# Patient Record
Sex: MALE | Race: WHITE | NOT HISPANIC OR LATINO
[De-identification: names, ages, dates, MRNs, and addresses within clinical notes are randomized per-mention and may not be internally consistent; named-entity substitution may affect disease eponyms.]

---

## 2015-06-22 RX ORDER — FUROSEMIDE 40 MG
1 TABLET ORAL
Qty: 0 | Refills: 0 | COMMUNITY
Start: 2015-06-22

## 2015-06-22 RX ORDER — CARVEDILOL PHOSPHATE 80 MG/1
0.5 CAPSULE, EXTENDED RELEASE ORAL
Qty: 0 | Refills: 0 | COMMUNITY
Start: 2015-06-22

## 2017-02-23 ENCOUNTER — TRANSCRIPTION ENCOUNTER (OUTPATIENT)
Age: 67
End: 2017-02-23

## 2017-03-28 ENCOUNTER — APPOINTMENT (OUTPATIENT)
Dept: CARDIOLOGY | Facility: CLINIC | Age: 67
End: 2017-03-28

## 2017-04-04 ENCOUNTER — APPOINTMENT (OUTPATIENT)
Dept: CARDIOLOGY | Facility: CLINIC | Age: 67
End: 2017-04-04

## 2017-04-04 VITALS
OXYGEN SATURATION: 100 % | HEIGHT: 71.5 IN | HEART RATE: 82 BPM | BODY MASS INDEX: 34.51 KG/M2 | SYSTOLIC BLOOD PRESSURE: 113 MMHG | WEIGHT: 252 LBS | DIASTOLIC BLOOD PRESSURE: 67 MMHG

## 2017-06-08 ENCOUNTER — APPOINTMENT (OUTPATIENT)
Dept: VASCULAR SURGERY | Facility: CLINIC | Age: 67
End: 2017-06-08

## 2017-06-08 VITALS
SYSTOLIC BLOOD PRESSURE: 116 MMHG | TEMPERATURE: 97.9 F | HEIGHT: 72 IN | DIASTOLIC BLOOD PRESSURE: 79 MMHG | BODY MASS INDEX: 33.86 KG/M2 | WEIGHT: 250 LBS | HEART RATE: 73 BPM

## 2017-07-05 ENCOUNTER — APPOINTMENT (OUTPATIENT)
Dept: ULTRASOUND IMAGING | Facility: CLINIC | Age: 67
End: 2017-07-05

## 2017-07-05 ENCOUNTER — OUTPATIENT (OUTPATIENT)
Dept: OUTPATIENT SERVICES | Facility: HOSPITAL | Age: 67
LOS: 1 days | End: 2017-07-05
Payer: COMMERCIAL

## 2017-07-05 DIAGNOSIS — Z00.8 ENCOUNTER FOR OTHER GENERAL EXAMINATION: ICD-10-CM

## 2017-07-05 DIAGNOSIS — N18.1 CHRONIC KIDNEY DISEASE, STAGE 1: ICD-10-CM

## 2017-07-05 DIAGNOSIS — R74.0 NONSPECIFIC ELEVATION OF LEVELS OF TRANSAMINASE AND LACTIC ACID DEHYDROGENASE [LDH]: ICD-10-CM

## 2017-07-05 PROCEDURE — 76700 US EXAM ABDOM COMPLETE: CPT

## 2017-07-10 ENCOUNTER — LABORATORY RESULT (OUTPATIENT)
Age: 67
End: 2017-07-10

## 2017-07-10 ENCOUNTER — APPOINTMENT (OUTPATIENT)
Age: 67
End: 2017-07-10

## 2017-07-10 VITALS
HEART RATE: 100 BPM | RESPIRATION RATE: 17 BRPM | TEMPERATURE: 97.9 F | HEIGHT: 71.5 IN | SYSTOLIC BLOOD PRESSURE: 101 MMHG | WEIGHT: 257 LBS | BODY MASS INDEX: 35.19 KG/M2 | DIASTOLIC BLOOD PRESSURE: 62 MMHG

## 2017-07-10 DIAGNOSIS — Z86.39 PERSONAL HISTORY OF OTHER ENDOCRINE, NUTRITIONAL AND METABOLIC DISEASE: ICD-10-CM

## 2017-07-10 DIAGNOSIS — R79.89 OTHER SPECIFIED ABNORMAL FINDINGS OF BLOOD CHEMISTRY: ICD-10-CM

## 2017-07-10 LAB
ALBUMIN SERPL ELPH-MCNC: 3.7 G/DL
ALP BLD-CCNC: 109 U/L
ALT SERPL-CCNC: 28 U/L
ANION GAP SERPL CALC-SCNC: 17 MMOL/L
AST SERPL-CCNC: 45 U/L
BILIRUB SERPL-MCNC: 0.6 MG/DL
BUN SERPL-MCNC: 38 MG/DL
CALCIUM SERPL-MCNC: 8.7 MG/DL
CHLORIDE SERPL-SCNC: 103 MMOL/L
CHOLEST SERPL-MCNC: 116 MG/DL
CHOLEST/HDLC SERPL: 3.1 RATIO
CO2 SERPL-SCNC: 22 MMOL/L
CREAT SERPL-MCNC: 1.68 MG/DL
FERRITIN SERPL-MCNC: 51 NG/ML
GLUCOSE SERPL-MCNC: 102 MG/DL
HBA1C MFR BLD HPLC: 8.2 %
HDLC SERPL-MCNC: 38 MG/DL
INR PPP: 1.16 RATIO
IRON SATN MFR SERPL: 11 %
IRON SERPL-MCNC: 32 UG/DL
LDLC SERPL CALC-MCNC: 43 MG/DL
POTASSIUM SERPL-SCNC: 4.2 MMOL/L
PROT SERPL-MCNC: 7.1 G/DL
PT BLD: 13.1 SEC
SODIUM SERPL-SCNC: 142 MMOL/L
T4 FREE SERPL-MCNC: 1.2 NG/DL
TIBC SERPL-MCNC: 290 UG/DL
TRIGL SERPL-MCNC: 174 MG/DL
TSH SERPL-ACNC: 11.84 UIU/ML
UIBC SERPL-MCNC: 258 UG/DL

## 2017-07-11 LAB
BASOPHILS # BLD AUTO: 0.01 K/UL
BASOPHILS NFR BLD AUTO: 0.3 %
EOSINOPHIL # BLD AUTO: 0.39 K/UL
EOSINOPHIL NFR BLD AUTO: 12.5 %
HCT VFR BLD CALC: 31.9 %
HGB BLD-MCNC: 10 G/DL
IMM GRANULOCYTES NFR BLD AUTO: 0 %
LYMPHOCYTES # BLD AUTO: 0.72 K/UL
LYMPHOCYTES NFR BLD AUTO: 23.2 %
MAN DIFF?: NORMAL
MCHC RBC-ENTMCNC: 29.9 PG
MCHC RBC-ENTMCNC: 31.3 GM/DL
MCV RBC AUTO: 95.5 FL
MONOCYTES # BLD AUTO: 0.29 K/UL
MONOCYTES NFR BLD AUTO: 9.3 %
NEUTROPHILS # BLD AUTO: 1.7 K/UL
NEUTROPHILS NFR BLD AUTO: 54.7 %
PLATELET # BLD AUTO: 46 K/UL
RBC # BLD: 3.34 M/UL
RBC # FLD: 16 %
TTG IGA SER IA-ACNC: 5.8 UNITS
TTG IGA SER-ACNC: NEGATIVE
TTG IGG SER IA-ACNC: <5 UNITS
TTG IGG SER IA-ACNC: NEGATIVE
WBC # FLD AUTO: 3.11 K/UL

## 2017-07-12 LAB
A1AT SERPL-MCNC: 158 MG/DL
AFP-TM SERPL-MCNC: 1 NG/ML
ANA SER IF-ACNC: NEGATIVE
CERULOPLASMIN SERPL-MCNC: 40 MG/DL
DEPRECATED KAPPA LC FREE/LAMBDA SER: 2.37 RATIO
IGA SER QL IEP: 281 MG/DL
IGG SER QL IEP: 1240 MG/DL
IGM SER QL IEP: 100 MG/DL
KAPPA LC CSF-MCNC: 3.11 MG/DL
KAPPA LC SERPL-MCNC: 7.37 MG/DL
LKM AB SER QL IF: 1.4 UNITS
MITOCHONDRIA AB SER IF-ACNC: NORMAL
SMOOTH MUSCLE AB SER QL IF: NORMAL

## 2017-07-17 ENCOUNTER — APPOINTMENT (OUTPATIENT)
Age: 67
End: 2017-07-17

## 2017-07-27 ENCOUNTER — APPOINTMENT (OUTPATIENT)
Dept: ENDOCRINOLOGY | Facility: CLINIC | Age: 67
End: 2017-07-27
Payer: COMMERCIAL

## 2017-07-27 VITALS
DIASTOLIC BLOOD PRESSURE: 80 MMHG | HEIGHT: 72 IN | HEART RATE: 51 BPM | WEIGHT: 247 LBS | BODY MASS INDEX: 33.46 KG/M2 | SYSTOLIC BLOOD PRESSURE: 120 MMHG | OXYGEN SATURATION: 97 %

## 2017-07-27 PROCEDURE — 99204 OFFICE O/P NEW MOD 45 MIN: CPT

## 2017-07-27 RX ORDER — DEXTROSE 4 G
4-6 TABLET,CHEWABLE ORAL
Qty: 1 | Refills: 5 | Status: ACTIVE | COMMUNITY
Start: 2017-07-27 | End: 1900-01-01

## 2017-07-28 LAB — PROLACTIN SERPL-MCNC: 1.1 NG/ML

## 2017-07-29 ENCOUNTER — OUTPATIENT (OUTPATIENT)
Dept: OUTPATIENT SERVICES | Facility: HOSPITAL | Age: 67
LOS: 1 days | End: 2017-07-29
Payer: COMMERCIAL

## 2017-07-29 ENCOUNTER — APPOINTMENT (OUTPATIENT)
Dept: RADIOLOGY | Facility: IMAGING CENTER | Age: 67
End: 2017-07-29
Payer: COMMERCIAL

## 2017-07-29 ENCOUNTER — RX RENEWAL (OUTPATIENT)
Age: 67
End: 2017-07-29

## 2017-07-29 DIAGNOSIS — Z00.8 ENCOUNTER FOR OTHER GENERAL EXAMINATION: ICD-10-CM

## 2017-07-29 PROCEDURE — 71020: CPT | Mod: 26

## 2017-07-29 PROCEDURE — 71046 X-RAY EXAM CHEST 2 VIEWS: CPT

## 2017-08-01 LAB
MONOMERIC PROLACTIN (ICMA)*: 0.9 NG/ML
PERCENT MACROPROLACTIN: 0 %
PROLACTIN, SERUM (ICMA)*: 0.9 NG/ML

## 2017-08-15 ENCOUNTER — NON-APPOINTMENT (OUTPATIENT)
Age: 67
End: 2017-08-15

## 2017-08-15 ENCOUNTER — APPOINTMENT (OUTPATIENT)
Dept: CARDIOLOGY | Facility: CLINIC | Age: 67
End: 2017-08-15
Payer: COMMERCIAL

## 2017-08-15 VITALS
HEIGHT: 72 IN | BODY MASS INDEX: 31.56 KG/M2 | WEIGHT: 233 LBS | DIASTOLIC BLOOD PRESSURE: 60 MMHG | SYSTOLIC BLOOD PRESSURE: 96 MMHG | OXYGEN SATURATION: 98 % | HEART RATE: 97 BPM

## 2017-08-15 PROCEDURE — 93000 ELECTROCARDIOGRAM COMPLETE: CPT

## 2017-08-15 PROCEDURE — 99215 OFFICE O/P EST HI 40 MIN: CPT

## 2017-08-22 ENCOUNTER — OUTPATIENT (OUTPATIENT)
Dept: OUTPATIENT SERVICES | Facility: HOSPITAL | Age: 67
LOS: 1 days | End: 2017-08-22
Payer: COMMERCIAL

## 2017-08-22 VITALS
HEART RATE: 90 BPM | HEIGHT: 72 IN | SYSTOLIC BLOOD PRESSURE: 106 MMHG | OXYGEN SATURATION: 99 % | RESPIRATION RATE: 16 BRPM | WEIGHT: 227.96 LBS | TEMPERATURE: 97 F | DIASTOLIC BLOOD PRESSURE: 69 MMHG

## 2017-08-22 DIAGNOSIS — Z89.9 ACQUIRED ABSENCE OF LIMB, UNSPECIFIED: Chronic | ICD-10-CM

## 2017-08-22 DIAGNOSIS — K74.60 UNSPECIFIED CIRRHOSIS OF LIVER: ICD-10-CM

## 2017-08-22 DIAGNOSIS — Z01.818 ENCOUNTER FOR OTHER PREPROCEDURAL EXAMINATION: ICD-10-CM

## 2017-08-22 DIAGNOSIS — E11.9 TYPE 2 DIABETES MELLITUS WITHOUT COMPLICATIONS: ICD-10-CM

## 2017-08-22 DIAGNOSIS — I25.10 ATHEROSCLEROTIC HEART DISEASE OF NATIVE CORONARY ARTERY WITHOUT ANGINA PECTORIS: ICD-10-CM

## 2017-08-22 DIAGNOSIS — D64.9 ANEMIA, UNSPECIFIED: ICD-10-CM

## 2017-08-22 DIAGNOSIS — G47.30 SLEEP APNEA, UNSPECIFIED: ICD-10-CM

## 2017-08-22 LAB
ANION GAP SERPL CALC-SCNC: 17 MMOL/L — SIGNIFICANT CHANGE UP (ref 5–17)
BUN SERPL-MCNC: 40 MG/DL — HIGH (ref 7–23)
CALCIUM SERPL-MCNC: 9.6 MG/DL — SIGNIFICANT CHANGE UP (ref 8.4–10.5)
CHLORIDE SERPL-SCNC: 101 MMOL/L — SIGNIFICANT CHANGE UP (ref 96–108)
CO2 SERPL-SCNC: 22 MMOL/L — SIGNIFICANT CHANGE UP (ref 22–31)
CREAT SERPL-MCNC: 1.69 MG/DL — HIGH (ref 0.5–1.3)
GLUCOSE SERPL-MCNC: 147 MG/DL — HIGH (ref 70–99)
HCT VFR BLD CALC: 35.8 % — LOW (ref 39–50)
HGB BLD-MCNC: 11.3 G/DL — LOW (ref 13–17)
MCHC RBC-ENTMCNC: 29.7 PG — SIGNIFICANT CHANGE UP (ref 27–34)
MCHC RBC-ENTMCNC: 31.6 GM/DL — LOW (ref 32–36)
MCV RBC AUTO: 94 FL — SIGNIFICANT CHANGE UP (ref 80–100)
PLATELET # BLD AUTO: 55 K/UL — LOW (ref 150–400)
POTASSIUM SERPL-MCNC: 4.7 MMOL/L — SIGNIFICANT CHANGE UP (ref 3.5–5.3)
POTASSIUM SERPL-SCNC: 4.7 MMOL/L — SIGNIFICANT CHANGE UP (ref 3.5–5.3)
RBC # BLD: 3.81 M/UL — LOW (ref 4.2–5.8)
RBC # FLD: 16.4 % — HIGH (ref 10.3–14.5)
SODIUM SERPL-SCNC: 140 MMOL/L — SIGNIFICANT CHANGE UP (ref 135–145)
WBC # BLD: 3.87 K/UL — SIGNIFICANT CHANGE UP (ref 3.8–10.5)
WBC # FLD AUTO: 3.87 K/UL — SIGNIFICANT CHANGE UP (ref 3.8–10.5)

## 2017-08-22 PROCEDURE — 85027 COMPLETE CBC AUTOMATED: CPT

## 2017-08-22 PROCEDURE — G0463: CPT

## 2017-08-22 PROCEDURE — 80048 BASIC METABOLIC PNL TOTAL CA: CPT

## 2017-08-22 NOTE — H&P PST ADULT - NEGATIVE GASTROINTESTINAL SYMPTOMS
no melena/no diarrhea/no constipation/no jaundice/no abdominal pain/no change in bowel habits/no nausea/no vomiting

## 2017-08-22 NOTE — H&P PST ADULT - PSH
AICD (automatic cardioverter/defibrillator) present  placement in (2006)  Coronary atherosclerosis of artery bypass graft  CABG X 4 (1986)  Stented coronary artery  RCA (1999) AICD (automatic cardioverter/defibrillator) present  placement in (2006)  Coronary atherosclerosis of artery bypass graft  CABG X 4 (1986)  History of amputation  right foot, 2nd toe, osteo 2015  Stented coronary artery  RCA (1999)

## 2017-08-22 NOTE — H&P PST ADULT - PMH
AICD lead malfunction    Coronary artery disease    DM (diabetes mellitus)    Heart failure with reduced left ventricular function    HLD (hyperlipidemia)    HTN (hypertension)    Ischemic cardiomyopathy    Pituitary adenoma    Presence of stent in coronary artery  2 stents  Thrombocytopenia AICD lead malfunction  history of  Anemia    Coronary artery disease    DM (diabetes mellitus)  type 2, Hg A1C 8.2 % ( 7/10/17)  Heart failure with reduced left ventricular function  EF 37%  Hepatic cirrhosis, unspecified hepatic cirrhosis type    History of hyperprolactinemia    History of osteomyelitis  2015, right foot 2nd toe  HLD (hyperlipidemia)    HTN (hypertension)    ICD (implantable cardioverter-defibrillator) in place  [employer]tr[employer], Model [id number] , last interrogation 4/2017  Ischemic cardiomyopathy    Pituitary adenoma  as per patient chronic, no changes , recently restarted follow up with endocrinologist ( note in allscripts )  Presence of stent in coronary artery  2 stents  PVD (peripheral vascular disease)    Sleep apnea  not using CPAP  Thrombocytopenia  Chronic AICD lead malfunction  history of  Anemia    Coronary artery disease    DM (diabetes mellitus)  type 2, Hg A1C 8.2 % ( 7/10/17)  Heart failure with reduced left ventricular function  EF 37%  Hepatic cirrhosis, unspecified hepatic cirrhosis type    History of hyperprolactinemia    History of osteomyelitis  2015, right foot 2nd toe  HLD (hyperlipidemia)    HTN (hypertension)    Hypothyroidism    ICD (implantable cardioverter-defibrillator) in place  Medtronic, Model [id number] , last interrogation 4/2017  Ischemic cardiomyopathy    Pituitary adenoma  as per patient chronic, no changes , recently restarted follow up with endocrinologist ( note in allscripts )  Presence of stent in coronary artery  2 stents  PVD (peripheral vascular disease)    Sleep apnea  not using CPAP  Thrombocytopenia  Chronic

## 2017-08-22 NOTE — H&P PST ADULT - NEGATIVE CARDIOVASCULAR SYMPTOMS
no paroxysmal nocturnal dyspnea/no claudication/no peripheral edema/no chest pain/no orthopnea/no dyspnea on exertion/no palpitations

## 2017-08-22 NOTE — H&P PST ADULT - HISTORY OF PRESENT ILLNESS
66 y/o Male with a pmhx significant for Type 2 DM with Htn, HLD, thrombocytopenia, CAD s/p stents and 4V cabg, S/p Medtronic AICD, and chronic R diabetic foot ulcer and R 2nd toe Ulcer. Was sent in by Surgeon, Dr Tristan, for worsening foot infection and further evaluation for possible surgical management. Patient reports he has been on oral keflex 500mg bid since January.  He has been keeping all scheduled appointments with ID specialist and his weekly appt with his podiatrist. He denies pain, purulent drainage or increase drainage from wound. No fever, chills, cough, chest pain, sob, headaches, dizziness, or recent injury. 68 y/o Male with a pmhx significant for Type 2 DM with HTN, HLD, thrombocytopenia, CAD, CABG x 4 (1986),  s/p 2 cardiac stents ( 1999) on ASA, CHF, Medtronic AICD, hypothyroidism,  Anemia, liver cirrhosis unknown etiology, presents to [facility] for scheduled EGD and colonoscopy on 8/29/17. Denies fever, chills, no acute complains.

## 2017-08-22 NOTE — H&P PST ADULT - OTHER CARE PROVIDERS
Dr Cuadra-podiatry; Dr Tristan- Surgeon, Vascular- Dr Brooke cardiologist Dr. Douglas Rivers [phone], podiatrist  Dr Tristan- [phone], hepatologist Dr. Inderjit Alaniz [phone], Endocrine Dr. Nicole Hennessy

## 2017-08-22 NOTE — H&P PST ADULT - NS MD HP INPLANTS MED DEV
Pacemaker/Automatic Implantable Cardioverter Defibrillator/cardiac stents 2 cardiac stents/Automatic Implantable Cardioverter Defibrillator

## 2017-08-29 ENCOUNTER — OUTPATIENT (OUTPATIENT)
Dept: OUTPATIENT SERVICES | Facility: HOSPITAL | Age: 67
LOS: 1 days | End: 2017-08-29
Payer: COMMERCIAL

## 2017-08-29 ENCOUNTER — APPOINTMENT (OUTPATIENT)
Age: 67
End: 2017-08-29

## 2017-08-29 DIAGNOSIS — Z89.9 ACQUIRED ABSENCE OF LIMB, UNSPECIFIED: Chronic | ICD-10-CM

## 2017-08-29 DIAGNOSIS — K74.60 UNSPECIFIED CIRRHOSIS OF LIVER: ICD-10-CM

## 2017-08-29 PROCEDURE — 43235 EGD DIAGNOSTIC BRUSH WASH: CPT | Mod: GC

## 2017-08-29 PROCEDURE — 45378 DIAGNOSTIC COLONOSCOPY: CPT | Mod: GC

## 2017-08-29 PROCEDURE — 43235 EGD DIAGNOSTIC BRUSH WASH: CPT

## 2017-08-29 PROCEDURE — G0121: CPT

## 2017-09-06 ENCOUNTER — APPOINTMENT (OUTPATIENT)
Dept: ENDOCRINOLOGY | Facility: CLINIC | Age: 67
End: 2017-09-06
Payer: COMMERCIAL

## 2017-09-06 VITALS
SYSTOLIC BLOOD PRESSURE: 90 MMHG | BODY MASS INDEX: 30.88 KG/M2 | HEIGHT: 72 IN | WEIGHT: 228 LBS | HEART RATE: 106 BPM | DIASTOLIC BLOOD PRESSURE: 60 MMHG | OXYGEN SATURATION: 99 %

## 2017-09-06 PROCEDURE — 99215 OFFICE O/P EST HI 40 MIN: CPT

## 2017-09-07 ENCOUNTER — RESULT CHARGE (OUTPATIENT)
Age: 67
End: 2017-09-07

## 2017-09-07 LAB
ALBUMIN SERPL ELPH-MCNC: 3.9 G/DL
ALP BLD-CCNC: 114 U/L
ALT SERPL-CCNC: 28 U/L
ANION GAP SERPL CALC-SCNC: 19 MMOL/L
AST SERPL-CCNC: 42 U/L
BILIRUB SERPL-MCNC: 1 MG/DL
BUN SERPL-MCNC: 37 MG/DL
CALCIUM SERPL-MCNC: 9.1 MG/DL
CHLORIDE SERPL-SCNC: 104 MMOL/L
CO2 SERPL-SCNC: 23 MMOL/L
CREAT SERPL-MCNC: 1.69 MG/DL
CREAT SPEC-SCNC: 198 MG/DL
GLUCOSE SERPL-MCNC: 163 MG/DL
HBA1C MFR BLD HPLC: 6.7 %
MICROALBUMIN 24H UR DL<=1MG/L-MCNC: 8.9 MG/DL
MICROALBUMIN/CREAT 24H UR-RTO: 45 MG/G
POTASSIUM SERPL-SCNC: 4.4 MMOL/L
PROT SERPL-MCNC: 7 G/DL
SODIUM SERPL-SCNC: 146 MMOL/L
T4 FREE SERPL-MCNC: 1.4 NG/DL
TSH SERPL-ACNC: 7.83 UIU/ML

## 2017-09-15 ENCOUNTER — CLINICAL ADVICE (OUTPATIENT)
Age: 67
End: 2017-09-15

## 2017-09-28 ENCOUNTER — APPOINTMENT (OUTPATIENT)
Age: 67
End: 2017-09-28
Payer: COMMERCIAL

## 2017-09-28 ENCOUNTER — LABORATORY RESULT (OUTPATIENT)
Age: 67
End: 2017-09-28

## 2017-09-28 VITALS
TEMPERATURE: 97.7 F | HEART RATE: 89 BPM | RESPIRATION RATE: 17 BRPM | SYSTOLIC BLOOD PRESSURE: 112 MMHG | WEIGHT: 234 LBS | DIASTOLIC BLOOD PRESSURE: 73 MMHG | HEIGHT: 72 IN | BODY MASS INDEX: 31.69 KG/M2

## 2017-09-28 LAB
ALBUMIN SERPL ELPH-MCNC: 3.7 G/DL
ALP BLD-CCNC: 132 U/L
ALT SERPL-CCNC: 41 U/L
ANION GAP SERPL CALC-SCNC: 13 MMOL/L
AST SERPL-CCNC: 50 U/L
BASOPHILS # BLD AUTO: 0.01 K/UL
BASOPHILS NFR BLD AUTO: 0.3 %
BILIRUB SERPL-MCNC: 0.8 MG/DL
BUN SERPL-MCNC: 31 MG/DL
CALCIUM SERPL-MCNC: 9.3 MG/DL
CHLORIDE SERPL-SCNC: 106 MMOL/L
CO2 SERPL-SCNC: 24 MMOL/L
CREAT SERPL-MCNC: 1.45 MG/DL
EOSINOPHIL # BLD AUTO: 0.52 K/UL
EOSINOPHIL NFR BLD AUTO: 14 %
GLUCOSE SERPL-MCNC: 91 MG/DL
HCT VFR BLD CALC: 30.8 %
HGB BLD-MCNC: 9.7 G/DL
IMM GRANULOCYTES NFR BLD AUTO: 0 %
INR PPP: 1.19 RATIO
LYMPHOCYTES # BLD AUTO: 1.02 K/UL
LYMPHOCYTES NFR BLD AUTO: 27.4 %
MAN DIFF?: NORMAL
MCHC RBC-ENTMCNC: 30 PG
MCHC RBC-ENTMCNC: 31.5 GM/DL
MCV RBC AUTO: 95.4 FL
MONOCYTES # BLD AUTO: 0.35 K/UL
MONOCYTES NFR BLD AUTO: 9.4 %
NEUTROPHILS # BLD AUTO: 1.82 K/UL
NEUTROPHILS NFR BLD AUTO: 48.9 %
PLATELET # BLD AUTO: 47 K/UL
POTASSIUM SERPL-SCNC: 4.4 MMOL/L
PROT SERPL-MCNC: 7.2 G/DL
PT BLD: 13.5 SEC
RBC # BLD: 3.23 M/UL
RBC # FLD: 16 %
SODIUM SERPL-SCNC: 143 MMOL/L
WBC # FLD AUTO: 3.72 K/UL

## 2017-09-28 PROCEDURE — 99214 OFFICE O/P EST MOD 30 MIN: CPT

## 2017-09-29 LAB — AFP-TM SERPL-MCNC: 1.3 NG/ML

## 2017-10-10 ENCOUNTER — APPOINTMENT (OUTPATIENT)
Dept: CARDIOLOGY | Facility: CLINIC | Age: 67
End: 2017-10-10
Payer: COMMERCIAL

## 2017-10-10 ENCOUNTER — RX RENEWAL (OUTPATIENT)
Age: 67
End: 2017-10-10

## 2017-10-10 ENCOUNTER — NON-APPOINTMENT (OUTPATIENT)
Age: 67
End: 2017-10-10

## 2017-10-10 VITALS
HEART RATE: 92 BPM | BODY MASS INDEX: 31.83 KG/M2 | DIASTOLIC BLOOD PRESSURE: 70 MMHG | SYSTOLIC BLOOD PRESSURE: 116 MMHG | HEIGHT: 72 IN | WEIGHT: 235 LBS

## 2017-10-10 DIAGNOSIS — I48.3 TYPICAL ATRIAL FLUTTER: ICD-10-CM

## 2017-10-10 PROCEDURE — 93283 PRGRMG EVAL IMPLANTABLE DFB: CPT

## 2017-10-10 PROCEDURE — 99215 OFFICE O/P EST HI 40 MIN: CPT

## 2017-10-11 ENCOUNTER — MOBILE ON CALL (OUTPATIENT)
Age: 67
End: 2017-10-11

## 2017-10-18 ENCOUNTER — NON-APPOINTMENT (OUTPATIENT)
Age: 67
End: 2017-10-18

## 2017-10-18 ENCOUNTER — APPOINTMENT (OUTPATIENT)
Dept: ELECTROPHYSIOLOGY | Facility: CLINIC | Age: 67
End: 2017-10-18
Payer: COMMERCIAL

## 2017-10-18 VITALS
HEIGHT: 72 IN | HEART RATE: 86 BPM | DIASTOLIC BLOOD PRESSURE: 69 MMHG | WEIGHT: 230 LBS | OXYGEN SATURATION: 99 % | BODY MASS INDEX: 31.15 KG/M2 | SYSTOLIC BLOOD PRESSURE: 102 MMHG

## 2017-10-18 PROCEDURE — 93000 ELECTROCARDIOGRAM COMPLETE: CPT

## 2017-10-18 PROCEDURE — 99215 OFFICE O/P EST HI 40 MIN: CPT

## 2017-11-07 ENCOUNTER — NON-APPOINTMENT (OUTPATIENT)
Age: 67
End: 2017-11-07

## 2017-11-07 ENCOUNTER — APPOINTMENT (OUTPATIENT)
Dept: CARDIOLOGY | Facility: CLINIC | Age: 67
End: 2017-11-07
Payer: COMMERCIAL

## 2017-11-07 VITALS
WEIGHT: 223.2 LBS | DIASTOLIC BLOOD PRESSURE: 67 MMHG | HEART RATE: 86 BPM | BODY MASS INDEX: 30.23 KG/M2 | TEMPERATURE: 97.4 F | SYSTOLIC BLOOD PRESSURE: 107 MMHG | OXYGEN SATURATION: 99 % | HEIGHT: 72 IN

## 2017-11-07 PROCEDURE — 93000 ELECTROCARDIOGRAM COMPLETE: CPT

## 2017-11-07 PROCEDURE — 99215 OFFICE O/P EST HI 40 MIN: CPT

## 2017-11-22 ENCOUNTER — APPOINTMENT (OUTPATIENT)
Dept: ELECTROPHYSIOLOGY | Facility: CLINIC | Age: 67
End: 2017-11-22

## 2018-01-02 ENCOUNTER — APPOINTMENT (OUTPATIENT)
Dept: CARDIOLOGY | Facility: CLINIC | Age: 68
End: 2018-01-02
Payer: COMMERCIAL

## 2018-01-02 VITALS
SYSTOLIC BLOOD PRESSURE: 104 MMHG | HEIGHT: 72 IN | DIASTOLIC BLOOD PRESSURE: 67 MMHG | WEIGHT: 214 LBS | BODY MASS INDEX: 28.99 KG/M2

## 2018-01-02 PROCEDURE — 99212 OFFICE O/P EST SF 10 MIN: CPT

## 2018-01-02 PROCEDURE — 93283 PRGRMG EVAL IMPLANTABLE DFB: CPT

## 2018-01-02 PROCEDURE — ZZZZZ: CPT

## 2018-01-08 ENCOUNTER — APPOINTMENT (OUTPATIENT)
Dept: ENDOCRINOLOGY | Facility: CLINIC | Age: 68
End: 2018-01-08
Payer: COMMERCIAL

## 2018-01-08 VITALS
BODY MASS INDEX: 28.71 KG/M2 | OXYGEN SATURATION: 98 % | DIASTOLIC BLOOD PRESSURE: 80 MMHG | WEIGHT: 212 LBS | HEART RATE: 70 BPM | HEIGHT: 72 IN | SYSTOLIC BLOOD PRESSURE: 110 MMHG

## 2018-01-08 LAB
GLUCOSE BLDC GLUCOMTR-MCNC: 39
GLUCOSE BLDC GLUCOMTR-MCNC: 52
GLUCOSE BLDC GLUCOMTR-MCNC: 53
HBA1C MFR BLD HPLC: 6.8

## 2018-01-08 PROCEDURE — 99215 OFFICE O/P EST HI 40 MIN: CPT

## 2018-01-08 RX ORDER — SODIUM SULFATE, POTASSIUM SULFATE, MAGNESIUM SULFATE 17.5; 3.13; 1.6 G/ML; G/ML; G/ML
17.5-3.13-1.6 SOLUTION, CONCENTRATE ORAL
Qty: 1 | Refills: 0 | Status: DISCONTINUED | COMMUNITY
Start: 2017-07-20 | End: 2018-01-08

## 2018-01-10 LAB — GLUCOSE BLDC GLUCOMTR-MCNC: 62

## 2018-02-18 ENCOUNTER — TRANSCRIPTION ENCOUNTER (OUTPATIENT)
Age: 68
End: 2018-02-18

## 2018-02-20 ENCOUNTER — NON-APPOINTMENT (OUTPATIENT)
Age: 68
End: 2018-02-20

## 2018-02-20 ENCOUNTER — APPOINTMENT (OUTPATIENT)
Dept: CARDIOLOGY | Facility: CLINIC | Age: 68
End: 2018-02-20
Payer: COMMERCIAL

## 2018-02-20 ENCOUNTER — OUTPATIENT (OUTPATIENT)
Dept: OUTPATIENT SERVICES | Facility: HOSPITAL | Age: 68
LOS: 1 days | End: 2018-02-20
Payer: COMMERCIAL

## 2018-02-20 ENCOUNTER — APPOINTMENT (OUTPATIENT)
Dept: UROLOGY | Facility: CLINIC | Age: 68
End: 2018-02-20
Payer: COMMERCIAL

## 2018-02-20 VITALS
BODY MASS INDEX: 28.71 KG/M2 | HEIGHT: 72 IN | HEART RATE: 114 BPM | DIASTOLIC BLOOD PRESSURE: 79 MMHG | WEIGHT: 212 LBS | RESPIRATION RATE: 18 BRPM | SYSTOLIC BLOOD PRESSURE: 126 MMHG

## 2018-02-20 VITALS
WEIGHT: 256 LBS | BODY MASS INDEX: 34.67 KG/M2 | HEIGHT: 72 IN | HEART RATE: 104 BPM | SYSTOLIC BLOOD PRESSURE: 122 MMHG | DIASTOLIC BLOOD PRESSURE: 76 MMHG | OXYGEN SATURATION: 100 %

## 2018-02-20 DIAGNOSIS — N43.3 HYDROCELE, UNSPECIFIED: ICD-10-CM

## 2018-02-20 DIAGNOSIS — N50.89 OTHER SPECIFIED DISORDERS OF THE MALE GENITAL ORGANS: ICD-10-CM

## 2018-02-20 DIAGNOSIS — Z89.9 ACQUIRED ABSENCE OF LIMB, UNSPECIFIED: Chronic | ICD-10-CM

## 2018-02-20 PROCEDURE — 93975 VASCULAR STUDY: CPT | Mod: 26

## 2018-02-20 PROCEDURE — 93975 VASCULAR STUDY: CPT

## 2018-02-20 PROCEDURE — 99215 OFFICE O/P EST HI 40 MIN: CPT

## 2018-02-20 PROCEDURE — 93000 ELECTROCARDIOGRAM COMPLETE: CPT

## 2018-02-20 PROCEDURE — 76870 US EXAM SCROTUM: CPT

## 2018-02-20 PROCEDURE — 76870 US EXAM SCROTUM: CPT | Mod: 26

## 2018-02-20 PROCEDURE — 36415 COLL VENOUS BLD VENIPUNCTURE: CPT

## 2018-02-20 PROCEDURE — 99244 OFF/OP CNSLTJ NEW/EST MOD 40: CPT | Mod: 25

## 2018-02-20 RX ORDER — CARVEDILOL 3.12 MG/1
TABLET, FILM COATED ORAL
Refills: 0 | Status: COMPLETED | COMMUNITY
End: 2018-02-20

## 2018-02-20 RX ORDER — BROMOCRIPTINE MESYLATE 5 MG/1
5 CAPSULE, GELATIN COATED ORAL
Refills: 0 | Status: COMPLETED | COMMUNITY
End: 2018-02-20

## 2018-02-20 RX ORDER — RAMIPRIL 5 MG/1
CAPSULE ORAL
Refills: 0 | Status: COMPLETED | COMMUNITY
End: 2018-02-20

## 2018-02-20 RX ORDER — ATORVASTATIN CALCIUM 80 MG/1
TABLET, FILM COATED ORAL
Refills: 0 | Status: COMPLETED | COMMUNITY
End: 2018-02-20

## 2018-02-20 RX ORDER — ASPIRIN 81 MG
81 TABLET, DELAYED RELEASE (ENTERIC COATED) ORAL
Refills: 0 | Status: COMPLETED | COMMUNITY
End: 2018-02-20

## 2018-02-20 RX ORDER — SITAGLIPTIN 100 MG/1
TABLET, FILM COATED ORAL
Refills: 0 | Status: COMPLETED | COMMUNITY
End: 2018-02-20

## 2018-02-21 LAB
ANION GAP SERPL CALC-SCNC: 12 MMOL/L
BUN SERPL-MCNC: 33 MG/DL
CALCIUM SERPL-MCNC: 8.7 MG/DL
CHLORIDE SERPL-SCNC: 106 MMOL/L
CO2 SERPL-SCNC: 27 MMOL/L
CREAT SERPL-MCNC: 1.33 MG/DL
GLUCOSE SERPL-MCNC: 222 MG/DL
POTASSIUM SERPL-SCNC: 3.9 MMOL/L
SODIUM SERPL-SCNC: 145 MMOL/L

## 2018-02-23 DIAGNOSIS — N43.3 HYDROCELE, UNSPECIFIED: ICD-10-CM

## 2018-02-23 DIAGNOSIS — N50.89 OTHER SPECIFIED DISORDERS OF THE MALE GENITAL ORGANS: ICD-10-CM

## 2018-02-25 ENCOUNTER — TRANSCRIPTION ENCOUNTER (OUTPATIENT)
Age: 68
End: 2018-02-25

## 2018-03-02 ENCOUNTER — APPOINTMENT (OUTPATIENT)
Dept: CARDIOLOGY | Facility: CLINIC | Age: 68
End: 2018-03-02
Payer: COMMERCIAL

## 2018-03-02 ENCOUNTER — NON-APPOINTMENT (OUTPATIENT)
Age: 68
End: 2018-03-02

## 2018-03-02 VITALS
BODY MASS INDEX: 35.41 KG/M2 | OXYGEN SATURATION: 96 % | HEIGHT: 72 IN | SYSTOLIC BLOOD PRESSURE: 124 MMHG | DIASTOLIC BLOOD PRESSURE: 84 MMHG | WEIGHT: 261.4 LBS | HEART RATE: 110 BPM

## 2018-03-02 PROCEDURE — 93306 TTE W/DOPPLER COMPLETE: CPT

## 2018-03-02 PROCEDURE — 99215 OFFICE O/P EST HI 40 MIN: CPT

## 2018-03-02 PROCEDURE — 93000 ELECTROCARDIOGRAM COMPLETE: CPT

## 2018-03-05 ENCOUNTER — OUTPATIENT (OUTPATIENT)
Dept: OUTPATIENT SERVICES | Facility: HOSPITAL | Age: 68
LOS: 1 days | End: 2018-03-05
Payer: COMMERCIAL

## 2018-03-05 ENCOUNTER — APPOINTMENT (OUTPATIENT)
Dept: ULTRASOUND IMAGING | Facility: IMAGING CENTER | Age: 68
End: 2018-03-05
Payer: COMMERCIAL

## 2018-03-05 DIAGNOSIS — Z89.9 ACQUIRED ABSENCE OF LIMB, UNSPECIFIED: Chronic | ICD-10-CM

## 2018-03-05 DIAGNOSIS — K74.60 UNSPECIFIED CIRRHOSIS OF LIVER: ICD-10-CM

## 2018-03-05 PROCEDURE — 93975 VASCULAR STUDY: CPT

## 2018-03-05 PROCEDURE — 76700 US EXAM ABDOM COMPLETE: CPT

## 2018-03-05 PROCEDURE — 76700 US EXAM ABDOM COMPLETE: CPT | Mod: 26

## 2018-03-06 LAB
AFP-TM SERPL-MCNC: 1.6 NG/ML
ALBUMIN SERPL ELPH-MCNC: 3.5 G/DL
ALP BLD-CCNC: 153 U/L
ALT SERPL-CCNC: 32 U/L
ANION GAP SERPL CALC-SCNC: 17 MMOL/L
AST SERPL-CCNC: 44 U/L
BASOPHILS # BLD AUTO: 0.01 K/UL
BASOPHILS NFR BLD AUTO: 0.3 %
BILIRUB SERPL-MCNC: 0.7 MG/DL
BUN SERPL-MCNC: 41 MG/DL
CALCIUM SERPL-MCNC: 9.1 MG/DL
CHLORIDE SERPL-SCNC: 99 MMOL/L
CO2 SERPL-SCNC: 28 MMOL/L
CREAT SERPL-MCNC: 1.45 MG/DL
EOSINOPHIL # BLD AUTO: 0.23 K/UL
EOSINOPHIL NFR BLD AUTO: 6.5 %
GLUCOSE SERPL-MCNC: 91 MG/DL
HCT VFR BLD CALC: 31.3 %
HGB BLD-MCNC: 10.1 G/DL
IMM GRANULOCYTES NFR BLD AUTO: 0.3 %
INR PPP: 1.3 RATIO
LYMPHOCYTES # BLD AUTO: 0.85 K/UL
LYMPHOCYTES NFR BLD AUTO: 23.9 %
MAN DIFF?: NORMAL
MCHC RBC-ENTMCNC: 30.3 PG
MCHC RBC-ENTMCNC: 32.3 GM/DL
MCV RBC AUTO: 94 FL
MONOCYTES # BLD AUTO: 0.36 K/UL
MONOCYTES NFR BLD AUTO: 10.1 %
NEUTROPHILS # BLD AUTO: 2.09 K/UL
NEUTROPHILS NFR BLD AUTO: 58.9 %
PLATELET # BLD AUTO: 49 K/UL
POTASSIUM SERPL-SCNC: 4 MMOL/L
PROT SERPL-MCNC: 7 G/DL
PT BLD: 14.8 SEC
RBC # BLD: 3.33 M/UL
RBC # FLD: 17.2 %
SODIUM SERPL-SCNC: 144 MMOL/L
WBC # FLD AUTO: 3.55 K/UL

## 2018-03-09 ENCOUNTER — APPOINTMENT (OUTPATIENT)
Dept: CARDIOLOGY | Facility: CLINIC | Age: 68
End: 2018-03-09
Payer: COMMERCIAL

## 2018-03-09 ENCOUNTER — NON-APPOINTMENT (OUTPATIENT)
Age: 68
End: 2018-03-09

## 2018-03-09 VITALS
BODY MASS INDEX: 32.41 KG/M2 | WEIGHT: 239 LBS | DIASTOLIC BLOOD PRESSURE: 72 MMHG | HEART RATE: 113 BPM | OXYGEN SATURATION: 97 % | SYSTOLIC BLOOD PRESSURE: 110 MMHG

## 2018-03-09 PROCEDURE — 93000 ELECTROCARDIOGRAM COMPLETE: CPT

## 2018-03-09 PROCEDURE — 99215 OFFICE O/P EST HI 40 MIN: CPT

## 2018-03-12 ENCOUNTER — APPOINTMENT (OUTPATIENT)
Age: 68
End: 2018-03-12
Payer: COMMERCIAL

## 2018-03-12 VITALS
BODY MASS INDEX: 33.59 KG/M2 | DIASTOLIC BLOOD PRESSURE: 75 MMHG | HEART RATE: 111 BPM | SYSTOLIC BLOOD PRESSURE: 116 MMHG | HEIGHT: 72 IN | TEMPERATURE: 97.6 F | WEIGHT: 248 LBS | RESPIRATION RATE: 17 BRPM

## 2018-03-12 PROCEDURE — 99214 OFFICE O/P EST MOD 30 MIN: CPT

## 2018-03-23 ENCOUNTER — MEDICATION RENEWAL (OUTPATIENT)
Age: 68
End: 2018-03-23

## 2018-03-30 ENCOUNTER — APPOINTMENT (OUTPATIENT)
Dept: CARDIOLOGY | Facility: CLINIC | Age: 68
End: 2018-03-30
Payer: COMMERCIAL

## 2018-03-30 ENCOUNTER — NON-APPOINTMENT (OUTPATIENT)
Age: 68
End: 2018-03-30

## 2018-03-30 VITALS
DIASTOLIC BLOOD PRESSURE: 70 MMHG | WEIGHT: 216 LBS | SYSTOLIC BLOOD PRESSURE: 100 MMHG | OXYGEN SATURATION: 97 % | BODY MASS INDEX: 29.3 KG/M2 | HEART RATE: 108 BPM

## 2018-03-30 PROCEDURE — 99215 OFFICE O/P EST HI 40 MIN: CPT

## 2018-03-30 PROCEDURE — 93000 ELECTROCARDIOGRAM COMPLETE: CPT

## 2018-04-03 ENCOUNTER — APPOINTMENT (OUTPATIENT)
Dept: CARDIOLOGY | Facility: CLINIC | Age: 68
End: 2018-04-03
Payer: COMMERCIAL

## 2018-04-03 VITALS
HEART RATE: 106 BPM | BODY MASS INDEX: 29.39 KG/M2 | DIASTOLIC BLOOD PRESSURE: 58 MMHG | SYSTOLIC BLOOD PRESSURE: 100 MMHG | WEIGHT: 217 LBS | HEIGHT: 72 IN

## 2018-04-03 PROCEDURE — 36415 COLL VENOUS BLD VENIPUNCTURE: CPT

## 2018-04-03 PROCEDURE — 99215 OFFICE O/P EST HI 40 MIN: CPT

## 2018-04-04 LAB
ANION GAP SERPL CALC-SCNC: 16 MMOL/L
BUN SERPL-MCNC: 72 MG/DL
CALCIUM SERPL-MCNC: 9.7 MG/DL
CHLORIDE SERPL-SCNC: 97 MMOL/L
CO2 SERPL-SCNC: 27 MMOL/L
CREAT SERPL-MCNC: 2 MG/DL
GLUCOSE SERPL-MCNC: 94 MG/DL
POTASSIUM SERPL-SCNC: 4.3 MMOL/L
SODIUM SERPL-SCNC: 140 MMOL/L

## 2018-04-09 ENCOUNTER — APPOINTMENT (OUTPATIENT)
Dept: ENDOCRINOLOGY | Facility: CLINIC | Age: 68
End: 2018-04-09
Payer: COMMERCIAL

## 2018-04-09 VITALS
SYSTOLIC BLOOD PRESSURE: 110 MMHG | WEIGHT: 225 LBS | HEIGHT: 72 IN | BODY MASS INDEX: 30.48 KG/M2 | HEART RATE: 77 BPM | OXYGEN SATURATION: 97 % | DIASTOLIC BLOOD PRESSURE: 60 MMHG

## 2018-04-09 LAB
GLUCOSE BLDC GLUCOMTR-MCNC: 103
HBA1C MFR BLD HPLC: 7.3

## 2018-04-09 PROCEDURE — 83036 HEMOGLOBIN GLYCOSYLATED A1C: CPT | Mod: QW

## 2018-04-09 PROCEDURE — 82962 GLUCOSE BLOOD TEST: CPT

## 2018-04-09 PROCEDURE — 99215 OFFICE O/P EST HI 40 MIN: CPT | Mod: 25

## 2018-04-10 ENCOUNTER — MEDICATION RENEWAL (OUTPATIENT)
Age: 68
End: 2018-04-10

## 2018-04-18 ENCOUNTER — APPOINTMENT (OUTPATIENT)
Dept: ELECTROPHYSIOLOGY | Facility: CLINIC | Age: 68
End: 2018-04-18
Payer: COMMERCIAL

## 2018-04-18 VITALS
OXYGEN SATURATION: 98 % | DIASTOLIC BLOOD PRESSURE: 84 MMHG | HEART RATE: 105 BPM | HEIGHT: 72 IN | SYSTOLIC BLOOD PRESSURE: 127 MMHG | BODY MASS INDEX: 30.48 KG/M2 | WEIGHT: 225 LBS

## 2018-04-18 PROCEDURE — 93000 ELECTROCARDIOGRAM COMPLETE: CPT

## 2018-04-18 PROCEDURE — 99215 OFFICE O/P EST HI 40 MIN: CPT

## 2018-04-30 ENCOUNTER — MEDICATION RENEWAL (OUTPATIENT)
Age: 68
End: 2018-04-30

## 2018-05-02 ENCOUNTER — NON-APPOINTMENT (OUTPATIENT)
Age: 68
End: 2018-05-02

## 2018-05-02 ENCOUNTER — APPOINTMENT (OUTPATIENT)
Dept: CARDIOLOGY | Facility: CLINIC | Age: 68
End: 2018-05-02
Payer: COMMERCIAL

## 2018-05-02 VITALS
HEIGHT: 72 IN | DIASTOLIC BLOOD PRESSURE: 60 MMHG | SYSTOLIC BLOOD PRESSURE: 118 MMHG | HEART RATE: 100 BPM | WEIGHT: 196 LBS | BODY MASS INDEX: 26.55 KG/M2

## 2018-05-02 PROCEDURE — 99215 OFFICE O/P EST HI 40 MIN: CPT

## 2018-05-02 PROCEDURE — 93000 ELECTROCARDIOGRAM COMPLETE: CPT

## 2018-05-07 ENCOUNTER — NON-APPOINTMENT (OUTPATIENT)
Age: 68
End: 2018-05-07

## 2018-05-18 ENCOUNTER — INPATIENT (INPATIENT)
Facility: HOSPITAL | Age: 68
LOS: 6 days | Discharge: HOME | DRG: 871 | End: 2018-05-25
Attending: STUDENT IN AN ORGANIZED HEALTH CARE EDUCATION/TRAINING PROGRAM | Admitting: ORTHOPAEDIC SURGERY
Payer: COMMERCIAL

## 2018-05-18 VITALS
OXYGEN SATURATION: 99 % | RESPIRATION RATE: 17 BRPM | SYSTOLIC BLOOD PRESSURE: 80 MMHG | HEART RATE: 82 BPM | TEMPERATURE: 98 F | DIASTOLIC BLOOD PRESSURE: 50 MMHG

## 2018-05-18 DIAGNOSIS — Z89.9 ACQUIRED ABSENCE OF LIMB, UNSPECIFIED: Chronic | ICD-10-CM

## 2018-05-18 DIAGNOSIS — A41.9 SEPSIS, UNSPECIFIED ORGANISM: ICD-10-CM

## 2018-05-18 LAB
ALBUMIN SERPL ELPH-MCNC: 3.4 G/DL — SIGNIFICANT CHANGE UP (ref 3.3–5)
ALP SERPL-CCNC: 113 U/L — SIGNIFICANT CHANGE UP (ref 40–120)
ALT FLD-CCNC: 39 U/L — SIGNIFICANT CHANGE UP (ref 10–45)
ANION GAP SERPL CALC-SCNC: 13 MMOL/L — SIGNIFICANT CHANGE UP (ref 5–17)
ANION GAP SERPL CALC-SCNC: 16 MMOL/L — SIGNIFICANT CHANGE UP (ref 5–17)
APPEARANCE UR: CLEAR — SIGNIFICANT CHANGE UP
APTT BLD: 30.7 SEC — SIGNIFICANT CHANGE UP (ref 27.5–37.4)
APTT BLD: 31.8 SEC — SIGNIFICANT CHANGE UP (ref 27.5–37.4)
AST SERPL-CCNC: 52 U/L — HIGH (ref 10–40)
B PERT IGG+IGM PNL SER: ABNORMAL
BASE EXCESS BLDV CALC-SCNC: -2.1 MMOL/L — LOW (ref -2–2)
BASOPHILS # BLD AUTO: 0 K/UL — SIGNIFICANT CHANGE UP (ref 0–0.2)
BASOPHILS NFR BLD AUTO: 0.4 % — SIGNIFICANT CHANGE UP (ref 0–2)
BILIRUB SERPL-MCNC: 1.4 MG/DL — HIGH (ref 0.2–1.2)
BILIRUB UR-MCNC: NEGATIVE — SIGNIFICANT CHANGE UP
BLD GP AB SCN SERPL QL: NEGATIVE — SIGNIFICANT CHANGE UP
BUN SERPL-MCNC: 77 MG/DL — HIGH (ref 7–23)
BUN SERPL-MCNC: 79 MG/DL — HIGH (ref 7–23)
CA-I SERPL-SCNC: 1.07 MMOL/L — LOW (ref 1.12–1.3)
CALCIUM SERPL-MCNC: 8.3 MG/DL — LOW (ref 8.4–10.5)
CALCIUM SERPL-MCNC: 9 MG/DL — SIGNIFICANT CHANGE UP (ref 8.4–10.5)
CHLORIDE BLDV-SCNC: 103 MMOL/L — SIGNIFICANT CHANGE UP (ref 96–108)
CHLORIDE SERPL-SCNC: 94 MMOL/L — LOW (ref 96–108)
CHLORIDE SERPL-SCNC: 98 MMOL/L — SIGNIFICANT CHANGE UP (ref 96–108)
CO2 BLDV-SCNC: 24 MMOL/L — SIGNIFICANT CHANGE UP (ref 22–30)
CO2 SERPL-SCNC: 21 MMOL/L — LOW (ref 22–31)
CO2 SERPL-SCNC: 21 MMOL/L — LOW (ref 22–31)
COLOR FLD: SIGNIFICANT CHANGE UP
COLOR SPEC: YELLOW — SIGNIFICANT CHANGE UP
CREAT SERPL-MCNC: 2.53 MG/DL — HIGH (ref 0.5–1.3)
CREAT SERPL-MCNC: 2.78 MG/DL — HIGH (ref 0.5–1.3)
DIFF PNL FLD: NEGATIVE — SIGNIFICANT CHANGE UP
EOSINOPHIL # BLD AUTO: 0.1 K/UL — SIGNIFICANT CHANGE UP (ref 0–0.5)
EOSINOPHIL NFR BLD AUTO: 0.8 % — SIGNIFICANT CHANGE UP (ref 0–6)
ERYTHROCYTE [SEDIMENTATION RATE] IN BLOOD: 80 MM/HR — HIGH (ref 0–20)
FLUID INTAKE SUBSTANCE CLASS: SIGNIFICANT CHANGE UP
FLUID SEGMENTED GRANULOCYTES: 24 % — SIGNIFICANT CHANGE UP
GAS PNL BLDV: 131 MMOL/L — LOW (ref 136–145)
GAS PNL BLDV: SIGNIFICANT CHANGE UP
GLUCOSE BLDC GLUCOMTR-MCNC: 129 MG/DL — HIGH (ref 70–99)
GLUCOSE BLDV-MCNC: 147 MG/DL — HIGH (ref 70–99)
GLUCOSE FLD-MCNC: 60 MG/DL — SIGNIFICANT CHANGE UP
GLUCOSE SERPL-MCNC: 107 MG/DL — HIGH (ref 70–99)
GLUCOSE SERPL-MCNC: 165 MG/DL — HIGH (ref 70–99)
GLUCOSE UR QL: NEGATIVE — SIGNIFICANT CHANGE UP
GRAM STN FLD: SIGNIFICANT CHANGE UP
HCO3 BLDV-SCNC: 23 MMOL/L — SIGNIFICANT CHANGE UP (ref 21–29)
HCT VFR BLD CALC: 29.9 % — LOW (ref 39–50)
HCT VFR BLD CALC: 32.4 % — LOW (ref 39–50)
HCT VFR BLDA CALC: 29 % — LOW (ref 39–50)
HGB BLD CALC-MCNC: 9.4 G/DL — LOW (ref 13–17)
HGB BLD-MCNC: 10.8 G/DL — LOW (ref 13–17)
HGB BLD-MCNC: 9.9 G/DL — LOW (ref 13–17)
INR BLD: 2 RATIO — HIGH (ref 0.88–1.16)
INR BLD: 2.05 RATIO — HIGH (ref 0.88–1.16)
KETONES UR-MCNC: NEGATIVE — SIGNIFICANT CHANGE UP
LACTATE BLDV-MCNC: 2.4 MMOL/L — HIGH (ref 0.7–2)
LACTATE BLDV-MCNC: 2.8 MMOL/L — HIGH (ref 0.7–2)
LDH SERPL L TO P-CCNC: >2250 U/L — SIGNIFICANT CHANGE UP
LEUKOCYTE ESTERASE UR-ACNC: NEGATIVE — SIGNIFICANT CHANGE UP
LYMPHOCYTES # BLD AUTO: 0.7 K/UL — LOW (ref 1–3.3)
LYMPHOCYTES # BLD AUTO: 9.2 % — LOW (ref 13–44)
LYMPHOCYTES # FLD: 40 % — SIGNIFICANT CHANGE UP
MAGNESIUM SERPL-MCNC: 1.6 MG/DL — SIGNIFICANT CHANGE UP (ref 1.6–2.6)
MCHC RBC-ENTMCNC: 30.6 PG — SIGNIFICANT CHANGE UP (ref 27–34)
MCHC RBC-ENTMCNC: 31.1 PG — SIGNIFICANT CHANGE UP (ref 27–34)
MCHC RBC-ENTMCNC: 33 GM/DL — SIGNIFICANT CHANGE UP (ref 32–36)
MCHC RBC-ENTMCNC: 33.4 GM/DL — SIGNIFICANT CHANGE UP (ref 32–36)
MCV RBC AUTO: 92.6 FL — SIGNIFICANT CHANGE UP (ref 80–100)
MCV RBC AUTO: 93.3 FL — SIGNIFICANT CHANGE UP (ref 80–100)
MONOCYTES # BLD AUTO: 0.8 K/UL — SIGNIFICANT CHANGE UP (ref 0–0.9)
MONOCYTES NFR BLD AUTO: 11.8 % — SIGNIFICANT CHANGE UP (ref 2–14)
MONOS+MACROS # FLD: 36 % — SIGNIFICANT CHANGE UP
NEUTROPHILS # BLD AUTO: 5.6 K/UL — SIGNIFICANT CHANGE UP (ref 1.8–7.4)
NEUTROPHILS NFR BLD AUTO: 77.7 % — HIGH (ref 43–77)
NITRITE UR-MCNC: NEGATIVE — SIGNIFICANT CHANGE UP
OTHER CELLS CSF MANUAL: 4 ML/DL — LOW (ref 18–22)
PCO2 BLDV: 41 MMHG — SIGNIFICANT CHANGE UP (ref 35–50)
PH BLDV: 7.36 — SIGNIFICANT CHANGE UP (ref 7.35–7.45)
PH UR: 5.5 — SIGNIFICANT CHANGE UP (ref 5–8)
PHOSPHATE SERPL-MCNC: 3.1 MG/DL — SIGNIFICANT CHANGE UP (ref 2.5–4.5)
PLATELET # BLD AUTO: 41 K/UL — LOW (ref 150–400)
PLATELET # BLD AUTO: 41 K/UL — LOW (ref 150–400)
PO2 BLDV: 22 MMHG — LOW (ref 25–45)
POTASSIUM BLDV-SCNC: 3.7 MMOL/L — SIGNIFICANT CHANGE UP (ref 3.5–5)
POTASSIUM SERPL-MCNC: 3.9 MMOL/L — SIGNIFICANT CHANGE UP (ref 3.5–5.3)
POTASSIUM SERPL-MCNC: 4 MMOL/L — SIGNIFICANT CHANGE UP (ref 3.5–5.3)
POTASSIUM SERPL-SCNC: 3.9 MMOL/L — SIGNIFICANT CHANGE UP (ref 3.5–5.3)
POTASSIUM SERPL-SCNC: 4 MMOL/L — SIGNIFICANT CHANGE UP (ref 3.5–5.3)
PROT FLD-MCNC: 7.1 G/DL — SIGNIFICANT CHANGE UP
PROT SERPL-MCNC: 7.7 G/DL — SIGNIFICANT CHANGE UP (ref 6–8.3)
PROT UR-MCNC: SIGNIFICANT CHANGE UP
PROTHROM AB SERPL-ACNC: 22.1 SEC — HIGH (ref 9.8–12.7)
PROTHROM AB SERPL-ACNC: 22.7 SEC — HIGH (ref 9.8–12.7)
RBC # BLD: 3.23 M/UL — LOW (ref 4.2–5.8)
RBC # BLD: 3.47 M/UL — LOW (ref 4.2–5.8)
RBC # FLD: 14.9 % — HIGH (ref 10.3–14.5)
RBC # FLD: 15.1 % — HIGH (ref 10.3–14.5)
RCV VOL RI: HIGH /UL (ref 0–5)
RH IG SCN BLD-IMP: POSITIVE — SIGNIFICANT CHANGE UP
SAO2 % BLDV: 29 % — LOW (ref 67–88)
SODIUM SERPL-SCNC: 131 MMOL/L — LOW (ref 135–145)
SODIUM SERPL-SCNC: 132 MMOL/L — LOW (ref 135–145)
SP GR SPEC: 1.02 — SIGNIFICANT CHANGE UP (ref 1.01–1.02)
SPECIMEN SOURCE: SIGNIFICANT CHANGE UP
TOTAL NUCLEATED CELL COUNT, BODY FLUID: HIGH /UL (ref 0–5)
TUBE TYPE: SIGNIFICANT CHANGE UP
UROBILINOGEN FLD QL: NEGATIVE — SIGNIFICANT CHANGE UP
WBC # BLD: 6.2 K/UL — SIGNIFICANT CHANGE UP (ref 3.8–10.5)
WBC # BLD: 7.2 K/UL — SIGNIFICANT CHANGE UP (ref 3.8–10.5)
WBC # FLD AUTO: 6.2 K/UL — SIGNIFICANT CHANGE UP (ref 3.8–10.5)
WBC # FLD AUTO: 7.2 K/UL — SIGNIFICANT CHANGE UP (ref 3.8–10.5)

## 2018-05-18 PROCEDURE — 20610 DRAIN/INJ JOINT/BURSA W/O US: CPT

## 2018-05-18 PROCEDURE — 99223 1ST HOSP IP/OBS HIGH 75: CPT | Mod: GC

## 2018-05-18 PROCEDURE — 99291 CRITICAL CARE FIRST HOUR: CPT

## 2018-05-18 PROCEDURE — 71045 X-RAY EXAM CHEST 1 VIEW: CPT | Mod: 26

## 2018-05-18 PROCEDURE — 93010 ELECTROCARDIOGRAM REPORT: CPT | Mod: 59

## 2018-05-18 PROCEDURE — 99291 CRITICAL CARE FIRST HOUR: CPT | Mod: 25

## 2018-05-18 PROCEDURE — 73562 X-RAY EXAM OF KNEE 3: CPT | Mod: 26,RT

## 2018-05-18 RX ORDER — PIPERACILLIN AND TAZOBACTAM 4; .5 G/20ML; G/20ML
3.38 INJECTION, POWDER, LYOPHILIZED, FOR SOLUTION INTRAVENOUS ONCE
Qty: 0 | Refills: 0 | Status: COMPLETED | OUTPATIENT
Start: 2018-05-18 | End: 2018-05-18

## 2018-05-18 RX ORDER — SODIUM CHLORIDE 9 MG/ML
1000 INJECTION, SOLUTION INTRAVENOUS
Qty: 0 | Refills: 0 | Status: DISCONTINUED | OUTPATIENT
Start: 2018-05-18 | End: 2018-05-18

## 2018-05-18 RX ORDER — SODIUM CHLORIDE 9 MG/ML
500 INJECTION INTRAMUSCULAR; INTRAVENOUS; SUBCUTANEOUS ONCE
Qty: 0 | Refills: 0 | Status: COMPLETED | OUTPATIENT
Start: 2018-05-18 | End: 2018-05-19

## 2018-05-18 RX ORDER — PIPERACILLIN AND TAZOBACTAM 4; .5 G/20ML; G/20ML
3.38 INJECTION, POWDER, LYOPHILIZED, FOR SOLUTION INTRAVENOUS EVERY 8 HOURS
Qty: 0 | Refills: 0 | Status: DISCONTINUED | OUTPATIENT
Start: 2018-05-19 | End: 2018-05-21

## 2018-05-18 RX ORDER — MAGNESIUM SULFATE 500 MG/ML
2 VIAL (ML) INJECTION ONCE
Qty: 0 | Refills: 0 | Status: COMPLETED | OUTPATIENT
Start: 2018-05-18 | End: 2018-05-19

## 2018-05-18 RX ORDER — NOREPINEPHRINE BITARTRATE/D5W 8 MG/250ML
0.05 PLASTIC BAG, INJECTION (ML) INTRAVENOUS
Qty: 8 | Refills: 0 | Status: DISCONTINUED | OUTPATIENT
Start: 2018-05-18 | End: 2018-05-18

## 2018-05-18 RX ORDER — SODIUM CHLORIDE 9 MG/ML
1000 INJECTION INTRAMUSCULAR; INTRAVENOUS; SUBCUTANEOUS ONCE
Qty: 0 | Refills: 0 | Status: COMPLETED | OUTPATIENT
Start: 2018-05-18 | End: 2018-05-18

## 2018-05-18 RX ORDER — CEFTRIAXONE 500 MG/1
1 INJECTION, POWDER, FOR SOLUTION INTRAMUSCULAR; INTRAVENOUS ONCE
Qty: 0 | Refills: 0 | Status: DISCONTINUED | OUTPATIENT
Start: 2018-05-18 | End: 2018-05-18

## 2018-05-18 RX ORDER — ACETAMINOPHEN 500 MG
975 TABLET ORAL EVERY 6 HOURS
Qty: 0 | Refills: 0 | Status: DISCONTINUED | OUTPATIENT
Start: 2018-05-18 | End: 2018-05-18

## 2018-05-18 RX ORDER — INSULIN LISPRO 100/ML
VIAL (ML) SUBCUTANEOUS EVERY 6 HOURS
Qty: 0 | Refills: 0 | Status: DISCONTINUED | OUTPATIENT
Start: 2018-05-18 | End: 2018-05-19

## 2018-05-18 RX ORDER — SODIUM CHLORIDE 9 MG/ML
1000 INJECTION INTRAMUSCULAR; INTRAVENOUS; SUBCUTANEOUS
Qty: 0 | Refills: 0 | Status: DISCONTINUED | OUTPATIENT
Start: 2018-05-18 | End: 2018-05-19

## 2018-05-18 RX ORDER — CEFTRIAXONE 500 MG/1
1 INJECTION, POWDER, FOR SOLUTION INTRAMUSCULAR; INTRAVENOUS ONCE
Qty: 0 | Refills: 0 | Status: COMPLETED | OUTPATIENT
Start: 2018-05-18 | End: 2018-05-18

## 2018-05-18 RX ORDER — SODIUM CHLORIDE 9 MG/ML
3 INJECTION INTRAMUSCULAR; INTRAVENOUS; SUBCUTANEOUS ONCE
Qty: 0 | Refills: 0 | Status: COMPLETED | OUTPATIENT
Start: 2018-05-18 | End: 2018-05-18

## 2018-05-18 RX ORDER — HYDROMORPHONE HYDROCHLORIDE 2 MG/ML
0.5 INJECTION INTRAMUSCULAR; INTRAVENOUS; SUBCUTANEOUS
Qty: 0 | Refills: 0 | Status: DISCONTINUED | OUTPATIENT
Start: 2018-05-18 | End: 2018-05-19

## 2018-05-18 RX ORDER — ACETAMINOPHEN 500 MG
975 TABLET ORAL ONCE
Qty: 0 | Refills: 0 | Status: COMPLETED | OUTPATIENT
Start: 2018-05-18 | End: 2018-05-18

## 2018-05-18 RX ORDER — SODIUM CHLORIDE 9 MG/ML
500 INJECTION INTRAMUSCULAR; INTRAVENOUS; SUBCUTANEOUS
Qty: 0 | Refills: 0 | Status: DISCONTINUED | OUTPATIENT
Start: 2018-05-18 | End: 2018-05-18

## 2018-05-18 RX ORDER — LEVOTHYROXINE SODIUM 125 MCG
62 TABLET ORAL AT BEDTIME
Qty: 0 | Refills: 0 | Status: DISCONTINUED | OUTPATIENT
Start: 2018-05-18 | End: 2018-05-19

## 2018-05-18 RX ORDER — FENTANYL CITRATE 50 UG/ML
50 INJECTION INTRAVENOUS ONCE
Qty: 0 | Refills: 0 | Status: DISCONTINUED | OUTPATIENT
Start: 2018-05-18 | End: 2018-05-18

## 2018-05-18 RX ORDER — ACETAMINOPHEN 500 MG
1000 TABLET ORAL EVERY 8 HOURS
Qty: 0 | Refills: 0 | Status: DISCONTINUED | OUTPATIENT
Start: 2018-05-18 | End: 2018-05-18

## 2018-05-18 RX ORDER — VANCOMYCIN HCL 1 G
2000 VIAL (EA) INTRAVENOUS ONCE
Qty: 0 | Refills: 0 | Status: COMPLETED | OUTPATIENT
Start: 2018-05-18 | End: 2018-05-18

## 2018-05-18 RX ORDER — SODIUM CHLORIDE 9 MG/ML
1000 INJECTION, SOLUTION INTRAVENOUS ONCE
Qty: 0 | Refills: 0 | Status: COMPLETED | OUTPATIENT
Start: 2018-05-18 | End: 2018-05-18

## 2018-05-18 RX ADMIN — Medication 250 MILLIGRAM(S): at 14:57

## 2018-05-18 RX ADMIN — Medication 975 MILLIGRAM(S): at 14:00

## 2018-05-18 RX ADMIN — Medication 975 MILLIGRAM(S): at 20:34

## 2018-05-18 RX ADMIN — HYDROMORPHONE HYDROCHLORIDE 0.5 MILLIGRAM(S): 2 INJECTION INTRAMUSCULAR; INTRAVENOUS; SUBCUTANEOUS at 22:17

## 2018-05-18 RX ADMIN — HYDROMORPHONE HYDROCHLORIDE 0.5 MILLIGRAM(S): 2 INJECTION INTRAMUSCULAR; INTRAVENOUS; SUBCUTANEOUS at 22:32

## 2018-05-18 RX ADMIN — SODIUM CHLORIDE 50 MILLILITER(S): 9 INJECTION INTRAMUSCULAR; INTRAVENOUS; SUBCUTANEOUS at 20:36

## 2018-05-18 RX ADMIN — CEFTRIAXONE 100 GRAM(S): 500 INJECTION, POWDER, FOR SOLUTION INTRAMUSCULAR; INTRAVENOUS at 14:15

## 2018-05-18 RX ADMIN — Medication 975 MILLIGRAM(S): at 21:04

## 2018-05-18 RX ADMIN — SODIUM CHLORIDE 2000 MILLILITER(S): 9 INJECTION INTRAMUSCULAR; INTRAVENOUS; SUBCUTANEOUS at 14:00

## 2018-05-18 RX ADMIN — FENTANYL CITRATE 50 MICROGRAM(S): 50 INJECTION INTRAVENOUS at 14:13

## 2018-05-18 RX ADMIN — SODIUM CHLORIDE 2000 MILLILITER(S): 9 INJECTION, SOLUTION INTRAVENOUS at 15:33

## 2018-05-18 RX ADMIN — Medication 975 MILLIGRAM(S): at 14:35

## 2018-05-18 RX ADMIN — PIPERACILLIN AND TAZOBACTAM 200 GRAM(S): 4; .5 INJECTION, POWDER, LYOPHILIZED, FOR SOLUTION INTRAVENOUS at 21:29

## 2018-05-18 RX ADMIN — SODIUM CHLORIDE 3 MILLILITER(S): 9 INJECTION INTRAMUSCULAR; INTRAVENOUS; SUBCUTANEOUS at 14:16

## 2018-05-18 RX ADMIN — Medication 62 MICROGRAM(S): at 22:32

## 2018-05-18 RX ADMIN — FENTANYL CITRATE 50 MICROGRAM(S): 50 INJECTION INTRAVENOUS at 15:50

## 2018-05-18 NOTE — ED PROVIDER NOTE - PMH
AICD lead malfunction  history of  Anemia    Coronary artery disease    DM (diabetes mellitus)  type 2, Hg A1C 8.2 % ( 7/10/17)  Heart failure with reduced left ventricular function  EF 37%  Hepatic cirrhosis, unspecified hepatic cirrhosis type    History of hyperprolactinemia    History of osteomyelitis  2015, right foot 2nd toe  HLD (hyperlipidemia)    HTN (hypertension)    Hypothyroidism    ICD (implantable cardioverter-defibrillator) in place  Medtronic, Model [id number] , last interrogation 4/2017  Ischemic cardiomyopathy    Pituitary adenoma  as per patient chronic, no changes , recently restarted follow up with endocrinologist ( note in allscripts )  Presence of stent in coronary artery  2 stents  PVD (peripheral vascular disease)    Sleep apnea  not using CPAP  Thrombocytopenia  Chronic

## 2018-05-18 NOTE — ED ADULT NURSE REASSESSMENT NOTE - NS ED NURSE REASSESS COMMENT FT1
pt. remains hypotensive to 82/48, but is mentating well. MD aware. fluid bolus continued to be infused.

## 2018-05-18 NOTE — CONSULT NOTE ADULT - SUBJECTIVE AND OBJECTIVE BOX
CHIEF COMPLAINT: right knee pain    HPI:  68 year old male with PMHx of ICM EF 40%, CABG, HTN, DM II, HLD, pituitary tumor, atrial fibrillation s/p PPM/ICD with complaints of constant right knee pain and swelling x 2 days. Patient states he was in USOH until 2 days ago when his knee began to feel pain and redness. He went to his PCP at this time who diagnosed him with deramtitis and started him on hydrocortisone. The knee continued to increase in pain and swelling thus prompting him to come to ED for evaluation. On arrival to ED, patient noted to be febrile to 102F, hypotensive to 79/51 and with swollen knee. Knee underwent arthrocentesis in ED which revealed turbid fluid with >15,000 WBC. He was also given 2L NS with appropriate response of blood pressure to 90s/60s, which is his baseline. MICU consulted to evaluate patient for potential septic shock in the setting of a history of CHF.    PAST MEDICAL & SURGICAL HISTORY:  Hypothyroidism  PVD (peripheral vascular disease)  History of hyperprolactinemia  Hepatic cirrhosis, unspecified hepatic cirrhosis type  Anemia  ICD (implantable cardioverter-defibrillator) in place: [email or web address], Model [id number] , last interrogation 4/2017  Sleep apnea: not using CPAP  History of osteomyelitis: 2015, right foot 2nd toe  Presence of stent in coronary artery: 2 stents  Heart failure with reduced left ventricular function: EF 37%  Ischemic cardiomyopathy  Pituitary adenoma: as per patient chronic, no changes , recently restarted follow up with endocrinologist ( note in allscripts )  Coronary artery disease  Thrombocytopenia: Chronic  HLD (hyperlipidemia)  HTN (hypertension)  DM (diabetes mellitus): type 2, Hg A1C 8.2 % ( 7/10/17)  AICD lead malfunction: history of  History of amputation: right foot, 2nd toe, osteo 2015  AICD (automatic cardioverter/defibrillator) present: placement in (2006)  Stented coronary artery: RCA (1999)  Coronary atherosclerosis of artery bypass graft: CABG X 4 (1986)      FAMILY HISTORY:  Family history of MI (myocardial infarction)      SOCIAL HISTORY:  Smoking: [ ] Never Smoked [ ] Former Smoker (__ packs x ___ years) [ ] Current Smoker  (__ packs x ___ years)  Substance Use: [ ] Never Used [ ] Used ____  EtOH Use: none  Marital Status: [ ] Single [x ]  [ ]  [ ]   Country of Birth: [place]  Advance Directives: full code    Allergies    No Known Allergies    Intolerances        HOME MEDICATIONS:    REVIEW OF SYSTEMS:  Constitutional: [ ] negative [ ] fevers [ x] chills [ ] weight loss [ ] weight gain  HEENT: [ ] negative [ ] dry eyes [ ] eye irritation [ ] postnasal drip [ ] nasal congestion  CV: [ ] negative  [ ] chest pain [ ] orthopnea [ ] palpitations [ ] murmur  Resp: [ ] negative [ ] cough [ ] shortness of breath [ ] dyspnea [ ] wheezing [ ] sputum [ ] hemoptysis  GI: [ x] negative [ ] nausea [ ] vomiting [ ] diarrhea [ ] constipation [ ] abd pain [ ] dysphagia   : [ ] negative [ ] dysuria [ ] nocturia [ ] hematuria [ ] increased urinary frequency  Musculoskeletal: [ ] negative [ ] back pain [ ] myalgias [x ] arthralgias [ ] fracture  Skin: [ ] negative [ ] rash [ ] itch  Neurological: [ ] negative [ ] headache [ ] dizziness [ ] syncope [ ] weakness [ ] numbness  Psychiatric: [x ] negative [ ] anxiety [ ] depression  Endocrine: [ ] negative [ ] diabetes [ ] thyroid problem  Hematologic/Lymphatic: [ ] negative [ ] anemia [ ] bleeding problem  Allergic/Immunologic: [ ] negative [ ] itchy eyes [ ] nasal discharge [ ] hives [ ] angioedema  [x ] All other systems negative  [ ] Unable to assess ROS because ________    OBJECTIVE:  ICU Vital Signs Last 24 Hrs  T(C): 36.7 (18 May 2018 17:20), Max: 37.4 (18 May 2018 14:17)  T(F): 98 (18 May 2018 17:20), Max: 99.4 (18 May 2018 14:17)  HR: 94 (18 May 2018 18:07) (82 - 104)  BP: 89/72 (18 May 2018 18:07) (79/51 - 96/60)  BP(mean): --  ABP: --  ABP(mean): --  RR: 18 (18 May 2018 18:07) (14 - 21)  SpO2: 100% (18 May 2018 18:07) (95% - 100%)    CAPILLARY BLOOD GLUCOSE      POCT Blood Glucose.: 151 mg/dL (18 May 2018 13:42)      PHYSICAL EXAM:  General: NAD, well-appearing, comfortable in bed  HEENT: PERRL, EOMI  Lymph Nodes: no palpable MC  Neck: supple, no JVD  Respiratory: lungs clear to auscultation bilaterally, no wheezes, rales or ronchi. No signs of respiratory distress  Cardiovascular: irregular rhythm, normal rate, no murmurs, appears grossly euvolemic on examination  Abdomen: soft, NTND, +BS  Extremities: chronic venous stasis changes with hyperpigmentation of bilateral lower extremities to the mid-calf. Erythema and edema of the right knee  Skin: hyperpigmentation of bilateral lower extremities  Neurological: no focal deficits  Psychiatry: AAOX3    LINES:     HOSPITAL MEDICATIONS:        LABS:                        10.8   7.2   )-----------( 41       ( 18 May 2018 14:19 )             32.4     Hgb Trend: 10.8<--  05-18    131<L>  |  94<L>  |  79<H>  ----------------------------<  165<H>  4.0   |  21<L>  |  2.78<H>    Ca    9.0      18 May 2018 14:19    TPro  7.7  /  Alb  3.4  /  TBili  1.4<H>  /  DBili  x   /  AST  52<H>  /  ALT  39  /  AlkPhos  113  05-18    Creatinine Trend: 2.78<--  PT/INR - ( 18 May 2018 14:19 )   PT: 22.7 sec;   INR: 2.05 ratio         PTT - ( 18 May 2018 14:19 )  PTT:31.8 sec      Venous Blood Gas:  05-18 @ 15:54  --/--/--/--/--  VBG Lactate: 2.4  Venous Blood Gas:  05-18 @ 14:52  7.36/41/22/23/29  VBG Lactate: 2.8      MICROBIOLOGY:   Cell Count, Body Fluid (05.18.18 @ 15:04)    Monocyte/Macrophage Count - Body Fluid: 36 %    Fluid Segmented Granulocytes: 24: Differential based on 100 cells counted after cytocentrifugation. %    Fluid Type: Synovial fluid    Body Fluid Appearance: Turbid    BF Lymphocytes: 40 %    Tube Type: Sterile    Color - Body Fluid: Orange    Total Nucleated Cell Count, Body Fluid: 46014: Degenerated cells present.       Includes WBC and other nucleated cells if present. /uL    Total RBC Count: 000797 /uL    RADIOLOGY:  < from: Xray Knee 3 Views, Right (05.18.18 @ 14:19) >  EXAM:  KNEE AP LAT & OBL RIGHT                          PROCEDURE DATE:  05/18/2018        INTERPRETATION:  CLINICAL INFORMATION: Left knee pain and swelling.   Unable to bear weight.    TECHNIQUE: AP lateral and oblique views of the right knee    COMPARISON: None    Impression:    Evaluation for joint effusion is limited by obliquity on the lateral   view. There is no definite knee joint effusion. The prepatellar soft   tissues are not fully included on this study although there is likely   prepatellar soft tissue swelling. No acute fracture or dislocation.   Arterial calcifications are noted.        CHU KRUEGER M.D., RADIOLOGY RESIDENT  This document has been electronically signed.  ALFREDO BRADFORD M.D., ATTENDING RADIOLOGIST  This document has been electronically signed. May 18 2018  4:38PM        < end of copied text >      < from: Xray Chest 1 View AP/PA (05.18.18 @ 14:27) >  EXAM:  XR CHEST AP OR PA 1V                            PROCEDURE DATE:  05/18/2018            INTERPRETATION:  A single chest x-ray was obtained on May 18, 2018.    Indication: Sepsis.    Impression:    The heart is slightly enlarged. The lungs areclear. A pacer is in good   position. Status post sternotomy.    < end of copied text >        [x ] Reviewed and interpreted by me    EKG:

## 2018-05-18 NOTE — ED ADULT NURSE REASSESSMENT NOTE - NS ED NURSE REASSESS COMMENT FT1
Pt. platelet infusion completed at 1650. Will continue to monitor VS. Safety and comfort provided. Family at bedside. Call bell within reach.

## 2018-05-18 NOTE — ED ADULT NURSE REASSESSMENT NOTE - NS ED NURSE REASSESS COMMENT FT1
Pt. MAP is 62. MD made aware - states if pt. MAP remains below 65, will initiate PRN pressors. Will continue to monitor BP. Safety and comfort provided. Call bell within reach.

## 2018-05-18 NOTE — ED PROCEDURE NOTE - CPROC ED INFORMED CONSENT1
This was an emergent procedure and consent was implied./verbal consent obtained, full risks and benefits discussed

## 2018-05-18 NOTE — ED PROVIDER NOTE - PHYSICAL EXAMINATION
Patient is 6'1" 200 lbs.     VITALS: reviewed hypotensive   GEN: NAD, A & O x 4  HEAD/EYES: NCAT, PERRL, EOMI, anicteric sclerae, no conjunctival pallor  ENT: mucus membranes moist, oropharynx WNL, trachea midline, no JVD  RESP: Normal respiratory rate. lungs CTA with equal breath sounds bilaterally, chest wall nontender and atraumatic  CV: HR is tachycardic and irregular. distal pulses intact and symmetric bilaterally  ABDOMEN: soft, nondistended, nontender, no palpable masses  : no CVAT  MSK: No tenderness of either leg except immediately over the right knee. his knee is very warm, red and swollen. Able to range hips.   SKIN: venous stasis changes of b/l lower extremities   NEURO: alert, mentating appropriately, no facial asymmetry. gross sensation, motor, coordination are intact  PSYCH: Affect appropriate Patient is 6'1" 200 lbs.     VITALS: reviewed hypotensive, tachycardic, afebrile, normal O2 sat  GEN: uncomfortable, mildly ill appearing, A & O x 4  HEAD/EYES: NCAT, PERRL, EOMI, anicteric sclerae, no conjunctival pallor  ENT: mucus membranes moist, oropharynx WNL, trachea midline, no JVD  RESP: Normal respiratory rate. lungs CTA with equal breath sounds bilaterally, chest wall nontender and atraumatic  CV: HR is tachycardic and irregular. distal pulses intact and symmetric bilaterally  ABDOMEN: soft, nondistended, nontender, no palpable masses  : no CVAT  MSK: No tenderness of either leg except immediately over the right knee. his knee is very warm, red and swollen +knee effusion. Able to range hips.   SKIN: venous stasis changes of b/l lower extremities   NEURO: alert, mentating appropriately, no facial asymmetry. gross sensation, motor, coordination are intact  PSYCH: Affect appropriate

## 2018-05-18 NOTE — ED PROVIDER NOTE - CARE PLAN
Principal Discharge DX:	Septic shock Principal Discharge DX:	Septic shock  Secondary Diagnosis:	Pyogenic arthritis of right knee joint, due to unspecified organism

## 2018-05-18 NOTE — ED PROVIDER NOTE - NS ED ROS FT
Positive for right knee pain and swelling. Fever.     CONST: no chills, no trauma  EYES: no pain, no visual disturbances  ENT: no sore throat, no epistaxis, no rhinorrhea, no hearing changes  CV: no chest pain, no palpitations, no orthopnea, no extremity pain or swelling  RESP: no cough, no sputum, no pleurisy, no wheezing  ABD: no abdominal pain, no nausea, no vomiting, no diarrhea, no black or bloody stool  : no dysuria, no hematuria, no frequency, no urgency  MSK: no back pain, no neck pain,   NEURO: no headache, no sensory disturbances, no focal weakness, no dizziness  HEME: no easy bleeding or bruising  SKIN: no diaphoresis, no rash

## 2018-05-18 NOTE — ED ADULT NURSE NOTE - PMH
AICD lead malfunction  history of  Anemia    Coronary artery disease    DM (diabetes mellitus)  type 2, Hg A1C 8.2 % ( 7/10/17)  Heart failure with reduced left ventricular function  EF 37%  Hepatic cirrhosis, unspecified hepatic cirrhosis type    History of hyperprolactinemia    History of osteomyelitis  2015, right foot 2nd toe  HLD (hyperlipidemia)    HTN (hypertension)    Hypothyroidism    ICD (implantable cardioverter-defibrillator) in place  [employer]tronic, Model [id number] , last interrogation 4/2017  Ischemic cardiomyopathy    Pituitary adenoma  as per patient chronic, no changes , recently restarted follow up with endocrinologist ( note in allscripts )  Presence of stent in coronary artery  2 stents  PVD (peripheral vascular disease)    Sleep apnea  not using CPAP  Thrombocytopenia  Chronic

## 2018-05-18 NOTE — ED PROVIDER NOTE - OBJECTIVE STATEMENT
68 year old male with PMHx of HTN, DM II, HLD, pituitary tumor, atrial fibrillation with complaints of constant right knee pain and swelling. Vital review show the patient is hypotensive. Is scheduled to undergo cardio inversion on Wednesday. Patient is able to ambulate independently but has difficulty getting in and out of chairs/beds. His pain began 2 days ago and he's never had this pain prior. PSHx of defibrillator placement. Report fever at home per patient 102.5 F. Took an Advil at 6 am took. no other pain. Denies any cough or SOB. No dysuria. No head trauma or LOC.    currently taking - Thryzene 125 mg 1x day, Glimerperide 4mgm 1x a day, Bromocriptine 5mg 1x day, Niaspan 1000mg 1x day, Furesmide 40mg 2x day, Rampiril 2.5mg 1x day, Metformin 1000mg 2x day, Carvedilol 25mg 2x day, Januvia 50mg 1x day, Atorvastin 40mg 1x day, Ecotrim 87mg 1x day , potassium 20 mg 1x day, Spironolactone 25mg 1x day, Metolazone 5mg every other day, Eliquis 2.5 2x a day.     Podiatrist Dr. Tristan, PCP Dr. Thapa [phone], Cardiologist Dr. Douglas Rivers, Liver SpecialistDr. Inderjit Alaniz, Endocrinologist Dr. Nicole Hennessy.

## 2018-05-18 NOTE — ED PROVIDER NOTE - MEDICAL DECISION MAKING DETAILS
Septic appearing, high concern for septic arthritis. Code sepsis called. No other clear source. Will obtain arthrocentesis. IVF, ABx, frequent reeval.

## 2018-05-18 NOTE — ED ADULT NURSE REASSESSMENT NOTE - NS ED NURSE REASSESS COMMENT FT1
Pt. still hypotensive. As per MD Santos, as long as MAP is 65 or above, will not initiate pressors. Will continue to monitor. Family member at bedside. Call bell within reach.

## 2018-05-18 NOTE — CONSULT NOTE ADULT - SUBJECTIVE AND OBJECTIVE BOX
Patient seen and evaluated @ 4:30 PM  Chief Complaint: R knee pain    HPI:  68 year old male with PMHx of ICM EF 40%, CABG, HTN, DM II, HLD, pituitary tumor, atrial fibrillation s/p PPM with complaints of constant right knee pain and swelling. Vital review show the patient is hypotensive. Is scheduled to undergo cardio inversion on Wednesday. Patient is able to ambulate independently but has difficulty getting in and out of chairs/beds. His pain began 2 days ago and he's never had this pain prior. PSHx of defibrillator placement. Report fever at home per patient 102.5 F. Took an Advil at 6 am took. no other pain. Denies any cough or SOB. No dysuria. No head trauma or LOC.    Patient received 2 L IVF in ED. Platelets hanging.    Planned for R knee washout.    PMH:   Hypothyroidism  PVD (peripheral vascular disease)  History of hyperprolactinemia  Hepatic cirrhosis, unspecified hepatic cirrhosis type  Anemia  ICD (implantable cardioverter-defibrillator) in place  Sleep apnea  History of osteomyelitis  Presence of stent in coronary artery  Heart failure with reduced left ventricular function  Ischemic cardiomyopathy  Pituitary adenoma  Coronary artery disease  Thrombocytopenia  HLD (hyperlipidemia)  HTN (hypertension)  DM (diabetes mellitus)  AICD lead malfunction  Shock    PSH:   History of amputation  AICD (automatic cardioverter/defibrillator) present  Stented coronary artery  Coronary atherosclerosis of artery bypass graft    Medications:     Allergies:  No Known Allergies    FAMILY HISTORY:  Family history of MI (myocardial infarction) (Father)    Social History:  [place]    Review of Systems:  Constitutional: [ ] Fever [ ] Chills [ ] Fatigue [ ] Weight change   HEENT: [ ] Blurred vision [ ] Eye Pain [ ] Headache [ ] Runny nose [ ] Sore Throat   Respiratory: [ ] Cough [ ] Wheezing [ ] Shortness of breath  Cardiovascular: [ ] Chest Pain [ ] Palpitations [ ] RODRIGUEZ [ ] PND [ ] Orthopnea  Gastrointestinal: [ ] Abdominal Pain [ ] Diarrhea [ ] Constipation [ ] Hemorrhoids [ ] Nausea [ ] Vomiting  Genitourinary: [ ] Nocturia [ ] Dysuria [ ] Incontinence  Extremities: [ ] Swelling [x] Joint Pain  Neurologic: [ ] Focal deficit [ ] Paresthesias [ ] Syncope  Lymphatic: [ ] Swelling [ ] Lymphadenopathy   Skin: [ ] Rash [ ] Ecchymoses [ ] Wounds [ ] Lesions  Psychiatry: [ ] Depression [ ] Suicidal/Homicidal Ideation [ ] Anxiety [ ] Sleep Disturbances  [x] 10 point review of systems is otherwise negative except as mentioned above            [ ]Unable to obtain    Physical Exam:  T(C): 37.4 (18 @ 14:17), Max: 37.4 (18 @ 14:17)  HR: 97 (18 @ 15:20) (82 - 104)  BP: 85/56 (18 @ 15:40) (79/51 - 96/60)  RR: 21 (18 @ 15:40) (17 - 21)  SpO2: 100% (18 @ 15:40) (95% - 100%)  Wt(kg): --    Daily     Daily     Appearance: NAD  Eyes: PERRL, EOMI  HENT: Normal oral muscosa, NC/AT  Cardiovascular: normal S1 and S2, RRR, soft holsystolic murmur, no edema, normal JVP  Respiratory: Clear to auscultation bilaterally  Gastrointestinal: Soft, non-tender, non-distended, BS+  Musculoskeletal: No clubbing, no joint deformity   Neurologic: Non-focal  Lymphatic: No lymphadenopathy  Psychiatry: AAOx3, mood & affect appropriate  Skin: No rashes, no ecchymoses, no cyanosis    Cardiovascular Diagnostic Testing:  ECG: aflutter with RBBB    Echo:  TTE 2018  EF 40%  mild MR    Stress Testin  IMPRESSIONS:Abnormal Study  * Chest Pain: No chest pain with exercise.  * Symptom: No Symptom.  * HR Response: Appropriate.  * BP Response: Appropriate.  * Heart Rhythm: Sinus Rhythm - Occassional VPD's - 60 BPM.  * Conduction defects: IVCD.  * Baseline ECG: Nonspecific ST-T wave abnormality.  * ECG Abnormalities: None.  * Arrhythmia: Occasional VPD's, Ventricular Couplets.  * Review of raw data shows: The study is of good technical  quality.  * The left ventricle was markedly dilated at stress. There  are large, severe defects in the inferior and  inferolateral walls, inferoseptal  that are partially  reversible, consistent with infarction and moderate  german-infarct ischemia.  There are  moderate defects in the  distal anterior and anteroapical and apical lateral walls  that are predominantly fixed consistent with infarct with  minimal german-infract ischemia.  * Post-stress gated wall motion analysis was performed  (LVEF = 22 %;LVEDV = 218 ml.), revealing severe global  hypokinesis.  * No previous Nuclear/Stress exam.    Cath: none    Interpretation of Telemetry: none    Imaging:  CXR  The heart is slightly enlarged. The lungs areclear. A pacer is in good   position. Status post sternotomy.    Labs:                        10.8   7.2   )-----------( 41       ( 18 May 2018 14:19 )             32.4     05-18    131<L>  |  94<L>  |  79<H>  ----------------------------<  165<H>  4.0   |  21<L>  |  2.78<H>    Ca    9.0      18 May 2018 14:19    TPro  7.7  /  Alb  3.4  /  TBili  1.4<H>  /  DBili  x   /  AST  52<H>  /  ALT  39  /  AlkPhos  113  05-18    PT/INR - ( 18 May 2018 14:19 )   PT: 22.7 sec;   INR: 2.05 ratio         PTT - ( 18 May 2018 14:19 )  PTT:31.8 sec

## 2018-05-18 NOTE — ED ADULT NURSE REASSESSMENT NOTE - NS ED NURSE REASSESS COMMENT FT1
upon calling report to the SICU for the 2nd time (pt. has assigned bed on teletracking), SICU RN reports SICU is no longer accepting pt. SICU resident also reports ortho is currently working w/ MICU to get pt. admitted to MICU. MD Santos aware of this and is speaking w/ SICU resident currently. Charge RN and Flow RN aware.

## 2018-05-18 NOTE — ED PROVIDER NOTE - PROGRESS NOTE DETAILS
BP improving, stable. Will perform emergent arthrocentesis now. Of note, lab lost VBG drawn with initial labs, will have to redraw it to obtain lactate. alvarez aspirated on arthrocentesis orthopedics consulted. Ortho consulted, coming to see now. Asked to transfuse platelets Cardiology consulted for pre-op Pressures soft despite fluids but MAP remains above 65. Ortho uncomfortable taking to the floor, SICU consulted.     Repeat exam shows good skin perfusion, clear lungs, normal mentation, 1+ distal pulses in all extremities, good skin turgor and good urine output. Pressures soft despite fluids but MAP remains above 65. Ortho uncomfortable taking to the floor, SICU consulted.     Repeat exam shows good skin perfusion, clear lungs, normal mentation, 1+ distal pulses in all extremities, good skin turgor, ~2 sec cap refill and good urine output. HR still tachycardic but improved, BPs soft systolic MAP>35, normal RR and O2 sat. ortho accepting to SICU, plan for urgent OR but case of nec fasc going to OR first. pressors to start PRN

## 2018-05-18 NOTE — ED ADULT NURSE NOTE - CHPI ED SYMPTOMS NEG
No SOB, cough, head trauma, or LOC./no numbness/no vomiting/no tingling/no weakness/no nausea/no decreased eating/drinking/no dizziness

## 2018-05-18 NOTE — CONSULT NOTE ADULT - SUBJECTIVE AND OBJECTIVE BOX
SICU Consultation Note  =====================================================  HPI: 68y male PMHx of ICM EF 40%, CABG, HTN, DM II, HLD, pituitary tumor, atrial fibrillation s/p PPM, right 3rd toe amputation presents to ER today with right knee pain and swelling for 2 days, with fevers TMax 102F at home yesterday. He has never had any similar episodes and no surgeries on his knee. Denies any trauma to the knee. Pain worse with movement. In ER, arthrocentesis performed, demonstrating Glucose 60, Protein 7.1, LDH >2250, Nucleated Cell Count 15,600, ,500. Gram stain pos. for Gram positive cocci. In ER patient was tachycardic to 102, Hypotensive to 75/54. Given 2g vancomycin, 1g Ceftriaxone.   Of note, patient states that his blood pressure is at baseline low 90s/60s nad also notes his creatinine is "high" at baseline.    Orthopedics consulted, plan to take patient to OR later tonight.    SICU Consulted for sepsis in setting of septic arthritis and hemodynamic monitoring.          PAST MEDICAL & SURGICAL HISTORY:  Hypothyroidism  PVD (peripheral vascular disease)  History of hyperprolactinemia  Hepatic cirrhosis, unspecified hepatic cirrhosis type  Anemia  ICD (implantable cardioverter-defibrillator) in place: [employer], Model [id number] , last interrogation 4/2017  Sleep apnea: not using CPAP  History of osteomyelitis: 2015, right foot 2nd toe  Presence of stent in coronary artery: 2 stents  Heart failure with reduced left ventricular function: EF 37%  Ischemic cardiomyopathy  Pituitary adenoma: as per patient chronic, no changes , recently restarted follow up with endocrinologist ( note in allscripts )  Coronary artery disease  Thrombocytopenia: Chronic  HLD (hyperlipidemia)  HTN (hypertension)  DM (diabetes mellitus): type 2, Hg A1C 8.2 % ( 7/10/17)  AICD lead malfunction: history of  History of amputation: right foot, 2nd toe, osteo 2015  AICD (automatic cardioverter/defibrillator) present: placement in (2006)  Stented coronary artery: RCA (1999)  Coronary atherosclerosis of artery bypass graft: CABG X 4 (1986)    Home Meds:   Allergies: None  Soc: No smoking  Advanced Directives: Presumed Full Code     ROS:    General: Fever  Skin/Breast: Non-Contributory  Ophthalmologic: Non-Contributory  ENMT: Non-Contributory  Respiratory and Thorax: Non-Contributory  Cardiovascular: No chest pain, SOB  Gastrointestinal: Non-Contributory  Genitourinary: Non-Contributory  Musculoskeletal: Right knee pain, pain with movement  Neurological: Non-Contributory  Psychiatric: Non-Contributory  Hematology/Lymphatics: Non-Contributory  Endocrine: Non-Contributory  Allergic/Immunologic: Non-Contributory    CURRENT MEDICATIONS:   --------------------------------------------------------------------------------------  Neurologic Medications  acetaminophen   Tablet. 975 milliGRAM(s) Oral every 6 hours PRN Mild Pain (1 - 3)    Respiratory Medications    Cardiovascular Medications    Gastrointestinal Medications  sodium chloride 0.9%. 1000 milliLiter(s) IV Continuous <Continuous>    Genitourinary Medications    Hematologic/Oncologic Medications    Antimicrobial/Immunologic Medications    Endocrine/Metabolic Medications    Topical/Other Medications    --------------------------------------------------------------------------------------    VITAL SIGNS, INS/OUTS (last 24 hours):  --------------------------------------------------------------------------------------  ((Insert SICU Vitals / Is+Os here)) ***  --------------------------------------------------------------------------------------    EXAM:  General/Neuro  Exam: Normal, NAD, alert, oriented x 3, no focal deficits. PERRLA.    Respiratory  Exam: Lungs clear to auscultation, Normal expansion/effort.  [] Tracheostomy   [] Intubated  Mechanical Ventilation:     Cardiovascular  Exam: S1, S2.  Regular rate and irregular rhythm. No peripheral pitting edema.  Cardiac Rhythm: Afib    GI  Exam: Abdomen soft, Non-tender, Non-distended.   Current Diet:  NPO for OR	      Tubes/Lines/Drains  [x] Peripheral IV  [] Central Venous Line     	[] R	[] L	[] IJ	[] Fem	[] SC        Type:	    Date Placed:   [] Arterial Line		[] R	[] L	[] Fem	[] Rad	[] Ax	Date Placed:   [] PICC:         	[] Midline		[] Mediport           [] Urinary Catheter		Date Placed:     Extremities  Exam: Extremities warm, pink, well-perfused.    UE: WNL, no swelling/edema.  Right LE: Erythema, swelling over right knee/patella. No palpable effusion. Tenderness to palpation. Previously amputated 3rd digit, well healed.   B/L LE: Venous statis and subsequent skin changes and breakdown.        LABS  --------------------------------------------------------------------------------------                        10.8   7.2   )-----------( 41       ( 18 May 2018 14:19 )             32.4       05-18    131<L>  |  94<L>  |  79<H>  ----------------------------<  165<H>  4.0   |  21<L>  |  2.78<H>    Ca    9.0      18 May 2018 14:19    TPro  7.7  /  Alb  3.4  /  TBili  1.4<H>  /  DBili  x   /  AST  52<H>  /  ALT  39  /  AlkPhos  113  05-18    --------------------------------------------------------------------------------------        ASSESSMENT:  68y Male pmhx ICM EF 40%, CABG, HTN, DM II, HLD, pituitary tumor, atrial fibrillation s/p PPM of presenting with sepsis 2/2 right septic arthritis of the knee.      PLAN:   Neurologic:   - Awake, alert  - Pain control (Tylenol for pain)    Respiratory:   - OOB/PT    Cardiovascular:   - Monitor HR and BP  - Pressors if worsening hypotension not responsive to fluids    Gastrointestinal/Nutrition:   - NPO for Surgery    Renal/Genitourinary:   - NS @ 50  - Cr. 2.78, monitor, contact PMD for baseline in AM    Hematologic:   - Hold DVT ppx for OR    Infectious Disease:   - Septic arthritis of right knee, gram stain positive for G+ cocci  - Zosyn q8h  - Vancomycin by level (Trough in AM)    Lines/Tubes:  - Peripheral IV    Endocrine:   - Monitor FS q6  - ISS    Disposition:   - OR for washout  - SICU for hemodynamic monitoring    --------------------------------------------------------------------------------------    Critical Care Diagnoses: SICU Consultation Note  =====================================================  HPI: 68y male PMHx of ICM EF 40%, CABG, HTN, DM II, HLD, pituitary tumor, atrial fibrillation s/p PPM (on Eliquis), right 3rd toe amputation presents to ER today with right knee pain and swelling for 2 days, with fevers TMax 102F at home yesterday. He has never had any similar episodes and no surgeries on his knee. Denies any trauma to the knee. Pain worse with movement. In ER, arthrocentesis performed, demonstrating Glucose 60, Protein 7.1, LDH >2250, Nucleated Cell Count 15,600, ,500. Gram stain pos. for Gram positive cocci. In ER patient was tachycardic to 102, Hypotensive to 75/54. Given 2g vancomycin, 1g Ceftriaxone.   Of note, patient states that his blood pressure is at baseline low 90s/60s nad also notes his creatinine is "high" at baseline.    Orthopedics consulted, plan to take patient to OR later tonight.    SICU Consulted for sepsis in setting of septic arthritis and hemodynamic monitoring.          PAST MEDICAL & SURGICAL HISTORY:  Hypothyroidism  PVD (peripheral vascular disease)  History of hyperprolactinemia  Hepatic cirrhosis, unspecified hepatic cirrhosis type  Anemia  ICD (implantable cardioverter-defibrillator) in place: [employer], Model [id number] , last interrogation 4/2017  Sleep apnea: not using CPAP  History of osteomyelitis: 2015, right foot 2nd toe  Presence of stent in coronary artery: 2 stents  Heart failure with reduced left ventricular function: EF 37%  Ischemic cardiomyopathy  Pituitary adenoma: as per patient chronic, no changes , recently restarted follow up with endocrinologist ( note in allscripts )  Coronary artery disease  Thrombocytopenia: Chronic  HLD (hyperlipidemia)  HTN (hypertension)  DM (diabetes mellitus): type 2, Hg A1C 8.2 % ( 7/10/17)  AICD lead malfunction: history of  History of amputation: right foot, 2nd toe, osteo 2015  AICD (automatic cardioverter/defibrillator) present: placement in (2006)  Stented coronary artery: RCA (1999)  Coronary atherosclerosis of artery bypass graft: CABG X 4 (1986)    Home Meds:   Allergies: None  Soc: No smoking  Advanced Directives: Presumed Full Code     ROS:    General: Fever  Skin/Breast: Non-Contributory  Ophthalmologic: Non-Contributory  ENMT: Non-Contributory  Respiratory and Thorax: Non-Contributory  Cardiovascular: No chest pain, SOB  Gastrointestinal: Non-Contributory  Genitourinary: Non-Contributory  Musculoskeletal: Right knee pain, pain with movement  Neurological: Non-Contributory  Psychiatric: Non-Contributory  Hematology/Lymphatics: Non-Contributory  Endocrine: Non-Contributory  Allergic/Immunologic: Non-Contributory    CURRENT MEDICATIONS:   --------------------------------------------------------------------------------------  Neurologic Medications  acetaminophen   Tablet. 975 milliGRAM(s) Oral every 6 hours PRN Mild Pain (1 - 3)    Respiratory Medications    Cardiovascular Medications    Gastrointestinal Medications  sodium chloride 0.9%. 1000 milliLiter(s) IV Continuous <Continuous>    Genitourinary Medications    Hematologic/Oncologic Medications    Antimicrobial/Immunologic Medications    Endocrine/Metabolic Medications    Topical/Other Medications    --------------------------------------------------------------------------------------    VITAL SIGNS, INS/OUTS (last 24 hours):  --------------------------------------------------------------------------------------  ((Insert SICU Vitals / Is+Os here)) ***  --------------------------------------------------------------------------------------    EXAM:  General/Neuro  Exam: Normal, NAD, alert, oriented x 3, no focal deficits. PERRLA.    Respiratory  Exam: Lungs clear to auscultation, Normal expansion/effort.  [] Tracheostomy   [] Intubated  Mechanical Ventilation:     Cardiovascular  Exam: S1, S2.  Regular rate and irregular rhythm. No peripheral pitting edema.  Cardiac Rhythm: Afib    GI  Exam: Abdomen soft, Non-tender, Non-distended.   Current Diet:  NPO for OR	      Tubes/Lines/Drains  [x] Peripheral IV  [] Central Venous Line     	[] R	[] L	[] IJ	[] Fem	[] SC        Type:	    Date Placed:   [] Arterial Line		[] R	[] L	[] Fem	[] Rad	[] Ax	Date Placed:   [] PICC:         	[] Midline		[] Mediport           [] Urinary Catheter		Date Placed:     Extremities  Exam: Extremities warm, pink, well-perfused.    UE: WNL, no swelling/edema.  Right LE: Erythema, swelling over right knee/patella. No palpable effusion. Tenderness to palpation. Previously amputated 3rd digit, well healed.   B/L LE: Venous statis and subsequent skin changes and breakdown.        LABS  --------------------------------------------------------------------------------------                        10.8   7.2   )-----------( 41       ( 18 May 2018 14:19 )             32.4       05-18    131<L>  |  94<L>  |  79<H>  ----------------------------<  165<H>  4.0   |  21<L>  |  2.78<H>    Ca    9.0      18 May 2018 14:19    TPro  7.7  /  Alb  3.4  /  TBili  1.4<H>  /  DBili  x   /  AST  52<H>  /  ALT  39  /  AlkPhos  113  05-18    --------------------------------------------------------------------------------------        ASSESSMENT:  68y Male pmhx ICM EF 40%, CABG, HTN, DM II, HLD, pituitary tumor, atrial fibrillation s/p PPM of presenting with sepsis 2/2 right septic arthritis of the knee.      PLAN:   Neurologic:   - Awake, alert  - Pain control (Tylenol for pain)    Respiratory:   - OOB/PT    Cardiovascular:   - Monitor HR and BP  - Pressors if worsening hypotension not responsive to fluids    Gastrointestinal/Nutrition:   - NPO for Surgery    Renal/Genitourinary:   - NS @ 50  - Cr. 2.78, monitor, contact PMD for baseline in AM    Hematologic:   - Hold DVT ppx for OR    Infectious Disease:   - Septic arthritis of right knee, gram stain positive for G+ cocci  - Zosyn q8h  - Vancomycin by level (Trough in AM)    Lines/Tubes:  - Peripheral IV    Endocrine:   - Monitor FS q6  - ISS    Disposition:   - OR for washout  - SICU for hemodynamic monitoring    --------------------------------------------------------------------------------------    Critical Care Diagnoses: SICU Consultation Note  =====================================================  HPI: 68y male PMHx of ICM EF 40%, CABG, HTN, DM II, HLD, pituitary tumor, atrial fibrillation s/p PPM (on Eliquis), right 3rd toe amputation presents to ER today with right knee pain and swelling for 2 days, with fevers TMax 102F at home yesterday. He has never had any similar episodes and no surgeries on his knee. Denies any trauma to the knee. Pain worse with movement. In ER, arthrocentesis performed, demonstrating Glucose 60, Protein 7.1, LDH >2250, Nucleated Cell Count 15,600, ,500. Gram stain pos. for Gram positive cocci. In ER patient was tachycardic to 102, Hypotensive to 75/54. Given 2g vancomycin, 1g Ceftriaxone.   He was due for cardioversion scheduled for Wednesday, took last Eliquis yesterday.  Of note, patient states that his blood pressure is at baseline low 90s/60s nad also notes his creatinine is "high" at baseline.    Orthopedics consulted, plan to take patient to OR later tonight.    SICU Consulted for sepsis in setting of septic arthritis and hemodynamic monitoring.          PAST MEDICAL & SURGICAL HISTORY:  Hypothyroidism  PVD (peripheral vascular disease)  History of hyperprolactinemia  Hepatic cirrhosis, unspecified hepatic cirrhosis type  Anemia  ICD (implantable cardioverter-defibrillator) in place: [employer], Model [id number] , last interrogation 4/2017  Sleep apnea: not using CPAP  History of osteomyelitis: 2015, right foot 2nd toe  Presence of stent in coronary artery: 2 stents  Heart failure with reduced left ventricular function: EF 37%  Ischemic cardiomyopathy  Pituitary adenoma: as per patient chronic, no changes , recently restarted follow up with endocrinologist ( note in allscripts )  Coronary artery disease  Thrombocytopenia: Chronic  HLD (hyperlipidemia)  HTN (hypertension)  DM (diabetes mellitus): type 2, Hg A1C 8.2 % ( 7/10/17)  AICD lead malfunction: history of  History of amputation: right foot, 2nd toe, osteo 2015  AICD (automatic cardioverter/defibrillator) present: placement in (2006)  Stented coronary artery: RCA (1999)  Coronary atherosclerosis of artery bypass graft: CABG X 4 (1986)    Home Meds:   Allergies: None  Soc: No smoking  Advanced Directives: Presumed Full Code     ROS:    General: Fever  Skin/Breast: Non-Contributory  Ophthalmologic: Non-Contributory  ENMT: Non-Contributory  Respiratory and Thorax: Non-Contributory  Cardiovascular: No chest pain, SOB  Gastrointestinal: Non-Contributory  Genitourinary: Non-Contributory  Musculoskeletal: Right knee pain, pain with movement  Neurological: Non-Contributory  Psychiatric: Non-Contributory  Hematology/Lymphatics: Non-Contributory  Endocrine: Non-Contributory  Allergic/Immunologic: Non-Contributory    CURRENT MEDICATIONS:   --------------------------------------------------------------------------------------  Neurologic Medications  acetaminophen   Tablet. 975 milliGRAM(s) Oral every 6 hours PRN Mild Pain (1 - 3)    Respiratory Medications    Cardiovascular Medications    Gastrointestinal Medications  sodium chloride 0.9%. 1000 milliLiter(s) IV Continuous <Continuous>    Genitourinary Medications    Hematologic/Oncologic Medications    Antimicrobial/Immunologic Medications    Endocrine/Metabolic Medications    Topical/Other Medications    --------------------------------------------------------------------------------------    VITAL SIGNS, INS/OUTS (last 24 hours):  --------------------------------------------------------------------------------------  ((Insert SICU Vitals / Is+Os here)) ***  --------------------------------------------------------------------------------------    EXAM:  General/Neuro  Exam: Normal, NAD, alert, oriented x 3, no focal deficits. PERRLA.    Respiratory  Exam: Lungs clear to auscultation, Normal expansion/effort.  [] Tracheostomy   [] Intubated  Mechanical Ventilation:     Cardiovascular  Exam: S1, S2.  Regular rate and irregular rhythm. No peripheral pitting edema.  Cardiac Rhythm: Afib    GI  Exam: Abdomen soft, Non-tender, Non-distended.   Current Diet:  NPO for OR	      Tubes/Lines/Drains  [x] Peripheral IV  [] Central Venous Line     	[] R	[] L	[] IJ	[] Fem	[] SC        Type:	    Date Placed:   [] Arterial Line		[] R	[] L	[] Fem	[] Rad	[] Ax	Date Placed:   [] PICC:         	[] Midline		[] Mediport           [] Urinary Catheter		Date Placed:     Extremities  Exam: Extremities warm, pink, well-perfused.    UE: WNL, no swelling/edema.  Right LE: Erythema, swelling over right knee/patella. No palpable effusion. Tenderness to palpation. Previously amputated 3rd digit, well healed.   B/L LE: Venous statis and subsequent skin changes and breakdown.        LABS  --------------------------------------------------------------------------------------                        10.8   7.2   )-----------( 41       ( 18 May 2018 14:19 )             32.4       05-18    131<L>  |  94<L>  |  79<H>  ----------------------------<  165<H>  4.0   |  21<L>  |  2.78<H>    Ca    9.0      18 May 2018 14:19    TPro  7.7  /  Alb  3.4  /  TBili  1.4<H>  /  DBili  x   /  AST  52<H>  /  ALT  39  /  AlkPhos  113  05-18    --------------------------------------------------------------------------------------        ASSESSMENT:  68y Male pmhx ICM EF 40%, CABG, HTN, DM II, HLD, pituitary tumor, atrial fibrillation s/p PPM of presenting with sepsis 2/2 right septic arthritis of the knee.      PLAN:   Neurologic:   - Awake, alert  - Pain control (Tylenol for pain)    Respiratory:   - OOB/PT    Cardiovascular:   - Monitor HR and BP  - Pressors if worsening hypotension not responsive to fluids    Gastrointestinal/Nutrition:   - NPO for Surgery    Renal/Genitourinary:   - NS @ 50  - Cr. 2.78, monitor, contact PMD for baseline in AM    Hematologic:   - Hold DVT ppx for OR    Infectious Disease:   - Septic arthritis of right knee, gram stain positive for G+ cocci  - Zosyn q8h  - Vancomycin by level (Trough in AM)    Lines/Tubes:  - Peripheral IV    Endocrine:   - Monitor FS q6  - ISS    Disposition:   - OR for washout  - SICU for hemodynamic monitoring    --------------------------------------------------------------------------------------    Critical Care Diagnoses: SICU Consultation Note  =====================================================  HPI: 68y male PMHx of ICM EF 40%, CABG, HTN, DM II, HLD, pituitary tumor, atrial fibrillation s/p PPM (on Eliquis), right 3rd toe amputation presents to ER today with right knee pain and swelling for 2 days, with fevers TMax 102F at home yesterday. He has never had any similar episodes and no surgeries on his knee. Denies any trauma to the knee. Pain worse with movement. In ER, arthrocentesis performed, demonstrating Glucose 60, Protein 7.1, LDH >2250, Nucleated Cell Count 15,600, ,500. Gram stain pos. for Gram positive cocci. In ER patient was tachycardic to 102, Hypotensive to 75/54. Given 2g vancomycin, 1g Ceftriaxone.   He was due for cardioversion scheduled for Wednesday, took last Eliquis yesterday.  Of note, patient states that his blood pressure is at baseline low 90s/60s and also notes his creatinine is "high" at baseline.    Orthopedics consulted, plan to take patient to OR later tonight.    SICU Consulted for sepsis in setting of septic arthritis and hemodynamic monitoring.          PAST MEDICAL & SURGICAL HISTORY:  Hypothyroidism  PVD (peripheral vascular disease)  History of hyperprolactinemia  Hepatic cirrhosis, unspecified hepatic cirrhosis type  Anemia  ICD (implantable cardioverter-defibrillator) in place: [employer], Model [id number] , last interrogation 4/2017  Sleep apnea: not using CPAP  History of osteomyelitis: 2015, right foot 2nd toe  Presence of stent in coronary artery: 2 stents  Heart failure with reduced left ventricular function: EF 37%  Ischemic cardiomyopathy  Pituitary adenoma: as per patient chronic, no changes , recently restarted follow up with endocrinologist ( note in allscripts )  Coronary artery disease  Thrombocytopenia: Chronic  HLD (hyperlipidemia)  HTN (hypertension)  DM (diabetes mellitus): type 2, Hg A1C 8.2 % ( 7/10/17)  AICD lead malfunction: history of  History of amputation: right foot, 2nd toe, osteo 2015  AICD (automatic cardioverter/defibrillator) present: placement in (2006)  Stented coronary artery: RCA (1999)  Coronary atherosclerosis of artery bypass graft: CABG X 4 (1986)    Home Meds:   Allergies: None  Soc: No smoking  Advanced Directives: Presumed Full Code     ROS:    General: Fever  Skin/Breast: Non-Contributory  Ophthalmologic: Non-Contributory  ENMT: Non-Contributory  Respiratory and Thorax: Non-Contributory  Cardiovascular: No chest pain, SOB  Gastrointestinal: Non-Contributory  Genitourinary: Non-Contributory  Musculoskeletal: Right knee pain, pain with movement  Neurological: Non-Contributory  Psychiatric: Non-Contributory  Hematology/Lymphatics: Non-Contributory  Endocrine: Non-Contributory  Allergic/Immunologic: Non-Contributory    CURRENT MEDICATIONS:   --------------------------------------------------------------------------------------  Neurologic Medications  acetaminophen   Tablet. 975 milliGRAM(s) Oral every 6 hours PRN Mild Pain (1 - 3)    Respiratory Medications    Cardiovascular Medications    Gastrointestinal Medications  sodium chloride 0.9%. 1000 milliLiter(s) IV Continuous <Continuous>    Genitourinary Medications    Hematologic/Oncologic Medications    Antimicrobial/Immunologic Medications    Endocrine/Metabolic Medications    Topical/Other Medications    --------------------------------------------------------------------------------------    VITAL SIGNS, INS/OUTS (last 24 hours):  --------------------------------------------------------------------------------------  ((Insert SICU Vitals / Is+Os here)) ***  --------------------------------------------------------------------------------------    EXAM:  General/Neuro  Exam: Normal, NAD, alert, oriented x 3, no focal deficits. PERRLA.    Respiratory  Exam: Lungs clear to auscultation, Normal expansion/effort.  [] Tracheostomy   [] Intubated  Mechanical Ventilation:     Cardiovascular  Exam: S1, S2.  Regular rate and irregular rhythm. No peripheral pitting edema.  Cardiac Rhythm: Afib    GI  Exam: Abdomen soft, Non-tender, Non-distended.   Current Diet:  NPO for OR	      Tubes/Lines/Drains  [x] Peripheral IV  [] Central Venous Line     	[] R	[] L	[] IJ	[] Fem	[] SC        Type:	    Date Placed:   [] Arterial Line		[] R	[] L	[] Fem	[] Rad	[] Ax	Date Placed:   [] PICC:         	[] Midline		[] Mediport           [] Urinary Catheter		Date Placed:     Extremities  Exam: Extremities warm, pink, well-perfused.    UE: WNL, no swelling/edema.  Right LE: Erythema, swelling over right knee/patella. No palpable effusion. Tenderness to palpation. Previously amputated 3rd digit, well healed.   B/L LE: Venous statis and subsequent skin changes and breakdown.        LABS  --------------------------------------------------------------------------------------                        10.8   7.2   )-----------( 41       ( 18 May 2018 14:19 )             32.4       05-18    131<L>  |  94<L>  |  79<H>  ----------------------------<  165<H>  4.0   |  21<L>  |  2.78<H>    Ca    9.0      18 May 2018 14:19    TPro  7.7  /  Alb  3.4  /  TBili  1.4<H>  /  DBili  x   /  AST  52<H>  /  ALT  39  /  AlkPhos  113  05-18    --------------------------------------------------------------------------------------        ASSESSMENT:  68y Male pmhx ICM EF 40%, CABG, HTN, DM II, HLD, pituitary tumor, atrial fibrillation s/p PPM of presenting with sepsis 2/2 right septic arthritis of the knee.      PLAN:   Neurologic:   - Awake, alert  - Pain control (Tylenol for pain)    Respiratory:   - OOB/PT    Cardiovascular:   - Monitor HR and BP  - Pressors if worsening hypotension not responsive to fluids    Gastrointestinal/Nutrition:   - NPO for Surgery    Renal/Genitourinary:   - NS @ 50  - Cr. 2.78, monitor, contact PMD for baseline in AM    Hematologic:   - Hold DVT ppx for OR    Infectious Disease:   - Septic arthritis of right knee, gram stain positive for G+ cocci  - Zosyn q8h  - Vancomycin by level (Trough in AM)    Lines/Tubes:  - Peripheral IV    Endocrine:   - Monitor FS q6  - ISS    Disposition:   - OR for washout  - SICU for hemodynamic monitoring    --------------------------------------------------------------------------------------    Critical Care Diagnoses: SICU Consultation Note  =====================================================  HPI: 68y male PMHx of ICM EF 40%, CABG, HTN, DM II, HLD, pituitary tumor, atrial fibrillation s/p PPM (on Eliquis), right 3rd toe amputation presents to ER today with right knee pain and swelling for 2 days, with fevers TMax 102F at home yesterday. He has never had any similar episodes and no surgeries on his knee. Denies any trauma to the knee. Pain worse with movement. In ER, arthrocentesis performed, demonstrating Glucose 60, Protein 7.1, LDH >2250, Nucleated Cell Count 15,600, ,500. Gram stain pos. for Gram positive cocci. In ER patient was tachycardic to 102, Hypotensive to 75/54. Given 2g vancomycin, 1g Ceftriaxone. 1U platelets.  He was due for cardioversion scheduled for Wednesday, took last Eliquis yesterday.  Of note, patient states that his blood pressure is at baseline low 90s/60s and also notes his creatinine is "high" at baseline.    Orthopedics consulted, plan to take patient to OR later tonight.    SICU Consulted for sepsis in setting of septic arthritis and hemodynamic monitoring.          PAST MEDICAL & SURGICAL HISTORY:  Hypothyroidism  PVD (peripheral vascular disease)  History of hyperprolactinemia  Hepatic cirrhosis, unspecified hepatic cirrhosis type  Anemia  ICD (implantable cardioverter-defibrillator) in place: Medtronic, Model [id number] , last interrogation 4/2017  Sleep apnea: not using CPAP  History of osteomyelitis: 2015, right foot 2nd toe  Presence of stent in coronary artery: 2 stents  Heart failure with reduced left ventricular function: EF 37%  Ischemic cardiomyopathy  Pituitary adenoma: as per patient chronic, no changes , recently restarted follow up with endocrinologist ( note in allscripts )  Coronary artery disease  Thrombocytopenia: Chronic  HLD (hyperlipidemia)  HTN (hypertension)  DM (diabetes mellitus): type 2, Hg A1C 8.2 % ( 7/10/17)  AICD lead malfunction: history of  History of amputation: right foot, 2nd toe, osteo 2015  AICD (automatic cardioverter/defibrillator) present: placement in (2006)  Stented coronary artery: RCA (1999)  Coronary atherosclerosis of artery bypass graft: CABG X 4 (1986)    Home Meds:   Allergies: None  Soc: No smoking  Advanced Directives: Presumed Full Code     ROS:    General: Fever  Skin/Breast: Non-Contributory  Ophthalmologic: Non-Contributory  ENMT: Non-Contributory  Respiratory and Thorax: Non-Contributory  Cardiovascular: No chest pain, SOB  Gastrointestinal: Non-Contributory  Genitourinary: Non-Contributory  Musculoskeletal: Right knee pain, pain with movement  Neurological: Non-Contributory  Psychiatric: Non-Contributory  Hematology/Lymphatics: Non-Contributory  Endocrine: Non-Contributory  Allergic/Immunologic: Non-Contributory    CURRENT MEDICATIONS:   --------------------------------------------------------------------------------------  Neurologic Medications  acetaminophen   Tablet. 975 milliGRAM(s) Oral every 6 hours PRN Mild Pain (1 - 3)    Respiratory Medications    Cardiovascular Medications    Gastrointestinal Medications  sodium chloride 0.9%. 1000 milliLiter(s) IV Continuous <Continuous>    Genitourinary Medications    Hematologic/Oncologic Medications    Antimicrobial/Immunologic Medications    Endocrine/Metabolic Medications    Topical/Other Medications    --------------------------------------------------------------------------------------    VITAL SIGNS, INS/OUTS (last 24 hours):  --------------------------------------------------------------------------------------  ((Insert SICU Vitals / Is+Os here)) ***  --------------------------------------------------------------------------------------    EXAM:  General/Neuro  Exam: Normal, NAD, alert, oriented x 3, no focal deficits. PERRLA.    Respiratory  Exam: Lungs clear to auscultation, Normal expansion/effort.  [] Tracheostomy   [] Intubated  Mechanical Ventilation:     Cardiovascular  Exam: S1, S2.  Regular rate and irregular rhythm. No peripheral pitting edema.  Cardiac Rhythm: Afib    GI  Exam: Abdomen soft, Non-tender, Non-distended.   Current Diet:  NPO for OR	      Tubes/Lines/Drains  [x] Peripheral IV  [] Central Venous Line     	[] R	[] L	[] IJ	[] Fem	[] SC        Type:	    Date Placed:   [] Arterial Line		[] R	[] L	[] Fem	[] Rad	[] Ax	Date Placed:   [] PICC:         	[] Midline		[] Mediport           [] Urinary Catheter		Date Placed:     Extremities  Exam: Extremities warm, pink, well-perfused.    UE: WNL, no swelling/edema.  Right LE: Erythema, swelling over right knee/patella. No palpable effusion. Tenderness to palpation. Previously amputated 3rd digit, well healed.   B/L LE: Venous statis and subsequent skin changes and breakdown.        LABS  --------------------------------------------------------------------------------------                        10.8   7.2   )-----------( 41       ( 18 May 2018 14:19 )             32.4       05-18    131<L>  |  94<L>  |  79<H>  ----------------------------<  165<H>  4.0   |  21<L>  |  2.78<H>    Ca    9.0      18 May 2018 14:19    TPro  7.7  /  Alb  3.4  /  TBili  1.4<H>  /  DBili  x   /  AST  52<H>  /  ALT  39  /  AlkPhos  113  05-18    --------------------------------------------------------------------------------------        ASSESSMENT:  68y Male pmhx ICM EF 40%, CABG, HTN, DM II, HLD, pituitary tumor, atrial fibrillation s/p PPM of presenting with sepsis 2/2 right septic arthritis of the knee.      PLAN:   Neurologic:   - Awake, alert  - Pain control (Tylenol for pain)    Respiratory:   - OOB/PT    Cardiovascular:   - Monitor HR and BP  - Pressors if worsening hypotension not responsive to fluids    Gastrointestinal/Nutrition:   - NPO for Surgery    Renal/Genitourinary:   - NS @ 50  - Cr. 2.78, monitor, contact PMD for baseline in AM    Hematologic:   - Hold DVT ppx for OR    Infectious Disease:   - Septic arthritis of right knee, gram stain positive for G+ cocci  - Zosyn q8h  - Vancomycin by level (Trough in AM)    Lines/Tubes:  - Peripheral IV    Endocrine:   - Monitor FS q6  - ISS    Disposition:   - OR for washout  - SICU for hemodynamic monitoring    --------------------------------------------------------------------------------------    Critical Care Diagnoses:

## 2018-05-18 NOTE — ED ADULT NURSE NOTE - OBJECTIVE STATEMENT
Pt is a 69 yo M who came to the ED amd c/o rt knee pain and swelling x 2 days. States he had a fever to 102 yesterday. RT knee red, swollen, warm, tender, dec ROM due to pain/swelling, + pulses, denies injury. States his Bp has been low , in the 90's due to his A fib, scheduled for cardioversion on 5/23. A/O x3, able to ambulate but has difficulty sitting and standing. Pt is a 67 yo M A&Ox4 who came to the ED amd c/o rt knee pain and swelling x 2 days. States he had a fever to 102 yesterday. RT knee red, swollen, warm, tender, dec ROM due to pain/swelling, + pulses, denies injury. States his Bp has been low , in the 90's due to his A fib, scheduled for cardioversion on 5/23. A/O x3, able to ambulate but has difficulty sitting and standing. Pt is a 67 yo M A&Ox4 with PMH of HTN, PVD, diabetes, a-fib (has a defibrillator) presents to the ED c/o rt knee pain and swelling x 2 days. States he had a fever to 102 yesterday. RT knee red, swollen, warm, tender, decreased ROM due to pain/swelling, + peripheral pulses bilaterally, denies injury. Pt. reports swelling and pain occurred spontaneously x2 days ago. States his Bp has been low, in the 90's due to his A fib, scheduled for cardioversion on Wednesday 5/23. Pt. hypotensive upon arrival to ED - MD aware at bedside. Pt. reports has been able to ambulate but has difficulty sitting and standing. Took Advil for pain at 0600 today. Pt denies SOB, cough, HA, dysuria, hematuria, diarrhea, and ABD pain. Rectal temp 99.4 F at this time. Pt. placed on CM - Afib to the 90s. Safety and comfort provided. Family at bedside. Pt is a 67 yo M A&Ox4 with PMH of HTN, PVD, diabetes, a-fib (has a defibrillator) presents to the ED c/o rt knee pain and swelling x 2 days. States he had a fever to 102 yesterday. RT knee red, swollen, warm, tender, decreased ROM due to pain/swelling, + peripheral pulses bilaterally, denies injury. Pt. reports swelling and pain occurred spontaneously x2 days ago. States his Bp has been low, in the 90's due to his A fib, scheduled for cardioversion on Wednesday 5/23. Pt. hypotensive upon arrival to ED - MD aware at bedside. 2L NS bolus initiated as per MD order. Pt. reports has been able to ambulate but has difficulty sitting and standing. Took Advil for pain at 0600 today. Pt denies SOB, cough, HA, dysuria, hematuria, diarrhea, and ABD pain. Rectal temp 99.4 F at this time. Pt. placed on CM - Afib to the 90s. Safety and comfort provided. Family at bedside. Pt is a 67 yo M A&Ox4 with PMH of HTN, PVD, diabetes, a-fib (has a defibrillator) presents to the ED c/o rt knee pain and swelling x 2 days. States he had a fever to 102 yesterday. RT knee red, swollen, warm, tender, decreased ROM due to pain/swelling, + peripheral pulses bilaterally, denies injury. Pt. reports swelling and pain occurred spontaneously x2 days ago. States his Bp has been low, in the 90's due to his A fib, scheduled for cardioversion on Wednesday 5/23. Pt. hypotensive upon arrival to ED - MD aware at bedside. 2L NS bolus initiated as per MD order. Pt. mentating well and is well appearing. Skin pink in color, warm and dry. for Pt. reports has been able to ambulate but has difficulty sitting and standing. Took Advil for pain at 0600 today. Pt denies SOB, cough, HA, dysuria, hematuria, diarrhea, and ABD pain. Rectal temp 99.4 F at this time. Pt. placed on CM - Afib to the 90s. Safety and comfort provided. Family at bedside. Pt is a 67 yo M A&Ox4 with PMH of HTN, PVD, diabetes, a-fib (has a defibrillator) presents to the ED c/o rt knee pain and swelling x 2 days. States he had a fever to 102 yesterday. RT knee red, swollen, warm, tender, decreased ROM due to pain/swelling, + peripheral pulses bilaterally, denies injury. Pt. reports swelling and pain occurred spontaneously x2 days ago. States his Bp has been low, in the 90's due to his A fib, scheduled for cardioversion on Wednesday 5/23. Pt. hypotensive upon arrival to ED - MD aware at bedside. 2L NS bolus initiated as per MD order. Pt. mentating well and is well appearing. Skin pink in color, warm and dry. for Pt. reports has been able to ambulate but has difficulty sitting and standing. Took Advil for pain at 0600 today. Bilateral lower extremity appear dry and dark red in color due to peripheral arterial disease. Pt denies SOB, cough, HA, dysuria, hematuria, diarrhea, and ABD pain. ABD appears round and distended, soft and nontender upon palpation. Rectal temp 99.4 F at this time. Pt. placed on CM - Afib to the 90s. Safety and comfort provided. Family at bedside.

## 2018-05-18 NOTE — ED ADULT NURSE NOTE - PSH
AICD (automatic cardioverter/defibrillator) present  placement in (2006)  Coronary atherosclerosis of artery bypass graft  CABG X 4 (1986)  History of amputation  right foot, 2nd toe, osteo 2015  Stented coronary artery  RCA (1999)

## 2018-05-18 NOTE — ED ADULT NURSE REASSESSMENT NOTE - NS ED NURSE REASSESS COMMENT FT1
Platelet infusion initiated at 1620 as per MD order with 2 RN's at bedside. Consent in chart. Patient educated on signs and symptoms of transfusion reaction. Informed to notify RN immediately if reaction ensues. 15 min vital signs being monitored. Safety and comfort provided. Family member at bedside. Call bell within reach.

## 2018-05-18 NOTE — ED ADULT NURSE REASSESSMENT NOTE - NS ED NURSE REASSESS COMMENT FT1
Pt. has yet to put out any urine since arrival to ED, even after multiple fluid boluses. Pt. bladder scanned. Max amount shown by bladder scan was 220. MD made aware. Safety and comfort provided. Family at bedside.

## 2018-05-19 LAB
ANION GAP SERPL CALC-SCNC: 15 MMOL/L — SIGNIFICANT CHANGE UP (ref 5–17)
APTT BLD: 31.7 SEC — SIGNIFICANT CHANGE UP (ref 27.5–37.4)
BUN SERPL-MCNC: 75 MG/DL — HIGH (ref 7–23)
CALCIUM SERPL-MCNC: 8.6 MG/DL — SIGNIFICANT CHANGE UP (ref 8.4–10.5)
CHLORIDE SERPL-SCNC: 98 MMOL/L — SIGNIFICANT CHANGE UP (ref 96–108)
CO2 SERPL-SCNC: 19 MMOL/L — LOW (ref 22–31)
CREAT SERPL-MCNC: 2.36 MG/DL — HIGH (ref 0.5–1.3)
CRP SERPL-MCNC: 10.7 MG/DL — HIGH (ref 0–0.4)
CULTURE RESULTS: NO GROWTH — SIGNIFICANT CHANGE UP
GAS PNL BLDV: SIGNIFICANT CHANGE UP
GLUCOSE BLDC GLUCOMTR-MCNC: 135 MG/DL — HIGH (ref 70–99)
GLUCOSE BLDC GLUCOMTR-MCNC: 91 MG/DL — SIGNIFICANT CHANGE UP (ref 70–99)
GLUCOSE BLDC GLUCOMTR-MCNC: 92 MG/DL — SIGNIFICANT CHANGE UP (ref 70–99)
GLUCOSE BLDC GLUCOMTR-MCNC: 95 MG/DL — SIGNIFICANT CHANGE UP (ref 70–99)
GLUCOSE BLDC GLUCOMTR-MCNC: 97 MG/DL — SIGNIFICANT CHANGE UP (ref 70–99)
GLUCOSE SERPL-MCNC: 73 MG/DL — SIGNIFICANT CHANGE UP (ref 70–99)
HCT VFR BLD CALC: 31.8 % — LOW (ref 39–50)
HGB BLD-MCNC: 10.9 G/DL — LOW (ref 13–17)
INR BLD: 1.82 RATIO — HIGH (ref 0.88–1.16)
MAGNESIUM SERPL-MCNC: 2.2 MG/DL — SIGNIFICANT CHANGE UP (ref 1.6–2.6)
MCHC RBC-ENTMCNC: 32.2 PG — SIGNIFICANT CHANGE UP (ref 27–34)
MCHC RBC-ENTMCNC: 34.4 GM/DL — SIGNIFICANT CHANGE UP (ref 32–36)
MCV RBC AUTO: 93.4 FL — SIGNIFICANT CHANGE UP (ref 80–100)
NT-PROBNP SERPL-SCNC: 4700 PG/ML — HIGH (ref 0–300)
PHOSPHATE SERPL-MCNC: 3.3 MG/DL — SIGNIFICANT CHANGE UP (ref 2.5–4.5)
PLATELET # BLD AUTO: 42 K/UL — LOW (ref 150–400)
POTASSIUM SERPL-MCNC: 3.9 MMOL/L — SIGNIFICANT CHANGE UP (ref 3.5–5.3)
POTASSIUM SERPL-SCNC: 3.9 MMOL/L — SIGNIFICANT CHANGE UP (ref 3.5–5.3)
PROCALCITONIN SERPL-MCNC: 1.25 NG/ML — HIGH (ref 0–0.04)
PROTHROM AB SERPL-ACNC: 20.1 SEC — HIGH (ref 9.8–12.7)
RBC # BLD: 3.4 M/UL — LOW (ref 4.2–5.8)
RBC # FLD: 15 % — HIGH (ref 10.3–14.5)
SODIUM SERPL-SCNC: 132 MMOL/L — LOW (ref 135–145)
SPECIMEN SOURCE: SIGNIFICANT CHANGE UP
VANCOMYCIN FLD-MCNC: 8.7 UG/ML — SIGNIFICANT CHANGE UP
WBC # BLD: 7.2 K/UL — SIGNIFICANT CHANGE UP (ref 3.8–10.5)
WBC # FLD AUTO: 7.2 K/UL — SIGNIFICANT CHANGE UP (ref 3.8–10.5)

## 2018-05-19 PROCEDURE — 93010 ELECTROCARDIOGRAM REPORT: CPT | Mod: 76

## 2018-05-19 PROCEDURE — 99233 SBSQ HOSP IP/OBS HIGH 50: CPT

## 2018-05-19 PROCEDURE — 71045 X-RAY EXAM CHEST 1 VIEW: CPT | Mod: 26

## 2018-05-19 PROCEDURE — 99291 CRITICAL CARE FIRST HOUR: CPT

## 2018-05-19 RX ORDER — PHYTONADIONE (VIT K1) 5 MG
5 TABLET ORAL ONCE
Qty: 0 | Refills: 0 | Status: COMPLETED | OUTPATIENT
Start: 2018-05-19 | End: 2018-05-19

## 2018-05-19 RX ORDER — CARVEDILOL PHOSPHATE 80 MG/1
12.5 CAPSULE, EXTENDED RELEASE ORAL EVERY 12 HOURS
Qty: 0 | Refills: 0 | Status: DISCONTINUED | OUTPATIENT
Start: 2018-05-20 | End: 2018-05-20

## 2018-05-19 RX ORDER — MAGNESIUM SULFATE 500 MG/ML
2 VIAL (ML) INJECTION ONCE
Qty: 0 | Refills: 0 | Status: COMPLETED | OUTPATIENT
Start: 2018-05-19 | End: 2018-05-19

## 2018-05-19 RX ORDER — CARVEDILOL PHOSPHATE 80 MG/1
6.25 CAPSULE, EXTENDED RELEASE ORAL ONCE
Qty: 0 | Refills: 0 | Status: COMPLETED | OUTPATIENT
Start: 2018-05-19 | End: 2018-05-19

## 2018-05-19 RX ORDER — ENOXAPARIN SODIUM 100 MG/ML
40 INJECTION SUBCUTANEOUS DAILY
Qty: 0 | Refills: 0 | Status: DISCONTINUED | OUTPATIENT
Start: 2018-05-19 | End: 2018-05-22

## 2018-05-19 RX ORDER — OXYCODONE HYDROCHLORIDE 5 MG/1
10 TABLET ORAL EVERY 4 HOURS
Qty: 0 | Refills: 0 | Status: DISCONTINUED | OUTPATIENT
Start: 2018-05-19 | End: 2018-05-25

## 2018-05-19 RX ORDER — CARVEDILOL PHOSPHATE 80 MG/1
12.5 CAPSULE, EXTENDED RELEASE ORAL EVERY 12 HOURS
Qty: 0 | Refills: 0 | Status: DISCONTINUED | OUTPATIENT
Start: 2018-05-19 | End: 2018-05-19

## 2018-05-19 RX ORDER — VANCOMYCIN HCL 1 G
1000 VIAL (EA) INTRAVENOUS ONCE
Qty: 0 | Refills: 0 | Status: COMPLETED | OUTPATIENT
Start: 2018-05-19 | End: 2018-05-19

## 2018-05-19 RX ORDER — OXYCODONE HYDROCHLORIDE 5 MG/1
5 TABLET ORAL EVERY 4 HOURS
Qty: 0 | Refills: 0 | Status: DISCONTINUED | OUTPATIENT
Start: 2018-05-19 | End: 2018-05-25

## 2018-05-19 RX ORDER — ATORVASTATIN CALCIUM 80 MG/1
40 TABLET, FILM COATED ORAL AT BEDTIME
Qty: 0 | Refills: 0 | Status: DISCONTINUED | OUTPATIENT
Start: 2018-05-19 | End: 2018-05-25

## 2018-05-19 RX ORDER — INSULIN LISPRO 100/ML
VIAL (ML) SUBCUTANEOUS
Qty: 0 | Refills: 0 | Status: DISCONTINUED | OUTPATIENT
Start: 2018-05-19 | End: 2018-05-25

## 2018-05-19 RX ORDER — HYDROMORPHONE HYDROCHLORIDE 2 MG/ML
0.5 INJECTION INTRAMUSCULAR; INTRAVENOUS; SUBCUTANEOUS ONCE
Qty: 0 | Refills: 0 | Status: DISCONTINUED | OUTPATIENT
Start: 2018-05-19 | End: 2018-05-19

## 2018-05-19 RX ORDER — LEVOTHYROXINE SODIUM 125 MCG
125 TABLET ORAL DAILY
Qty: 0 | Refills: 0 | Status: DISCONTINUED | OUTPATIENT
Start: 2018-05-20 | End: 2018-05-25

## 2018-05-19 RX ORDER — METOPROLOL TARTRATE 50 MG
2.5 TABLET ORAL ONCE
Qty: 0 | Refills: 0 | Status: COMPLETED | OUTPATIENT
Start: 2018-05-19 | End: 2018-05-19

## 2018-05-19 RX ORDER — INSULIN LISPRO 100/ML
VIAL (ML) SUBCUTANEOUS AT BEDTIME
Qty: 0 | Refills: 0 | Status: DISCONTINUED | OUTPATIENT
Start: 2018-05-19 | End: 2018-05-25

## 2018-05-19 RX ORDER — ASPIRIN/CALCIUM CARB/MAGNESIUM 324 MG
81 TABLET ORAL DAILY
Qty: 0 | Refills: 0 | Status: DISCONTINUED | OUTPATIENT
Start: 2018-05-19 | End: 2018-05-25

## 2018-05-19 RX ADMIN — PIPERACILLIN AND TAZOBACTAM 25 GRAM(S): 4; .5 INJECTION, POWDER, LYOPHILIZED, FOR SOLUTION INTRAVENOUS at 12:36

## 2018-05-19 RX ADMIN — CARVEDILOL PHOSPHATE 6.25 MILLIGRAM(S): 80 CAPSULE, EXTENDED RELEASE ORAL at 09:58

## 2018-05-19 RX ADMIN — HYDROMORPHONE HYDROCHLORIDE 0.5 MILLIGRAM(S): 2 INJECTION INTRAMUSCULAR; INTRAVENOUS; SUBCUTANEOUS at 07:07

## 2018-05-19 RX ADMIN — Medication 50 GRAM(S): at 00:08

## 2018-05-19 RX ADMIN — PIPERACILLIN AND TAZOBACTAM 25 GRAM(S): 4; .5 INJECTION, POWDER, LYOPHILIZED, FOR SOLUTION INTRAVENOUS at 21:35

## 2018-05-19 RX ADMIN — HYDROMORPHONE HYDROCHLORIDE 0.5 MILLIGRAM(S): 2 INJECTION INTRAMUSCULAR; INTRAVENOUS; SUBCUTANEOUS at 12:15

## 2018-05-19 RX ADMIN — Medication 2.5 MILLIGRAM(S): at 16:30

## 2018-05-19 RX ADMIN — ATORVASTATIN CALCIUM 40 MILLIGRAM(S): 80 TABLET, FILM COATED ORAL at 21:35

## 2018-05-19 RX ADMIN — HYDROMORPHONE HYDROCHLORIDE 0.5 MILLIGRAM(S): 2 INJECTION INTRAMUSCULAR; INTRAVENOUS; SUBCUTANEOUS at 06:59

## 2018-05-19 RX ADMIN — SODIUM CHLORIDE 50 MILLILITER(S): 9 INJECTION INTRAMUSCULAR; INTRAVENOUS; SUBCUTANEOUS at 18:16

## 2018-05-19 RX ADMIN — SODIUM CHLORIDE 1000 MILLILITER(S): 9 INJECTION INTRAMUSCULAR; INTRAVENOUS; SUBCUTANEOUS at 00:07

## 2018-05-19 RX ADMIN — PIPERACILLIN AND TAZOBACTAM 25 GRAM(S): 4; .5 INJECTION, POWDER, LYOPHILIZED, FOR SOLUTION INTRAVENOUS at 04:59

## 2018-05-19 RX ADMIN — SODIUM CHLORIDE 50 MILLILITER(S): 9 INJECTION INTRAMUSCULAR; INTRAVENOUS; SUBCUTANEOUS at 00:07

## 2018-05-19 RX ADMIN — CARVEDILOL PHOSPHATE 12.5 MILLIGRAM(S): 80 CAPSULE, EXTENDED RELEASE ORAL at 20:06

## 2018-05-19 RX ADMIN — HYDROMORPHONE HYDROCHLORIDE 0.5 MILLIGRAM(S): 2 INJECTION INTRAMUSCULAR; INTRAVENOUS; SUBCUTANEOUS at 15:00

## 2018-05-19 RX ADMIN — SODIUM CHLORIDE 50 MILLILITER(S): 9 INJECTION INTRAMUSCULAR; INTRAVENOUS; SUBCUTANEOUS at 09:59

## 2018-05-19 RX ADMIN — CARVEDILOL PHOSPHATE 6.25 MILLIGRAM(S): 80 CAPSULE, EXTENDED RELEASE ORAL at 15:45

## 2018-05-19 RX ADMIN — HYDROMORPHONE HYDROCHLORIDE 0.5 MILLIGRAM(S): 2 INJECTION INTRAMUSCULAR; INTRAVENOUS; SUBCUTANEOUS at 03:27

## 2018-05-19 RX ADMIN — Medication 250 MILLIGRAM(S): at 04:59

## 2018-05-19 RX ADMIN — OXYCODONE HYDROCHLORIDE 5 MILLIGRAM(S): 5 TABLET ORAL at 23:48

## 2018-05-19 RX ADMIN — Medication 5 MILLIGRAM(S): at 03:18

## 2018-05-19 RX ADMIN — Medication 50 GRAM(S): at 16:43

## 2018-05-19 RX ADMIN — HYDROMORPHONE HYDROCHLORIDE 0.5 MILLIGRAM(S): 2 INJECTION INTRAMUSCULAR; INTRAVENOUS; SUBCUTANEOUS at 11:53

## 2018-05-19 RX ADMIN — HYDROMORPHONE HYDROCHLORIDE 0.5 MILLIGRAM(S): 2 INJECTION INTRAMUSCULAR; INTRAVENOUS; SUBCUTANEOUS at 14:45

## 2018-05-19 RX ADMIN — HYDROMORPHONE HYDROCHLORIDE 0.5 MILLIGRAM(S): 2 INJECTION INTRAMUSCULAR; INTRAVENOUS; SUBCUTANEOUS at 03:42

## 2018-05-19 NOTE — H&P ADULT - NSHPPHYSICALEXAM_GEN_ALL_CORE
General: laying in bed, NAD  Resp: Unlabored breathing  EXT: BLE have chronic lymphedematous changes, are erythematous, with old scabs, DP palp BL     RLE: Knee is warm (compared to other side), with a moderate effusion, sp a recent drainage, there soft tissue extension of the swelling, Pt can range knee slowly, and with obvious difficulty    Vital Signs Last 24 Hrs  T(C): 37.1 (18 May 2018 23:00), Max: 37.4 (18 May 2018 14:17)  T(F): 98.7 (18 May 2018 23:00), Max: 99.4 (18 May 2018 14:17)  HR: 117 (19 May 2018 00:41) (82 - 121)  BP: 87/52 (19 May 2018 00:41) (75/54 - 103/57)  BP(mean): 65 (19 May 2018 00:41) (60 - 75)  RR: 19 (19 May 2018 00:41) (10 - 23)  SpO2: 96% (19 May 2018 00:41) (94% - 100%)

## 2018-05-19 NOTE — H&P ADULT - ASSESSMENT
68M with extensive cardiac history sp CABG 20 years ago with AICD placement presents likely septic with hypotension and likely septic knee    ·	Neuro: pain control prn  ·	Resp: IS, monitor VS  ·	CV: monitor on Tele, cards to follow, holding AC for OR  ·	GI: NPO  ·	Renal: IVF, gentle hydration, ISS  ·	Heme: holding DVT PPx ahead of OR, reverse INR   ·	MSK: Pre op labs, SICU clearance  ·	Appreciate SICU management of the patient    Ortho [id number]

## 2018-05-19 NOTE — PROGRESS NOTE ADULT - ASSESSMENT
Comfortable overnight and looks OK this AM.  His usual cardiac regimen at home is Coreg 25 BID, Lasix 40 BID, Aldactone 25 OD, Ramipril 2.5 OD, and PRN Metolazone 5.  Think we should aim to get him back on this.  Agree with starting Coreg first and would try to get up to 25 BID.  He lives at BP around 90/60 so no need to be any higher.  Also has atrial tach/flutter and try to keep rate under 100.  Looks fine to go to OR today.  RAGHU Willett [phone]

## 2018-05-19 NOTE — H&P ADULT - HISTORY OF PRESENT ILLNESS
68M with PMHx of  PMHx of ICM EF 40%, CABG, HTN, DM II, HLD, pituitary tumor, atrial fibrillation s/p PPM, AICD p/w two days of left knee pain and fevers as high as 102.2. The patient went to an outpatient orthopaedic surgeon who sent him into the OR for further evaluation. The patient denies recent trauma to the knee or history of gout. The patient has had episodes of cellulitis in the past, but has not recently been treated. He has been able to bear weight on the knee with some difficulty.     Patient is a diabetic and per the patient his hbg A1c is in the mid-7s    Pt states that his blood pressure has been low recently (90s systolic), which was known by his cardiologist. He has a fib and was scheduled for cardioversion this coming weds.

## 2018-05-19 NOTE — H&P ADULT - PMH
AICD lead malfunction  history of  Anemia    Coronary artery disease    DM (diabetes mellitus)  type 2, Hg A1C 8.2 % ( 7/10/17)  Heart failure with reduced left ventricular function  EF 37%  Hepatic cirrhosis, unspecified hepatic cirrhosis type    History of hyperprolactinemia    History of osteomyelitis  2015, right foot 2nd toe  HLD (hyperlipidemia)    HTN (hypertension)    Hypothyroidism    ICD (implantable cardioverter-defibrillator) in place  [employer]tronic, Model [id number] , last interrogation 4/2017  Ischemic cardiomyopathy    Pituitary adenoma  as per patient chronic, no changes , recently restarted follow up with endocrinologist ( note in allscripts )  Presence of stent in coronary artery  2 stents  PVD (peripheral vascular disease)    Sleep apnea  not using CPAP  Thrombocytopenia  Chronic

## 2018-05-19 NOTE — PROGRESS NOTE ADULT - SUBJECTIVE AND OBJECTIVE BOX
incision and drainage of right prepatellar bursitis at bedside. Right knee was prepped in sterile fashion with betadine. 25cc of 1% lidocaine was used anesthetize the superior and inferior incision sites as well as the prepatellar bursa. 1 cm incision was made superior and inferior to patella, clamp was used to tunnel into the bursa to fully debride and break up pus pockets. 2 wound cultures were obtained. Penrose drain was placed running from superior to inferior incision sites left about 5 cms of penrose from each incision. Knee was wrapped in sterile 4x4 gauze, webril and ace wrap. Elizabeth rubio continue elevating the right knee and icing as needed. Penrose drain will remain in bursa for next 2 days. Will check wounds in am.

## 2018-05-19 NOTE — H&P ADULT - NSHPLABSRESULTS_GEN_ALL_CORE
9.9    6.2   )-----------( 41       ( 18 May 2018 23:31 )             29.9   05-18    132<L>  |  98  |  77<H>  ----------------------------<  107<H>  3.9   |  21<L>  |  2.53<H>    Ca    8.3<L>      18 May 2018 23:31  Phos  3.1     05-18  Mg     1.6     05-18    TPro  7.7  /  Alb  3.4  /  TBili  1.4<H>  /  DBili  x   /  AST  52<H>  /  ALT  39  /  AlkPhos  113  05-18  PT/INR - ( 18 May 2018 23:31 )   PT: 22.1 sec;   INR: 2.00 ratio         PTT - ( 18 May 2018 23:31 )  PTT:30.7 sec    Culture - Body Fluid with Gram Stain (collected 18 May 2018 18:17)  Source: .Body Fluid Knee Fluid  Gram Stain (18 May 2018 22:36):    Few polymorphonuclear leukocytes per low power field    Few Red blood cells per low power field    Numerous Gram positive cocci in pairs, chains and clusters per oil power    field    XR: No fracture

## 2018-05-19 NOTE — PROGRESS NOTE ADULT - ASSESSMENT
ASSESSMENT:  68 year old male with an extensive cardiac history presenting with tachycardia and hypotension in the setting of septic arthritis of the right knee.    PLAN:    Neuro: acute right knee pain  - Monitor mental status.  - Pain control as needed with Dilaudid.    Resp: no acute issues  - Monitor pulse oximeter.    CV: septic shock, ischemic cardiomyopathy, compensated HFrEF, CAD, HTN, HLD, PVD  - Monitor vital signs.  - Holding home carvedilol, ramipril, and Lasix in the setting of likely septic shock from possibly septic arthritis. ASSESSMENT:  68 year old male with an extensive cardiac history presenting with tachycardia and hypotension in the setting of septic arthritis vs. bursitis of the right knee.    PLAN:    Neuro: acute right knee pain  - Monitor mental status.  - Pain control as needed with Dilaudid.    Resp: BEHZAD not on CPAP  - Monitor pulse oximeter.    CV: septic shock, ischemic cardiomyopathy, compensated HFrEF, CAD, HTN, HLD  - Monitor vital signs.  - Holding home carvedilol, ramipril, and Lasix in the setting of likely septic shock from possibly septic arthritis vs. bursitis.    GI: cirrhosis  - NPO as patient may be going to the OR for washout of the right knee vs. pre-patellar bursa.  - Monitor LFTs and coags.    Renal: JULIANNE stage 3 on CKD stage 3  - Monitor I&Os.  - Monitor electrolytes and replete as necessary.  - NS at 50 mL/hr while NPO.    Heme: chronic thrombocytopenia, PVD  - Monitor CBC and coags.  - Will start Lovenox for VTE prophylaxis post-procedure.  - Holding home ASA as patient is possibly pre-op.    ID: septic arthritis vs. bursitis  - Monitor temperature, WBC, and procalcitonin.  - Follow up arthrocentesis and blood cultures.  - Empiric antibiotics with Zosyn and vancomycin by level.  - Possible OR for washout of right knee vs. bedside washout of pre-patellar bursa.    Endo: DM type II, hypothyroidism  - Monitor glucose and ISS q6hrs for glycemic control while NPO.  - Home Synthroid for hypothyroidism.    Disposition:  - Full code.  - Will remain in SICU for hemodynamic monitoring.    Chelsea Mejia PA-C     [id number]

## 2018-05-19 NOTE — PROGRESS NOTE ADULT - SUBJECTIVE AND OBJECTIVE BOX
Comfortable overnight and has no dyspnea or other cardiac symptoms.  Pain in knee without change and not yet been debrided    PAST MEDICAL & SURGICAL HISTORY:  Hypothyroidism  PVD (peripheral vascular disease)  History of hyperprolactinemia  Hepatic cirrhosis, unspecified hepatic cirrhosis type  Anemia  ICD (implantable cardioverter-defibrillator) in place: Medtronic, Model [id number] , last interrogation 2017  Sleep apnea: not using CPAP  History of osteomyelitis: 2015, right foot 2nd toe  Presence of stent in coronary artery: 2 stents  Heart failure with reduced left ventricular function: EF 37%  Ischemic cardiomyopathy  Pituitary adenoma: as per patient chronic, no changes , recently restarted follow up with endocrinologist ( note in allscripts )  Coronary artery disease  Thrombocytopenia: Chronic  HLD (hyperlipidemia)  HTN (hypertension)  DM (diabetes mellitus): type 2, Hg A1C 8.2 % ( 7/10/17)  AICD lead malfunction: history of  History of amputation: right foot, 2nd toe, osteo 2015  AICD (automatic cardioverter/defibrillator) present: placement in ()  Stented coronary artery: RCA ()  Coronary atherosclerosis of artery bypass graft: CABG X 4 ()    Medications:  HYDROmorphone  Injectable 0.5 milliGRAM(s) IV Push every 3 hours PRN  insulin lispro (HumaLOG) corrective regimen sliding scale   SubCutaneous every 6 hours  levothyroxine Injectable 62 MICROGram(s) IV Push at bedtime  piperacillin/tazobactam IVPB. 3.375 Gram(s) IV Intermittent every 8 hours  sodium chloride 0.9%. 1000 milliLiter(s) IV Continuous <Continuous>      Vitals:  T(C): 37.4 (18 @ 08:00), Max: 37.4 (18 @ 14:17)  HR: 118 (18 @ 10:00) (82 - 121)  BP: 101/56 (18 @ 10:00) (75/54 - 110/55)  BP(mean): 73 (18 @ 10:00) (60 - 80)  RR: 26 (18 @ 10:00) (4 - 26)  SpO2: 98% (18 @ 10:00) (93% - 100%)  Wt(kg): --  Daily Height in cm: 185.42 (18 May 2018 19:15)    Daily Weight in k.8 (19 May 2018 00:42)  I&O's Summary    18 May 2018 07:01  -  19 May 2018 07:00  --------------------------------------------------------  IN: 1500 mL / OUT: 830 mL / NET: 670 mL    19 May 2018 07:01  -  19 May 2018 10:01  --------------------------------------------------------  IN: 250 mL / OUT: 250 mL / NET: 0 mL        Physical Exam:  Appearance: [ x] Normal [ ] NAD  Eyes: [ ] PERRL [ ] EOMI  HENT: [x ] Normal oral [place] [ ]NC/AT  Cardiovascular: [x ] S1 x[ ] S2 [ ] irregular, trace edema  Procedural Access Site: [ ] No hematoma [ ] Non-tender to palpation [ ] 2+ pulse [ ] No bruit [ ] No Ecchymosis  Respiratory: [x ] Clear to auscultation bilaterally  Gastrointestinal: [x ] Soft [ ] Non-tender [ ] Non-distended [ ] BS+  Musculoskeletal: [x ] No clubbing [ ] No joint deformity   Neurologic: [ ] Non-focal  Lymphatic: [x ] No lymphadenopathy  Psychiatry: [x ] AAOx3 [ ] Mood & affect appropriate  Skin: [ ] No rashes [ ] No ecchymoses [ ] No cyanosis        132<L>  |  98  |  75<H>  ----------------------------<  73  3.9   |  19<L>  |  2.36<H>    Ca    8.6      19 May 2018 03:53  Phos  3.3       Mg     2.2         TPro  7.7  /  Alb  3.4  /  TBili  1.4<H>  /  DBili  x   /  AST  52<H>  /  ALT  39  /  AlkPhos  113      PT/INR - ( 19 May 2018 03:53 )   PT: 20.1 sec;   INR: 1.82 ratio         PTT - ( 19 May 2018 03:53 )  PTT:31.7 sec      Serum Pro-Brain Natriuretic Peptide: 4700 pg/mL ( @ 03:53)          ECG:    Echo:    Stress Testing:     Cath:    Imaging:    Interpretation of Telemetry:

## 2018-05-19 NOTE — PROGRESS NOTE ADULT - SUBJECTIVE AND OBJECTIVE BOX
68y male PMHx of ICM EF 40%, CABG, HTN, DM II, HLD, pituitary tumor, atrial fibrillation s/p PPM (on Eliquis), right 3rd toe amputation presents to ER today with right knee pain and swelling for 2 days, with fevers TMax 102F at home yesterday. He has never had any similar episodes and no surgeries on his knee. Denies any trauma to the knee. Pain worse with movement. In ER, arthrocentesis performed, demonstrating Glucose 60, Protein 7.1, LDH >2250, Nucleated Cell Count 15,600, ,500. Gram stain pos. for Gram positive cocci. In ER patient was tachycardic to 102, Hypotensive to 75/54. Given 2g vancomycin, 1g Ceftriaxone. 1U platelets.  He was due for cardioversion scheduled for Wednesday, took last Eliquis yesterday.  Of note, patient states that his blood pressure is at baseline low 90s/60s and also notes his creatinine is "high" at baseline.    Orthopedics consulted, plan to take patient to OR later tonight.    SICU Consulted for sepsis in setting of septic arthritis and hemodynamic monitoring. HISTORY:  68 year old male with a past medical history of ischemic cardiomyopathy, CHFrEF of 40%, CAD s/p CABG x4 (1986) & stents x2 (RCA in 1999), atrial fibrillation on Eliquis, s/p PPM/ACID (Medtronic [id number] placed in 2006 w/ lead malfunction & replacement, last interrogation 04/2017), HTN, HLD, DM type II, cirrhosis, PVD s/p right 2nd toe amputation for osteomyelitis, chronic thrombocytopenia, hypothyroidism, pituitary adenoma, hyperprolactinemia, and BEHZAD who presented on 5/18/2018 with two days of  fever and right knee pain & swelling that is worse with movement. Denies trauma to the knee and has had no previous knee surgeries. Arthrocentesis was performed, with fluid analysis highly suspicious for septic arthritis. Additionally, patient was tachycardic to the 100s and hypotensive w/ SBP in the 70s. In the ED, he was given 3 L of crystalloid, vancomycin 2 g, ceftriaxone 1 g, and 1 unit of platelets. SICU consulted for hemodynamic monitoring. Of note, patient was being anticoagulated for a scheduled cardioversion on 5/23/2018 and last took his Eliquis on 5/17/2018. His normal SBP is .    24 HOUR EVENTS:  - Was further fluid resuscitated with 1000 mL of NS for hypotension.  - Given vitamin K 5 mg PO for elevated INR.  - Awaiting OR for right knee washout with Dr. Watkins. HISTORY:  68 year old male with a past medical history of ischemic cardiomyopathy, HFrEF of 40%, CAD s/p CABG x4 (1986) & stents x2 (RCA in 1999), atrial fibrillation on Eliquis, s/p PPM/ACID (Medtronic [id number] placed in 2006 w/ lead malfunction & replacement, last interrogation 04/2017), HTN, HLD, DM type II, cirrhosis, PVD s/p right 2nd toe amputation for osteomyelitis, chronic thrombocytopenia, hypothyroidism, pituitary adenoma, hyperprolactinemia, and BEHZAD who presented on 5/18/2018 with two days of  fever and right knee pain & swelling that is worse with movement. Denies trauma to the knee and has had no previous knee surgeries. Arthrocentesis was performed, with fluid analysis concerning for septic arthritis. Additionally, patient was tachycardic to the 100s and hypotensive w/ SBP in the 70s. In the ED, he was given 3 L of crystalloid, vancomycin 2 g, ceftriaxone 1 g, and 1 unit of platelets. SICU consulted for hemodynamic monitoring. Of note, patient was being anticoagulated for a scheduled cardioversion on 5/23/2018 and last took his Eliquis on 5/17/2018. His normal SBP is .    24 HOUR EVENTS:  - Was further fluid resuscitated with 1000 mL of NS for tachycardia and hypotension. Lactate cleared from 2.4 to .  - Given vitamin K 5 mg PO for elevated INR.  - Awaiting possible right knee washout with Dr. Watkins. HISTORY:  68 year old male with a past medical history of ischemic cardiomyopathy, HFrEF of 40%, CAD s/p CABG x4 (1986) & stents x2 (RCA in 1999), atrial fibrillation on Eliquis, s/p PPM/ACID (Medtronic [id number] placed in 2006 w/ lead malfunction & replacement, last interrogation 04/2017), HTN, HLD, DM type II, cirrhosis, PVD s/p right 2nd toe amputation for osteomyelitis, chronic thrombocytopenia, hypothyroidism, pituitary adenoma, hyperprolactinemia, and BEHZAD who presented on 5/18/2018 with two days of fever and right knee pain & swelling that is worse with movement. Denies trauma to the knee and has had no previous knee surgeries. Arthrocentesis was performed, with fluid analysis concerning for septic arthritis. Additionally, patient was tachycardic to the 100s and hypotensive w/ SBP in the 70s. In the ED, he was given 3 L of crystalloid, vancomycin 2 g, ceftriaxone 1 g, and 1 unit of platelets. SICU consulted for hemodynamic monitoring. Of note, patient was being anticoagulated for a scheduled cardioversion on 5/23/2018 and last took his Eliquis on 5/17/2018. His normal SBP is .    24 HOUR EVENTS:  - Was further fluid resuscitated with 1000 mL of NS for tachycardia and hypotension. Lactate cleared from 2.4 to .  - Given vitamin K 5 mg PO for elevated INR.  - Awaiting possible right knee washout with Dr. Watkins. HISTORY:  68 year old male with a past medical history of ischemic cardiomyopathy, HFrEF of 40%, CAD s/p CABG x4 (1986) & stents x2 (RCA in 1999), atrial fibrillation on Eliquis, s/p PPM/ACID (Medtronic [id number] placed in 2006 w/ lead malfunction & replacement, last interrogation 04/2017), HTN, HLD, DM type II, cirrhosis, PVD s/p right 2nd toe amputation for osteomyelitis, chronic thrombocytopenia, hypothyroidism, pituitary adenoma, hyperprolactinemia, and BEHZAD who presented on 5/18/2018 with two days of fever and right knee pain & swelling that is worse with movement. Denies trauma to the knee and has had no previous knee surgeries. Arthrocentesis was performed, with fluid analysis concerning for septic arthritis. Additionally, patient was tachycardic to the 100s and hypotensive w/ SBP in the 70s. In the ED, he was given 3 L of crystalloid, vancomycin 2 g, ceftriaxone 1 g, and 1 unit of platelets. SICU consulted for hemodynamic monitoring. Of note, patient was being anticoagulated for a scheduled cardioversion on 5/23/2018 and last took his Eliquis on 5/17/2018. His normal SBP is .    24 HOUR EVENTS:  - Admitted within the last 24 hours so please see above.  - Was further fluid resuscitated with 1000 mL of NS for tachycardia and hypotension. Lactate cleared from 2.4 to 1.8.  - Given vitamin K 5 mg PO for elevated INR.    SUBJECTIVE/ROS:  [x] A ten-point review of systems was otherwise negative except as noted.  [ ] Due to altered mental status/intubation, subjective information were not able to be obtained from the patient. History was obtained, to the extent possible, from review of the chart and collateral sources of information.    NEURO  Exam: awake, alert, oriented x4, no acute distress, no focal deficits  Meds: HYDROmorphone  Injectable 0.5 milliGRAM(s) IV Push every 3 hours PRN Severe Pain (7 - 10)  [x] Adequacy of sedation and pain control has been assessed and adjusted    RESPIRATORY  RR: 4 (05-19-18 @ 05:00) (4 - 23)  SpO2: 97% (05-19-18 @ 05:00) (93% - 100%)  Exam: clear to auscultation bilaterally  Mechanical Ventilation: no  [N/A] Extubation Readiness Assessed  Meds: none    CARDIOVASCULAR  HR: 120 (05-19-18 @ 05:00) (82 - 121)  BP: 105/53 (05-19-18 @ 05:00) (75/54 - 110/55)  BP(mean): 72 (05-19-18 @ 05:00) (60 - 76)  VBG - ( 19 May 2018 03:51 )  pH: 7.38  /  pCO2: 37    /  pO2: 34    / HCO3: 22    / Base Excess: -2.5  /  SaO2: 60   /   Lactate: 1.8    Exam: irregularly irregular  Cardiac Rhythm: atrial fibrillation  Perfusion    [x]Adequate    [ ]Inadequate  Mentation   [x]Normal       [ ]Reduced  Extremities  [x]Warm         [ ]Cool  Volume Status [ ]Hypervolemic [x]Euvolemic [ ]Hypovolemic  Meds: none    GI/NUTRITION  Exam: soft, nontender, nondistended  Diet: NPO  Meds: none    GENITOURINARY        132<L>  |  98  |  75<H>  ----------------------------<  73  3.9   |  19<L>  |  2.36<H>    Ca    8.6      19 May 2018 03:53  Phos  3.3  Mg     2.2    [ ] Tello catheter, indication: N/A  Meds: sodium chloride 0.9% infuse at 50 mL/hr    HEMATOLOGIC  Meds: none  [x] VTE Prophylaxis                        10.9   7.2   )-----------( 42       ( 19 May 2018 03:53 )             31.8     PT/INR - ( 19 May 2018 03:53 )   PT: 20.1 sec;   INR: 1.82 ratio    PTT - ( 19 May 2018 03:53 )  PTT:31.7 sec    INFECTIOUS DISEASES  T(C): 36.9 (05-19-18 @ 03:00), Max: 37.4 (05-18-18 @ 14:17)  WBC Count:  ·	7.2 K/uL (05-19 @ 03:53)  ·	6.2 K/uL (05-18 @ 23:31)  ·	7.2 K/uL (05-18 @ 14:19)    Recent Cultures:  ·	Specimen Source: .Body Fluid Knee Fluid, 05-18 @ 18:17; Results --; Gram Stain: Few polymorphonuclear leukocytes per low power field, few Red blood cells per low power field, numerous Gram positive cocci in pairs, chains and clusters per oil power field; Organism: --  ·	Blood cultures x2 pending  Meds: piperacillin/tazobactam IVPB. 3.375 Gram(s) IV Intermittent every 8 hours    ENDOCRINE  Capillary Blood Glucose:  ·	POCT Blood Glucose.: 97 mg/dL (19 May 2018 05:34)  ·	POCT Blood Glucose.: 95 mg/dL (19 May 2018 00:11)  ·	POCT Blood Glucose.: 129 mg/dL (18 May 2018 21:26)  ·	POCT Blood Glucose.: 151 mg/dL (18 May 2018 13:42)  Meds:  ·	insulin lispro (HumaLOG) corrective regimen sliding scale   SubCutaneous every 6 hours  ·	levothyroxine Injectable 62 MICROGram(s) IV Push at bedtime    ACCESS DEVICES:  [x] Peripheral IV  [ ] Central Venous Line	[ ] R	[ ] L	[ ] IJ	[ ] Fem	[ ] SC	Placed:   [ ] Arterial Line		[ ] R	[ ] L	[ ] Fem	[ ] Rad	[ ] Ax	Placed:   [ ] PICC:					[ ] Mediport  [ ] Urinary Catheter, Date Placed:   [x] Necessity of urinary, arterial, and venous catheters discussed    OTHER MEDICATIONS: None.    CODE STATUS: Full code.    IMAGING:

## 2018-05-19 NOTE — PROGRESS NOTE ADULT - SUBJECTIVE AND OBJECTIVE BOX
Patient seen and examined.   There is no palpable effusion in the knee. Prepatellar bursa is grossly inflammed/fluctuant.    ER documentation states that there was a arthrocentesis. Based on the physical examination, it would appear that this was a bursal aspiration given the location of the needle ar and the clinical picture of prepatellar bursitis.    I do not believe that this is septic arthritis. We will perform a bedside I&D of the prepatellar bursa, and follow clinically. AICD so patient cannot have MRI to confirm intraarticular fluid.

## 2018-05-19 NOTE — PROGRESS NOTE ADULT - SUBJECTIVE AND OBJECTIVE BOX
Pt seen and examined.   No acute events overnight.     Vital Signs Last 24 Hrs  T(C): 36.9 (19 May 2018 03:00), Max: 37.4 (18 May 2018 14:17)  T(F): 98.4 (19 May 2018 03:00), Max: 99.4 (18 May 2018 14:17)  HR: 119 (19 May 2018 07:00) (82 - 121)  BP: 106/68 (19 May 2018 07:00) (75/54 - 110/55)  BP(mean): 80 (19 May 2018 07:00) (60 - 80)  RR: 22 (19 May 2018 07:00) (4 - 23)  SpO2: 96% (19 May 2018 07:00) (93% - 100%)    PE:  NAD  RLE knee erythematous, increased warmth to the touch  ROM 0-90 deg limited by pain, particularly at 45-90 deg flexion  EHL/FHL/TA/GS intact  SILT SP/DP/T/S/S  WWP brisk cap refill      68yMale with R septic knee  - NPO/IVF  - Pain control  - Hold chemical DVT ppx  - Plan for OR today for I&D  - s/p Vit K 5 mg for elevated INR, 1.8 this am  - SICU care appreciated

## 2018-05-20 LAB
-  AMPICILLIN/SULBACTAM: SIGNIFICANT CHANGE UP
-  CEFAZOLIN: SIGNIFICANT CHANGE UP
-  CIPROFLOXACIN: SIGNIFICANT CHANGE UP
-  CLINDAMYCIN: SIGNIFICANT CHANGE UP
-  ERYTHROMYCIN: SIGNIFICANT CHANGE UP
-  GENTAMICIN: SIGNIFICANT CHANGE UP
-  LEVOFLOXACIN: SIGNIFICANT CHANGE UP
-  MOXIFLOXACIN(AEROBIC): SIGNIFICANT CHANGE UP
-  OXACILLIN: SIGNIFICANT CHANGE UP
-  PENICILLIN: SIGNIFICANT CHANGE UP
-  RIFAMPIN: SIGNIFICANT CHANGE UP
-  TETRACYCLINE: SIGNIFICANT CHANGE UP
-  TRIMETHOPRIM/SULFAMETHOXAZOLE: SIGNIFICANT CHANGE UP
-  VANCOMYCIN: SIGNIFICANT CHANGE UP
ANION GAP SERPL CALC-SCNC: 16 MMOL/L — SIGNIFICANT CHANGE UP (ref 5–17)
APTT BLD: 24.6 SEC — LOW (ref 27.5–37.4)
BUN SERPL-MCNC: 65 MG/DL — HIGH (ref 7–23)
CA-I BLD-SCNC: 1.16 MMOL/L — SIGNIFICANT CHANGE UP (ref 1.12–1.3)
CALCIUM SERPL-MCNC: 8.5 MG/DL — SIGNIFICANT CHANGE UP (ref 8.4–10.5)
CHLORIDE SERPL-SCNC: 99 MMOL/L — SIGNIFICANT CHANGE UP (ref 96–108)
CO2 SERPL-SCNC: 18 MMOL/L — LOW (ref 22–31)
CREAT SERPL-MCNC: 1.95 MG/DL — HIGH (ref 0.5–1.3)
GLUCOSE BLDC GLUCOMTR-MCNC: 178 MG/DL — HIGH (ref 70–99)
GLUCOSE BLDC GLUCOMTR-MCNC: 180 MG/DL — HIGH (ref 70–99)
GLUCOSE BLDC GLUCOMTR-MCNC: 266 MG/DL — HIGH (ref 70–99)
GLUCOSE BLDC GLUCOMTR-MCNC: 271 MG/DL — HIGH (ref 70–99)
GLUCOSE SERPL-MCNC: 139 MG/DL — HIGH (ref 70–99)
HCT VFR BLD CALC: 29.1 % — LOW (ref 39–50)
HGB BLD-MCNC: 9.8 G/DL — LOW (ref 13–17)
INR BLD: 1.6 RATIO — HIGH (ref 0.88–1.16)
MAGNESIUM SERPL-MCNC: 2.7 MG/DL — HIGH (ref 1.6–2.6)
MCHC RBC-ENTMCNC: 31.5 PG — SIGNIFICANT CHANGE UP (ref 27–34)
MCHC RBC-ENTMCNC: 33.6 GM/DL — SIGNIFICANT CHANGE UP (ref 32–36)
MCV RBC AUTO: 93.8 FL — SIGNIFICANT CHANGE UP (ref 80–100)
METHOD TYPE: SIGNIFICANT CHANGE UP
PHOSPHATE SERPL-MCNC: 3.4 MG/DL — SIGNIFICANT CHANGE UP (ref 2.5–4.5)
PLATELET # BLD AUTO: 42 K/UL — LOW (ref 150–400)
POTASSIUM SERPL-MCNC: 4 MMOL/L — SIGNIFICANT CHANGE UP (ref 3.5–5.3)
POTASSIUM SERPL-SCNC: 4 MMOL/L — SIGNIFICANT CHANGE UP (ref 3.5–5.3)
PROTHROM AB SERPL-ACNC: 17.4 SEC — HIGH (ref 9.8–12.7)
RBC # BLD: 3.11 M/UL — LOW (ref 4.2–5.8)
RBC # FLD: 15 % — HIGH (ref 10.3–14.5)
SODIUM SERPL-SCNC: 133 MMOL/L — LOW (ref 135–145)
VANCOMYCIN FLD-MCNC: 7.7 UG/ML — SIGNIFICANT CHANGE UP
WBC # BLD: 7.5 K/UL — SIGNIFICANT CHANGE UP (ref 3.8–10.5)
WBC # FLD AUTO: 7.5 K/UL — SIGNIFICANT CHANGE UP (ref 3.8–10.5)

## 2018-05-20 PROCEDURE — 71045 X-RAY EXAM CHEST 1 VIEW: CPT | Mod: 26

## 2018-05-20 PROCEDURE — 99233 SBSQ HOSP IP/OBS HIGH 50: CPT

## 2018-05-20 RX ORDER — FUROSEMIDE 40 MG
40 TABLET ORAL ONCE
Qty: 0 | Refills: 0 | Status: DISCONTINUED | OUTPATIENT
Start: 2018-05-20 | End: 2018-05-20

## 2018-05-20 RX ORDER — POLYETHYLENE GLYCOL 3350 17 G/17G
17 POWDER, FOR SOLUTION ORAL DAILY
Qty: 0 | Refills: 0 | Status: DISCONTINUED | OUTPATIENT
Start: 2018-05-20 | End: 2018-05-25

## 2018-05-20 RX ORDER — MAGNESIUM SULFATE 500 MG/ML
2 VIAL (ML) INJECTION ONCE
Qty: 0 | Refills: 0 | Status: COMPLETED | OUTPATIENT
Start: 2018-05-20 | End: 2018-05-20

## 2018-05-20 RX ORDER — FUROSEMIDE 40 MG
40 TABLET ORAL DAILY
Qty: 0 | Refills: 0 | Status: DISCONTINUED | OUTPATIENT
Start: 2018-05-20 | End: 2018-05-25

## 2018-05-20 RX ORDER — VANCOMYCIN HCL 1 G
1250 VIAL (EA) INTRAVENOUS ONCE
Qty: 0 | Refills: 0 | Status: COMPLETED | OUTPATIENT
Start: 2018-05-20 | End: 2018-05-20

## 2018-05-20 RX ORDER — CARVEDILOL PHOSPHATE 80 MG/1
25 CAPSULE, EXTENDED RELEASE ORAL EVERY 12 HOURS
Qty: 0 | Refills: 0 | Status: DISCONTINUED | OUTPATIENT
Start: 2018-05-20 | End: 2018-05-25

## 2018-05-20 RX ADMIN — Medication 50 GRAM(S): at 01:36

## 2018-05-20 RX ADMIN — Medication 125 MICROGRAM(S): at 05:04

## 2018-05-20 RX ADMIN — PIPERACILLIN AND TAZOBACTAM 25 GRAM(S): 4; .5 INJECTION, POWDER, LYOPHILIZED, FOR SOLUTION INTRAVENOUS at 22:31

## 2018-05-20 RX ADMIN — Medication 2: at 08:25

## 2018-05-20 RX ADMIN — OXYCODONE HYDROCHLORIDE 10 MILLIGRAM(S): 5 TABLET ORAL at 23:00

## 2018-05-20 RX ADMIN — OXYCODONE HYDROCHLORIDE 5 MILLIGRAM(S): 5 TABLET ORAL at 00:18

## 2018-05-20 RX ADMIN — Medication 81 MILLIGRAM(S): at 13:39

## 2018-05-20 RX ADMIN — CARVEDILOL PHOSPHATE 12.5 MILLIGRAM(S): 80 CAPSULE, EXTENDED RELEASE ORAL at 05:05

## 2018-05-20 RX ADMIN — ATORVASTATIN CALCIUM 40 MILLIGRAM(S): 80 TABLET, FILM COATED ORAL at 22:31

## 2018-05-20 RX ADMIN — PIPERACILLIN AND TAZOBACTAM 25 GRAM(S): 4; .5 INJECTION, POWDER, LYOPHILIZED, FOR SOLUTION INTRAVENOUS at 05:04

## 2018-05-20 RX ADMIN — Medication 6: at 18:01

## 2018-05-20 RX ADMIN — CARVEDILOL PHOSPHATE 25 MILLIGRAM(S): 80 CAPSULE, EXTENDED RELEASE ORAL at 19:16

## 2018-05-20 RX ADMIN — Medication 166.67 MILLIGRAM(S): at 06:09

## 2018-05-20 RX ADMIN — Medication 40 MILLIGRAM(S): at 13:39

## 2018-05-20 RX ADMIN — Medication 6: at 13:04

## 2018-05-20 RX ADMIN — PIPERACILLIN AND TAZOBACTAM 25 GRAM(S): 4; .5 INJECTION, POWDER, LYOPHILIZED, FOR SOLUTION INTRAVENOUS at 13:40

## 2018-05-20 RX ADMIN — OXYCODONE HYDROCHLORIDE 5 MILLIGRAM(S): 5 TABLET ORAL at 05:57

## 2018-05-20 RX ADMIN — OXYCODONE HYDROCHLORIDE 10 MILLIGRAM(S): 5 TABLET ORAL at 22:30

## 2018-05-20 RX ADMIN — ENOXAPARIN SODIUM 40 MILLIGRAM(S): 100 INJECTION SUBCUTANEOUS at 13:39

## 2018-05-20 RX ADMIN — OXYCODONE HYDROCHLORIDE 5 MILLIGRAM(S): 5 TABLET ORAL at 05:27

## 2018-05-20 NOTE — PROGRESS NOTE ADULT - ASSESSMENT
68 year old male with an extensive cardiac history presenting with tachycardia and hypotension in the setting of septic arthritis vs. bursitis of the right knee.    PLAN:    Neuro: acute right knee pain  - Monitor mental status.  - Pain control as needed with oxycodone    Resp: BEHZAD not on CPAP  - Monitor pulse oximeter.    CV: septic shock, ischemic cardiomyopathy, compensated HFrEF, CAD, HTN, HLD  - Monitor vital signs.  - Continue home meds of coreg    GI: cirrhosis  - Reg low carb diet  - Monitor LFTs and coags.    Renal: JULIANNE stage 3 on CKD stage 3  - Monitor I&Os.  - Monitor electrolytes and replete as necessary.    Heme: chronic thrombocytopenia, PVD  - Monitor CBC and coags.  - Lovenox for DVT ppx  - Continue home Aspirin    ID: septic arthritis vs. bursitis  - Monitor temperature, WBC, and procalcitonin.  - Follow up arthrocentesis and blood cultures.  - Empiric antibiotics with Zosyn and vancomycin by level.  - Possible OR for washout of right knee vs. bedside washout of pre-patellar bursa.    Endo: DM type II, hypothyroidism  - Monitor glucose and ISS q6hrs for glycemic control while NPO.  - Home Synthroid for hypothyroidism.    Disposition:  - Full code.  - Will remain in SICU for hemodynamic monitoring.

## 2018-05-20 NOTE — PROVIDER CONTACT NOTE (OTHER) - ACTION/TREATMENT ORDERED:
ECG performed. SICU team AMELIE Gasca made aware. SICU team called cardiology and no intervention at this time. Cardiac leads assessed and repositioned. Will continue to monitor.

## 2018-05-20 NOTE — PROVIDER CONTACT NOTE (OTHER) - ASSESSMENT
Pt reports "feeling fine" No chest pain or feeling that his heart is racing. AAOx4. Able to follow commands. VSS HR on monitor as high as 190s. With runs of vtach. Hypotensive BP 94/54 MAP 68. No s/s of resp distress on room air. Started on coreg this afternoon for runs of vtach during day shift.

## 2018-05-20 NOTE — PROVIDER CONTACT NOTE (OTHER) - BACKGROUND
Pt admitted 5/18 with R knee pain and fevers as high at 102. Significant cardiac history. AICD in place. Bursa tap today to drain fluid in R knee. Penrose drain placed.

## 2018-05-20 NOTE — PROGRESS NOTE ADULT - SUBJECTIVE AND OBJECTIVE BOX
HISTORY  68 year old male with a past medical history of ischemic cardiomyopathy, HFrEF of 40%, CAD s/p CABG x4 (1986) & stents x2 (RCA in 1999), atrial fibrillation on Eliquis, s/p PPM/ACID (Medtronic [id number] placed in 2006 w/ lead malfunction & replacement, last interrogation 04/2017), HTN, HLD, DM type II, cirrhosis, PVD s/p right 2nd toe amputation for osteomyelitis, chronic thrombocytopenia, hypothyroidism, pituitary adenoma, hyperprolactinemia, and BEHZAD who presented on 5/18/2018 with two days of fever and right knee pain & swelling that is worse with movement. Denies trauma to the knee and has had no previous knee surgeries. Arthrocentesis was performed, with fluid analysis concerning for septic arthritis. Additionally, patient was tachycardic to the 100s and hypotensive w/ SBP in the 70s. In the ED, he was given 3 L of crystalloid, vancomycin 2 g, ceftriaxone 1 g, and 1 unit of platelets. SICU consulted for hemodynamic monitoring. Of note, patient was being anticoagulated for a scheduled cardioversion on 5/23/2018 and last took his Eliquis on 5/17/2018. His normal SBP is .    24 HOUR EVENTS:  - I&D at bedside for prepatellar bursitis  - Consistent bouts of vtach on monitor, self limited (AICD)  - Patient otherwise doing well, has no complaints      SUBJECTIVE/ROS:  [X] A ten-point review of systems was otherwise negative except as noted.  [ ] Due to altered mental status/intubation, subjective information were not able to be obtained from the patient. History was obtained, to the extent possible, from review of the chart and collateral sources of information.      NEURO  Exam: awake, alert, oriented  Meds: oxyCODONE    IR 5 milliGRAM(s) Oral every 4 hours PRN Moderate Pain (4 - 6)  oxyCODONE    IR 10 milliGRAM(s) Oral every 4 hours PRN Severe Pain (7 - 10)    [x] Adequacy of sedation and pain control has been assessed and adjusted      RESPIRATORY  RR: 16 (05-20-18 @ 01:00) (4 - 31)  SpO2: 94% (05-20-18 @ 01:00) (92% - 98%)  Exam: unlabored, clear to auscultation bilaterally  Mechanical Ventilation:     [N/A] Extubation Readiness Assessed  Meds:       CARDIOVASCULAR  HR: 119 (05-20-18 @ 01:00) (115 - 120)  BP: 98/54 (05-20-18 @ 01:00) (87/52 - 110/55)  BP(mean): 70 (05-20-18 @ 01:00) (65 - 83)  VBG - ( 19 May 2018 03:51 )  pH: 7.38  /  pCO2: 37    /  pO2: 34    / HCO3: 22    / Base Excess: -2.5  /  SaO2: 60     Lactate: 1.8        Exam: regular rate and rhythm  Cardiac Rhythm: sinus  Perfusion     [x]Adequate   [ ]Inadequate  Mentation   [x]Normal       [ ]Reduced  Extremities  [x]Warm         [ ]Cool  Volume Status [ ]Hypervolemic [x]Euvolemic [ ]Hypovolemic  Meds: carvedilol 12.5 milliGRAM(s) Oral every 12 hours      GI/NUTRITION  Exam: soft, nontender, nondistended, incision C/D/I  Diet: Carb restricted      GENITOURINARY  I&O's Detail    05-18 @ 07:01  -  05-19 @ 07:00  --------------------------------------------------------  IN:    IV PiggyBack: 450 mL    sodium chloride 0.9%: 550 mL    Sodium Chloride 0.9% IV Bolus: 500 mL  Total IN: 1500 mL    OUT:    Voided: 830 mL  Total OUT: 830 mL    Total NET: 670 mL      05-19 @ 07:01  -  05-20 @ 01:02  --------------------------------------------------------  IN:    IV PiggyBack: 300 mL    Oral Fluid: 240 mL    sodium chloride 0.9%: 650 mL  Total IN: 1190 mL    OUT:    Voided: 1125 mL  Total OUT: 1125 mL    Total NET: 65 mL          05-19    132<L>  |  98  |  75<H>  ----------------------------<  73  3.9   |  19<L>  |  2.36<H>    Ca    8.6      19 May 2018 03:53  Phos  3.3     05-19  Mg     2.2     05-19    TPro  7.7  /  Alb  3.4  /  TBili  1.4<H>  /  DBili  x   /  AST  52<H>  /  ALT  39  /  AlkPhos  113  05-18    [ ] Tello catheter, indication: N/A  Meds:       HEMATOLOGIC  Meds: aspirin  chewable 81 milliGRAM(s) Oral daily  enoxaparin Injectable 40 milliGRAM(s) SubCutaneous daily    [x] VTE Prophylaxis                        10.9   7.2   )-----------( 42       ( 19 May 2018 03:53 )             31.8     PT/INR - ( 19 May 2018 03:53 )   PT: 20.1 sec;   INR: 1.82 ratio         PTT - ( 19 May 2018 03:53 )  PTT:31.7 sec  Transfusion     [ ] PRBC   [ ] Platelets   [ ] FFP   [ ] Cryoprecipitate      INFECTIOUS DISEASES  WBC Count: 7.2 K/uL (05-19 @ 03:53)    RECENT CULTURES:  Specimen Source: .Urine Clean Catch (Midstream)  Date/Time: 05-18 @ 20:30  Culture Results:   No growth  Gram Stain: --  Organism: --  Specimen Source: .Body Fluid Knee Fluid  Date/Time: 05-18 @ 18:17  Culture Results:   Numerous Staphylococcus aureus  Gram Stain:   Few polymorphonuclear leukocytes per low power field  Few Red blood cells per low power field  Numerous Gram positive cocci in pairs, chains and clusters per oil power  field  Organism: --  Specimen Source: .Blood Blood-Peripheral  Date/Time: 05-18 @ 16:07  Culture Results:   No growth to date.  Gram Stain: --  Organism: --    Meds: piperacillin/tazobactam IVPB. 3.375 Gram(s) IV Intermittent every 8 hours        ENDOCRINE  CAPILLARY BLOOD GLUCOSE      POCT Blood Glucose.: 135 mg/dL (19 May 2018 21:39)  POCT Blood Glucose.: 91 mg/dL (19 May 2018 17:50)  POCT Blood Glucose.: 92 mg/dL (19 May 2018 11:50)  POCT Blood Glucose.: 97 mg/dL (19 May 2018 05:34)    Meds: atorvastatin 40 milliGRAM(s) Oral at bedtime  insulin lispro (HumaLOG) corrective regimen sliding scale   SubCutaneous three times a day before meals  insulin lispro (HumaLOG) corrective regimen sliding scale   SubCutaneous at bedtime  levothyroxine 125 MICROGram(s) Oral daily        ACCESS DEVICES:  [ ] Peripheral IV  [ ] Central Venous Line	[ ] R	[ ] L	[ ] IJ	[ ] Fem	[ ] SC	Placed:   [ ] Arterial Line		[ ] R	[ ] L	[ ] Fem	[ ] Rad	[ ] Ax	Placed:   [ ] PICC:					[ ] Mediport  [ ] Urinary Catheter, Date Placed:   [x] Necessity of urinary, arterial, and venous catheters discussed    OTHER MEDICATIONS:      CODE STATUS:      IMAGING:

## 2018-05-20 NOTE — PROGRESS NOTE ADULT - SUBJECTIVE AND OBJECTIVE BOX
Pt seen and examined.   No acute events overnight.     Vital Signs Last 24 Hrs  T(C): 37.2 (20 May 2018 03:00), Max: 38 (19 May 2018 19:00)  T(F): 98.9 (20 May 2018 03:00), Max: 100.4 (19 May 2018 19:00)  HR: 116 (20 May 2018 07:00) (116 - 120)  BP: 95/59 (20 May 2018 07:00) (87/50 - 110/68)  BP(mean): 72 (20 May 2018 07:00) (62 - 83)  RR: 16 (20 May 2018 07:00) (8 - 31)  SpO2: 94% (20 May 2018 07:00) (92% - 100%)    PE:  NAD  RLE knee dressing c/d/i  ROM 0-100 deg limited by mild pain  EHL/FHL/TA/GS intact  SILT SP/DP/T/S/S  WWP brisk cap refill      68yMale with R prepatellar bursitis s/p bedside I&D  - PAIN CONTROL  - dvt ppx  FU cultures  PT/OT, WBAT  DC planning

## 2018-05-20 NOTE — PROGRESS NOTE ADULT - SUBJECTIVE AND OBJECTIVE BOX
Per above infection confined to bursa and not septic arthritis,  Knee feels better this AM.  Having runs wide complex tachycardia which not causing symptoms or discharge from ICD. No cardiac complaints    PAST MEDICAL & SURGICAL HISTORY:  Hypothyroidism  PVD (peripheral vascular disease)  History of hyperprolactinemia  Hepatic cirrhosis, unspecified hepatic cirrhosis type  Anemia  ICD (implantable cardioverter-defibrillator) in place: Medtronic, Model [id number] , last interrogation 2017  Sleep apnea: not using CPAP  History of osteomyelitis: 2015, right foot 2nd toe  Presence of stent in coronary artery: 2 stents  Heart failure with reduced left ventricular function: EF 37%  Ischemic cardiomyopathy  Pituitary adenoma: as per patient chronic, no changes , recently restarted follow up with endocrinologist ( note in allscripts )  Coronary artery disease  Thrombocytopenia: Chronic  HLD (hyperlipidemia)  HTN (hypertension)  DM (diabetes mellitus): type 2, Hg A1C 8.2 % ( 7/10/17)  AICD lead malfunction: history of  History of amputation: right foot, 2nd toe, osteo 2015  AICD (automatic cardioverter/defibrillator) present: placement in ()  Stented coronary artery: RCA ()  Coronary atherosclerosis of artery bypass graft: CABG X 4 ()    Medications:  aspirin  chewable 81 milliGRAM(s) Oral daily  atorvastatin 40 milliGRAM(s) Oral at bedtime  carvedilol 12.5 milliGRAM(s) Oral every 12 hours  enoxaparin Injectable 40 milliGRAM(s) SubCutaneous daily  insulin lispro (HumaLOG) corrective regimen sliding scale   SubCutaneous three times a day before meals  insulin lispro (HumaLOG) corrective regimen sliding scale   SubCutaneous at bedtime  levothyroxine 125 MICROGram(s) Oral daily  oxyCODONE    IR 5 milliGRAM(s) Oral every 4 hours PRN  oxyCODONE    IR 10 milliGRAM(s) Oral every 4 hours PRN  piperacillin/tazobactam IVPB. 3.375 Gram(s) IV Intermittent every 8 hours      Vitals:  T(C): 36.7 (18 @ 07:00), Max: 38 (18 @ 19:00)  HR: 116 (18 @ 08:00) (116 - 120)  BP: 95/59 (18 @ 07:00) (87/50 - 110/68)  BP(mean): 72 (18 @ 07:00) (62 - 83)  RR: 5 (18 @ 08:00) (5 - 31)  SpO2: 93% (18 @ 08:00) (92% - 100%)  Wt(kg): --  Daily     Daily Weight in k.6 (20 May 2018 04:00)  I&O's Summary    19 May 2018 07:01  -  20 May 2018 07:00  --------------------------------------------------------  IN: 1565 mL / OUT: 1375 mL / NET: 190 mL        Physical Exam:  Appearance: [ x] Normal x[ ] NAD  Eyes: [ ] PERRL [ ] EOMI  HENT: [ x] Normal oral [place] [ ]NC/AT  Cardiovascular: [x ] S1 [x ] S2 x[ ] RRR [ ] No m/r/g trace edema  Procedural Access Site: [ ] No hematoma [ ] Non-tender to palpation [ ] 2+ pulse [ ] No bruit [ ] No Ecchymosis  Respiratory: [ x] Clear to auscultation bilaterally  Gastrointestinal: [ x] Soft [ ] Non-tender [ ] Non-distended [ ] BS+  Musculoskeletal: [x ] No clubbing [ ] No joint deformity   Neurologic: [ x] Non-focal  Lymphatic: [ x] No lymphadenopathy  Psychiatry: [x] AAOx3 [ ] Mood & affect appropriate  Skin: [ ] No rashes [ ] No ecchymoses [ ] No cyanosis        133<L>  |  99  |  65<H>  ----------------------------<  139<H>  4.0   |  18<L>  |  1.95<H>    Ca    8.5      20 May 2018 04:30  Phos  3.4     05-20  Mg     2.7     20    TPro  7.7  /  Alb  3.4  /  TBili  1.4<H>  /  DBili  x   /  AST  52<H>  /  ALT  39  /  AlkPhos  113  18    PT/INR - ( 20 May 2018 04:30 )   PT: 17.4 sec;   INR: 1.60 ratio         PTT - ( 20 May 2018 04:30 )  PTT:24.6 sec      Serum Pro-Brain Natriuretic Peptide: 4700 pg/mL ( @ 03:53)          ECG:    Echo:    Stress Testing:     Cath:    Imaging:    Interpretation of Telemetry:

## 2018-05-21 DIAGNOSIS — D69.6 THROMBOCYTOPENIA, UNSPECIFIED: ICD-10-CM

## 2018-05-21 DIAGNOSIS — I50.22 CHRONIC SYSTOLIC (CONGESTIVE) HEART FAILURE: ICD-10-CM

## 2018-05-21 DIAGNOSIS — N17.9 ACUTE KIDNEY FAILURE, UNSPECIFIED: ICD-10-CM

## 2018-05-21 DIAGNOSIS — K74.60 UNSPECIFIED CIRRHOSIS OF LIVER: ICD-10-CM

## 2018-05-21 DIAGNOSIS — M70.41 PREPATELLAR BURSITIS, RIGHT KNEE: ICD-10-CM

## 2018-05-21 DIAGNOSIS — E11.59 TYPE 2 DIABETES MELLITUS WITH OTHER CIRCULATORY COMPLICATIONS: ICD-10-CM

## 2018-05-21 DIAGNOSIS — I48.91 UNSPECIFIED ATRIAL FIBRILLATION: ICD-10-CM

## 2018-05-21 LAB
-  AMPICILLIN/SULBACTAM: SIGNIFICANT CHANGE UP
-  AMPICILLIN/SULBACTAM: SIGNIFICANT CHANGE UP
-  CEFAZOLIN: SIGNIFICANT CHANGE UP
-  CEFAZOLIN: SIGNIFICANT CHANGE UP
-  CIPROFLOXACIN: SIGNIFICANT CHANGE UP
-  CIPROFLOXACIN: SIGNIFICANT CHANGE UP
-  CLINDAMYCIN: SIGNIFICANT CHANGE UP
-  CLINDAMYCIN: SIGNIFICANT CHANGE UP
-  ERYTHROMYCIN: SIGNIFICANT CHANGE UP
-  ERYTHROMYCIN: SIGNIFICANT CHANGE UP
-  GENTAMICIN: SIGNIFICANT CHANGE UP
-  GENTAMICIN: SIGNIFICANT CHANGE UP
-  LEVOFLOXACIN: SIGNIFICANT CHANGE UP
-  LEVOFLOXACIN: SIGNIFICANT CHANGE UP
-  MOXIFLOXACIN(AEROBIC): SIGNIFICANT CHANGE UP
-  MOXIFLOXACIN(AEROBIC): SIGNIFICANT CHANGE UP
-  OXACILLIN: SIGNIFICANT CHANGE UP
-  OXACILLIN: SIGNIFICANT CHANGE UP
-  PENICILLIN: SIGNIFICANT CHANGE UP
-  PENICILLIN: SIGNIFICANT CHANGE UP
-  RIFAMPIN: SIGNIFICANT CHANGE UP
-  RIFAMPIN: SIGNIFICANT CHANGE UP
-  TETRACYCLINE: SIGNIFICANT CHANGE UP
-  TETRACYCLINE: SIGNIFICANT CHANGE UP
-  TRIMETHOPRIM/SULFAMETHOXAZOLE: SIGNIFICANT CHANGE UP
-  TRIMETHOPRIM/SULFAMETHOXAZOLE: SIGNIFICANT CHANGE UP
-  VANCOMYCIN: SIGNIFICANT CHANGE UP
-  VANCOMYCIN: SIGNIFICANT CHANGE UP
ANION GAP SERPL CALC-SCNC: 16 MMOL/L — SIGNIFICANT CHANGE UP (ref 5–17)
APTT BLD: 28.6 SEC — SIGNIFICANT CHANGE UP (ref 27.5–37.4)
BUN SERPL-MCNC: 64 MG/DL — HIGH (ref 7–23)
CA-I BLD-SCNC: 1.14 MMOL/L — SIGNIFICANT CHANGE UP (ref 1.12–1.3)
CALCIUM SERPL-MCNC: 8.5 MG/DL — SIGNIFICANT CHANGE UP (ref 8.4–10.5)
CHLORIDE SERPL-SCNC: 95 MMOL/L — LOW (ref 96–108)
CO2 SERPL-SCNC: 20 MMOL/L — LOW (ref 22–31)
CREAT SERPL-MCNC: 1.95 MG/DL — HIGH (ref 0.5–1.3)
GLUCOSE BLDC GLUCOMTR-MCNC: 193 MG/DL — HIGH (ref 70–99)
GLUCOSE BLDC GLUCOMTR-MCNC: 225 MG/DL — HIGH (ref 70–99)
GLUCOSE BLDC GLUCOMTR-MCNC: 235 MG/DL — HIGH (ref 70–99)
GLUCOSE BLDC GLUCOMTR-MCNC: 271 MG/DL — HIGH (ref 70–99)
GLUCOSE SERPL-MCNC: 137 MG/DL — HIGH (ref 70–99)
HCT VFR BLD CALC: 29.6 % — LOW (ref 39–50)
HGB BLD-MCNC: 9.4 G/DL — LOW (ref 13–17)
INR BLD: 1.39 RATIO — HIGH (ref 0.88–1.16)
MAGNESIUM SERPL-MCNC: 2.4 MG/DL — SIGNIFICANT CHANGE UP (ref 1.6–2.6)
MCHC RBC-ENTMCNC: 29.3 PG — SIGNIFICANT CHANGE UP (ref 27–34)
MCHC RBC-ENTMCNC: 31.7 GM/DL — LOW (ref 32–36)
MCV RBC AUTO: 92.5 FL — SIGNIFICANT CHANGE UP (ref 80–100)
METHOD TYPE: SIGNIFICANT CHANGE UP
METHOD TYPE: SIGNIFICANT CHANGE UP
PHOSPHATE SERPL-MCNC: 3.1 MG/DL — SIGNIFICANT CHANGE UP (ref 2.5–4.5)
PLATELET # BLD AUTO: 51 K/UL — LOW (ref 150–400)
POTASSIUM SERPL-MCNC: 4 MMOL/L — SIGNIFICANT CHANGE UP (ref 3.5–5.3)
POTASSIUM SERPL-SCNC: 4 MMOL/L — SIGNIFICANT CHANGE UP (ref 3.5–5.3)
PROTHROM AB SERPL-ACNC: 15.2 SEC — HIGH (ref 9.8–12.7)
RBC # BLD: 3.2 M/UL — LOW (ref 4.2–5.8)
RBC # FLD: 14.7 % — HIGH (ref 10.3–14.5)
SODIUM SERPL-SCNC: 131 MMOL/L — LOW (ref 135–145)
WBC # BLD: 6.1 K/UL — SIGNIFICANT CHANGE UP (ref 3.8–10.5)
WBC # FLD AUTO: 6.1 K/UL — SIGNIFICANT CHANGE UP (ref 3.8–10.5)

## 2018-05-21 PROCEDURE — 99233 SBSQ HOSP IP/OBS HIGH 50: CPT | Mod: GC

## 2018-05-21 PROCEDURE — 71045 X-RAY EXAM CHEST 1 VIEW: CPT | Mod: 26

## 2018-05-21 PROCEDURE — 99254 IP/OBS CNSLTJ NEW/EST MOD 60: CPT

## 2018-05-21 PROCEDURE — 99223 1ST HOSP IP/OBS HIGH 75: CPT

## 2018-05-21 RX ORDER — CEFAZOLIN SODIUM 1 G
1000 VIAL (EA) INJECTION EVERY 8 HOURS
Qty: 0 | Refills: 0 | Status: DISCONTINUED | OUTPATIENT
Start: 2018-05-21 | End: 2018-05-22

## 2018-05-21 RX ORDER — INSULIN LISPRO 100/ML
3 VIAL (ML) SUBCUTANEOUS
Qty: 0 | Refills: 0 | Status: DISCONTINUED | OUTPATIENT
Start: 2018-05-21 | End: 2018-05-25

## 2018-05-21 RX ORDER — INSULIN GLARGINE 100 [IU]/ML
5 INJECTION, SOLUTION SUBCUTANEOUS AT BEDTIME
Qty: 0 | Refills: 0 | Status: DISCONTINUED | OUTPATIENT
Start: 2018-05-21 | End: 2018-05-25

## 2018-05-21 RX ADMIN — Medication 3 UNIT(S): at 13:16

## 2018-05-21 RX ADMIN — Medication 81 MILLIGRAM(S): at 13:16

## 2018-05-21 RX ADMIN — Medication 125 MICROGRAM(S): at 05:18

## 2018-05-21 RX ADMIN — CARVEDILOL PHOSPHATE 25 MILLIGRAM(S): 80 CAPSULE, EXTENDED RELEASE ORAL at 05:18

## 2018-05-21 RX ADMIN — Medication 6: at 10:05

## 2018-05-21 RX ADMIN — INSULIN GLARGINE 5 UNIT(S): 100 INJECTION, SOLUTION SUBCUTANEOUS at 21:37

## 2018-05-21 RX ADMIN — Medication 2: at 17:35

## 2018-05-21 RX ADMIN — PIPERACILLIN AND TAZOBACTAM 25 GRAM(S): 4; .5 INJECTION, POWDER, LYOPHILIZED, FOR SOLUTION INTRAVENOUS at 05:18

## 2018-05-21 RX ADMIN — Medication 40 MILLIGRAM(S): at 05:18

## 2018-05-21 RX ADMIN — OXYCODONE HYDROCHLORIDE 10 MILLIGRAM(S): 5 TABLET ORAL at 14:13

## 2018-05-21 RX ADMIN — OXYCODONE HYDROCHLORIDE 10 MILLIGRAM(S): 5 TABLET ORAL at 14:43

## 2018-05-21 RX ADMIN — Medication 4: at 13:16

## 2018-05-21 RX ADMIN — ENOXAPARIN SODIUM 40 MILLIGRAM(S): 100 INJECTION SUBCUTANEOUS at 13:15

## 2018-05-21 RX ADMIN — ATORVASTATIN CALCIUM 40 MILLIGRAM(S): 80 TABLET, FILM COATED ORAL at 21:37

## 2018-05-21 RX ADMIN — CARVEDILOL PHOSPHATE 25 MILLIGRAM(S): 80 CAPSULE, EXTENDED RELEASE ORAL at 18:17

## 2018-05-21 RX ADMIN — Medication 100 MILLIGRAM(S): at 21:37

## 2018-05-21 RX ADMIN — PIPERACILLIN AND TAZOBACTAM 25 GRAM(S): 4; .5 INJECTION, POWDER, LYOPHILIZED, FOR SOLUTION INTRAVENOUS at 13:16

## 2018-05-21 RX ADMIN — Medication 3 UNIT(S): at 17:35

## 2018-05-21 NOTE — PROGRESS NOTE ADULT - ASSESSMENT
68 year old male with PMHx of ICM EF 40%, CABG, HTN, DM II, HLD, pituitary tumor, atrial fibrillation s/p PPM with complaints of constant right knee pain and swelling planned for R knee washout for septic joint.    #ICM EF 40% s/p ICD  -- continue coreg 25 mg  -- continue lasix  -- continue asa    #AFib  -- resume eliquis when possible post-op  -- will defer planned cardioversion    #HLD  -- continue atorvastatin    General Cardiology will continue to follow.    Srikanth Loera MD 68 year old male with PMHx of ICM EF 40%, CABG, HTN, DM II, HLD, pituitary tumor, atrial fibrillation s/p PPM  Right knee pain and swelling planned for R knee washout for septic joint.    #ICM EF 40% s/p ICD  -- continue Coreg 25 mg  -- continue Lasix  -- continue asa    #AFib  -- resume eliquis when possible post-op  -- will defer planned cardioversion    #HLD  -- continue atorvastatin    General Cardiology will continue to follow.    MD Charly Nam M.D.    Cardiology Consult Service  [phone] or [phone]

## 2018-05-21 NOTE — CONSULT NOTE ADULT - PROBLEM SELECTOR RECOMMENDATION 6
Suspected etiology due to fatty liver dz/cardiac.   No history of significant ETOH abuse.   s/p EGD/colonoscopy last year with Grade 1 varices and diverticulosis.

## 2018-05-21 NOTE — CONSULT NOTE ADULT - PROBLEM SELECTOR RECOMMENDATION 5
Chronically low platelets suspect due to cirrhosis.  No evidence of bleeding.   Eventually to resume Apixaban once cleared from surg.

## 2018-05-21 NOTE — CONSULT NOTE ADULT - PROBLEM SELECTOR RECOMMENDATION 4
Clinically without overt signs of decompensation   Cont with lasix 40mg daily (PO)  monitor I+Os and renal function.

## 2018-05-21 NOTE — PROGRESS NOTE ADULT - SUBJECTIVE AND OBJECTIVE BOX
Cardiology Progress Note    Interval events: Episodes of NSVT over the weekend. ICD not firing. No complaints this morning.    Medications:  aspirin  chewable 81 milliGRAM(s) Oral daily  atorvastatin 40 milliGRAM(s) Oral at bedtime  carvedilol 25 milliGRAM(s) Oral every 12 hours  enoxaparin Injectable 40 milliGRAM(s) SubCutaneous daily  furosemide    Tablet 40 milliGRAM(s) Oral daily  insulin lispro (HumaLOG) corrective regimen sliding scale   SubCutaneous three times a day before meals  insulin lispro (HumaLOG) corrective regimen sliding scale   SubCutaneous at bedtime  levothyroxine 125 MICROGram(s) Oral daily  oxyCODONE    IR 5 milliGRAM(s) Oral every 4 hours PRN  oxyCODONE    IR 10 milliGRAM(s) Oral every 4 hours PRN  piperacillin/tazobactam IVPB. 3.375 Gram(s) IV Intermittent every 8 hours  polyethylene glycol 3350 17 Gram(s) Oral daily PRN      Review of Systems:  Constitutional: [ ] Fever [ ] Chills [ ] Fatigue [ ] Weight change   HEENT: [ ] Blurred vision [ ] Eye Pain [ ] Headache [ ] Runny nose [ ] Sore Throat   Respiratory: [ ] Cough [ ] Wheezing [ ] Shortness of breath  Cardiovascular: [ ] Chest Pain [ ] Palpitations [ ] RODRIGUEZ [ ] PND [ ] Orthopnea  Gastrointestinal: [ ] Abdominal Pain [ ] Diarrhea [ ] Constipation [ ] Hemorrhoids [ ] Nausea [ ] Vomiting  Genitourinary: [ ] Nocturia [ ] Dysuria [ ] Incontinence  Extremities: [ ] Swelling [ ] Joint Pain  Neurologic: [ ] Focal deficit [ ] Paresthesias [ ] Syncope  Lymphatic: [ ] Swelling [ ] Lymphadenopathy   Skin: [ ] Rash [ ] Ecchymoses [ ] Wounds [ ] Lesions  Psychiatry: [ ] Depression [ ] Suicidal/Homicidal Ideation [ ] Anxiety [ ] Sleep Disturbances  [x] 10 point review of systems is otherwise negative except as mentioned above            [ ]Unable to obtain    Vitals:  T(C): 36.8 (05-21-18 @ 05:00), Max: 37 (05-21-18 @ 01:09)  HR: 116 (05-21-18 @ 05:00) (103 - 117)  BP: 93/58 (05-21-18 @ 05:00) (84/53 - 93/58)  BP(mean): 65 (05-21-18 @ 00:00) (63 - 69)  RR: 18 (05-21-18 @ 05:00) (5 - 24)  SpO2: 98% (05-21-18 @ 05:00) (93% - 99%)  Wt(kg): --  Daily     Daily   I&O's Summary    20 May 2018 07:01  -  21 May 2018 07:00  --------------------------------------------------------  IN: 400 mL / OUT: 1450 mL / NET: -1050 mL        Physical Exam:  Appearance: NAD  Eyes: PERRL, EOMI  HENT: Normal oral mus[place]a, NC/AT  Cardiovascular: normal S1 and S2, RRR, soft holsystolic murmur, no edema, normal JVP  Respiratory: Clear to auscultation bilaterally  Gastrointestinal: Soft, non-tender, non-distended, BS+  Musculoskeletal: No clubbing, no joint deformity   Neurologic: Non-focal  Lymphatic: No lymphadenopathy  Psychiatry: AAOx3, mood & affect appropriate  Skin: No rashes, no ecchymoses, no cyanosis    Labs:                        9.8    7.5   )-----------( 42       ( 20 May 2018 04:30 )             29.1     05-20    133<L>  |  99  |  65<H>  ----------------------------<  139<H>  4.0   |  18<L>  |  1.95<H>    Ca    8.5      20 May 2018 04:30  Phos  3.4     05-20  Mg     2.7     05-20      PT/INR - ( 20 May 2018 04:30 )   PT: 17.4 sec;   INR: 1.60 ratio         PTT - ( 20 May 2018 04:30 )  PTT:24.6 sec      Serum Pro-Brain Natriuretic Peptide: 4700 pg/mL (05-19 @ 03:53)          New results/imaging: Cardiology Progress Note    Interval events: Episodes of NSVT over the weekend. ICD not firing. No complaints this morning.    Medications:  aspirin  chewable 81 milliGRAM(s) Oral daily  atorvastatin 40 milliGRAM(s) Oral at bedtime  carvedilol 25 milliGRAM(s) Oral every 12 hours  enoxaparin Injectable 40 milliGRAM(s) SubCutaneous daily  furosemide    Tablet 40 milliGRAM(s) Oral daily  insulin lispro (HumaLOG) corrective regimen sliding scale   SubCutaneous three times a day before meals  insulin lispro (HumaLOG) corrective regimen sliding scale   SubCutaneous at bedtime  levothyroxine 125 MICROGram(s) Oral daily  oxyCODONE    IR 5 milliGRAM(s) Oral every 4 hours PRN  oxyCODONE    IR 10 milliGRAM(s) Oral every 4 hours PRN  piperacillin/tazobactam IVPB. 3.375 Gram(s) IV Intermittent every 8 hours  polyethylene glycol 3350 17 Gram(s) Oral daily PRN      Review of Systems:  Constitutional: [ ] Fever [ ] Chills [ ] Fatigue [ ] Weight change   HEENT: [ ] Blurred vision [ ] Eye Pain [ ] Headache [ ] Runny nose [ ] Sore Throat   Respiratory: [ ] Cough [ ] Wheezing [ ] Shortness of breath  Cardiovascular: [ ] Chest Pain [ ] Palpitations [ ] RODRIGUEZ [ ] PND [ ] Orthopnea  Gastrointestinal: [ ] Abdominal Pain [ ] Diarrhea [ ] Constipation [ ] Hemorrhoids [ ] Nausea [ ] Vomiting  Genitourinary: [ ] Nocturia [ ] Dysuria [ ] Incontinence  Extremities: [ ] Swelling [ ] Joint Pain  Neurologic: [ ] Focal deficit [ ] Paresthesias [ ] Syncope  Lymphatic: [ ] Swelling [ ] Lymphadenopathy   Skin: [ ] Rash [ ] Ecchymoses [ ] Wounds [ ] Lesions  Psychiatry: [ ] Depression [ ] Suicidal/Homicidal Ideation [ ] Anxiety [ ] Sleep Disturbances  [x] 10 point review of systems is otherwise negative except as mentioned above             Vitals:  T(C): 36.8 (05-21-18 @ 05:00), Max: 37 (05-21-18 @ 01:09)  HR: 116 (05-21-18 @ 05:00) (103 - 117)  BP: 93/58 (05-21-18 @ 05:00) (84/53 - 93/58)  BP(mean): 65 (05-21-18 @ 00:00) (63 - 69)  RR: 18 (05-21-18 @ 05:00) (5 - 24)  SpO2: 98% (05-21-18 @ 05:00) (93% - 99%)    Physical Exam:  Appearance: NAD  Eyes: PERRL, EOMI  HENT: Normal oral mus[place]a, NC/AT  Cardiovascular: normal S1 and S2, RRR, soft holsystolic murmur, no edema, normal JVP  Respiratory: Clear to auscultation bilaterally  Gastrointestinal: Soft, non-tender, non-distended, BS+  Musculoskeletal: No clubbing, no joint deformity   Neurologic: Non-focal  Lymphatic: No lymphadenopathy  Psychiatry: AAOx3, mood & affect appropriate  Skin: No rashes, no ecchymoses, no cyanosis    I&O's Summary    20 May 2018 07:01  -  21 May 2018 07:00  --------------------------------------------------------  IN: 400 mL / OUT: 1450 mL / NET: -1050 mL    Labs:                      9.8    7.5   )-----------( 42       ( 20 May 2018 04:30 )             29.1     05-20  133<L>  |  99  |  65<H>  ----------------------------<  139<H>  4.0   |  18<L>  |  1.95<H>    Ca    8.5      20 May 2018 04:30  Phos  3.4     05-20  Mg     2.7     05-20    PT/INR - ( 20 May 2018 04:30 )   PT: 17.4 sec;   INR: 1.60 ratio    PTT - ( 20 May 2018 04:30 )  PTT:24.6 sec    Serum Pro-Brain Natriuretic Peptide: 4700 pg/mL (05-19 @ 03:53)

## 2018-05-21 NOTE — PROGRESS NOTE ADULT - SUBJECTIVE AND OBJECTIVE BOX
Pt seen and examined.   No acute events overnight. Patient states knee feels much better. Was able to ambulate with cane yesterday    Vital Signs Last 24 Hrs  T(C): 36.8 (21 May 2018 05:00), Max: 37 (21 May 2018 01:09)  T(F): 98.3 (21 May 2018 05:00), Max: 98.6 (21 May 2018 01:09)  HR: 116 (21 May 2018 05:00) (103 - 117)  BP: 93/58 (21 May 2018 05:00) (84/53 - 95/59)  BP(mean): 65 (21 May 2018 00:00) (63 - 72)  RR: 18 (21 May 2018 05:00) (5 - 24)  SpO2: 98% (21 May 2018 05:00) (93% - 99%)      PE:  NAD  RLE knee dressing c/d/i  cellulitis decreasing   flexing and extended knee with out difficulty   EHL/FHL/TA/GS intact  SILT SP/DP/T/S/S  WWP brisk cap refill      68yMale with R prepatellar bursitis s/p bedside I&D  no evidence of septic joint   DC drain on 5/22  - PAIN CONTROL  - dvt ppx  FU cultures  PT/OT, WBAT  DC planning

## 2018-05-21 NOTE — CONSULT NOTE ADULT - PROBLEM SELECTOR RECOMMENDATION 7
will check A1c. monitor BS on SS  holding all oral agents for now.   FS elevated now. 178 to 271.   Will add low dose basal insulin for now.

## 2018-05-21 NOTE — CONSULT NOTE ADULT - SUBJECTIVE AND OBJECTIVE BOX
HPI:  68M with PMHx of  PMHx of ICM EF 40%, CABG, CKD3, HTN, DM II, HLD, pituitary tumor, atrial fibrillation s/p PPM, AICD, cirrhosis, thrombocytopenia p/w two days of left knee pain and fevers as high as 102.2. The patient went to an outpatient orthopaedic surgeon who sent him into the OR for further evaluation. The patient denies recent trauma to the knee or history of gout. The patient has had episodes of cellulitis in the past, but has not recently been treated. He has been able to bear weight on the knee with some difficulty.     Patient was admitted to SICU for close monitoring in setting of sepsis, hypotension. Patient underwent right knee wash out, I+D, and drain placement for suspected prepatellar bursitis on 5/19. Cultures showing MSSA. Hospital course c/b afib RVR, NSVT. Patient had been scheduled for cardioversion this week prior to admission. Patient evaluated by Card and Coreg was resumed. Eliquis currently still on hold until cleared from surg standpoint.       Home Medications:  Amaryl 4 mg oral tablet: 1 tab(s) orally 2 times a day ( hold the morning of procedure )  (22 Aug 2017 18:18)  atorvastatin 40 mg oral tablet: 1 tab(s) orally once a day (at bedtime) (22 Aug 2017 18:18)  carvedilol 25 mg oral tablet: 1 tab(s) orally every 12 hours (22 Aug 2017 18:18)  Ecotrin Adult Low Strength 81 mg oral delayed release tablet: 1 tab(s) orally once a day ( continue )  (22 Aug 2017 18:18)  furosemide 40 mg oral tablet: 1 tab(s) orally once a day (22 Aug 2017 18:18)  Januvia 50 mg oral tablet: 1 tab(s) orally once a day ( hold the morning of procedure )  (22 Aug 2017 18:18)  levothyroxine 125 mcg (0.125 mg) oral tablet: 1 tab(s) orally once a day (22 Aug 2017 18:18)  metFORMIN 1000 mg oral tablet: 1 tab(s) orally 2 times a day ( hold the morning of procedure )  (22 Aug 2017 18:18)  Niaspan ER 1000 mg oral tablet, extended release: 1 tab(s) orally once a day (at bedtime) (22 Aug 2017 18:18)  ramipril 10 mg oral capsule: 1 cap(s) orally once a day (22 Aug 2017 18:18)      PAST MEDICAL & SURGICAL HISTORY:  Hypothyroidism  PVD (peripheral vascular disease)  History of hyperprolactinemia  Hepatic cirrhosis, unspecified hepatic cirrhosis type  Anemia  ICD (implantable cardioverter-defibrillator) in place: [employer], Model [id number] , last interrogation 4/2017  Sleep apnea: not using CPAP  History of osteomyelitis: 2015, right foot 2nd toe  Presence of stent in coronary artery: 2 stents  Heart failure with reduced left ventricular function: EF 37%  Ischemic cardiomyopathy  Pituitary adenoma: as per patient chronic, no changes , recently restarted follow up with endocrinologist ( note in allscripts )  Coronary artery disease  Thrombocytopenia: Chronic  HLD (hyperlipidemia)  HTN (hypertension)  DM (diabetes mellitus): type 2, Hg A1C 8.2 % ( 7/10/17)  AICD lead malfunction: history of  History of amputation: right foot, 2nd toe, osteo 2015  AICD (automatic cardioverter/defibrillator) present: placement in (2006)  Stented coronary artery: RCA (1999)  Coronary atherosclerosis of artery bypass graft: CABG X 4 (1986)      Review of Systems:   CONSTITUTIONAL: No fever, weight loss, or fatigue  EYES: No eye pain, visual disturbances, or discharge  ENMT:  No difficulty hearing, tinnitus, vertigo; No sinus or throat pain  NECK: No pain or stiffness  RESPIRATORY: No cough, wheezing, chills or hemoptysis; No shortness of breath  CARDIOVASCULAR: No chest pain, palpitations, dizziness  GASTROINTESTINAL: No abdominal or epigastric pain. No nausea, vomiting, or hematemesis; No diarrhea or constipation. No melena or hematochezia.  GENITOURINARY: No dysuria, frequency, hematuria, or incontinence  NEUROLOGICAL: No headaches, memory loss, loss of strength, numbness, or tremors  SKIN: No itching, burning, rashes, or lesions   LYMPH NODES: No enlarged glands  ENDOCRINE: No heat or cold intolerance; No hair loss  MUSCULOSKELETAL: right knee swelling and pain  PSYCHIATRIC: No depression, anxiety, mood swings, or difficulty sleeping  HEME/LYMPH: No easy bruising, or bleeding gums  ALLERY AND IMMUNOLOGIC: No hives or eczema    Allergies    No Known Allergies    Intolerances        Social History:   Denies any smoking, drinking.   Lives with spouse    FAMILY HISTORY:  Family history of MI (myocardial infarction) (Father, Mother)      MEDICATIONS  (STANDING):  aspirin  chewable 81 milliGRAM(s) Oral daily  atorvastatin 40 milliGRAM(s) Oral at bedtime  carvedilol 25 milliGRAM(s) Oral every 12 hours  enoxaparin Injectable 40 milliGRAM(s) SubCutaneous daily  furosemide    Tablet 40 milliGRAM(s) Oral daily  insulin lispro (HumaLOG) corrective regimen sliding scale   SubCutaneous three times a day before meals  insulin lispro (HumaLOG) corrective regimen sliding scale   SubCutaneous at bedtime  levothyroxine 125 MICROGram(s) Oral daily  piperacillin/tazobactam IVPB. 3.375 Gram(s) IV Intermittent every 8 hours    MEDICATIONS  (PRN):  oxyCODONE    IR 5 milliGRAM(s) Oral every 4 hours PRN Moderate Pain (4 - 6)  oxyCODONE    IR 10 milliGRAM(s) Oral every 4 hours PRN Severe Pain (7 - 10)  polyethylene glycol 3350 17 Gram(s) Oral daily PRN Constipation      Vital Signs Last 24 Hrs  T(C): 36.8 (21 May 2018 05:00), Max: 37 (21 May 2018 01:09)  T(F): 98.3 (21 May 2018 05:00), Max: 98.6 (21 May 2018 01:09)  HR: 116 (21 May 2018 05:00) (103 - 117)  BP: 93/58 (21 May 2018 05:00) (84/53 - 93/58)  BP(mean): 65 (21 May 2018 00:00) (63 - 69)  RR: 18 (21 May 2018 05:00) (13 - 24)  SpO2: 98% (21 May 2018 05:00) (94% - 99%)  CAPILLARY BLOOD GLUCOSE      POCT Blood Glucose.: 180 mg/dL (20 May 2018 22:48)  POCT Blood Glucose.: 266 mg/dL (20 May 2018 17:47)  POCT Blood Glucose.: 271 mg/dL (20 May 2018 13:01)        PHYSICAL EXAM:  GENERAL: NAD, well-developed  HEAD:  Atraumatic, Normocephalic  EYES: EOMI, conjunctiva and sclera clear  NECK: Supple, No JVD  CHEST/LUNG: Clear to auscultation bilaterally; No wheeze  HEART: tachy, irregular, no murmur  ABDOMEN: Soft, Nontender, Nondistended; Bowel sounds present  EXTREMITIES:  right knee ACE wrap in place. no cyanosis. warm. no edema,.   PSYCH: AAOx3  NEUROLOGY: non-focal  SKIN: No rashes or lesions    LABS:                        9.4    6.1   )-----------( 51       ( 21 May 2018 07:17 )             29.6     05-21    131<L>  |  95<L>  |  64<H>  ----------------------------<  137<H>  4.0   |  20<L>  |  1.95<H>    Ca    8.5      21 May 2018 07:14  Phos  3.1     05-21  Mg     2.4     05-21      PT/INR - ( 21 May 2018 07:14 )   PT: 15.2 sec;   INR: 1.39 ratio         PTT - ( 21 May 2018 07:14 )  PTT:28.6 sec          Culture - Surgical Swab (collected 19 May 2018 16:48)  Source: .Surgical Swab right prepatellar bursa  Preliminary Report (20 May 2018 15:59):    Moderate Staphylococcus aureus    Culture - Surgical Swab (collected 19 May 2018 16:48)  Source: .Surgical Swab right prepatellar bursa  Preliminary Report (20 May 2018 16:08):    Moderate Staphylococcus aureus    Culture - Urine (collected 18 May 2018 20:30)  Source: .Urine Clean Catch (Midstream)  Final Report (19 May 2018 22:55):    No growth    Culture - Body Fluid with Gram Stain (collected 18 May 2018 18:17)  Source: .Body Fluid Knee Fluid  Gram Stain (18 May 2018 22:36):    Few polymorphonuclear leukocytes per low power field    Few Red blood cells per low power field    Numerous Gram positive cocci in pairs, chains and clusters per oil power    field  Preliminary Report (19 May 2018 17:54):    Numerous Staphylococcus aureus  Organism: Staphylococcus aureus (20 May 2018 17:44)  Organism: Staphylococcus aureus (20 May 2018 17:44)    Culture - Blood (collected 18 May 2018 16:07)  Source: .Blood Blood-Peripheral  Preliminary Report (19 May 2018 17:01):    No growth to date.    Culture - Blood (collected 18 May 2018 16:07)  Source: .Blood Blood-Peripheral  Preliminary Report (19 May 2018 17:01):    No growth to date.        RADIOLOGY & ADDITIONAL TESTS:    Imaging Personally Reviewed:    Consultant(s) Notes Reviewed:  ortho surg, Card    Care Discussed with Consultants/Other Providers: HPI:  68M with PMHx of  PMHx of ICM EF 40%, CABG, CKD3, HTN, DM II, HLD, pituitary tumor, atrial fibrillation s/p PPM, AICD, cirrhosis, thrombocytopenia p/w two days of left knee pain and fevers as high as 102.2. The patient went to an outpatient orthopaedic surgeon who sent him into the OR for further evaluation. The patient denies recent trauma to the knee or history of gout. The patient has had episodes of cellulitis in the past, but has not recently been treated. He has been able to bear weight on the knee with some difficulty.     Patient was admitted to SICU for close monitoring in setting of sepsis, hypotension. Patient underwent right knee wash out, I+D, and drain placement for suspected prepatellar bursitis on 5/19. Cultures showing MSSA. Hospital course c/b afib RVR, NSVT. Patient had been scheduled for cardioversion this week prior to admission. Patient evaluated by Card and Coreg was resumed. Eliquis currently still on hold until cleared from surg standpoint.     Currently resting comfortably in bed without complaints. right knee pain mainly with  movement. denies any CP/SOB, palpitations. no fever or chills. Denies any urinary or GI symptoms.     Home Medications:  Amaryl 4 mg oral tablet: 1 tab(s) orally 2 times a day ( hold the morning of procedure )  (22 Aug 2017 18:18)  atorvastatin 40 mg oral tablet: 1 tab(s) orally once a day (at bedtime) (22 Aug 2017 18:18)  carvedilol 25 mg oral tablet: 1 tab(s) orally every 12 hours (22 Aug 2017 18:18)  Ecotrin Adult Low Strength 81 mg oral delayed release tablet: 1 tab(s) orally once a day ( continue )  (22 Aug 2017 18:18)  furosemide 40 mg oral tablet: 1 tab(s) orally once a day (22 Aug 2017 18:18)  Januvia 50 mg oral tablet: 1 tab(s) orally once a day ( hold the morning of procedure )  (22 Aug 2017 18:18)  levothyroxine 125 mcg (0.125 mg) oral tablet: 1 tab(s) orally once a day (22 Aug 2017 18:18)  metFORMIN 1000 mg oral tablet: 1 tab(s) orally 2 times a day ( hold the morning of procedure )  (22 Aug 2017 18:18)  Niaspan ER 1000 mg oral tablet, extended release: 1 tab(s) orally once a day (at bedtime) (22 Aug 2017 18:18)  ramipril 10 mg oral capsule: 1 cap(s) orally once a day (22 Aug 2017 18:18)      PAST MEDICAL & SURGICAL HISTORY:  Hypothyroidism  PVD (peripheral vascular disease)  History of hyperprolactinemia  Hepatic cirrhosis, unspecified hepatic cirrhosis type  Anemia  ICD (implantable cardioverter-defibrillator) in place: [employer], Model [id number] , last interrogation 4/2017  Sleep apnea: not using CPAP  History of osteomyelitis: 2015, right foot 2nd toe  Presence of stent in coronary artery: 2 stents  Heart failure with reduced left ventricular function: EF 37%  Ischemic cardiomyopathy  Pituitary adenoma: as per patient chronic, no changes , recently restarted follow up with endocrinologist ( note in allscripts )  Coronary artery disease  Thrombocytopenia: Chronic  HLD (hyperlipidemia)  HTN (hypertension)  DM (diabetes mellitus): type 2, Hg A1C 8.2 % ( 7/10/17)  AICD lead malfunction: history of  History of amputation: right foot, 2nd toe, osteo 2015  AICD (automatic cardioverter/defibrillator) present: placement in (2006)  Stented coronary artery: RCA (1999)  Coronary atherosclerosis of artery bypass graft: CABG X 4 (1986)      Review of Systems:   CONSTITUTIONAL: No fever, weight loss, or fatigue  EYES: No eye pain, visual disturbances, or discharge  ENMT:  No difficulty hearing, tinnitus, vertigo; No sinus or throat pain  NECK: No pain or stiffness  RESPIRATORY: No cough, wheezing, chills or hemoptysis; No shortness of breath  CARDIOVASCULAR: No chest pain, palpitations, dizziness  GASTROINTESTINAL: No abdominal or epigastric pain. No nausea, vomiting, or hematemesis; No diarrhea or constipation. No melena or hematochezia.  GENITOURINARY: No dysuria, frequency, hematuria, or incontinence  NEUROLOGICAL: No headaches, memory loss, loss of strength, numbness, or tremors  SKIN: No itching, burning, rashes, or lesions   LYMPH NODES: No enlarged glands  ENDOCRINE: No heat or cold intolerance; No hair loss  MUSCULOSKELETAL: right knee swelling and pain  PSYCHIATRIC: No depression, anxiety, mood swings, or difficulty sleeping  HEME/LYMPH: No easy bruising, or bleeding gums  ALLERY AND IMMUNOLOGIC: No hives or eczema    Allergies    No Known Allergies    Intolerances        Social History:   Denies any smoking, drinking.   Lives with spouse    FAMILY HISTORY:  Family history of MI (myocardial infarction) (Father, Mother)      MEDICATIONS  (STANDING):  aspirin  chewable 81 milliGRAM(s) Oral daily  atorvastatin 40 milliGRAM(s) Oral at bedtime  carvedilol 25 milliGRAM(s) Oral every 12 hours  enoxaparin Injectable 40 milliGRAM(s) SubCutaneous daily  furosemide    Tablet 40 milliGRAM(s) Oral daily  insulin lispro (HumaLOG) corrective regimen sliding scale   SubCutaneous three times a day before meals  insulin lispro (HumaLOG) corrective regimen sliding scale   SubCutaneous at bedtime  levothyroxine 125 MICROGram(s) Oral daily  piperacillin/tazobactam IVPB. 3.375 Gram(s) IV Intermittent every 8 hours    MEDICATIONS  (PRN):  oxyCODONE    IR 5 milliGRAM(s) Oral every 4 hours PRN Moderate Pain (4 - 6)  oxyCODONE    IR 10 milliGRAM(s) Oral every 4 hours PRN Severe Pain (7 - 10)  polyethylene glycol 3350 17 Gram(s) Oral daily PRN Constipation      Vital Signs Last 24 Hrs  T(C): 36.8 (21 May 2018 05:00), Max: 37 (21 May 2018 01:09)  T(F): 98.3 (21 May 2018 05:00), Max: 98.6 (21 May 2018 01:09)  HR: 116 (21 May 2018 05:00) (103 - 117)  BP: 93/58 (21 May 2018 05:00) (84/53 - 93/58)  BP(mean): 65 (21 May 2018 00:00) (63 - 69)  RR: 18 (21 May 2018 05:00) (13 - 24)  SpO2: 98% (21 May 2018 05:00) (94% - 99%)  CAPILLARY BLOOD GLUCOSE      POCT Blood Glucose.: 180 mg/dL (20 May 2018 22:48)  POCT Blood Glucose.: 266 mg/dL (20 May 2018 17:47)  POCT Blood Glucose.: 271 mg/dL (20 May 2018 13:01)        PHYSICAL EXAM:  GENERAL: NAD, well-developed  HEAD:  Atraumatic, Normocephalic  EYES: EOMI, conjunctiva and sclera clear  NECK: Supple, No JVD  CHEST/LUNG: Clear to auscultation bilaterally; No wheeze  HEART: tachy, irregular, no murmur  ABDOMEN: Soft, Nontender, Nondistended; Bowel sounds present  EXTREMITIES:  right knee ACE wrap in place. no cyanosis. warm. no edema,.   PSYCH: AAOx3  NEUROLOGY: non-focal  SKIN: No rashes or lesions    LABS:                        9.4    6.1   )-----------( 51       ( 21 May 2018 07:17 )             29.6     05-21    131<L>  |  95<L>  |  64<H>  ----------------------------<  137<H>  4.0   |  20<L>  |  1.95<H>    Ca    8.5      21 May 2018 07:14  Phos  3.1     05-21  Mg     2.4     05-21      PT/INR - ( 21 May 2018 07:14 )   PT: 15.2 sec;   INR: 1.39 ratio         PTT - ( 21 May 2018 07:14 )  PTT:28.6 sec          Culture - Surgical Swab (collected 19 May 2018 16:48)  Source: .Surgical Swab right prepatellar bursa  Preliminary Report (20 May 2018 15:59):    Moderate Staphylococcus aureus    Culture - Surgical Swab (collected 19 May 2018 16:48)  Source: .Surgical Swab right prepatellar bursa  Preliminary Report (20 May 2018 16:08):    Moderate Staphylococcus aureus    Culture - Urine (collected 18 May 2018 20:30)  Source: .Urine Clean Catch (Midstream)  Final Report (19 May 2018 22:55):    No growth    Culture - Body Fluid with Gram Stain (collected 18 May 2018 18:17)  Source: .Body Fluid Knee Fluid  Gram Stain (18 May 2018 22:36):    Few polymorphonuclear leukocytes per low power field    Few Red blood cells per low power field    Numerous Gram positive cocci in pairs, chains and clusters per oil power    field  Preliminary Report (19 May 2018 17:54):    Numerous Staphylococcus aureus  Organism: Staphylococcus aureus (20 May 2018 17:44)  Organism: Staphylococcus aureus (20 May 2018 17:44)    Culture - Blood (collected 18 May 2018 16:07)  Source: .Blood Blood-Peripheral  Preliminary Report (19 May 2018 17:01):    No growth to date.    Culture - Blood (collected 18 May 2018 16:07)  Source: .Blood Blood-Peripheral  Preliminary Report (19 May 2018 17:01):    No growth to date.        RADIOLOGY & ADDITIONAL TESTS:    Imaging Personally Reviewed:    Consultant(s) Notes Reviewed:  ortho surg, Card    Care Discussed with Consultants/Other Providers:

## 2018-05-21 NOTE — CONSULT NOTE ADULT - SUBJECTIVE AND OBJECTIVE BOX
Patient is a 68y old  Male who presents with a chief complaint of Right knee pain (19 May 2018 00:38)      HPI:  68M with PMHx of  PMHx of ICM EF 40%, CABG, HTN, DM II, HLD, pituitary tumor, atrial fibrillation s/p PPM, AICD p/w two days of left knee pain and fevers as high as 102.2. The patient went to an outpatient orthopaedic surgeon who sent him into the OR for further evaluation. The patient denies recent trauma to the knee or history of gout. The patient has had episodes of cellulitis in the past, but has not recently been treated. He has been able to bear weight on the knee with some difficulty.  He has a fib and was scheduled for cardioversion this coming weds.  Above reviewed.  Patient was admitted to SICU for close monitoring in setting of sepsis, hypotension. Patient underwent right knee wash out, I+D, and drain placement for suspected prepatellar bursitis on 5/19. Cultures showing MSSA. Hospital course c/b afib RVR, NSVT. Now transferred to floor.   Pt says last episode of cellulitis more than a year ago. The knee pain is better today able to move better. No more fever or chills. Otherwise denies any complains.       PAST MEDICAL & SURGICAL HISTORY:  Hypothyroidism  PVD (peripheral vascular disease)  History of hyperprolactinemia  Hepatic cirrhosis, unspecified hepatic cirrhosis type  Anemia  ICD (implantable cardioverter-defibrillator) in place: [employer], Model [id number] , last interrogation 4/2017  Sleep apnea: not using CPAP  History of osteomyelitis: 2015, right foot 2nd toe  Presence of stent in coronary artery: 2 stents  Heart failure with reduced left ventricular function: EF 37%  Ischemic cardiomyopathy  Pituitary adenoma: as per patient chronic, no changes , recently restarted follow up with endocrinologist ( note in allscripts )  Coronary artery disease  Thrombocytopenia: Chronic  HLD (hyperlipidemia)  HTN (hypertension)  DM (diabetes mellitus): type 2, Hg A1C 8.2 % ( 7/10/17)  AICD lead malfunction: history of  History of amputation: right foot, 2nd toe, osteo 2015  AICD (automatic cardioverter/defibrillator) present: placement in (2006)  Stented coronary artery: RCA (1999)  Coronary atherosclerosis of artery bypass graft: CABG X 4 (1986)      REVIEW OF SYSTEMS    General: Resolved chills and Fevers    Skin: skin changes b/l legs, chronic   	  Ophthalmologic: Denies any visual complaints, discharge, redness.  	  ENMT: No nasal congestion or throat pain.     Respiratory and Thorax: No cough, sputum or chest pain. Denies shortness of breath.  	  Cardiovascular: No chest pain, palpitations.    Gastrointestinal: No nausea, abdominal pain or diarrhea.    Genitourinary: No dysuria, frequency. No flank pain.    Musculoskeletal: Per HPI     Neurological: No confusion. No extremity weakness.    Psychiatric: No hallucinations	    Endocrine: No recent weight gain or loss.    Allergic/Immunologic: No hives or rash       Social history:  , no smoking       FAMILY HISTORY:  Family history of MI (myocardial infarction) (Father, Mother)      Allergies  No Known Allergies      Antimicrobials:  ceFAZolin   IVPB 1000 milliGRAM(s) IV Intermittent every 8 hours        Vital Signs Last 24 Hrs  T(C): 36.5 (21 May 2018 17:33), Max: 37 (21 May 2018 01:09)  T(F): 97.7 (21 May 2018 17:33), Max: 98.6 (21 May 2018 01:09)  HR: 69 (21 May 2018 17:33) (69 - 116)  BP: 91/55 (21 May 2018 17:33) (84/53 - 98/63)  BP(mean): 65 (21 May 2018 00:00) (63 - 66)  RR: 17 (21 May 2018 17:33) (13 - 24)  SpO2: 97% (21 May 2018 17:33) (95% - 98%)      PHYSICAL EXAM: Patient in no acute distress.    Constitutional: Comfortable. Awake and alert    Eyes: No discharge or conjunctival injection    ENMT: No thrush. No pharyngeal exudate or erythema.    Neck: Supple, No LN.    Respiratory: Good air entry bilaterally.    Cardiovascular: S1 S2 wnl, No murmur, irregular, lt sided AICD.     Gastrointestinal: Soft BS(+) no tenderness, non distended.    Genitourinary: No CVA tenderness     Extremities: trace edema.     Vascular: peripheral pulses felt    Neurological: AAO X 3. No grossly focal deficits.    Skin: Chronic skin changes b/l lower extremities     Musculoskeletal: rt knee with dressing and drain.     Psychiatric: Affect normal.                              9.4    6.1   )-----------( 51       ( 21 May 2018 07:17 )             29.6       05-21    131<L>  |  95<L>  |  64<H>  ----------------------------<  137<H>  4.0   |  20<L>  |  1.95<H>    Ca    8.5      21 May 2018 07:14  Phos  3.1     05-21  Mg     2.4     05-21          Culture - Surgical Swab (collected 19 May 2018 16:48)  Source: .Surgical Swab right prepatellar bursa  Preliminary Report (20 May 2018 15:59):    Moderate Staphylococcus aureus  Organism: Staphylococcus aureus (21 May 2018 18:07)  Organism: Staphylococcus aureus (21 May 2018 18:07)    Culture - Surgical Swab (collected 19 May 2018 16:48)  Source: .Surgical Swab right prepatellar bursa  Preliminary Report (20 May 2018 16:08):    Moderate Staphylococcus aureus  Organism: Staphylococcus aureus (21 May 2018 18:05)  Organism: Staphylococcus aureus (21 May 2018 18:05)    Culture - Urine (collected 18 May 2018 20:30)  Source: .Urine Clean Catch (Midstream)  Final Report (19 May 2018 22:55):    No growth          Radiology: Imaging reviewed personally [ x]  < from: Xray Chest 1 View- PORTABLE-Routine (05.21.18 @ 08:29) >  Impression:    The heart is normal in size. The lungs are clear. No radiographic   evidence for congestive heart failure. A pacer is in good position.   Status post sternotomy.      < from: Xray Knee 3 Views, Right (05.18.18 @ 14:19) >  Impression:    Evaluation for joint effusion is limited by obliquity on the lateral   view. There is no definite knee joint effusion. The prepatellar soft   tissues are not fully included on this study although there is likely   prepatellar soft tissue swelling. No acute fracture or dislocation.   Arterial calcifications are noted.

## 2018-05-21 NOTE — CONSULT NOTE ADULT - PROBLEM SELECTOR RECOMMENDATION 9
Septic bursitis with MSSA on fluid culture.   Plan for drain removal tomorrow as per surg.   Currently on Zosyn since 5/18 likely will require 2-3 weeks of abx course.   Will consult ID for appropriate abx and duration as outpt.   Pain control on oxycodone. bowel regiment. Septic bursitis with MSSA on fluid culture.   Plan for drain removal tomorrow as per surg.   Currently on Zosyn since 5/18 likely will require 2-3 weeks of abx course.   Will consult ID for appropriate abx and duration as outpt.   Pain control on oxycodone. bowel regiment.  PT eval ordered

## 2018-05-22 DIAGNOSIS — N17.9 ACUTE KIDNEY FAILURE, UNSPECIFIED: ICD-10-CM

## 2018-05-22 DIAGNOSIS — Z29.9 ENCOUNTER FOR PROPHYLACTIC MEASURES, UNSPECIFIED: ICD-10-CM

## 2018-05-22 DIAGNOSIS — E87.1 HYPO-OSMOLALITY AND HYPONATREMIA: ICD-10-CM

## 2018-05-22 DIAGNOSIS — Z45.2 ENCOUNTER FOR ADJUSTMENT AND MANAGEMENT OF VASCULAR ACCESS DEVICE: ICD-10-CM

## 2018-05-22 LAB
ANION GAP SERPL CALC-SCNC: 14 MMOL/L — SIGNIFICANT CHANGE UP (ref 5–17)
APPEARANCE UR: CLEAR — SIGNIFICANT CHANGE UP
BASOPHILS # BLD AUTO: 0 K/UL — SIGNIFICANT CHANGE UP (ref 0–0.2)
BASOPHILS NFR BLD AUTO: 0.3 % — SIGNIFICANT CHANGE UP (ref 0–2)
BILIRUB UR-MCNC: NEGATIVE — SIGNIFICANT CHANGE UP
BUN SERPL-MCNC: 70 MG/DL — HIGH (ref 7–23)
CALCIUM SERPL-MCNC: 8.3 MG/DL — LOW (ref 8.4–10.5)
CHLORIDE SERPL-SCNC: 94 MMOL/L — LOW (ref 96–108)
CHLORIDE UR-SCNC: <35 MMOL/L — SIGNIFICANT CHANGE UP
CO2 SERPL-SCNC: 22 MMOL/L — SIGNIFICANT CHANGE UP (ref 22–31)
COLOR SPEC: YELLOW — SIGNIFICANT CHANGE UP
CREAT ?TM UR-MCNC: 139 MG/DL — SIGNIFICANT CHANGE UP
CREAT SERPL-MCNC: 2.36 MG/DL — HIGH (ref 0.5–1.3)
DIFF PNL FLD: NEGATIVE — SIGNIFICANT CHANGE UP
EOSINOPHIL # BLD AUTO: 0.2 K/UL — SIGNIFICANT CHANGE UP (ref 0–0.5)
EOSINOPHIL NFR BLD AUTO: 3.1 % — SIGNIFICANT CHANGE UP (ref 0–6)
GLUCOSE BLDC GLUCOMTR-MCNC: 164 MG/DL — HIGH (ref 70–99)
GLUCOSE BLDC GLUCOMTR-MCNC: 176 MG/DL — HIGH (ref 70–99)
GLUCOSE BLDC GLUCOMTR-MCNC: 204 MG/DL — HIGH (ref 70–99)
GLUCOSE BLDC GLUCOMTR-MCNC: 236 MG/DL — HIGH (ref 70–99)
GLUCOSE SERPL-MCNC: 141 MG/DL — HIGH (ref 70–99)
GLUCOSE UR QL: NEGATIVE — SIGNIFICANT CHANGE UP
HBA1C BLD-MCNC: 7.5 % — HIGH (ref 4–5.6)
HCT VFR BLD CALC: 28.6 % — LOW (ref 39–50)
HGB BLD-MCNC: 9.5 G/DL — LOW (ref 13–17)
KETONES UR-MCNC: NEGATIVE — SIGNIFICANT CHANGE UP
LEUKOCYTE ESTERASE UR-ACNC: NEGATIVE — SIGNIFICANT CHANGE UP
LYMPHOCYTES # BLD AUTO: 0.8 K/UL — LOW (ref 1–3.3)
LYMPHOCYTES # BLD AUTO: 12.3 % — LOW (ref 13–44)
MAGNESIUM SERPL-MCNC: 2.2 MG/DL — SIGNIFICANT CHANGE UP (ref 1.6–2.6)
MCHC RBC-ENTMCNC: 30.9 PG — SIGNIFICANT CHANGE UP (ref 27–34)
MCHC RBC-ENTMCNC: 33.3 GM/DL — SIGNIFICANT CHANGE UP (ref 32–36)
MCV RBC AUTO: 93 FL — SIGNIFICANT CHANGE UP (ref 80–100)
MONOCYTES # BLD AUTO: 0.5 K/UL — SIGNIFICANT CHANGE UP (ref 0–0.9)
MONOCYTES NFR BLD AUTO: 8.7 % — SIGNIFICANT CHANGE UP (ref 2–14)
NEUTROPHILS # BLD AUTO: 4.6 K/UL — SIGNIFICANT CHANGE UP (ref 1.8–7.4)
NEUTROPHILS NFR BLD AUTO: 75.6 % — SIGNIFICANT CHANGE UP (ref 43–77)
NITRITE UR-MCNC: NEGATIVE — SIGNIFICANT CHANGE UP
OSMOLALITY UR: 382 MOS/KG — SIGNIFICANT CHANGE UP (ref 300–900)
PH UR: 6 — SIGNIFICANT CHANGE UP (ref 5–8)
PLATELET # BLD AUTO: 62 K/UL — LOW (ref 150–400)
POTASSIUM SERPL-MCNC: 3.6 MMOL/L — SIGNIFICANT CHANGE UP (ref 3.5–5.3)
POTASSIUM SERPL-SCNC: 3.6 MMOL/L — SIGNIFICANT CHANGE UP (ref 3.5–5.3)
POTASSIUM UR-SCNC: 26 MMOL/L — SIGNIFICANT CHANGE UP
PROCALCITONIN SERPL-MCNC: 1.43 NG/ML — HIGH (ref 0–0.04)
PROT UR-MCNC: SIGNIFICANT CHANGE UP
RBC # BLD: 3.08 M/UL — LOW (ref 4.2–5.8)
RBC # FLD: 14.8 % — HIGH (ref 10.3–14.5)
SODIUM SERPL-SCNC: 130 MMOL/L — LOW (ref 135–145)
SODIUM UR-SCNC: <20 MMOL/L — SIGNIFICANT CHANGE UP
SP GR SPEC: 1.02 — SIGNIFICANT CHANGE UP (ref 1.01–1.02)
UROBILINOGEN FLD QL: 1 MG/DL
WBC # BLD: 6.1 K/UL — SIGNIFICANT CHANGE UP (ref 3.8–10.5)
WBC # FLD AUTO: 6.1 K/UL — SIGNIFICANT CHANGE UP (ref 3.8–10.5)

## 2018-05-22 PROCEDURE — 99233 SBSQ HOSP IP/OBS HIGH 50: CPT

## 2018-05-22 PROCEDURE — 99232 SBSQ HOSP IP/OBS MODERATE 35: CPT

## 2018-05-22 RX ORDER — CEFAZOLIN SODIUM 1 G
500 VIAL (EA) INJECTION EVERY 12 HOURS
Qty: 0 | Refills: 0 | Status: DISCONTINUED | OUTPATIENT
Start: 2018-05-23 | End: 2018-05-24

## 2018-05-22 RX ORDER — HEPARIN SODIUM 5000 [USP'U]/ML
5000 INJECTION INTRAVENOUS; SUBCUTANEOUS EVERY 8 HOURS
Qty: 0 | Refills: 0 | Status: DISCONTINUED | OUTPATIENT
Start: 2018-05-23 | End: 2018-05-25

## 2018-05-22 RX ADMIN — CARVEDILOL PHOSPHATE 25 MILLIGRAM(S): 80 CAPSULE, EXTENDED RELEASE ORAL at 17:49

## 2018-05-22 RX ADMIN — Medication 4: at 12:32

## 2018-05-22 RX ADMIN — Medication 40 MILLIGRAM(S): at 05:15

## 2018-05-22 RX ADMIN — Medication 100 MILLIGRAM(S): at 05:15

## 2018-05-22 RX ADMIN — Medication 2: at 17:49

## 2018-05-22 RX ADMIN — Medication 3 UNIT(S): at 08:50

## 2018-05-22 RX ADMIN — Medication 125 MICROGRAM(S): at 05:15

## 2018-05-22 RX ADMIN — OXYCODONE HYDROCHLORIDE 10 MILLIGRAM(S): 5 TABLET ORAL at 18:20

## 2018-05-22 RX ADMIN — ENOXAPARIN SODIUM 40 MILLIGRAM(S): 100 INJECTION SUBCUTANEOUS at 12:32

## 2018-05-22 RX ADMIN — OXYCODONE HYDROCHLORIDE 10 MILLIGRAM(S): 5 TABLET ORAL at 17:50

## 2018-05-22 RX ADMIN — CARVEDILOL PHOSPHATE 25 MILLIGRAM(S): 80 CAPSULE, EXTENDED RELEASE ORAL at 05:15

## 2018-05-22 RX ADMIN — Medication 3 UNIT(S): at 17:49

## 2018-05-22 RX ADMIN — ATORVASTATIN CALCIUM 40 MILLIGRAM(S): 80 TABLET, FILM COATED ORAL at 21:56

## 2018-05-22 RX ADMIN — Medication 100 MILLIGRAM(S): at 13:33

## 2018-05-22 RX ADMIN — Medication 3 UNIT(S): at 12:32

## 2018-05-22 RX ADMIN — INSULIN GLARGINE 5 UNIT(S): 100 INJECTION, SOLUTION SUBCUTANEOUS at 21:56

## 2018-05-22 RX ADMIN — Medication 81 MILLIGRAM(S): at 12:32

## 2018-05-22 RX ADMIN — Medication 2: at 08:50

## 2018-05-22 NOTE — PHYSICAL THERAPY INITIAL EVALUATION ADULT - LIVES WITH, PROFILE
Pt lives with spouse in a private house. Pt reports 1 step to enter, 1 flight of stairs to bedroom.  Pt reports he can stay on main floor of house.

## 2018-05-22 NOTE — CONSULT NOTE ADULT - ASSESSMENT
67 yo M with PMH of ICM EF 40%, CAD s/p CABG, RCA stent, CKD 3 (baseline Cr 1.6), HTN, DM II, HLD, pituitary tumor, atrial fibrillation s/p PPM, AICD, cirrhosis, thrombocytopenia presented with severe sepsis with hypotension 2/2 septic joint in R knee s/p washout, with JULIANNE and now needs PICC for long-term antibiotics.
68 year old male with PMHx of ICM EF 40%, CABG, HTN, DM II, HLD, pituitary tumor, atrial fibrillation s/p PPM with complaints of constant right knee pain and swelling planned for R knee washout for septic joint.    #Pre-operative evaluation  -- no contraindication to planned OR procedure  -- would defer further volume rescuscitation given CHF hx  -- continue coreg  -- resume eliquis when possible post-op  -- will defer planned cardioversion    General Cardiology will continue to follow.    Srikanth Loera MD
68 year old male with PMHx of ICM EF 40%, CABG, HTN, DM II, HLD, pituitary tumor, atrial fibrillation s/p PPM/ICD with complaints of constant right knee pain and swelling x 2 days, admitted to orthopedics for planned washout of septic joint, MICU consulted to evaluate for septic shock.    # Sepsis r/o septic shock:   - patient admitted to orthopedics with septic joint. He has no leukocytosis but was admitted with fever and relative hypotension compared to his baseline pressure of 90/60  - patient received 2L NS with appropriate response in his blood pressure from 70s/50s --> 90s/60s, mapping appropriately >65, mentating well and asymptomatic  - patient was cleared by cardiology for surgery, per their consult note  - continue with antibiotic management of septic joint per primary team  - patient's clinical presentation is not consistent with septic shock as he was fluid responsive, does not require pressors or MICU level of care at this time    # Septic joint:   - plan for eventual OR washout  - management per primary team    # CHF: currently in well-compensated CHF, no signs or symptoms of acute exacerbation, appears euvolemic on exam  - continue with current management  - monitor volume status  - follow any further cardiology recommendations    Patient does not require MICU level of care at this time. Please re-consult should clinical condition change.    Jacinta Hall MD PGY3  MICU Resident #[id number]
68M with PMHx of  PMHx of ICM EF 40%, CABG, HTN, DM II, HLD, pituitary tumor, atrial fibrillation s/p PPM, AICD p/w two days of left knee pain and fevers as high as 102.2.   Severe sepsis( fever, tachycardia, hypotension, acute on chronic renal failure) due to MSSA septic prepatellar bursitis.  S/P I& D  Resolved sepsis   Clinically improved.    Plan:  Switched Zosyn to Cefazolin 1 gm iv q8h  Ortho on board.
69 yo m with CAD, CABG, chronic systolic HF, afib, ICD, idiopathic cirrhosis, varices, thrombocytopenia, CKD3, Dm2, HLD, pit adenoma presented with right knee prepatellar bursitis s/p I+D, drainage on 5/19 course c/b NSVT, afib RVR, JULIANNE.

## 2018-05-22 NOTE — PROGRESS NOTE ADULT - ASSESSMENT
69 yo m with CAD, CABG, chronic systolic HF, afib, ICD, idiopathic cirrhosis, varices, thrombocytopenia, CKD3, Dm2, HLD, pit adenoma presented with right knee prepatellar bursitis s/p I+D, drainage on 5/19 course c/b NSVT, afib RVR, JULIANNE.

## 2018-05-22 NOTE — CONSULT NOTE ADULT - PROBLEM SELECTOR RECOMMENDATION 9
Pt with CKD stage 3 at baseline. Urine microalb/Cr ratio 45 mg/g in 9/2017. Cr 2.36, eGFR 28 today.   Kidney failure risk equation estimates risk of renal failure requiring HD as 4.11% at 2 years and 12.29% at 5 years.   - repeat urine microalb/creatinine Pt with CKD stage 3 at baseline. Urine microalb/Cr ratio 45 mg/g in 9/2017. Cr 2.36, eGFR 28 today.   Kidney failure risk equation estimates risk of renal failure requiring HD as 4.11% at 2 years and 12.29% at 5 years conservatively. Risk may be less when JULIANNE improves.    - repeat urine microalb/creatinine Pt with CKD stage 3 at baseline. Urine microalb/Cr ratio 45 mg/g in 9/2017. Cr 2.36, eGFR 28 today.   Kidney failure risk equation estimates risk of renal failure requiring HD as 4.11% at 2 years and 12.29% at 5 years conservatively. Risk may be lower if JULIANNE improves.  - Pt is right-handed. PICC placement preferably on R-side to preserve LUE for possible future vascular access  - repeat urine microalb/creatinine

## 2018-05-22 NOTE — PHYSICAL THERAPY INITIAL EVALUATION ADULT - PERTINENT HX OF CURRENT PROBLEM, REHAB EVAL
Pt is a 67 y/o male admitted to [facility] on 5/18/18 Pt is a 67 y/o male admitted to [facility] on 5/18/18 p/w two days of left knee pain and fevers as high as 102.2. The patient went to an outpatient orthopaedic surgeon who sent him into the OR for further evaluation. The patient denies recent trauma to the knee or history of gout. The patient has had episodes of cellulitis in the past, but has not recently been treated. He has been able to bear weight on the knee with some difficulty. Continued...

## 2018-05-22 NOTE — CONSULT NOTE ADULT - SUBJECTIVE AND OBJECTIVE BOX
NEPHROLOGY CONSULTATION NOTE    Patient is a 68y Male whom presented to the hospital with     PAST MEDICAL & SURGICAL HISTORY:  Hypothyroidism  PVD (peripheral vascular disease)  History of hyperprolactinemia  Hepatic cirrhosis, unspecified hepatic cirrhosis type  Anemia  ICD (implantable cardioverter-defibrillator) in place: Medtronic, Model [id number] , last interrogation 2017  Sleep apnea: not using CPAP  History of osteomyelitis: 2015, right foot 2nd toe  Presence of stent in coronary artery: 2 stents  Heart failure with reduced left ventricular function: EF 37%  Ischemic cardiomyopathy  Pituitary adenoma: as per patient chronic, no changes , recently restarted follow up with endocrinologist ( note in allscripts )  Coronary artery disease  Thrombocytopenia: Chronic  HLD (hyperlipidemia)  HTN (hypertension)  DM (diabetes mellitus): type 2, Hg A1C 8.2 % ( 7/10/17)  AICD lead malfunction: history of  History of amputation: right foot, 2nd toe, osteo 2015  AICD (automatic cardioverter/defibrillator) present: placement in ()  Stented coronary artery: RCA ()  Coronary atherosclerosis of artery bypass graft: CABG X 4 ()    Allergies:  No Known Allergies    Home Medications Reviewed  Hospital Medications:   MEDICATIONS  (STANDING):  aspirin  chewable 81 milliGRAM(s) Oral daily  atorvastatin 40 milliGRAM(s) Oral at bedtime  carvedilol 25 milliGRAM(s) Oral every 12 hours  ceFAZolin   IVPB 1000 milliGRAM(s) IV Intermittent every 8 hours  furosemide    Tablet 40 milliGRAM(s) Oral daily  insulin glargine Injectable (LANTUS) 5 Unit(s) SubCutaneous at bedtime  insulin lispro (HumaLOG) corrective regimen sliding scale   SubCutaneous three times a day before meals  insulin lispro (HumaLOG) corrective regimen sliding scale   SubCutaneous at bedtime  insulin lispro Injectable (HumaLOG) 3 Unit(s) SubCutaneous three times a day before meals  levothyroxine 125 MICROGram(s) Oral daily    SOCIAL HISTORY:  Denies ETOH,Smoking,   FAMILY HISTORY:  Family history of MI (myocardial infarction) (Father, Mother)      REVIEW OF SYSTEMS:  CONSTITUTIONAL: No weakness, fevers or chills  EYES/ENT: No visual changes;  No vertigo or throat pain   NECK: No pain or stiffness  RESPIRATORY: No cough, wheezing, hemoptysis; No shortness of breath  CARDIOVASCULAR: No chest pain or palpitations.  GASTROINTESTINAL: No abdominal or epigastric pain. No nausea, vomiting, or hematemesis; No diarrhea or constipation. No melena or hematochezia.  GENITOURINARY: No dysuria, frequency, foamy urine, urinary urgency, incontinence or hematuria  NEUROLOGICAL: No numbness or weakness  SKIN: No itching, burning, rashes, or lesions   VASCULAR: No bilateral lower extremity edema.   All other review of systems is negative unless indicated above.    VITALS:  T(F): 98.2 (18 @ 13:19), Max: 98.2 (18 @ 09:20)  HR: 78 (18 @ 13:19)  BP: 96/60 (18 @ 13:19)  RR: 18 (18 @ 13:19)  SpO2: 97% (18 @ 13:19)  Wt(kg): --     @ 07:01  -   @ 07:00  --------------------------------------------------------  IN: 860 mL / OUT: 1225 mL / NET: -365 mL     @ 07:01  -   @ 15:24  --------------------------------------------------------  IN: 620 mL / OUT: 600 mL / NET: 20 mL        PHYSICAL EXAM:  Constitutional: NAD  HEENT: anicteric sclera, oropharynx clear, MMM  Neck: No JVD  Respiratory: CTAB, no wheezes, rales or rhonchi  Cardiovascular: S1, S2, RRR  Gastrointestinal: BS+, soft, NT/ND  Extremities: No cyanosis or clubbing. No peripheral edema  Neurological: A/O x 3, no focal deficits  Psychiatric: Normal mood, normal affect  : No CVA tenderness. No mckinnon.   Skin: No rashes  Vascular Access:    LABS:      130<L>  |  94<L>  |  70<H>  ----------------------------<  141<H>  3.6   |  22  |  2.36<H>    Ca    8.3<L>      22 May 2018 06:45  Phos  3.1     05-21  Mg     2.2     05-      Creatinine Trend: 2.36 <--, 1.95 <--, 1.95 <--, 2.36 <--, 2.53 <--, 2.78 <--                        9.5    6.1   )-----------( 62       ( 22 May 2018 06:45 )             28.6     Urine Studies:  Urinalysis Basic - ( 18 May 2018 18:41 )    Color: Yellow / Appearance: Clear / S.017 / pH:   Gluc:  / Ketone: Negative  / Bili: Negative / Urobili: Negative   Blood:  / Protein: Trace / Nitrite: Negative   Leuk Esterase: Negative / RBC:  / WBC    Sq Epi:  / Non Sq Epi:  / Bacteria:                 RADIOLOGY & ADDITIONAL STUDIES: NEPHROLOGY CONSULTATION NOTE    Patient is a 68y Male whom presented to the hospital with R knee pain, sepsis, hypotension    69 yo M with PMHx of ICM EF 40%, CAD s/p CABG, stents, HTN, DM II, HLD, pituitary tumor, atrial fibrillation s/p PPM, AICD p/w two days of left knee pain and fevers as high as 102.2. The patient went to an outpatient orthopaedic surgeon who sent him into the OR for further evaluation. The patient denies recent trauma to the knee or history of gout. The patient has had episodes of cellulitis in the past, but has not recently been treated. He has been able to bear weight on the knee with some difficulty.    Patient is a diabetic and per the patient his hbg A1c is 7.5. Pt states that his blood pressure has been low recently (90s systolic), which was known by his cardiologist. He has A fib on eliquis and was scheduled for cardioversion this coming .     Patient came in with R knee pain, sepsis, hypotension and was admitted to SICU. Pt is s/p R knee washout for septic knee on . Pt now transferred to medicine and needs PICC line for IV antibiotics through 18.    Today, pt is feeling better with no complaints except for R knee pain. Pt denies fevers, chills, n/v, cough, CP, SOB, abd pain, back/flank pain, diarrhea, dysuria, urinary frequency.     PAST MEDICAL & SURGICAL HISTORY:  Hypothyroidism  PVD (peripheral vascular disease)  History of hyperprolactinemia  Hepatic cirrhosis, unspecified hepatic cirrhosis type  Anemia  ICD (implantable cardioverter-defibrillator) in place: Medtronic, Model [id number] , last interrogation 2017  Sleep apnea: not using CPAP  History of osteomyelitis: 2015, right foot 2nd toe  Presence of stent in coronary artery: 2 stents  Heart failure with reduced left ventricular function: EF 37%  Ischemic cardiomyopathy  Pituitary adenoma: as per patient chronic, no changes , recently restarted follow up with endocrinologist ( note in allscripts )  Coronary artery disease  Thrombocytopenia: Chronic  HLD (hyperlipidemia)  HTN (hypertension)  DM (diabetes mellitus): type 2, Hg A1C 8.2 % ( 7/10/17)  AICD lead malfunction: history of  History of amputation: right foot, 2nd toe, osteo 2015  AICD (automatic cardioverter/defibrillator) present: placement in ()  Stented coronary artery: RCA ()  Coronary atherosclerosis of artery bypass graft: CABG X 4 ()    Allergies:  No Known Allergies    Home Medications Reviewed  Hospital Medications:   MEDICATIONS  (STANDING):  aspirin  chewable 81 milliGRAM(s) Oral daily  atorvastatin 40 milliGRAM(s) Oral at bedtime  carvedilol 25 milliGRAM(s) Oral every 12 hours  ceFAZolin   IVPB 1000 milliGRAM(s) IV Intermittent every 8 hours  furosemide    Tablet 40 milliGRAM(s) Oral daily  insulin glargine Injectable (LANTUS) 5 Unit(s) SubCutaneous at bedtime  insulin lispro (HumaLOG) corrective regimen sliding scale   SubCutaneous three times a day before meals  insulin lispro (HumaLOG) corrective regimen sliding scale   SubCutaneous at bedtime  insulin lispro Injectable (HumaLOG) 3 Unit(s) SubCutaneous three times a day before meals  levothyroxine 125 MICROGram(s) Oral daily    SOCIAL HISTORY:  Denies ETOH,Smoking,   FAMILY HISTORY:  Family history of MI (myocardial infarction) (Father, Mother)      REVIEW OF SYSTEMS:  CONSTITUTIONAL: No weakness, fevers or chills  EYES/ENT: No visual changes;  No vertigo or throat pain   NECK: No pain or stiffness  RESPIRATORY: No cough, wheezing, hemoptysis; No shortness of breath  CARDIOVASCULAR: No chest pain or palpitations.  GASTROINTESTINAL: No abdominal or epigastric pain. No nausea, vomiting, or hematemesis; No diarrhea or constipation. No melena or hematochezia.  GENITOURINARY: No dysuria, frequency, foamy urine, urinary urgency, incontinence or hematuria  NEUROLOGICAL: No numbness or weakness  SKIN: +dry skin b/l LE. No itching, burning, rashes, or lesions   VASCULAR: +pedal edema  All other review of systems is negative unless indicated above.    VITALS:  T(F): 98.2 (18 @ 13:19), Max: 98.2 (18 @ 09:20)  HR: 78 (18 @ 13:19)  BP: 96/60 (18 @ 13:19)  RR: 18 (18 @ 13:19)  SpO2: 97% (18 @ 13:19)  Wt(kg): --     @ 07:01  -   @ 07:00  --------------------------------------------------------  IN: 860 mL / OUT: 1225 mL / NET: -365 mL     @ 07:01  -   @ 15:24  --------------------------------------------------------  IN: 620 mL / OUT: 600 mL / NET: 20 mL        PHYSICAL EXAM:  Constitutional: NAD, middle aged man sitting comfortably in bed  HEENT: PERRL, anicteric sclera, no conjunctival pallor oropharynx clear, MMM  Neck: supple, no lymphadenopathy  Respiratory: CTAB, no wheezes, rales or rhonchi  Cardiovascular: RRR, no murmurs, rubs, gallops  Gastrointestinal: BS+, soft, obese distention, NT  Extremities: No cyanosis or clubbing. 2+ pedal edema b/l  Psychiatric: AOx3, Normal mood, normal affect. in good spirits  : No CVA tenderness. No mckinnon.   Skin: No rashes  Vascular Access: PIVs in b/l forearms    LABS:      130<L>  |  94<L>  |  70<H>  ----------------------------<  141<H>  3.6   |  22  |  2.36<H>    Ca    8.3<L>      22 May 2018 06:45  Phos  3.1       Mg     2.2           Creatinine Trend: 2.36 <--, 1.95 <--, 1.95 <--, 2.36 <--, 2.53 <--, 2.78 <--                        9.5    6.1   )-----------( 62       ( 22 May 2018 06:45 )             28.6     Urine Studies:  Urinalysis Basic - ( 18 May 2018 18:41 )    Color: Yellow / Appearance: Clear / S.017 / pH:   Gluc:  / Ketone: Negative  / Bili: Negative / Urobili: Negative   Blood:  / Protein: Trace / Nitrite: Negative   Leuk Esterase: Negative / RBC:  / WBC    Sq Epi:  / Non Sq Epi:  / Bacteria:                 RADIOLOGY & ADDITIONAL STUDIES: NEPHROLOGY CONSULTATION NOTE    Patient is a 68y Male whom presented to the hospital with R knee pain, sepsis, hypotension    69 yo M with PMHx of ICM EF 40%, CAD s/p CABG, stents, HTN, DM II, HLD, pituitary tumor, atrial fibrillation s/p PPM, AICD p/w two days of left knee pain and fevers as high as 102.2. The patient went to an outpatient orthopaedic surgeon who sent him into the OR for further evaluation. The patient denies recent trauma to the knee or history of gout. The patient has had episodes of cellulitis in the past, but has not recently been treated. He has been able to bear weight on the knee with some difficulty.    Patient is a diabetic and per the patient his hbg A1c is 7.5. Pt states that his blood pressure has been low recently (90s systolic), which was known by his cardiologist. He has A fib on eliquis and was scheduled for cardioversion this coming .     Patient came in with R knee pain, sepsis, hypotension and was admitted to SICU. Pt is s/p R knee washout for septic knee on . Pt now transferred to medicine and needs PICC line for IV antibiotics through 18.   Patient does not have a nephrologist.   Today, pt is feeling better with no complaints except for R knee pain. Pt denies fevers, chills, n/v, cough, CP, SOB, abd pain, back/flank pain, diarrhea, dysuria, urinary frequency.     PAST MEDICAL & SURGICAL HISTORY:  Hypothyroidism  PVD (peripheral vascular disease)  History of hyperprolactinemia  Hepatic cirrhosis, unspecified hepatic cirrhosis type  Anemia  ICD (implantable cardioverter-defibrillator) in place: Medtronic, Model [id number] , last interrogation 2017  Sleep apnea: not using CPAP  History of osteomyelitis: 2015, right foot 2nd toe  Presence of stent in coronary artery: 2 stents  Heart failure with reduced left ventricular function: EF 37%  Ischemic cardiomyopathy  Pituitary adenoma: as per patient chronic, no changes , recently restarted follow up with endocrinologist ( note in allscripts )  Coronary artery disease  Thrombocytopenia: Chronic  HLD (hyperlipidemia)  HTN (hypertension)  DM (diabetes mellitus): type 2, Hg A1C 8.2 % ( 7/10/17)  AICD lead malfunction: history of  History of amputation: right foot, 2nd toe, osteo 2015  AICD (automatic cardioverter/defibrillator) present: placement in ()  Stented coronary artery: RCA ()  Coronary atherosclerosis of artery bypass graft: CABG X 4 ()    Allergies:  No Known Allergies    Home Medications Reviewed  Hospital Medications:   MEDICATIONS  (STANDING):  aspirin  chewable 81 milliGRAM(s) Oral daily  atorvastatin 40 milliGRAM(s) Oral at bedtime  carvedilol 25 milliGRAM(s) Oral every 12 hours  ceFAZolin   IVPB 1000 milliGRAM(s) IV Intermittent every 8 hours  furosemide    Tablet 40 milliGRAM(s) Oral daily  insulin glargine Injectable (LANTUS) 5 Unit(s) SubCutaneous at bedtime  insulin lispro (HumaLOG) corrective regimen sliding scale   SubCutaneous three times a day before meals  insulin lispro (HumaLOG) corrective regimen sliding scale   SubCutaneous at bedtime  insulin lispro Injectable (HumaLOG) 3 Unit(s) SubCutaneous three times a day before meals  levothyroxine 125 MICROGram(s) Oral daily    SOCIAL HISTORY:  Denies ETOH,Smoking,   FAMILY HISTORY:  Family history of MI (myocardial infarction) (Father, Mother)      REVIEW OF SYSTEMS:  CONSTITUTIONAL: No weakness, fevers or chills  EYES/ENT: No visual changes;  No vertigo or throat pain   NECK: No pain or stiffness  RESPIRATORY: No cough, wheezing, hemoptysis; No shortness of breath  CARDIOVASCULAR: No chest pain or palpitations.  GASTROINTESTINAL: No abdominal or epigastric pain. No nausea, vomiting, or hematemesis; No diarrhea or constipation. No melena or hematochezia.  GENITOURINARY: No dysuria, frequency, foamy urine, urinary urgency, incontinence or hematuria  NEUROLOGICAL: No numbness or weakness  SKIN: +dry skin b/l LE. No itching, burning, rashes, or lesions   VASCULAR: +pedal edema  All other review of systems is negative unless indicated above.    VITALS:  T(F): 98.2 (18 @ 13:19), Max: 98.2 (18 @ 09:20)  HR: 78 (18 @ 13:19)  BP: 96/60 (18 @ 13:19)  RR: 18 (18 @ 13:19)  SpO2: 97% (18 @ 13:19)  Wt(kg): --     @ 07: @ 07:00  --------------------------------------------------------  IN: 860 mL / OUT: 1225 mL / NET: -365 mL     @ 07: @ 15:24  --------------------------------------------------------  IN: 620 mL / OUT: 600 mL / NET: 20 mL        PHYSICAL EXAM:  Constitutional: NAD, middle aged man sitting comfortably in bed  HEENT: PERRL, anicteric sclera, no conjunctival pallor oropharynx clear, MMM  Neck: supple, no lymphadenopathy  Respiratory: CTAB, no wheezes, rales or rhonchi  Cardiovascular: RRR, no murmurs, rubs, gallops  Gastrointestinal: BS+, soft, obese distention, NT  Extremities: No cyanosis or clubbing. 2+ pedal edema b/l  Psychiatric: AOx3, Normal mood, normal affect. in good spirits  : No CVA tenderness. No mckinnon.   Skin: No rashes  Vascular Access: PIVs in b/l forearms    LABS:      130<L>  |  94<L>  |  70<H>  ----------------------------<  141<H>  3.6   |  22  |  2.36<H>    Ca    8.3<L>      22 May 2018 06:45  Phos  3.1       Mg     2.2           Creatinine Trend: 2.36 <--, 1.95 <--, 1.95 <--, 2.36 <--, 2.53 <--, 2.78 <--                        9.5    6.1   )-----------( 62       ( 22 May 2018 06:45 )             28.6     Urine Studies:  Urinalysis Basic - ( 18 May 2018 18:41 )    Color: Yellow / Appearance: Clear / S.017 / pH:   Gluc:  / Ketone: Negative  / Bili: Negative / Urobili: Negative   Blood:  / Protein: Trace / Nitrite: Negative   Leuk Esterase: Negative / RBC:  / WBC    Sq Epi:  / Non Sq Epi:  / Bacteria:                 RADIOLOGY & ADDITIONAL STUDIES:

## 2018-05-22 NOTE — PROGRESS NOTE ADULT - PROBLEM SELECTOR PLAN 3
improving. likely due to pre-renal/hypotension/sepsis on admission.   Baseline Cr around 1.6 last year. CKD3.   Cr on admission 2.78 now 1.95 today.   Monitor strict I+Os and labs. improving. likely due to pre-renal/hypotension/sepsis on admission.   Baseline Cr around 1.6 last year. CKD3.   Monitor strict I+Os and labs.

## 2018-05-22 NOTE — PROGRESS NOTE ADULT - PROBLEM SELECTOR PLAN 1
Septic bursitis with MSSA on fluid culture.   Plan for drain removal tomorrow as per surg.   Currently on Zosyn since 5/18 likely will require 2-3 weeks of abx course.   Will consult ID for appropriate abx and duration as outpt.   Pain control on oxycodone. bowel regiment.  PT eval ordered. Septic bursitis with MSSA on fluid culture. s/p drain removal by ortho this morning.   Currently on Zosyn since 5/18 likely will require 3 weeks of abx course.   Discussed care with Dr. Albert - pt will need total 3 weeks of abx since surgery. Will order PICC line and will dc home on ancef.  Pain control on oxycodone. bowel regiment.  PT eval ordered.

## 2018-05-22 NOTE — PROGRESS NOTE ADULT - SUBJECTIVE AND OBJECTIVE BOX
Patient is a 68y old  Male who presents with a chief complaint of Right knee pain (19 May 2018 00:38)      SUBJECTIVE / OVERNIGHT EVENTS:  no acute events overnight, vss, afebrile  Pt reports that his right knee pain is better  s/p removal of drain this morning by ortho  He wants to go home    ROS:  14 point ROS negative in detail except stated as above    MEDICATIONS  (STANDING):  aspirin  chewable 81 milliGRAM(s) Oral daily  atorvastatin 40 milliGRAM(s) Oral at bedtime  carvedilol 25 milliGRAM(s) Oral every 12 hours  ceFAZolin   IVPB 1000 milliGRAM(s) IV Intermittent every 8 hours  enoxaparin Injectable 40 milliGRAM(s) SubCutaneous daily  furosemide    Tablet 40 milliGRAM(s) Oral daily  insulin glargine Injectable (LANTUS) 5 Unit(s) SubCutaneous at bedtime  insulin lispro (HumaLOG) corrective regimen sliding scale   SubCutaneous three times a day before meals  insulin lispro (HumaLOG) corrective regimen sliding scale   SubCutaneous at bedtime  insulin lispro Injectable (HumaLOG) 3 Unit(s) SubCutaneous three times a day before meals  levothyroxine 125 MICROGram(s) Oral daily    MEDICATIONS  (PRN):  oxyCODONE    IR 5 milliGRAM(s) Oral every 4 hours PRN Moderate Pain (4 - 6)  oxyCODONE    IR 10 milliGRAM(s) Oral every 4 hours PRN Severe Pain (7 - 10)  polyethylene glycol 3350 17 Gram(s) Oral daily PRN Constipation      CAPILLARY BLOOD GLUCOSE      POCT Blood Glucose.: 176 mg/dL (22 May 2018 08:38)  POCT Blood Glucose.: 225 mg/dL (21 May 2018 21:24)  POCT Blood Glucose.: 193 mg/dL (21 May 2018 17:27)  POCT Blood Glucose.: 235 mg/dL (21 May 2018 12:54)    I&O's Summary    21 May 2018 07:01  -  22 May 2018 07:00  --------------------------------------------------------  IN: 860 mL / OUT: 1225 mL / NET: -365 mL    22 May 2018 07:01  -  22 May 2018 11:43  --------------------------------------------------------  IN: 240 mL / OUT: 600 mL / NET: -360 mL        PHYSICAL EXAM:  Vital Signs Last 24 Hrs  T(C): 36.8 (22 May 2018 09:20), Max: 36.8 (21 May 2018 13:29)  T(F): 98.2 (22 May 2018 09:20), Max: 98.3 (21 May 2018 13:29)  HR: 71 (22 May 2018 09:20) (69 - 112)  BP: 92/60 (22 May 2018 09:20) (90/56 - 98/63)  BP(mean): --  RR: 18 (22 May 2018 09:20) (17 - 18)  SpO2: 97% (22 May 2018 09:20) (96% - 99%)    GENERAL: NAD, well-developed  HEAD:  Atraumatic, Normocephalic  EYES: EOMI, PERRLA, conjunctiva and sclera clear  NECK: Supple, No JVD  CHEST/LUNG: Clear to auscultation bilaterally; No wheeze  HEART: Regular rate and rhythm; No murmurs, rubs, or gallops  ABDOMEN: Soft, Nontender, Nondistended; Bowel sounds present  EXTREMITIES:  right knee under ACE wrap; 2+ Peripheral Pulses, No clubbing, cyanosis, or edema  NEUROLOGY: AAOx3; non-focal  SKIN: No rashes or lesions    LABS:  personally reviewed                        9.5    6.1   )-----------( 62       ( 22 May 2018 06:45 )             28.6     05-22    130<L>  |  94<L>  |  70<H>  ----------------------------<  141<H>  3.6   |  22  |  2.36<H>    Ca    8.3<L>      22 May 2018 06:45  Phos  3.1     05-21  Mg     2.2     05-22      PT/INR - ( 21 May 2018 07:14 )   PT: 15.2 sec;   INR: 1.39 ratio         PTT - ( 21 May 2018 07:14 )  PTT:28.6 sec          RADIOLOGY & ADDITIONAL TESTS:    Imaging Personally Reviewed:    Consultant(s) Notes Reviewed:  ortho, card, ID    Care Discussed with Consultants/Other Providers:

## 2018-05-22 NOTE — CONSULT NOTE ADULT - CONSULT REASON
Clearance for PICC Line placement
Pre-operative evaluation
Hypotension
MSSA septic bursitis
Septic Arthritis/Sepsis
transfer to medicine service

## 2018-05-22 NOTE — PROGRESS NOTE ADULT - SUBJECTIVE AND OBJECTIVE BOX
68y old  Male who presents with a chief complaint of Right knee pain (19 May 2018 00:38)      Interval history:  Afebrile, no cough, no SOB, no N/V/D, no abdominal pain, denies dysuria, pain in the knee improved. s/p removal of drain.       No Known Allergies      Antimicrobials:  ceFAZolin   IVPB 1000 milliGRAM(s) IV Intermittent every 8 hours      REVIEW OF SYSTEMS:  As above.     Vital Signs Last 24 Hrs  T(C): 36.7 (05-22-18 @ 16:15), Max: 36.8 (05-22-18 @ 09:20)  T(F): 98.1 (05-22-18 @ 16:15), Max: 98.2 (05-22-18 @ 09:20)  HR: 66 (05-22-18 @ 16:15) (66 - 78)  BP: 93/58 (05-22-18 @ 16:15) (90/56 - 97/62)  RR: 18 (05-22-18 @ 16:15) (18 - 18)  SpO2: 98% (05-22-18 @ 16:15) (96% - 99%)      PHYSICAL EXAM:  Patient in no acute distress. AAOX3.  No icterus, no oral ulcers.  Cardiovascular: S1S2 normal. +murmur.   Lungs: + air entry B/L lung fields.  Gastrointestinal: soft, nontender, nondistended.  Extremities: rt knee with dressing, able to bend almost 90 degrees.   IV sites not inflamed.                           9.5    6.1   )-----------( 62       ( 22 May 2018 06:45 )             28.6   05-22    130<L>  |  94<L>  |  70<H>  ----------------------------<  141<H>  3.6   |  22  |  2.36<H>    Ca    8.3<L>      22 May 2018 06:45  Phos  3.1     05-21  Mg     2.2     05-22          Radiology:  < from: Xray Chest 1 View- PORTABLE-Routine (05.21.18 @ 08:29) >  Impression:    The heart is normal in size. The lungs are clear. No radiographic   evidence for congestive heart failure. A pacer is in good position.   Status post sternotomy.

## 2018-05-22 NOTE — CONSULT NOTE ADULT - PROBLEM SELECTOR RECOMMENDATION 2
Not rate controlled yet. On Coreg 25mg BID now.   Had Coreg dose lowered due to chronically low BP as outpt. (SBP 80-90s)  Previously to hospitalization planned for cardioversion this week.   Seen and followed by card team, cardioversion deferred to later time.   Holding Eliquis until cleared from surg team.
JULIANNE on CKD stage 3 likely prerenal 2/2 hypotension and sepsis. Cr baseline ranges from 1.33-2.0 in past few months.  - check urine urea and urine creatinine  - monitor BMP, avoid nephrotoxins

## 2018-05-22 NOTE — PHYSICAL THERAPY INITIAL EVALUATION ADULT - PLANNED THERAPY INTERVENTIONS, PT EVAL
strengthening/gait training/transfer training/balance training/bed mobility training strengthening/transfer training/Stair Negotiation Training: GOAL- Patient will ascend/descend 1 flight of stairs independently with single railing independently in 2 weeks./balance training/gait training/bed mobility training

## 2018-05-22 NOTE — PROGRESS NOTE ADULT - SUBJECTIVE AND OBJECTIVE BOX
No acute events overnight. Pain well controlled with pain medications. Patient states knee pain is improving    Vital Signs Last 24 Hrs  T(C): 36.3 (22 May 2018 05:18), Max: 36.8 (21 May 2018 09:55)  T(F): 97.4 (22 May 2018 05:18), Max: 98.3 (21 May 2018 09:55)  HR: 71 (22 May 2018 05:18) (69 - 115)  BP: 97/62 (22 May 2018 05:18) (90/56 - 98/63)  BP(mean): --  RR: 18 (22 May 2018 05:18) (17 - 18)  SpO2: 97% (22 May 2018 05:18) (96% - 99%)    Exam:  Gen: NAD  Motor: EHL/FHL/TA/Gastrocnemius intact  Sensory: SILT DP/SP/S/S/T nerve distributions  R Knee wound improving, Penrose drain removed    A/P: 68 year old male with R prepatellar bursitis  - Pain Control  - ABx per primary team  - WBAT  - PT/OT  - No acute orthopedic intervention at this time

## 2018-05-22 NOTE — PHYSICAL THERAPY INITIAL EVALUATION ADULT - IMPAIRMENTS CONTRIBUTING TO GAIT DEVIATIONS, PT EVAL
narrow base of support/decreased strength/Pt fairly steady with rolling walker, 0 LOB while ambulating./impaired balance

## 2018-05-22 NOTE — CONSULT NOTE ADULT - ATTENDING COMMENTS
68 year old man with long history of ischemic cardiomyopathy, ICD, [place] lead fracture and shocks, lead replaced. Recent months with atrial tachycardia rates controlled on carvedilol, but blood pressure at times low leading to decrease carvedilol dose then heart rate too fast. Because of chronic thrombocytopenia have been reluctant to anticoagulate and cardiovert, but in recent months decompensated with congestive heart failure, volume overload and needed aggressive diuresis. Thus recently started apixaban and scheduled TE cardioversion next week.     However now admitted with septic knee. Heart failure is in fact compensated and tolerated fluid bolus for sepsis protocol and low blood pressure, rhythm is stable atrial tachycardia heart rates around 100. He is medically optimized to go to OR for washout of knee and can be off anticoagulation until orthopedic deems safe to resume. Attempt to maintain on carvedilol balancing heart rate ideally not greater than 100) and blood pressure recognizing his normal is 95 to 110.    Dr. Jakob Willett will be seeing patient this weekend - [phone]. Cardiology Fellow also aware of patient [id number].
Stef Albert  Pager: [phone]. If no response or past 5 pm call [phone].
Patient seen and examined with SICU team on 5/19/18.  Patient with extensive PMHx (Ischemic CM (EF 40%, s/p CAGB), DM, HLD, a.fib., PPM, PVD, AICD, sleep apnea (does not use CPAP), thrombocytopenia) now presents with hypotension (SBP in 70's in ED) and likely septic knee.  Brought to SICU for resuscitation of possible septic shock  On arrival to SICU, SBP in low 80's  Awake, alert  Oxygenating well  + swollen & painful knee    - Started on broad spectrum antibiotics, culture taken in ED, for OR with orthopedics for wash out of knee  - Diuretics / ACE / Beta blockers held, careful fluid resuscitation  - SBP increasing overnight  - Cardiology consult appreciated  - 35 minutes critical care time on 5/18/18    -
Septic knee, CKD evaluation for PICC  1.  CKD--has significant lifetime ESRD risk but modest over next 5 years.  As outlined above benefit >risk particularly given significant infection.  Needs w/u including renal sono/art dopplers, repeat P/Cr and possible serologic w/u.  Needs f/u outpt with a nephrologist  2.  Proteinuria--likely benefit from ACEI or ARB under nephrology supervision  3.  DM2--needs tight control to optimize healing of infected joint

## 2018-05-22 NOTE — PHYSICAL THERAPY INITIAL EVALUATION ADULT - PRECAUTIONS/LIMITATIONS, REHAB EVAL
pertinent hx continued... Arthrocentesis was performed, with fluid analysis concerning for septic arthritis. Additionally, patient was tachycardic to the 100s and hypotensive w/ SBP in the 70s. In the ED, he was given 3 L of crystalloid, vancomycin 2 g, ceftriaxone 1 g, and 1 unit of platelets. SICU consulted for hemodynamic monitoring. Of note, patient was being anticoagulated for a scheduled cardioversion on 5/23/2018 and last took his Eliquis on 5/17/2018. His normal SBP is . pertinent hx continued... Arthrocentesis was performed, with fluid analysis concerning for septic arthritis. Additionally, patient was tachycardic to the 100s and hypotensive w/ SBP in the 70s. In the ED, he was given 3 L of crystalloid, vancomycin 2 g, ceftriaxone 1 g, and 1 unit of platelets. SICU consulted for hemodynamic monitoring. Of note, patient was being anticoagulated for a scheduled cardioversion on 5/23/2018 and last took his Eliquis on 5/17/2018. His normal SBP is .   presented with right knee prepatellar bursitis s/p I+D, drainage on 5/19 course c/b NSVT, afib RVR, JULIANNE. Pt s/p I & D of R prepatellar bursitis 5/19/18. pertinent hx continued... Arthrocentesis was performed, with fluid analysis concerning for septic arthritis. Additionally, patient was tachycardic to the 100s and hypotensive w/ SBP in the 70s. In the ED, he was given 3 L of crystalloid, vancomycin 2 g, ceftriaxone 1 g, and 1 unit of platelets. SICU consulted for hemodynamic monitoring. Of note, patient was being anticoagulated for a scheduled cardioversion on 5/23/2018 and last took his Eliquis on 5/17/2018. His normal SBP is .   presented with right knee prepatellar bursitis s/p I+D, drainage on 5/19 course c/b NSVT, afib RVR, JULIANNE. Pt s/p I & D of R prepatellar bursitis 5/19/18./fall precautions

## 2018-05-22 NOTE — CONSULT NOTE ADULT - PROBLEM SELECTOR RECOMMENDATION 3
improving. likely due to pre-renal/hypotension/sepsis on admission.   Baseline Cr around 1.6 last year. CKD3.   Cr on admission 2.78 now 1.95 today.   Monitor strict I+Os and labs.
Mild hyponatremia, worsening to 130 from 132.   - check urine electrolytes, urine osm, serum osm  - check AM cortisol, TSH

## 2018-05-22 NOTE — PROGRESS NOTE ADULT - SUBJECTIVE AND OBJECTIVE BOX
HPI:  68M with PMHx of  PMHx of ICM EF 40%, CABG, HTN, DM II, HLD, pituitary tumor, atrial fibrillation s/p PPM, AICD p/w two days of left knee pain and fevers as high as 102.2. The patient went to an outpatient orthopaedic surgeon who sent him into the OR for further evaluation. The patient denies recent trauma to the knee or history of gout. The patient has had episodes of cellulitis in the past, but has not recently been treated. He has been able to bear weight on the knee with some difficulty.     Patient is a diabetic and per the patient his hbg A1c is in the mid-7s    Pt states that his blood pressure has been low recently (90s systolic), which was known by his cardiologist. He has a fib and was scheduled for cardioversion this coming weds. (19 May 2018 00:38)    Medications:  aspirin  chewable 81 milliGRAM(s) Oral daily  atorvastatin 40 milliGRAM(s) Oral at bedtime  carvedilol 25 milliGRAM(s) Oral every 12 hours  ceFAZolin   IVPB 1000 milliGRAM(s) IV Intermittent every 8 hours  enoxaparin Injectable 40 milliGRAM(s) SubCutaneous daily  furosemide    Tablet 40 milliGRAM(s) Oral daily  insulin glargine Injectable (LANTUS) 5 Unit(s) SubCutaneous at bedtime  insulin lispro (HumaLOG) corrective regimen sliding scale   SubCutaneous three times a day before meals  insulin lispro (HumaLOG) corrective regimen sliding scale   SubCutaneous at bedtime  insulin lispro Injectable (HumaLOG) 3 Unit(s) SubCutaneous three times a day before meals  levothyroxine 125 MICROGram(s) Oral daily  oxyCODONE    IR 5 milliGRAM(s) Oral every 4 hours PRN  oxyCODONE    IR 10 milliGRAM(s) Oral every 4 hours PRN  polyethylene glycol 3350 17 Gram(s) Oral daily PRN    PMH/PSH/FH/SH:  Unchanged    Vitals:  T(C): 36.8 (05-22-18 @ 09:20), Max: 36.8 (05-21-18 @ 09:55)  HR: 71 (05-22-18 @ 09:20) (69 - 115)  BP: 92/60 (05-22-18 @ 09:20) (90/56 - 98/63)  RR: 18 (05-22-18 @ 09:20) (17 - 18)  SpO2: 97% (05-22-18 @ 09:20) (96% - 99%)      EXAM:  GENERAL:  Alert, no distress  HEENT: Normocephalic, EOM intact, PERRLA  NECK: Normal carotid upstrokes, no bruit; No JVD  CHEST:  Clear to auscultation  HEART: PMI not displaced. Normal S1 and S2.  No murmur, rub or gallop.  ABDOMEN: Normal bowel sounds, no bruit. Soft, non-tender.  Liver and spleen not palpable; aorta not palpable.  EXT:  No edema, no varicosities, 2+ pulses in upper and lower extremities.  PSYCH:  A&Ox3, normal insight, no anxiety  NEURO: Non-focal  SKIN:  No rash         I&O's Summary    21 May 2018 07:01  -  22 May 2018 07:00  --------------------------------------------------------  IN: 860 mL / OUT: 1225 mL / NET: -365 mL      Labs:                        9.5    6.1   )-----------( 62       ( 22 May 2018 06:45 )             28.6       05-22    130<L>  |  94<L>  |  70<H>  ----------------------------<  141<H>  3.6   |  22  |  2.36<H>    Ca    8.3<L>      22 May 2018 06:45  Phos  3.1     05-21  Mg     2.2     05-22    PT/INR - ( 21 May 2018 07:14 )   PT: 15.2 sec;   INR: 1.39 ratio    PTT - ( 21 May 2018 07:14 )  PTT:28.6 sec    Serum Pro-Brain Natriuretic Peptide: 4700 pg/mL (05-19-18 @ 03:53)        Assessment		  68 year old male with PMHx of ICM EF 40%, CABG, HTN, DM II, HLD, pituitary tumor, atrial fibrillation s/p PPM  Right knee pain and swelling planned for R knee washout for septic joint.    #ICM EF 40% s/p ICD  -- continue Coreg 25 mg  -- continue Lasix  -- continue asa    #AFib  -- resume eliquis when possible post-op  -- will defer planned cardioversion    #HLD  -- continue atorvastatin    General Cardiology will continue to follow.    MD Charly Nam M.D.    Cardiology Consult Service  [phone] or [phone] Alert, no distress.  Tells me that knee drain was removed yesterday.  Feeling better overall; reports no cardiac sxs.  Hopes to ambulate with assistance of physical therapist later today.      Medications:  aspirin  chewable 81 milliGRAM(s) Oral daily  atorvastatin 40 milliGRAM(s) Oral at bedtime  carvedilol 25 milliGRAM(s) Oral every 12 hours  ceFAZolin   IVPB 1000 milliGRAM(s) IV Intermittent every 8 hours  enoxaparin Injectable 40 milliGRAM(s) SubCutaneous daily  furosemide    Tablet 40 milliGRAM(s) Oral daily  insulin glargine Injectable (LANTUS) 5 Unit(s) SubCutaneous at bedtime  insulin lispro (HumaLOG) corrective regimen sliding scale   SubCutaneous three times a day before meals  insulin lispro (HumaLOG) corrective regimen sliding scale   SubCutaneous at bedtime  insulin lispro Injectable (HumaLOG) 3 Unit(s) SubCutaneous three times a day before meals  levothyroxine 125 MICROGram(s) Oral daily  oxyCODONE    IR 5 milliGRAM(s) Oral every 4 hours PRN  oxyCODONE    IR 10 milliGRAM(s) Oral every 4 hours PRN  polyethylene glycol 3350 17 Gram(s) Oral daily PRN    PMH/PSH/FH/SH:  Unchanged    ROS:  General: no fatigue/malaise, weight loss/gain.  Skin: no rashes.  Ophthalmologic: no blurred vision, no loss of vision. 	  Respiratory: no SOB, cough or wheeze.  Cardiovascular: no chest pain, dyspnea, palpitations, orthopnea or paroxysmal nocturnal dyspnea. No claudication.  Gastrointestinal:  no N/V/D, no melena/hematemesis/hematochezia.  Genitourinary: no dysuria/hesitancy or hematuria.  Neurological: no changes in vision or hearing, no lightheadedness/dizziness, no syncope/near syncope	  Psychiatric: no unusual stress/anxiety.   Hematology/Lymphatics: no unusual bleeding, bruising and no lymphadenopathy.  Endocrine: no unusual thirst.        Vitals:  T(C): 36.8 (05-22-18 @ 09:20), Max: 36.8 (05-21-18 @ 09:55)  HR: 71 (05-22-18 @ 09:20) (69 - 115)  BP: 92/60 (05-22-18 @ 09:20) (90/56 - 98/63)  RR: 18 (05-22-18 @ 09:20) (17 - 18)  SpO2: 97% (05-22-18 @ 09:20) (96% - 99%)      EXAM:  GENERAL:  Alert, no distress  HEENT: Normocephalic, EOM intact, PERRLA  NECK: Normal carotid upstrokes, no bruit; No JVD  CHEST:  Clear to auscultation  HEART: PMI not displaced. Normal S1 and S2.  No murmur, rub or gallop.  ABDOMEN: Normal bowel sounds, no bruit. Soft, non-tender.  Liver and spleen not palpable; aorta not palpable.  EXT:  No edema, no varicosities, 2+ pulses in upper and lower extremities.  PSYCH:  A&Ox3, normal insight, no anxiety  NEURO: Non-focal  SKIN:  No rash       TELE:  A. fib with moderate VR; occ. VPCs.      I&O's Summary    21 May 2018 07:01  -  22 May 2018 07:00  --------------------------------------------------------  IN: 860 mL / OUT: 1225 mL / NET: -365 mL      Labs:                        9.5    6.1   )-----------( 62       ( 22 May 2018 06:45 )             28.6       05-22    130<L>  |  94<L>  |  70<H>  ----------------------------<  141<H>  3.6   |  22  |  2.36<H>    Ca    8.3<L>      22 May 2018 06:45  Phos  3.1     05-21  Mg     2.2     05-22    PT/INR - ( 21 May 2018 07:14 )   PT: 15.2 sec;   INR: 1.39 ratio    PTT - ( 21 May 2018 07:14 )  PTT:28.6 sec    Serum Pro-Brain Natriuretic Peptide: 4700 pg/mL (05-19-18 @ 03:53)        Assessment		  68 year old male with PMHx of ICM EF 40%, CABG, HTN, DM II, HLD, pituitary tumor, atrial fibrillation s/p ICD  Right knee pain and swelling due to Staph infection, s/p washout of septic joint.  Remains on antibx.    #ICM EF 40% s/p ICD  -- continue Coreg 25 mg bid  -- continue Lasix 40 mg qd  -- continue asa    #AFib  -- resume Eliquis when possible post-op  -- Previously planned cardioversion deferred for now.    #HLD  -- continue atorvastatin      Charly Major M.D.  Office: [phone]  Beeper: [phone] Alert, no distress.  Tells me that knee drain was removed yesterday.  Feeling better overall; reports no cardiac sxs.  Hopes to ambulate with assistance of physical therapist later today.      Medications:  aspirin  chewable 81 milliGRAM(s) Oral daily  atorvastatin 40 milliGRAM(s) Oral at bedtime  carvedilol 25 milliGRAM(s) Oral every 12 hours  ceFAZolin   IVPB 1000 milliGRAM(s) IV Intermittent every 8 hours  enoxaparin Injectable 40 milliGRAM(s) SubCutaneous daily  furosemide    Tablet 40 milliGRAM(s) Oral daily  insulin glargine Injectable (LANTUS) 5 Unit(s) SubCutaneous at bedtime  insulin lispro (HumaLOG) corrective regimen sliding scale   SubCutaneous three times a day before meals  insulin lispro (HumaLOG) corrective regimen sliding scale   SubCutaneous at bedtime  insulin lispro Injectable (HumaLOG) 3 Unit(s) SubCutaneous three times a day before meals  levothyroxine 125 MICROGram(s) Oral daily  oxyCODONE    IR 5 milliGRAM(s) Oral every 4 hours PRN  oxyCODONE    IR 10 milliGRAM(s) Oral every 4 hours PRN  polyethylene glycol 3350 17 Gram(s) Oral daily PRN    PMH/PSH/FH/SH:  Unchanged    ROS:  General: no fatigue/malaise, weight loss/gain.  Skin: no rashes.  Ophthalmologic: no blurred vision, no loss of vision. 	  Respiratory: no SOB, cough or wheeze.  Cardiovascular: no chest pain, dyspnea, palpitations, orthopnea or paroxysmal nocturnal dyspnea. No claudication.  Gastrointestinal:  no N/V/D, no melena/hematemesis/hematochezia.  Genitourinary: no dysuria/hesitancy or hematuria.  Neurological: no changes in vision or hearing, no lightheadedness/dizziness, no syncope/near syncope	  Psychiatric: no unusual stress/anxiety.   Hematology/Lymphatics: no unusual bleeding, bruising and no lymphadenopathy.  Endocrine: no unusual thirst.        Vitals:  T(C): 36.8 (05-22-18 @ 09:20), Max: 36.8 (05-21-18 @ 09:55)  HR: 71 (05-22-18 @ 09:20) (69 - 115)  BP: 92/60 (05-22-18 @ 09:20) (90/56 - 98/63)  RR: 18 (05-22-18 @ 09:20) (17 - 18)  SpO2: 97% (05-22-18 @ 09:20) (96% - 99%)      EXAM:  GENERAL:  Alert, no distress  HEENT: Normocephalic, EOM intact, PERRLA  NECK: Normal carotid upstrokes, no bruit; No JVD  CHEST:  Clear to auscultation  HEART: PMI not displaced. Normal S1 and S2.  No murmur, rub or gallop.  ABDOMEN: Normal bowel sounds, no bruit. Soft, non-tender.  Liver and spleen not palpable; aorta not palpable.  EXT:  No edema, no varicosities, 2+ pulses in upper and lower extremities.  PSYCH:  A&Ox3, normal insight, no anxiety  NEURO: Non-focal  SKIN:  No rash       TELE:  Appears to be SR with occ. VPCs.      I&O's Summary    21 May 2018 07:01  -  22 May 2018 07:00  --------------------------------------------------------  IN: 860 mL / OUT: 1225 mL / NET: -365 mL      Labs:                        9.5    6.1   )-----------( 62       ( 22 May 2018 06:45 )             28.6       05-22    130<L>  |  94<L>  |  70<H>  ----------------------------<  141<H>  3.6   |  22  |  2.36<H>    Ca    8.3<L>      22 May 2018 06:45  Phos  3.1     05-21  Mg     2.2     05-22    PT/INR - ( 21 May 2018 07:14 )   PT: 15.2 sec;   INR: 1.39 ratio    PTT - ( 21 May 2018 07:14 )  PTT:28.6 sec    Serum Pro-Brain Natriuretic Peptide: 4700 pg/mL (05-19-18 @ 03:53)        Assessment		  68 year old male with PMHx of ICM EF 40%, CABG, HTN, DM II, HLD, pituitary tumor, atrial fibrillation s/p ICD  Right knee pain and swelling due to Staph infection, s/p washout of septic joint.  Remains on antibx.    #ICM EF 40% s/p ICD  -- continue Coreg 25 mg bid  -- continue Lasix 40 mg qd  -- continue asa    #AFib  -- resume Eliquis when possible post-op  -- Previously planned cardioversion deferred for now.    #HLD  -- continue atorvastatin      Charly Major M.D.  Office: [phone]  Beeper: [phone]

## 2018-05-22 NOTE — PROGRESS NOTE ADULT - ASSESSMENT
68M with PMHx of  PMHx of ICM EF 40%, CABG, HTN, DM II, HLD, pituitary tumor, atrial fibrillation s/p PPM, AICD p/w two days of left knee pain and fevers as high as 102.2.   Severe sepsis( fever, tachycardia, hypotension, acute on chronic renal failure) due to MSSA septic prepatellar bursitis.  S/P I& D  Resolved sepsis   Clinically improved.    Plan:  Continue with Cefazolin, given rise in Cr decreased dose to 500 mg q12h.   Will need 3 weeks of therapy total.   Recommend PICC placement.   Ortho on board.

## 2018-05-22 NOTE — CONSULT NOTE ADULT - NSHPATTENDINGPLANDISCUSS_GEN_ALL_CORE
Medicine Attending
to reach Cardiology Attending call during weekdays [facility] [phone], or off hours [phone].
hospitalist

## 2018-05-22 NOTE — PHYSICAL THERAPY INITIAL EVALUATION ADULT - DIAGNOSIS, PT EVAL
Impaired mobility/function secondary to generalized weakness, decreased balance, decreased endurance. Impaired mobility/function secondary to generalized weakness, decreased balance, decreased ROM R knee.

## 2018-05-22 NOTE — CONSULT NOTE ADULT - CONSULT REQUESTED DATE/TIME
18-May-2018 16:54
18-May-2018 18:31
18-May-2018 20:25
21-May-2018
21-May-2018 19:05
22-May-2018 15:24

## 2018-05-22 NOTE — PROGRESS NOTE ADULT - PROBLEM SELECTOR PLAN 7
will check A1c. monitor BS on SS  holding all oral agents for now.   FS elevated now. 178 to 271.   Will add low dose basal insulin for now A1C 7.5, holding all oral agents for now.   - continue lantus 5 units  - continue humalog 3 units TIDAC  - FSS +ISS

## 2018-05-22 NOTE — PROGRESS NOTE ADULT - PROBLEM SELECTOR PLAN 2
Not rate controlled yet. On Coreg 25mg BID now.   Had Coreg dose lowered due to chronically low BP as outpt. (SBP 80-90s)  Previously to hospitalization planned for cardioversion this week.   Seen and followed by card team, cardioversion deferred to later time.   Holding Eliquis until cleared from surg team.

## 2018-05-23 ENCOUNTER — APPOINTMENT (OUTPATIENT)
Dept: CV DIAGNOSITCS | Facility: HOSPITAL | Age: 68
End: 2018-05-23

## 2018-05-23 LAB
ANION GAP SERPL CALC-SCNC: 17 MMOL/L — SIGNIFICANT CHANGE UP (ref 5–17)
BUN SERPL-MCNC: 68 MG/DL — HIGH (ref 7–23)
CALCIUM SERPL-MCNC: 8.7 MG/DL — SIGNIFICANT CHANGE UP (ref 8.4–10.5)
CHLORIDE SERPL-SCNC: 92 MMOL/L — LOW (ref 96–108)
CO2 SERPL-SCNC: 21 MMOL/L — LOW (ref 22–31)
CORTIS AM PEAK SERPL-MCNC: 26.8 UG/DL — HIGH (ref 6–18.4)
CREAT ?TM UR-MCNC: 132 MG/DL — SIGNIFICANT CHANGE UP
CREAT ?TM UR-MCNC: 133 MG/DL — SIGNIFICANT CHANGE UP
CREAT SERPL-MCNC: 2.25 MG/DL — HIGH (ref 0.5–1.3)
CULTURE RESULTS: SIGNIFICANT CHANGE UP
GLUCOSE BLDC GLUCOMTR-MCNC: 160 MG/DL — HIGH (ref 70–99)
GLUCOSE BLDC GLUCOMTR-MCNC: 189 MG/DL — HIGH (ref 70–99)
GLUCOSE BLDC GLUCOMTR-MCNC: 213 MG/DL — HIGH (ref 70–99)
GLUCOSE BLDC GLUCOMTR-MCNC: 230 MG/DL — HIGH (ref 70–99)
GLUCOSE SERPL-MCNC: 160 MG/DL — HIGH (ref 70–99)
HCT VFR BLD CALC: 31.1 % — LOW (ref 39–50)
HGB BLD-MCNC: 9.7 G/DL — LOW (ref 13–17)
MAGNESIUM SERPL-MCNC: 2.3 MG/DL — SIGNIFICANT CHANGE UP (ref 1.6–2.6)
MCHC RBC-ENTMCNC: 28.8 PG — SIGNIFICANT CHANGE UP (ref 27–34)
MCHC RBC-ENTMCNC: 31.1 GM/DL — LOW (ref 32–36)
MCV RBC AUTO: 92.7 FL — SIGNIFICANT CHANGE UP (ref 80–100)
MICROALBUMIN UR-MCNC: 0.9 MG/DL — SIGNIFICANT CHANGE UP
MICROALBUMIN/CREAT UR-RTO: 7 MG/G — SIGNIFICANT CHANGE UP (ref 0–30)
ORGANISM # SPEC MICROSCOPIC CNT: SIGNIFICANT CHANGE UP
ORGANISM # SPEC MICROSCOPIC CNT: SIGNIFICANT CHANGE UP
OSMOLALITY SERPL: 297 MOS/KG — SIGNIFICANT CHANGE UP (ref 275–300)
PLATELET # BLD AUTO: 69 K/UL — LOW (ref 150–400)
POTASSIUM SERPL-MCNC: 3.4 MMOL/L — LOW (ref 3.5–5.3)
POTASSIUM SERPL-SCNC: 3.4 MMOL/L — LOW (ref 3.5–5.3)
PROT ?TM UR-MCNC: 21 MG/DL — HIGH (ref 0–12)
PROT/CREAT UR-RTO: 0.2 RATIO — SIGNIFICANT CHANGE UP (ref 0–0.2)
RBC # BLD: 3.36 M/UL — LOW (ref 4.2–5.8)
RBC # FLD: 14.8 % — HIGH (ref 10.3–14.5)
SODIUM SERPL-SCNC: 130 MMOL/L — LOW (ref 135–145)
SPECIMEN SOURCE: SIGNIFICANT CHANGE UP
TSH SERPL-MCNC: 9.71 UIU/ML — HIGH (ref 0.27–4.2)
UUN UR-MCNC: 666 MG/DL — SIGNIFICANT CHANGE UP
WBC # BLD: 5.3 K/UL — SIGNIFICANT CHANGE UP (ref 3.8–10.5)
WBC # FLD AUTO: 5.3 K/UL — SIGNIFICANT CHANGE UP (ref 3.8–10.5)

## 2018-05-23 PROCEDURE — 99232 SBSQ HOSP IP/OBS MODERATE 35: CPT

## 2018-05-23 PROCEDURE — 99233 SBSQ HOSP IP/OBS HIGH 50: CPT

## 2018-05-23 PROCEDURE — 99254 IP/OBS CNSLTJ NEW/EST MOD 60: CPT | Mod: GC

## 2018-05-23 RX ORDER — POTASSIUM CHLORIDE 20 MEQ
20 PACKET (EA) ORAL ONCE
Qty: 0 | Refills: 0 | Status: COMPLETED | OUTPATIENT
Start: 2018-05-23 | End: 2018-05-23

## 2018-05-23 RX ADMIN — Medication 125 MICROGRAM(S): at 05:35

## 2018-05-23 RX ADMIN — Medication 2: at 12:43

## 2018-05-23 RX ADMIN — CARVEDILOL PHOSPHATE 25 MILLIGRAM(S): 80 CAPSULE, EXTENDED RELEASE ORAL at 05:35

## 2018-05-23 RX ADMIN — Medication 3 UNIT(S): at 17:40

## 2018-05-23 RX ADMIN — Medication 100 MILLIGRAM(S): at 03:44

## 2018-05-23 RX ADMIN — ATORVASTATIN CALCIUM 40 MILLIGRAM(S): 80 TABLET, FILM COATED ORAL at 21:19

## 2018-05-23 RX ADMIN — Medication 4: at 17:38

## 2018-05-23 RX ADMIN — Medication 3 UNIT(S): at 12:43

## 2018-05-23 RX ADMIN — Medication 20 MILLIEQUIVALENT(S): at 17:36

## 2018-05-23 RX ADMIN — OXYCODONE HYDROCHLORIDE 10 MILLIGRAM(S): 5 TABLET ORAL at 17:46

## 2018-05-23 RX ADMIN — OXYCODONE HYDROCHLORIDE 5 MILLIGRAM(S): 5 TABLET ORAL at 06:10

## 2018-05-23 RX ADMIN — Medication 100 MILLIGRAM(S): at 17:36

## 2018-05-23 RX ADMIN — Medication 40 MILLIGRAM(S): at 05:35

## 2018-05-23 RX ADMIN — HEPARIN SODIUM 5000 UNIT(S): 5000 INJECTION INTRAVENOUS; SUBCUTANEOUS at 12:43

## 2018-05-23 RX ADMIN — INSULIN GLARGINE 5 UNIT(S): 100 INJECTION, SOLUTION SUBCUTANEOUS at 21:19

## 2018-05-23 RX ADMIN — Medication 81 MILLIGRAM(S): at 12:42

## 2018-05-23 RX ADMIN — OXYCODONE HYDROCHLORIDE 5 MILLIGRAM(S): 5 TABLET ORAL at 05:40

## 2018-05-23 RX ADMIN — HEPARIN SODIUM 5000 UNIT(S): 5000 INJECTION INTRAVENOUS; SUBCUTANEOUS at 05:34

## 2018-05-23 RX ADMIN — HEPARIN SODIUM 5000 UNIT(S): 5000 INJECTION INTRAVENOUS; SUBCUTANEOUS at 21:19

## 2018-05-23 RX ADMIN — CARVEDILOL PHOSPHATE 25 MILLIGRAM(S): 80 CAPSULE, EXTENDED RELEASE ORAL at 17:36

## 2018-05-23 RX ADMIN — OXYCODONE HYDROCHLORIDE 10 MILLIGRAM(S): 5 TABLET ORAL at 18:16

## 2018-05-23 NOTE — PROGRESS NOTE ADULT - ASSESSMENT
67 yo m with CAD, CABG, chronic systolic HF, afib, ICD, idiopathic cirrhosis, varices, thrombocytopenia, CKD3, Dm2, HLD, pit adenoma presented with right knee prepatellar bursitis s/p I+D, drainage on 5/19 course c/b NSVT, afib RVR, JULIANNE.

## 2018-05-23 NOTE — PROGRESS NOTE ADULT - PROBLEM SELECTOR PLAN 1
Septic bursitis with MSSA on fluid culture. s/p drain removal by ortho this morning.   Currently on Zosyn since 5/18 likely will require 3 weeks of abx course.   Discussed care with Dr. Albert - pt will need total 3 weeks of abx since surgery.   - pending renal clearance before PICC line insertion given her CKD  - care discussed with CM for home infusion  - Pain control on oxycodone. bowel regiment.  - continue home PT

## 2018-05-23 NOTE — PROGRESS NOTE ADULT - PROBLEM SELECTOR PLAN 2
Rate controlled. On Coreg 25mg BID now.   - cleared by surgery for resuming systemic AC (eliquis); on hold in prep for PICC line insertion  Had Coreg dose lowered due to chronically low BP as outpt. (SBP 80-90s)  Previously to hospitalization planned for cardioversion this week.   Seen and followed by card team, cardioversion deferred to later time.

## 2018-05-23 NOTE — PROGRESS NOTE ADULT - SUBJECTIVE AND OBJECTIVE BOX
Patient is a 68y old  Male who presents with a chief complaint of Right knee pain (19 May 2018 00:38)      SUBJECTIVE / OVERNIGHT EVENTS:  no acute events overnight, vss, afebrile  Pt reports that PT session went well yesterday - he had mild discomfort/pain in his right knee after exercise but no pain while sitting in the chair  He is hopeful that he can get PICC line today so he can go home soon    ROS:  14 point ROS negative in detail except stated as above    MEDICATIONS  (STANDING):  aspirin  chewable 81 milliGRAM(s) Oral daily  atorvastatin 40 milliGRAM(s) Oral at bedtime  carvedilol 25 milliGRAM(s) Oral every 12 hours  ceFAZolin   IVPB 500 milliGRAM(s) IV Intermittent every 12 hours  furosemide    Tablet 40 milliGRAM(s) Oral daily  heparin  Injectable 5000 Unit(s) SubCutaneous every 8 hours  insulin glargine Injectable (LANTUS) 5 Unit(s) SubCutaneous at bedtime  insulin lispro (HumaLOG) corrective regimen sliding scale   SubCutaneous three times a day before meals  insulin lispro (HumaLOG) corrective regimen sliding scale   SubCutaneous at bedtime  insulin lispro Injectable (HumaLOG) 3 Unit(s) SubCutaneous three times a day before meals  levothyroxine 125 MICROGram(s) Oral daily  potassium chloride    Tablet ER 20 milliEquivalent(s) Oral once    MEDICATIONS  (PRN):  oxyCODONE    IR 5 milliGRAM(s) Oral every 4 hours PRN Moderate Pain (4 - 6)  oxyCODONE    IR 10 milliGRAM(s) Oral every 4 hours PRN Severe Pain (7 - 10)  polyethylene glycol 3350 17 Gram(s) Oral daily PRN Constipation      CAPILLARY BLOOD GLUCOSE      POCT Blood Glucose.: 160 mg/dL (23 May 2018 07:45)  POCT Blood Glucose.: 204 mg/dL (22 May 2018 21:46)  POCT Blood Glucose.: 164 mg/dL (22 May 2018 17:34)  POCT Blood Glucose.: 236 mg/dL (22 May 2018 12:18)    I&O's Summary    22 May 2018 07:01  -  23 May 2018 07:00  --------------------------------------------------------  IN: 1100 mL / OUT: 1100 mL / NET: 0 mL        PHYSICAL EXAM:  Vital Signs Last 24 Hrs  T(C): 37.1 (23 May 2018 09:08), Max: 37.1 (23 May 2018 09:08)  T(F): 98.7 (23 May 2018 09:08), Max: 98.7 (23 May 2018 09:08)  HR: 67 (23 May 2018 09:08) (64 - 78)  BP: 110/72 (23 May 2018 09:08) (93/58 - 110/72)  BP(mean): --  RR: 18 (23 May 2018 09:08) (18 - 18)  SpO2: 97% (23 May 2018 09:08) (97% - 99%)    GENERAL: NAD, well-developed  HEAD:  Atraumatic, Normocephalic  EYES: EOMI, PERRLA, conjunctiva and sclera clear  NECK: Supple, No JVD  CHEST/LUNG: Clear to auscultation bilaterally; No wheeze  HEART: Regular rate and rhythm; No murmurs, rubs, or gallops  ABDOMEN: Soft, Nontender, Nondistended; Bowel sounds present  EXTREMITIES:  2+ Peripheral Pulses, No clubbing, cyanosis, or edema  NEUROLOGY: AAOx3; non-focal  SKIN: No rashes or lesions    LABS:  personally reviewed                        9.7    5.3   )-----------( 69       ( 23 May 2018 07:03 )             31.1     05-23    130<L>  |  92<L>  |  68<H>  ----------------------------<  160<H>  3.4<L>   |  21<L>  |  2.25<H>    Ca    8.7      23 May 2018 07:03  Mg     2.3     05-23            Urinalysis Basic - ( 22 May 2018 18:30 )    Color: Yellow / Appearance: Clear / S.016 / pH: x  Gluc: x / Ketone: Negative  / Bili: Negative / Urobili: 1 mg/dL   Blood: x / Protein: Trace / Nitrite: Negative   Leuk Esterase: Negative / RBC: x / WBC x   Sq Epi: x / Non Sq Epi: x / Bacteria: x        RADIOLOGY & ADDITIONAL TESTS:    Imaging Personally Reviewed:    Consultant(s) Notes Reviewed:  ID, ortho, renal    Care Discussed with Consultants/Other Providers: Dr. Blunt

## 2018-05-23 NOTE — PROGRESS NOTE ADULT - ASSESSMENT
67 yo M with PMH of ICM EF 40%, CAD s/p CABG, RCA stent, CKD 3 (baseline Cr 1.6), HTN, DM II, HLD, pituitary tumor, atrial fibrillation s/p PPM, AICD, cirrhosis, thrombocytopenia presented with severe sepsis with hypotension 2/2 septic joint in R knee s/p washout, with JULIANNE and now needs PICC for long-term antibiotics.

## 2018-05-23 NOTE — PROGRESS NOTE ADULT - SUBJECTIVE AND OBJECTIVE BOX
68y old  Male who presents with a chief complaint of Right knee pain (19 May 2018 00:38)      Interval history:  Afebrile, walked with the help of walker, denies any other complains.     No Known Allergies    Antimicrobials:  ceFAZolin   IVPB 500 milliGRAM(s) IV Intermittent every 12 hours      REVIEW OF SYSTEMS:  No chest pain or palpitations  No cough, no SOB  No N/V/D, no abdominal pain  No dysuria or frequency  No rash.     Vital Signs Last 24 Hrs  T(C): 36.6 (05-23-18 @ 13:20), Max: 37.1 (05-23-18 @ 09:08)  T(F): 97.9 (05-23-18 @ 13:20), Max: 98.7 (05-23-18 @ 09:08)  HR: 65 (05-23-18 @ 13:20) (64 - 68)  BP: 96/62 (05-23-18 @ 13:20) (96/62 - 110/72)  RR: 18 (05-23-18 @ 13:20) (18 - 18)  SpO2: 99% (05-23-18 @ 13:20) (97% - 99%)    PHYSICAL EXAM:  Patient in no acute distress. AAOX3.  Cardiovascular: S1S2 normal, + murmur.  Lungs: + air entry B/L lung fields.  Gastrointestinal: soft, nontender, nondistended.  Extremities: chronic skin changes b/l lower extremities. Dressing of the rt knee.   IV sites not inflamed.                           9.7    5.3   )-----------( 69       ( 23 May 2018 07:03 )             31.1   05-23    130<L>  |  92<L>  |  68<H>  ----------------------------<  160<H>  3.4<L>   |  21<L>  |  2.25<H>    Ca    8.7      23 May 2018 07:03  Mg     2.3     05-23

## 2018-05-23 NOTE — PROGRESS NOTE ADULT - PROBLEM SELECTOR PLAN 2
JULIANNE on CKD stage 3 likely prerenal 2/2 hypotension and sepsis. Cr baseline ranges from 1.33-2.0 in past few months. Slightly improved Cr today  - FeUrea 16% - likely pre-renal  - monitor BMP, avoid nephrotoxins.  - f/u renal sono/art dopplers  - may need ACEI/ARB for proteinuria JULIANNE on CKD stage 3 likely prerenal 2/2 hypotension and sepsis. Cr baseline ranges from 1.33-2.0 in past few months. Slightly improved Cr today  - FeUrea 16% - likely pre-renal  - monitor BMP, avoid nephrotoxins.  - f/u renal sono/art dopplers  - may need ACEI/ARB for proteinuria  - needs good renal f/u as outpatient   - pt should f/u with his outpatient ophth (Dr. Lock), endo, PMD

## 2018-05-23 NOTE — PROGRESS NOTE ADULT - ASSESSMENT
68M with PMHx of  PMHx of ICM EF 40%, CABG, HTN, DM II, HLD, pituitary tumor, atrial fibrillation s/p PPM, AICD p/w two days of left knee pain and fevers as high as 102.2.   Severe sepsis( fever, tachycardia, hypotension, acute on chronic renal failure) due to MSSA septic prepatellar bursitis.  S/P I& D  Resolved sepsis   Clinically improved.    Plan:  Continue with Cefazolin, given rise in Cr decreased dose to 500 mg q12h.   Will need 3 weeks of therapy total until 6/7/18  CBC/CMP once a week while on OPAT.   Awaiting PICC placement.   Ortho on board.

## 2018-05-23 NOTE — PROGRESS NOTE ADULT - PROBLEM SELECTOR PLAN 1
Pt with CKD stage 3 at baseline. Urine microalb/Cr ratio 45 mg/g in 9/2017. Cr 2.25, eGFR 2 today. - per wife, pt is scheduled for IJ instead of PICC  Pt has high lifetime risk of renal failure needing HD. Low risk at 2 and 5 years.   - Pt is right-handed. PICC placement preferably on R-side to preserve LUE for possible future vascular access  - repeat urine microalb/creatinine.

## 2018-05-23 NOTE — PROGRESS NOTE ADULT - PROBLEM SELECTOR PLAN 7
A1C 7.5, holding all oral agents for now.   - continue lantus 5 units  - continue humalog 3 units TIDAC  - FSS +ISS

## 2018-05-23 NOTE — PROGRESS NOTE ADULT - SUBJECTIVE AND OBJECTIVE BOX
Patient is a 68y old  Male who presents with a chief complaint of Right knee pain (19 May 2018 00:38)      SUBJECTIVE / OVERNIGHT EVENTS: No acute events. Per pt's wife, pt is being scheduled for an IJ line instead of PICC. Pt feels well and wants to go home. Pt denies fevers, chills, n/v, cough, CP, SOB, abd pain, diarrhea, dysuria, urinary frequency.     MEDICATIONS  (STANDING):  aspirin  chewable 81 milliGRAM(s) Oral daily  atorvastatin 40 milliGRAM(s) Oral at bedtime  carvedilol 25 milliGRAM(s) Oral every 12 hours  ceFAZolin   IVPB 500 milliGRAM(s) IV Intermittent every 12 hours  furosemide    Tablet 40 milliGRAM(s) Oral daily  heparin  Injectable 5000 Unit(s) SubCutaneous every 8 hours  insulin glargine Injectable (LANTUS) 5 Unit(s) SubCutaneous at bedtime  insulin lispro (HumaLOG) corrective regimen sliding scale   SubCutaneous three times a day before meals  insulin lispro (HumaLOG) corrective regimen sliding scale   SubCutaneous at bedtime  insulin lispro Injectable (HumaLOG) 3 Unit(s) SubCutaneous three times a day before meals  levothyroxine 125 MICROGram(s) Oral daily  potassium chloride    Tablet ER 20 milliEquivalent(s) Oral once    MEDICATIONS  (PRN):  oxyCODONE    IR 5 milliGRAM(s) Oral every 4 hours PRN Moderate Pain (4 - 6)  oxyCODONE    IR 10 milliGRAM(s) Oral every 4 hours PRN Severe Pain (7 - 10)  polyethylene glycol 3350 17 Gram(s) Oral daily PRN Constipation      Vital Signs Last 24 Hrs  T(C): 36.6 (23 May 2018 13:20), Max: 37.1 (23 May 2018 09:08)  T(F): 97.9 (23 May 2018 13:20), Max: 98.7 (23 May 2018 09:08)  HR: 65 (23 May 2018 13:20) (64 - 68)  BP: 96/62 (23 May 2018 13:20) (96/62 - 110/72)  BP(mean): --  RR: 18 (23 May 2018 13:20) (18 - 18)  SpO2: 99% (23 May 2018 13:20) (97% - 99%)    CAPILLARY BLOOD GLUCOSE      POCT Blood Glucose.: 189 mg/dL (23 May 2018 12:39)  POCT Blood Glucose.: 160 mg/dL (23 May 2018 07:45)  POCT Blood Glucose.: 204 mg/dL (22 May 2018 21:46)  POCT Blood Glucose.: 164 mg/dL (22 May 2018 17:34)      I&O's Summary    22 May 2018 07:  -  23 May 2018 07:00  --------------------------------------------------------  IN: 1100 mL / OUT: 1100 mL / NET: 0 mL    23 May 2018 07:  -  23 May 2018 16:53  --------------------------------------------------------  IN: 480 mL / OUT: 500 mL / NET: -20 mL          PHYSICAL EXAM:  GENERAL: NAD, well-developed man sitting comfortably in a chair.   ENMT: Airway patent. MMM.  HEART: RRR; Normal S1, S2. No murmurs, rubs, or gallops  CHEST/LUNG: CTABL; No wheezing, rhonchi, or rales  ABDOMEN: +BS; Soft, obese distention, nontender  EXTREMITIES:  2+ Peripheral Pulses, No clubbing, cyanosis, or edema  PSYCH: AAOx3, normal mood and affect  SKIN: Warm, dry, intact; No rashes or lesions. dry flaking skin on b/l LE      LABS:                                   9.7    5.3   )-----------( 69       ( 23 May 2018 07:03 )             31.1                         9.5    6.1   )-----------( 62       ( 22 May 2018 06:45 )             28.6                         9.4    6.1   )-----------( 51       ( 21 May 2018 07:17 )             29.6       05-23    130<L>  |  92<L>  |  68<H>  ----------------------------<  160<H>  3.4<L>   |  21<L>  |  2.25<H>      130<L>  |  94<L>  |  70<H>  ----------------------------<  141<H>  3.6   |  22  |  2.36<H>      131<L>  |  95<L>  |  64<H>  ----------------------------<  137<H>  4.0   |  20<L>  |  1.95<H>    Ca    8.7      23 May 2018 07:03  Ca    8.3<L>      22 May 2018 06:45  Ca    8.5      21 May 2018 07:14  Mg     2.3               CAPILLARY BLOOD GLUCOSE      POCT Blood Glucose.: 189 mg/dL (23 May 2018 12:39)  POCT Blood Glucose.: 160 mg/dL (23 May 2018 07:45)  POCT Blood Glucose.: 204 mg/dL (22 May 2018 21:46)  POCT Blood Glucose.: 164 mg/dL (22 May 2018 17:34)      Urine Studies:  Urinalysis Basic - ( 22 May 2018 18:30 )    Color: Yellow / Appearance: Clear / S.016 / pH:   Gluc:  / Ketone: Negative  / Bili: Negative / Urobili: 1 mg/dL   Blood:  / Protein: Trace / Nitrite: Negative   Leuk Esterase: Negative / RBC:  / WBC    Sq Epi:  / Non Sq Epi:  / Bacteria:       Creatinine, Random Urine: 133 mg/dL ( 22:35)  Protein/Creatinine Ratio Calculation: 0.2 Ratio ( 22:35)  Creatinine, Random Urine: 132 mg/dL ( 22:35)  Osmolality, Random Urine: 382 mos/kg ( @ 18:51)  Potassium, Random Urine: 26 mmol/L ( @ 18:51)  Chloride, Random Urine: <35 mmol/L ( 18:51)  Sodium, Random Urine: <20 mmol/L ( 18:50)  Creatinine, Random Urine: 139 mg/dL ( @ 18:50)        RADIOLOGY & ADDITIONAL TESTS:    Imaging Personally Reviewed:     Consultant(s) Notes Reviewed:     Care Discussed with Consultants/Other Providers:

## 2018-05-23 NOTE — PROGRESS NOTE ADULT - SUBJECTIVE AND OBJECTIVE BOX
No acute events overnight. Pain well controlled with pain medications.    Vital Signs Last 24 Hrs  T(C): 36.3 (23 May 2018 04:49), Max: 36.8 (22 May 2018 09:20)  T(F): 97.4 (23 May 2018 04:49), Max: 98.3 (22 May 2018 21:49)  HR: 64 (23 May 2018 04:49) (64 - 78)  BP: 104/71 (23 May 2018 04:49) (92/60 - 107/69)  BP(mean): --  RR: 18 (23 May 2018 04:49) (18 - 18)  SpO2: 99% (23 May 2018 04:49) (97% - 99%)    Exam:  Gen: NAD, I&D site improving/well healing  Motor: EHL/FHL/TA/Gastrocnemius intact  Sensory: SILT DP/SP/S/S/T nerve distributions  Dressing: Clean, Dry, Intact    A/P: 68 year old male with R prepatellar bursitis  - Pain Control  - ABx per ID  - No acute orthopedic intervention at this time  - Follow up with Dr. Watkins as outpatient.

## 2018-05-23 NOTE — PROGRESS NOTE ADULT - PROBLEM SELECTOR PLAN 3
improving. likely due to pre-renal/hypotension/sepsis on admission.   Baseline Cr around 1.6 last year. CKD3.   Monitor strict I+Os and labs.

## 2018-05-24 LAB
ANION GAP SERPL CALC-SCNC: 15 MMOL/L — SIGNIFICANT CHANGE UP (ref 5–17)
BUN SERPL-MCNC: 66 MG/DL — HIGH (ref 7–23)
CALCIUM SERPL-MCNC: 8.7 MG/DL — SIGNIFICANT CHANGE UP (ref 8.4–10.5)
CHLORIDE SERPL-SCNC: 94 MMOL/L — LOW (ref 96–108)
CO2 SERPL-SCNC: 25 MMOL/L — SIGNIFICANT CHANGE UP (ref 22–31)
CREAT SERPL-MCNC: 1.89 MG/DL — HIGH (ref 0.5–1.3)
CULTURE RESULTS: SIGNIFICANT CHANGE UP
CULTURE RESULTS: SIGNIFICANT CHANGE UP
GLUCOSE BLDC GLUCOMTR-MCNC: 169 MG/DL — HIGH (ref 70–99)
GLUCOSE BLDC GLUCOMTR-MCNC: 172 MG/DL — HIGH (ref 70–99)
GLUCOSE BLDC GLUCOMTR-MCNC: 200 MG/DL — HIGH (ref 70–99)
GLUCOSE BLDC GLUCOMTR-MCNC: 232 MG/DL — HIGH (ref 70–99)
GLUCOSE SERPL-MCNC: 171 MG/DL — HIGH (ref 70–99)
HCT VFR BLD CALC: 33.4 % — LOW (ref 39–50)
HGB BLD-MCNC: 10.6 G/DL — LOW (ref 13–17)
MAGNESIUM SERPL-MCNC: 2.1 MG/DL — SIGNIFICANT CHANGE UP (ref 1.6–2.6)
MCHC RBC-ENTMCNC: 29.4 PG — SIGNIFICANT CHANGE UP (ref 27–34)
MCHC RBC-ENTMCNC: 31.8 GM/DL — LOW (ref 32–36)
MCV RBC AUTO: 92.5 FL — SIGNIFICANT CHANGE UP (ref 80–100)
ORGANISM # SPEC MICROSCOPIC CNT: SIGNIFICANT CHANGE UP
PLATELET # BLD AUTO: 85 K/UL — LOW (ref 150–400)
POTASSIUM SERPL-MCNC: 3.6 MMOL/L — SIGNIFICANT CHANGE UP (ref 3.5–5.3)
POTASSIUM SERPL-SCNC: 3.6 MMOL/L — SIGNIFICANT CHANGE UP (ref 3.5–5.3)
RBC # BLD: 3.61 M/UL — LOW (ref 4.2–5.8)
RBC # FLD: 14.8 % — HIGH (ref 10.3–14.5)
SODIUM SERPL-SCNC: 134 MMOL/L — LOW (ref 135–145)
SPECIMEN SOURCE: SIGNIFICANT CHANGE UP
SPECIMEN SOURCE: SIGNIFICANT CHANGE UP
WBC # BLD: 5.7 K/UL — SIGNIFICANT CHANGE UP (ref 3.8–10.5)
WBC # FLD AUTO: 5.7 K/UL — SIGNIFICANT CHANGE UP (ref 3.8–10.5)

## 2018-05-24 PROCEDURE — 36558 INSERT TUNNELED CV CATH: CPT

## 2018-05-24 PROCEDURE — 99233 SBSQ HOSP IP/OBS HIGH 50: CPT

## 2018-05-24 PROCEDURE — 99232 SBSQ HOSP IP/OBS MODERATE 35: CPT

## 2018-05-24 PROCEDURE — 76937 US GUIDE VASCULAR ACCESS: CPT | Mod: 26

## 2018-05-24 PROCEDURE — 77001 FLUOROGUIDE FOR VEIN DEVICE: CPT | Mod: 26

## 2018-05-24 RX ORDER — CEFAZOLIN SODIUM 1 G
1000 VIAL (EA) INJECTION EVERY 12 HOURS
Qty: 0 | Refills: 0 | Status: DISCONTINUED | OUTPATIENT
Start: 2018-05-24 | End: 2018-05-25

## 2018-05-24 RX ADMIN — Medication 40 MILLIGRAM(S): at 06:52

## 2018-05-24 RX ADMIN — Medication 100 MILLIGRAM(S): at 06:52

## 2018-05-24 RX ADMIN — INSULIN GLARGINE 5 UNIT(S): 100 INJECTION, SOLUTION SUBCUTANEOUS at 22:10

## 2018-05-24 RX ADMIN — Medication 3 UNIT(S): at 07:54

## 2018-05-24 RX ADMIN — Medication 125 MICROGRAM(S): at 06:52

## 2018-05-24 RX ADMIN — OXYCODONE HYDROCHLORIDE 5 MILLIGRAM(S): 5 TABLET ORAL at 01:52

## 2018-05-24 RX ADMIN — Medication 81 MILLIGRAM(S): at 12:26

## 2018-05-24 RX ADMIN — OXYCODONE HYDROCHLORIDE 5 MILLIGRAM(S): 5 TABLET ORAL at 02:30

## 2018-05-24 RX ADMIN — Medication 100 MILLIGRAM(S): at 17:45

## 2018-05-24 RX ADMIN — Medication 2: at 12:27

## 2018-05-24 RX ADMIN — HEPARIN SODIUM 5000 UNIT(S): 5000 INJECTION INTRAVENOUS; SUBCUTANEOUS at 14:56

## 2018-05-24 RX ADMIN — OXYCODONE HYDROCHLORIDE 5 MILLIGRAM(S): 5 TABLET ORAL at 20:01

## 2018-05-24 RX ADMIN — HEPARIN SODIUM 5000 UNIT(S): 5000 INJECTION INTRAVENOUS; SUBCUTANEOUS at 21:33

## 2018-05-24 RX ADMIN — HEPARIN SODIUM 5000 UNIT(S): 5000 INJECTION INTRAVENOUS; SUBCUTANEOUS at 06:52

## 2018-05-24 RX ADMIN — CARVEDILOL PHOSPHATE 25 MILLIGRAM(S): 80 CAPSULE, EXTENDED RELEASE ORAL at 17:45

## 2018-05-24 RX ADMIN — Medication 2: at 17:45

## 2018-05-24 RX ADMIN — OXYCODONE HYDROCHLORIDE 5 MILLIGRAM(S): 5 TABLET ORAL at 19:31

## 2018-05-24 RX ADMIN — ATORVASTATIN CALCIUM 40 MILLIGRAM(S): 80 TABLET, FILM COATED ORAL at 21:33

## 2018-05-24 RX ADMIN — CARVEDILOL PHOSPHATE 25 MILLIGRAM(S): 80 CAPSULE, EXTENDED RELEASE ORAL at 06:52

## 2018-05-24 RX ADMIN — Medication 3 UNIT(S): at 17:45

## 2018-05-24 RX ADMIN — Medication 2: at 07:54

## 2018-05-24 RX ADMIN — Medication 3 UNIT(S): at 12:26

## 2018-05-24 NOTE — PROGRESS NOTE ADULT - PROBLEM SELECTOR PLAN 1
Septic bursitis with MSSA on fluid culture. s/p drain removal by ortho.   Currently on ancef since 5/18 likely will require 3 weeks of abx course.   Discussed care with Dr. Albert - pt will need total 3 weeks of abx since surgery.   - scheduled for tunneled IJ catheter placement by IR  - care discussed with CM for home infusion  - Pain control on oxycodone. bowel regiment.  - continue home PT Septic bursitis with MSSA on fluid culture. s/p drain removal by ortho.   Currently on ancef since 5/18 likely will require 3 weeks of abx course.   Discussed care with Dr. Albert - pt will need total 3 weeks of abx since surgery.   - scheduled for tunneled IJ catheter placement by IR  - resume eliquis after IJ placement  - care discussed with CM for home infusion  - Pain control on oxycodone. bowel regiment.  - continue home PT

## 2018-05-24 NOTE — PROGRESS NOTE ADULT - SUBJECTIVE AND OBJECTIVE BOX
Patient is a 68y old  Male who presents with a chief complaint of Right knee pain (19 May 2018 00:38)      SUBJECTIVE / OVERNIGHT EVENTS:  no acute events overnight, vss, afebrile  Pt reports that he has been ambulating well with walker  Scheduled for tunneled IJ placement by IR today    ROS:  14 point ROS negative in detail except stated as above    MEDICATIONS  (STANDING):  aspirin  chewable 81 milliGRAM(s) Oral daily  atorvastatin 40 milliGRAM(s) Oral at bedtime  carvedilol 25 milliGRAM(s) Oral every 12 hours  ceFAZolin   IVPB 500 milliGRAM(s) IV Intermittent every 12 hours  furosemide    Tablet 40 milliGRAM(s) Oral daily  heparin  Injectable 5000 Unit(s) SubCutaneous every 8 hours  insulin glargine Injectable (LANTUS) 5 Unit(s) SubCutaneous at bedtime  insulin lispro (HumaLOG) corrective regimen sliding scale   SubCutaneous three times a day before meals  insulin lispro (HumaLOG) corrective regimen sliding scale   SubCutaneous at bedtime  insulin lispro Injectable (HumaLOG) 3 Unit(s) SubCutaneous three times a day before meals  levothyroxine 125 MICROGram(s) Oral daily    MEDICATIONS  (PRN):  oxyCODONE    IR 5 milliGRAM(s) Oral every 4 hours PRN Moderate Pain (4 - 6)  oxyCODONE    IR 10 milliGRAM(s) Oral every 4 hours PRN Severe Pain (7 - 10)  polyethylene glycol 3350 17 Gram(s) Oral daily PRN Constipation      CAPILLARY BLOOD GLUCOSE      POCT Blood Glucose.: 169 mg/dL (24 May 2018 07:50)  POCT Blood Glucose.: 213 mg/dL (23 May 2018 21:18)  POCT Blood Glucose.: 230 mg/dL (23 May 2018 17:27)  POCT Blood Glucose.: 189 mg/dL (23 May 2018 12:39)    I&O's Summary    23 May 2018 07:  -  24 May 2018 07:00  --------------------------------------------------------  IN: 1210 mL / OUT: 1300 mL / NET: -90 mL    24 May 2018 07:01  -  24 May 2018 12:10  --------------------------------------------------------  IN: 0 mL / OUT: 950 mL / NET: -950 mL        PHYSICAL EXAM:  Vital Signs Last 24 Hrs  T(C): 37.1 (24 May 2018 09:03), Max: 37.1 (23 May 2018 18:57)  T(F): 98.8 (24 May 2018 09:03), Max: 98.8 (23 May 2018 18:57)  HR: 63 (24 May 2018 09:03) (63 - 78)  BP: 97/52 (24 May 2018 09:03) (96/62 - 110/72)  BP(mean): --  RR: 18 (24 May 2018 09:03) (18 - 18)  SpO2: 99% (24 May 2018 09:03) (94% - 99%)    GENERAL: NAD, well-developed  HEAD:  Atraumatic, Normocephalic  EYES: EOMI, PERRLA, conjunctiva and sclera clear  NECK: Supple, No JVD  CHEST/LUNG: Clear to auscultation bilaterally; No wheeze  HEART: Regular rate and rhythm; No murmurs, rubs, or gallops  ABDOMEN: Soft, Nontender, Nondistended; Bowel sounds present  EXTREMITIES:  dressing on the right knee.   NEUROLOGY: AAOx3; non-focal  SKIN: chronic venous stasis changes in bilateral LEs    LABS:  personally reviewed                        10.6   5.7   )-----------( 85       ( 24 May 2018 06:52 )             33.4     05-24    134<L>  |  94<L>  |  66<H>  ----------------------------<  171<H>  3.6   |  25  |  1.89<H>    Ca    8.7      24 May 2018 06:48  Mg     2.1     05-24      Urinalysis Basic - ( 22 May 2018 18:30 )    Color: Yellow / Appearance: Clear / S.016 / pH: x  Gluc: x / Ketone: Negative  / Bili: Negative / Urobili: 1 mg/dL   Blood: x / Protein: Trace / Nitrite: Negative   Leuk Esterase: Negative / RBC: x / WBC x   Sq Epi: x / Non Sq Epi: x / Bacteria: x        RADIOLOGY & ADDITIONAL TESTS:    Imaging Personally Reviewed:    Consultant(s) Notes Reviewed:  IR, ID, cardiology    Care Discussed with Consultants/Other Providers: Dr. Albert

## 2018-05-24 NOTE — PROGRESS NOTE ADULT - PROBLEM SELECTOR PLAN 8
DVT ppx: lovenox SQ  Dispo; home with home PT DVT ppx: HSQ, resume eliquis after IJ placement  Dispo; home with home PT

## 2018-05-24 NOTE — PROGRESS NOTE ADULT - PROBLEM SELECTOR PLAN 2
Rate controlled. On Coreg 25mg BID now.   - cleared by surgery for resuming systemic AC (eliquis); on hold in prep for IJ line insertion  Had Coreg dose lowered due to chronically low BP as outpt. (SBP 80-90s)  Previously to hospitalization planned for cardioversion this week.   Seen and followed by card team, cardioversion deferred to later time.

## 2018-05-24 NOTE — PROGRESS NOTE ADULT - ASSESSMENT
67 yo m with CAD, CABG, chronic systolic HF, afib, ICD, idiopathic cirrhosis, varices, thrombocytopenia, CKD3, Dm2, HLD, pit adenoma presented with right knee prepatellar bursitis s/p I+D, drainage on 5/19 course c/b NSVT, afib RVR, JULIANNE, now awaiting tunneled IJ catheter placement for IV abx

## 2018-05-24 NOTE — CHART NOTE - NSCHARTNOTEFT_GEN_A_CORE
Discussed case with IR PA - can resume eliquis 24 hrs post procedure per IR. Attending Dr. Minoo mark. Monitor site overnight.    Tahir Perez PA-C  Department of Medicine  #[id number]

## 2018-05-24 NOTE — PROGRESS NOTE ADULT - SUBJECTIVE AND OBJECTIVE BOX
Alert, no distress.  Tells me that knee drain was removed yesterday.  Feeling better overall; reports no cardiac sxs.  Hopes to ambulate with assistance of physical therapist later today.        Medications:  aspirin  chewable 81 milliGRAM(s) Oral daily  atorvastatin 40 milliGRAM(s) Oral at bedtime  carvedilol 25 milliGRAM(s) Oral every 12 hours  ceFAZolin   IVPB 500 milliGRAM(s) IV Intermittent every 12 hours  furosemide    Tablet 40 milliGRAM(s) Oral daily  heparin  Injectable 5000 Unit(s) SubCutaneous every 8 hours  insulin glargine Injectable (LANTUS) 5 Unit(s) SubCutaneous at bedtime  insulin lispro (HumaLOG) corrective regimen sliding scale   SubCutaneous three times a day before meals  insulin lispro (HumaLOG) corrective regimen sliding scale   SubCutaneous at bedtime  insulin lispro Injectable (HumaLOG) 3 Unit(s) SubCutaneous three times a day before meals  levothyroxine 125 MICROGram(s) Oral daily  oxyCODONE    IR 5 milliGRAM(s) Oral every 4 hours PRN  oxyCODONE    IR 10 milliGRAM(s) Oral every 4 hours PRN  polyethylene glycol 3350 17 Gram(s) Oral daily PRN    PMH/PSH/FH/SH: [ ] Unchanged          ROS:  General: no fatigue/malaise, weight loss/gain.  Skin: no rashes.  Ophthalmologic: no blurred vision, no loss of vision. 	  Respiratory: no SOB, cough or wheeze.  Cardiovascular: no chest pain, dyspnea, palpitations, orthopnea or paroxysmal nocturnal dyspnea. No claudication.  Gastrointestinal:  no N/V/D, no melena/hematemesis/hematochezia.  Genitourinary: no dysuria/hesitancy or hematuria.  Neurological: no changes in vision or hearing, no lightheadedness/dizziness, no syncope/near syncope	  Psychiatric: no unusual stress/anxiety.   Hematology/Lymphatics: no unusual bleeding, bruising and no lymphadenopathy.  Endocrine: no unusual thirst.       Vitals:  T(C): 37.1 (05-24-18 @ 09:03), Max: 37.1 (05-23-18 @ 18:57)  HR: 63 (05-24-18 @ 09:03) (63 - 78)  BP: 97/52 (05-24-18 @ 09:03) (96/62 - 110/72)  RR: 18 (05-24-18 @ 09:03) (18 - 18)  SpO2: 99% (05-24-18 @ 09:03) (94% - 99%)      EXAM:  GENERAL:  Alert, no distress  HEENT: Normocephalic, EOM intact, PERRLA  NECK: Normal carotid upstrokes, no bruit; No JVD  CHEST:  Clear to auscultation  HEART: PMI not displaced. Normal S1 and S2.  No murmur, rub or gallop.  ABDOMEN: Normal bowel sounds, no bruit. Soft, non-tender.  Liver and spleen not palpable; aorta not palpable.  EXT:  No edema, no varicosities, 2+ pulses in upper and lower extremities.  PSYCH:  A&Ox3, normal insight, no anxiety  NEURO: Non-focal  SKIN:  No rash       TELE:  Appears to be SR with occ. VPCs.        LABS:                        10.6   5.7   )-----------( 85       ( 24 May 2018 06:52 )             33.4       05-24    134<L>  |  94<L>  |  66<H>  ----------------------------<  171<H>  3.6   |  25  |  1.89<H>    Ca    8.7      24 May 2018 06:48  Mg     2.1     05-24      Assessment		  68 year old male with PMHx of ICM EF 40%, CABG, HTN, DM II, HLD, pituitary tumor, atrial fibrillation s/p ICD  Right knee pain and swelling due to Staph infection, s/p washout of septic joint.  Remains on antibx.    #ICM EF 40% s/p ICD  -- continue Coreg 25 mg bid  -- continue Lasix 40 mg qd  -- continue asa    #AFib  -- resume Eliquis when possible post-op  -- Previously planned cardioversion deferred for now.    #HLD  -- continue atorvastatin      Charly Major M.D.  Office: [phone]  Beeper: [phone] Alert, OOB to chair.  No cardiac sxs.  Ambulating with physical therapist.  Awaiting IV line to permit home antibx.    Medications:  aspirin  chewable 81 milliGRAM(s) Oral daily  atorvastatin 40 milliGRAM(s) Oral at bedtime  carvedilol 25 milliGRAM(s) Oral every 12 hours  ceFAZolin   IVPB 500 milliGRAM(s) IV Intermittent every 12 hours  furosemide    Tablet 40 milliGRAM(s) Oral daily  heparin  Injectable 5000 Unit(s) SubCutaneous every 8 hours  insulin glargine Injectable (LANTUS) 5 Unit(s) SubCutaneous at bedtime  insulin lispro (HumaLOG) corrective regimen sliding scale   SubCutaneous three times a day before meals  insulin lispro (HumaLOG) corrective regimen sliding scale   SubCutaneous at bedtime  insulin lispro Injectable (HumaLOG) 3 Unit(s) SubCutaneous three times a day before meals  levothyroxine 125 MICROGram(s) Oral daily  oxyCODONE    IR 5 milliGRAM(s) Oral every 4 hours PRN  oxyCODONE    IR 10 milliGRAM(s) Oral every 4 hours PRN  polyethylene glycol 3350 17 Gram(s) Oral daily PRN    PMH/PSH/FH/SH:  Unchanged    ROS:  General: no fatigue/malaise, weight loss/gain.  Skin: no rashes.  Ophthalmologic: no blurred vision, no loss of vision. 	  Respiratory: no SOB, cough or wheeze.  Cardiovascular: no chest pain, dyspnea, palpitations, orthopnea or paroxysmal nocturnal dyspnea. No claudication.  Gastrointestinal:  no N/V/D, no melena/hematemesis/hematochezia.  Genitourinary: no dysuria/hesitancy or hematuria.  Neurological: no changes in vision or hearing, no lightheadedness/dizziness, no syncope/near syncope	  Psychiatric: no unusual stress/anxiety.   Hematology/Lymphatics: no unusual bleeding, bruising and no lymphadenopathy.  Endocrine: no unusual thirst.       Vitals:  T(C): 37.1 (05-24-18 @ 09:03), Max: 37.1 (05-23-18 @ 18:57)  HR: 63 (05-24-18 @ 09:03) (63 - 78)  BP: 97/52 (05-24-18 @ 09:03) (96/62 - 110/72)  RR: 18 (05-24-18 @ 09:03) (18 - 18)  SpO2: 99% (05-24-18 @ 09:03) (94% - 99%)      EXAM:  GENERAL:  Alert, no distress  HEENT: Normocephalic, EOM intact, PERRLA  NECK: Normal carotid upstrokes, no bruit; No JVD  CHEST:  Clear to auscultation  HEART: PMI not displaced. Normal S1 and S2.  No murmur, rub or gallop.  ABDOMEN: Normal bowel sounds, no bruit. Soft, non-tender.  Liver and spleen not palpable; aorta not palpable.  EXT:  No edema, no varicosities, 2+ pulses in upper and lower extremities.  PSYCH:  A&Ox3, normal insight, no anxiety  NEURO: Non-focal  SKIN:  No rash       TELE:  Appears to be SR, 50 - 70 bpm, with occ. VPCs.        LABS:                        10.6   5.7   )-----------( 85       ( 24 May 2018 06:52 )             33.4       05-24    134<L>  |  94<L>  |  66<H>  ----------------------------<  171<H>  3.6   |  25  |  1.89<H>    Ca    8.7      24 May 2018 06:48  Mg     2.1     05-24      Assessment		  68 year old male with PMHx of ICM EF 40%, CABG, HTN, DM II, HLD, pituitary tumor, atrial fibrillation s/p ICD  Right knee pain and swelling due to Staph infection, s/p washout of septic joint.  Remains on antibx.    #ICM EF 40% s/p ICD  -- continue Coreg 25 mg bid  -- continue Lasix 40 mg qd  -- continue asa    #AFib  -- resume Eliquis when possible post IV line placement  -- Previously planned cardioversion deferred for now.    #HLD  -- continue atorvastatin      Charly Major M.D.  Office: [phone]  Beeper: [phone]

## 2018-05-24 NOTE — PROGRESS NOTE ADULT - SUBJECTIVE AND OBJECTIVE BOX
Interventional Radiology     Pre- Procedure   Patient and/or family/surrogate educated about central line-associated blood stream infection prevention practices  Central Line insertion checklist utilized    Catheter Type: (  ) Dialysis  (  ) Introducer  ( x  ) Central venous catheter (tunnelled CVC)   (  ) peripherally inserted central catheter (PICC)      Number of Lumens: ( x ) single   (  ) Double   (   ) Triple  (  ) Antimicrobial catheter    TIME OUT performed prior to this procedure with verbal confirmation of correct patient identity, correct site, side and ar (if applicable), agreement of the procedure, correct patient position, availability of necessary equipment.  The consent form (if applicable) is complete and accurate.  Safety precautions based on patient history or medication use has been addressed   RN at bedside    Procedure  The patient was placed in the ( x )Supine position, (   ) Trendelenburg postiton.  The patient's placement site was prepped with Chlorhexidine solution then drapped using maximum sterile barrier protection.  The area was injected with 10 cc of 2% Lidocaine.  Using the  (  ) Seldinger technique    ( x ) Modified Seldinger technique   ( x ) Ultrasound, the catheter was introduced.  Strict adherence to outlined aseptic technique, including hand washing prior to donning sterile barriers (gloves and gown), utilizing a mask and cap, plus draping the patient with a sterile drape was observed.  Uponcompletion of the line placement, the insertion site was covered with sterile dressing.    All materials used for catheter insertion, including the intact guide wire, were accounted for at the end of the procedure      Emergency placement (x  ) No    (   ) Yes   (  x ) New Site    (   )  Guide Wire change      Attempted site and actual site ( x  ) Right  (   )  Left                 internal jugular, tunnelled CVC (castillo)        Post Procedure (Catheter Placement Verification)  Correct placement confirmed by pressure transducer or ultrasonography or venous saturation  The tip of the catheter is confirmed to be in the SVC by radiography which revealed no pneumothorax and line may be accessed

## 2018-05-24 NOTE — PROGRESS NOTE ADULT - SUBJECTIVE AND OBJECTIVE BOX
Interventional Radiology Pre-Procedure Note    This is a 68y Male with PMH of ICM, CABG, HTB, DMII, HLD, pituitary tumor, afib (not on anticoag), s/p PPM, AICD who was admitted on 5/18 with sepsis 2/2 right prepatellar bursitis. Pt underwent knee washout and I&D on 5/19. He is now requiring access for prolonged abx therapy with cefazolin [id number]. Pt with GFR <40, IR requested for tunnelled CVC placement.    Pt offers no complaints at this time. Plan is for tunnelled CVC placement.     PAST MEDICAL & SURGICAL HISTORY:  Hypothyroidism  PVD (peripheral vascular disease)  History of hyperprolactinemia  Hepatic cirrhosis, unspecified hepatic cirrhosis type  Anemia  ICD (implantable cardioverter-defibrillator) in place: [employer], Model Q495LLM , last interrogation 4/2017  Sleep apnea: not using CPAP  History of osteomyelitis: 2015, right foot 2nd toe  Presence of stent in coronary artery: 2 stents  Heart failure with reduced left ventricular function: EF 37%  Ischemic cardiomyopathy  Pituitary adenoma: as per patient chronic, no changes , recently restarted follow up with endocrinologist ( note in allscripts )  Coronary artery disease  Thrombocytopenia: Chronic  HLD (hyperlipidemia)  HTN (hypertension)  DM (diabetes mellitus): type 2, Hg A1C 8.2 % ( 7/10/17)  AICD lead malfunction: history of  History of amputation: right foot, 2nd toe, osteo 2015  AICD (automatic cardioverter/defibrillator) present: placement in (2006)  Stented coronary artery: RCA (1999)  Coronary atherosclerosis of artery bypass graft: CABG X 4 (1986)       Vital Signs Last 24 Hrs  T(C): 36.7 (24 May 2018 13:10), Max: 37.1 (23 May 2018 18:57)  T(F): 98.1 (24 May 2018 13:10), Max: 98.8 (23 May 2018 18:57)  HR: 66 (24 May 2018 13:10) (63 - 78)  BP: 100/65 (24 May 2018 13:10) (97/52 - 110/72)  BP(mean): --  RR: 18 (24 May 2018 13:10) (18 - 18)  SpO2: 100% (24 May 2018 13:10) (94% - 100%)    Allergies: No Known Allergies    Physical Exam: Gen: NAD, A&Ox3    Labs:                         10.6   5.7   )-----------( 85       ( 24 May 2018 06:52 )             33.4     05-24    134<L>  |  94<L>  |  66<H>  ----------------------------<  171<H>  3.6   |  25  |  1.89<H>    Ca    8.7      24 May 2018 06:48  Mg     2.1     05-24          Plan is for tunnelled CVC placement. The full procedure/ risks/ benefits/ alternatives were discussed with the pt and Informed consent obtained. All questions and concerns have been addressed at this time.

## 2018-05-25 ENCOUNTER — TRANSCRIPTION ENCOUNTER (OUTPATIENT)
Age: 68
End: 2018-05-25

## 2018-05-25 VITALS
HEART RATE: 70 BPM | OXYGEN SATURATION: 100 % | TEMPERATURE: 98 F | SYSTOLIC BLOOD PRESSURE: 118 MMHG | DIASTOLIC BLOOD PRESSURE: 70 MMHG | RESPIRATION RATE: 18 BRPM

## 2018-05-25 LAB
ALBUMIN SERPL ELPH-MCNC: 2.9 G/DL — LOW (ref 3.3–5)
ALP SERPL-CCNC: 195 U/L — HIGH (ref 40–120)
ALT FLD-CCNC: 23 U/L — SIGNIFICANT CHANGE UP (ref 10–45)
ANION GAP SERPL CALC-SCNC: 13 MMOL/L — SIGNIFICANT CHANGE UP (ref 5–17)
AST SERPL-CCNC: 58 U/L — HIGH (ref 10–40)
BILIRUB SERPL-MCNC: 0.8 MG/DL — SIGNIFICANT CHANGE UP (ref 0.2–1.2)
BUN SERPL-MCNC: 55 MG/DL — HIGH (ref 7–23)
CALCIUM SERPL-MCNC: 8.6 MG/DL — SIGNIFICANT CHANGE UP (ref 8.4–10.5)
CHLORIDE SERPL-SCNC: 97 MMOL/L — SIGNIFICANT CHANGE UP (ref 96–108)
CO2 SERPL-SCNC: 23 MMOL/L — SIGNIFICANT CHANGE UP (ref 22–31)
CREAT SERPL-MCNC: 1.63 MG/DL — HIGH (ref 0.5–1.3)
GLUCOSE BLDC GLUCOMTR-MCNC: 174 MG/DL — HIGH (ref 70–99)
GLUCOSE BLDC GLUCOMTR-MCNC: 216 MG/DL — HIGH (ref 70–99)
GLUCOSE SERPL-MCNC: 163 MG/DL — HIGH (ref 70–99)
HCT VFR BLD CALC: 30.6 % — LOW (ref 39–50)
HGB BLD-MCNC: 10.1 G/DL — LOW (ref 13–17)
MCHC RBC-ENTMCNC: 30.5 PG — SIGNIFICANT CHANGE UP (ref 27–34)
MCHC RBC-ENTMCNC: 33.1 GM/DL — SIGNIFICANT CHANGE UP (ref 32–36)
MCV RBC AUTO: 92.3 FL — SIGNIFICANT CHANGE UP (ref 80–100)
PLATELET # BLD AUTO: 77 K/UL — LOW (ref 150–400)
POTASSIUM SERPL-MCNC: 3.4 MMOL/L — LOW (ref 3.5–5.3)
POTASSIUM SERPL-SCNC: 3.4 MMOL/L — LOW (ref 3.5–5.3)
PROT SERPL-MCNC: 6.9 G/DL — SIGNIFICANT CHANGE UP (ref 6–8.3)
RBC # BLD: 3.32 M/UL — LOW (ref 4.2–5.8)
RBC # FLD: 15 % — HIGH (ref 10.3–14.5)
SODIUM SERPL-SCNC: 133 MMOL/L — LOW (ref 135–145)
WBC # BLD: 4.7 K/UL — SIGNIFICANT CHANGE UP (ref 3.8–10.5)
WBC # FLD AUTO: 4.7 K/UL — SIGNIFICANT CHANGE UP (ref 3.8–10.5)

## 2018-05-25 PROCEDURE — 84295 ASSAY OF SERUM SODIUM: CPT

## 2018-05-25 PROCEDURE — 83615 LACTATE (LD) (LDH) ENZYME: CPT

## 2018-05-25 PROCEDURE — 84145 PROCALCITONIN (PCT): CPT

## 2018-05-25 PROCEDURE — C1769: CPT

## 2018-05-25 PROCEDURE — 99232 SBSQ HOSP IP/OBS MODERATE 35: CPT

## 2018-05-25 PROCEDURE — 86900 BLOOD TYPING SEROLOGIC ABO: CPT

## 2018-05-25 PROCEDURE — 77001 FLUOROGUIDE FOR VEIN DEVICE: CPT

## 2018-05-25 PROCEDURE — 86901 BLOOD TYPING SEROLOGIC RH(D): CPT

## 2018-05-25 PROCEDURE — 84300 ASSAY OF URINE SODIUM: CPT

## 2018-05-25 PROCEDURE — 86140 C-REACTIVE PROTEIN: CPT

## 2018-05-25 PROCEDURE — 87205 SMEAR GRAM STAIN: CPT

## 2018-05-25 PROCEDURE — C1751: CPT

## 2018-05-25 PROCEDURE — 93975 VASCULAR STUDY: CPT

## 2018-05-25 PROCEDURE — 73562 X-RAY EXAM OF KNEE 3: CPT

## 2018-05-25 PROCEDURE — 71045 X-RAY EXAM CHEST 1 VIEW: CPT

## 2018-05-25 PROCEDURE — 87040 BLOOD CULTURE FOR BACTERIA: CPT

## 2018-05-25 PROCEDURE — 83930 ASSAY OF BLOOD OSMOLALITY: CPT

## 2018-05-25 PROCEDURE — 96374 THER/PROPH/DIAG INJ IV PUSH: CPT | Mod: XU

## 2018-05-25 PROCEDURE — 86850 RBC ANTIBODY SCREEN: CPT

## 2018-05-25 PROCEDURE — 20610 DRAIN/INJ JOINT/BURSA W/O US: CPT | Mod: RT

## 2018-05-25 PROCEDURE — 89051 BODY FLUID CELL COUNT: CPT

## 2018-05-25 PROCEDURE — 84100 ASSAY OF PHOSPHORUS: CPT

## 2018-05-25 PROCEDURE — 76937 US GUIDE VASCULAR ACCESS: CPT

## 2018-05-25 PROCEDURE — 82330 ASSAY OF CALCIUM: CPT

## 2018-05-25 PROCEDURE — 84443 ASSAY THYROID STIM HORMONE: CPT

## 2018-05-25 PROCEDURE — 82533 TOTAL CORTISOL: CPT

## 2018-05-25 PROCEDURE — 84157 ASSAY OF PROTEIN OTHER: CPT

## 2018-05-25 PROCEDURE — 82043 UR ALBUMIN QUANTITATIVE: CPT

## 2018-05-25 PROCEDURE — 82435 ASSAY OF BLOOD CHLORIDE: CPT

## 2018-05-25 PROCEDURE — 83735 ASSAY OF MAGNESIUM: CPT

## 2018-05-25 PROCEDURE — 85730 THROMBOPLASTIN TIME PARTIAL: CPT

## 2018-05-25 PROCEDURE — 36558 INSERT TUNNELED CV CATH: CPT

## 2018-05-25 PROCEDURE — 99232 SBSQ HOSP IP/OBS MODERATE 35: CPT | Mod: GC

## 2018-05-25 PROCEDURE — 87086 URINE CULTURE/COLONY COUNT: CPT

## 2018-05-25 PROCEDURE — P9011: CPT

## 2018-05-25 PROCEDURE — 93005 ELECTROCARDIOGRAM TRACING: CPT

## 2018-05-25 PROCEDURE — 93975 VASCULAR STUDY: CPT | Mod: 26

## 2018-05-25 PROCEDURE — 84156 ASSAY OF PROTEIN URINE: CPT

## 2018-05-25 PROCEDURE — 97162 PT EVAL MOD COMPLEX 30 MIN: CPT

## 2018-05-25 PROCEDURE — 80053 COMPREHEN METABOLIC PANEL: CPT

## 2018-05-25 PROCEDURE — 83880 ASSAY OF NATRIURETIC PEPTIDE: CPT

## 2018-05-25 PROCEDURE — 99285 EMERGENCY DEPT VISIT HI MDM: CPT | Mod: 25

## 2018-05-25 PROCEDURE — 85014 HEMATOCRIT: CPT

## 2018-05-25 PROCEDURE — 99239 HOSP IP/OBS DSCHRG MGMT >30: CPT

## 2018-05-25 PROCEDURE — 87186 SC STD MICRODIL/AGAR DIL: CPT

## 2018-05-25 PROCEDURE — 84133 ASSAY OF URINE POTASSIUM: CPT

## 2018-05-25 PROCEDURE — 82436 ASSAY OF URINE CHLORIDE: CPT

## 2018-05-25 PROCEDURE — 83935 ASSAY OF URINE OSMOLALITY: CPT

## 2018-05-25 PROCEDURE — 82962 GLUCOSE BLOOD TEST: CPT

## 2018-05-25 PROCEDURE — 85610 PROTHROMBIN TIME: CPT

## 2018-05-25 PROCEDURE — 82947 ASSAY GLUCOSE BLOOD QUANT: CPT

## 2018-05-25 PROCEDURE — 82945 GLUCOSE OTHER FLUID: CPT

## 2018-05-25 PROCEDURE — 85652 RBC SED RATE AUTOMATED: CPT

## 2018-05-25 PROCEDURE — 84540 ASSAY OF URINE/UREA-N: CPT

## 2018-05-25 PROCEDURE — 84132 ASSAY OF SERUM POTASSIUM: CPT

## 2018-05-25 PROCEDURE — 36430 TRANSFUSION BLD/BLD COMPNT: CPT

## 2018-05-25 PROCEDURE — 83605 ASSAY OF LACTIC ACID: CPT

## 2018-05-25 PROCEDURE — 10060 I&D ABSCESS SIMPLE/SINGLE: CPT | Mod: XU

## 2018-05-25 PROCEDURE — 83036 HEMOGLOBIN GLYCOSYLATED A1C: CPT

## 2018-05-25 PROCEDURE — 80048 BASIC METABOLIC PNL TOTAL CA: CPT

## 2018-05-25 PROCEDURE — 82570 ASSAY OF URINE CREATININE: CPT

## 2018-05-25 PROCEDURE — 85027 COMPLETE CBC AUTOMATED: CPT

## 2018-05-25 PROCEDURE — 87070 CULTURE OTHR SPECIMN AEROBIC: CPT

## 2018-05-25 PROCEDURE — 82803 BLOOD GASES ANY COMBINATION: CPT

## 2018-05-25 PROCEDURE — 87075 CULTR BACTERIA EXCEPT BLOOD: CPT

## 2018-05-25 PROCEDURE — 81003 URINALYSIS AUTO W/O SCOPE: CPT

## 2018-05-25 PROCEDURE — 96375 TX/PRO/DX INJ NEW DRUG ADDON: CPT | Mod: XU

## 2018-05-25 RX ORDER — POTASSIUM CHLORIDE 20 MEQ
20 PACKET (EA) ORAL ONCE
Qty: 0 | Refills: 0 | Status: COMPLETED | OUTPATIENT
Start: 2018-05-25 | End: 2018-05-25

## 2018-05-25 RX ORDER — CEFAZOLIN SODIUM 1 G
1 VIAL (EA) INJECTION
Qty: 28 | Refills: 0 | OUTPATIENT
Start: 2018-05-25 | End: 2018-06-07

## 2018-05-25 RX ORDER — INSULIN LISPRO 100/ML
VIAL (ML) SUBCUTANEOUS ONCE
Qty: 0 | Refills: 0 | Status: COMPLETED | OUTPATIENT
Start: 2018-05-25 | End: 2018-05-25

## 2018-05-25 RX ORDER — OXYCODONE HYDROCHLORIDE 5 MG/1
1 TABLET ORAL
Qty: 30 | Refills: 0 | OUTPATIENT
Start: 2018-05-25 | End: 2018-05-29

## 2018-05-25 RX ADMIN — Medication 125 MICROGRAM(S): at 06:28

## 2018-05-25 RX ADMIN — Medication 2: at 12:37

## 2018-05-25 RX ADMIN — Medication 100 MILLIGRAM(S): at 06:26

## 2018-05-25 RX ADMIN — HEPARIN SODIUM 5000 UNIT(S): 5000 INJECTION INTRAVENOUS; SUBCUTANEOUS at 13:58

## 2018-05-25 RX ADMIN — Medication 3 UNIT(S): at 07:29

## 2018-05-25 RX ADMIN — Medication 2: at 07:28

## 2018-05-25 RX ADMIN — HEPARIN SODIUM 5000 UNIT(S): 5000 INJECTION INTRAVENOUS; SUBCUTANEOUS at 06:28

## 2018-05-25 RX ADMIN — Medication 20 MILLIEQUIVALENT(S): at 12:37

## 2018-05-25 RX ADMIN — Medication 100 MILLIGRAM(S): at 16:56

## 2018-05-25 RX ADMIN — CARVEDILOL PHOSPHATE 25 MILLIGRAM(S): 80 CAPSULE, EXTENDED RELEASE ORAL at 06:28

## 2018-05-25 RX ADMIN — Medication 81 MILLIGRAM(S): at 12:37

## 2018-05-25 RX ADMIN — Medication 40 MILLIGRAM(S): at 06:28

## 2018-05-25 NOTE — PROGRESS NOTE ADULT - PROBLEM SELECTOR PROBLEM 2
Acute renal failure superimposed on chronic kidney disease
Atrial fibrillation with RVR

## 2018-05-25 NOTE — PROGRESS NOTE ADULT - PROBLEM SELECTOR PROBLEM 7
Type 2 diabetes mellitus with other circulatory complication, without long-term current use of insulin

## 2018-05-25 NOTE — PROGRESS NOTE ADULT - PROBLEM SELECTOR PLAN 1
JULIANNE on CKD. JULIANNE likely in setting of hypotension. Pt with stable renal function. Recommend to check CKD work up including HEp b, Hep B, C3, C4, ANCA, Serum immunofixation, Serum Free light chains, UA, Urine Tp/Cr, Renal sonogram with dopplers. Monitor BMP, Strict i/o, avoid nephrotoxics, NSAIDS, RCA.

## 2018-05-25 NOTE — PROGRESS NOTE ADULT - SUBJECTIVE AND OBJECTIVE BOX
Patient is a 68y old  Male who presents with a chief complaint of Right knee pain (25 May 2018 11:50)      SUBJECTIVE / OVERNIGHT EVENTS:  no acute events overnight. vss, afebrile  s/p RIJ placement by IR yesterday without complication  Pt is unhappy that he is still in the hospital    ROS:  14 point ROS negative in detail except stated as above    MEDICATIONS  (STANDING):  aspirin  chewable 81 milliGRAM(s) Oral daily  atorvastatin 40 milliGRAM(s) Oral at bedtime  carvedilol 25 milliGRAM(s) Oral every 12 hours  ceFAZolin   IVPB 1000 milliGRAM(s) IV Intermittent every 12 hours  furosemide    Tablet 40 milliGRAM(s) Oral daily  heparin  Injectable 5000 Unit(s) SubCutaneous every 8 hours  insulin glargine Injectable (LANTUS) 5 Unit(s) SubCutaneous at bedtime  insulin lispro (HumaLOG) corrective regimen sliding scale   SubCutaneous three times a day before meals  insulin lispro (HumaLOG) corrective regimen sliding scale   SubCutaneous at bedtime  insulin lispro Injectable (HumaLOG) 3 Unit(s) SubCutaneous three times a day before meals  levothyroxine 125 MICROGram(s) Oral daily  potassium chloride    Tablet ER 20 milliEquivalent(s) Oral once    MEDICATIONS  (PRN):  oxyCODONE    IR 5 milliGRAM(s) Oral every 4 hours PRN Moderate Pain (4 - 6)  oxyCODONE    IR 10 milliGRAM(s) Oral every 4 hours PRN Severe Pain (7 - 10)  polyethylene glycol 3350 17 Gram(s) Oral daily PRN Constipation      CAPILLARY BLOOD GLUCOSE      POCT Blood Glucose.: 174 mg/dL (25 May 2018 07:27)  POCT Blood Glucose.: 232 mg/dL (24 May 2018 21:40)  POCT Blood Glucose.: 172 mg/dL (24 May 2018 17:33)  POCT Blood Glucose.: 200 mg/dL (24 May 2018 12:15)    I&O's Summary    24 May 2018 07:01  -  25 May 2018 07:00  --------------------------------------------------------  IN: 840 mL / OUT: 1650 mL / NET: -810 mL    25 May 2018 07:01  -  25 May 2018 12:03  --------------------------------------------------------  IN: 120 mL / OUT: 700 mL / NET: -580 mL        PHYSICAL EXAM:  Vital Signs Last 24 Hrs  T(C): 36.7 (25 May 2018 09:50), Max: 37.2 (24 May 2018 21:41)  T(F): 98.1 (25 May 2018 09:50), Max: 98.9 (24 May 2018 21:41)  HR: 59 (25 May 2018 09:50) (59 - 73)  BP: 99/63 (25 May 2018 09:50) (98/62 - 112/71)  BP(mean): --  RR: 18 (25 May 2018 09:50) (16 - 18)  SpO2: 100% (25 May 2018 09:50) (95% - 100%)    GENERAL: NAD, well-developed  HEAD:  Atraumatic, Normocephalic  EYES: EOMI, PERRLA, conjunctiva and sclera clear  NECK: Supple, No JVD  CHEST/LUNG: Clear to auscultation bilaterally; No wheeze  HEART: Regular rate and rhythm; No murmurs, rubs, or gallops  ABDOMEN: Soft, Nontender, Nondistended; Bowel sounds present  EXTREMITIES:  right knee under dressing; 2+ Peripheral Pulses, No clubbing, cyanosis, or edema  NEUROLOGY: AAOx3; non-focal  SKIN: No rashes or lesions    LABS:  personally reviewed                        10.1   4.7   )-----------( 77       ( 25 May 2018 06:56 )             30.6     05-25    133<L>  |  97  |  55<H>  ----------------------------<  163<H>  3.4<L>   |  23  |  1.63<H>    Ca    8.6      25 May 2018 06:56  Mg     2.1     05-24    TPro  6.9  /  Alb  2.9<L>  /  TBili  0.8  /  DBili  x   /  AST  58<H>  /  ALT  23  /  AlkPhos  195<H>  05-25              RADIOLOGY & ADDITIONAL TESTS:    Imaging Personally Reviewed:    Consultant(s) Notes Reviewed:      Care Discussed with Consultants/Other Providers:

## 2018-05-25 NOTE — DISCHARGE NOTE ADULT - HOSPITAL COURSE
To be completed by attending Mr. Harvey is a 67 yo man with CAD, CABG, chronic systolic HF, afib, ICD, idiopathic cirrhosis, varices, thrombocytopenia, CKD3, Dm2, HLD, pituitary adenoma presented with right knee prepatellar bursitis s/p I+D, drainage on 5/19. His hospital course was complicated by NSVT, afib w/ RVR, JULIANNE. Cardiology was consulted for the management and his initially planned cardioversion was deferred until active infection resolved. ID was consulted for MSSA septic prepatellar bursitis and he was started on ancef. ID recommended total 3 weeks course of IV abx. s/p tunneled IJ catheter placement by IR for IV abx on 5/24 without complication. Nephrology was also consulted for his JULIANNE on CKD - recommended outpatient workups for etiology. Cardiology recommended resuming eliquis for his atrial fibrillation until cardioversion. He has remained hemodynamically stable for discharge home with home infusion and close follow up with his outpatient providers.

## 2018-05-25 NOTE — CHART NOTE - NSCHARTNOTEFT_GEN_A_CORE
Request from  to facilitate patient discharge. Medication reconciliation reviewed, revised, and resolved with  who had medically cleared patient for discharge with follow-up as advised. Please refer to discharge note for detailed hospital course. Patient is currently stable for discharge to home at this time.    Tahir Perez PA-C  Department of Medicine  #[id number]

## 2018-05-25 NOTE — DISCHARGE NOTE ADULT - CARE PROVIDERS DIRECT ADDRESSES
,DirectAddress_Unknown ,DirectAddress_Unknown,[email or web address],DirectAddress_Unknown,DirectAddress_Unknown ,[email or web address],DirectAddress_Unknown,DirectAddress_Unknown,[email or web address]

## 2018-05-25 NOTE — PROGRESS NOTE ADULT - ASSESSMENT
69 yo m with CAD, CABG, chronic systolic HF, afib, ICD, idiopathic cirrhosis, varices, thrombocytopenia, CKD3, Dm2, HLD, pit adenoma presented with right knee prepatellar bursitis s/p I+D, drainage on 5/19 course c/b NSVT, afib RVR, JULIANNE, s/p tunneled IJ catheter placement for IV abx

## 2018-05-25 NOTE — DISCHARGE NOTE ADULT - HOME CARE AGENCY
[facility]  [phone]. Visiting nurse will call 1-2 days after discharge to arrange visit. Please call agency with any questions or concerns.  [facility] [phone]. Visiting nurse will call to arrange visit and delivery of medication. Please call agency with any questions or concerns

## 2018-05-25 NOTE — PROGRESS NOTE ADULT - PROBLEM SELECTOR PLAN 5
Chronically low platelets suspect due to cirrhosis.  No evidence of bleeding.   Eventually to resume Apixaban once cleared from surg

## 2018-05-25 NOTE — DISCHARGE NOTE ADULT - CARE PROVIDER_API CALL
[facility],   Phone: [phone]  Fax: (   )    - [facility],   Phone: [phone]  Fax: (   )    -    Stef Gentile), Infectious Disease; Internal Medicine  [address] [place] [address]  Phone: [phone]  Fax: [phone] Stef Gentile), Infectious Disease; Internal Medicine  [address] [place] [address]  Phone: [phone]  Fax: [phone]    [facility],   Phone: [phone]  Fax: (   )    -    Farhad Watkins), Orthopaedic Surgery  [address] [place] [address]  Phone: [phone]  Fax: [phone] Stef Gentile), Infectious Disease; Internal Medicine  [address] [place] [address]  Phone: [phone]  Fax: [phone]    Farhad Watkins (MD), Orthopaedic Surgery  [address]  [place] [address]  Phone: [phone]  Fax: [phone]    [facility],   Phone: [phone]  Fax: (   )    -    Douglas Rivers), Cardiovascular Disease; Internal Medicine; Nuclear Cardiology  [address]  [place] [address]  Phone: [phone]  Fax: [phone]

## 2018-05-25 NOTE — PROGRESS NOTE ADULT - SUBJECTIVE AND OBJECTIVE BOX
Alert, OOB to chair.  No cardiac sxs.  Ambulating with physical therapist.  Awaiting IV line to permit home antibx.      Medications:  aspirin  chewable 81 milliGRAM(s) Oral daily  atorvastatin 40 milliGRAM(s) Oral at bedtime  carvedilol 25 milliGRAM(s) Oral every 12 hours  ceFAZolin   IVPB 1000 milliGRAM(s) IV Intermittent every 12 hours  furosemide    Tablet 40 milliGRAM(s) Oral daily  heparin  Injectable 5000 Unit(s) SubCutaneous every 8 hours  insulin glargine Injectable (LANTUS) 5 Unit(s) SubCutaneous at bedtime  insulin lispro (HumaLOG) corrective regimen sliding scale   SubCutaneous three times a day before meals  insulin lispro (HumaLOG) corrective regimen sliding scale   SubCutaneous at bedtime  insulin lispro Injectable (HumaLOG) 3 Unit(s) SubCutaneous three times a day before meals  levothyroxine 125 MICROGram(s) Oral daily  oxyCODONE    IR 5 milliGRAM(s) Oral every 4 hours PRN  oxyCODONE    IR 10 milliGRAM(s) Oral every 4 hours PRN  polyethylene glycol 3350 17 Gram(s) Oral daily PRN    PMH/PSH/FH/SH:  Unchanged      ROS:  General: no fatigue/malaise, weight loss/gain.  Skin: no rashes.  Ophthalmologic: no blurred vision, no loss of vision. 	  Respiratory: no SOB, cough or wheeze.  Cardiovascular: no chest pain, dyspnea, palpitations, orthopnea or paroxysmal nocturnal dyspnea. No claudication.  Gastrointestinal:  no N/V/D, no melena/hematemesis/hematochezia.  Genitourinary: no dysuria/hesitancy or hematuria.  Neurological: no changes in vision or hearing, no lightheadedness/dizziness, no syncope/near syncope	  Psychiatric: no unusual stress/anxiety.   Hematology/Lymphatics: no unusual bleeding, bruising and no lymphadenopathy.  Endocrine: no unusual thirst.       Vitals:  T(C): 36.7 (18 @ 09:50), Max: 37.2 (18 @ 21:41)  HR: 59 (18 @ 09:50) (59 - 73)  BP: 99/63 (18 @ 09:50) (98/62 - 112/71)  RR: 18 (05-25-18 @ 09:50) (16 - 18)  SpO2: 100% (- @ 09:50) (95% - 100%)  Daily Weight in k.6 (25 May 2018 11:50)      EXAM:  GENERAL:  Alert, no distress  HEENT: Normocephalic, EOM intact, PERRLA  NECK: Normal carotid upstrokes, no bruit; No JVD  CHEST:  Clear to auscultation  HEART: PMI not displaced. Normal S1 and S2.  No murmur, rub or gallop.  ABDOMEN: Normal bowel sounds, no bruit. Soft, non-tender.  Liver and spleen not palpable; aorta not palpable.  EXT:  No edema, no varicosities, 2+ pulses in upper and lower extremities.  PSYCH:  A&Ox3, normal insight, no anxiety  NEURO: Non-focal  SKIN:  No rash       I&O's Summary    24 May 2018 07:  -  25 May 2018 07:00  --------------------------------------------------------  IN: 840 mL / OUT: 1650 mL / NET: -810 mL    25 May 2018 07:01  -  25 May 2018 13:21  --------------------------------------------------------  IN: 120 mL / OUT: 700 mL / NET: -580 mL        Labs:                      10.1   4.7   )-----------( 77       ( 25 May 2018 06:56 )             30.6       133<L>  |  97  |  55<H>  ----------------------------<  163<H>  3.4<L>   |  23  |  1.63<H>    Ca    8.6      25 May 2018 06:56  Mg     2.1         TPro  6.9  /  Alb  2.9<L>  /  TBili  0.8  /  DBili  x   /  AST  58<H>  /  ALT  23  /  AlkPhos  195<H>      Serum Pro-Brain Natriuretic Peptide: 4700 pg/mL (18 @ 03:53)        Assessment		  68 year old male with PMHx of ICM EF 40%, CABG, HTN, DM II, HLD, pituitary tumor, atrial fibrillation s/p ICD  Right knee pain and swelling due to Staph infection, s/p washout of septic joint.  Remains on antibx.    #ICM EF 40% s/p ICD  -- continue Coreg 25 mg bid  -- continue Lasix 40 mg qd  -- continue asa    #AFib  -- resume Eliquis when possible post IV line placement  -- Previously planned cardioversion deferred for now.    #HLD  -- continue atorvastatin      Charly Major M.D.  Office: [phone]  Beeper: [phone] Alert, OOB to chair.  No cardiac sxs.  Ambulating with physical therapist.  IV line placed for home antibx.    Medications:  aspirin  chewable 81 milliGRAM(s) Oral daily  atorvastatin 40 milliGRAM(s) Oral at bedtime  carvedilol 25 milliGRAM(s) Oral every 12 hours  ceFAZolin   IVPB 1000 milliGRAM(s) IV Intermittent every 12 hours  furosemide    Tablet 40 milliGRAM(s) Oral daily  heparin  Injectable 5000 Unit(s) SubCutaneous every 8 hours  insulin glargine Injectable (LANTUS) 5 Unit(s) SubCutaneous at bedtime  insulin lispro (HumaLOG) corrective regimen sliding scale   SubCutaneous three times a day before meals  insulin lispro (HumaLOG) corrective regimen sliding scale   SubCutaneous at bedtime  insulin lispro Injectable (HumaLOG) 3 Unit(s) SubCutaneous three times a day before meals  levothyroxine 125 MICROGram(s) Oral daily  oxyCODONE    IR 5 milliGRAM(s) Oral every 4 hours PRN  oxyCODONE    IR 10 milliGRAM(s) Oral every 4 hours PRN  polyethylene glycol 3350 17 Gram(s) Oral daily PRN    PMH/PSH/FH/SH:  Unchanged    ROS:  General: no fatigue/malaise, weight loss/gain.  Skin: no rashes.  Ophthalmologic: no blurred vision, no loss of vision. 	  Respiratory: no SOB, cough or wheeze.  Cardiovascular: no chest pain, dyspnea, palpitations, orthopnea or paroxysmal nocturnal dyspnea. No claudication.  Gastrointestinal:  no N/V/D, no melena/hematemesis/hematochezia.  Genitourinary: no dysuria/hesitancy or hematuria.  Neurological: no changes in vision or hearing, no lightheadedness/dizziness, no syncope/near syncope	  Psychiatric: no unusual stress/anxiety.   Hematology/Lymphatics: no unusual bleeding, bruising and no lymphadenopathy.  Endocrine: no unusual thirst.     Vitals:  T(C): 36.7 (18 @ 09:50), Max: 37.2 (18 @ 21:41)  HR: 59 (18 @ 09:50) (59 - 73)  BP: 99/63 (18 @ 09:50) (98/62 - 112/71)  RR: 18 (18 @ 09:50) (16 - 18)  SpO2: 100% (18 @ 09:50) (95% - 100%)  Daily Weight in k.6 (25 May 2018 11:50)    EXAM:  GENERAL:  Alert, no distress  HEENT: Normocephalic, EOM intact, PERRLA  NECK: Normal carotid upstrokes, no bruit; No JVD  CHEST:  Clear to auscultation  HEART: PMI not displaced. Normal S1 and S2.  No murmur, rub or gallop.  ABDOMEN: Normal bowel sounds, no bruit. Soft, non-tender.  Liver and spleen not palpable; aorta not palpable.  EXT:  No edema, no varicosities, 2+ pulses in upper and lower extremities.  PSYCH:  A&Ox3, normal insight, no anxiety  NEURO: Non-focal  SKIN:  No rash     I&O's Summary    24 May 2018 07:  -  25 May 2018 07:00  --------------------------------------------------------  IN: 840 mL / OUT: 1650 mL / NET: -810 mL    25 May 2018 07:01  -  25 May 2018 13:21  --------------------------------------------------------  IN: 120 mL / OUT: 700 mL / NET: -580 mL    Labs:                      10.1   4.7   )-----------( 77       ( 25 May 2018 06:56 )             30.6       133<L>  |  97  |  55<H>  ----------------------------<  163<H>  3.4<L>   |  23  |  1.63<H>    Ca    8.6      25 May 2018 06:56  Mg     2.1     -    TPro  6.9  /  Alb  2.9<L>  /  TBili  0.8  /  DBili  x   /  AST  58<H>  /  ALT  23  /  AlkPhos  195<H>      Serum Pro-Brain Natriuretic Peptide: 4700 pg/mL (18 @ 03:53)      Assessment		  68 year old male with PMHx of ICM EF 40%, CABG, HTN, DM II, HLD, pituitary tumor, atrial fibrillation s/p ICD  Right knee pain and swelling due to Staph infection, s/p washout of septic joint.  Remains on antibx.    #ICM EF 40% s/p ICD  -- continue Coreg 25 mg bid  -- continue Lasix 40 mg qd  -- continue asa  -- will hold off on ramipril at his time given borderline BP and concern re transient JULIANNE.    #AFib  -- resume Eliquis next Tues.    -- Previously planned cardioversion deferred for now; patient will f/u with Dr. Rivers after discharge.    #HLD  -- continue atorvastatin    Discussed with Dr. Zack Perez of orthopedics.    Charly Major M.D.  Office: [phone]  Beeper: [phone]

## 2018-05-25 NOTE — PROGRESS NOTE ADULT - ATTENDING COMMENTS
Stef Albert  Pager: [phone]. If no response or past 5 pm call [phone].
Flexing and extending knee without difficulty.    No evidence that there is/was septic arthritis. Suspect that the ED tapped the bursa, which has now been drained.    Continue packing/drain, discontinue tuesday. Abx
Dr. Leonard (Attending Physician)  Pain well controlled. Afib with RVR and NSVT improved with increased carvedilol, will put on home dose today.  JULIANNE improving ho CKD Stage 3, Restart lasix 40mg orally today.  Will start stool softeners.  On aspirin for CAD, cabg, stents, lovenox for dvt ppx.  Holding anticoagulation.  May need Hep gtt. Staph Aureus from drainage of pre-patellar bursitis.  Given vancomycin 1250 mg after low yesterday. Will list for telemetry floor.
Stef Albert  Pager: [phone]. If no response or past 5 pm call [phone].
Infected knee, ARF, CKD  1.  ARF--rx infection.  Hemodynamic optimization  2.  CKD--likely DM2.  W/U in progress.
Pt has hemodynamically stable to be discharged home with home infusion. He is to complete total 3 week course of IV abx and to follow up with Dr. Albert as outpatient.     61 minutes spent on discharge process    Janna Hennessy MD  Division of Hospital Medicine  Pager: [phone]  Office: [phone]
Dr. Leonard (Attending Physician)  N - Pain control  P - No acute resp issues  C - Afib with RVR Tachycardic to 120s this morning likely secondary to severe sepsis from septic arthritis.  NSVT overnight no shocks. ischemic cardiomyopathy, HFrEF of 40%, CAD s/p CABG x4 (1986) & stents x2 (RCA in 1999), atrial fibrillation on Eliquis, s/p PPM/ACID ([employer] [id number] placed in 2006 w/ lead malfunction & replacement, last interrogation 04/2017), HTN, HLD  GI - NPO for OR, Ho Cirrhosis   - JULIANNE on CKD stage 3.  H - Coagulopathy secondary to Eliquis given vitamin K 5 mg yesterday for cirrhosis with elevated INR.    ID - Septic Arthritis on Vanc/Zosyn  Endo - hypothyroid on synthroid, DM on ssi
Agree with above.  See my consult note signed 5/23
Janna Hennessy MD  Division of Hospital Medicine  Pager: [phone]  Office: [phone]
Janna Hennessy MD  Division of Hospital Medicine  Pager: [phone]  Office: [phone]
Janna Hennessy MD  Division of Hospital Medicine  Pager: [phone]  Office: [phone]

## 2018-05-25 NOTE — DISCHARGE NOTE ADULT - NSFTFSERV1RD_GEN_ALL_CORE
rehabilitation services/observation and assessment/central venous access care/medication teaching and assessment

## 2018-05-25 NOTE — PROGRESS NOTE ADULT - PROBLEM SELECTOR PROBLEM 6
Cirrhosis of liver without ascites, unspecified hepatic cirrhosis type

## 2018-05-25 NOTE — DISCHARGE NOTE ADULT - ADDITIONAL INSTRUCTIONS
Follow up with [facility] as outpatient for further kidney workup - call [phone] for appointment within 1 week  Continue home infusions of abx for total 3 weeks - completes on 6/7/18 Follow up with [facility] as outpatient for further kidney workup - call [phone] for appointment within 1 week  Continue home infusions of abx for total 3 weeks - completes on 6/7/18  You need weekly CBC/CMP blood work to monitor while on antibiotics Follow up with [facility] as outpatient for further kidney workup - call [phone] for appointment within 1 week  Continue home infusions of abx for total 3 weeks - completes on 6/7/18  You need weekly CBC/CMP blood work to monitor while on antibiotics  Follow up with orthopedics Dr. Watkins as outpatient - call for appointment within 1 week Follow up with [facility] as outpatient for further kidney workup - call [phone] for appointment within 1 week  Continue home infusions of abx for total 3 weeks - completes on 6/7/18 - F/u with ID Dr. Ybarra in 1-2 weeks  You need weekly CBC/CMP blood work to monitor while on antibiotics  Follow up with orthopedics Dr. Watkins as outpatient - call for appointment within 1 week  Follow up with cardiologist Dr. Rivers in 1 week  Follow up with PCP Dr. Thapa in 1-2 weeks Follow up with [facility] as outpatient for further kidney workup - call [phone] for appointment within 1 week  Continue home infusions of abx for total 3 weeks - completes on 6/7/18 - F/u with ID Dr. Ybarra in 1-2 weeks  You need weekly CBC/CMP blood work to monitor while on antibiotics  Follow up with orthopedics Dr. Watkins as outpatient - call for appointment within 1 week  Follow up with cardiologist Dr. Rivers within 1 week follow up when to resume eliquis  Follow up with PCP Dr. Thapa in 1-2 weeks

## 2018-05-25 NOTE — DISCHARGE NOTE ADULT - CARE PLAN
Principal Discharge DX:	Prepatellar bursitis of right knee  Secondary Diagnosis:	Atrial fibrillation with RVR  Secondary Diagnosis:	Acute renal failure superimposed on stage 3 chronic kidney disease, unspecified acute renal failure type  Secondary Diagnosis:	Chronic systolic heart failure  Secondary Diagnosis:	Thrombocytopenia  Secondary Diagnosis:	Cirrhosis of liver without ascites, unspecified hepatic cirrhosis type  Secondary Diagnosis:	Type 2 diabetes mellitus with other circulatory complication, without long-term current use of insulin Principal Discharge DX:	Prepatellar bursitis of right knee  Goal:	Resolution - complete antibiotics  Assessment and plan of treatment:	Continue home infusions of abx for total 3 weeks - completes on 6/7/18  Take all antibiotics as ordered.  Call you Health care provider upon arrival home to make a one week follow up appointment.  If you develop fever, chills, malaise, or change in mental status call your Health Care Provider or go to the Emergency Department.  Nutrition is important, eat small frequent meals to help ensure you get adequate calories.  Do not stay in bed all day!  Increase your activity daily as tolerated.  Secondary Diagnosis:	Atrial fibrillation with RVR  Goal:	Controlled Heart Rate  Assessment and plan of treatment:	Atrial fibrillation is the most common heart rhythm problem.  The condition puts you at risk for has stroke and heart attack  It helps if you control your blood pressure, not drink more than 1-2 alcohol drinks per day, cut down on caffeine, getting treatment for over active thyroid gland, and get regular exercise  Call your doctor if you feel your heart racing or beating unusually, chest tightness or pain, lightheaded, faint, shortness of breath especially with exercise  It is important to take your heart medication as prescribed  You may be on anticoagulation which is very important to take as directed - you may need blood work to monitor drug levels  Secondary Diagnosis:	Acute renal failure superimposed on stage 3 chronic kidney disease, unspecified acute renal failure type  Goal:	Improvement  Assessment and plan of treatment:	Follow up with [facility] as outpatient for further kidney workup - call [phone] for appointment within 1 week  Secondary Diagnosis:	Chronic systolic heart failure  Goal:	Stable  Assessment and plan of treatment:	Weigh yourself daily.  If you gain 3lbs in 3 days, or 5lbs in a week call your Health Care Provider.  Do not eat or drink foods containing more than 2000mg of salt (sodium) in your diet every day.  Call your Health Care Provider if you have any swelling or increased swelling in your feet, ankles, and/or stomach.  Take all of your medication as directed.  If you become dizzy call your Health Care Provider.  Secondary Diagnosis:	Thrombocytopenia  Goal:	Stable  Assessment and plan of treatment:	Continue to monitor platelet count with PCP  Monitor for any signs of bleeding  Secondary Diagnosis:	Cirrhosis of liver without ascites, unspecified hepatic cirrhosis type  Goal:	Stable  Assessment and plan of treatment:	Cirrhosis is scarring of the liver which can cause heavy bleeding, swelling, & breathing problems  Call your doctor with belly & leg swelling, shortness of breath, bruising or bleeding easily, feeling full, tired, trouble getting enough or too much sleep, yellowing of skin or whites of eye, sudden confusion, or coma  Causes may include heavy alcohol use, hepatitis B or C, or fatty liver.    You can help yourself by avoiding alcohol, speak with your doctor before starting on any new medication, herbs, vitamins, or supplements which may cause more damage to the liver  Treatment includes treating the cause, reducing blood pressure, low salt diet, diuretics, belly fluid drainage, treating infections, getting vaccinations to prevent common infections, &/or lactulose to improve confusion  It is important to get help if you have an alcohol problem, use condoms when having sex, and nor sharing drug needles if this applies to you  Secondary Diagnosis:	Type 2 diabetes mellitus with other circulatory complication, without long-term current use of insulin  Goal:	Stable  Assessment and plan of treatment:	HgA1C this admission - 7.5  Make sure you get your HgA1c checked every three months.  If you take oral diabetes medications, check your blood glucose two times a day.  If you take insulin, check your blood glucose before meals and at bedtime.  It's important not to skip any meals.  Keep a log of your blood glucose results and always take it with you to your doctor appointments.  Keep a list of your current medications including injectables and over the counter medications and bring this medication list with you to all your doctor appointments.  If you have not seen your ophthalmologist this year call for appointment.  Check your feet daily for redness, sores, or openings. Do not self treat. If no improvement in two days call your primary care physician for an appointment.  Low blood sugar (hypoglycemia) is a blood sugar below 70mg/dl. Check your blood sugar if you feel signs/symptoms of hypoglycemia. If your blood sugar is below 70 take 15 grams of carbohydrates (ex 4 oz of apple juice, 3-4 glucose tablets, or 4-6 oz of regular soda) wait 15 minutes and repeat blood sugar to make sure it comes up above 70.  If your blood sugar is above 70 and you are due for a meal, have a meal.  If you are not due for a meal have a snack.  This snack helps keeps your blood sugar at a safe range. Principal Discharge DX:	Prepatellar bursitis of right knee  Goal:	Resolution - complete antibiotics  Assessment and plan of treatment:	You had a tunneled IJ catheter placement by IR (5/24) to Continue home infusions of abx for total 3 weeks - completes on 6/7/18 - you need weekly CBC/CMP blood work to monitor while on antibiotics  Take all antibiotics as ordered.  Pain control as needed  Call you Health care provider upon arrival home to make a one week follow up appointment.  If you develop fever, chills, malaise, or change in mental status call your Health Care Provider or go to the Emergency Department.  Nutrition is important, eat small frequent meals to help ensure you get adequate calories.  Do not stay in bed all day!  Increase your activity daily as tolerated.  Secondary Diagnosis:	Atrial fibrillation with RVR  Goal:	Controlled Heart Rate  Assessment and plan of treatment:	Atrial fibrillation is the most common heart rhythm problem.  The condition puts you at risk for has stroke and heart attack  It helps if you control your blood pressure, not drink more than 1-2 alcohol drinks per day, cut down on caffeine, getting treatment for over active thyroid gland, and get regular exercise  Call your doctor if you feel your heart racing or beating unusually, chest tightness or pain, lightheaded, faint, shortness of breath especially with exercise  It is important to take your heart medication as prescribed  You may be on anticoagulation which is very important to take as directed - you may need blood work to monitor drug levels  Secondary Diagnosis:	Acute renal failure superimposed on stage 3 chronic kidney disease, unspecified acute renal failure type  Goal:	Improvement  Assessment and plan of treatment:	Follow up with [facility] as outpatient for further kidney workup - call [phone] for appointment within 1 week  Avoid taking (NSAIDs) - (ex: Ibuprofen, Advil, Celebrex, Naprosyn)  Avoid taking any nephrotoxic agents (can harm kidneys) - Intravenous contrast for diagnostic testing, combination cold medications.  Have all medications adjusted for your renal function by your Health Care Provider.  Blood pressure control is important.  Take all medication as prescribed.  Secondary Diagnosis:	Chronic systolic heart failure  Goal:	Stable  Assessment and plan of treatment:	Weigh yourself daily.  If you gain 3lbs in 3 days, or 5lbs in a week call your Health Care Provider.  Do not eat or drink foods containing more than 2000mg of salt (sodium) in your diet every day.  Call your Health Care Provider if you have any swelling or increased swelling in your feet, ankles, and/or stomach.  Take all of your medication as directed.  If you become dizzy call your Health Care Provider.  Secondary Diagnosis:	Thrombocytopenia  Goal:	Stable  Assessment and plan of treatment:	Continue to monitor platelet count with PCP  Monitor for any signs of bleeding  Secondary Diagnosis:	Cirrhosis of liver without ascites, unspecified hepatic cirrhosis type  Goal:	Stable  Assessment and plan of treatment:	Cirrhosis is scarring of the liver which can cause heavy bleeding, swelling, & breathing problems  Call your doctor with belly & leg swelling, shortness of breath, bruising or bleeding easily, feeling full, tired, trouble getting enough or too much sleep, yellowing of skin or whites of eye, sudden confusion, or coma  Causes may include heavy alcohol use, hepatitis B or C, or fatty liver.    You can help yourself by avoiding alcohol, speak with your doctor before starting on any new medication, herbs, vitamins, or supplements which may cause more damage to the liver  Treatment includes treating the cause, reducing blood pressure, low salt diet, diuretics, belly fluid drainage, treating infections, getting vaccinations to prevent common infections, &/or lactulose to improve confusion  It is important to get help if you have an alcohol problem, use condoms when having sex, and nor sharing drug needles if this applies to you  Secondary Diagnosis:	Type 2 diabetes mellitus with other circulatory complication, without long-term current use of insulin  Goal:	Stable  Assessment and plan of treatment:	HgA1C this admission - 7.5  Make sure you get your HgA1c checked every three months.  If you take oral diabetes medications, check your blood glucose two times a day.  If you take insulin, check your blood glucose before meals and at bedtime.  It's important not to skip any meals.  Keep a log of your blood glucose results and always take it with you to your doctor appointments.  Keep a list of your current medications including injectables and over the counter medications and bring this medication list with you to all your doctor appointments.  If you have not seen your ophthalmologist this year call for appointment.  Check your feet daily for redness, sores, or openings. Do not self treat. If no improvement in two days call your primary care physician for an appointment.  Low blood sugar (hypoglycemia) is a blood sugar below 70mg/dl. Check your blood sugar if you feel signs/symptoms of hypoglycemia. If your blood sugar is below 70 take 15 grams of carbohydrates (ex 4 oz of apple juice, 3-4 glucose tablets, or 4-6 oz of regular soda) wait 15 minutes and repeat blood sugar to make sure it comes up above 70.  If your blood sugar is above 70 and you are due for a meal, have a meal.  If you are not due for a meal have a snack.  This snack helps keeps your blood sugar at a safe range. Principal Discharge DX:	Prepatellar bursitis of right knee  Goal:	Resolution - complete antibiotics  Assessment and plan of treatment:	You had a tunneled IJ catheter placement by IR (5/24) to Continue home infusions of abx for total 3 weeks - completes on 6/7/18 - you need weekly CBC/CMP blood work to monitor while on antibiotics  Take all antibiotics as ordered.  Pain control as needed  Follow up with orthopedics Dr. Watkins as outpatient - call for appointment within 1 week  Call you Health care provider upon arrival home to make a one week follow up appointment.  If you develop fever, chills, malaise, or change in mental status call your Health Care Provider or go to the Emergency Department.  Nutrition is important, eat small frequent meals to help ensure you get adequate calories.  Do not stay in bed all day!  Increase your activity daily as tolerated.  Secondary Diagnosis:	Atrial fibrillation with RVR  Goal:	Controlled Heart Rate  Assessment and plan of treatment:	Atrial fibrillation is the most common heart rhythm problem.  The condition puts you at risk for has stroke and heart attack  It helps if you control your blood pressure, not drink more than 1-2 alcohol drinks per day, cut down on caffeine, getting treatment for over active thyroid gland, and get regular exercise  Call your doctor if you feel your heart racing or beating unusually, chest tightness or pain, lightheaded, faint, shortness of breath especially with exercise  It is important to take your heart medication as prescribed  You may be on anticoagulation which is very important to take as directed - you may need blood work to monitor drug levels  Secondary Diagnosis:	Acute renal failure superimposed on stage 3 chronic kidney disease, unspecified acute renal failure type  Goal:	Improvement  Assessment and plan of treatment:	Follow up with [facility] as outpatient for further kidney workup - call [phone] for appointment within 1 week  Avoid taking (NSAIDs) - (ex: Ibuprofen, Advil, Celebrex, Naprosyn)  Avoid taking any nephrotoxic agents (can harm kidneys) - Intravenous contrast for diagnostic testing, combination cold medications.  Have all medications adjusted for your renal function by your Health Care Provider.  Blood pressure control is important.  Take all medication as prescribed.  Secondary Diagnosis:	Chronic systolic heart failure  Goal:	Stable  Assessment and plan of treatment:	Weigh yourself daily.  If you gain 3lbs in 3 days, or 5lbs in a week call your Health Care Provider.  Do not eat or drink foods containing more than 2000mg of salt (sodium) in your diet every day.  Call your Health Care Provider if you have any swelling or increased swelling in your feet, ankles, and/or stomach.  Take all of your medication as directed.  If you become dizzy call your Health Care Provider.  Secondary Diagnosis:	Thrombocytopenia  Goal:	Stable  Assessment and plan of treatment:	Continue to monitor platelet count with PCP  Monitor for any signs of bleeding  Secondary Diagnosis:	Cirrhosis of liver without ascites, unspecified hepatic cirrhosis type  Goal:	Stable  Assessment and plan of treatment:	Cirrhosis is scarring of the liver which can cause heavy bleeding, swelling, & breathing problems  Call your doctor with belly & leg swelling, shortness of breath, bruising or bleeding easily, feeling full, tired, trouble getting enough or too much sleep, yellowing of skin or whites of eye, sudden confusion, or coma  Causes may include heavy alcohol use, hepatitis B or C, or fatty liver.    You can help yourself by avoiding alcohol, speak with your doctor before starting on any new medication, herbs, vitamins, or supplements which may cause more damage to the liver  Treatment includes treating the cause, reducing blood pressure, low salt diet, diuretics, belly fluid drainage, treating infections, getting vaccinations to prevent common infections, &/or lactulose to improve confusion  It is important to get help if you have an alcohol problem, use condoms when having sex, and nor sharing drug needles if this applies to you  Secondary Diagnosis:	Type 2 diabetes mellitus with other circulatory complication, without long-term current use of insulin  Goal:	Stable  Assessment and plan of treatment:	HgA1C this admission - 7.5  Make sure you get your HgA1c checked every three months.  If you take oral diabetes medications, check your blood glucose two times a day.  If you take insulin, check your blood glucose before meals and at bedtime.  It's important not to skip any meals.  Keep a log of your blood glucose results and always take it with you to your doctor appointments.  Keep a list of your current medications including injectables and over the counter medications and bring this medication list with you to all your doctor appointments.  If you have not seen your ophthalmologist this year call for appointment.  Check your feet daily for redness, sores, or openings. Do not self treat. If no improvement in two days call your primary care physician for an appointment.  Low blood sugar (hypoglycemia) is a blood sugar below 70mg/dl. Check your blood sugar if you feel signs/symptoms of hypoglycemia. If your blood sugar is below 70 take 15 grams of carbohydrates (ex 4 oz of apple juice, 3-4 glucose tablets, or 4-6 oz of regular soda) wait 15 minutes and repeat blood sugar to make sure it comes up above 70.  If your blood sugar is above 70 and you are due for a meal, have a meal.  If you are not due for a meal have a snack.  This snack helps keeps your blood sugar at a safe range. Principal Discharge DX:	Prepatellar bursitis of right knee  Goal:	Resolution - complete antibiotics  Assessment and plan of treatment:	You had a tunneled IJ catheter placement by IR (5/24) to Continue home infusions of abx for total 3 weeks - completes on 6/7/18 - you need weekly CBC/CMP blood work to monitor while on antibiotics  Take all antibiotics as ordered.  Pain control as needed  Follow up with orthopedics Dr. Watkins as outpatient - call for appointment within 1 week  Call you Health care provider upon arrival home to make a one week follow up appointment.  If you develop fever, chills, malaise, or change in mental status call your Health Care Provider or go to the Emergency Department.  Nutrition is important, eat small frequent meals to help ensure you get adequate calories.  Do not stay in bed all day!  Increase your activity daily as tolerated.  Secondary Diagnosis:	Atrial fibrillation with RVR  Goal:	Controlled Heart Rate  Assessment and plan of treatment:	Atrial fibrillation is the most common heart rhythm problem.  The condition puts you at risk for has stroke and heart attack  It helps if you control your blood pressure, not drink more than 1-2 alcohol drinks per day, cut down on caffeine, getting treatment for over active thyroid gland, and get regular exercise  Call your doctor if you feel your heart racing or beating unusually, chest tightness or pain, lightheaded, faint, shortness of breath especially with exercise  It is important to take your heart medication as prescribed  You may be on anticoagulation which is very important to take as directed - you may need blood work to monitor drug levels  Secondary Diagnosis:	Acute renal failure superimposed on stage 3 chronic kidney disease, unspecified acute renal failure type  Goal:	Improvement  Assessment and plan of treatment:	Follow up with [facility] as outpatient for further kidney workup - call [phone] for appointment within 1 week  Avoid taking (NSAIDs) - (ex: Ibuprofen, Advil, Celebrex, Naprosyn)  Avoid taking any nephrotoxic agents (can harm kidneys) - Intravenous contrast for diagnostic testing, combination cold medications.  Have all medications adjusted for your renal function by your Health Care Provider.  Blood pressure control is important.  Take all medication as prescribed.  Secondary Diagnosis:	Chronic systolic heart failure  Goal:	Stable  Assessment and plan of treatment:	Weigh yourself daily.  If you gain 3lbs in 3 days, or 5lbs in a week call your Health Care Provider.  Do not eat or drink foods containing more than 2000mg of salt (sodium) in your diet every day.  Call your Health Care Provider if you have any swelling or increased swelling in your feet, ankles, and/or stomach.  Take all of your medication as directed.  If you become dizzy call your Health Care Provider.  Secondary Diagnosis:	Thrombocytopenia  Goal:	Stable  Assessment and plan of treatment:	Continue to monitor platelet count with PCP  Monitor for any signs of bleeding  Secondary Diagnosis:	Cirrhosis of liver without ascites, unspecified hepatic cirrhosis type  Goal:	Stable  Assessment and plan of treatment:	Cirrhosis is scarring of the liver which can cause heavy bleeding, swelling, & breathing problems  Call your doctor with belly & leg swelling, shortness of breath, bruising or bleeding easily, feeling full, tired, trouble getting enough or too much sleep, yellowing of skin or whites of eye, sudden confusion, or coma  Causes may include heavy alcohol use, hepatitis B or C, or fatty liver.    You can help yourself by avoiding alcohol, speak with your doctor before starting on any new medication, herbs, vitamins, or supplements which may cause more damage to the liver  Treatment includes treating the cause, reducing blood pressure, low salt diet, diuretics, belly fluid drainage, treating infections, getting vaccinations to prevent common infections, &/or lactulose to improve confusion  It is important to get help if you have an alcohol problem, use condoms when having sex, and nor sharing drug needles if this applies to you  Secondary Diagnosis:	Type 2 diabetes mellitus with other circulatory complication, without long-term current use of insulin  Goal:	Stable  Assessment and plan of treatment:	Follow up with endocrinologist Dr. Hennessy as scheduled within next month  HgA1C this admission - 7.5  Make sure you get your HgA1c checked every three months.  If you take oral diabetes medications, check your blood glucose two times a day.  If you take insulin, check your blood glucose before meals and at bedtime.  It's important not to skip any meals.  Keep a log of your blood glucose results and always take it with you to your doctor appointments.  Keep a list of your current medications including injectables and over the counter medications and bring this medication list with you to all your doctor appointments.  If you have not seen your ophthalmologist this year call for appointment.  Check your feet daily for redness, sores, or openings. Do not self treat. If no improvement in two days call your primary care physician for an appointment.  Low blood sugar (hypoglycemia) is a blood sugar below 70mg/dl. Check your blood sugar if you feel signs/symptoms of hypoglycemia. If your blood sugar is below 70 take 15 grams of carbohydrates (ex 4 oz of apple juice, 3-4 glucose tablets, or 4-6 oz of regular soda) wait 15 minutes and repeat blood sugar to make sure it comes up above 70.  If your blood sugar is above 70 and you are due for a meal, have a meal.  If you are not due for a meal have a snack.  This snack helps keeps your blood sugar at a safe range. Principal Discharge DX:	Prepatellar bursitis of right knee  Goal:	Resolution - complete antibiotics  Assessment and plan of treatment:	You had a tunneled IJ catheter placement by IR (5/24) to Continue home infusions of abx for total 3 weeks - completes on 6/7/18 - you need weekly CBC/CMP blood work to monitor while on antibiotics  Take all antibiotics as ordered.  Pain control as needed  Follow up with orthopedics Dr. Watkins as outpatient - call for appointment within 1 week  Call you Health care provider upon arrival home to make a one week follow up appointment.  If you develop fever, chills, malaise, or change in mental status call your Health Care Provider or go to the Emergency Department.  Nutrition is important, eat small frequent meals to help ensure you get adequate calories.  Do not stay in bed all day!  Increase your activity daily as tolerated.  Secondary Diagnosis:	Atrial fibrillation with RVR  Goal:	Controlled Heart Rate  Assessment and plan of treatment:	Follow up with Dr. Rivers within 1 week to follow up when to resume eliquis  Atrial fibrillation is the most common heart rhythm problem.  The condition puts you at risk for has stroke and heart attack  It helps if you control your blood pressure, not drink more than 1-2 alcohol drinks per day, cut down on caffeine, getting treatment for over active thyroid gland, and get regular exercise  Call your doctor if you feel your heart racing or beating unusually, chest tightness or pain, lightheaded, faint, shortness of breath especially with exercise  It is important to take your heart medication as prescribed  You may be on anticoagulation which is very important to take as directed - you may need blood work to monitor drug levels  Secondary Diagnosis:	Acute renal failure superimposed on stage 3 chronic kidney disease, unspecified acute renal failure type  Goal:	Improvement  Assessment and plan of treatment:	Follow up with [facility] as outpatient for further kidney workup - call [phone] for appointment within 1 week  Avoid taking (NSAIDs) - (ex: Ibuprofen, Advil, Celebrex, Naprosyn)  Avoid taking any nephrotoxic agents (can harm kidneys) - Intravenous contrast for diagnostic testing, combination cold medications.  Have all medications adjusted for your renal function by your Health Care Provider.  Blood pressure control is important.  Take all medication as prescribed.  Secondary Diagnosis:	Chronic systolic heart failure  Goal:	Stable  Assessment and plan of treatment:	Weigh yourself daily.  If you gain 3lbs in 3 days, or 5lbs in a week call your Health Care Provider.  Do not eat or drink foods containing more than 2000mg of salt (sodium) in your diet every day.  Call your Health Care Provider if you have any swelling or increased swelling in your feet, ankles, and/or stomach.  Take all of your medication as directed.  If you become dizzy call your Health Care Provider.  Secondary Diagnosis:	Thrombocytopenia  Goal:	Stable  Assessment and plan of treatment:	Continue to monitor platelet count with PCP  Monitor for any signs of bleeding  Secondary Diagnosis:	Cirrhosis of liver without ascites, unspecified hepatic cirrhosis type  Goal:	Stable  Assessment and plan of treatment:	Cirrhosis is scarring of the liver which can cause heavy bleeding, swelling, & breathing problems  Call your doctor with belly & leg swelling, shortness of breath, bruising or bleeding easily, feeling full, tired, trouble getting enough or too much sleep, yellowing of skin or whites of eye, sudden confusion, or coma  Causes may include heavy alcohol use, hepatitis B or C, or fatty liver.    You can help yourself by avoiding alcohol, speak with your doctor before starting on any new medication, herbs, vitamins, or supplements which may cause more damage to the liver  Treatment includes treating the cause, reducing blood pressure, low salt diet, diuretics, belly fluid drainage, treating infections, getting vaccinations to prevent common infections, &/or lactulose to improve confusion  It is important to get help if you have an alcohol problem, use condoms when having sex, and nor sharing drug needles if this applies to you  Secondary Diagnosis:	Type 2 diabetes mellitus with other circulatory complication, without long-term current use of insulin  Goal:	Stable  Assessment and plan of treatment:	Follow up with endocrinologist Dr. Hennessy as scheduled within next month  HgA1C this admission - 7.5  Make sure you get your HgA1c checked every three months.  If you take oral diabetes medications, check your blood glucose two times a day.  If you take insulin, check your blood glucose before meals and at bedtime.  It's important not to skip any meals.  Keep a log of your blood glucose results and always take it with you to your doctor appointments.  Keep a list of your current medications including injectables and over the counter medications and bring this medication list with you to all your doctor appointments.  If you have not seen your ophthalmologist this year call for appointment.  Check your feet daily for redness, sores, or openings. Do not self treat. If no improvement in two days call your primary care physician for an appointment.  Low blood sugar (hypoglycemia) is a blood sugar below 70mg/dl. Check your blood sugar if you feel signs/symptoms of hypoglycemia. If your blood sugar is below 70 take 15 grams of carbohydrates (ex 4 oz of apple juice, 3-4 glucose tablets, or 4-6 oz of regular soda) wait 15 minutes and repeat blood sugar to make sure it comes up above 70.  If your blood sugar is above 70 and you are due for a meal, have a meal.  If you are not due for a meal have a snack.  This snack helps keeps your blood sugar at a safe range.

## 2018-05-25 NOTE — PROGRESS NOTE ADULT - PROBLEM SELECTOR PROBLEM 1
Needs peripherally inserted central catheter (PICC)
JULIANNE (acute kidney injury)
Prepatellar bursitis of right knee

## 2018-05-25 NOTE — DISCHARGE NOTE ADULT - PROVIDER TOKENS
FREE:[LAST:[[facility]],PHONE:[phone]],FAX:[(   )    -]] FREE:[LAST:[[facility]],PHONE:[phone]],FAX:[(   )    -]],TOKEN:'[id number]' TOKEN:'[id number]',FREE:[LAST:[[facility]],PHONE:[phone]],FAX:[(   )    -]],TOKEN:'[id number]' TOKEN:'[id number]:'[id number]',FREE:[LAST:[[facility]],PHONE:[phone]],FAX:[(   )    -]],TOKEN:'[id number]'

## 2018-05-25 NOTE — PROGRESS NOTE ADULT - PROBLEM SELECTOR PROBLEM 4
Chronic systolic heart failure

## 2018-05-25 NOTE — PROGRESS NOTE ADULT - NSHPATTENDINGPLANDISCUSS_GEN_ALL_CORE
Medicine Attending
Medicine Attending
cardiology fellow, Dr. DHARA Loera; patient seen and examined.  Hx., exam and labs as above.  I agree with the assessment and recommendations.
Dr. Simon
NP Niesha
hospitalist
hospitalist
HUSSAIN Zack

## 2018-05-25 NOTE — PROGRESS NOTE ADULT - PROBLEM SELECTOR PLAN 6
Suspected etiology due to fatty liver dz/cardiac.   No history of significant ETOH abuse.   s/p EGD/colonoscopy last year with Grade 1 varices and diverticulosis

## 2018-05-25 NOTE — PROGRESS NOTE ADULT - SUBJECTIVE AND OBJECTIVE BOX
[facility] DIVISION OF KIDNEY DISEASES AND HYPERTENSION -- [phone]   FOLLOW UP NOTE  --------------------------------------------------------------------------------  HPI:  69 yo M with PMH of ICM EF 40%, CAD s/p CABG, RCA stent, CKD 3 (baseline Cr 1.6), HTN, DM II, HLD, pituitary tumor, atrial fibrillation s/p PPM, AICD, cirrhosis, thrombocytopenia presented with severe sepsis with hypotension 2/2 septic joint in R knee s/p washout, now needs IV access for  long-term antibiotics. Pt s/p tunnelled CVC placement on (5/24).     PAST HISTORY  --------------------------------------------------------------------------------  No significant changes to PMH, PSH, FHx, SHx, unless otherwise noted    ALLERGIES & MEDICATIONS  --------------------------------------------------------------------------------  Allergies    No Known Allergies    Intolerances      Standing Inpatient Medications  aspirin  chewable 81 milliGRAM(s) Oral daily  atorvastatin 40 milliGRAM(s) Oral at bedtime  carvedilol 25 milliGRAM(s) Oral every 12 hours  ceFAZolin   IVPB 1000 milliGRAM(s) IV Intermittent every 12 hours  furosemide    Tablet 40 milliGRAM(s) Oral daily  heparin  Injectable 5000 Unit(s) SubCutaneous every 8 hours  insulin glargine Injectable (LANTUS) 5 Unit(s) SubCutaneous at bedtime  insulin lispro (HumaLOG) corrective regimen sliding scale   SubCutaneous three times a day before meals  insulin lispro (HumaLOG) corrective regimen sliding scale   SubCutaneous at bedtime  insulin lispro Injectable (HumaLOG) 3 Unit(s) SubCutaneous three times a day before meals  levothyroxine 125 MICROGram(s) Oral daily    PRN Inpatient Medications  oxyCODONE    IR 5 milliGRAM(s) Oral every 4 hours PRN  oxyCODONE    IR 10 milliGRAM(s) Oral every 4 hours PRN  polyethylene glycol 3350 17 Gram(s) Oral daily PRN      REVIEW OF SYSTEMS  --------------------------------------------------------------------------------  General: no fever  CVS: no chest pain  RESP: no sob, no cough  ABD: no abdominal pain  : no dysuria,  MSK: no edema     VITALS/PHYSICAL EXAM  --------------------------------------------------------------------------------  T(C): 36.7 (05-25-18 @ 01:42), Max: 37.2 (05-24-18 @ 21:41)  HR: 63 (05-25-18 @ 01:42) (63 - 73)  BP: 112/71 (05-25-18 @ 01:42) (97/52 - 112/71)  RR: 18 (05-25-18 @ 01:42) (16 - 18)  SpO2: 99% (05-25-18 @ 01:42) (95% - 100%)  Wt(kg): --        05-24-18 @ 07:01  -  05-25-18 @ 07:00  --------------------------------------------------------  IN: 840 mL / OUT: 1650 mL / NET: -810 mL      Physical Exam:  	GENERAL: NAD, well-developed man sitting comfortably in a chair.   ENMT: Airway patent. MMM.  HEART: RRR; Normal S1, S2. No murmurs, rubs, or gallops  CHEST/LUNG: CTABL; No wheezing, rhonchi, or rales  ABDOMEN: +BS; Soft, obese distention, nontender  EXTREMITIES:  2+ Peripheral Pulses, No clubbing, cyanosis, or edema  PSYCH: AAOx3, normal mood and affect  SKIN: Warm, dry, intact; No rashes or lesions. dry flaking skin on b/l LE      LABS/STUDIES  --------------------------------------------------------------------------------              10.1   4.7   >-----------<  77       [05-25-18 @ 06:56]              30.6     133  |  97  |  55  ----------------------------<  163      [05-25-18 @ 06:56]  3.4   |  23  |  1.63        Ca     8.6     [05-25-18 @ 06:56]      Mg     2.1     [05-24-18 @ 06:48]    TPro  6.9  /  Alb  2.9  /  TBili  0.8  /  DBili  x   /  AST  58  /  ALT  23  /  AlkPhos  195  [05-25-18 @ 06:56]          Creatinine Trend:  SCr 1.63 [05-25 @ 06:56]  SCr 1.89 [05-24 @ 06:48]  SCr 2.25 [05-23 @ 07:03]  SCr 2.36 [05-22 @ 06:45]  SCr 1.95 [05-21 @ 07:14]    Urinalysis - [05-22-18 @ 18:30]      Color Yellow / Appearance Clear / SG 1.016 / pH 6.0      Gluc Negative / Ketone Negative  / Bili Negative / Urobili 1       Blood Negative / Protein Trace / Leuk Est Negative / Nitrite Negative      RBC  / WBC  / Hyaline  / Gran  / Sq Epi  / Non Sq Epi  / Bacteria     Urine Creatinine 133      [05-22-18 @ 22:35]  Urine Protein 21      [05-22-18 @ 22:35]  Urine Albumin 0.9      [05-22-18 @ 22:35]  Urine Sodium <20      [05-22-18 @ 18:50]  Urine Urea Nitrogen 666      [05-22-18 @ 22:35]  Urine Potassium 26      [05-22-18 @ 18:51]  Urine Chloride <35      [05-22-18 @ 18:51]  Urine Osmolality 382      [05-22-18 @ 18:51]    HbA1c 7.5      [05-22-18 @ 07:44]  TSH 9.71      [05-23-18 @ 08:21]

## 2018-05-25 NOTE — PROGRESS NOTE ADULT - PROBLEM SELECTOR PLAN 1
Septic bursitis with MSSA on fluid culture. s/p drain removal by ortho.   Currently on ancef since 5/18 likely will require 3 weeks of abx course.   Discussed care with Dr. Albert - pt will need total 3 weeks of abx since surgery.   - s/p tunneled IJ catheter placement by IR (5/24)  - resume eliquis 24hrs after line placement. To be resumed tonight  - care discussed with CM for home infusion  - Pain control on oxycodone. bowel regiment.  - continue home PT

## 2018-05-25 NOTE — DISCHARGE NOTE ADULT - MEDICATION SUMMARY - MEDICATIONS TO TAKE
I will START or STAY ON the medications listed below when I get home from the hospital:    Weekly CBC and CMP while on IV antibiotics  -- Indication: For Prepatellar bursitis of right knee    oxyCODONE 5 mg oral tablet  -- 1 tab(s) by mouth every 4 hours, As needed, Moderate Pain (4 - 6) MDD:6  -- Indication: For Prepatellar bursitis of right knee    Ecotrin Adult Low Strength 81 mg oral delayed release tablet  -- 1 tab(s) by mouth once a day ( continue )   -- Indication: For Chronic systolic heart failure    metFORMIN 1000 mg oral tablet  -- 1 tab(s) by mouth 2 times a day ( hold the morning of procedure )   -- Indication: For Type 2 diabetes mellitus with other circulatory complication, without long-term current use of insulin    Januvia 50 mg oral tablet  -- 1 tab(s) by mouth once a day ( hold the morning of procedure )   -- Indication: For Type 2 diabetes mellitus with other circulatory complication, without long-term current use of insulin    Amaryl 4 mg oral tablet  -- 1 tab(s) by mouth 2 times a day ( hold the morning of procedure )   -- Indication: For Type 2 diabetes mellitus with other circulatory complication, without long-term current use of insulin    Niaspan ER 1000 mg oral tablet, extended release  -- 1 tab(s) by mouth once a day (at bedtime)  -- Indication: For Hyperlipidemia    atorvastatin 40 mg oral tablet  -- 1 tab(s) by mouth once a day (at bedtime)  -- Indication: For Hyperlipidemia    bromocriptine 5 mg oral capsule  -- 1 cap(s) by mouth once a day  -- Indication: For Pituiatary Adenoma    carvedilol 25 mg oral tablet  -- 1 tab(s) by mouth every 12 hours  -- Indication: For Chronic systolic heart failure    ceFAZolin 1 g intravenous injection  -- 1 gram(s) intravenous every 12 hours  -- Indication: For Prepatellar bursitis of right knee    furosemide 40 mg oral tablet  -- 1 tab(s) by mouth once a day  -- Indication: For Chronic systolic heart failure    levothyroxine 125 mcg (0.125 mg) oral tablet  -- 1 tab(s) by mouth once a day  -- Indication: For Hypothyroid

## 2018-05-25 NOTE — DISCHARGE NOTE ADULT - PATIENT PORTAL LINK FT
You can access the [facility] Patient Portal, offered by [facility], by registering with the following website: [email or web address]

## 2018-05-25 NOTE — PROGRESS NOTE ADULT - PROBLEM SELECTOR PROBLEM 3
Hyponatremia
JULIANNE (acute kidney injury)

## 2018-05-25 NOTE — DISCHARGE NOTE ADULT - PLAN OF CARE
Resolution - complete antibiotics Continue home infusions of abx for total 3 weeks - completes on 6/7/18  Take all antibiotics as ordered.  Call you Health care provider upon arrival home to make a one week follow up appointment.  If you develop fever, chills, malaise, or change in mental status call your Health Care Provider or go to the Emergency Department.  Nutrition is important, eat small frequent meals to help ensure you get adequate calories.  Do not stay in bed all day!  Increase your activity daily as tolerated. Controlled Heart Rate Atrial fibrillation is the most common heart rhythm problem.  The condition puts you at risk for has stroke and heart attack  It helps if you control your blood pressure, not drink more than 1-2 alcohol drinks per day, cut down on caffeine, getting treatment for over active thyroid gland, and get regular exercise  Call your doctor if you feel your heart racing or beating unusually, chest tightness or pain, lightheaded, faint, shortness of breath especially with exercise  It is important to take your heart medication as prescribed  You may be on anticoagulation which is very important to take as directed - you may need blood work to monitor drug levels Improvement Follow up with [facility] as outpatient for further kidney workup - call [phone] for appointment within 1 week Stable Weigh yourself daily.  If you gain 3lbs in 3 days, or 5lbs in a week call your Health Care Provider.  Do not eat or drink foods containing more than 2000mg of salt (sodium) in your diet every day.  Call your Health Care Provider if you have any swelling or increased swelling in your feet, ankles, and/or stomach.  Take all of your medication as directed.  If you become dizzy call your Health Care Provider. Continue to monitor platelet count with PCP  Monitor for any signs of bleeding Cirrhosis is scarring of the liver which can cause heavy bleeding, swelling, & breathing problems  Call your doctor with belly & leg swelling, shortness of breath, bruising or bleeding easily, feeling full, tired, trouble getting enough or too much sleep, yellowing of skin or whites of eye, sudden confusion, or coma  Causes may include heavy alcohol use, hepatitis B or C, or fatty liver.    You can help yourself by avoiding alcohol, speak with your doctor before starting on any new medication, herbs, vitamins, or supplements which may cause more damage to the liver  Treatment includes treating the cause, reducing blood pressure, low salt diet, diuretics, belly fluid drainage, treating infections, getting vaccinations to prevent common infections, &/or lactulose to improve confusion  It is important to get help if you have an alcohol problem, use condoms when having sex, and nor sharing drug needles if this applies to you HgA1C this admission - 7.5  Make sure you get your HgA1c checked every three months.  If you take oral diabetes medications, check your blood glucose two times a day.  If you take insulin, check your blood glucose before meals and at bedtime.  It's important not to skip any meals.  Keep a log of your blood glucose results and always take it with you to your doctor appointments.  Keep a list of your current medications including injectables and over the counter medications and bring this medication list with you to all your doctor appointments.  If you have not seen your ophthalmologist this year call for appointment.  Check your feet daily for redness, sores, or openings. Do not self treat. If no improvement in two days call your primary care physician for an appointment.  Low blood sugar (hypoglycemia) is a blood sugar below 70mg/dl. Check your blood sugar if you feel signs/symptoms of hypoglycemia. If your blood sugar is below 70 take 15 grams of carbohydrates (ex 4 oz of apple juice, 3-4 glucose tablets, or 4-6 oz of regular soda) wait 15 minutes and repeat blood sugar to make sure it comes up above 70.  If your blood sugar is above 70 and you are due for a meal, have a meal.  If you are not due for a meal have a snack.  This snack helps keeps your blood sugar at a safe range. You had a tunneled IJ catheter placement by IR (5/24) to Continue home infusions of abx for total 3 weeks - completes on 6/7/18 - you need weekly CBC/CMP blood work to monitor while on antibiotics  Take all antibiotics as ordered.  Pain control as needed  Call you Health care provider upon arrival home to make a one week follow up appointment.  If you develop fever, chills, malaise, or change in mental status call your Health Care Provider or go to the Emergency Department.  Nutrition is important, eat small frequent meals to help ensure you get adequate calories.  Do not stay in bed all day!  Increase your activity daily as tolerated. Follow up with [facility] as outpatient for further kidney workup - call [phone] for appointment within 1 week  Avoid taking (NSAIDs) - (ex: Ibuprofen, Advil, Celebrex, Naprosyn)  Avoid taking any nephrotoxic agents (can harm kidneys) - Intravenous contrast for diagnostic testing, combination cold medications.  Have all medications adjusted for your renal function by your Health Care Provider.  Blood pressure control is important.  Take all medication as prescribed. You had a tunneled IJ catheter placement by IR (5/24) to Continue home infusions of abx for total 3 weeks - completes on 6/7/18 - you need weekly CBC/CMP blood work to monitor while on antibiotics  Take all antibiotics as ordered.  Pain control as needed  Follow up with orthopedics Dr. Watkins as outpatient - call for appointment within 1 week  Call you Health care provider upon arrival home to make a one week follow up appointment.  If you develop fever, chills, malaise, or change in mental status call your Health Care Provider or go to the Emergency Department.  Nutrition is important, eat small frequent meals to help ensure you get adequate calories.  Do not stay in bed all day!  Increase your activity daily as tolerated. Follow up with endocrinologist Dr. Hennessy as scheduled within next month  HgA1C this admission - 7.5  Make sure you get your HgA1c checked every three months.  If you take oral diabetes medications, check your blood glucose two times a day.  If you take insulin, check your blood glucose before meals and at bedtime.  It's important not to skip any meals.  Keep a log of your blood glucose results and always take it with you to your doctor appointments.  Keep a list of your current medications including injectables and over the counter medications and bring this medication list with you to all your doctor appointments.  If you have not seen your ophthalmologist this year call for appointment.  Check your feet daily for redness, sores, or openings. Do not self treat. If no improvement in two days call your primary care physician for an appointment.  Low blood sugar (hypoglycemia) is a blood sugar below 70mg/dl. Check your blood sugar if you feel signs/symptoms of hypoglycemia. If your blood sugar is below 70 take 15 grams of carbohydrates (ex 4 oz of apple juice, 3-4 glucose tablets, or 4-6 oz of regular soda) wait 15 minutes and repeat blood sugar to make sure it comes up above 70.  If your blood sugar is above 70 and you are due for a meal, have a meal.  If you are not due for a meal have a snack.  This snack helps keeps your blood sugar at a safe range. Follow up with Dr. Rivers within 1 week to follow up when to resume eliquis  Atrial fibrillation is the most common heart rhythm problem.  The condition puts you at risk for has stroke and heart attack  It helps if you control your blood pressure, not drink more than 1-2 alcohol drinks per day, cut down on caffeine, getting treatment for over active thyroid gland, and get regular exercise  Call your doctor if you feel your heart racing or beating unusually, chest tightness or pain, lightheaded, faint, shortness of breath especially with exercise  It is important to take your heart medication as prescribed  You may be on anticoagulation which is very important to take as directed - you may need blood work to monitor drug levels

## 2018-05-25 NOTE — PROGRESS NOTE ADULT - ASSESSMENT
69 yo M with PMH of ICM EF 40%, CAD s/p CABG, RCA stent, CKD 3 (baseline Cr 1.6), HTN, DM II, HLD, pituitary tumor, atrial fibrillation s/p PPM, AICD, cirrhosis, thrombocytopenia presented with severe sepsis with hypotension 2/2 septic joint in R knee s/p washout, now needs IV access for  long-term antibiotics. Pt s/p tunnelled CVC placement on (5/24).

## 2018-05-25 NOTE — DISCHARGE NOTE ADULT - SECONDARY DIAGNOSIS.
Atrial fibrillation with RVR Acute renal failure superimposed on stage 3 chronic kidney disease, unspecified acute renal failure type Chronic systolic heart failure Thrombocytopenia Cirrhosis of liver without ascites, unspecified hepatic cirrhosis type Type 2 diabetes mellitus with other circulatory complication, without long-term current use of insulin

## 2018-05-30 ENCOUNTER — NON-APPOINTMENT (OUTPATIENT)
Age: 68
End: 2018-05-30

## 2018-05-30 ENCOUNTER — APPOINTMENT (OUTPATIENT)
Dept: CARDIOLOGY | Facility: CLINIC | Age: 68
End: 2018-05-30
Payer: COMMERCIAL

## 2018-05-30 VITALS
HEART RATE: 64 BPM | HEIGHT: 72 IN | WEIGHT: 202 LBS | BODY MASS INDEX: 27.36 KG/M2 | SYSTOLIC BLOOD PRESSURE: 102 MMHG | DIASTOLIC BLOOD PRESSURE: 60 MMHG

## 2018-05-30 PROCEDURE — 93000 ELECTROCARDIOGRAM COMPLETE: CPT

## 2018-05-30 PROCEDURE — 99215 OFFICE O/P EST HI 40 MIN: CPT

## 2018-05-30 RX ORDER — APIXABAN 2.5 MG/1
2.5 TABLET, FILM COATED ORAL
Qty: 60 | Refills: 2 | Status: DISCONTINUED | COMMUNITY
Start: 2018-05-02 | End: 2018-05-30

## 2018-05-30 RX ORDER — OXYCODONE 5 MG/1
5 TABLET ORAL
Qty: 30 | Refills: 0 | Status: COMPLETED | COMMUNITY
Start: 2018-05-25

## 2018-06-08 ENCOUNTER — OUTPATIENT (OUTPATIENT)
Dept: OUTPATIENT SERVICES | Facility: HOSPITAL | Age: 68
LOS: 1 days | End: 2018-06-08
Payer: COMMERCIAL

## 2018-06-08 DIAGNOSIS — I48.91 UNSPECIFIED ATRIAL FIBRILLATION: ICD-10-CM

## 2018-06-08 DIAGNOSIS — Z89.9 ACQUIRED ABSENCE OF LIMB, UNSPECIFIED: Chronic | ICD-10-CM

## 2018-06-08 DIAGNOSIS — Z00.00 ENCOUNTER FOR GENERAL ADULT MEDICAL EXAMINATION WITHOUT ABNORMAL FINDINGS: ICD-10-CM

## 2018-06-10 ENCOUNTER — RX RENEWAL (OUTPATIENT)
Age: 68
End: 2018-06-10

## 2018-06-10 ENCOUNTER — FORM ENCOUNTER (OUTPATIENT)
Age: 68
End: 2018-06-10

## 2018-06-11 PROCEDURE — 36589 REMOVAL TUNNELED CV CATH: CPT

## 2018-06-12 DIAGNOSIS — Z45.2 ENCOUNTER FOR ADJUSTMENT AND MANAGEMENT OF VASCULAR ACCESS DEVICE: ICD-10-CM

## 2018-06-12 DIAGNOSIS — M86.9 OSTEOMYELITIS, UNSPECIFIED: ICD-10-CM

## 2018-06-14 ENCOUNTER — APPOINTMENT (OUTPATIENT)
Dept: HEPATOLOGY | Facility: CLINIC | Age: 68
End: 2018-06-14
Payer: COMMERCIAL

## 2018-06-14 VITALS
DIASTOLIC BLOOD PRESSURE: 67 MMHG | HEIGHT: 72 IN | WEIGHT: 200 LBS | TEMPERATURE: 98.1 F | BODY MASS INDEX: 27.09 KG/M2 | RESPIRATION RATE: 17 BRPM | HEART RATE: 76 BPM | SYSTOLIC BLOOD PRESSURE: 108 MMHG | OXYGEN SATURATION: 96 %

## 2018-06-14 PROCEDURE — 99214 OFFICE O/P EST MOD 30 MIN: CPT

## 2018-06-18 ENCOUNTER — RX RENEWAL (OUTPATIENT)
Age: 68
End: 2018-06-18

## 2018-06-28 ENCOUNTER — APPOINTMENT (OUTPATIENT)
Dept: CARDIOLOGY | Facility: CLINIC | Age: 68
End: 2018-06-28
Payer: COMMERCIAL

## 2018-06-28 ENCOUNTER — NON-APPOINTMENT (OUTPATIENT)
Age: 68
End: 2018-06-28

## 2018-06-28 VITALS
WEIGHT: 199 LBS | HEIGHT: 72 IN | DIASTOLIC BLOOD PRESSURE: 63 MMHG | BODY MASS INDEX: 26.95 KG/M2 | SYSTOLIC BLOOD PRESSURE: 101 MMHG | HEART RATE: 81 BPM

## 2018-06-28 PROCEDURE — 93000 ELECTROCARDIOGRAM COMPLETE: CPT

## 2018-06-28 PROCEDURE — 99215 OFFICE O/P EST HI 40 MIN: CPT

## 2018-07-02 ENCOUNTER — TRANSCRIPTION ENCOUNTER (OUTPATIENT)
Age: 68
End: 2018-07-02

## 2018-07-02 ENCOUNTER — APPOINTMENT (OUTPATIENT)
Dept: RADIOLOGY | Facility: CLINIC | Age: 68
End: 2018-07-02
Payer: COMMERCIAL

## 2018-07-02 ENCOUNTER — OUTPATIENT (OUTPATIENT)
Dept: OUTPATIENT SERVICES | Facility: HOSPITAL | Age: 68
LOS: 1 days | End: 2018-07-02
Payer: COMMERCIAL

## 2018-07-02 DIAGNOSIS — M19.072 PRIMARY OSTEOARTHRITIS, LEFT ANKLE AND FOOT: ICD-10-CM

## 2018-07-02 DIAGNOSIS — M19.041 PRIMARY OSTEOARTHRITIS, RIGHT HAND: ICD-10-CM

## 2018-07-02 DIAGNOSIS — Z89.9 ACQUIRED ABSENCE OF LIMB, UNSPECIFIED: Chronic | ICD-10-CM

## 2018-07-02 PROCEDURE — 73130 X-RAY EXAM OF HAND: CPT

## 2018-07-02 PROCEDURE — 73130 X-RAY EXAM OF HAND: CPT | Mod: 26,50

## 2018-07-05 ENCOUNTER — EMERGENCY (EMERGENCY)
Facility: HOSPITAL | Age: 68
LOS: 1 days | Discharge: HOME | End: 2018-07-05
Attending: EMERGENCY MEDICINE
Payer: COMMERCIAL

## 2018-07-05 ENCOUNTER — RX RENEWAL (OUTPATIENT)
Age: 68
End: 2018-07-05

## 2018-07-05 VITALS
OXYGEN SATURATION: 99 % | SYSTOLIC BLOOD PRESSURE: 113 MMHG | HEIGHT: 73 IN | HEART RATE: 85 BPM | WEIGHT: 197.09 LBS | TEMPERATURE: 98 F | DIASTOLIC BLOOD PRESSURE: 72 MMHG | RESPIRATION RATE: 18 BRPM

## 2018-07-05 DIAGNOSIS — Z89.9 ACQUIRED ABSENCE OF LIMB, UNSPECIFIED: Chronic | ICD-10-CM

## 2018-07-05 LAB
ALBUMIN SERPL ELPH-MCNC: 3.5 G/DL — SIGNIFICANT CHANGE UP (ref 3.3–5)
ALP SERPL-CCNC: 215 U/L — HIGH (ref 40–120)
ALT FLD-CCNC: 29 U/L — SIGNIFICANT CHANGE UP (ref 10–45)
ANION GAP SERPL CALC-SCNC: 15 MMOL/L — SIGNIFICANT CHANGE UP (ref 5–17)
AST SERPL-CCNC: 38 U/L — SIGNIFICANT CHANGE UP (ref 10–40)
BASOPHILS # BLD AUTO: 0 K/UL — SIGNIFICANT CHANGE UP (ref 0–0.2)
BASOPHILS NFR BLD AUTO: 0 % — SIGNIFICANT CHANGE UP (ref 0–2)
BILIRUB SERPL-MCNC: 0.5 MG/DL — SIGNIFICANT CHANGE UP (ref 0.2–1.2)
BUN SERPL-MCNC: 31 MG/DL — HIGH (ref 7–23)
CALCIUM SERPL-MCNC: 8.6 MG/DL — SIGNIFICANT CHANGE UP (ref 8.4–10.5)
CHLORIDE SERPL-SCNC: 103 MMOL/L — SIGNIFICANT CHANGE UP (ref 96–108)
CO2 SERPL-SCNC: 20 MMOL/L — LOW (ref 22–31)
CREAT SERPL-MCNC: 1.15 MG/DL — SIGNIFICANT CHANGE UP (ref 0.5–1.3)
CRP SERPL-MCNC: 0.3 MG/DL — SIGNIFICANT CHANGE UP (ref 0–0.4)
EOSINOPHIL # BLD AUTO: 0.1 K/UL — SIGNIFICANT CHANGE UP (ref 0–0.5)
EOSINOPHIL NFR BLD AUTO: 2.7 % — SIGNIFICANT CHANGE UP (ref 0–6)
ERYTHROCYTE [SEDIMENTATION RATE] IN BLOOD: 57 MM/HR — HIGH (ref 0–20)
GAS PNL BLDV: SIGNIFICANT CHANGE UP
GLUCOSE SERPL-MCNC: 233 MG/DL — HIGH (ref 70–99)
HCT VFR BLD CALC: 28.7 % — LOW (ref 39–50)
HGB BLD-MCNC: 9.7 G/DL — LOW (ref 13–17)
LYMPHOCYTES # BLD AUTO: 0.8 K/UL — LOW (ref 1–3.3)
LYMPHOCYTES # BLD AUTO: 23.1 % — SIGNIFICANT CHANGE UP (ref 13–44)
MCHC RBC-ENTMCNC: 32.4 PG — SIGNIFICANT CHANGE UP (ref 27–34)
MCHC RBC-ENTMCNC: 34 GM/DL — SIGNIFICANT CHANGE UP (ref 32–36)
MCV RBC AUTO: 95.3 FL — SIGNIFICANT CHANGE UP (ref 80–100)
MONOCYTES # BLD AUTO: 0.3 K/UL — SIGNIFICANT CHANGE UP (ref 0–0.9)
MONOCYTES NFR BLD AUTO: 9.3 % — SIGNIFICANT CHANGE UP (ref 2–14)
NEUTROPHILS # BLD AUTO: 2.4 K/UL — SIGNIFICANT CHANGE UP (ref 1.8–7.4)
NEUTROPHILS NFR BLD AUTO: 64.9 % — SIGNIFICANT CHANGE UP (ref 43–77)
PLATELET # BLD AUTO: 57 K/UL — LOW (ref 150–400)
POTASSIUM SERPL-MCNC: 5.1 MMOL/L — SIGNIFICANT CHANGE UP (ref 3.5–5.3)
POTASSIUM SERPL-SCNC: 5.1 MMOL/L — SIGNIFICANT CHANGE UP (ref 3.5–5.3)
PROT SERPL-MCNC: 7.1 G/DL — SIGNIFICANT CHANGE UP (ref 6–8.3)
RBC # BLD: 3.01 M/UL — LOW (ref 4.2–5.8)
RBC # FLD: 15 % — HIGH (ref 10.3–14.5)
SODIUM SERPL-SCNC: 138 MMOL/L — SIGNIFICANT CHANGE UP (ref 135–145)
WBC # BLD: 3.7 K/UL — LOW (ref 3.8–10.5)
WBC # FLD AUTO: 3.7 K/UL — LOW (ref 3.8–10.5)

## 2018-07-05 PROCEDURE — 86140 C-REACTIVE PROTEIN: CPT

## 2018-07-05 PROCEDURE — 82435 ASSAY OF BLOOD CHLORIDE: CPT

## 2018-07-05 PROCEDURE — 85014 HEMATOCRIT: CPT

## 2018-07-05 PROCEDURE — 82330 ASSAY OF CALCIUM: CPT

## 2018-07-05 PROCEDURE — 73120 X-RAY EXAM OF HAND: CPT

## 2018-07-05 PROCEDURE — 84132 ASSAY OF SERUM POTASSIUM: CPT

## 2018-07-05 PROCEDURE — 85652 RBC SED RATE AUTOMATED: CPT

## 2018-07-05 PROCEDURE — 73120 X-RAY EXAM OF HAND: CPT | Mod: 26,LT

## 2018-07-05 PROCEDURE — 99284 EMERGENCY DEPT VISIT MOD MDM: CPT | Mod: 25

## 2018-07-05 PROCEDURE — 84295 ASSAY OF SERUM SODIUM: CPT

## 2018-07-05 PROCEDURE — 82947 ASSAY GLUCOSE BLOOD QUANT: CPT

## 2018-07-05 PROCEDURE — 99284 EMERGENCY DEPT VISIT MOD MDM: CPT

## 2018-07-05 PROCEDURE — 80053 COMPREHEN METABOLIC PANEL: CPT

## 2018-07-05 PROCEDURE — 85027 COMPLETE CBC AUTOMATED: CPT

## 2018-07-05 PROCEDURE — 82803 BLOOD GASES ANY COMBINATION: CPT

## 2018-07-05 PROCEDURE — 83605 ASSAY OF LACTIC ACID: CPT

## 2018-07-05 RX ORDER — COLCHICINE 0.6 MG
0.6 TABLET ORAL ONCE
Qty: 0 | Refills: 0 | Status: COMPLETED | OUTPATIENT
Start: 2018-07-05 | End: 2018-07-05

## 2018-07-05 RX ORDER — COLCHICINE 0.6 MG
1 TABLET ORAL
Qty: 14 | Refills: 0 | OUTPATIENT
Start: 2018-07-05 | End: 2018-07-18

## 2018-07-05 RX ADMIN — Medication 750 MILLIGRAM(S): at 11:38

## 2018-07-05 RX ADMIN — Medication 0.6 MILLIGRAM(S): at 11:26

## 2018-07-05 RX ADMIN — Medication 750 MILLIGRAM(S): at 11:39

## 2018-07-05 NOTE — ED ADULT NURSE NOTE - PMH
AICD lead malfunction  history of  Anemia    Coronary artery disease    DM (diabetes mellitus)  type 2, Hg A1C 8.2 % ( 7/10/17)  Heart failure with reduced left ventricular function  EF 37%  Hepatic cirrhosis, unspecified hepatic cirrhosis type    History of hyperprolactinemia    History of osteomyelitis  2015, right foot 2nd toe  HLD (hyperlipidemia)    HTN (hypertension)    Hypothyroidism    ICD (implantable cardioverter-defibrillator) in place  [employer], Model U924KCI , last interrogation 4/2017  Ischemic cardiomyopathy    Pituitary adenoma  as per patient chronic, no changes , recently restarted follow up with endocrinologist ( note in allscripts )  Presence of stent in coronary artery  2 stents  PVD (peripheral vascular disease)    Sleep apnea  not using CPAP  Thrombocytopenia  Chronic

## 2018-07-05 NOTE — ED ADULT NURSE NOTE - OBJECTIVE STATEMENT
68 year old male presents ambulatory to ED through waiting room complaining of finger pain and swelling. History of hypothyroid, PVD, hyperprolactinemia, cirrhosis, anemia, ICD, osteomyelitis, CAD s/p stent, heart failure, cardiomyopathy, HLD, HTN, DM. Right middle, index and ring finger pain and swelling at joints x 7-10 days. Seen at urgent care 4 days ago, xrays and placed on clindamycin with no improvement. Recently treated for patellar bursitis x 3 weeks with one week inpatient two weeks out patient antibiotics. Denies HA, CP, SOB, fevers, chills, body aches, abd pain, NVD, recent fall/travel/injury. Denies history of gout. Took 600 advil at 4am with little relief of pain. Patient undressed and placed into gown, call bell in hand and side rails up with bed in lowest position for safety. blanket provided. Comfort and safety provided.

## 2018-07-05 NOTE — ED PROVIDER NOTE - MEDICAL DECISION MAKING DETAILS
67 y/o hx of CAD, DM, cryptogenic cirrhosis, pAF on no anticoagulation, HTN, HLD, pituitary tumor presents with various swollen joints. DDx includes Gout, septic joint. No fevers/chills, well appearing, low suspicion for endocarditis. Plan: Plain Films, Lab Work, reassess.

## 2018-07-05 NOTE — ED PROVIDER NOTE - PMH
AICD lead malfunction  history of  Anemia    Coronary artery disease    DM (diabetes mellitus)  type 2, Hg A1C 8.2 % ( 7/10/17)  Heart failure with reduced left ventricular function  EF 37%  Hepatic cirrhosis, unspecified hepatic cirrhosis type    History of hyperprolactinemia    History of osteomyelitis  2015, right foot 2nd toe  HLD (hyperlipidemia)    HTN (hypertension)    Hypothyroidism    ICD (implantable cardioverter-defibrillator) in place  Medtronic, Model S041AVW , last interrogation 4/2017  Ischemic cardiomyopathy    Pituitary adenoma  as per patient chronic, no changes , recently restarted follow up with endocrinologist ( note in allscripts )  Presence of stent in coronary artery  2 stents  PVD (peripheral vascular disease)    Sleep apnea  not using CPAP  Thrombocytopenia  Chronic

## 2018-07-05 NOTE — ED PROVIDER NOTE - PLAN OF CARE
1. Take one tablet of colchicine every 6 hours for two doses on 07/05 then one table daily starting 07/06.   2. Take 250 mg of naproxen every 8 hours as needed for pain and swelling  3. Continue Clindamycin as prescribed.  4. Follow up with Dr. Froilan Solitario tomorrow at 11 AM.   5. Return to the emergency room for fevers/chills, worsening pain or any other concerns.   3.

## 2018-07-05 NOTE — ED PROVIDER NOTE - PROGRESS NOTE DETAILS
68 y.o male with PMHx of CAD s/p AICD, DM, cryptogenic cirrhosis, pAF on no anticoagulation, HTN, HLD, CKD, pituitary tumor presenting to ED due to multiple joint pain in his hands. Patient has history of Discussed with Hand Surgeon Dr. Solitario. States would treat for Gout before any intervention including I&D. 68 y.o male with PMHx of CAD s/p AICD, DM, cryptogenic cirrhosis, pAF on no anticoagulation, HTN, HLD, CKD, pituitary tumor presenting to ED due to multiple joint pain in his hands. Patient has history of osteomyelitis and amputation of a toe previously. Labs show elevated ESR. 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 Will order labs, start the patient on gout medication and call hand surgery for evaluation  ATTG: Dr. Ferris

## 2018-07-05 NOTE — PATIENT PROFILE ADULT. - NS TRANSFER RESPONSE BELONGING
Home Treatment    Home treatment for pinkeye will help reduce your pain and keep your eye free of drainage. If you wear contacts, remove them and wear glasses until your symptoms have gone away completely. Thoroughly clean your contacts and storage case.  Cold compresses or warm compresses (whichever feels best) can be used. If an allergy is the problem, a cool compress may feel better. If the pinkeye is caused by an infection, a warm, moist compress may soothe your eye and help reduce redness and swelling. Warm, moist compresses can spread infection from one eye to the other. Use a different compress for each eye, and use a clean compress for each application.  When cleaning your eye, wipe from the inside  (next to the nose) toward the outside. Use a clean surface for each wipe so that drainage being cleaned away is not rubbed back across the eye.   If tissues or wipes are used, make sure they are put in the trash and not allowed to sit around. If washcloths are used to clean the eye, put them in the laundry right away so that no one else picks them up or uses them. After wiping your eye, wash your hands to prevent the pinkeye from spreading.  After pinkeye has been diagnosed:  Take steps to prevent the spread of pinkeye by following the instructions in the Prevention section of this topic.   Do not go to day care or school or go to work until pinkeye has improved.   You can usually return to day care, school, or work when symptoms begin to improve, typically in 3 to 5 days. Medicines are not usually used to treat viral pinkeye, so preventing its spread is important. Home treatment of the symptoms will help you feel more comfortable while the infection goes away.       
yes

## 2018-07-05 NOTE — ED PROVIDER NOTE - OBJECTIVE STATEMENT
68 YOM p/w 7 days of right middle finger swelling at DIP and right index finger PIP pain, left ring finger PIP swelling and left middle PIP. no fever, no chills. Pt has pain with movement. Pt recently on clindamycin. 67 y/o hx of CAD, DM, cryptogenic cirrhosis, pAF on no anticoagulation, HTN, HLD, pituitary tumor  p/w 7 days of right middle finger swelling at DIP and right index finger PIP pain, left ring finger PIP swelling and left middle PIP. no fever, no chills. Pt has pain with movement. Pt prescribed oral clindamycin at urgent care 4 days PTA with no improvement. Denies any fevers/chills, patient has noted mild swelling in past but not painful or to this extent.      Patient recently treated for 3 weeks with Ancef for PrePatellar Bursitis. 67 y/o hx of CAD, DM, cryptogenic cirrhosis, pAF on no anticoagulation, HTN, HLD, pituitary tumor  p/w 7 days of right middle finger swelling at DIP and right index finger PIP pain, left ring finger PIP swelling and left middle PIP. no fever, no chills. Pt has pain with movement. Pt prescribed oral clindamycin at urgent care 4 days PTA with no improvement. Underwent hand xrays with erosive changes suggestive of gout. Denies any fevers/chills, patient has noted mild swelling in past but not painful or to this extent.      Patient recently treated for 3 weeks with Ancef for PrePatellar Bursitis. 67 y/o hx of CAD, DM, cryptogenic cirrhosis, pAF on no anticoagulation, HTN, HLD, CKD, pituitary tumor  p/w 7 days of right middle finger swelling at DIP and right index finger PIP pain, left ring finger PIP swelling and left middle PIP. no fever, no chills. Pt has pain with movement. Pt prescribed oral clindamycin at urgent care 4 days PTA with no improvement. Underwent hand xrays with erosive changes suggestive of gout. Denies any fevers/chills, patient has noted mild swelling in past but not painful or to this extent.      Patient recently treated for 3 weeks with Ancef for PrePatellar Bursitis. 69 y/o hx of CAD, DM, cryptogenic cirrhosis, pAF on no anticoagulation, HTN, HLD, CKD, pituitary tumor  p/w 7 days of right middle finger swelling at DIP and right index finger PIP pain, left ring finger PIP swelling and left middle PIP. no fever, no chills. Pt has pain with movement. Pt prescribed oral clindamycin at urgent care 4 days PTA with no improvement. Underwent hand xrays with erosive changes suggestive of gout. Denies any fevers/chills, patient has noted mild swelling in past but not painful or to this extent.      Patient recently treated for 3 weeks with Ancef for PrePatellar Bursitis.    PMD: Dr. Patel 67 y/o hx of CAD s/p AICD, DM, cryptogenic cirrhosis, pAF on no anticoagulation, HTN, HLD, CKD, pituitary tumor  p/w 7 days of right middle finger swelling at DIP and right index finger PIP pain, left ring finger PIP swelling and left middle PIP. no fever, no chills. Pt has pain with movement. Pt prescribed oral clindamycin at urgent care 4 days PTA with no improvement. Underwent hand xrays with erosive changes suggestive of gout. Denies any fevers/chills, patient has noted mild swelling in past but not painful or to this extent.      Patient recently treated for 3 weeks with Ancef for PrePatellar Bursitis.    PMD: Dr. Patel

## 2018-07-05 NOTE — ED PROVIDER NOTE - MUSCULOSKELETAL, MLM
Tenderness to palpation at DIP L 3rd +4th digits, DIP of R 2nd digit, PIP of R 3rd digit. Tenderness to palpation at DIP L 3rd +4th digits, DIP of R 2nd digit, PIP of R 3rd digit. Tophus on right first toe.

## 2018-07-05 NOTE — ED PROVIDER NOTE - CARE PLAN
Principal Discharge DX:	Gout  Assessment and plan of treatment:	1. Take one tablet of colchicine every 6 hours for two doses on 07/05 then one table daily starting 07/06.   2. Take 250 mg of naproxen every 8 hours as needed for pain and swelling  3. Continue Clindamycin as prescribed.  4. Follow up with Dr. Froilan Solitario tomorrow at 11 AM.   5. Return to the emergency room for fevers/chills, worsening pain or any other concerns.   3.

## 2018-07-05 NOTE — ED ADULT TRIAGE NOTE - CHIEF COMPLAINT QUOTE
10 days of finger redness and swelling. saw PCP was prescribed cream for inflammation. went to urgent care and was given antibiotics for possible infection.  pt c.o pain in fingers, right 2nd, 3rd, 4th, left 3rd digit. denies fevers/chill, no history of gout.

## 2018-07-09 ENCOUNTER — APPOINTMENT (OUTPATIENT)
Dept: ENDOCRINOLOGY | Facility: CLINIC | Age: 68
End: 2018-07-09
Payer: COMMERCIAL

## 2018-07-09 VITALS
HEIGHT: 72 IN | OXYGEN SATURATION: 98 % | WEIGHT: 203 LBS | SYSTOLIC BLOOD PRESSURE: 110 MMHG | HEART RATE: 80 BPM | BODY MASS INDEX: 27.5 KG/M2 | DIASTOLIC BLOOD PRESSURE: 80 MMHG

## 2018-07-09 LAB
GLUCOSE BLDC GLUCOMTR-MCNC: 206
HBA1C MFR BLD HPLC: 7

## 2018-07-09 PROCEDURE — 83036 HEMOGLOBIN GLYCOSYLATED A1C: CPT | Mod: QW

## 2018-07-09 PROCEDURE — 99214 OFFICE O/P EST MOD 30 MIN: CPT | Mod: 25

## 2018-07-12 ENCOUNTER — MEDICATION RENEWAL (OUTPATIENT)
Age: 68
End: 2018-07-12

## 2018-07-13 ENCOUNTER — RX RENEWAL (OUTPATIENT)
Age: 68
End: 2018-07-13

## 2018-07-17 PROBLEM — G47.30 SLEEP APNEA, UNSPECIFIED: Chronic | Status: ACTIVE | Noted: 2017-08-22

## 2018-07-17 PROBLEM — D64.9 ANEMIA, UNSPECIFIED: Chronic | Status: ACTIVE | Noted: 2017-08-22

## 2018-07-17 PROBLEM — E03.9 HYPOTHYROIDISM, UNSPECIFIED: Chronic | Status: ACTIVE | Noted: 2017-08-22

## 2018-07-17 PROBLEM — I73.9 PERIPHERAL VASCULAR DISEASE, UNSPECIFIED: Chronic | Status: ACTIVE | Noted: 2017-08-22

## 2018-07-17 PROBLEM — K74.60 UNSPECIFIED CIRRHOSIS OF LIVER: Chronic | Status: ACTIVE | Noted: 2017-08-22

## 2018-07-17 PROBLEM — Z86.39 PERSONAL HISTORY OF OTHER ENDOCRINE, NUTRITIONAL AND METABOLIC DISEASE: Chronic | Status: ACTIVE | Noted: 2017-08-22

## 2018-07-23 ENCOUNTER — INBOUND DOCUMENT (OUTPATIENT)
Age: 68
End: 2018-07-23

## 2018-08-01 ENCOUNTER — INPATIENT (INPATIENT)
Facility: HOSPITAL | Age: 68
LOS: 11 days | Discharge: HOME | DRG: 356 | End: 2018-08-13
Attending: HOSPITALIST | Admitting: HOSPITALIST
Payer: COMMERCIAL

## 2018-08-01 VITALS
HEART RATE: 92 BPM | SYSTOLIC BLOOD PRESSURE: 101 MMHG | OXYGEN SATURATION: 99 % | TEMPERATURE: 99 F | HEIGHT: 73 IN | WEIGHT: 188.94 LBS | RESPIRATION RATE: 19 BRPM | DIASTOLIC BLOOD PRESSURE: 63 MMHG

## 2018-08-01 DIAGNOSIS — E11.9 TYPE 2 DIABETES MELLITUS WITHOUT COMPLICATIONS: ICD-10-CM

## 2018-08-01 DIAGNOSIS — E87.5 HYPERKALEMIA: ICD-10-CM

## 2018-08-01 DIAGNOSIS — N17.9 ACUTE KIDNEY FAILURE, UNSPECIFIED: ICD-10-CM

## 2018-08-01 DIAGNOSIS — A04.72 ENTEROCOLITIS DUE TO CLOSTRIDIUM DIFFICILE, NOT SPECIFIED AS RECURRENT: ICD-10-CM

## 2018-08-01 DIAGNOSIS — E78.5 HYPERLIPIDEMIA, UNSPECIFIED: ICD-10-CM

## 2018-08-01 DIAGNOSIS — Z89.9 ACQUIRED ABSENCE OF LIMB, UNSPECIFIED: Chronic | ICD-10-CM

## 2018-08-01 DIAGNOSIS — I25.810 ATHEROSCLEROSIS OF CORONARY ARTERY BYPASS GRAFT(S) WITHOUT ANGINA PECTORIS: ICD-10-CM

## 2018-08-01 DIAGNOSIS — L97.519 NON-PRESSURE CHRONIC ULCER OF OTHER PART OF RIGHT FOOT WITH UNSPECIFIED SEVERITY: ICD-10-CM

## 2018-08-01 DIAGNOSIS — I50.22 CHRONIC SYSTOLIC (CONGESTIVE) HEART FAILURE: ICD-10-CM

## 2018-08-01 DIAGNOSIS — Z29.9 ENCOUNTER FOR PROPHYLACTIC MEASURES, UNSPECIFIED: ICD-10-CM

## 2018-08-01 LAB
ALBUMIN SERPL ELPH-MCNC: 3.2 G/DL — LOW (ref 3.3–5)
ALP SERPL-CCNC: 112 U/L — SIGNIFICANT CHANGE UP (ref 40–120)
ALT FLD-CCNC: 13 U/L — SIGNIFICANT CHANGE UP (ref 10–45)
ANION GAP SERPL CALC-SCNC: 13 MMOL/L — SIGNIFICANT CHANGE UP (ref 5–17)
ANION GAP SERPL CALC-SCNC: 14 MMOL/L — SIGNIFICANT CHANGE UP (ref 5–17)
APTT BLD: 27.8 SEC — SIGNIFICANT CHANGE UP (ref 27.5–37.4)
AST SERPL-CCNC: 21 U/L — SIGNIFICANT CHANGE UP (ref 10–40)
BASOPHILS # BLD AUTO: 0 K/UL — SIGNIFICANT CHANGE UP (ref 0–0.2)
BASOPHILS NFR BLD AUTO: 0.2 % — SIGNIFICANT CHANGE UP (ref 0–2)
BILIRUB SERPL-MCNC: 1 MG/DL — SIGNIFICANT CHANGE UP (ref 0.2–1.2)
BUN SERPL-MCNC: 42 MG/DL — HIGH (ref 7–23)
BUN SERPL-MCNC: 49 MG/DL — HIGH (ref 7–23)
C DIFF GDH STL QL: POSITIVE — SIGNIFICANT CHANGE UP
C DIFF GDH STL QL: SIGNIFICANT CHANGE UP
CALCIUM SERPL-MCNC: 8.2 MG/DL — LOW (ref 8.4–10.5)
CALCIUM SERPL-MCNC: 8.7 MG/DL — SIGNIFICANT CHANGE UP (ref 8.4–10.5)
CHLORIDE SERPL-SCNC: 102 MMOL/L — SIGNIFICANT CHANGE UP (ref 96–108)
CHLORIDE SERPL-SCNC: 102 MMOL/L — SIGNIFICANT CHANGE UP (ref 96–108)
CO2 SERPL-SCNC: 17 MMOL/L — LOW (ref 22–31)
CO2 SERPL-SCNC: 18 MMOL/L — LOW (ref 22–31)
CREAT SERPL-MCNC: 1.66 MG/DL — HIGH (ref 0.5–1.3)
CREAT SERPL-MCNC: 1.93 MG/DL — HIGH (ref 0.5–1.3)
EOSINOPHIL # BLD AUTO: 0.1 K/UL — SIGNIFICANT CHANGE UP (ref 0–0.5)
EOSINOPHIL NFR BLD AUTO: 0.7 % — SIGNIFICANT CHANGE UP (ref 0–6)
GLUCOSE SERPL-MCNC: 231 MG/DL — HIGH (ref 70–99)
GLUCOSE SERPL-MCNC: 237 MG/DL — HIGH (ref 70–99)
HCT VFR BLD CALC: 33.9 % — LOW (ref 39–50)
HGB BLD-MCNC: 10.9 G/DL — LOW (ref 13–17)
INR BLD: 1.41 RATIO — HIGH (ref 0.88–1.16)
LIDOCAIN IGE QN: 26 U/L — SIGNIFICANT CHANGE UP (ref 7–60)
LYMPHOCYTES # BLD AUTO: 1.2 K/UL — SIGNIFICANT CHANGE UP (ref 1–3.3)
LYMPHOCYTES # BLD AUTO: 8.1 % — LOW (ref 13–44)
MCHC RBC-ENTMCNC: 30.4 PG — SIGNIFICANT CHANGE UP (ref 27–34)
MCHC RBC-ENTMCNC: 32.2 GM/DL — SIGNIFICANT CHANGE UP (ref 32–36)
MCV RBC AUTO: 94.2 FL — SIGNIFICANT CHANGE UP (ref 80–100)
MONOCYTES # BLD AUTO: 1 K/UL — HIGH (ref 0–0.9)
MONOCYTES NFR BLD AUTO: 7.2 % — SIGNIFICANT CHANGE UP (ref 2–14)
NEUTROPHILS # BLD AUTO: 11.8 K/UL — HIGH (ref 1.8–7.4)
NEUTROPHILS NFR BLD AUTO: 83.7 % — HIGH (ref 43–77)
PLATELET # BLD AUTO: 62 K/UL — LOW (ref 150–400)
POTASSIUM SERPL-MCNC: 4.8 MMOL/L — SIGNIFICANT CHANGE UP (ref 3.5–5.3)
POTASSIUM SERPL-MCNC: 5.4 MMOL/L — HIGH (ref 3.5–5.3)
POTASSIUM SERPL-SCNC: 4.8 MMOL/L — SIGNIFICANT CHANGE UP (ref 3.5–5.3)
POTASSIUM SERPL-SCNC: 5.4 MMOL/L — HIGH (ref 3.5–5.3)
PROT SERPL-MCNC: 7.1 G/DL — SIGNIFICANT CHANGE UP (ref 6–8.3)
PROTHROM AB SERPL-ACNC: 15.5 SEC — HIGH (ref 9.8–12.7)
RBC # BLD: 3.59 M/UL — LOW (ref 4.2–5.8)
RBC # FLD: 14.4 % — SIGNIFICANT CHANGE UP (ref 10.3–14.5)
SODIUM SERPL-SCNC: 133 MMOL/L — LOW (ref 135–145)
SODIUM SERPL-SCNC: 133 MMOL/L — LOW (ref 135–145)
TROPONIN T, HIGH SENSITIVITY RESULT: 41 NG/L — SIGNIFICANT CHANGE UP (ref 0–51)
WBC # BLD: 14.1 K/UL — HIGH (ref 3.8–10.5)
WBC # FLD AUTO: 14.1 K/UL — HIGH (ref 3.8–10.5)

## 2018-08-01 PROCEDURE — 73610 X-RAY EXAM OF ANKLE: CPT | Mod: 26,RT

## 2018-08-01 PROCEDURE — 99285 EMERGENCY DEPT VISIT HI MDM: CPT | Mod: GC

## 2018-08-01 PROCEDURE — 93010 ELECTROCARDIOGRAM REPORT: CPT

## 2018-08-01 PROCEDURE — 71045 X-RAY EXAM CHEST 1 VIEW: CPT | Mod: 26

## 2018-08-01 PROCEDURE — 99223 1ST HOSP IP/OBS HIGH 75: CPT

## 2018-08-01 PROCEDURE — 73630 X-RAY EXAM OF FOOT: CPT | Mod: 26,RT

## 2018-08-01 RX ORDER — LEVOTHYROXINE SODIUM 125 MCG
250 TABLET ORAL
Qty: 0 | Refills: 0 | Status: DISCONTINUED | OUTPATIENT
Start: 2018-08-01 | End: 2018-08-10

## 2018-08-01 RX ORDER — GLUCAGON INJECTION, SOLUTION 0.5 MG/.1ML
1 INJECTION, SOLUTION SUBCUTANEOUS ONCE
Qty: 0 | Refills: 0 | Status: DISCONTINUED | OUTPATIENT
Start: 2018-08-01 | End: 2018-08-10

## 2018-08-01 RX ORDER — LEVOTHYROXINE SODIUM 125 MCG
1 TABLET ORAL
Qty: 0 | Refills: 0 | COMMUNITY

## 2018-08-01 RX ORDER — INSULIN LISPRO 100/ML
VIAL (ML) SUBCUTANEOUS AT BEDTIME
Qty: 0 | Refills: 0 | Status: DISCONTINUED | OUTPATIENT
Start: 2018-08-01 | End: 2018-08-10

## 2018-08-01 RX ORDER — DEXTROSE 50 % IN WATER 50 %
12.5 SYRINGE (ML) INTRAVENOUS ONCE
Qty: 0 | Refills: 0 | Status: DISCONTINUED | OUTPATIENT
Start: 2018-08-01 | End: 2018-08-10

## 2018-08-01 RX ORDER — BROMOCRIPTINE MESYLATE 5 MG/1
1 CAPSULE ORAL
Qty: 0 | Refills: 0 | COMMUNITY

## 2018-08-01 RX ORDER — SPIRONOLACTONE 25 MG/1
25 TABLET, FILM COATED ORAL DAILY
Qty: 0 | Refills: 0 | Status: DISCONTINUED | OUTPATIENT
Start: 2018-08-01 | End: 2018-08-02

## 2018-08-01 RX ORDER — BROMOCRIPTINE MESYLATE 5 MG/1
5 CAPSULE ORAL DAILY
Qty: 0 | Refills: 0 | Status: DISCONTINUED | OUTPATIENT
Start: 2018-08-01 | End: 2018-08-10

## 2018-08-01 RX ORDER — VANCOMYCIN HCL 1 G
1000 VIAL (EA) INTRAVENOUS ONCE
Qty: 0 | Refills: 0 | Status: DISCONTINUED | OUTPATIENT
Start: 2018-08-01 | End: 2018-08-01

## 2018-08-01 RX ORDER — VANCOMYCIN HCL 1 G
VIAL (EA) INTRAVENOUS
Qty: 0 | Refills: 0 | Status: DISCONTINUED | OUTPATIENT
Start: 2018-08-01 | End: 2018-08-01

## 2018-08-01 RX ORDER — VANCOMYCIN HCL 1 G
1000 VIAL (EA) INTRAVENOUS ONCE
Qty: 0 | Refills: 0 | Status: COMPLETED | OUTPATIENT
Start: 2018-08-01 | End: 2018-08-01

## 2018-08-01 RX ORDER — FUROSEMIDE 40 MG
40 TABLET ORAL DAILY
Qty: 0 | Refills: 0 | Status: DISCONTINUED | OUTPATIENT
Start: 2018-08-01 | End: 2018-08-02

## 2018-08-01 RX ORDER — PIPERACILLIN AND TAZOBACTAM 4; .5 G/20ML; G/20ML
3.38 INJECTION, POWDER, LYOPHILIZED, FOR SOLUTION INTRAVENOUS ONCE
Qty: 0 | Refills: 0 | Status: COMPLETED | OUTPATIENT
Start: 2018-08-01 | End: 2018-08-01

## 2018-08-01 RX ORDER — LISINOPRIL 2.5 MG/1
2.5 TABLET ORAL DAILY
Qty: 0 | Refills: 0 | Status: DISCONTINUED | OUTPATIENT
Start: 2018-08-01 | End: 2018-08-03

## 2018-08-01 RX ORDER — SODIUM CHLORIDE 9 MG/ML
250 INJECTION INTRAMUSCULAR; INTRAVENOUS; SUBCUTANEOUS ONCE
Qty: 0 | Refills: 0 | Status: DISCONTINUED | OUTPATIENT
Start: 2018-08-01 | End: 2018-08-01

## 2018-08-01 RX ORDER — DEXTROSE 50 % IN WATER 50 %
15 SYRINGE (ML) INTRAVENOUS ONCE
Qty: 0 | Refills: 0 | Status: DISCONTINUED | OUTPATIENT
Start: 2018-08-01 | End: 2018-08-10

## 2018-08-01 RX ORDER — ATORVASTATIN CALCIUM 80 MG/1
40 TABLET, FILM COATED ORAL AT BEDTIME
Qty: 0 | Refills: 0 | Status: DISCONTINUED | OUTPATIENT
Start: 2018-08-01 | End: 2018-08-10

## 2018-08-01 RX ORDER — SODIUM CHLORIDE 9 MG/ML
1000 INJECTION, SOLUTION INTRAVENOUS
Qty: 0 | Refills: 0 | Status: DISCONTINUED | OUTPATIENT
Start: 2018-08-01 | End: 2018-08-10

## 2018-08-01 RX ORDER — DEXTROSE 50 % IN WATER 50 %
25 SYRINGE (ML) INTRAVENOUS ONCE
Qty: 0 | Refills: 0 | Status: DISCONTINUED | OUTPATIENT
Start: 2018-08-01 | End: 2018-08-10

## 2018-08-01 RX ORDER — SODIUM CHLORIDE 9 MG/ML
500 INJECTION INTRAMUSCULAR; INTRAVENOUS; SUBCUTANEOUS ONCE
Qty: 0 | Refills: 0 | Status: COMPLETED | OUTPATIENT
Start: 2018-08-01 | End: 2018-08-01

## 2018-08-01 RX ORDER — INSULIN LISPRO 100/ML
VIAL (ML) SUBCUTANEOUS
Qty: 0 | Refills: 0 | Status: DISCONTINUED | OUTPATIENT
Start: 2018-08-01 | End: 2018-08-03

## 2018-08-01 RX ORDER — CARVEDILOL PHOSPHATE 80 MG/1
12.5 CAPSULE, EXTENDED RELEASE ORAL EVERY 12 HOURS
Qty: 0 | Refills: 0 | Status: DISCONTINUED | OUTPATIENT
Start: 2018-08-01 | End: 2018-08-02

## 2018-08-01 RX ORDER — LEVOTHYROXINE SODIUM 125 MCG
125 TABLET ORAL
Qty: 0 | Refills: 0 | Status: DISCONTINUED | OUTPATIENT
Start: 2018-08-01 | End: 2018-08-10

## 2018-08-01 RX ORDER — ASPIRIN/CALCIUM CARB/MAGNESIUM 324 MG
81 TABLET ORAL DAILY
Qty: 0 | Refills: 0 | Status: DISCONTINUED | OUTPATIENT
Start: 2018-08-01 | End: 2018-08-10

## 2018-08-01 RX ORDER — VANCOMYCIN HCL 1 G
125 VIAL (EA) INTRAVENOUS EVERY 6 HOURS
Qty: 0 | Refills: 0 | Status: DISCONTINUED | OUTPATIENT
Start: 2018-08-01 | End: 2018-08-10

## 2018-08-01 RX ORDER — METFORMIN HYDROCHLORIDE 850 MG/1
1 TABLET ORAL
Qty: 0 | Refills: 0 | COMMUNITY

## 2018-08-01 RX ADMIN — Medication 1: at 22:33

## 2018-08-01 RX ADMIN — Medication 250 MILLIGRAM(S): at 18:08

## 2018-08-01 RX ADMIN — SODIUM CHLORIDE 500 MILLILITER(S): 9 INJECTION INTRAMUSCULAR; INTRAVENOUS; SUBCUTANEOUS at 16:00

## 2018-08-01 RX ADMIN — ATORVASTATIN CALCIUM 40 MILLIGRAM(S): 80 TABLET, FILM COATED ORAL at 22:34

## 2018-08-01 RX ADMIN — PIPERACILLIN AND TAZOBACTAM 200 GRAM(S): 4; .5 INJECTION, POWDER, LYOPHILIZED, FOR SOLUTION INTRAVENOUS at 16:15

## 2018-08-01 RX ADMIN — Medication 125 MILLIGRAM(S): at 20:35

## 2018-08-01 NOTE — CONSULT NOTE ADULT - ASSESSMENT
67 y/o male with right foot 3rd toe distal tip ulcer  -Pt seen and evaluated in ED  -3rd toe wound fibrotic, no probe to bone, no drainage  -Right foot edematous and erythematous  -Cont IV abx  -Pending right foot x-rays  -Possible CT tomorrow if no improvement (pt cannot have an MRI)  -Seen and discussed w/ attending

## 2018-08-01 NOTE — ED ADULT NURSE NOTE - OBJECTIVE STATEMENT
68M comes to ED c/o weakness, decreased PO intake, 15 lb weight loss and diarrhea over the past 2 weeks. He had recent right knee infection for which he was on IV ceftriaxone. Patient is a&ox3 in no acute distress at this time. Patient states he had gout flare to fingers and diarrhea started after taking allopurinol. He states he was seen earlier today by podiatry who wrapped his right foot for suspected gout in right ankle, and blister to third toe. He denies SOB/chest pain/N/V/dizziness/fever/chills/abdominal pain/urinary symptoms. On exam, soft nondistended abdomen, moves all extremities, swelling and redness to right second and third finger, left fourth finger, no swelling to knees, no edema, dark skin changes to bilateral lower extremities. PMH quadruple bypass, DM, gout, HTN, fatty liver and hypothyroid. Patient states he was been walking with a cane. Will continue to monitor.

## 2018-08-01 NOTE — ED PROVIDER NOTE - ATTENDING CONTRIBUTION TO CARE
Patient is a 69 yo with history of right knee bursitis, s/p treatment in May 2018 x 3 weeks, hx of gout now c/o generalized weakness and nonbloody diarrhea x 1.5 weeks. Reports he is going 6-8 times a day. He is naproxen/ colchicine for his gout but stopped mid-July secondary to these symptoms. No fevers, cough, dysuria. Saw his outpatient podiatrist who debrided patient's right foot ulcers.     pmd: Elier HUFFMAN noted  Gen. no acute distress, Non toxic   HEENT: EOMI, mmm,   Lungs: CTAB/L no C/ W /R   CVS: RRR   Abd; Soft non tender, non distended   Ext: right foot 2nd toe, ulcer, heel ulcer, no surrounding erythema with drainage  Skin: no rash  Neuro AAOx3 non focal clear speech  - [place] DUBON Patient is a 69 yo with history of right knee bursitis, s/p treatment in May 2018 x 3 weeks, hx of gout now c/o generalized weakness and nonbloody diarrhea x 1.5 weeks. Reports he is going 6-8 times a day. He is naproxen/ colchicine for his gout but stopped mid-July secondary to these symptoms. No fevers, cough, dysuria. Saw his outpatient podiatrist who debrided patient's right foot ulcers and sent him in for IV antibiotics and evaluation for diarrhea and weakness.     pmd: Elier    VS noted  Gen. chronically ill, weak, elderly  HEENT: EOMI, dry MM  Lungs: CTAB/L no C/ W /R   CVS: RRR   Abd; Soft non tender, non distended   Ext: right foot 2nd toe, ulcer, heel ulcer, no surrounding erythema with drainage  Skin: no rash  Neuro AAOx3 non focal clear speech  a/p: weakness, persistent diarrhea, foot ulcers - requiring Abx - concern for C.diff, sepsis, foot infection - podiatry consulted, broad spectrum Abx, labs, cdiff, u/a, sent. Pt w/ hx of CHF, IVF to be given slowly so as to not fluid overload him.   - [place] DUBON

## 2018-08-01 NOTE — ED ADULT NURSE NOTE - NSIMPLEMENTINTERV_GEN_ALL_ED
Implemented All Fall Risk Interventions:  [place] to call system. Call bell, personal items and telephone within reach. Instruct patient to call for assistance. Room bathroom lighting operational. Non-slip footwear when patient is off stretcher. Physically safe environment: no spills, clutter or unnecessary equipment. Stretcher in lowest position, wheels locked, appropriate side rails in place. Provide visual cue, wrist band, yellow gown, etc. Monitor gait and stability. Monitor for mental status changes and reorient to person, place, and time. Review medications for side effects contributing to fall risk. Reinforce activity limits and safety measures with patient and family.

## 2018-08-01 NOTE — H&P ADULT - HISTORY OF PRESENT ILLNESS
68 y.o male with PMHx of CAD s/p CABG & stenting, CHF, Gout, recent right knee septic bursitis presenting with diarrhea, weakness, right foot ulcers x 1 week. Reports 6-8 episodes of watery non-bloody stools per day. Associated with generalized fatigue and decreased appetite. He was seen today by Dr. Tristan Podiatry and had his right 2nd toe, heel ulcer debrided. Right foot ulcers were associated with mild yellow-drainage. Denies any fevers, nausea/vomiting, chest pain, difficulty breathing, headaches, travel history. 68 y.o male with PMHx of CAD s/p CABG & stenting, systolic CHF ( Ef 37%) s/p ICD, Afib, Gout, idiopathic cirrhosis, thrombocytopenia, CKD stage 3, DM2, HLD, pituitary adenoma and recent right knee septic bursitis presenting with diarrhea, weakness, and right foot ulcer. He started having diarrhea 2 weeks ago, with 6-8 episodes of watery non-bloody stools per day. Associated with generalized fatigue and decreased appetite. He attributed these symptoms to naproxen/ colchicine for his gout, subsequently stopped taking them in mid-July.  He was recently discharged from the hospital (5/23) with IV antibiotics for right knee septic bursitis, completing a full 3 weeks antibiotic course on 6/8/18. Denies any fevers, nausea/vomiting, chest pain, SOB, headaches, travel history and pus in stool.    He was also seen today by his podiatrist Dr. Tristan and had his right 2nd toe, heel ulcer debrided due to with mild yellow-drainage s/p debridement in office.     In the ED he received vanco, zosyn, Ns 500 ml and was found + Cdiff, remained hemodynamically stable

## 2018-08-01 NOTE — CONSULT NOTE ADULT - SUBJECTIVE AND OBJECTIVE BOX
Podiatry pager #: [phone]/ [phone]    Patient is a 68y old  Male who presents with a chief complaint of right foot 3rd toe ulcer at distal tip.    ED HPI:  Patient is a 67 yo with history of right knee bursitis, s/p treatment in May 2018 x 3 weeks, hx of gout now c/o generalized weakness and nonbloody diarrhea x 1.5 weeks. Reports he is going 6-8 times a day. He is naproxen/ colchicine for his gout but stopped mid-July secondary to these symptoms. No fevers, cough, dysuria. Saw his outpatient podiatrist who debrided patient's right foot ulcers.     PAST MEDICAL & SURGICAL HISTORY:  Hypothyroidism  PVD (peripheral vascular disease)  History of hyperprolactinemia  Hepatic cirrhosis, unspecified hepatic cirrhosis type  Anemia  ICD (implantable cardioverter-defibrillator) in place: [employer], Model [id number] , last interrogation 4/2017  Sleep apnea: not using CPAP  History of osteomyelitis: 2015, right foot 2nd toe  Presence of stent in coronary artery: 2 stents  Heart failure with reduced left ventricular function: EF 37%  Ischemic cardiomyopathy  Pituitary adenoma: as per patient chronic, no changes , recently restarted follow up with endocrinologist ( note in allscripts )  Coronary artery disease  Thrombocytopenia: Chronic  HLD (hyperlipidemia)  HTN (hypertension)  DM (diabetes mellitus): type 2, Hg A1C 8.2 % ( 7/10/17)  AICD lead malfunction: history of  History of amputation: right foot, 2nd toe, osteo 2015  AICD (automatic cardioverter/defibrillator) present: placement in (2006)  Stented coronary artery: RCA (1999)  Coronary atherosclerosis of artery bypass graft: CABG X 4 (1986)      MEDICATIONS  (STANDING):  piperacillin/tazobactam IVPB. 3.375 Gram(s) IV Intermittent once  sodium chloride 0.9% Bolus 250 milliLiter(s) IV Bolus once  vancomycin  IVPB 1000 milliGRAM(s) IV Intermittent once    MEDICATIONS  (PRN):      Allergies    No Known Allergies    Intolerances        VITALS:    Vital Signs Last 24 Hrs  T(C): 36.7 (01 Aug 2018 15:14), Max: 37.3 (01 Aug 2018 10:00)  T(F): 98 (01 Aug 2018 15:14), Max: 99.1 (01 Aug 2018 10:00)  HR: 85 (01 Aug 2018 15:14) (82 - 92)  BP: 117/78 (01 Aug 2018 15:14) (101/63 - 117/78)  BP(mean): --  RR: 17 (01 Aug 2018 15:14) (16 - 19)  SpO2: 99% (01 Aug 2018 15:14) (99% - 100%)    LABS:                          10.9   14.1  )-----------( 62       ( 01 Aug 2018 12:00 )             33.9       08-01    133<L>  |  102  |  49<H>  ----------------------------<  231<H>  5.4<H>   |  18<L>  |  1.93<H>    Ca    8.7      01 Aug 2018 12:00    TPro  7.1  /  Alb  3.2<L>  /  TBili  1.0  /  DBili  x   /  AST  21  /  ALT  13  /  AlkPhos  112  08-01      CAPILLARY BLOOD GLUCOSE          PT/INR - ( 01 Aug 2018 12:00 )   PT: 15.5 sec;   INR: 1.41 ratio         PTT - ( 01 Aug 2018 12:00 )  PTT:27.8 sec    LOWER EXTREMITY PHYSICAL EXAM:    Vasular: DP bounding, PT nonpalpable secondary to edema, CFT <5 seconds B/L, Temperature gradient warm to cool B/L.   Neuro: Epicritic sensation diminished to the level of toes B/L.  Musculoskeletal/Ortho: right foot 2nd toe amp  Skin: perimalleolar nonpitting nonpitting, ascending cellulitis, posterior heel boggy but no open lesions   Wound #1:   Location: right foot 3rd toe distal tip  Size: 0.5cm in diameter  Depth: to subQ  Wound bed: fibrotic  Drainage: none  Odor: none  Periwound: erythema  Etiology: pressure, diabetic  Wound #2: sub 5th MPJ, hyperkeratotic (debrided earlier today), nothing open, no clinical signs of infection    RADIOLOGY & ADDITIONAL STUDIES:

## 2018-08-01 NOTE — ED PROVIDER NOTE - CARE PLAN
Principal Discharge DX:	C. difficile diarrhea  Assessment and plan of treatment:	68M presenting with diarrhea, weakness, right foot ulcer x 1 week. was seen by outpatient podiatry and was sent to the ED for further evaluation. Right foot ulcer draining yellow-fluid. (+) C. Diff. on stool culture. Treated with vancomycin and zosyn for foot ulcer as per podiatry. Treated with oral vancomycin for C. Diff as per admitted hospitalist.  Secondary Diagnosis:	Foot ulcer, right, limited to breakdown of skin

## 2018-08-01 NOTE — H&P ADULT - NSHPOUTPATIENTPROVIDERS_GEN_ALL_CORE
PCP: Dr Thapa   Podiatrist: Dr. Tristan   Cardiologist: Dr. Rivers   Liver specialist: Dr. Inderjit Alaniz  Endocrinologist: Dr. clive Hennessy

## 2018-08-01 NOTE — H&P ADULT - PROBLEM SELECTOR PLAN 2
Podiatry consult appreciated  right foot xray: no signs of OM   Pt is not a candidate for MRI due to ICD, possible CT scan tomorrow  continue IV abx    ID consult per primary team

## 2018-08-01 NOTE — H&P ADULT - PROBLEM SELECTOR PLAN 1
s/p 3 week IV abx for septic bursitis, found to have leukocytosis, acute on chronic RF and + cdiff  hemodynamically stable   s/p  ml   Continue Vancomycin 125 mg QID

## 2018-08-01 NOTE — H&P ADULT - NSHPLABSRESULTS_GEN_ALL_CORE
Labs personally reviewed:                        10.9   14.1  )-----------( 62       ( 01 Aug 2018 12:00 )             33.9       08-01    133<L>  |  102  |  49<H>  ----------------------------<  231<H>  5.4<H>   |  18<L>  |  1.93<H>    Ca    8.7      01 Aug 2018 12:00    TPro  7.1  /  Alb  3.2<L>  /  TBili  1.0  /  DBili  x   /  AST  21  /  ALT  13  /  AlkPhos  112  08-01      PT/INR - ( 01 Aug 2018 12:00 )   PT: 15.5 sec;   INR: 1.41 ratio         PTT - ( 01 Aug 2018 12:00 )  PTT:27.8 sec      Imaging personally reviewed:  CXR: clear lungs, Xray right foot: no signs of OM

## 2018-08-01 NOTE — H&P ADULT - PROBLEM SELECTOR PLAN 8
Pt is asymptomatic, K: 5.4, however has acute on chronic renal failure,   check EKG  f/u BMP now, insuline and D50 if K elevated

## 2018-08-01 NOTE — H&P ADULT - ASSESSMENT
68 y.o male with PMHx of CAD s/p CABG & stenting, systolic CHF ( Ef 37%) s/p ICD, Afib, Gout, idiopathic cirrhosis, thrombocytopenia, CKD stage 3, DM2, HLD, pituitary adenoma and recent right knee septic bursitis s/p IV abx presenting with diarrhea (6-8 episodes per day), weakness, and right foot ulcer s/p debridement in podiatry office, found to have leucocytosis, Cdiff+. He is being admitted for Cdiff colitis and possible right foot OM.

## 2018-08-01 NOTE — ED PROVIDER NOTE - OBJECTIVE STATEMENT
68 year old male with PMHx of CHF CABG/stenting, Gout, recent right knee septic bursitis presenting with diarrhea, weakness, right foot ulcers x 1 week. Reports 6-8 episodes of watery non-bloody stools per day. Associated with generalized fatigue and decreased appetite. He was seen today by Dr. Tristan Podiatry and had his right 2nd toe, heel ulcer debrided. Right foot ulcers were associated with mild yellow-drainage. Denies any fevers, nausea/vomiting, chest pain, difficulty breathing, headaches, travel history.

## 2018-08-01 NOTE — ED PROVIDER NOTE - PLAN OF CARE
68M presenting with diarrhea, weakness, right foot ulcer x 1 week. was seen by outpatient podiatry and was sent to the ED for further evaluation. Right foot ulcer draining yellow-fluid. (+) C. Diff. on stool culture. Treated with vancomycin and zosyn for foot ulcer as per podiatry. Treated with oral vancomycin for C. Diff as per admitted hospitalist.

## 2018-08-01 NOTE — ED PROVIDER NOTE - PMH
AICD lead malfunction  history of  Anemia    Coronary artery disease    DM (diabetes mellitus)  type 2, Hg A1C 8.2 % ( 7/10/17)  Heart failure with reduced left ventricular function  EF 37%  Hepatic cirrhosis, unspecified hepatic cirrhosis type    History of hyperprolactinemia    History of osteomyelitis  2015, right foot 2nd toe  HLD (hyperlipidemia)    HTN (hypertension)    Hypothyroidism    ICD (implantable cardioverter-defibrillator) in place  [employer]tr[employer], Model F630USX , last interrogation 4/2017  Ischemic cardiomyopathy    Pituitary adenoma  as per patient chronic, no changes , recently restarted follow up with endocrinologist ( note in allscripts )  Presence of stent in coronary artery  2 stents  PVD (peripheral vascular disease)    Sleep apnea  not using CPAP  Thrombocytopenia  Chronic

## 2018-08-01 NOTE — H&P ADULT - PMH
AICD lead malfunction  history of  Anemia    Coronary artery disease    DM (diabetes mellitus)  type 2, Hg A1C 8.2 % ( 7/10/17)  Heart failure with reduced left ventricular function  EF 37%  Hepatic cirrhosis, unspecified hepatic cirrhosis type    History of hyperprolactinemia    History of osteomyelitis  2015, right foot 2nd toe  HLD (hyperlipidemia)    HTN (hypertension)    Hypothyroidism    ICD (implantable cardioverter-defibrillator) in place  Medtronic, Model [id number] , last interrogation 4/2017  Ischemic cardiomyopathy    Pituitary adenoma  as per patient chronic, no changes , recently restarted follow up with endocrinologist ( note in allscripts )  Presence of stent in coronary artery  2 stents  PVD (peripheral vascular disease)    Sleep apnea  not using CPAP  Thrombocytopenia  Chronic

## 2018-08-01 NOTE — ED ADULT TRIAGE NOTE - CHIEF COMPLAINT QUOTE
diarrhea and decreased appetite for 1 week  pt c/o blister on right 3rd toe and right ankle swelling

## 2018-08-02 ENCOUNTER — TRANSCRIPTION ENCOUNTER (OUTPATIENT)
Age: 68
End: 2018-08-02

## 2018-08-02 LAB
ANION GAP SERPL CALC-SCNC: 14 MMOL/L — SIGNIFICANT CHANGE UP (ref 5–17)
APPEARANCE UR: CLEAR — SIGNIFICANT CHANGE UP
BILIRUB UR-MCNC: NEGATIVE — SIGNIFICANT CHANGE UP
BUN SERPL-MCNC: 38 MG/DL — HIGH (ref 7–23)
CALCIUM SERPL-MCNC: 8.3 MG/DL — LOW (ref 8.4–10.5)
CHLORIDE SERPL-SCNC: 103 MMOL/L — SIGNIFICANT CHANGE UP (ref 96–108)
CO2 SERPL-SCNC: 15 MMOL/L — LOW (ref 22–31)
COLOR SPEC: YELLOW — SIGNIFICANT CHANGE UP
CREAT SERPL-MCNC: 1.46 MG/DL — HIGH (ref 0.5–1.3)
DIFF PNL FLD: NEGATIVE — SIGNIFICANT CHANGE UP
GLUCOSE SERPL-MCNC: 200 MG/DL — HIGH (ref 70–99)
GLUCOSE UR QL: 500 MG/DL
HBA1C BLD-MCNC: 6.7 % — HIGH (ref 4–5.6)
HCT VFR BLD CALC: 30.1 % — LOW (ref 39–50)
HGB BLD-MCNC: 9.9 G/DL — LOW (ref 13–17)
HYALINE CASTS # UR AUTO: ABNORMAL
KETONES UR-MCNC: ABNORMAL
LEUKOCYTE ESTERASE UR-ACNC: NEGATIVE — SIGNIFICANT CHANGE UP
MCHC RBC-ENTMCNC: 30.4 PG — SIGNIFICANT CHANGE UP (ref 27–34)
MCHC RBC-ENTMCNC: 32.9 GM/DL — SIGNIFICANT CHANGE UP (ref 32–36)
MCV RBC AUTO: 92.3 FL — SIGNIFICANT CHANGE UP (ref 80–100)
NIGHT BLUE STAIN TISS: SIGNIFICANT CHANGE UP
NITRITE UR-MCNC: NEGATIVE — SIGNIFICANT CHANGE UP
PH UR: 6 — SIGNIFICANT CHANGE UP (ref 5–8)
PLATELET # BLD AUTO: 59 K/UL — LOW (ref 150–400)
POTASSIUM SERPL-MCNC: 4.6 MMOL/L — SIGNIFICANT CHANGE UP (ref 3.5–5.3)
POTASSIUM SERPL-SCNC: 4.6 MMOL/L — SIGNIFICANT CHANGE UP (ref 3.5–5.3)
PROT UR-MCNC: SIGNIFICANT CHANGE UP
RBC # BLD: 3.26 M/UL — LOW (ref 4.2–5.8)
RBC # FLD: 15.4 % — HIGH (ref 10.3–14.5)
SODIUM SERPL-SCNC: 132 MMOL/L — LOW (ref 135–145)
SP GR SPEC: 1.02 — SIGNIFICANT CHANGE UP (ref 1.01–1.02)
SPECIMEN SOURCE: SIGNIFICANT CHANGE UP
UROBILINOGEN FLD QL: NEGATIVE — SIGNIFICANT CHANGE UP
WBC # BLD: 11.78 K/UL — HIGH (ref 3.8–10.5)
WBC # FLD AUTO: 11.78 K/UL — HIGH (ref 3.8–10.5)
WBC UR QL: SIGNIFICANT CHANGE UP /HPF (ref 0–5)

## 2018-08-02 PROCEDURE — 99222 1ST HOSP IP/OBS MODERATE 55: CPT

## 2018-08-02 PROCEDURE — 99223 1ST HOSP IP/OBS HIGH 75: CPT

## 2018-08-02 PROCEDURE — 73701 CT LOWER EXTREMITY W/DYE: CPT | Mod: 26,RT

## 2018-08-02 RX ORDER — FUROSEMIDE 40 MG
40 TABLET ORAL
Qty: 0 | Refills: 0 | Status: DISCONTINUED | OUTPATIENT
Start: 2018-08-02 | End: 2018-08-03

## 2018-08-02 RX ORDER — SPIRONOLACTONE 25 MG/1
25 TABLET, FILM COATED ORAL
Qty: 0 | Refills: 0 | Status: DISCONTINUED | OUTPATIENT
Start: 2018-08-02 | End: 2018-08-03

## 2018-08-02 RX ORDER — SODIUM CHLORIDE 9 MG/ML
1000 INJECTION INTRAMUSCULAR; INTRAVENOUS; SUBCUTANEOUS
Qty: 0 | Refills: 0 | Status: DISCONTINUED | OUTPATIENT
Start: 2018-08-02 | End: 2018-08-02

## 2018-08-02 RX ORDER — CARVEDILOL PHOSPHATE 80 MG/1
25 CAPSULE, EXTENDED RELEASE ORAL EVERY 12 HOURS
Qty: 0 | Refills: 0 | Status: DISCONTINUED | OUTPATIENT
Start: 2018-08-02 | End: 2018-08-06

## 2018-08-02 RX ADMIN — LISINOPRIL 2.5 MILLIGRAM(S): 2.5 TABLET ORAL at 09:04

## 2018-08-02 RX ADMIN — Medication 125 MILLIGRAM(S): at 08:58

## 2018-08-02 RX ADMIN — Medication 2: at 08:56

## 2018-08-02 RX ADMIN — SPIRONOLACTONE 25 MILLIGRAM(S): 25 TABLET, FILM COATED ORAL at 06:15

## 2018-08-02 RX ADMIN — Medication 40 MILLIGRAM(S): at 06:15

## 2018-08-02 RX ADMIN — Medication 125 MILLIGRAM(S): at 02:47

## 2018-08-02 RX ADMIN — ATORVASTATIN CALCIUM 40 MILLIGRAM(S): 80 TABLET, FILM COATED ORAL at 21:03

## 2018-08-02 RX ADMIN — BROMOCRIPTINE MESYLATE 5 MILLIGRAM(S): 5 CAPSULE ORAL at 12:37

## 2018-08-02 RX ADMIN — Medication 2: at 12:37

## 2018-08-02 RX ADMIN — Medication 125 MILLIGRAM(S): at 12:37

## 2018-08-02 RX ADMIN — Medication 2: at 17:44

## 2018-08-02 RX ADMIN — CARVEDILOL PHOSPHATE 12.5 MILLIGRAM(S): 80 CAPSULE, EXTENDED RELEASE ORAL at 06:15

## 2018-08-02 RX ADMIN — CARVEDILOL PHOSPHATE 25 MILLIGRAM(S): 80 CAPSULE, EXTENDED RELEASE ORAL at 17:53

## 2018-08-02 RX ADMIN — Medication 81 MILLIGRAM(S): at 12:37

## 2018-08-02 RX ADMIN — SPIRONOLACTONE 25 MILLIGRAM(S): 25 TABLET, FILM COATED ORAL at 17:53

## 2018-08-02 RX ADMIN — Medication 125 MILLIGRAM(S): at 17:54

## 2018-08-02 RX ADMIN — Medication 125 MICROGRAM(S): at 09:04

## 2018-08-02 NOTE — PROGRESS NOTE ADULT - ASSESSMENT
68 y.o male with PMHx of CAD s/p CABG & stenting, systolic CHF ( Ef 37%) s/p ICD, Afib, Gout, idiopathic cirrhosis, thrombocytopenia, CKD stage 3, DM2, HLD, pituitary adenoma and recent right knee MSSA prepatellar bursitis presenting with diarrhea, weakness, and right foot ulcer.     Clostridium difficile colitis  - patient has moderately severe first episode of Cdiff. He has exagerated gastrocolic reflex.  avoid systemic antibiotics  increased po intake encouraged over patients reluctance  Probiotic recommendations made to wife (who is RN) at bedside. Nature of Cdiff discussed with patient and wife - Her questions about infection control after discharge in the home were ansered    Leukocytosis  improving    Foot ulcer  achilles tendon swelling  CT scan pending    CKD   Creatinine trending down    Suggest  Continue po Vanco 125 4 times daily 8/1 -->  minmize systemic antibiotics as feasible

## 2018-08-02 NOTE — DISCHARGE NOTE ADULT - MEDICATION SUMMARY - MEDICATIONS TO TAKE
I will START or STAY ON the medications listed below when I get home from the hospital:    spironolactone 25 mg oral tablet  -- 1 tab(s) by mouth 2 times a day  -- Indication: For Blood pressure    oxyCODONE-acetaminophen 5 mg-325 mg oral tablet  -- 1 tab(s) by mouth every 6 hours, As Needed -Moderate Pain (4 - 6) MDD:4  -- Indication: For Pain    aspirin 81 mg oral delayed release tablet  -- 1 tab(s) by mouth once a day  -- Indication: For Blood thinner    ramipril 2.5 mg oral capsule  -- 1 cap(s) by mouth once a day (in the morning)  -- Indication: For Blood pressure    Januvia 50 mg oral tablet  -- 1 tab(s) by mouth once a day  -- Indication: For diabetes    allopurinol 100 mg oral tablet  -- 1 tab(s) by mouth once a day  -- Indication: For gout    Niaspan ER 1000 mg oral tablet, extended release  -- 1 tab(s) by mouth once a day (at bedtime)  -- Indication: For Cholesterol    atorvastatin 40 mg oral tablet  -- 1 tab(s) by mouth once a day (at bedtime)  -- Indication: For Cholesterol    bromocriptine 5 mg oral capsule  -- 1 cap(s) by mouth once a day  -- Indication: For Antiparkinson    sodium hypochlorite 0.125% topical solution  -- 1 application on skin once a day  -- Indication: For wound care    carvedilol 12.5 mg oral tablet  -- 1 tab(s) by mouth every 12 hours  -- Indication: For Blood pressure    Keflex 500 mg oral capsule  -- 1 cap(s) by mouth every 6 hours  -- Indication: For Antibiotic    furosemide 40 mg oral tablet  -- 1 tab(s) by mouth once a day  -- Indication: For water pill    vancomycin 125 mg oral capsule  -- 1 cap(s) by mouth every 6 hours  -- Indication: For C. difficile colitis    potassium chloride 20 mEq oral tablet, extended release  -- 1 tab(s) by mouth once a day (in the morning)  -- Indication: For supplement while on furosemide    levothyroxine 125 mcg (0.125 mg) oral tablet  -- 1 tab(s) by mouth Monday through Friday  -- Indication: For Thyroid    levothyroxine 125 mcg (0.125 mg) oral tablet  -- 2 tab(s) by mouth Saturday and Sunday  -- Indication: For Thyroid

## 2018-08-02 NOTE — DISCHARGE NOTE ADULT - INSTRUCTIONS
Low fat, Low salt Diabetic  Low fat, Low salt call for  follow up  appointment  with primary care  md   diet  meds  activity  as per md   any severe pain nausea  vomiting  fevers  any  signs of infection reddness swelling purulent drainage  any problems     call md   call 911 or [facility] EMERGENCY ROOM

## 2018-08-02 NOTE — DISCHARGE NOTE ADULT - ADDITIONAL INSTRUCTIONS
Follow up with your Primary care doctor  Follow up with Podiatry  Follow up with Cardiology Follow up with your Primary care doctor  Follow up with Podiatry in 5 days  Follow up with Cardiology

## 2018-08-02 NOTE — DISCHARGE NOTE ADULT - HOSPITAL COURSE
68 m with DM, HLD, CKD, CAD s/p CABG & stenting, systolic CHF ( Ef 37%) s/p ICD, Afib, Gout, idiopathic cirrhosis, thrombocytopenia,  pituitary adenoma and recent right knee MSSA prepatellar bursitis p/w diarrhea, weakness, and right foot/heel ulcer and purulent drainage, was found to have C-diff and started on PO vanco, the achilles tendon also aspirated and had purulence and tophaceous  material that grew MSSA, the diarrhea is improving but the renal function worsened but now back to baseline    #Clostridium difficile colitis  * on PO vanco and clinically resolved, leukocytosis also resolved, will c/w PO vanco until a week after finishing the course of antibiotics    #achilles gouty tendinopathy and MSSA abscess and skin infection with bursitis s/p OR debridement and washout, OR cxs also MSSA  * increase cefazolin to 1 g q 8 as the renal function improved started 8/6 now day 8  * low suspicions for residual infection as per podiatry  * can switch to Keflex 500 PO q6 for another week 68 m with DM, HLD, CKD, CAD s/p CABG & stenting, systolic CHF ( Ef 37%) s/p ICD, Afib, Gout, idiopathic cirrhosis, thrombocytopenia,  pituitary adenoma and recent right knee MSSA prepatellar bursitis p/w diarrhea, weakness, and right foot/heel ulcer and purulent drainage, was found to have C-diff and started on PO vanco, the achilles tendon also aspirated and had purulence and tophaceous  material that grew MSSA, the diarrhea is improving but the renal function worsened but now back to baseline    #Clostridium difficile colitis  * on PO vanco and clinically resolved, leukocytosis also resolved, will c/w PO vanco until a week after finishing the course of antibiotics    #achilles gouty tendinopathy and MSSA abscess and skin infection with bursitis s/p OR debridement and washout, OR cxs also MSSA  * increase cefazolin to 1 g q 8 as the renal function improved started 8/6 now day 8  * low suspicions for residual infection as per podiatry  * Switched to Keflex 500 PO q6 for another week 68 m with DM, HLD, CKD, CAD s/p CABG & stenting, systolic CHF ( Ef 37%) s/p ICD, Afib, Gout, idiopathic cirrhosis, thrombocytopenia,  pituitary adenoma and recent right knee MSSA prepatellar bursitis p/w diarrhea, weakness, and right foot/heel ulcer and purulent drainage, was found to have C-diff and started on PO vanco, the achilles tendon also aspirated and had purulence and tophaceous  material that grew MSSA, the diarrhea is improving but the renal function worsened but now back to baseline    #Clostridium difficile colitis  * on PO vanco and clinically resolved, leukocytosis also resolved, will c/w PO vanco until a week after finishing the course of antibiotics    #achilles gouty tendinopathy and MSSA abscess and skin infection with bursitis s/p OR debridement and washout, OR cxs also MSSA  * increase cefazolin to 1 g q 8 as the renal function improved started 8/6 now day 8  * low suspicions for residual infection as per podiatry  * Switched to Keflex 500 PO q6 for another week  c/w wound care to Rt foot and f/u w/ podiatry

## 2018-08-02 NOTE — CONSULT NOTE ADULT - SUBJECTIVE AND OBJECTIVE BOX
HPI:  68 y.o male with PMHx of CAD s/p CABG & stenting, systolic CHF ( Ef 37%) s/p ICD, Afib, Gout, idiopathic cirrhosis, thrombocytopenia, CKD stage 3, DM2, HLD, pituitary adenoma and recent right knee septic bursitis presenting with diarrhea, weakness, and right foot ulcer. He started having diarrhea 2 weeks ago, with 6-8 episodes of watery non-bloody stools per day. Associated with generalized fatigue and decreased appetite. He attributed these symptoms to naproxen/ colchicine for his gout, subsequently stopped taking them in mid-July.  He was recently discharged from the hospital (5/23) with IV antibiotics for right knee septic bursitis, completing a full 3 weeks antibiotic course on 6/8/18. Denies any fevers, nausea/vomiting, chest pain, SOB, headaches, travel history and pus in stool.    He was also seen today by his podiatrist Dr. Tristan and had his right 2nd toe, heel ulcer debrided due to with mild yellow-drainage s/p debridement in office.     In the ED he received vanco, zosyn, Ns 500 ml and was found + Cdiff, remained hemodynamically stable (01 Aug 2018 17:54)    Managed by cardiology with long history of ischemic cardiomyopathy, ICD, [place] lead fracture and shocks, lead replaced. Recent months with atrial tachycardia rates controlled on carvedilol, Because of chronic thrombocytopenia had been reluctant to anticoagulate and cardiovert, but then decompensated with congestive heart failure, volume overload and needed aggressive diuresis. Started apixaban and during last hospital stay spontaneously reverted to sinus and maintained. Anticoagulation discontinued.    PMH:   Hypothyroidism  PVD (peripheral vascular disease)  History of hyperprolactinemia  Hepatic cirrhosis, unspecified hepatic cirrhosis type  Anemia  ICD (implantable cardioverter-defibrillator) in place  Sleep apnea  History of osteomyelitis  Presence of stent in coronary artery  Heart failure with reduced left ventricular function  Ischemic cardiomyopathy  Pituitary adenoma  Coronary artery disease  Thrombocytopenia  HLD (hyperlipidemia)  HTN (hypertension)  DM (diabetes mellitus)  AICD lead malfunction  Shock    PSH:   History of amputation  AICD (automatic cardioverter/defibrillator) present  Stented coronary artery  Coronary atherosclerosis of artery bypass graft    Review of Systems:  Constitutional: [ ] Fever [ ] Chills [ ] Fatigue [ ] Weight change   HEENT: [ ] Blurred vision [ ] Eye Pain [ ] Headache [ ] Runny nose [ ] Sore Throat   Respiratory: [ ] Cough [ ] Wheezing [ ] Shortness of breath  Cardiovascular: [ ] Chest Pain [ ] Palpitations [ ] RODRIGUEZ [ ] PND [ ] Orthopnea  Gastrointestinal: [ ] Abdominal Pain [x ] Diarrhea [ ] Constipation [ ] Hemorrhoids [ ] Nausea [ ] Vomiting  Genitourinary: [ ] Nocturia [ ] Dysuria [ ] Incontinence  Extremities: [ ] Swelling [x] Joint Pain  Neurologic: [ ] Focal deficit [ ] Paresthesias [ ] Syncope  Lymphatic: [ ] Swelling [ ] Lymphadenopathy   Skin: [ ] Rash [ ] Ecchymoses [ ] Wounds [ ] Lesions  Psychiatry: [ ] Depression [ ] Suicidal/Homicidal Ideation [ ] Anxiety [ ] Sleep Disturbances  [x] 10 point review of systems is otherwise negative except as mentioned above            [ ]Unable to obtain    Medications:   aspirin enteric coated 81 milliGRAM(s) Oral daily  atorvastatin 40 milliGRAM(s) Oral at bedtime  bromocriptine Capsule 5 milliGRAM(s) Oral daily  carvedilol 12.5 milliGRAM(s) Oral every 12 hours  dextrose 40% Gel 15 Gram(s) Oral once PRN  dextrose 5%. 1000 milliLiter(s) IV Continuous <Continuous>  dextrose 50% Injectable 12.5 Gram(s) IV Push once  dextrose 50% Injectable 25 Gram(s) IV Push once  dextrose 50% Injectable 25 Gram(s) IV Push once  furosemide    Tablet 40 milliGRAM(s) Oral daily  glucagon  Injectable 1 milliGRAM(s) IntraMuscular once PRN  insulin lispro (HumaLOG) corrective regimen sliding scale   SubCutaneous three times a day before meals  insulin lispro (HumaLOG) corrective regimen sliding scale   SubCutaneous at bedtime  levothyroxine 125 MICROGram(s) Oral <User Schedule>  levothyroxine 250 MICROGram(s) Oral <User Schedule>  lisinopril 2.5 milliGRAM(s) Oral daily  spironolactone 25 milliGRAM(s) Oral daily  vancomycin    Solution 125 milliGRAM(s) Oral every 6 hours    Allergies:  No Known Allergies    FAMILY HISTORY:  Family history of MI (myocardial infarction) (Father)    Social History:  Smoking:  Alcohol:  Drugs:    Vital Signs Last 24 Hrs  T(C): 36.8 (02 Aug 2018 06:03), Max: 37 (01 Aug 2018 19:07)  T(F): 98.3 (02 Aug 2018 06:03), Max: 98.6 (01 Aug 2018 19:07)  HR: 89 (02 Aug 2018 06:03) (80 - 89)  BP: 107/71 (02 Aug 2018 06:03) (99/60 - 118/74)  BP(mean): 91 (01 Aug 2018 17:54) (91 - 91)  RR: 18 (02 Aug 2018 06:03) (17 - 18)  SpO2: 97% (02 Aug 2018 06:03) (96% - 99%)    Physical Exam:  Appearance: NAD  Eyes: PERRL, EOMI  HENT: Normal oral mus[place]a, NC/AT  Cardiovascular: normal S1 and S2, RRR, soft holsystolic murmur, no edema, normal JVP  Respiratory: Clear to auscultation bilaterally  Gastrointestinal: Soft, non-tender, non-distended, BS+  Musculoskeletal: No clubbing, no joint deformity   Neurologic: Non-focal  Lymphatic: No lymphadenopathy  Psychiatry: AAOx3, mood & affect appropriate  Skin: No rashes, no ecchymoses, no cyanosis    Labs:                        9.9    11.78 )-----------( 59       ( 02 Aug 2018 07:40 )             30.1     08-02    132<L>  |  103  |  38<H>  ----------------------------<  200<H>  4.6   |  15<L>  |  1.46<H>    Ca    8.3<L>      02 Aug 2018 06:42    TPro  7.1  /  Alb  3.2<L>  /  TBili  1.0  /  DBili  x   /  AST  21  /  ALT  13  /  AlkPhos  112  08-01    PT/INR - ( 01 Aug 2018 12:00 )   PT: 15.5 sec;   INR: 1.41 ratio         PTT - ( 01 Aug 2018 12:00 )  PTT:27.8 sec          Hemoglobin A1C, Whole Blood: 6.7 % (08-02 @ 07:40)          Cardiovascular Diagnostic Testing:  ECG:    Echo:    Stress Testing:    Cath:    Interpretation of Telemetry:    Imaging:

## 2018-08-02 NOTE — DISCHARGE NOTE ADULT - CARE PLAN
Principal Discharge DX:	C. difficile colitis  Assessment and plan of treatment:	Continue with oral vancomycin taper dose Principal Discharge DX:	C. difficile colitis  Assessment and plan of treatment:	Continue with oral vancomycin taper dose  Secondary Diagnosis:	Acute renal failure superimposed on stage 3 chronic kidney disease  Secondary Diagnosis:	CAD (coronary artery disease) of bypass graft  Assessment and plan of treatment:	Continue with Aspirin  Secondary Diagnosis:	Type 2 diabetes mellitus with hyperglycemia, without long-term current use of insulin  Assessment and plan of treatment:	8/2: HgbA1c- 6.7  Continue with you home diabetic medications  Secondary Diagnosis:	Paroxysmal atrial tachycardia  Assessment and plan of treatment:	Continue with --------------- Principal Discharge DX:	C. difficile colitis  Assessment and plan of treatment:	Continue with oral vancomycin taper dose  Secondary Diagnosis:	Acute renal failure superimposed on stage 3 chronic kidney disease  Secondary Diagnosis:	CAD (coronary artery disease) of bypass graft  Assessment and plan of treatment:	Continue with Aspirin  Secondary Diagnosis:	Type 2 diabetes mellitus with hyperglycemia, without long-term current use of insulin  Assessment and plan of treatment:	8/2: HgbA1c- 6.7  Continue with you home diabetic medications  Secondary Diagnosis:	Paroxysmal atrial tachycardia  Assessment and plan of treatment:	Continue with carvedilol as prescribed Principal Discharge DX:	C. difficile colitis  Assessment and plan of treatment:	Continue with oral vancomycin taper dose  Secondary Diagnosis:	Acute renal failure superimposed on stage 3 chronic kidney disease  Secondary Diagnosis:	CAD (coronary artery disease) of bypass graft  Assessment and plan of treatment:	Continue with Aspirin  Secondary Diagnosis:	Type 2 diabetes mellitus with hyperglycemia, without long-term current use of insulin  Assessment and plan of treatment:	8/2: HgbA1c- 6.7  Continue with you home diabetic medications  Secondary Diagnosis:	Paroxysmal atrial tachycardia  Assessment and plan of treatment:	Continue with carvedilol as prescribed  Secondary Diagnosis:	Chronic tophaceous gout  Assessment and plan of treatment:	Continue with Allopurinol Principal Discharge DX:	C. difficile colitis  Assessment and plan of treatment:	Continue with oral vancomycin taper dose  Secondary Diagnosis:	Acute renal failure superimposed on stage 3 chronic kidney disease  Secondary Diagnosis:	CAD (coronary artery disease) of bypass graft  Assessment and plan of treatment:	Continue with Aspirin  Secondary Diagnosis:	Type 2 diabetes mellitus with hyperglycemia, without long-term current use of insulin  Assessment and plan of treatment:	8/2: HgbA1c- 6.7  Continue with you home diabetic medications  Secondary Diagnosis:	Paroxysmal atrial tachycardia  Assessment and plan of treatment:	Continue with carvedilol as prescribed  Secondary Diagnosis:	Chronic tophaceous gout  Assessment and plan of treatment:	Continue with Allopurinol  Secondary Diagnosis:	Hyponatremia Principal Discharge DX:	C. difficile colitis  Assessment and plan of treatment:	Continue with oral vancomycin taper dose  Secondary Diagnosis:	Acute renal failure superimposed on stage 3 chronic kidney disease  Secondary Diagnosis:	MSSA (methicillin susceptible Staphylococcus aureus) infection  Assessment and plan of treatment:	Received intravenous antibiotics  Secondary Diagnosis:	Type 2 diabetes mellitus with hyperglycemia, without long-term current use of insulin  Assessment and plan of treatment:	8/2: HgbA1c- 6.7  Continue with you home diabetic medications  Secondary Diagnosis:	Paroxysmal atrial tachycardia  Assessment and plan of treatment:	Continue with carvedilol as prescribed  Secondary Diagnosis:	Chronic tophaceous gout  Assessment and plan of treatment:	Continue with Allopurinol  Secondary Diagnosis:	Hyponatremia Principal Discharge DX:	C. difficile colitis  Assessment and plan of treatment:	Continue with oral vancomycin taper dose  Secondary Diagnosis:	Acute renal failure superimposed on stage 3 chronic kidney disease  Secondary Diagnosis:	MSSA (methicillin susceptible Staphylococcus aureus) infection  Assessment and plan of treatment:	Received intravenous antibiotics  Secondary Diagnosis:	Type 2 diabetes mellitus with hyperglycemia, without long-term current use of insulin  Assessment and plan of treatment:	8/2: HgbA1c- 6.7  Continue with you home diabetic medications  Secondary Diagnosis:	Paroxysmal atrial tachycardia  Assessment and plan of treatment:	Continue with carvedilol as prescribed  Secondary Diagnosis:	Chronic tophaceous gout  Assessment and plan of treatment:	Continue with Allopurinol  Had right posterior heel wound debridement on 9/10  Secondary Diagnosis:	Hyponatremia Principal Discharge DX:	C. difficile colitis  Goal:	Resolution of infection  Assessment and plan of treatment:	Continue with oral vancomycin taper dose  Secondary Diagnosis:	Acute renal failure superimposed on stage 3 chronic kidney disease  Secondary Diagnosis:	MSSA (methicillin susceptible Staphylococcus aureus) infection  Assessment and plan of treatment:	Received intravenous antibiotics  Secondary Diagnosis:	Type 2 diabetes mellitus with hyperglycemia, without long-term current use of insulin  Assessment and plan of treatment:	8/2: HgbA1c- 6.7  Continue with you home diabetic medications  Secondary Diagnosis:	Paroxysmal atrial tachycardia  Assessment and plan of treatment:	Continue with carvedilol as prescribed  Secondary Diagnosis:	Chronic tophaceous gout  Assessment and plan of treatment:	Continue with Allopurinol  Had right posterior heel wound debridement on 9/10  Secondary Diagnosis:	Hyponatremia Principal Discharge DX:	C. difficile colitis  Goal:	Resolution of infection  Assessment and plan of treatment:	Continue with oral vancomycin taper dose  Secondary Diagnosis:	Acute renal failure superimposed on stage 3 chronic kidney disease  Secondary Diagnosis:	MSSA (methicillin susceptible Staphylococcus aureus) infection  Assessment and plan of treatment:	Received intravenous antibiotics  Continue with Keflex 500 PO q6 for 7more days  Secondary Diagnosis:	Type 2 diabetes mellitus with hyperglycemia, without long-term current use of insulin  Assessment and plan of treatment:	8/2: HgbA1c- 6.7  Continue with you home diabetic medications  Secondary Diagnosis:	Paroxysmal atrial tachycardia  Assessment and plan of treatment:	Continue with carvedilol as prescribed  Secondary Diagnosis:	Chronic tophaceous gout  Assessment and plan of treatment:	Continue with Allopurinol  Had right posterior heel wound debridement on 9/10  Secondary Diagnosis:	Hyponatremia Principal Discharge DX:	C. difficile colitis  Goal:	Resolution of infection  Assessment and plan of treatment:	Continue with oral vancomycin taper dose  Secondary Diagnosis:	Acute renal failure superimposed on stage 3 chronic kidney disease  Assessment and plan of treatment:	Creatinine stable  Secondary Diagnosis:	MSSA (methicillin susceptible Staphylococcus aureus) infection  Assessment and plan of treatment:	Received intravenous antibiotics  Continue with Keflex 500 PO q6 for 7more days  Right foot wound: Apply 1/4 Dakins. 1/4" iodoform. Apply 4.4 Abd Rubio daily  Secondary Diagnosis:	Type 2 diabetes mellitus with hyperglycemia, without long-term current use of insulin  Assessment and plan of treatment:	8/2: HgbA1c- 6.7  Continue with you home diabetic medications  Secondary Diagnosis:	Paroxysmal atrial tachycardia  Assessment and plan of treatment:	Continue with carvedilol as prescribed  Secondary Diagnosis:	Chronic tophaceous gout  Assessment and plan of treatment:	Continue with Allopurinol  Had right posterior heel wound debridement on 9/10  Secondary Diagnosis:	Hyponatremia

## 2018-08-02 NOTE — DISCHARGE NOTE ADULT - CARE PROVIDER_API CALL
Andrea Tristan (SHANE), Podiatric Medicine and Surgery  [address] [facility] [place] [address]  Phone: [phone]  Fax: [phone]    Douglsa Rivers), Cardiovascular Disease; Internal Medicine; Nuclear Cardiology  [address] [address]  Phone: [phone]  Fax: [phone]    Suha Thapa), Internal Medicine  [address] [place] [address]  Phone: [phone]  Fax: [phone]

## 2018-08-02 NOTE — DISCHARGE NOTE ADULT - NS AS DC HF EDUCATION INSTRUCTIONS
Monitor Weight Daily/Low salt diet/Report weight gain of 2 or more pounds in one day or 3 or more pounds in one week, worsening shortness of breath, fatigue, weakness, increased swelling of hands and feet to primary care provider/Activities as tolerated/Call Primary Care Provider for follow-up after discharge

## 2018-08-02 NOTE — DISCHARGE NOTE ADULT - SECONDARY DIAGNOSIS.
Acute renal failure superimposed on stage 3 chronic kidney disease CAD (coronary artery disease) of bypass graft Type 2 diabetes mellitus with hyperglycemia, without long-term current use of insulin Paroxysmal atrial tachycardia Chronic tophaceous gout Hyponatremia MSSA (methicillin susceptible Staphylococcus aureus) infection

## 2018-08-02 NOTE — DISCHARGE NOTE ADULT - PLAN OF CARE
Continue with oral vancomycin taper dose Continue with Aspirin 8/2: HgbA1c- 6.7  Continue with you home diabetic medications Continue with --------------- Continue with carvedilol as prescribed Continue with Allopurinol Received intravenous antibiotics Continue with Allopurinol  Had right posterior heel wound debridement on 9/10 Resolution of infection Received intravenous antibiotics  Continue with Keflex 500 PO q6 for 7more days Creatinine stable Received intravenous antibiotics  Continue with Keflex 500 PO q6 for 7more days  Right foot wound: Apply 1/4 Dakins. 1/4" iodoform. Apply 4.4 Abd Rubio daily

## 2018-08-02 NOTE — DISCHARGE NOTE ADULT - PATIENT PORTAL LINK FT
You can access the [facility] Patient Portal, offered by [facility], by registering with the following website: [email or web address]

## 2018-08-02 NOTE — CONSULT NOTE ADULT - ASSESSMENT
68 year old male with ICM EF 40%, CABG, HTN, DM II, HLD, pituitary tumor, atrial fibrillation s/p ICD admitted with C. Diff. Has recent sustained atrial tachycardia and reverted to sinus and need to maintain on full doses of his cardiac regimen in effort to maintain, but no longer on anticoagulation.    Ischemic cardiomyopathy    maintain carvedilol dose should be 25 mg BID    Ramipril 2.5 mg BID changed to lisinopril 2.5 mg BID in hospital, may want to increase dose to 5 mg    Spironolactone should be 25 mg BID    Furosemide should be 40 mg BID    Continue aspirin 81 mg    Paroxysmal atrial tachycardia    manage by best treating ischemic cardiomyopathy and avoiding volume overload    no anticoagulation    Paroxysmal ventricular tachycardia    Has ICD.    Chronic thrombocytopenia    Preferable to not be on anticoagulation

## 2018-08-02 NOTE — DISCHARGE NOTE ADULT - CARE PROVIDERS DIRECT ADDRESSES
[email or web address]

## 2018-08-03 DIAGNOSIS — N18.3 CHRONIC KIDNEY DISEASE, STAGE 3 (MODERATE): ICD-10-CM

## 2018-08-03 DIAGNOSIS — N17.9 ACUTE KIDNEY FAILURE, UNSPECIFIED: ICD-10-CM

## 2018-08-03 DIAGNOSIS — E87.1 HYPO-OSMOLALITY AND HYPONATREMIA: ICD-10-CM

## 2018-08-03 DIAGNOSIS — E87.2 ACIDOSIS: ICD-10-CM

## 2018-08-03 LAB
ANION GAP SERPL CALC-SCNC: 12 MMOL/L — SIGNIFICANT CHANGE UP (ref 5–17)
BUN SERPL-MCNC: 40 MG/DL — HIGH (ref 7–23)
CALCIUM SERPL-MCNC: 8 MG/DL — LOW (ref 8.4–10.5)
CHLORIDE SERPL-SCNC: 103 MMOL/L — SIGNIFICANT CHANGE UP (ref 96–108)
CO2 SERPL-SCNC: 17 MMOL/L — LOW (ref 22–31)
CREAT ?TM UR-MCNC: 158 MG/DL — SIGNIFICANT CHANGE UP
CREAT SERPL-MCNC: 1.87 MG/DL — HIGH (ref 0.5–1.3)
CRYSTALS SNV QL MICRO: SIGNIFICANT CHANGE UP
CULTURE RESULTS: SIGNIFICANT CHANGE UP
GLUCOSE SERPL-MCNC: 291 MG/DL — HIGH (ref 70–99)
PH UR: 5.5 — SIGNIFICANT CHANGE UP (ref 5–8)
POTASSIUM SERPL-MCNC: 4.6 MMOL/L — SIGNIFICANT CHANGE UP (ref 3.5–5.3)
POTASSIUM SERPL-SCNC: 4.6 MMOL/L — SIGNIFICANT CHANGE UP (ref 3.5–5.3)
SODIUM SERPL-SCNC: 132 MMOL/L — LOW (ref 135–145)
SODIUM UR-SCNC: 23 MMOL/L — SIGNIFICANT CHANGE UP
SPECIMEN SOURCE: SIGNIFICANT CHANGE UP

## 2018-08-03 PROCEDURE — 99232 SBSQ HOSP IP/OBS MODERATE 35: CPT

## 2018-08-03 PROCEDURE — 99233 SBSQ HOSP IP/OBS HIGH 50: CPT | Mod: GC

## 2018-08-03 RX ORDER — INSULIN LISPRO 100/ML
VIAL (ML) SUBCUTANEOUS
Qty: 0 | Refills: 0 | Status: DISCONTINUED | OUTPATIENT
Start: 2018-08-03 | End: 2018-08-10

## 2018-08-03 RX ORDER — ACETYLCYSTEINE 200 MG/ML
1200 VIAL (ML) MISCELLANEOUS EVERY 12 HOURS
Qty: 0 | Refills: 0 | Status: COMPLETED | OUTPATIENT
Start: 2018-08-03 | End: 2018-08-05

## 2018-08-03 RX ORDER — SODIUM CHLORIDE 9 MG/ML
1000 INJECTION INTRAMUSCULAR; INTRAVENOUS; SUBCUTANEOUS
Qty: 0 | Refills: 0 | Status: DISCONTINUED | OUTPATIENT
Start: 2018-08-03 | End: 2018-08-04

## 2018-08-03 RX ORDER — FUROSEMIDE 40 MG
40 TABLET ORAL DAILY
Qty: 0 | Refills: 0 | Status: DISCONTINUED | OUTPATIENT
Start: 2018-08-03 | End: 2018-08-03

## 2018-08-03 RX ORDER — ACETAMINOPHEN 500 MG
650 TABLET ORAL ONCE
Qty: 0 | Refills: 0 | Status: COMPLETED | OUTPATIENT
Start: 2018-08-03 | End: 2018-08-03

## 2018-08-03 RX ADMIN — ATORVASTATIN CALCIUM 40 MILLIGRAM(S): 80 TABLET, FILM COATED ORAL at 21:59

## 2018-08-03 RX ADMIN — CARVEDILOL PHOSPHATE 25 MILLIGRAM(S): 80 CAPSULE, EXTENDED RELEASE ORAL at 11:19

## 2018-08-03 RX ADMIN — Medication 40 MILLIGRAM(S): at 10:19

## 2018-08-03 RX ADMIN — Medication 81 MILLIGRAM(S): at 13:02

## 2018-08-03 RX ADMIN — SODIUM CHLORIDE 60 MILLILITER(S): 9 INJECTION INTRAMUSCULAR; INTRAVENOUS; SUBCUTANEOUS at 17:42

## 2018-08-03 RX ADMIN — Medication 6: at 13:01

## 2018-08-03 RX ADMIN — Medication 125 MILLIGRAM(S): at 17:30

## 2018-08-03 RX ADMIN — Medication 2: at 08:54

## 2018-08-03 RX ADMIN — BROMOCRIPTINE MESYLATE 5 MILLIGRAM(S): 5 CAPSULE ORAL at 13:03

## 2018-08-03 RX ADMIN — Medication 4: at 17:29

## 2018-08-03 RX ADMIN — Medication 650 MILLIGRAM(S): at 21:59

## 2018-08-03 RX ADMIN — Medication 125 MILLIGRAM(S): at 21:59

## 2018-08-03 RX ADMIN — Medication 125 MILLIGRAM(S): at 13:02

## 2018-08-03 RX ADMIN — Medication 650 MILLIGRAM(S): at 22:44

## 2018-08-03 RX ADMIN — Medication 125 MILLIGRAM(S): at 06:47

## 2018-08-03 RX ADMIN — Medication 125 MICROGRAM(S): at 08:55

## 2018-08-03 RX ADMIN — CARVEDILOL PHOSPHATE 25 MILLIGRAM(S): 80 CAPSULE, EXTENDED RELEASE ORAL at 21:59

## 2018-08-03 RX ADMIN — Medication 125 MILLIGRAM(S): at 00:26

## 2018-08-03 NOTE — PROGRESS NOTE ADULT - NSHPATTENDINGPLANDISCUSS_GEN_ALL_CORE
to reach Cardiology Attending call during weekdays [employer] [phone], or off hours [phone]. Nephrology, Medicine NP. To reach Cardiology Attending call during weekdays [employer] [phone], or off hours [phone].

## 2018-08-03 NOTE — CONSULT NOTE ADULT - PROBLEM SELECTOR RECOMMENDATION 9
pre renal vs atn  check ua  check urine na/cr/osm/k/cl  check urine pro/cr  check renal us  d/c ACE and d/c lasix  given active diarrhea and contrast expose- recommend IV nacl @ 60cc/hr x 24 h then re eval- I discussed with Dr Dillon and he agrees  trend bmp  avoid nephrotoxins

## 2018-08-03 NOTE — PROGRESS NOTE ADULT - ASSESSMENT
68 y.o male with PMHx of CAD s/p CABG & stenting, systolic CHF ( Ef 37%) s/p ICD, Afib, Gout, idiopathic cirrhosis, thrombocytopenia, CKD stage 3, DM2, HLD, pituitary adenoma and recent right knee MSSA prepatellar bursitis presenting with diarrhea, weakness, and right foot ulcer.     Clostridium difficile colitis  - patient has moderately severe first episode of Cdiff. He has exagerated gastrocolic reflex.        modestly better  Leukocytosis        improving  Foot ulcer  achilles tendon swelling                impression is gouty achilles tendonopathy  CKD   Creatinine increased    Suggest  Continue po Vanco 125  4 times daily 8/1 -->  minmize systemic antibiotics as feasible  If required for his gout in context of renal impairment, no ID objection to systemic steroids - but would prefer to minimize dose and duration as feasible    I will be out until 8/13 --- ID service to follow

## 2018-08-03 NOTE — PROVIDER CONTACT NOTE (MEDICATION) - ASSESSMENT
pt is A+Ox4. pt educated on risk/benefit of medication, but continue to refuse stating, "no provider mentioned it today and I don't feel comfortable taking it at this time".

## 2018-08-03 NOTE — CONSULT NOTE ADULT - ASSESSMENT
68 y.o male with PMHx of CAD s/p CABG & stenting, systolic CHF ( Ef 37%) s/p ICD, Afib, Gout, idiopathic cirrhosis, thrombocytopenia, CKD stage 3, DM2, HLD, pituitary adenoma and recent right knee septic bursitis presenting with diarrhea, weakness, and right foot ulcer found to have c diff and alban on ckd3 in setting of contrast exposure/diuretic/ace and hypotension

## 2018-08-03 NOTE — PROGRESS NOTE ADULT - ASSESSMENT
68 year old male with ICM EF 40%, CABG, HTN, DM II, HLD, pituitary tumor, atrial fibrillation s/p ICD admitted with C. Diff. Has recent sustained atrial tachycardia and reverted to sinus and need to maintain on full doses of his cardiac regimen in effort to maintain, but no longer on anticoagulation.    Ischemic cardiomyopathy    maintain carvedilol dose should be 25 mg BID    Ramipril 2.5 mg changed to lisinopril 2.5 mg in hospital    Spironolactone should be 25 mg BID    Furosemide should be 40 mg BID, but with diarrhea and relatively low blood pressure will decrease to once daily for now.    Continue aspirin 81 mg    Paroxysmal atrial tachycardia    manage by best treating ischemic cardiomyopathy and avoiding volume overload    If possible would not hold carvedilol doses    no anticoagulation    Paroxysmal ventricular tachycardia    Has ICD.    Chronic thrombocytopenia    Preferable to not be on anticoagulation 68 year old male with ICM EF 40%, CABG, HTN, DM II, HLD, pituitary tumor, atrial fibrillation s/p ICD admitted with C. Diff. Has recent sustained atrial tachycardia and reverted to sinus and need to maintain on full doses of his cardiac regimen in effort to maintain, but no longer on anticoagulation.    Ischemic cardiomyopathy    maintain carvedilol dose should be 25 mg BID    Ramipril 2.5 mg changed to lisinopril 2.5 mg in hospital    Spironolactone should be 25 mg BID    Furosemide should be 40 mg BID, but with diarrhea and relatively low blood pressure will decrease to once daily for now.    Continue aspirin 81 mg    Paroxysmal atrial tachycardia    manage by best treating ischemic cardiomyopathy and avoiding volume overload    If possible would not hold carvedilol doses    no anticoagulation    Paroxysmal ventricular tachycardia    Has ICD.    Chronic thrombocytopenia    Preferable to not be on anticoagulation    JULIANNE - discussed with renal due to increase in creatinine, contrast administered yesterday for procedure in ER and diarrhea leading to relative dehydration will stop furosemide and lisinopril for now.

## 2018-08-03 NOTE — CONSULT NOTE ADULT - SUBJECTIVE AND OBJECTIVE BOX
[facility] Consult Note Nephrology - CONSULTATION NOTE    68 y.o male with PMHx of CAD s/p CABG & stenting, systolic CHF ( Ef 37%) s/p ICD, Afib, Gout, idiopathic cirrhosis, thrombocytopenia, CKD stage 3, DM2, HLD, pituitary adenoma and recent right knee septic bursitis presenting with diarrhea, weakness, and right foot ulcer. He started having diarrhea 2 weeks ago, with 6-8 episodes of watery non-bloody stools per day. Associated with generalized fatigue and decreased appetite. He attributed these symptoms to naproxen/ colchicine for his gout, subsequently stopped taking them in mid-July.  He was recently discharged from the hospital () with IV antibiotics for right knee septic bursitis, completing a full 3 weeks antibiotic course on 18. Denies any fevers, nausea/vomiting, chest pain, SOB, headaches, travel history and pus in stool.    He was also seen today by his podiatrist Dr. Tristan and had his right 2nd toe, heel ulcer debrided due to with mild yellow-drainage s/p debridement in office.     In the ED he received vanco, zosyn, Ns 500 ml and was found + Cdiff, remained hemodynamically stable     Renal now called for alban on ckd 3- cr appears to range around 1.5mg/dl. Pt s/p ct with iv con of leg with 90cc of contrast given, on lasix/ace and having diarrhea close q2h  Feels weak and has poor appetite  denies any nausea or vomiting or chest pain    PAST MEDICAL & SURGICAL HISTORY:  Hypothyroidism  PVD (peripheral vascular disease)  History of hyperprolactinemia  Hepatic cirrhosis, unspecified hepatic cirrhosis type  Anemia  ICD (implantable cardioverter-defibrillator) in place: Medtronic, Model [id number] , last interrogation 2017  Sleep apnea: not using CPAP  History of osteomyelitis: , right foot 2nd toe  Presence of stent in coronary artery: 2 stents  Heart failure with reduced left ventricular function: EF 37%  Ischemic cardiomyopathy  Pituitary adenoma: as per patient chronic, no changes , recently restarted follow up with endocrinologist ( note in allscripts )  Coronary artery disease  Thrombocytopenia: Chronic  HLD (hyperlipidemia)  HTN (hypertension)  DM (diabetes mellitus): type 2, Hg A1C 8.2 % ( 7/10/17)  AICD lead malfunction: history of  History of amputation: right foot, 2nd toe, osteo 2015  AICD (automatic cardioverter/defibrillator) present: placement in ()  Stented coronary artery: RCA ()  Coronary atherosclerosis of artery bypass graft: CABG X 4 ()    No Known Allergies    Home Medications Reviewed  Hospital Medications:   MEDICATIONS  (STANDING):  acetylcysteine  Oral Solution 1200 milliGRAM(s) Oral every 12 hours  aspirin enteric coated 81 milliGRAM(s) Oral daily  atorvastatin 40 milliGRAM(s) Oral at bedtime  bromocriptine Capsule 5 milliGRAM(s) Oral daily  carvedilol 25 milliGRAM(s) Oral every 12 hours  dextrose 5%. 1000 milliLiter(s) (50 mL/Hr) IV Continuous <Continuous>  dextrose 50% Injectable 12.5 Gram(s) IV Push once  dextrose 50% Injectable 25 Gram(s) IV Push once  dextrose 50% Injectable 25 Gram(s) IV Push once  insulin lispro (HumaLOG) corrective regimen sliding scale   SubCutaneous three times a day before meals  insulin lispro (HumaLOG) corrective regimen sliding scale   SubCutaneous at bedtime  levothyroxine 125 MICROGram(s) Oral <User Schedule>  levothyroxine 250 MICROGram(s) Oral <User Schedule>  spironolactone 25 milliGRAM(s) Oral two times a day  vancomycin    Solution 125 milliGRAM(s) Oral every 6 hours    SOCIAL HISTORY:  Denies ETOh,Smoking,   FAMILY HISTORY:  Family history of MI (myocardial infarction) (Father)    REVIEW OF SYSTEMS:  CONSTITUTIONAL: No weakness, fevers or chills  EYES/ENT: No visual changes;  No vertigo or throat pain   NECK: No pain or stiffness  RESPIRATORY: No cough, wheezing, hemoptysis; No shortness of breath  CARDIOVASCULAR: No chest pain or palpitations.  GASTROINTESTINAL: + diarrhea  GENITOURINARY: No dysuria, frequency, foamy urine, urinary urgency, incontinence or hematuria  NEUROLOGICAL: No numbness or weakness  SKIN: No itching, burning, rashes, or lesions   VASCULAR: No bilateral lower extremity edema.   All other review of systems is negative unless indicated above.    VITALS:  T(F): 97.8 (18 @ 10:04), Max: 98.2 (18 @ 06:30)  HR: 70 (18 @ 10:04)  BP: 90/48 (18 @ 10:04)  RR: 18 (18 @ 10:04)  SpO2: 98% (18 @ 10:04)  Wt(kg): --     @ 07:01  -   @ 07:00  --------------------------------------------------------  IN: 600 mL / OUT: 300 mL / NET: 300 mL        PHYSICAL EXAM:  Constitutional: NAD  HEENT: anicteric sclera, oropharynx clear, MMM  Neck: No JVD  Respiratory: CTAB, no wheezes, rales or rhonchi  Cardiovascular: S1, S2, RRR  Gastrointestinal: BS+, soft, NT/ND  Extremities: right foot ulcer  Neurological: A/O x 3, no focal deficits  Psychiatric: Normal mood, normal affect  : No CVA tenderness. No mckinnon.   Skin: No rashes  Vascular Access:    LABS:      132<L>  |  103  |  40<H>  ----------------------------<  291<H>  4.6   |  17<L>  |  1.87<H>    Ca    8.0<L>      03 Aug 2018 10:15      Creatinine Trend: 1.87 <--, 1.46 <--, 1.66 <--, 1.93 <--                        9.9    11.78 )-----------( 59       ( 02 Aug 2018 07:40 )             30.1     Urine Studies:  Urinalysis Basic - ( 02 Aug 2018 02:46 )    Color: Yellow / Appearance: Clear / S.019 / pH:   Gluc:  / Ketone: Trace  / Bili: Negative / Urobili: Negative   Blood:  / Protein: Trace / Nitrite: Negative   Leuk Esterase: Negative / RBC:  / WBC 3-5 /HPF   Sq Epi:  / Non Sq Epi:  / Bacteria:         RADIOLOGY & ADDITIONAL STUDIES:

## 2018-08-04 LAB
ANION GAP SERPL CALC-SCNC: 14 MMOL/L — SIGNIFICANT CHANGE UP (ref 5–17)
BUN SERPL-MCNC: 42 MG/DL — HIGH (ref 7–23)
CALCIUM SERPL-MCNC: 8.1 MG/DL — LOW (ref 8.4–10.5)
CALCIUM UR-MCNC: 1 MG/DL — SIGNIFICANT CHANGE UP
CHLORIDE SERPL-SCNC: 102 MMOL/L — SIGNIFICANT CHANGE UP (ref 96–108)
CO2 SERPL-SCNC: 16 MMOL/L — LOW (ref 22–31)
CREAT SERPL-MCNC: 2.68 MG/DL — HIGH (ref 0.5–1.3)
CULTURE RESULTS: NO GROWTH — SIGNIFICANT CHANGE UP
GLUCOSE SERPL-MCNC: 165 MG/DL — HIGH (ref 70–99)
GRAM STN FLD: SIGNIFICANT CHANGE UP
HCT VFR BLD CALC: 28.4 % — LOW (ref 39–50)
HGB BLD-MCNC: 9.1 G/DL — LOW (ref 13–17)
MCHC RBC-ENTMCNC: 29.1 PG — SIGNIFICANT CHANGE UP (ref 27–34)
MCHC RBC-ENTMCNC: 32 GM/DL — SIGNIFICANT CHANGE UP (ref 32–36)
MCV RBC AUTO: 90.7 FL — SIGNIFICANT CHANGE UP (ref 80–100)
OSMOLALITY UR: 435 MOS/KG — SIGNIFICANT CHANGE UP (ref 50–1200)
PLATELET # BLD AUTO: 52 K/UL — LOW (ref 150–400)
POTASSIUM SERPL-MCNC: 4.3 MMOL/L — SIGNIFICANT CHANGE UP (ref 3.5–5.3)
POTASSIUM SERPL-SCNC: 4.3 MMOL/L — SIGNIFICANT CHANGE UP (ref 3.5–5.3)
PROT ?TM UR-MCNC: 15 MG/DL — HIGH (ref 0–12)
RBC # BLD: 3.13 M/UL — LOW (ref 4.2–5.8)
RBC # FLD: 15 % — HIGH (ref 10.3–14.5)
SODIUM SERPL-SCNC: 132 MMOL/L — LOW (ref 135–145)
SPECIMEN SOURCE: SIGNIFICANT CHANGE UP
SPECIMEN SOURCE: SIGNIFICANT CHANGE UP
URATE UR-MCNC: 28.3 MG/DL — SIGNIFICANT CHANGE UP
UUN UR-MCNC: 596 MG/DL — SIGNIFICANT CHANGE UP
WBC # BLD: 7.21 K/UL — SIGNIFICANT CHANGE UP (ref 3.8–10.5)
WBC # FLD AUTO: 7.21 K/UL — SIGNIFICANT CHANGE UP (ref 3.8–10.5)

## 2018-08-04 PROCEDURE — 99232 SBSQ HOSP IP/OBS MODERATE 35: CPT | Mod: GC

## 2018-08-04 RX ORDER — SODIUM CHLORIDE 9 MG/ML
1000 INJECTION INTRAMUSCULAR; INTRAVENOUS; SUBCUTANEOUS
Qty: 0 | Refills: 0 | Status: DISCONTINUED | OUTPATIENT
Start: 2018-08-04 | End: 2018-08-05

## 2018-08-04 RX ADMIN — Medication 125 MILLIGRAM(S): at 12:51

## 2018-08-04 RX ADMIN — ATORVASTATIN CALCIUM 40 MILLIGRAM(S): 80 TABLET, FILM COATED ORAL at 20:53

## 2018-08-04 RX ADMIN — Medication 125 MILLIGRAM(S): at 23:12

## 2018-08-04 RX ADMIN — Medication 250 MICROGRAM(S): at 17:01

## 2018-08-04 RX ADMIN — CARVEDILOL PHOSPHATE 25 MILLIGRAM(S): 80 CAPSULE, EXTENDED RELEASE ORAL at 20:54

## 2018-08-04 RX ADMIN — CARVEDILOL PHOSPHATE 25 MILLIGRAM(S): 80 CAPSULE, EXTENDED RELEASE ORAL at 09:37

## 2018-08-04 RX ADMIN — Medication 1200 MILLIGRAM(S): at 20:54

## 2018-08-04 RX ADMIN — Medication 125 MILLIGRAM(S): at 18:05

## 2018-08-04 RX ADMIN — Medication 81 MILLIGRAM(S): at 12:49

## 2018-08-04 RX ADMIN — Medication 6: at 18:05

## 2018-08-04 RX ADMIN — SODIUM CHLORIDE 75 MILLILITER(S): 9 INJECTION INTRAMUSCULAR; INTRAVENOUS; SUBCUTANEOUS at 18:05

## 2018-08-04 RX ADMIN — Medication 1: at 22:08

## 2018-08-04 RX ADMIN — Medication 1200 MILLIGRAM(S): at 12:49

## 2018-08-04 RX ADMIN — Medication 125 MILLIGRAM(S): at 05:37

## 2018-08-04 RX ADMIN — BROMOCRIPTINE MESYLATE 5 MILLIGRAM(S): 5 CAPSULE ORAL at 12:51

## 2018-08-04 RX ADMIN — Medication 6: at 12:48

## 2018-08-04 RX ADMIN — Medication 4: at 09:35

## 2018-08-04 NOTE — CHART NOTE - NSCHARTNOTEFT_GEN_A_CORE
Notified by RN for with abnormal body fluid culture results-"Numerous polymorphonuclear leukocytes seen per low power field;   Moderate Gram Positive Cocci in Clusters seen per oil power field"     REVIEW OF SYSTEMS:    CONSTITUTIONAL: No weakness, fevers or chills  EYES/ENT: No visual changes;  No vertigo or throat pain   NECK: No pain or stiffness  RESPIRATORY: No cough, wheezing, hemoptysis; No shortness of breath  CARDIOVASCULAR: No chest pain or palpitations  GASTROINTESTINAL: No abdominal or epigastric pain. No nausea, vomiting, or hematemesis; No diarrhea or constipation. No melena or hematochezia.  GENITOURINARY: No dysuria, frequency or hematuria  NEUROLOGICAL: No numbness or weakness  SKIN: No itching, rashes      MEDICATIONS  (STANDING):  acetylcysteine  Oral Solution 1200 milliGRAM(s) Oral every 12 hours  aspirin enteric coated 81 milliGRAM(s) Oral daily  atorvastatin 40 milliGRAM(s) Oral at bedtime  bromocriptine Capsule 5 milliGRAM(s) Oral daily  carvedilol 25 milliGRAM(s) Oral every 12 hours  dextrose 5%. 1000 milliLiter(s) (50 mL/Hr) IV Continuous <Continuous>  dextrose 50% Injectable 12.5 Gram(s) IV Push once  dextrose 50% Injectable 25 Gram(s) IV Push once  dextrose 50% Injectable 25 Gram(s) IV Push once  insulin lispro (HumaLOG) corrective regimen sliding scale   SubCutaneous three times a day before meals  insulin lispro (HumaLOG) corrective regimen sliding scale   SubCutaneous at bedtime  levothyroxine 125 MICROGram(s) Oral <User Schedule>  levothyroxine 250 MICROGram(s) Oral <User Schedule>  sodium chloride 0.9%. 1000 milliLiter(s) (60 mL/Hr) IV Continuous <Continuous>  vancomycin    Solution 125 milliGRAM(s) Oral every 6 hours    MEDICATIONS  (PRN):  dextrose 40% Gel 15 Gram(s) Oral once PRN Blood Glucose LESS THAN 70 milliGRAM(s)/deciliter  glucagon  Injectable 1 milliGRAM(s) IntraMuscular once PRN Glucose LESS THAN 70 milligrams/deciliter      Allergies    No Known Allergies    Intolerances        Vital Signs Last 24 Hrs  T(C): 36.8 (04 Aug 2018 14:24), Max: 36.9 (03 Aug 2018 19:24)  T(F): 98.2 (04 Aug 2018 14:24), Max: 98.4 (03 Aug 2018 19:24)  HR: 74 (04 Aug 2018 14:24) (68 - 81)  BP: 96/59 (04 Aug 2018 14:24) (92/54 - 99/62)  BP(mean): --  RR: 18 (04 Aug 2018 14:24) (18 - 18)  SpO2: 95% (04 Aug 2018 14:24) (95% - 100%)                        9.1    7.21  )-----------( 52       ( 04 Aug 2018 09:05 )             28.4       08-04    132<L>  |  102  |  42<H>  ----------------------------<  165<H>  4.3   |  16<L>  |  2.68<H>    Ca    8.1<L>      04 Aug 2018 07:33          Culture - Body Fluid with Gram Stain (collected 03 Aug 2018 21:04)  Source: .Body Fluid Synovial Fluid (&lt;5mL)  Gram Stain (04 Aug 2018 00:57):    Numerous polymorphonuclear leukocytes seen per low power field    Moderate Gram Positive Cocci in Clusters seen per oil power field    Culture - Urine (collected 03 Aug 2018 03:02)  Source: .Urine Clean Catch (Midstream)  Final Report (04 Aug 2018 10:28):    No growth    Culture - Acid Fast - Stool w/Smear (collected 01 Aug 2018 18:49)  Source: .Stool Stool    Culture - Blood (collected 01 Aug 2018 18:14)  Source: .Blood Blood  Preliminary Report (02 Aug 2018 19:01):    No growth to date.    A&P    Pt clinically stable and actively being treated for C-diff. Will defer antibiotics at this time, case presented to DR Way. Id continues to follow.

## 2018-08-04 NOTE — PROVIDER CONTACT NOTE (CRITICAL VALUE NOTIFICATION) - TEST AND RESULT REPORTED:
Body fluid Moderate gram positive Cocci in clusters. Moderate poly-morphonuclear Leukocytes (synovial fluid) Body fluid Sspecimen collected on 8/03/18, moderate gram positive Cocci in clusters. Moderate poly-morphonuclear Leukocytes (synovial fluid)

## 2018-08-04 NOTE — PROGRESS NOTE ADULT - ASSESSMENT
Assessment and Plan:   · Assessment		  68 year old male with ICM EF 40%, CABG, HTN, DM II, HLD, pituitary tumor, atrial fibrillation s/p ICD admitted with C. Diff. Has recent sustained atrial tachycardia and reverted to sinus and need to maintain on full doses of his cardiac regimen in effort to maintain, but no longer on anticoagulation.    Ischemic cardiomyopathy    maintain carvedilol dose should be 25 mg BID    Ramipril 2.5 mg changed to lisinopril 2.5 mg in hospital    Spironolactone should be 25 mg BID    Furosemide should be 40 mg BID, but with diarrhea and relatively low blood pressure will decrease to once daily for now.    Continue aspirin 81 mg    Paroxysmal atrial tachycardia    manage by best treating ischemic cardiomyopathy and avoiding volume overload    If possible would not hold carvedilol doses    no anticoagulation    Paroxysmal ventricular tachycardia    Has ICD.    Chronic thrombocytopenia    Preferable to not be on anticoagulation    JULIANNE Khan MD, FACC  Office: [phone]  Cell: [phone]

## 2018-08-05 LAB
-  AMPICILLIN/SULBACTAM: SIGNIFICANT CHANGE UP
-  AMPICILLIN/SULBACTAM: SIGNIFICANT CHANGE UP
-  CEFAZOLIN: SIGNIFICANT CHANGE UP
-  CEFAZOLIN: SIGNIFICANT CHANGE UP
-  CLINDAMYCIN: SIGNIFICANT CHANGE UP
-  CLINDAMYCIN: SIGNIFICANT CHANGE UP
-  ERYTHROMYCIN: SIGNIFICANT CHANGE UP
-  ERYTHROMYCIN: SIGNIFICANT CHANGE UP
-  GENTAMICIN: SIGNIFICANT CHANGE UP
-  GENTAMICIN: SIGNIFICANT CHANGE UP
-  OXACILLIN: SIGNIFICANT CHANGE UP
-  OXACILLIN: SIGNIFICANT CHANGE UP
-  PENICILLIN: SIGNIFICANT CHANGE UP
-  PENICILLIN: SIGNIFICANT CHANGE UP
-  RIFAMPIN: SIGNIFICANT CHANGE UP
-  RIFAMPIN: SIGNIFICANT CHANGE UP
-  TETRACYCLINE: SIGNIFICANT CHANGE UP
-  TETRACYCLINE: SIGNIFICANT CHANGE UP
-  TRIMETHOPRIM/SULFAMETHOXAZOLE: SIGNIFICANT CHANGE UP
-  TRIMETHOPRIM/SULFAMETHOXAZOLE: SIGNIFICANT CHANGE UP
-  VANCOMYCIN: SIGNIFICANT CHANGE UP
-  VANCOMYCIN: SIGNIFICANT CHANGE UP
ANION GAP SERPL CALC-SCNC: 16 MMOL/L — SIGNIFICANT CHANGE UP (ref 5–17)
BUN SERPL-MCNC: 48 MG/DL — HIGH (ref 7–23)
CALCIUM SERPL-MCNC: 7.9 MG/DL — LOW (ref 8.4–10.5)
CHLORIDE SERPL-SCNC: 101 MMOL/L — SIGNIFICANT CHANGE UP (ref 96–108)
CO2 SERPL-SCNC: 13 MMOL/L — LOW (ref 22–31)
CREAT SERPL-MCNC: 3.94 MG/DL — HIGH (ref 0.5–1.3)
GLUCOSE SERPL-MCNC: 193 MG/DL — HIGH (ref 70–99)
HCT VFR BLD CALC: 26 % — LOW (ref 39–50)
HGB BLD-MCNC: 8.9 G/DL — LOW (ref 13–17)
MCHC RBC-ENTMCNC: 30.8 PG — SIGNIFICANT CHANGE UP (ref 27–34)
MCHC RBC-ENTMCNC: 34.2 GM/DL — SIGNIFICANT CHANGE UP (ref 32–36)
MCV RBC AUTO: 90 FL — SIGNIFICANT CHANGE UP (ref 80–100)
METHOD TYPE: SIGNIFICANT CHANGE UP
METHOD TYPE: SIGNIFICANT CHANGE UP
PLATELET # BLD AUTO: 54 K/UL — LOW (ref 150–400)
POTASSIUM SERPL-MCNC: 4.4 MMOL/L — SIGNIFICANT CHANGE UP (ref 3.5–5.3)
POTASSIUM SERPL-SCNC: 4.4 MMOL/L — SIGNIFICANT CHANGE UP (ref 3.5–5.3)
RBC # BLD: 2.89 M/UL — LOW (ref 4.2–5.8)
RBC # FLD: 14.9 % — HIGH (ref 10.3–14.5)
SODIUM SERPL-SCNC: 130 MMOL/L — LOW (ref 135–145)
URATE SERPL-MCNC: 11 MG/DL — HIGH (ref 3.4–8.8)
WBC # BLD: 6.75 K/UL — SIGNIFICANT CHANGE UP (ref 3.8–10.5)
WBC # FLD AUTO: 6.75 K/UL — SIGNIFICANT CHANGE UP (ref 3.8–10.5)

## 2018-08-05 PROCEDURE — 99232 SBSQ HOSP IP/OBS MODERATE 35: CPT

## 2018-08-05 RX ORDER — SODIUM CHLORIDE 9 MG/ML
1000 INJECTION, SOLUTION INTRAVENOUS
Qty: 0 | Refills: 0 | Status: DISCONTINUED | OUTPATIENT
Start: 2018-08-05 | End: 2018-08-06

## 2018-08-05 RX ORDER — SODIUM CHLORIDE 9 MG/ML
1000 INJECTION, SOLUTION INTRAVENOUS
Qty: 0 | Refills: 0 | Status: DISCONTINUED | OUTPATIENT
Start: 2018-08-05 | End: 2018-08-05

## 2018-08-05 RX ORDER — ACETAMINOPHEN 500 MG
650 TABLET ORAL ONCE
Qty: 0 | Refills: 0 | Status: COMPLETED | OUTPATIENT
Start: 2018-08-05 | End: 2018-08-05

## 2018-08-05 RX ORDER — ALLOPURINOL 300 MG
100 TABLET ORAL DAILY
Qty: 0 | Refills: 0 | Status: DISCONTINUED | OUTPATIENT
Start: 2018-08-05 | End: 2018-08-10

## 2018-08-05 RX ORDER — INSULIN LISPRO 100/ML
3 VIAL (ML) SUBCUTANEOUS
Qty: 0 | Refills: 0 | Status: DISCONTINUED | OUTPATIENT
Start: 2018-08-05 | End: 2018-08-07

## 2018-08-05 RX ADMIN — BROMOCRIPTINE MESYLATE 5 MILLIGRAM(S): 5 CAPSULE ORAL at 11:49

## 2018-08-05 RX ADMIN — Medication 100 MILLIGRAM(S): at 11:49

## 2018-08-05 RX ADMIN — SODIUM CHLORIDE 60 MILLILITER(S): 9 INJECTION, SOLUTION INTRAVENOUS at 11:46

## 2018-08-05 RX ADMIN — Medication 650 MILLIGRAM(S): at 04:05

## 2018-08-05 RX ADMIN — Medication 250 MICROGRAM(S): at 08:41

## 2018-08-05 RX ADMIN — Medication 125 MILLIGRAM(S): at 05:21

## 2018-08-05 RX ADMIN — Medication 81 MILLIGRAM(S): at 11:49

## 2018-08-05 RX ADMIN — SODIUM CHLORIDE 75 MILLILITER(S): 9 INJECTION INTRAMUSCULAR; INTRAVENOUS; SUBCUTANEOUS at 04:22

## 2018-08-05 RX ADMIN — Medication 2: at 21:46

## 2018-08-05 RX ADMIN — Medication 125 MILLIGRAM(S): at 11:48

## 2018-08-05 RX ADMIN — Medication 1200 MILLIGRAM(S): at 08:41

## 2018-08-05 RX ADMIN — Medication 125 MILLIGRAM(S): at 23:10

## 2018-08-05 RX ADMIN — Medication 4: at 08:40

## 2018-08-05 RX ADMIN — Medication 3 UNIT(S): at 17:24

## 2018-08-05 RX ADMIN — Medication 125 MILLIGRAM(S): at 17:25

## 2018-08-05 RX ADMIN — CARVEDILOL PHOSPHATE 25 MILLIGRAM(S): 80 CAPSULE, EXTENDED RELEASE ORAL at 21:22

## 2018-08-05 RX ADMIN — ATORVASTATIN CALCIUM 40 MILLIGRAM(S): 80 TABLET, FILM COATED ORAL at 21:22

## 2018-08-05 RX ADMIN — Medication 650 MILLIGRAM(S): at 05:00

## 2018-08-05 RX ADMIN — CARVEDILOL PHOSPHATE 25 MILLIGRAM(S): 80 CAPSULE, EXTENDED RELEASE ORAL at 08:45

## 2018-08-05 RX ADMIN — Medication 8: at 12:38

## 2018-08-05 RX ADMIN — Medication 8: at 17:25

## 2018-08-05 NOTE — PROGRESS NOTE ADULT - PROBLEM SELECTOR PLAN 6
sliding scale   monitor FS sliding scale   monitor FS  on D5 bicarb drip. sugars running high.  start premeal humalog.   will consider Lantus but caution in the setting of JULIANNE.

## 2018-08-05 NOTE — PROGRESS NOTE ADULT - ASSESSMENT
Assessment and Plan:   		  68 year old male with ICM EF 40%, CABG, HTN, DM II, HLD, pituitary tumor, atrial fibrillation s/p ICD admitted with C. Diff. Has recent sustained atrial tachycardia and reverted to sinus and need to maintain on full doses of his cardiac regimen in effort to maintain, but no longer on anticoagulation.    Ischemic cardiomyopathy    maintain carvedilol dose should be 25 mg BID    Ramipril 2.5 mg changed to lisinopril 2.5 mg in hospital    Spironolactone should be 25 mg BID    Hold furosemide    Continue aspirin 81 mg    Paroxysmal atrial tachycardia    manage by best treating ischemic cardiomyopathy and avoiding volume overload    If possible would not hold carvedilol doses    no anticoagulation    Paroxysmal ventricular tachycardia    Has ICD.    Chronic thrombocytopenia    Preferable to not be on anticoagulation    JULIANNE / CKD    Hold diuretics    Renal f/u    Sergio Khan MD, FACC  Office: [phone]  Cell: [phone]

## 2018-08-05 NOTE — CONSULT NOTE ADULT - ASSESSMENT
Imp:  69 yo male with multiple medical problems a/w C. Diff noted to have tophaceous gout in the elbows, hands, and with fluid behind the achilles tendon and active synovitis of the fingers.  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    Rec:  WIll need uric acid lowering therapy: would start Allopurinol 100mg/day  check uric acid level  gout diet reviewed  Hold NSAIDs/steroids due to CKD and current infxn

## 2018-08-05 NOTE — PROVIDER CONTACT NOTE (OTHER) - ASSESSMENT
patient a&ox4 resting in bed and denies pain
Pt asymptomatic - no dizziness, no N/V, no HA, no feelings of being light headed.  Pt states he is having watery BMs.
patient a&ox4 resting in bed
pt went to bathroom and upon return reported that abscess has opened. pink milky drainage and oozing at this site. pt complains of mild discomfort. site cleansed with NS and gauze/kory applied.

## 2018-08-05 NOTE — PROVIDER CONTACT NOTE (OTHER) - RECOMMENDATIONS
per provider
As per PA, hold all BP meds this AM and re-evaluate after breakfast to assess need for BP meds.
per provider.
provider come to assess pt at bedside

## 2018-08-05 NOTE — PROGRESS NOTE ADULT - ASSESSMENT
68 y.o male with PMHx of CAD s/p CABG & stenting, systolic CHF ( Ef 37%) s/p ICD, Afib, Gout, idiopathic cirrhosis, thrombocytopenia, CKD stage 3, DM2, HLD, pituitary adenoma and recent right knee septic bursitis s/p IV abx presenting with diarrhea (6-8 episodes per day), weakness, and right foot ulcer s/p debridement in podiatry office, found to have leucocytosis, Cdiff+. He is being admitted for Cdiff colitis and possible right foot OM/ gout. Hospital course complicated by acute on chronic renal failure.

## 2018-08-05 NOTE — CONSULT NOTE ADULT - SUBJECTIVE AND OBJECTIVE BOX
HISTORY OF PRESENT ILLNESS:  67 yo male with multiple med issues; CKD, DM, CAD, CHF, cirrhosis who had a recent infection in the area of the R knee, now p/w diarrhea and found to have C. DIff.  He is noted to have tophi on the elbows and fingers and after a recent fluid collection in the achilles area he was found to have +monosodium urate crystals.  He had an episode of toe pain last month and this was the first time he recalls being diagnosed with gout.       PAST MEDICAL & SURGICAL HISTORY:  Hypothyroidism  PVD (peripheral vascular disease)  History of hyperprolactinemia  Hepatic cirrhosis, unspecified hepatic cirrhosis type  Anemia  ICD (implantable cardioverter-defibrillator) in place: Medtr[employer], Model [id number] , last interrogation 4/2017  Sleep apnea: not using CPAP  History of osteomyelitis: 2015, right foot 2nd toe  Presence of stent in coronary artery: 2 stents  Heart failure with reduced left ventricular function: EF 37%  Ischemic cardiomyopathy  Pituitary adenoma: as per patient chronic, no changes , recently restarted follow up with endocrinologist ( note in allscripts )  Coronary artery disease  Thrombocytopenia: Chronic  HLD (hyperlipidemia)  HTN (hypertension)  DM (diabetes mellitus): type 2, Hg A1C 8.2 % ( 7/10/17)  AICD lead malfunction: history of  History of amputation: right foot, 2nd toe, osteo 2015  AICD (automatic cardioverter/defibrillator) present: placement in (2006)  Stented coronary artery: RCA (1999)  Coronary atherosclerosis of artery bypass graft: CABG X 4 (1986)        MEDICATIONS  (STANDING):  acetylcysteine  Oral Solution 1200 milliGRAM(s) Oral every 12 hours  aspirin enteric coated 81 milliGRAM(s) Oral daily  atorvastatin 40 milliGRAM(s) Oral at bedtime  bromocriptine Capsule 5 milliGRAM(s) Oral daily  carvedilol 25 milliGRAM(s) Oral every 12 hours  dextrose 5%. 1000 milliLiter(s) (50 mL/Hr) IV Continuous <Continuous>  dextrose 50% Injectable 12.5 Gram(s) IV Push once  dextrose 50% Injectable 25 Gram(s) IV Push once  dextrose 50% Injectable 25 Gram(s) IV Push once  insulin lispro (HumaLOG) corrective regimen sliding scale   SubCutaneous three times a day before meals  insulin lispro (HumaLOG) corrective regimen sliding scale   SubCutaneous at bedtime  levothyroxine 125 MICROGram(s) Oral <User Schedule>  levothyroxine 250 MICROGram(s) Oral <User Schedule>  sodium chloride 0.9%. 1000 milliLiter(s) (75 mL/Hr) IV Continuous <Continuous>  vancomycin    Solution 125 milliGRAM(s) Oral every 6 hours    MEDICATIONS  (PRN):  dextrose 40% Gel 15 Gram(s) Oral once PRN Blood Glucose LESS THAN 70 milliGRAM(s)/deciliter  glucagon  Injectable 1 milliGRAM(s) IntraMuscular once PRN Glucose LESS THAN 70 milligrams/deciliter      Allergies    No Known Allergies    Intolerances        PERTINENT MEDICATION HISTORY:    SOCIAL HISTORY:    FAMILY HISTORY:  Family history of MI (myocardial infarction) (Father)      Vital Signs Last 24 Hrs  T(C): 36.8 (04 Aug 2018 20:33), Max: 36.8 (04 Aug 2018 11:02)  T(F): 98.2 (04 Aug 2018 20:33), Max: 98.2 (04 Aug 2018 11:02)  HR: 74 (04 Aug 2018 20:33) (68 - 78)  BP: 92/52 (04 Aug 2018 20:33) (92/52 - 97/60)  BP(mean): --  RR: 18 (04 Aug 2018 20:33) (18 - 18)  SpO2: 95% (04 Aug 2018 20:33) (95% - 99%)    Daily     Daily     PHYSICAL EXAM:      Constitutional:  well appearing    Eyes:    ENMT:    Neck:  supple       Back:        Gastrointestinal:  abdomen distented, non tender, no masses felt    Genitourinary:    Rectal:    Extremities:  B chronic discoloration, L heel area wrapped,       Neurological:  grossly intact    Skin:  discoloration of LE    Lymph Nodes:    Musculoskeletal:  +tophi noted on finger and elbows R>L  +swelling, tenderness of the 2-3rd PIPs on R and 3rd PIP on left    Psychiatric:  appropriate, alert      LABS:                        9.1    7.21  )-----------( 52       ( 04 Aug 2018 09:05 )             28.4     08-04    132<L>  |  102  |  42<H>  ----------------------------<  165<H>  4.3   |  16<L>  |  2.68<H>    Ca    8.1<L>      04 Aug 2018 07:33            RADIOLOGY & ADDITIONAL STUDIES:  < from: CT Ankle w/ IV Cont, Right (08.02.18 @ 22:30) >  SOFT TISSUE: A reniform fluid collection is identified surrounding the   mid and distal Achilles tendon consistent with retro-Achilles bursitis.   The bursal fluid collection measures approximately 27 x 43 x 80 mm and   has a partially mineralized wall, suggesting chronicity. Within the bursa   there is a large amount of fluid. The Achilles tendon is intact.   Remaining tendinous structures of the ankle are normal in CT appearance.   There is diffuse muscle atrophy. Skin thickening and subcutaneous edema   is seen diffusely about the ankle. Calcification of the neurovascular   structures.    < end of copied text >

## 2018-08-05 NOTE — PROVIDER CONTACT NOTE (OTHER) - BACKGROUND
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 Called [employer], [employer], and [facility] who all report they can't accept any new clients. VM left for [employer], and request sent to [facility] with [facility] for his review.     8/7 VM from [facility] with [facility] that they are working to staff client's homemaking hours. Mac will call client and look into preferred day and time, and then continue to work with the staffing department. [facility] will call back with an update.     8/10 Email received from [facility]- they are working with client to interview/schedule a homemaker. They will continue to update writer.     Vanesa Gtz RN  [facility]  [phone]  Fax: [phone]   pt is admitted with positive C-diff and a right foot ulcer.

## 2018-08-06 LAB
ANION GAP SERPL CALC-SCNC: 14 MMOL/L — SIGNIFICANT CHANGE UP (ref 5–17)
BUN SERPL-MCNC: 52 MG/DL — HIGH (ref 7–23)
CALCIUM SERPL-MCNC: 7.7 MG/DL — LOW (ref 8.4–10.5)
CHLORIDE SERPL-SCNC: 97 MMOL/L — SIGNIFICANT CHANGE UP (ref 96–108)
CO2 SERPL-SCNC: 16 MMOL/L — LOW (ref 22–31)
CREAT SERPL-MCNC: 4.25 MG/DL — HIGH (ref 0.5–1.3)
CULTURE RESULTS: SIGNIFICANT CHANGE UP
CULTURE RESULTS: SIGNIFICANT CHANGE UP
GLUCOSE SERPL-MCNC: 212 MG/DL — HIGH (ref 70–99)
HCT VFR BLD CALC: 25.3 % — LOW (ref 39–50)
HGB BLD-MCNC: 8.7 G/DL — LOW (ref 13–17)
MCHC RBC-ENTMCNC: 29.9 PG — SIGNIFICANT CHANGE UP (ref 27–34)
MCHC RBC-ENTMCNC: 34.4 GM/DL — SIGNIFICANT CHANGE UP (ref 32–36)
MCV RBC AUTO: 86.9 FL — SIGNIFICANT CHANGE UP (ref 80–100)
PLATELET # BLD AUTO: 56 K/UL — LOW (ref 150–400)
POTASSIUM SERPL-MCNC: 4.4 MMOL/L — SIGNIFICANT CHANGE UP (ref 3.5–5.3)
POTASSIUM SERPL-SCNC: 4.4 MMOL/L — SIGNIFICANT CHANGE UP (ref 3.5–5.3)
RBC # BLD: 2.91 M/UL — LOW (ref 4.2–5.8)
RBC # FLD: 14.7 % — HIGH (ref 10.3–14.5)
SODIUM SERPL-SCNC: 127 MMOL/L — LOW (ref 135–145)
SPECIMEN SOURCE: SIGNIFICANT CHANGE UP
SPECIMEN SOURCE: SIGNIFICANT CHANGE UP
WBC # BLD: 5.63 K/UL — SIGNIFICANT CHANGE UP (ref 3.8–10.5)
WBC # FLD AUTO: 5.63 K/UL — SIGNIFICANT CHANGE UP (ref 3.8–10.5)

## 2018-08-06 PROCEDURE — 99233 SBSQ HOSP IP/OBS HIGH 50: CPT

## 2018-08-06 PROCEDURE — 76775 US EXAM ABDO BACK WALL LIM: CPT | Mod: 26

## 2018-08-06 PROCEDURE — 99233 SBSQ HOSP IP/OBS HIGH 50: CPT | Mod: GC

## 2018-08-06 RX ORDER — CEFAZOLIN SODIUM 1 G
1000 VIAL (EA) INJECTION ONCE
Qty: 0 | Refills: 0 | Status: COMPLETED | OUTPATIENT
Start: 2018-08-06 | End: 2018-08-06

## 2018-08-06 RX ORDER — CARVEDILOL PHOSPHATE 80 MG/1
12.5 CAPSULE, EXTENDED RELEASE ORAL EVERY 12 HOURS
Qty: 0 | Refills: 0 | Status: DISCONTINUED | OUTPATIENT
Start: 2018-08-06 | End: 2018-08-10

## 2018-08-06 RX ORDER — CEFAZOLIN SODIUM 1 G
1000 VIAL (EA) INJECTION EVERY 12 HOURS
Qty: 0 | Refills: 0 | Status: DISCONTINUED | OUTPATIENT
Start: 2018-08-06 | End: 2018-08-10

## 2018-08-06 RX ORDER — HEPARIN SODIUM 5000 [USP'U]/ML
5000 INJECTION INTRAVENOUS; SUBCUTANEOUS EVERY 8 HOURS
Qty: 0 | Refills: 0 | Status: DISCONTINUED | OUTPATIENT
Start: 2018-08-06 | End: 2018-08-10

## 2018-08-06 RX ORDER — CEFAZOLIN SODIUM 1 G
VIAL (EA) INJECTION
Qty: 0 | Refills: 0 | Status: DISCONTINUED | OUTPATIENT
Start: 2018-08-06 | End: 2018-08-10

## 2018-08-06 RX ORDER — SODIUM CHLORIDE 9 MG/ML
1000 INJECTION, SOLUTION INTRAVENOUS
Qty: 0 | Refills: 0 | Status: DISCONTINUED | OUTPATIENT
Start: 2018-08-06 | End: 2018-08-07

## 2018-08-06 RX ADMIN — Medication 4: at 08:29

## 2018-08-06 RX ADMIN — Medication 100 MILLIGRAM(S): at 12:08

## 2018-08-06 RX ADMIN — Medication 6: at 12:55

## 2018-08-06 RX ADMIN — Medication 100 MILLIGRAM(S): at 12:55

## 2018-08-06 RX ADMIN — Medication 3 UNIT(S): at 08:29

## 2018-08-06 RX ADMIN — CARVEDILOL PHOSPHATE 12.5 MILLIGRAM(S): 80 CAPSULE, EXTENDED RELEASE ORAL at 08:30

## 2018-08-06 RX ADMIN — SODIUM CHLORIDE 60 MILLILITER(S): 9 INJECTION, SOLUTION INTRAVENOUS at 23:02

## 2018-08-06 RX ADMIN — Medication 4: at 17:19

## 2018-08-06 RX ADMIN — Medication 1: at 22:01

## 2018-08-06 RX ADMIN — ATORVASTATIN CALCIUM 40 MILLIGRAM(S): 80 TABLET, FILM COATED ORAL at 21:54

## 2018-08-06 RX ADMIN — Medication 81 MILLIGRAM(S): at 12:55

## 2018-08-06 RX ADMIN — Medication 125 MILLIGRAM(S): at 23:02

## 2018-08-06 RX ADMIN — Medication 125 MILLIGRAM(S): at 12:55

## 2018-08-06 RX ADMIN — Medication 3 UNIT(S): at 12:55

## 2018-08-06 RX ADMIN — Medication 125 MILLIGRAM(S): at 18:28

## 2018-08-06 RX ADMIN — BROMOCRIPTINE MESYLATE 5 MILLIGRAM(S): 5 CAPSULE ORAL at 12:55

## 2018-08-06 RX ADMIN — Medication 125 MICROGRAM(S): at 08:30

## 2018-08-06 RX ADMIN — CARVEDILOL PHOSPHATE 12.5 MILLIGRAM(S): 80 CAPSULE, EXTENDED RELEASE ORAL at 21:58

## 2018-08-06 RX ADMIN — Medication 3 UNIT(S): at 17:19

## 2018-08-06 RX ADMIN — Medication 125 MILLIGRAM(S): at 05:04

## 2018-08-06 RX ADMIN — Medication 100 MILLIGRAM(S): at 23:02

## 2018-08-06 NOTE — PROGRESS NOTE ADULT - PROBLEM SELECTOR PLAN 6
sliding scale   monitor FS  on D5 bicarb drip. sugars running high.  start premeal humalog, will titrate up.   will consider Lantus but caution in the setting of JULIANNE.

## 2018-08-06 NOTE — PROGRESS NOTE ADULT - ASSESSMENT
68 m with DM, H LD, CKD, CAD s/p CABG & stenting, systolic CHF ( Ef 37%) s/p ICD, Afib, Gout, idiopathic cirrhosis, thrombocytopenia,  pituitary adenoma and recent right knee MSSA prepatellar bursitis p/w diarrhea, weakness, and right foot/heel ulcer and purulent drainage, was found to have C-diff and started on PO vanco, the achilles tendon also aspirated and had purulence and tophaceous  material that grew MSSA, the diarrhea is improving but the renal function has been worsening    #Clostridium difficile colitis  * on PO vanco and clinically improving, leukocytosis resolved but renal function worse, will c/w PO vanco until finishing the course of antibiotics    #achilles gouty tendinopathy and MSSA abscess and skin infection  * start cefazolin 1 g q 12 and adjust as per the renal function    #JULIANNE on CKD worsening  * C-diff is improving,  * f/u the renal u/s and monitor BMP

## 2018-08-06 NOTE — PROGRESS NOTE ADULT - ASSESSMENT
Assessment and Plan:   		  68 year old male with ICM EF 40%, CABG, HTN, DM II, HLD, pituitary tumor, paroxysmal atrial tachycardia, paroxysmal ventricular tackycardia, ICD admitted with C. Diff. Has recent sustained atrial tachycardia and reverted to sinus and need to maintain on his cardiac regimen in effort to maintain, but no longer on anticoagulation.    Ischemic cardiomyopathy    maintain carvedilol, but will decrease today to 12.5 mg BID in effort to increase blood pressure, possibly allow improved renal perfusion    Ramipril 2.5 mg changed to lisinopril 2.5 mg in hospital, but discontinued due to JULIANNE    Spironolactone 25 mg BID, discontinued due to JULIANNE    furosemide stopped due to JULIANNE and appears euvolemic    Continue aspirin 81 mg    Paroxysmal atrial tachycardia    manage by best treating ischemic cardiomyopathy and avoiding volume overload    If possible would not hold carvedilol doses, now at lower dose    no anticoagulation    Paroxysmal ventricular tachycardia    Has ICD.    Chronic thrombocytopenia    Preferable to not be on anticoagulation    JULIANNE / CKD    No diuretics, no potentially nephrotoxic substances    Should probably never receive iodinated contrast for any procedures in future    Renal f/u

## 2018-08-07 LAB
ANION GAP SERPL CALC-SCNC: 15 MMOL/L — SIGNIFICANT CHANGE UP (ref 5–17)
BUN SERPL-MCNC: 54 MG/DL — HIGH (ref 7–23)
CALCIUM SERPL-MCNC: 7.9 MG/DL — LOW (ref 8.4–10.5)
CHLORIDE SERPL-SCNC: 97 MMOL/L — SIGNIFICANT CHANGE UP (ref 96–108)
CO2 SERPL-SCNC: 20 MMOL/L — LOW (ref 22–31)
CREAT SERPL-MCNC: 4.41 MG/DL — HIGH (ref 0.5–1.3)
GLUCOSE SERPL-MCNC: 191 MG/DL — HIGH (ref 70–99)
POTASSIUM SERPL-MCNC: 4.2 MMOL/L — SIGNIFICANT CHANGE UP (ref 3.5–5.3)
POTASSIUM SERPL-SCNC: 4.2 MMOL/L — SIGNIFICANT CHANGE UP (ref 3.5–5.3)
SODIUM SERPL-SCNC: 132 MMOL/L — LOW (ref 135–145)

## 2018-08-07 PROCEDURE — 99233 SBSQ HOSP IP/OBS HIGH 50: CPT

## 2018-08-07 PROCEDURE — 99232 SBSQ HOSP IP/OBS MODERATE 35: CPT | Mod: GC

## 2018-08-07 RX ORDER — SODIUM HYPOCHLORITE 0.125 %
1 SOLUTION, NON-ORAL MISCELLANEOUS DAILY
Qty: 0 | Refills: 0 | Status: DISCONTINUED | OUTPATIENT
Start: 2018-08-07 | End: 2018-08-10

## 2018-08-07 RX ORDER — INSULIN LISPRO 100/ML
2 VIAL (ML) SUBCUTANEOUS ONCE
Qty: 0 | Refills: 0 | Status: COMPLETED | OUTPATIENT
Start: 2018-08-07 | End: 2018-08-07

## 2018-08-07 RX ORDER — INSULIN LISPRO 100/ML
4 VIAL (ML) SUBCUTANEOUS
Qty: 0 | Refills: 0 | Status: DISCONTINUED | OUTPATIENT
Start: 2018-08-07 | End: 2018-08-10

## 2018-08-07 RX ORDER — SODIUM CHLORIDE 9 MG/ML
1000 INJECTION, SOLUTION INTRAVENOUS
Qty: 0 | Refills: 0 | Status: DISCONTINUED | OUTPATIENT
Start: 2018-08-07 | End: 2018-08-08

## 2018-08-07 RX ADMIN — CARVEDILOL PHOSPHATE 12.5 MILLIGRAM(S): 80 CAPSULE, EXTENDED RELEASE ORAL at 09:04

## 2018-08-07 RX ADMIN — HEPARIN SODIUM 5000 UNIT(S): 5000 INJECTION INTRAVENOUS; SUBCUTANEOUS at 21:41

## 2018-08-07 RX ADMIN — Medication 1: at 21:48

## 2018-08-07 RX ADMIN — SODIUM CHLORIDE 40 MILLILITER(S): 9 INJECTION, SOLUTION INTRAVENOUS at 18:31

## 2018-08-07 RX ADMIN — Medication 100 MILLIGRAM(S): at 12:56

## 2018-08-07 RX ADMIN — Medication 2 UNIT(S): at 13:08

## 2018-08-07 RX ADMIN — Medication 4 UNIT(S): at 18:28

## 2018-08-07 RX ADMIN — Medication 100 MILLIGRAM(S): at 09:05

## 2018-08-07 RX ADMIN — HEPARIN SODIUM 5000 UNIT(S): 5000 INJECTION INTRAVENOUS; SUBCUTANEOUS at 13:10

## 2018-08-07 RX ADMIN — Medication 81 MILLIGRAM(S): at 12:56

## 2018-08-07 RX ADMIN — Medication 125 MILLIGRAM(S): at 18:29

## 2018-08-07 RX ADMIN — Medication 100 MILLIGRAM(S): at 21:41

## 2018-08-07 RX ADMIN — CARVEDILOL PHOSPHATE 12.5 MILLIGRAM(S): 80 CAPSULE, EXTENDED RELEASE ORAL at 21:41

## 2018-08-07 RX ADMIN — Medication 4: at 18:29

## 2018-08-07 RX ADMIN — BROMOCRIPTINE MESYLATE 5 MILLIGRAM(S): 5 CAPSULE ORAL at 12:56

## 2018-08-07 RX ADMIN — Medication 3 UNIT(S): at 12:54

## 2018-08-07 RX ADMIN — Medication 3 UNIT(S): at 09:03

## 2018-08-07 RX ADMIN — Medication 8: at 12:54

## 2018-08-07 RX ADMIN — Medication 125 MICROGRAM(S): at 08:17

## 2018-08-07 RX ADMIN — Medication 125 MILLIGRAM(S): at 12:56

## 2018-08-07 RX ADMIN — ATORVASTATIN CALCIUM 40 MILLIGRAM(S): 80 TABLET, FILM COATED ORAL at 21:41

## 2018-08-07 RX ADMIN — Medication 2: at 09:02

## 2018-08-07 RX ADMIN — Medication 125 MILLIGRAM(S): at 06:26

## 2018-08-07 NOTE — PROGRESS NOTE ADULT - ASSESSMENT
Assessment and Plan:   		  68 year old male with ICM EF 40%, CABG, HTN, DM II, HLD, pituitary tumor, paroxysmal atrial tachycardia, paroxysmal ventricular tackycardia, ICD admitted with C. Diff. Has recent sustained atrial tachycardia and reverted to sinus and need to maintain on his cardiac regimen in effort to maintain, but no longer on anticoagulation.    Ischemic cardiomyopathy    maintain carvedilol at decreased dose 12.5 mg BID in effort to increase blood pressure, possibly allow improved renal perfusion    Ramipril 2.5 mg changed to lisinopril 2.5 mg in hospital, but discontinued due to JULIANNE    Spironolactone 25 mg BID, discontinued due to JULIANNE    furosemide stopped due to JULIANNE and is remaining euvolemic    Continue aspirin 81 mg    Paroxysmal atrial tachycardia    manage by best treating ischemic cardiomyopathy and avoiding volume overload    If possible would not hold carvedilol doses, now at lower dose    no anticoagulation    Paroxysmal ventricular tachycardia    Has ICD.    Chronic thrombocytopenia    Preferable to not be on anticoagulation    JULIANNE / CKD    No diuretics, no potentially nephrotoxic substances    Should probably never receive iodinated contrast for any procedures in future    Renal f/u

## 2018-08-07 NOTE — PROGRESS NOTE ADULT - PROBLEM SELECTOR PLAN 6
sliding scale   monitor FS  on D5 bicarb drip. sugars running high.  premeal humalog titrated up.   will consider Lantus but with caution in the setting of JULIANNE.

## 2018-08-07 NOTE — PROVIDER CONTACT NOTE (MEDICATION) - ACTION/TREATMENT ORDERED:
held   meds for now and will followup with cardiologist,
continue to monitor pt.
risk and benefits explained, verbalized understanding

## 2018-08-07 NOTE — PROGRESS NOTE ADULT - ATTENDING COMMENTS
- Dr. MARTÍN Way ([facility])  - [phone]
For any question, call:  Cell # [phone]  Pager # [phone]  Callback # [phone]
For any question, call:  Cell # [phone]  Pager # [phone]  Callback # [phone]
- Dr. MARTÍN Way ([facility])  - [phone]
Alberto Fountain will be covering for me starting 8/8/18. He can be reached at  if needed.     - Dr. MARTÍN Way ([facility])  - [phone]
- Dr. MARTÍN Way ([facility])  - [phone]
- Dr. MARTÍN Way ([facility])  - [phone]
- Dr. MARTÍN Way ([facility])  - [phone]

## 2018-08-08 LAB
ANION GAP SERPL CALC-SCNC: 13 MMOL/L — SIGNIFICANT CHANGE UP (ref 5–17)
BUN SERPL-MCNC: 48 MG/DL — HIGH (ref 7–23)
CALCIUM SERPL-MCNC: 7.9 MG/DL — LOW (ref 8.4–10.5)
CHLORIDE SERPL-SCNC: 97 MMOL/L — SIGNIFICANT CHANGE UP (ref 96–108)
CO2 SERPL-SCNC: 23 MMOL/L — SIGNIFICANT CHANGE UP (ref 22–31)
CREAT SERPL-MCNC: 3.38 MG/DL — HIGH (ref 0.5–1.3)
CULTURE RESULTS: SIGNIFICANT CHANGE UP
CULTURE RESULTS: SIGNIFICANT CHANGE UP
GLUCOSE SERPL-MCNC: 181 MG/DL — HIGH (ref 70–99)
HCT VFR BLD CALC: 26 % — LOW (ref 39–50)
HGB BLD-MCNC: 8.9 G/DL — LOW (ref 13–17)
MCHC RBC-ENTMCNC: 30.3 PG — SIGNIFICANT CHANGE UP (ref 27–34)
MCHC RBC-ENTMCNC: 34.2 GM/DL — SIGNIFICANT CHANGE UP (ref 32–36)
MCV RBC AUTO: 88.4 FL — SIGNIFICANT CHANGE UP (ref 80–100)
ORGANISM # SPEC MICROSCOPIC CNT: SIGNIFICANT CHANGE UP
PLATELET # BLD AUTO: 63 K/UL — LOW (ref 150–400)
POTASSIUM SERPL-MCNC: 4.1 MMOL/L — SIGNIFICANT CHANGE UP (ref 3.5–5.3)
POTASSIUM SERPL-SCNC: 4.1 MMOL/L — SIGNIFICANT CHANGE UP (ref 3.5–5.3)
RBC # BLD: 2.94 M/UL — LOW (ref 4.2–5.8)
RBC # FLD: 14.8 % — HIGH (ref 10.3–14.5)
SODIUM SERPL-SCNC: 133 MMOL/L — LOW (ref 135–145)
SPECIMEN SOURCE: SIGNIFICANT CHANGE UP
SPECIMEN SOURCE: SIGNIFICANT CHANGE UP
WBC # BLD: 4.11 K/UL — SIGNIFICANT CHANGE UP (ref 3.8–10.5)
WBC # FLD AUTO: 4.11 K/UL — SIGNIFICANT CHANGE UP (ref 3.8–10.5)

## 2018-08-08 PROCEDURE — 99233 SBSQ HOSP IP/OBS HIGH 50: CPT

## 2018-08-08 RX ADMIN — Medication 4 UNIT(S): at 13:05

## 2018-08-08 RX ADMIN — Medication 125 MILLIGRAM(S): at 23:53

## 2018-08-08 RX ADMIN — Medication 100 MILLIGRAM(S): at 11:52

## 2018-08-08 RX ADMIN — Medication 100 MILLIGRAM(S): at 10:13

## 2018-08-08 RX ADMIN — Medication 6: at 13:04

## 2018-08-08 RX ADMIN — Medication 125 MILLIGRAM(S): at 00:58

## 2018-08-08 RX ADMIN — HEPARIN SODIUM 5000 UNIT(S): 5000 INJECTION INTRAVENOUS; SUBCUTANEOUS at 22:53

## 2018-08-08 RX ADMIN — Medication 4 UNIT(S): at 09:18

## 2018-08-08 RX ADMIN — Medication 1 APPLICATION(S): at 11:55

## 2018-08-08 RX ADMIN — Medication 125 MILLIGRAM(S): at 17:42

## 2018-08-08 RX ADMIN — ATORVASTATIN CALCIUM 40 MILLIGRAM(S): 80 TABLET, FILM COATED ORAL at 22:54

## 2018-08-08 RX ADMIN — BROMOCRIPTINE MESYLATE 5 MILLIGRAM(S): 5 CAPSULE ORAL at 13:04

## 2018-08-08 RX ADMIN — HEPARIN SODIUM 5000 UNIT(S): 5000 INJECTION INTRAVENOUS; SUBCUTANEOUS at 05:33

## 2018-08-08 RX ADMIN — Medication 4 UNIT(S): at 17:42

## 2018-08-08 RX ADMIN — Medication 100 MILLIGRAM(S): at 22:53

## 2018-08-08 RX ADMIN — Medication 125 MICROGRAM(S): at 09:18

## 2018-08-08 RX ADMIN — CARVEDILOL PHOSPHATE 12.5 MILLIGRAM(S): 80 CAPSULE, EXTENDED RELEASE ORAL at 10:14

## 2018-08-08 RX ADMIN — Medication 125 MILLIGRAM(S): at 05:31

## 2018-08-08 RX ADMIN — SODIUM CHLORIDE 40 MILLILITER(S): 9 INJECTION, SOLUTION INTRAVENOUS at 10:17

## 2018-08-08 RX ADMIN — CARVEDILOL PHOSPHATE 12.5 MILLIGRAM(S): 80 CAPSULE, EXTENDED RELEASE ORAL at 20:45

## 2018-08-08 RX ADMIN — Medication 125 MILLIGRAM(S): at 11:52

## 2018-08-08 RX ADMIN — Medication 81 MILLIGRAM(S): at 11:52

## 2018-08-08 RX ADMIN — HEPARIN SODIUM 5000 UNIT(S): 5000 INJECTION INTRAVENOUS; SUBCUTANEOUS at 13:04

## 2018-08-08 RX ADMIN — Medication 2: at 09:18

## 2018-08-08 RX ADMIN — Medication 4: at 17:42

## 2018-08-08 NOTE — PROGRESS NOTE ADULT - ASSESSMENT
Assessment and Plan:   		  68 year old male with ICM EF 40%, CABG, HTN, DM II, HLD, pituitary tumor, paroxysmal atrial tachycardia, paroxysmal ventricular tackycardia, ICD admitted with C. Diff. Has recent sustained atrial tachycardia and reverted to sinus and need to maintain on his cardiac regimen in effort to maintain, but no longer on anticoagulation.    Ischemic cardiomyopathy    maintain carvedilol at decreased dose 12.5 mg BID in effort to increase blood pressure and allow improved renal perfusion    Ramipril 2.5 mg changed to lisinopril 2.5 mg in hospital, but discontinued due to JULIANNE, remain off    Spironolactone 25 mg BID, discontinued due to JULIANNE, resume soon    furosemide stopped due to JULIANNE and may be manifesting mild volume accumulation, observe closely for need to resume    Continue aspirin 81 mg    Paroxysmal atrial tachycardia    manage by best treating ischemic cardiomyopathy and avoiding volume overload    If possible would not hold carvedilol doses, now at lower dose    no anticoagulation    Paroxysmal ventricular tachycardia    Has ICD.    Chronic thrombocytopenia    Preferable to not be on anticoagulation    JULIANNE / CKD    Renal function now improving    Off diuretics, but will need to resume soon. Continue to avoid potentially nephrotoxic medications    Should probably never receive iodinated contrast for any procedures in future    Renal f/u

## 2018-08-08 NOTE — PROGRESS NOTE ADULT - ASSESSMENT
pt seen at bedside in NAD. dressing to right foot c/d/i  continue abx  irrigated with NS and packed with wet to dry and covered in DSD  follow up rheumatology needs long term control of gout  will order Dakins for daily irrigation  BOOKED FOR FRIDAY WITH DR. HARDEN FOR AN ACHILLES TENDON WASHOUT. PLEASE OPTIMIZE FOR THE OR (LOCAL ANESTHESIA W/ SEDATION) AND DOCUMENT MEDICAL CLEARANCE IN THE CHART. THANK YOU.   will follow

## 2018-08-08 NOTE — PROGRESS NOTE ADULT - PROBLEM SELECTOR PLAN 6
sliding scale   monitor FS  on D5 bicarb drip  premeal humalog titrated up.   will consider Lantus but with caution in the setting of JULIANNE.

## 2018-08-09 ENCOUNTER — TRANSCRIPTION ENCOUNTER (OUTPATIENT)
Age: 68
End: 2018-08-09

## 2018-08-09 LAB
ANION GAP SERPL CALC-SCNC: 12 MMOL/L — SIGNIFICANT CHANGE UP (ref 5–17)
ANISOCYTOSIS BLD QL: SLIGHT — SIGNIFICANT CHANGE UP
BASOPHILS # BLD AUTO: 0.01 K/UL — SIGNIFICANT CHANGE UP (ref 0–0.2)
BASOPHILS NFR BLD AUTO: 0.3 % — SIGNIFICANT CHANGE UP (ref 0–2)
BUN SERPL-MCNC: 36 MG/DL — HIGH (ref 7–23)
CALCIUM SERPL-MCNC: 8 MG/DL — LOW (ref 8.4–10.5)
CHLORIDE SERPL-SCNC: 102 MMOL/L — SIGNIFICANT CHANGE UP (ref 96–108)
CO2 SERPL-SCNC: 24 MMOL/L — SIGNIFICANT CHANGE UP (ref 22–31)
CREAT SERPL-MCNC: 2.25 MG/DL — HIGH (ref 0.5–1.3)
EOSINOPHIL # BLD AUTO: 0.2 K/UL — SIGNIFICANT CHANGE UP (ref 0–0.5)
EOSINOPHIL NFR BLD AUTO: 5.9 % — SIGNIFICANT CHANGE UP (ref 0–6)
GLUCOSE SERPL-MCNC: 142 MG/DL — HIGH (ref 70–99)
HCT VFR BLD CALC: 24.3 % — LOW (ref 39–50)
HGB BLD-MCNC: 8.2 G/DL — LOW (ref 13–17)
IMM GRANULOCYTES NFR BLD AUTO: 0.3 % — SIGNIFICANT CHANGE UP (ref 0–1.5)
LYMPHOCYTES # BLD AUTO: 0.82 K/UL — LOW (ref 1–3.3)
LYMPHOCYTES # BLD AUTO: 24.2 % — SIGNIFICANT CHANGE UP (ref 13–44)
MACROCYTES BLD QL: SIGNIFICANT CHANGE UP
MAGNESIUM SERPL-MCNC: 1.5 MG/DL — LOW (ref 1.6–2.6)
MANUAL SMEAR VERIFICATION: SIGNIFICANT CHANGE UP
MCHC RBC-ENTMCNC: 30.1 PG — SIGNIFICANT CHANGE UP (ref 27–34)
MCHC RBC-ENTMCNC: 33.7 GM/DL — SIGNIFICANT CHANGE UP (ref 32–36)
MCV RBC AUTO: 89.3 FL — SIGNIFICANT CHANGE UP (ref 80–100)
MONOCYTES # BLD AUTO: 0.51 K/UL — SIGNIFICANT CHANGE UP (ref 0–0.9)
MONOCYTES NFR BLD AUTO: 15 % — HIGH (ref 2–14)
NEUTROPHILS # BLD AUTO: 1.84 K/UL — SIGNIFICANT CHANGE UP (ref 1.8–7.4)
NEUTROPHILS NFR BLD AUTO: 54.3 % — SIGNIFICANT CHANGE UP (ref 43–77)
PLAT MORPH BLD: NORMAL — SIGNIFICANT CHANGE UP
PLATELET # BLD AUTO: 65 K/UL — LOW (ref 150–400)
POTASSIUM SERPL-MCNC: 3.8 MMOL/L — SIGNIFICANT CHANGE UP (ref 3.5–5.3)
POTASSIUM SERPL-SCNC: 3.8 MMOL/L — SIGNIFICANT CHANGE UP (ref 3.5–5.3)
RBC # BLD: 2.72 M/UL — LOW (ref 4.2–5.8)
RBC # FLD: 15 % — HIGH (ref 10.3–14.5)
RBC BLD AUTO: ABNORMAL
SODIUM SERPL-SCNC: 138 MMOL/L — SIGNIFICANT CHANGE UP (ref 135–145)
WBC # BLD: 3.39 K/UL — LOW (ref 3.8–10.5)
WBC # FLD AUTO: 3.39 K/UL — LOW (ref 3.8–10.5)

## 2018-08-09 PROCEDURE — 99233 SBSQ HOSP IP/OBS HIGH 50: CPT

## 2018-08-09 PROCEDURE — 99232 SBSQ HOSP IP/OBS MODERATE 35: CPT

## 2018-08-09 RX ORDER — MAGNESIUM SULFATE 500 MG/ML
1 VIAL (ML) INJECTION ONCE
Qty: 0 | Refills: 0 | Status: COMPLETED | OUTPATIENT
Start: 2018-08-09 | End: 2018-08-09

## 2018-08-09 RX ORDER — SODIUM CHLORIDE 9 MG/ML
1000 INJECTION INTRAMUSCULAR; INTRAVENOUS; SUBCUTANEOUS
Qty: 0 | Refills: 0 | Status: DISCONTINUED | OUTPATIENT
Start: 2018-08-09 | End: 2018-08-10

## 2018-08-09 RX ORDER — CHLORHEXIDINE GLUCONATE 213 G/1000ML
1 SOLUTION TOPICAL ONCE
Qty: 0 | Refills: 0 | Status: DISCONTINUED | OUTPATIENT
Start: 2018-08-09 | End: 2018-08-10

## 2018-08-09 RX ADMIN — Medication 6: at 13:00

## 2018-08-09 RX ADMIN — SODIUM CHLORIDE 30 MILLILITER(S): 9 INJECTION INTRAMUSCULAR; INTRAVENOUS; SUBCUTANEOUS at 23:46

## 2018-08-09 RX ADMIN — Medication 100 GRAM(S): at 13:00

## 2018-08-09 RX ADMIN — Medication 125 MICROGRAM(S): at 06:19

## 2018-08-09 RX ADMIN — Medication 1 APPLICATION(S): at 14:52

## 2018-08-09 RX ADMIN — Medication 4: at 19:10

## 2018-08-09 RX ADMIN — ATORVASTATIN CALCIUM 40 MILLIGRAM(S): 80 TABLET, FILM COATED ORAL at 22:20

## 2018-08-09 RX ADMIN — Medication 4 UNIT(S): at 08:55

## 2018-08-09 RX ADMIN — HEPARIN SODIUM 5000 UNIT(S): 5000 INJECTION INTRAVENOUS; SUBCUTANEOUS at 13:00

## 2018-08-09 RX ADMIN — Medication 4 UNIT(S): at 19:05

## 2018-08-09 RX ADMIN — HEPARIN SODIUM 5000 UNIT(S): 5000 INJECTION INTRAVENOUS; SUBCUTANEOUS at 22:20

## 2018-08-09 RX ADMIN — CARVEDILOL PHOSPHATE 12.5 MILLIGRAM(S): 80 CAPSULE, EXTENDED RELEASE ORAL at 10:02

## 2018-08-09 RX ADMIN — Medication 81 MILLIGRAM(S): at 11:36

## 2018-08-09 RX ADMIN — Medication 125 MILLIGRAM(S): at 17:26

## 2018-08-09 RX ADMIN — Medication 100 MILLIGRAM(S): at 22:21

## 2018-08-09 RX ADMIN — Medication 4 UNIT(S): at 13:00

## 2018-08-09 RX ADMIN — CARVEDILOL PHOSPHATE 12.5 MILLIGRAM(S): 80 CAPSULE, EXTENDED RELEASE ORAL at 23:40

## 2018-08-09 RX ADMIN — Medication 2: at 08:55

## 2018-08-09 RX ADMIN — BROMOCRIPTINE MESYLATE 5 MILLIGRAM(S): 5 CAPSULE ORAL at 11:36

## 2018-08-09 RX ADMIN — Medication 125 MILLIGRAM(S): at 11:36

## 2018-08-09 RX ADMIN — Medication 125 MILLIGRAM(S): at 23:33

## 2018-08-09 RX ADMIN — Medication 125 MILLIGRAM(S): at 06:19

## 2018-08-09 RX ADMIN — Medication 100 MILLIGRAM(S): at 11:37

## 2018-08-09 RX ADMIN — Medication 100 MILLIGRAM(S): at 10:02

## 2018-08-09 NOTE — PROVIDER CONTACT NOTE (OTHER) - ACTION/TREATMENT ORDERED:
NP Shallow aware of above, pts BP to be rechecked in one hour-if  or greater admin med.
NP Shallow aware of above, to clarifcy dose of insulin.
Per NP Shallow, pt to be covered for FS of 204
PA notified. No new orders.
Endorsed to next shift.
PA notified. Ok to give all AM meds now and reschedule lisinopril to 8 am per PA Samuel.
provider came to assess pt at bedside. keep site dressing dry. Tylenol ordered for pain.

## 2018-08-09 NOTE — PROVIDER CONTACT NOTE (OTHER) - DATE AND TIME:
09-Aug-2018 18:40
09-Aug-2018 19:00
01-Aug-2018 20:18
02-Aug-2018 06:09
03-Aug-2018 06:40
05-Aug-2018 03:00

## 2018-08-09 NOTE — PROGRESS NOTE ADULT - PROBLEM SELECTOR PLAN 6
sliding scale   monitor FS    premeal humalog titrated up.   will consider Lantus but with caution in the setting of JULIANNE.

## 2018-08-09 NOTE — PROGRESS NOTE ADULT - ASSESSMENT
68 m with DM, H LD, CKD, CAD s/p CABG & stenting, systolic CHF ( Ef 37%) s/p ICD, Afib, Gout, idiopathic cirrhosis, thrombocytopenia,  pituitary adenoma and recent right knee MSSA prepatellar bursitis p/w diarrhea, weakness, and right foot/heel ulcer and purulent drainage, was found to have C-diff and started on PO vanco, the achilles tendon also aspirated and had purulence and tophaceous  material that grew MSSA, the diarrhea is improving but the renal function has been worsening    #Clostridium difficile colitis  * on PO vanco and clinically resolved, leukocytosis also resolved, will c/w PO vanco until finishing the course of antibiotics    #achilles gouty tendinopathy and MSSA abscess and skin infection with bursitis  * c/w cefazolin 1 g q 12 and adjust as per the renal function, started 8/6 now day 4  * pt going for achilles wash out tomorrow, will f/u the OR cultures  * will decide about the duration based on the OR findings    #JULIANNE on CKD now improving

## 2018-08-09 NOTE — PROVIDER CONTACT NOTE (OTHER) - SITUATION
pts BP 97/60,66HR. pt scheduled for coreg. no parameters in place. med not admin. RN asking for clarification of admin med.
RN asking for clarification of SS insulin coverage for dinner--see prior contact note
pts pre dinner .pt ate dinner  RN attempted to admin insulin x3 ( pt was in restroom). one hour passed. New FS was taken results 204. RN asking for clarification on dose of insulin to admin
/71; HR 89. clarifying about which AM meds to give right now.
EKG completed
Pt with low BP this AM of 86/58, HR = 74
abscess on right foot open at this time; pink drainage

## 2018-08-09 NOTE — PROGRESS NOTE ADULT - ASSESSMENT
Assessment and Plan:   		  68 year old male with ICM EF 40%, CABG, HTN, DM II, HLD, pituitary tumor, paroxysmal atrial tachycardia, paroxysmal ventricular tackycardia, ICD admitted with C. Diff. Has recent sustained atrial tachycardia and reverted to sinus and need to maintain on his cardiac regimen in effort to maintain, but no longer on anticoagulation. Creatinine declining steadily and rapidly past 2 days.    Ischemic cardiomyopathy    maintain carvedilol at decreased dose 12.5 mg BID in effort to increase blood pressure and allow improved renal perfusion    Ramipril 2.5 mg changed to lisinopril 2.5 mg in hospital, but discontinued due to JULIANNE, remain off until renal function fully recovers.    Spironolactone 25 mg BID, discontinued due to JULIANNE, resume soon, probably tomorrow.    furosemide stopped due to JULIANNE and may be manifesting mild volume accumulation, observe closely and anticipate resuming in 1 to 2 days.    Continue aspirin 81 mg    Paroxysmal atrial tachycardia    manage by best treating ischemic cardiomyopathy and avoiding volume overload    If possible would not hold carvedilol doses, now at lower dose    no anticoagulation    Paroxysmal ventricular tachycardia    Has ICD.    Chronic thrombocytopenia    Preferable to not be on anticoagulation    JULIANNE / CKD    Renal function now improving    Off diuretics, but will need to resume soon. Continue to avoid potentially nephrotoxic medications    Should probably never receive iodinated contrast for any procedures in future    Renal f/u    Right heel, achilles wound, plan for wash out tomorrow by podiatry. Heart failure remains compensated, rhythm stable and he is optimized for this procedure.

## 2018-08-10 ENCOUNTER — RESULT REVIEW (OUTPATIENT)
Age: 68
End: 2018-08-10

## 2018-08-10 LAB
ANION GAP SERPL CALC-SCNC: 11 MMOL/L — SIGNIFICANT CHANGE UP (ref 5–17)
APTT BLD: 29.1 SEC — SIGNIFICANT CHANGE UP (ref 27.5–37.4)
BLD GP AB SCN SERPL QL: NEGATIVE — SIGNIFICANT CHANGE UP
BUN SERPL-MCNC: 28 MG/DL — HIGH (ref 7–23)
CALCIUM SERPL-MCNC: 8.2 MG/DL — LOW (ref 8.4–10.5)
CHLORIDE SERPL-SCNC: 100 MMOL/L — SIGNIFICANT CHANGE UP (ref 96–108)
CO2 SERPL-SCNC: 23 MMOL/L — SIGNIFICANT CHANGE UP (ref 22–31)
CREAT SERPL-MCNC: 1.67 MG/DL — HIGH (ref 0.5–1.3)
GLUCOSE SERPL-MCNC: 128 MG/DL — HIGH (ref 70–99)
HCT VFR BLD CALC: 26.2 % — LOW (ref 39–50)
HGB BLD-MCNC: 8.5 G/DL — LOW (ref 13–17)
INR BLD: 1.25 RATIO — HIGH (ref 0.88–1.16)
MCHC RBC-ENTMCNC: 29.7 PG — SIGNIFICANT CHANGE UP (ref 27–34)
MCHC RBC-ENTMCNC: 32.4 GM/DL — SIGNIFICANT CHANGE UP (ref 32–36)
MCV RBC AUTO: 91.6 FL — SIGNIFICANT CHANGE UP (ref 80–100)
PLATELET # BLD AUTO: 72 K/UL — LOW (ref 150–400)
POTASSIUM SERPL-MCNC: 4.1 MMOL/L — SIGNIFICANT CHANGE UP (ref 3.5–5.3)
POTASSIUM SERPL-SCNC: 4.1 MMOL/L — SIGNIFICANT CHANGE UP (ref 3.5–5.3)
PROTHROM AB SERPL-ACNC: 14.2 SEC — HIGH (ref 10–13.1)
RBC # BLD: 2.86 M/UL — LOW (ref 4.2–5.8)
RBC # FLD: 14.9 % — HIGH (ref 10.3–14.5)
RH IG SCN BLD-IMP: POSITIVE — SIGNIFICANT CHANGE UP
SODIUM SERPL-SCNC: 134 MMOL/L — LOW (ref 135–145)
WBC # BLD: 4.19 K/UL — SIGNIFICANT CHANGE UP (ref 3.8–10.5)
WBC # FLD AUTO: 4.19 K/UL — SIGNIFICANT CHANGE UP (ref 3.8–10.5)

## 2018-08-10 PROCEDURE — 88304 TISSUE EXAM BY PATHOLOGIST: CPT | Mod: 26

## 2018-08-10 PROCEDURE — 99233 SBSQ HOSP IP/OBS HIGH 50: CPT

## 2018-08-10 RX ORDER — DEXTROSE 50 % IN WATER 50 %
12.5 SYRINGE (ML) INTRAVENOUS ONCE
Qty: 0 | Refills: 0 | Status: DISCONTINUED | OUTPATIENT
Start: 2018-08-10 | End: 2018-08-13

## 2018-08-10 RX ORDER — HYDROMORPHONE HYDROCHLORIDE 2 MG/ML
0.5 INJECTION INTRAMUSCULAR; INTRAVENOUS; SUBCUTANEOUS ONCE
Qty: 0 | Refills: 0 | Status: DISCONTINUED | OUTPATIENT
Start: 2018-08-10 | End: 2018-08-10

## 2018-08-10 RX ORDER — SPIRONOLACTONE 25 MG/1
25 TABLET, FILM COATED ORAL
Qty: 0 | Refills: 0 | Status: DISCONTINUED | OUTPATIENT
Start: 2018-08-10 | End: 2018-08-13

## 2018-08-10 RX ORDER — ONDANSETRON 8 MG/1
4 TABLET, FILM COATED ORAL ONCE
Qty: 0 | Refills: 0 | Status: DISCONTINUED | OUTPATIENT
Start: 2018-08-10 | End: 2018-08-13

## 2018-08-10 RX ORDER — FUROSEMIDE 40 MG
40 TABLET ORAL DAILY
Qty: 0 | Refills: 0 | Status: DISCONTINUED | OUTPATIENT
Start: 2018-08-10 | End: 2018-08-13

## 2018-08-10 RX ORDER — INSULIN LISPRO 100/ML
VIAL (ML) SUBCUTANEOUS AT BEDTIME
Qty: 0 | Refills: 0 | Status: DISCONTINUED | OUTPATIENT
Start: 2018-08-10 | End: 2018-08-13

## 2018-08-10 RX ORDER — INSULIN LISPRO 100/ML
VIAL (ML) SUBCUTANEOUS
Qty: 0 | Refills: 0 | Status: DISCONTINUED | OUTPATIENT
Start: 2018-08-10 | End: 2018-08-13

## 2018-08-10 RX ORDER — CARVEDILOL PHOSPHATE 80 MG/1
12.5 CAPSULE, EXTENDED RELEASE ORAL EVERY 12 HOURS
Qty: 0 | Refills: 0 | Status: DISCONTINUED | OUTPATIENT
Start: 2018-08-10 | End: 2018-08-13

## 2018-08-10 RX ORDER — ACETAMINOPHEN 500 MG
1000 TABLET ORAL ONCE
Qty: 0 | Refills: 0 | Status: DISCONTINUED | OUTPATIENT
Start: 2018-08-10 | End: 2018-08-13

## 2018-08-10 RX ORDER — LEVOTHYROXINE SODIUM 125 MCG
250 TABLET ORAL
Qty: 0 | Refills: 0 | Status: DISCONTINUED | OUTPATIENT
Start: 2018-08-10 | End: 2018-08-13

## 2018-08-10 RX ORDER — HEPARIN SODIUM 5000 [USP'U]/ML
5000 INJECTION INTRAVENOUS; SUBCUTANEOUS EVERY 8 HOURS
Qty: 0 | Refills: 0 | Status: DISCONTINUED | OUTPATIENT
Start: 2018-08-10 | End: 2018-08-13

## 2018-08-10 RX ORDER — SODIUM HYPOCHLORITE 0.125 %
1 SOLUTION, NON-ORAL MISCELLANEOUS DAILY
Qty: 0 | Refills: 0 | Status: DISCONTINUED | OUTPATIENT
Start: 2018-08-10 | End: 2018-08-13

## 2018-08-10 RX ORDER — ASPIRIN/CALCIUM CARB/MAGNESIUM 324 MG
81 TABLET ORAL DAILY
Qty: 0 | Refills: 0 | Status: DISCONTINUED | OUTPATIENT
Start: 2018-08-10 | End: 2018-08-13

## 2018-08-10 RX ORDER — OXYCODONE AND ACETAMINOPHEN 5; 325 MG/1; MG/1
1 TABLET ORAL EVERY 4 HOURS
Qty: 0 | Refills: 0 | Status: DISCONTINUED | OUTPATIENT
Start: 2018-08-10 | End: 2018-08-13

## 2018-08-10 RX ORDER — SODIUM CHLORIDE 9 MG/ML
1000 INJECTION, SOLUTION INTRAVENOUS
Qty: 0 | Refills: 0 | Status: DISCONTINUED | OUTPATIENT
Start: 2018-08-10 | End: 2018-08-13

## 2018-08-10 RX ORDER — SODIUM CHLORIDE 9 MG/ML
1000 INJECTION INTRAMUSCULAR; INTRAVENOUS; SUBCUTANEOUS
Qty: 0 | Refills: 0 | Status: DISCONTINUED | OUTPATIENT
Start: 2018-08-10 | End: 2018-08-10

## 2018-08-10 RX ORDER — ACETAMINOPHEN 500 MG
1000 TABLET ORAL ONCE
Qty: 0 | Refills: 0 | Status: DISCONTINUED | OUTPATIENT
Start: 2018-08-10 | End: 2018-08-10

## 2018-08-10 RX ORDER — DEXTROSE 50 % IN WATER 50 %
25 SYRINGE (ML) INTRAVENOUS ONCE
Qty: 0 | Refills: 0 | Status: DISCONTINUED | OUTPATIENT
Start: 2018-08-10 | End: 2018-08-13

## 2018-08-10 RX ORDER — INSULIN LISPRO 100/ML
4 VIAL (ML) SUBCUTANEOUS
Qty: 0 | Refills: 0 | Status: DISCONTINUED | OUTPATIENT
Start: 2018-08-10 | End: 2018-08-13

## 2018-08-10 RX ORDER — ATORVASTATIN CALCIUM 80 MG/1
40 TABLET, FILM COATED ORAL AT BEDTIME
Qty: 0 | Refills: 0 | Status: DISCONTINUED | OUTPATIENT
Start: 2018-08-10 | End: 2018-08-13

## 2018-08-10 RX ORDER — ACETAMINOPHEN 500 MG
650 TABLET ORAL EVERY 6 HOURS
Qty: 0 | Refills: 0 | Status: DISCONTINUED | OUTPATIENT
Start: 2018-08-10 | End: 2018-08-13

## 2018-08-10 RX ORDER — LEVOTHYROXINE SODIUM 125 MCG
125 TABLET ORAL
Qty: 0 | Refills: 0 | Status: DISCONTINUED | OUTPATIENT
Start: 2018-08-10 | End: 2018-08-13

## 2018-08-10 RX ORDER — CEFAZOLIN SODIUM 1 G
1000 VIAL (EA) INJECTION EVERY 12 HOURS
Qty: 0 | Refills: 0 | Status: DISCONTINUED | OUTPATIENT
Start: 2018-08-10 | End: 2018-08-13

## 2018-08-10 RX ORDER — ONDANSETRON 8 MG/1
4 TABLET, FILM COATED ORAL ONCE
Qty: 0 | Refills: 0 | Status: DISCONTINUED | OUTPATIENT
Start: 2018-08-10 | End: 2018-08-10

## 2018-08-10 RX ORDER — HYDROMORPHONE HYDROCHLORIDE 2 MG/ML
0.5 INJECTION INTRAMUSCULAR; INTRAVENOUS; SUBCUTANEOUS ONCE
Qty: 0 | Refills: 0 | Status: DISCONTINUED | OUTPATIENT
Start: 2018-08-10 | End: 2018-08-13

## 2018-08-10 RX ORDER — CHLORHEXIDINE GLUCONATE 213 G/1000ML
1 SOLUTION TOPICAL ONCE
Qty: 0 | Refills: 0 | Status: DISCONTINUED | OUTPATIENT
Start: 2018-08-10 | End: 2018-08-13

## 2018-08-10 RX ORDER — SPIRONOLACTONE 25 MG/1
25 TABLET, FILM COATED ORAL
Qty: 0 | Refills: 0 | Status: DISCONTINUED | OUTPATIENT
Start: 2018-08-10 | End: 2018-08-10

## 2018-08-10 RX ORDER — FUROSEMIDE 40 MG
40 TABLET ORAL DAILY
Qty: 0 | Refills: 0 | Status: DISCONTINUED | OUTPATIENT
Start: 2018-08-10 | End: 2018-08-10

## 2018-08-10 RX ORDER — VANCOMYCIN HCL 1 G
125 VIAL (EA) INTRAVENOUS EVERY 6 HOURS
Qty: 0 | Refills: 0 | Status: DISCONTINUED | OUTPATIENT
Start: 2018-08-10 | End: 2018-08-13

## 2018-08-10 RX ORDER — GLUCAGON INJECTION, SOLUTION 0.5 MG/.1ML
1 INJECTION, SOLUTION SUBCUTANEOUS ONCE
Qty: 0 | Refills: 0 | Status: DISCONTINUED | OUTPATIENT
Start: 2018-08-10 | End: 2018-08-13

## 2018-08-10 RX ORDER — BROMOCRIPTINE MESYLATE 5 MG/1
5 CAPSULE ORAL DAILY
Qty: 0 | Refills: 0 | Status: DISCONTINUED | OUTPATIENT
Start: 2018-08-10 | End: 2018-08-13

## 2018-08-10 RX ORDER — MORPHINE SULFATE 50 MG/1
2 CAPSULE, EXTENDED RELEASE ORAL EVERY 4 HOURS
Qty: 0 | Refills: 0 | Status: DISCONTINUED | OUTPATIENT
Start: 2018-08-10 | End: 2018-08-13

## 2018-08-10 RX ORDER — ALLOPURINOL 300 MG
100 TABLET ORAL DAILY
Qty: 0 | Refills: 0 | Status: DISCONTINUED | OUTPATIENT
Start: 2018-08-10 | End: 2018-08-13

## 2018-08-10 RX ADMIN — SPIRONOLACTONE 25 MILLIGRAM(S): 25 TABLET, FILM COATED ORAL at 18:16

## 2018-08-10 RX ADMIN — Medication 125 MILLIGRAM(S): at 18:08

## 2018-08-10 RX ADMIN — Medication 40 MILLIGRAM(S): at 18:14

## 2018-08-10 RX ADMIN — OXYCODONE AND ACETAMINOPHEN 1 TABLET(S): 5; 325 TABLET ORAL at 20:09

## 2018-08-10 RX ADMIN — Medication 100 MILLIGRAM(S): at 10:29

## 2018-08-10 RX ADMIN — CARVEDILOL PHOSPHATE 12.5 MILLIGRAM(S): 80 CAPSULE, EXTENDED RELEASE ORAL at 10:29

## 2018-08-10 RX ADMIN — BROMOCRIPTINE MESYLATE 5 MILLIGRAM(S): 5 CAPSULE ORAL at 18:09

## 2018-08-10 RX ADMIN — Medication 125 MILLIGRAM(S): at 23:01

## 2018-08-10 RX ADMIN — Medication 4 UNIT(S): at 18:07

## 2018-08-10 RX ADMIN — CARVEDILOL PHOSPHATE 12.5 MILLIGRAM(S): 80 CAPSULE, EXTENDED RELEASE ORAL at 18:08

## 2018-08-10 RX ADMIN — HEPARIN SODIUM 5000 UNIT(S): 5000 INJECTION INTRAVENOUS; SUBCUTANEOUS at 22:19

## 2018-08-10 RX ADMIN — HEPARIN SODIUM 5000 UNIT(S): 5000 INJECTION INTRAVENOUS; SUBCUTANEOUS at 05:31

## 2018-08-10 RX ADMIN — Medication 81 MILLIGRAM(S): at 18:09

## 2018-08-10 RX ADMIN — OXYCODONE AND ACETAMINOPHEN 1 TABLET(S): 5; 325 TABLET ORAL at 20:39

## 2018-08-10 RX ADMIN — ATORVASTATIN CALCIUM 40 MILLIGRAM(S): 80 TABLET, FILM COATED ORAL at 22:19

## 2018-08-10 RX ADMIN — Medication 100 MILLIGRAM(S): at 18:08

## 2018-08-10 RX ADMIN — Medication 100 MILLIGRAM(S): at 22:16

## 2018-08-10 RX ADMIN — Medication 125 MILLIGRAM(S): at 05:31

## 2018-08-10 RX ADMIN — Medication 125 MICROGRAM(S): at 05:31

## 2018-08-10 NOTE — BRIEF OPERATIVE NOTE - OPERATION/FINDINGS
s/p right posterior heel wound debridement, partially closed  gouty tophi expressed  low concern for residual infection

## 2018-08-10 NOTE — PRE-OP CHECKLIST - 1.
emotional support and pre op teaching provided to patient and family withy verbazlied understanding.

## 2018-08-10 NOTE — BRIEF OPERATIVE NOTE - PROCEDURE
<<-----Click on this checkbox to enter Procedure Incision and debridement, foot  08/10/2018    Active  [id number]

## 2018-08-10 NOTE — PROGRESS NOTE ADULT - ASSESSMENT
Assessment and Plan:   		  68 year old male with ICM EF 40%, CABG, HTN, DM II, HLD, pituitary tumor, paroxysmal atrial tachycardia, paroxysmal ventricular tackycardia, ICD admitted with C. Diff. Has recent sustained atrial tachycardia and reverted to sinus and need to maintain on his cardiac regimen in effort to maintain, but no longer on anticoagulation. Creatinine declining steadily and rapidly past 2 days.    Ischemic cardiomyopathy    maintain carvedilol at decreased dose 12.5 mg BID in effort to increase blood pressure and allow improved renal perfusion    Ramipril 2.5 mg changed to lisinopril 2.5 mg in hospital, but discontinued due to JULIANNE, remain off until renal function fully recovers.    Spironolactone 25 mg BID, discontinued due to JULIANNE, and have ordered to resume today.    furosemide stopped due to JULIANNE which has recovered and manifesting mild volume accumulation, resume furosemide 40 mg daily today and likely need to increase to twice daily soon.    Continue aspirin 81 mg    Paroxysmal atrial tachycardia    manage by best treating ischemic cardiomyopathy and avoiding volume overload    If possible would not hold carvedilol doses, now at lower dose    no anticoagulation    Paroxysmal ventricular tachycardia    Has ICD.    Chronic thrombocytopenia    Preferable to not be on anticoagulation    JULIANNE / CKD    JULIANNE recovered, renal function essentially at baseline    Resume diuretic and aldosterone antagonist today. Continue to avoid potentially nephrotoxic medications, but likely resume ACE-i in next 1 to 2 days.    Should probably never receive iodinated contrast for any procedures in future    Renal f/u    Right heel, achilles wound, plan for wash out tomorrow by podiatry. Heart failure remains compensated, rhythm stable and he is optimized for this procedure.

## 2018-08-11 DIAGNOSIS — M71.9 BURSOPATHY, UNSPECIFIED: ICD-10-CM

## 2018-08-11 LAB
ANION GAP SERPL CALC-SCNC: 10 MMOL/L — SIGNIFICANT CHANGE UP (ref 5–17)
BUN SERPL-MCNC: 23 MG/DL — SIGNIFICANT CHANGE UP (ref 7–23)
CALCIUM SERPL-MCNC: 8.4 MG/DL — SIGNIFICANT CHANGE UP (ref 8.4–10.5)
CHLORIDE SERPL-SCNC: 101 MMOL/L — SIGNIFICANT CHANGE UP (ref 96–108)
CO2 SERPL-SCNC: 26 MMOL/L — SIGNIFICANT CHANGE UP (ref 22–31)
CREAT SERPL-MCNC: 1.46 MG/DL — HIGH (ref 0.5–1.3)
GLUCOSE SERPL-MCNC: 123 MG/DL — HIGH (ref 70–99)
HCT VFR BLD CALC: 25.1 % — LOW (ref 39–50)
HGB BLD-MCNC: 8.3 G/DL — LOW (ref 13–17)
MCHC RBC-ENTMCNC: 30.5 PG — SIGNIFICANT CHANGE UP (ref 27–34)
MCHC RBC-ENTMCNC: 33.1 GM/DL — SIGNIFICANT CHANGE UP (ref 32–36)
MCV RBC AUTO: 92.3 FL — SIGNIFICANT CHANGE UP (ref 80–100)
PLATELET # BLD AUTO: 67 K/UL — LOW (ref 150–400)
POTASSIUM SERPL-MCNC: 3.9 MMOL/L — SIGNIFICANT CHANGE UP (ref 3.5–5.3)
POTASSIUM SERPL-SCNC: 3.9 MMOL/L — SIGNIFICANT CHANGE UP (ref 3.5–5.3)
RBC # BLD: 2.72 M/UL — LOW (ref 4.2–5.8)
RBC # FLD: 15.2 % — HIGH (ref 10.3–14.5)
SODIUM SERPL-SCNC: 137 MMOL/L — SIGNIFICANT CHANGE UP (ref 135–145)
WBC # BLD: 3.48 K/UL — LOW (ref 3.8–10.5)
WBC # FLD AUTO: 3.48 K/UL — LOW (ref 3.8–10.5)

## 2018-08-11 RX ADMIN — Medication 4 UNIT(S): at 09:05

## 2018-08-11 RX ADMIN — SPIRONOLACTONE 25 MILLIGRAM(S): 25 TABLET, FILM COATED ORAL at 18:00

## 2018-08-11 RX ADMIN — Medication 125 MILLIGRAM(S): at 12:22

## 2018-08-11 RX ADMIN — Medication 40 MILLIGRAM(S): at 06:23

## 2018-08-11 RX ADMIN — Medication 125 MILLIGRAM(S): at 18:00

## 2018-08-11 RX ADMIN — Medication 250 MICROGRAM(S): at 06:13

## 2018-08-11 RX ADMIN — Medication 4 UNIT(S): at 13:18

## 2018-08-11 RX ADMIN — Medication 6: at 17:58

## 2018-08-11 RX ADMIN — HEPARIN SODIUM 5000 UNIT(S): 5000 INJECTION INTRAVENOUS; SUBCUTANEOUS at 13:18

## 2018-08-11 RX ADMIN — Medication 4: at 13:19

## 2018-08-11 RX ADMIN — OXYCODONE AND ACETAMINOPHEN 1 TABLET(S): 5; 325 TABLET ORAL at 20:00

## 2018-08-11 RX ADMIN — HEPARIN SODIUM 5000 UNIT(S): 5000 INJECTION INTRAVENOUS; SUBCUTANEOUS at 06:13

## 2018-08-11 RX ADMIN — ATORVASTATIN CALCIUM 40 MILLIGRAM(S): 80 TABLET, FILM COATED ORAL at 21:19

## 2018-08-11 RX ADMIN — Medication 4 UNIT(S): at 17:57

## 2018-08-11 RX ADMIN — Medication 100 MILLIGRAM(S): at 06:15

## 2018-08-11 RX ADMIN — Medication 1 APPLICATION(S): at 07:05

## 2018-08-11 RX ADMIN — OXYCODONE AND ACETAMINOPHEN 1 TABLET(S): 5; 325 TABLET ORAL at 20:30

## 2018-08-11 RX ADMIN — CARVEDILOL PHOSPHATE 12.5 MILLIGRAM(S): 80 CAPSULE, EXTENDED RELEASE ORAL at 18:00

## 2018-08-11 RX ADMIN — SPIRONOLACTONE 25 MILLIGRAM(S): 25 TABLET, FILM COATED ORAL at 06:14

## 2018-08-11 RX ADMIN — Medication 100 MILLIGRAM(S): at 18:00

## 2018-08-11 RX ADMIN — CARVEDILOL PHOSPHATE 12.5 MILLIGRAM(S): 80 CAPSULE, EXTENDED RELEASE ORAL at 06:13

## 2018-08-11 RX ADMIN — Medication 125 MILLIGRAM(S): at 23:28

## 2018-08-11 RX ADMIN — Medication 81 MILLIGRAM(S): at 12:21

## 2018-08-11 RX ADMIN — BROMOCRIPTINE MESYLATE 5 MILLIGRAM(S): 5 CAPSULE ORAL at 12:21

## 2018-08-11 RX ADMIN — HEPARIN SODIUM 5000 UNIT(S): 5000 INJECTION INTRAVENOUS; SUBCUTANEOUS at 21:19

## 2018-08-11 RX ADMIN — Medication 125 MILLIGRAM(S): at 06:14

## 2018-08-11 RX ADMIN — Medication 100 MILLIGRAM(S): at 12:21

## 2018-08-11 NOTE — PROGRESS NOTE ADULT - PROBLEM SELECTOR PLAN 6
No signs and symptoms of acute exacerbation   lasix and spironolactone have been resumed  ACe-i still on hold

## 2018-08-12 LAB
-  AMPICILLIN/SULBACTAM: SIGNIFICANT CHANGE UP
-  CEFAZOLIN: SIGNIFICANT CHANGE UP
-  CLINDAMYCIN: SIGNIFICANT CHANGE UP
-  ERYTHROMYCIN: SIGNIFICANT CHANGE UP
-  GENTAMICIN: SIGNIFICANT CHANGE UP
-  OXACILLIN: SIGNIFICANT CHANGE UP
-  PENICILLIN: SIGNIFICANT CHANGE UP
-  RIFAMPIN: SIGNIFICANT CHANGE UP
-  TETRACYCLINE: SIGNIFICANT CHANGE UP
-  TRIMETHOPRIM/SULFAMETHOXAZOLE: SIGNIFICANT CHANGE UP
-  VANCOMYCIN: SIGNIFICANT CHANGE UP
METHOD TYPE: SIGNIFICANT CHANGE UP

## 2018-08-12 PROCEDURE — 99233 SBSQ HOSP IP/OBS HIGH 50: CPT

## 2018-08-12 RX ORDER — ASPIRIN/CALCIUM CARB/MAGNESIUM 324 MG
1 TABLET ORAL
Qty: 0 | Refills: 0 | COMMUNITY

## 2018-08-12 RX ORDER — ASPIRIN/CALCIUM CARB/MAGNESIUM 324 MG
1 TABLET ORAL
Qty: 0 | Refills: 0 | COMMUNITY
Start: 2018-08-12

## 2018-08-12 RX ORDER — ALLOPURINOL 300 MG
1 TABLET ORAL
Qty: 0 | Refills: 0 | COMMUNITY
Start: 2018-08-12

## 2018-08-12 RX ORDER — BROMOCRIPTINE MESYLATE 5 MG/1
1 CAPSULE ORAL
Qty: 0 | Refills: 0 | COMMUNITY

## 2018-08-12 RX ORDER — FUROSEMIDE 40 MG
1 TABLET ORAL
Qty: 0 | Refills: 0 | COMMUNITY
Start: 2018-08-12

## 2018-08-12 RX ADMIN — BROMOCRIPTINE MESYLATE 5 MILLIGRAM(S): 5 CAPSULE ORAL at 12:36

## 2018-08-12 RX ADMIN — Medication 81 MILLIGRAM(S): at 12:36

## 2018-08-12 RX ADMIN — ATORVASTATIN CALCIUM 40 MILLIGRAM(S): 80 TABLET, FILM COATED ORAL at 21:58

## 2018-08-12 RX ADMIN — Medication 125 MILLIGRAM(S): at 17:34

## 2018-08-12 RX ADMIN — Medication 40 MILLIGRAM(S): at 06:01

## 2018-08-12 RX ADMIN — CARVEDILOL PHOSPHATE 12.5 MILLIGRAM(S): 80 CAPSULE, EXTENDED RELEASE ORAL at 06:06

## 2018-08-12 RX ADMIN — Medication 100 MILLIGRAM(S): at 12:36

## 2018-08-12 RX ADMIN — Medication 100 MILLIGRAM(S): at 17:33

## 2018-08-12 RX ADMIN — SPIRONOLACTONE 25 MILLIGRAM(S): 25 TABLET, FILM COATED ORAL at 06:01

## 2018-08-12 RX ADMIN — Medication 4 UNIT(S): at 12:35

## 2018-08-12 RX ADMIN — OXYCODONE AND ACETAMINOPHEN 1 TABLET(S): 5; 325 TABLET ORAL at 22:34

## 2018-08-12 RX ADMIN — Medication 4 UNIT(S): at 08:38

## 2018-08-12 RX ADMIN — OXYCODONE AND ACETAMINOPHEN 1 TABLET(S): 5; 325 TABLET ORAL at 22:04

## 2018-08-12 RX ADMIN — Medication 125 MILLIGRAM(S): at 12:36

## 2018-08-12 RX ADMIN — Medication 0: at 21:58

## 2018-08-12 RX ADMIN — HEPARIN SODIUM 5000 UNIT(S): 5000 INJECTION INTRAVENOUS; SUBCUTANEOUS at 06:01

## 2018-08-12 RX ADMIN — Medication 100 MILLIGRAM(S): at 06:01

## 2018-08-12 RX ADMIN — Medication 4 UNIT(S): at 17:33

## 2018-08-12 RX ADMIN — Medication 125 MILLIGRAM(S): at 06:01

## 2018-08-12 RX ADMIN — Medication 250 MICROGRAM(S): at 06:01

## 2018-08-12 RX ADMIN — HEPARIN SODIUM 5000 UNIT(S): 5000 INJECTION INTRAVENOUS; SUBCUTANEOUS at 12:40

## 2018-08-12 RX ADMIN — CARVEDILOL PHOSPHATE 12.5 MILLIGRAM(S): 80 CAPSULE, EXTENDED RELEASE ORAL at 17:33

## 2018-08-12 RX ADMIN — SPIRONOLACTONE 25 MILLIGRAM(S): 25 TABLET, FILM COATED ORAL at 17:34

## 2018-08-12 RX ADMIN — Medication 6: at 12:35

## 2018-08-12 RX ADMIN — HEPARIN SODIUM 5000 UNIT(S): 5000 INJECTION INTRAVENOUS; SUBCUTANEOUS at 21:58

## 2018-08-12 NOTE — PROGRESS NOTE ADULT - PROBLEM SELECTOR PROBLEM 8
Hyperkalemia
HLD (hyperlipidemia)
Hyperkalemia
HLD (hyperlipidemia)
Hyperkalemia

## 2018-08-12 NOTE — PROGRESS NOTE ADULT - PROBLEM SELECTOR PROBLEM 7
HLD (hyperlipidemia)
Type 2 diabetes mellitus
HLD (hyperlipidemia)
Type 2 diabetes mellitus
HLD (hyperlipidemia)

## 2018-08-12 NOTE — PROGRESS NOTE ADULT - ASSESSMENT
68 m with DM, HLD, CKD, CAD s/p CABG & stenting, systolic CHF ( Ef 37%) s/p ICD, Afib, Gout, idiopathic cirrhosis, thrombocytopenia,  pituitary adenoma and recent right knee MSSA prepatellar bursitis p/w diarrhea, weakness, and right foot/heel ulcer and purulent drainage, was found to have C-diff and started on PO vanco, the achilles tendon also aspirated and had purulence and tophaceous  material that grew MSSA, the diarrhea is improving but the renal function has been worsening    #Clostridium difficile colitis  * on PO vanco and clinically resolved, leukocytosis also resolved, will c/w PO vanco until a week after finishing the course of antibiotics    #achilles gouty tendinopathy and MSSA abscess and skin infection with bursitis s/p OR debridement and washout, OR cxs also MSSA  * increase cefazolin to 1 g q 8 as the renal function imrpoved, started 8/6 now day 7  * low suspicions for residual infection as per podiartry  * when ready for DC can switch to Keflex 500 PO q6 for another week    #JULIANNE on CKD now improving, cr : 1.46

## 2018-08-12 NOTE — PROGRESS NOTE ADULT - PROBLEM SELECTOR PROBLEM 6
Type 2 diabetes mellitus
Chronic systolic congestive heart failure
Type 2 diabetes mellitus
Chronic systolic congestive heart failure
Type 2 diabetes mellitus

## 2018-08-12 NOTE — PHYSICAL THERAPY INITIAL EVALUATION ADULT - PRECAUTIONS/LIMITATIONS, REHAB EVAL
Denies any fevers, nausea/vomiting, chest pain, SOB, headaches, travel history and pus in stool.Seen by pods R 2nd toe, heel ulcer debrided +mild yellow drainage. In the ED he received vanco, zosyn, Ns 500 ml and was found + Cdiff, remained hemodynamically stable. Pt with PMHx of CAD s/p CABG & stenting, systolic CHF ( Ef 37%) s/p ICD, Afib, Gout, idiopathic cirrhosis, thrombocytopenia, CKD stage 3, DM2, HLD, pituitary adenoma and recent right knee septic bursitis. Hospital course complicated by acute on chronic renal failure.S/p achilles tendon washout 8/9/18. US renal without hydronephrosis. Pt is not a candidate for MRI due to ICD. Foot aspirate shows crystals consistent with gout.

## 2018-08-12 NOTE — PROGRESS NOTE ADULT - PROBLEM SELECTOR PLAN 9
Hep SQ
SCD  early ambulation
Hep SQ
SCD  early ambulation

## 2018-08-12 NOTE — PROGRESS NOTE ADULT - ASSESSMENT
pt seen at bedside in NAD. dressing to right foot c/d/i s/p I&D of left posterior heel  sutures intact no pus minimal tophi decreased edema and erythema   irrigated with dakins and applied packing with DSD  follow up OR cultures  continue abx --> Will adjust for outpt antibiotics once cultures c/s finalized   pt may ambulate as tolerated

## 2018-08-12 NOTE — PHYSICAL THERAPY INITIAL EVALUATION ADULT - PERTINENT HX OF CURRENT PROBLEM, REHAB EVAL
Pt is a 68 y.o male admitted to [facility] on 8/1/18 for diarrhea, weakness, R foot ulcer. +diarrhea 2 wks ago with 6-8 episodes of watery nonbloody stools per day. +fatigue and decr appetite. He attributed these symptoms to naproxen/ colchicine for his gout, subsequently stopped taking them in mid-July.  He was recently discharged from the hospital (5/23) with IV antibiotics for right knee septic bursitis, completing a full 3 weeks antibiotic course on 6/8/18.

## 2018-08-12 NOTE — PHYSICAL THERAPY INITIAL EVALUATION ADULT - DISCHARGE DISPOSITION, PT EVAL
DC home with no skilled PT needs, owns walker/cane, assist from spouse as needed, DC plan to be emailed, CM to be notifeid.

## 2018-08-12 NOTE — PHYSICAL THERAPY INITIAL EVALUATION ADULT - ADDITIONAL COMMENTS
pt lives in home with spouse, +no stairs to enter, +flt of stairs in home, can stay on main floor, PTA ind amb and ADLs, occasional use of cane 2* bursitis R knee, owns walker. spouse can assist as needed, spouse is an RN x40+yrs.

## 2018-08-12 NOTE — PHYSICAL THERAPY INITIAL EVALUATION ADULT - PLANNED THERAPY INTERVENTIONS, PT EVAL
PT will DC pt at this time as pt is fxl independent; remain on amb walking program, pt cleared for DC home as per PT standpoint

## 2018-08-12 NOTE — PROGRESS NOTE ADULT - PROBLEM SELECTOR PLAN 5
No signs and symptoms of acute exacerbation   off Lasix 40 mg PO daily, ACE , spironolactone.   holding still for elevated Cr.
No signs and symptoms of acute exacerbation   off Lasix 40 mg PO daily, ACE , spironolactone.   hold for labile BP and rising Cr.
No signs and symptoms of acute exacerbation   off Lasix 40 mg PO daily, ACE , spironolactone.   hold for labile BP and rising Cr.
Stable  Pt is currently asymptomatic,  continue ASA 81 mg
No signs and symptoms of acute exacerbation   off Lasix 40 mg PO daily, ACE , spironolactone.   hold for labile BP and rising Cr.
No signs and symptoms of acute exacerbation   off Lasix 40 mg PO daily, ACE , spironolactone.   holding still for elevated Cr.
Stable  Pt is currently asymptomatic,  continue ASA 81 mg
No signs and symptoms of acute exacerbation   On Lasix 40 mg PO daily, ACE , spironolactone.   hold for labile BP.

## 2018-08-12 NOTE — PROGRESS NOTE ADULT - PROBLEM SELECTOR PROBLEM 5
Chronic systolic congestive heart failure
CAD (coronary artery disease) of bypass graft
Chronic systolic congestive heart failure
CAD (coronary artery disease) of bypass graft
Chronic systolic congestive heart failure

## 2018-08-12 NOTE — PROGRESS NOTE ADULT - PROBLEM SELECTOR PLAN 7
atorvastatin 40 mg
sliding scale   monitor FS    premeal humalog titrated up.   will consider Lantus but with caution in the setting of JULIANNE.
atorvastatin 40 mg
sliding scale   monitor FS    premeal humalog titrated up.   will consider Lantus but with caution in the setting of JULIANNE.
atorvastatin 40 mg

## 2018-08-13 ENCOUNTER — INBOUND DOCUMENT (OUTPATIENT)
Age: 68
End: 2018-08-13

## 2018-08-13 VITALS
SYSTOLIC BLOOD PRESSURE: 100 MMHG | RESPIRATION RATE: 18 BRPM | OXYGEN SATURATION: 95 % | DIASTOLIC BLOOD PRESSURE: 62 MMHG | TEMPERATURE: 98 F | HEART RATE: 60 BPM

## 2018-08-13 LAB
ANION GAP SERPL CALC-SCNC: 12 MMOL/L — SIGNIFICANT CHANGE UP (ref 5–17)
BUN SERPL-MCNC: 18 MG/DL — SIGNIFICANT CHANGE UP (ref 7–23)
CALCIUM SERPL-MCNC: 8.2 MG/DL — LOW (ref 8.4–10.5)
CHLORIDE SERPL-SCNC: 101 MMOL/L — SIGNIFICANT CHANGE UP (ref 96–108)
CO2 SERPL-SCNC: 26 MMOL/L — SIGNIFICANT CHANGE UP (ref 22–31)
CREAT SERPL-MCNC: 1.22 MG/DL — SIGNIFICANT CHANGE UP (ref 0.5–1.3)
GLUCOSE SERPL-MCNC: 107 MG/DL — HIGH (ref 70–99)
HCT VFR BLD CALC: 26.3 % — LOW (ref 39–50)
HGB BLD-MCNC: 8.2 G/DL — LOW (ref 13–17)
MCHC RBC-ENTMCNC: 29.1 PG — SIGNIFICANT CHANGE UP (ref 27–34)
MCHC RBC-ENTMCNC: 31.2 GM/DL — LOW (ref 32–36)
MCV RBC AUTO: 93.3 FL — SIGNIFICANT CHANGE UP (ref 80–100)
PLATELET # BLD AUTO: 72 K/UL — LOW (ref 150–400)
POTASSIUM SERPL-MCNC: 4 MMOL/L — SIGNIFICANT CHANGE UP (ref 3.5–5.3)
POTASSIUM SERPL-SCNC: 4 MMOL/L — SIGNIFICANT CHANGE UP (ref 3.5–5.3)
RBC # BLD: 2.82 M/UL — LOW (ref 4.2–5.8)
RBC # FLD: 15.2 % — HIGH (ref 10.3–14.5)
SODIUM SERPL-SCNC: 139 MMOL/L — SIGNIFICANT CHANGE UP (ref 135–145)
WBC # BLD: 3.04 K/UL — LOW (ref 3.8–10.5)
WBC # FLD AUTO: 3.04 K/UL — LOW (ref 3.8–10.5)

## 2018-08-13 PROCEDURE — 83690 ASSAY OF LIPASE: CPT

## 2018-08-13 PROCEDURE — 87206 SMEAR FLUORESCENT/ACID STAI: CPT

## 2018-08-13 PROCEDURE — 85027 COMPLETE CBC AUTOMATED: CPT

## 2018-08-13 PROCEDURE — 97161 PT EVAL LOW COMPLEX 20 MIN: CPT

## 2018-08-13 PROCEDURE — 93005 ELECTROCARDIOGRAM TRACING: CPT

## 2018-08-13 PROCEDURE — 82570 ASSAY OF URINE CREATININE: CPT

## 2018-08-13 PROCEDURE — 83735 ASSAY OF MAGNESIUM: CPT

## 2018-08-13 PROCEDURE — 81001 URINALYSIS AUTO W/SCOPE: CPT

## 2018-08-13 PROCEDURE — 73610 X-RAY EXAM OF ANKLE: CPT

## 2018-08-13 PROCEDURE — 86850 RBC ANTIBODY SCREEN: CPT

## 2018-08-13 PROCEDURE — 76775 US EXAM ABDO BACK WALL LIM: CPT

## 2018-08-13 PROCEDURE — 84540 ASSAY OF URINE/UREA-N: CPT

## 2018-08-13 PROCEDURE — 87075 CULTR BACTERIA EXCEPT BLOOD: CPT

## 2018-08-13 PROCEDURE — 84300 ASSAY OF URINE SODIUM: CPT

## 2018-08-13 PROCEDURE — 88304 TISSUE EXAM BY PATHOLOGIST: CPT

## 2018-08-13 PROCEDURE — 73630 X-RAY EXAM OF FOOT: CPT

## 2018-08-13 PROCEDURE — 71045 X-RAY EXAM CHEST 1 VIEW: CPT

## 2018-08-13 PROCEDURE — 89060 EXAM SYNOVIAL FLUID CRYSTALS: CPT

## 2018-08-13 PROCEDURE — 87449 NOS EACH ORGANISM AG IA: CPT

## 2018-08-13 PROCEDURE — 73701 CT LOWER EXTREMITY W/DYE: CPT

## 2018-08-13 PROCEDURE — 82340 ASSAY OF CALCIUM IN URINE: CPT

## 2018-08-13 PROCEDURE — 99285 EMERGENCY DEPT VISIT HI MDM: CPT | Mod: 25

## 2018-08-13 PROCEDURE — 84550 ASSAY OF BLOOD/URIC ACID: CPT

## 2018-08-13 PROCEDURE — 85610 PROTHROMBIN TIME: CPT

## 2018-08-13 PROCEDURE — 87045 FECES CULTURE AEROBIC BACT: CPT

## 2018-08-13 PROCEDURE — 86900 BLOOD TYPING SEROLOGIC ABO: CPT

## 2018-08-13 PROCEDURE — 87205 SMEAR GRAM STAIN: CPT

## 2018-08-13 PROCEDURE — 87324 CLOSTRIDIUM AG IA: CPT

## 2018-08-13 PROCEDURE — 80053 COMPREHEN METABOLIC PANEL: CPT

## 2018-08-13 PROCEDURE — 99231 SBSQ HOSP IP/OBS SF/LOW 25: CPT

## 2018-08-13 PROCEDURE — 84484 ASSAY OF TROPONIN QUANT: CPT

## 2018-08-13 PROCEDURE — 85730 THROMBOPLASTIN TIME PARTIAL: CPT

## 2018-08-13 PROCEDURE — 84156 ASSAY OF PROTEIN URINE: CPT

## 2018-08-13 PROCEDURE — 83986 ASSAY PH BODY FLUID NOS: CPT

## 2018-08-13 PROCEDURE — 87102 FUNGUS ISOLATION CULTURE: CPT

## 2018-08-13 PROCEDURE — 83935 ASSAY OF URINE OSMOLALITY: CPT

## 2018-08-13 PROCEDURE — 99233 SBSQ HOSP IP/OBS HIGH 50: CPT

## 2018-08-13 PROCEDURE — 82962 GLUCOSE BLOOD TEST: CPT

## 2018-08-13 PROCEDURE — 96374 THER/PROPH/DIAG INJ IV PUSH: CPT

## 2018-08-13 PROCEDURE — 83036 HEMOGLOBIN GLYCOSYLATED A1C: CPT

## 2018-08-13 PROCEDURE — 87040 BLOOD CULTURE FOR BACTERIA: CPT

## 2018-08-13 PROCEDURE — 87046 STOOL CULTR AEROBIC BACT EA: CPT

## 2018-08-13 PROCEDURE — 84560 ASSAY OF URINE/URIC ACID: CPT

## 2018-08-13 PROCEDURE — 87186 SC STD MICRODIL/AGAR DIL: CPT

## 2018-08-13 PROCEDURE — 87070 CULTURE OTHR SPECIMN AEROBIC: CPT

## 2018-08-13 PROCEDURE — 80048 BASIC METABOLIC PNL TOTAL CA: CPT

## 2018-08-13 PROCEDURE — 87086 URINE CULTURE/COLONY COUNT: CPT

## 2018-08-13 PROCEDURE — 86901 BLOOD TYPING SEROLOGIC RH(D): CPT

## 2018-08-13 PROCEDURE — 87116 MYCOBACTERIA CULTURE: CPT

## 2018-08-13 PROCEDURE — 87015 SPECIMEN INFECT AGNT CONCNTJ: CPT

## 2018-08-13 RX ORDER — VANCOMYCIN HCL 1 G
1 VIAL (EA) INTRAVENOUS
Qty: 56 | Refills: 0 | OUTPATIENT
Start: 2018-08-13 | End: 2018-08-26

## 2018-08-13 RX ORDER — SPIRONOLACTONE 25 MG/1
1 TABLET, FILM COATED ORAL
Qty: 0 | Refills: 0 | COMMUNITY

## 2018-08-13 RX ORDER — CEPHALEXIN 500 MG
1 CAPSULE ORAL
Qty: 28 | Refills: 0 | OUTPATIENT
Start: 2018-08-13 | End: 2018-08-19

## 2018-08-13 RX ORDER — ALLOPURINOL 300 MG
1 TABLET ORAL
Qty: 30 | Refills: 0 | OUTPATIENT
Start: 2018-08-13 | End: 2018-09-11

## 2018-08-13 RX ORDER — SODIUM HYPOCHLORITE 0.125 %
1 SOLUTION, NON-ORAL MISCELLANEOUS
Qty: 1 | Refills: 0 | OUTPATIENT
Start: 2018-08-13 | End: 2018-08-26

## 2018-08-13 RX ORDER — SPIRONOLACTONE 25 MG/1
1 TABLET, FILM COATED ORAL
Qty: 0 | Refills: 0 | COMMUNITY
Start: 2018-08-13

## 2018-08-13 RX ADMIN — Medication 100 MILLIGRAM(S): at 05:52

## 2018-08-13 RX ADMIN — Medication 40 MILLIGRAM(S): at 05:51

## 2018-08-13 RX ADMIN — Medication 125 MICROGRAM(S): at 08:34

## 2018-08-13 RX ADMIN — BROMOCRIPTINE MESYLATE 5 MILLIGRAM(S): 5 CAPSULE ORAL at 11:55

## 2018-08-13 RX ADMIN — SPIRONOLACTONE 25 MILLIGRAM(S): 25 TABLET, FILM COATED ORAL at 05:51

## 2018-08-13 RX ADMIN — Medication 125 MILLIGRAM(S): at 05:51

## 2018-08-13 RX ADMIN — Medication 81 MILLIGRAM(S): at 11:55

## 2018-08-13 RX ADMIN — Medication 1 APPLICATION(S): at 11:45

## 2018-08-13 RX ADMIN — CARVEDILOL PHOSPHATE 12.5 MILLIGRAM(S): 80 CAPSULE, EXTENDED RELEASE ORAL at 05:51

## 2018-08-13 RX ADMIN — Medication 100 MILLIGRAM(S): at 11:55

## 2018-08-13 RX ADMIN — Medication 125 MILLIGRAM(S): at 00:06

## 2018-08-13 RX ADMIN — HEPARIN SODIUM 5000 UNIT(S): 5000 INJECTION INTRAVENOUS; SUBCUTANEOUS at 05:51

## 2018-08-13 RX ADMIN — Medication 125 MILLIGRAM(S): at 11:55

## 2018-08-13 RX ADMIN — Medication 4 UNIT(S): at 08:34

## 2018-08-13 NOTE — PROGRESS NOTE ADULT - PROBLEM SELECTOR PLAN 3
Podiatry consult appreciated  right foot xray: no signs of OM   Pt is not a candidate for MRI due to ICD  CT scan with IV contrast. renal Jody Tate consulted.   continue IV abx. gentle IVF for renal protection.  rheum Dr. Goldberg consulted for possible right ankle gout. f/u rheum recommendations. f/u foot aspirate for crystals and culture.
Podiatry consult appreciated  right foot xray: no signs of OM   Pt is not a candidate for MRI due to ICD  CT scan with IV contrast. renal Jody Tate   continue IV abx. IVF as above. (on bicarb drip)  rheum Dr. Goldberg consulted for right ankle gout.   Foot aspirate shows crystals consistent with gout.  high uric acid. started on allopurinol. pain control.
renally dosed ancef  duration TBD
likely hypovolemic vs related to JULIANNE  UOsm/Sandra suggests pre-renal  ivf as above.
Podiatry consult appreciated  right foot xray: no signs of OM   Pt is not a candidate for MRI due to ICD  CT scan with IV contrast. renal Jody Tate   continue IV abx. IVF as above. (on bicarb drip)  rheum Dr. Goldberg consulted for possible right ankle gout. f/u rheum recommendations. foot aspirate shows crystals consistent with gout.  high uric acid. started on allopurinol. pain control.
Likely prerenal from diarrhea, fluid loss.   Cr worsened from 1.15 to 1.93 on admission.   s/p  ml. initialy improved. now worsened.    Agree with renal. restart gentle IVF. hold Lasix.   continue to trend Cr.   Avoid renal toxic medications
Podiatry consult appreciated  right foot xray: no signs of OM   Pt is not a candidate for MRI due to ICD  CT scan with IV contrast. renal Jody Tate   continue IV abx. IVF as above. (on bicarb drip)  rheum Dr. Goldberg consulted for possible right ankle gout. f/u rheum recommendations. foot aspirate shows crystals consistent with gout.  high uric acid.   started on allopurinol.
Podiatry consult appreciated  right foot xray: no signs of OM   Pt is not a candidate for MRI due to ICD  CT scan with IV contrast. renal Jody Tate   continue IV abx. IVF as above. (on bicarb drip)  rheum Dr. Goldberg consulted for right ankle gout.   Foot aspirate shows crystals consistent with gout.  high uric acid. started on allopurinol. pain control.
likely hypovolemic  check u osm  ivf as above.
likely hypovolemic vs related to JULIANNE  UOsm/Sandra suggests pre-renal  ivf as above.
likely hypovolemic vs related to JULIANNE  UOsm/Sandra suggests pre-renal  ivf as above.
likely hypovolemic vs related to JULIANNE  na improved  trend na
likely hypovolemic vs related to JULIANNE  na improved  trend na
likely hypovolemic vs related to JULIANNE  na improving  trend na
likely hypovolemic vs related to JULIANNE  na improving  trend na
renally dosed ancef  duration TBD
Likely prerenal from diarrhea, fluid loss.   Cr worsened from 1.15 to 1.93 on admission.   s/p  ml. Cr improving 1.4 today with gentle IVF.  monitor BMP, continue to trend Cr.   Avoid renal toxic medications  renal consulted.
Podiatry consult appreciated  right foot xray: no signs of OM   Pt is not a candidate for MRI due to ICD  rheum Dr. Goldberg consulted for right ankle gout.   Foot aspirate shows crystals consistent with gout.  high uric acid. started on allopurinol. pain control.  ****Pt is medically cleared for achilles tendon washout 8/9/18.****

## 2018-08-13 NOTE — PROGRESS NOTE ADULT - SUBJECTIVE AND OBJECTIVE BOX
CARLIN [id number]  68y  Male  Enterocolitis due to Clostridium difficile  H/o or current diagnosis of HF- ACEI/ARB contraindication unknown  H/o or current diagnosis of HF- no contraindication to ACEI/ARBs  H/o or current diagnosis of HF- ACEI/ARB contraindication unknown  H/o or current diagnosis of HF- Contraindication to ACEI/ARBs  H/o or current diagnosis of HF- ACEI/ARB contraindication unknown  Family history of MI (myocardial infarction) (Father)  Handoff  MEWS Score  Hypothyroidism  PVD (peripheral vascular disease)  History of hyperprolactinemia  Hepatic cirrhosis, unspecified hepatic cirrhosis type  Anemia  ICD (implantable cardioverter-defibrillator) in place  Sleep apnea  History of osteomyelitis  Presence of stent in coronary artery  Heart failure with reduced left ventricular function  Ischemic cardiomyopathy  Pituitary adenoma  Coronary artery disease  Thrombocytopenia  HLD (hyperlipidemia)  HTN (hypertension)  DM (diabetes mellitus)  AICD lead malfunction  Shock  C. difficile colitis  C. difficile diarrhea  Metabolic acidosis  Hyponatremia  CKD (chronic kidney disease), stage III  Acute kidney injury superimposed on CKD  Prophylactic measure  Hyperkalemia  HLD (hyperlipidemia)  Type 2 diabetes mellitus  Chronic systolic congestive heart failure  CAD (coronary artery disease) of bypass graft  Acute on chronic kidney failure  Right foot ulcer, with unspecified severity  C. difficile colitis  History of amputation  AICD (automatic cardioverter/defibrillator) present  Stented coronary artery  Coronary atherosclerosis of artery bypass graft  WEAK/DIARRHEA/NO APPETITE  27  Paroxysmal atrial tachycardia  Type 2 diabetes mellitus with hyperglycemia, without long-term current use of insulin  CAD (coronary artery disease) of bypass graft  Acute renal failure superimposed on stage 3 chronic kidney disease  Foot ulcer, right, limited to breakdown of skin    allopurinol 100 milliGRAM(s) Oral daily  aspirin enteric coated 81 milliGRAM(s) Oral daily  atorvastatin 40 milliGRAM(s) Oral at bedtime  bromocriptine Capsule 5 milliGRAM(s) Oral daily  carvedilol 25 milliGRAM(s) Oral every 12 hours  dextrose 40% Gel 15 Gram(s) Oral once PRN  dextrose 5%. 1000 milliLiter(s) IV Continuous <Continuous>  dextrose 50% Injectable 12.5 Gram(s) IV Push once  dextrose 50% Injectable 25 Gram(s) IV Push once  dextrose 50% Injectable 25 Gram(s) IV Push once  glucagon  Injectable 1 milliGRAM(s) IntraMuscular once PRN  insulin lispro (HumaLOG) corrective regimen sliding scale   SubCutaneous three times a day before meals  insulin lispro (HumaLOG) corrective regimen sliding scale   SubCutaneous at bedtime  levothyroxine 125 MICROGram(s) Oral <User Schedule>  levothyroxine 250 MICROGram(s) Oral <User Schedule>  sodium chloride 0.9%. 1000 milliLiter(s) IV Continuous <Continuous>  vancomycin    Solution 125 milliGRAM(s) Oral every 6 hours  No Known Allergies    CBC Full  -  ( 04 Aug 2018 09:05 )  WBC Count : 7.21 K/uL  Hemoglobin : 9.1 g/dL  Hematocrit : 28.4 %  Platelet Count - Automated : 52 K/uL  Mean Cell Volume : 90.7 fl  Mean Cell Hemoglobin : 29.1 pg  Mean Cell Hemoglobin Concentration : 32.0 gm/dL  Auto Neutrophil # : x  Auto Lymphocyte # : x  Auto Monocyte # : x  Auto Eosinophil # : x  Auto Basophil # : x  Auto Neutrophil % : x  Auto Lymphocyte % : x  Auto Monocyte % : x  Auto Eosinophil % : x  Auto Basophil % : x    Patient visited at bedside with "right foot draining" Patient relates posterior right foot opened up and extensive amount of drainage was noted. Tophaceous depostis noted to exude from posterior achilles tendon tendon. No signs of fluctuance decreased posterior fluid. no signs of abscess. Apply betadine and 4x4 with abd and kory right leg No signs of ascending cellulitis right leg. Continue daily dressing changes. Patient may need debridement of area if any signs of infection. Podiatry will folow.
CARLIN [id number]  68y  Male  Enterocolitis due to Clostridium difficile  H/o or current diagnosis of HF- ACEI/ARB contraindication unknown  H/o or current diagnosis of HF- no contraindication to ACEI/ARBs  H/o or current diagnosis of HF- ACEI/ARB contraindication unknown  H/o or current diagnosis of HF- Contraindication to ACEI/ARBs  H/o or current diagnosis of HF- ACEI/ARB contraindication unknown  Family history of MI (myocardial infarction) (Father)  Handoff  MEWS Score  Hypothyroidism  PVD (peripheral vascular disease)  History of hyperprolactinemia  Hepatic cirrhosis, unspecified hepatic cirrhosis type  Anemia  ICD (implantable cardioverter-defibrillator) in place  Sleep apnea  History of osteomyelitis  Presence of stent in coronary artery  Heart failure with reduced left ventricular function  Ischemic cardiomyopathy  Pituitary adenoma  Coronary artery disease  Thrombocytopenia  HLD (hyperlipidemia)  HTN (hypertension)  DM (diabetes mellitus)  AICD lead malfunction  Shock  C. difficile colitis  C. difficile diarrhea  Prophylactic measure  Hyperkalemia  HLD (hyperlipidemia)  Type 2 diabetes mellitus  Chronic systolic congestive heart failure  CAD (coronary artery disease) of bypass graft  Acute on chronic kidney failure  Right foot ulcer, with unspecified severity  C. difficile colitis  History of amputation  AICD (automatic cardioverter/defibrillator) present  Stented coronary artery  Coronary atherosclerosis of artery bypass graft  WEAK/DIARRHEA/NO APPETITE  27  Foot ulcer, right, limited to breakdown of skin    aspirin enteric coated 81 milliGRAM(s) Oral daily  atorvastatin 40 milliGRAM(s) Oral at bedtime  bromocriptine Capsule 5 milliGRAM(s) Oral daily  carvedilol 25 milliGRAM(s) Oral every 12 hours  dextrose 40% Gel 15 Gram(s) Oral once PRN  dextrose 5%. 1000 milliLiter(s) IV Continuous <Continuous>  dextrose 50% Injectable 12.5 Gram(s) IV Push once  dextrose 50% Injectable 25 Gram(s) IV Push once  dextrose 50% Injectable 25 Gram(s) IV Push once  furosemide    Tablet 40 milliGRAM(s) Oral two times a day  glucagon  Injectable 1 milliGRAM(s) IntraMuscular once PRN  insulin lispro (HumaLOG) corrective regimen sliding scale   SubCutaneous three times a day before meals  insulin lispro (HumaLOG) corrective regimen sliding scale   SubCutaneous at bedtime  levothyroxine 125 MICROGram(s) Oral <User Schedule>  levothyroxine 250 MICROGram(s) Oral <User Schedule>  lisinopril 2.5 milliGRAM(s) Oral daily  spironolactone 25 milliGRAM(s) Oral two times a day  vancomycin    Solution 125 milliGRAM(s) Oral every 6 hours  No Known Allergies    CBC Full  -  ( 02 Aug 2018 07:40 )  WBC Count : 11.78 K/uL  Hemoglobin : 9.9 g/dL  Hematocrit : 30.1 %  Platelet Count - Automated : 59 K/uL  Mean Cell Volume : 92.3 fl  Mean Cell Hemoglobin : 30.4 pg  Mean Cell Hemoglobin Concentration : 32.9 gm/dL  Auto Neutrophil # : x  Auto Lymphocyte # : x  Auto Monocyte # : x  Auto Eosinophil # : x  Auto Basophil # : x  Auto Neutrophil % : x  Auto Lymphocyte % : x  Auto Monocyte % : x  Auto Eosinophil % : x  Auto Basophil % : x  Patient visited at [facility] awaiting evaluation of recent CT scan. Awaiting rheumatology consult for possible achilles tendon gout  ulcer right toe is stable continue with daily dressing changes  podiatry will follow
Follow Up:  C diff    Interval History/ROS: eating better, taking probiotic - had bm that was partially formed, then loose stools    Allergies  No Known Allergies    ANTIMICROBIALS:  vancomycin    Solution 125 every 6 hours    OTHER MEDS:  MEDICATIONS  (STANDING):  aspirin enteric coated 81 daily  atorvastatin 40 at bedtime  bromocriptine Capsule 5 daily  carvedilol 25 every 12 hours  dextrose 40% Gel 15 once PRN  dextrose 50% Injectable 12.5 once  dextrose 50% Injectable 25 once  dextrose 50% Injectable 25 once  glucagon  Injectable 1 once PRN  insulin lispro (HumaLOG) corrective regimen sliding scale  three times a day before meals  insulin lispro (HumaLOG) corrective regimen sliding scale  at bedtime  levothyroxine 125 <User Schedule>  levothyroxine 250 <User Schedule>    Vital Signs Last 24 Hrs  T(C): 36.9 (03 Aug 2018 19:24), Max: 36.9 (03 Aug 2018 19:24)  T(F): 98.4 (03 Aug 2018 19:24), Max: 98.4 (03 Aug 2018 19:24)  HR: 73 (03 Aug 2018 19:24) (70 - 75)  BP: 99/62 (03 Aug 2018 19:24) (86/58 - 99/62)  BP(mean): --  RR: 18 (03 Aug 2018 19:24) (18 - 18)  SpO2: 100% (03 Aug 2018 19:24) (98% - 100%)    PHYSICAL EXAM:  General: WN/WD NAD, Non-toxic  Neurology: A&Ox3, nonfocal  Respiratory: Clear to auscultation bilaterally  CV: RRR, S1S2, no murmurs, rubs or gallops  Abdominal: Soft, Non-tender, non-distended, normal bowel sounds  Extremities: No edema, deep erytema  Line Sites: Clear  Skin: No rash                        9.9    11.78 )-----------( 59       ( 02 Aug 2018 07:40 )             30.1   WBC Count: 11.78 ( @ 07:40)  WBC Count: 14.1 ( @ 12:00)        132<L>  |  103  |  40<H>  ----------------------------<  291<H>  4.6   |  17<L>  |  1.87<H>  Creatinine: 1.87 ( @ 10:15)    Creatinine: 1.46 ( @ 06:42)    Creatinine: 1.66 ( @ 20:34)    Creatinine: 1.93 ( @ 12:00)      Ca    8.0<L>      03 Aug 2018 10:15        Urinalysis Basic - ( 02 Aug 2018 02:46 )    Color: Yellow / Appearance: Clear / S.019 / pH: x  Gluc: x / Ketone: Trace  / Bili: Negative / Urobili: Negative   Blood: x / Protein: Trace / Nitrite: Negative   Leuk Esterase: Negative / RBC: x / WBC 3-5 /HPF   Sq Epi: x / Non Sq Epi: x / Bacteria: x      MICROBIOLOGY:  .Stool Stool  18 --  --  --      .Blood Blood  18   No growth to date.  --  --      .Stool Feces  18   No enteric pathogens isolated.  (Stool culture examined for Salmonella,  Shigella, Campylobacter, Aeromonas, Plesiomonas,  Vibrio, E.coli O157 and Yersinia)  --  --      .Blood Blood  18   No growth to date.  --  --    Clostridium difficile GDH Toxins A&amp;B, EIA:   Positive (18 @ 14:07)  Clostridium difficile GDH Interpretation: Positive for toxigenic C. Difficile.  This specimen is positive for C.  Difficile glutamate dehydrogenase (GDH) antigen and positive for C.  Difficile Toxins A & B, by EIA.  GDH is a highly sensitive marker for C.  Difficile that is produced in largeamounts by all C. Difficile strains,  both toxigenic and nontoxigenic.  This assay has not been validated as a  test of cure.  The results of this assay should always be interpreted in  conjunction with patient's clinical history. (18 @ 14:07)    RADIOLOGY:  < from: CT Ankle w/ IV Cont, Right (18 @ 22:30) >  1.  Chronic appearing retro-Achilles bursitis. It is not possible to   determine on the basis of imaging alone if this is sterile or infected.   Consider percutaneous aspiration of the fluid for further evaluation.    < end of copied text >      Gopi Grace MD; Division of Infectious Disease; Pager: [phone]; nights and weekends: [phone]
Follow Up:  C-diff, MSSA abscess    Interval History: pt stable and afebrile, has been on cefazolin, s/p washout and OR cx MSSA    ROS:      All other systems negative    Constitutional: no fever, no chills  Head: no trauma  Eyes: no vision changes, no eye pain  ENT:  no sore throat, no rhinorrhea  Cardiovascular:  no chest pain, no palpitation  Respiratory:  no SOB, no cough  GI:  no abd pain, no vomiting, improved diarrhea, more formed  urinary: no dysuria, no hematuria, no flank pain  musculoskeletal: packing in right heel  skin:  no rash  neurology:  no headache, no seizure, no change in mental status  psych: no anxiety, no depression       Allergies  No Known Allergies        ANTIMICROBIALS:  ceFAZolin   IVPB 1000 every 12 hours  vancomycin    Solution 125 every 6 hours      OTHER MEDS:  acetaminophen   Tablet. 650 milliGRAM(s) Oral every 6 hours PRN  acetaminophen  IVPB. 1000 milliGRAM(s) IV Intermittent once PRN  allopurinol 100 milliGRAM(s) Oral daily  aspirin enteric coated 81 milliGRAM(s) Oral daily  atorvastatin 40 milliGRAM(s) Oral at bedtime  bromocriptine Capsule 5 milliGRAM(s) Oral daily  carvedilol 12.5 milliGRAM(s) Oral every 12 hours  chlorhexidine 4% Liquid 1 Application(s) Topical once  Dakins Solution - 1/4 Strength 1 Application(s) Topical daily  dextrose 5%. 1000 milliLiter(s) IV Continuous <Continuous>  dextrose 50% Injectable 12.5 Gram(s) IV Push once  dextrose 50% Injectable 25 Gram(s) IV Push once  dextrose 50% Injectable 25 Gram(s) IV Push once  furosemide    Tablet 40 milliGRAM(s) Oral daily  glucagon  Injectable 1 milliGRAM(s) IntraMuscular once PRN  heparin  Injectable 5000 Unit(s) SubCutaneous every 8 hours  HYDROmorphone  Injectable 0.5 milliGRAM(s) IV Push once PRN  insulin lispro (HumaLOG) corrective regimen sliding scale   SubCutaneous at bedtime  insulin lispro (HumaLOG) corrective regimen sliding scale   SubCutaneous three times a day before meals  insulin lispro Injectable (HumaLOG) 4 Unit(s) SubCutaneous three times a day before meals  levothyroxine 125 MICROGram(s) Oral <User Schedule>  levothyroxine 250 MICROGram(s) Oral <User Schedule>  morphine  - Injectable 2 milliGRAM(s) IV Push every 4 hours PRN  ondansetron Injectable 4 milliGRAM(s) IV Push once PRN  oxyCODONE    5 mG/acetaminophen 325 mG 1 Tablet(s) Oral every 4 hours PRN  spironolactone 25 milliGRAM(s) Oral two times a day      Vital Signs Last 24 Hrs  T(C): 36.7 (12 Aug 2018 05:53), Max: 37 (11 Aug 2018 19:50)  T(F): 98 (12 Aug 2018 05:53), Max: 98.6 (11 Aug 2018 19:50)  HR: 62 (12 Aug 2018 07:33) (60 - 70)  BP: 101/60 (12 Aug 2018 07:33) (100/62 - 113/70)  BP(mean): --  RR: 18 (12 Aug 2018 07:33) (18 - 18)  SpO2: 97% (12 Aug 2018 07:33) (97% - 100%)    Physical Exam:  General:    NAD,  non toxic, A&O x 3  Head: atraumatic, normocephalic  Eye: normal sclera and conjunctiva  ENT:    no oropharyngeal lesions,   no LAD,   neck supple  Cardio:     regular S1, S2,  no murmur  Respiratory:    clear b/l,    no wheezing  abd:     soft,   BS +,   no tenderness,    no organomegaly  :   no CVAT,  no suprapubic tenderness,   no  mckinnon  Musculoskeletal:  right heel and achilles s/p debridement and washout now with packing  vascular: no lines, normal pulses  Skin:   b/l hyperpigmentation and chronic skin changes of legs  Neurologic:     no focal deficit  psych: normal affect, no suicidal ideation                          8.3    3.48  )-----------( 67       ( 11 Aug 2018 08:42 )             25.1       08-11    137  |  101  |  23  ----------------------------<  123<H>  3.9   |  26  |  1.46<H>    Ca    8.4      11 Aug 2018 07:22            MICROBIOLOGY:  v  .Surgical Swab right foot deep wound culture  08-11-18   Few Staphylococcus species  --  --      .Body Fluid Synovial Fluid (<5mL)  08-03-18   Moderate Staphylococcus aureus  --  Staphylococcus aureus      .Abscess ankle right  08-03-18   Few Staphylococcus aureus  --  Staphylococcus aureus      .Urine Clean Catch (Midstream)  08-03-18   No growth  --  --      .Stool Stool  08-01-18   No growth at 1 week.  --  --      .Blood Blood  08-01-18   No growth at 5 days.  --  --      .Stool Feces  08-01-18   No enteric pathogens isolated.  (Stool culture examined for Salmonella,  Shigella, Campylobacter, Aeromonas, Plesiomonas,  Vibrio, E.coli O157 and Yersinia)  --  --      .Blood Blood  08-01-18   No growth at 5 days.  --  --                RADIOLOGY:  Images below reviewed personally  < from: US Renal (08.06.18 @ 15:19) >  IMPRESSION:     Normal renal ultrasound.    Splenomegaly.      < from: CT Ankle w/ IV Cont, Right (08.02.18 @ 22:30) >  IMPRESSION:  1.  Chronic appearing retro-Achilles bursitis. It is not possible to   determine on the basis of imaging alone if this is sterile or infected.   Consider percutaneous aspiration of the fluid for further evaluation.
HPI:  Feeling better and stronger each day, ambulating in room, no dizziness or lightheadedness. No dyspnea and no palpitations.    Medications:  allopurinol 100 milliGRAM(s) Oral daily  aspirin enteric coated 81 milliGRAM(s) Oral daily  atorvastatin 40 milliGRAM(s) Oral at bedtime  bromocriptine Capsule 5 milliGRAM(s) Oral daily  carvedilol 12.5 milliGRAM(s) Oral every 12 hours  ceFAZolin   IVPB      ceFAZolin   IVPB 1000 milliGRAM(s) IV Intermittent every 12 hours  Dakins Solution - 1/4 Strength 1 Application(s) Topical daily  dextrose 40% Gel 15 Gram(s) Oral once PRN  dextrose 5%. 1000 milliLiter(s) IV Continuous <Continuous>  dextrose 50% Injectable 12.5 Gram(s) IV Push once  dextrose 50% Injectable 25 Gram(s) IV Push once  dextrose 50% Injectable 25 Gram(s) IV Push once  glucagon  Injectable 1 milliGRAM(s) IntraMuscular once PRN  heparin  Injectable 5000 Unit(s) SubCutaneous every 8 hours  insulin lispro (HumaLOG) corrective regimen sliding scale   SubCutaneous three times a day before meals  insulin lispro (HumaLOG) corrective regimen sliding scale   SubCutaneous at bedtime  insulin lispro Injectable (HumaLOG) 4 Unit(s) SubCutaneous three times a day before meals  levothyroxine 125 MICROGram(s) Oral <User Schedule>  levothyroxine 250 MICROGram(s) Oral <User Schedule>  vancomycin    Solution 125 milliGRAM(s) Oral every 6 hours    PMH/PSH/FH/SH:  Unchanged    ROS:  CONSTITUTIONAL: No fever or chills.  appetite is normal  EYES: no visual disturbances  ENMT:  No epistaxis  RESPIRATORY: No cough, wheezing  or hemoptysis  CARDIOVASCULAR: see HPI  GASTROINTESTINAL: No abdominal pain. No nausea, vomiting, or hematemesis; No diarrhea or constipation. No melena or hematochezia.  GENITOURINARY: No dysuria, frequency, hematuria  NEUROLOGICAL: No headache.  No amaurosis.  No new focal motor or sensory disturbance  SKIN: No pruritis or rash   MUSCULOSKELETAL: No joint pain or swelling; No muscle, back, or extremity pain  PSYCHIATRIC: No depression, anxiety or difficulty sleeping  HEME/LYMPH: No easy bruising, or bleeding gums    Vitals:  T(C): 36.9 (18 @ 06:14), Max: 36.9 (18 @ 20:01)  HR: 64 (18 @ 06:14) (61 - 79)  BP: 104/65 (18 @ 06:14) (97/60 - 111/69)  BP(mean): --  RR: 18 (18 @ 06:14) (18 - 18)  SpO2: 96% (18 @ 06:14) (96% - 100%)  Wt(kg): --  Daily     Daily Weight in k.1 (09 Aug 2018 04:14)    Physical exam:  Appearance: NAD  Eyes: PERRL, EOMI  HENT: Normal oral mucosa, NC/AT  Cardiovascular: RRR, normal S1 and S2, soft holosystolic murmur, trace left and right pre-tibial edema, normal JVP  Respiratory: Clear to auscultation bilaterally  Gastrointestinal: Soft, non-tender, non-distended, BS+  Musculoskeletal: No clubbing, no joint deformity   Skin: No rashes, no ecchymoses, no cyanosis  Lymphatic: No lymphadenopathy  Neurologic: Non-focal  Psychiatry: AAOx3, mood & affect appropriate    I&O's Summary    08 Aug 2018 07:  -  09 Aug 2018 07:00  --------------------------------------------------------  IN: 1810 mL / OUT: 2460 mL / NET: -650 mL    09 Aug 2018 07:  -  09 Aug 2018 08:41  --------------------------------------------------------  IN: 0 mL / OUT: 200 mL / NET: -200 mL                              8.2    3.39  )-----------( 65       ( 09 Aug 2018 07:25 )             24.3         138  |  102  |  36<H>  ----------------------------<  142<H>  3.8   |  24  |  2.25<H>    Ca    8.0<L>      09 Aug 2018 06:49  Mg     1.5     
Patient is a 68y old  Male who presents with a chief complaint of diarrhea and weakness (02 Aug 2018 16:31)      SUBJECTIVE / OVERNIGHT EVENTS:  diarrhea much improved.  no cp, no sob, no n/v/d. no abdominal pain.  no headache, no dizziness.   low urine outpt.       Vital Signs Last 24 Hrs  T(C): 36.7 (06 Aug 2018 16:01), Max: 36.8 (06 Aug 2018 04:54)  T(F): 98 (06 Aug 2018 16:01), Max: 98.3 (06 Aug 2018 04:54)  HR: 68 (06 Aug 2018 16:01) (58 - 68)  BP: 98/60 (06 Aug 2018 16:01) (97/56 - 104/66)  BP(mean): --  RR: 18 (06 Aug 2018 16:01) (18 - 18)  SpO2: 96% (06 Aug 2018 16:01) (95% - 100%)  I&O's Summary    05 Aug 2018 07:01  -  06 Aug 2018 07:00  --------------------------------------------------------  IN: 2475 mL / OUT: 425 mL / NET: 2050 mL    06 Aug 2018 07:01  -  06 Aug 2018 19:59  --------------------------------------------------------  IN: 1940 mL / OUT: 550 mL / NET: 1390 mL        PHYSICAL EXAM:  GENERAL: NAD, Comfortable  HEAD:  Atraumatic, Normocephalic  EYES: EOMI, PERRLA, conjunctiva and sclera clear  NECK: Supple, No JVD  CHEST/LUNG: Clear to auscultation bilaterally; No wheeze  HEART: Regular rate and rhythm; No murmurs, rubs, or gallops  ABDOMEN: Soft, Nontender, Nondistended; Bowel sounds present  Neuro: AAO x 3, no focal deficit, 5/5 b/l extremities  EXTREMITIES:  2+ Peripheral Pulses, No clubbing, cyanosis, or edema, right foot, third toe, small ulcer, healing. small plantar hardened skin patch. no pus, no drainage. dressing d/c/i.   SKIN: No rashes or lesions      LABS:                        8.7    5.63  )-----------( 56       ( 06 Aug 2018 07:42 )             25.3     08-06    127<L>  |  97  |  52<H>  ----------------------------<  212<H>  4.4   |  16<L>  |  4.25<H>    Ca    7.7<L>      06 Aug 2018 06:00        CAPILLARY BLOOD GLUCOSE      POCT Blood Glucose.: 249 mg/dL (06 Aug 2018 17:09)  POCT Blood Glucose.: 294 mg/dL (06 Aug 2018 12:24)  POCT Blood Glucose.: 246 mg/dL (06 Aug 2018 08:27)  POCT Blood Glucose.: 304 mg/dL (05 Aug 2018 21:40)            RADIOLOGY & ADDITIONAL TESTS:    Imaging Personally Reviewed:  [x] YES  [ ] NO    Consultant(s) Notes Reviewed:  [x] YES  [ ] NO      MEDICATIONS  (STANDING):  allopurinol 100 milliGRAM(s) Oral daily  aspirin enteric coated 81 milliGRAM(s) Oral daily  atorvastatin 40 milliGRAM(s) Oral at bedtime  bromocriptine Capsule 5 milliGRAM(s) Oral daily  carvedilol 12.5 milliGRAM(s) Oral every 12 hours  ceFAZolin   IVPB      ceFAZolin   IVPB 1000 milliGRAM(s) IV Intermittent every 12 hours  dextrose 5% 1000 milliLiter(s) (60 mL/Hr) IV Continuous <Continuous>  dextrose 5%. 1000 milliLiter(s) (50 mL/Hr) IV Continuous <Continuous>  dextrose 50% Injectable 12.5 Gram(s) IV Push once  dextrose 50% Injectable 25 Gram(s) IV Push once  dextrose 50% Injectable 25 Gram(s) IV Push once  insulin lispro (HumaLOG) corrective regimen sliding scale   SubCutaneous three times a day before meals  insulin lispro (HumaLOG) corrective regimen sliding scale   SubCutaneous at bedtime  insulin lispro Injectable (HumaLOG) 3 Unit(s) SubCutaneous three times a day before meals  levothyroxine 125 MICROGram(s) Oral <User Schedule>  levothyroxine 250 MICROGram(s) Oral <User Schedule>  vancomycin    Solution 125 milliGRAM(s) Oral every 6 hours    MEDICATIONS  (PRN):  dextrose 40% Gel 15 Gram(s) Oral once PRN Blood Glucose LESS THAN 70 milliGRAM(s)/deciliter  glucagon  Injectable 1 milliGRAM(s) IntraMuscular once PRN Glucose LESS THAN 70 milligrams/deciliter      Care Discussed with Consultants/Other Providers [x] YES  [ ] NO    HEALTH ISSUES - PROBLEM Dx:  Metabolic acidosis: Metabolic acidosis  Hyponatremia: Hyponatremia  CKD (chronic kidney disease), stage III: CKD (chronic kidney disease), stage III  Acute kidney injury superimposed on CKD: Acute kidney injury superimposed on CKD  Prophylactic measure: Prophylactic measure  Hyperkalemia: Hyperkalemia  HLD (hyperlipidemia): HLD (hyperlipidemia)  Type 2 diabetes mellitus: Type 2 diabetes mellitus  Chronic systolic congestive heart failure: Chronic systolic congestive heart failure  CAD (coronary artery disease) of bypass graft: CAD (coronary artery disease) of bypass graft  Acute on chronic kidney failure: Acute on chronic kidney failure  Right foot ulcer, with unspecified severity: Right foot ulcer, with unspecified severity  C. difficile colitis: C. difficile colitis
Patient is a 68y old  Male who presents with a chief complaint of diarrhea and weakness (02 Aug 2018 16:31)     INTERVAL HPI/OVERNIGHT EVENTS:  Patient seen and evaluated at bedside.  Pt is resting comfortable in NAD. Denies N/V/F/C.  Pain rated at 0/10    Allergies    No Known Allergies    Intolerances    Vital Signs Last 24 Hrs  T(C): 36.6 (08 Aug 2018 14:22), Max: 36.8 (07 Aug 2018 16:08)  T(F): 97.9 (08 Aug 2018 14:22), Max: 98.3 (07 Aug 2018 23:43)  HR: 73 (08 Aug 2018 14:22) (64 - 73)  BP: 104/64 (08 Aug 2018 14:22) (97/60 - 111/63)  BP(mean): --  RR: 18 (08 Aug 2018 14:22) (17 - 189)  SpO2: 100% (08 Aug 2018 14:22) (97% - 100%)    LABS:                        8.9    4.11  )-----------( 63       ( 08 Aug 2018 07:52 )             26.0     08-08    133<L>  |  97  |  48<H>  ----------------------------<  181<H>  4.1   |  23  |  3.38<H>    Ca    7.9<L>      08 Aug 2018 06:40    CAPILLARY BLOOD GLUCOSE    POCT Blood Glucose.: 290 mg/dL (08 Aug 2018 12:30)  POCT Blood Glucose.: 199 mg/dL (08 Aug 2018 08:46)  POCT Blood Glucose.: 257 mg/dL (07 Aug 2018 21:46)  POCT Blood Glucose.: 248 mg/dL (07 Aug 2018 18:17)  POCT Blood Glucose.: 255 mg/dL (07 Aug 2018 17:04)    Lower Extremity Physical Exam:  right heel/achilles wound from gout attack that opened up do to tophi noted  fibrotic tissue noted along with tophaceous material     RADIOLOGY & ADDITIONAL TESTS:
Podiatry Pager #: [phone]    Patient is a 68y old  Male who presents with a chief complaint of diarrhea and weakness (02 Aug 2018 16:31)      INTERVAL HPI/OVERNIGHT EVENTS:   Pt is scheduled for right foot posterior heel incision and drainage with Dr. Tristan at 3:30pm. Patient is aware of procedure and is NPO since midnight.    MEDICATIONS  (STANDING):  allopurinol 100 milliGRAM(s) Oral daily  aspirin enteric coated 81 milliGRAM(s) Oral daily  atorvastatin 40 milliGRAM(s) Oral at bedtime  bromocriptine Capsule 5 milliGRAM(s) Oral daily  carvedilol 12.5 milliGRAM(s) Oral every 12 hours  ceFAZolin   IVPB      ceFAZolin   IVPB 1000 milliGRAM(s) IV Intermittent every 12 hours  chlorhexidine 4% Liquid 1 Application(s) Topical once  Dakins Solution - 1/4 Strength 1 Application(s) Topical daily  dextrose 5%. 1000 milliLiter(s) (50 mL/Hr) IV Continuous <Continuous>  dextrose 50% Injectable 12.5 Gram(s) IV Push once  dextrose 50% Injectable 25 Gram(s) IV Push once  dextrose 50% Injectable 25 Gram(s) IV Push once  furosemide    Tablet 40 milliGRAM(s) Oral daily  heparin  Injectable 5000 Unit(s) SubCutaneous every 8 hours  insulin lispro (HumaLOG) corrective regimen sliding scale   SubCutaneous three times a day before meals  insulin lispro (HumaLOG) corrective regimen sliding scale   SubCutaneous at bedtime  insulin lispro Injectable (HumaLOG) 4 Unit(s) SubCutaneous three times a day before meals  levothyroxine 125 MICROGram(s) Oral <User Schedule>  levothyroxine 250 MICROGram(s) Oral <User Schedule>  sodium chloride 0.9%. 1000 milliLiter(s) (30 mL/Hr) IV Continuous <Continuous>  spironolactone 25 milliGRAM(s) Oral two times a day  vancomycin    Solution 125 milliGRAM(s) Oral every 6 hours    MEDICATIONS  (PRN):  dextrose 40% Gel 15 Gram(s) Oral once PRN Blood Glucose LESS THAN 70 milliGRAM(s)/deciliter  glucagon  Injectable 1 milliGRAM(s) IntraMuscular once PRN Glucose LESS THAN 70 milligrams/deciliter      Allergies    No Known Allergies    Intolerances        Vital Signs Last 24 Hrs  T(C): 36.8 (10 Aug 2018 05:36), Max: 36.8 (09 Aug 2018 16:17)  T(F): 98.3 (10 Aug 2018 05:36), Max: 98.3 (09 Aug 2018 16:17)  HR: 60 (10 Aug 2018 05:36) (59 - 66)  BP: 111/69 (10 Aug 2018 05:36) (102/59 - 112/70)  BP(mean): --  RR: 18 (10 Aug 2018 05:36) (18 - 18)  SpO2: 98% (10 Aug 2018 05:36) (97% - 100%)    LABS:                        8.2    3.39  )-----------( 65       ( 09 Aug 2018 07:25 )             24.3     08-10    134<L>  |  100  |  28<H>  ----------------------------<  128<H>  4.1   |  23  |  1.67<H>    Ca    8.2<L>      10 Aug 2018 06:45  Mg     1.5     08-09          CAPILLARY BLOOD GLUCOSE      POCT Blood Glucose.: 222 mg/dL (09 Aug 2018 21:45)  POCT Blood Glucose.: 204 mg/dL (09 Aug 2018 18:39)  POCT Blood Glucose.: 181 mg/dL (09 Aug 2018 17:16)  POCT Blood Glucose.: 255 mg/dL (09 Aug 2018 12:45)  POCT Blood Glucose.: 172 mg/dL (09 Aug 2018 08:37)      RADIOLOGY & ADDITIONAL TESTS:    Plan:   To OR today at 3:30pm with Dr. Tristan for right foot posterior heel incision and drainage.   CXR on sunrise.  EKG on sunrise.  Medical/Cardiac clearance since 8/9/18 and documented in chart.  Consent signed and in chart.  Procedure was explained to patient in detail. All alternatives, risks and complications were discussed. All questions answered.
pt seen at bedside in NAD. dressing to right foot c/d/i s/p I&D of left posterior heel  sutures intact no pus minimal tophi decreased edema and erythema   irrigated with dakins and applied packing with DSD  follow up OR cultures  continue abx  pt may ambulate as tolerated
********Cardiology Progress Note***************    CC:      INTERVAL HISTORY:          HPI:  68 y.o male with PMHx of CAD s/p CABG & stenting, systolic CHF ( Ef 37%) s/p ICD, Afib, Gout, idiopathic cirrhosis, thrombocytopenia, CKD stage 3, DM2, HLD, pituitary adenoma and recent right knee septic bursitis presenting with diarrhea, weakness, and right foot ulcer. Now much improve w/ Rx for C. diff w/ decreasing diarrhea and no fever or abdominal pain.  Ambulating in room w/ LH or any effort provoked sxs(BP marginal).    No chest, throat, jaw. arm or upper back pain.  No dyspnea, orthopnea, PND.  No edema. No or syncope.     Allergies    No Known Allergies    Intolerances    	    MEDICATIONS:  aspirin enteric coated 81 milliGRAM(s) Oral daily  carvedilol 25 milliGRAM(s) Oral every 12 hours    vancomycin    Solution 125 milliGRAM(s) Oral every 6 hours      bromocriptine Capsule 5 milliGRAM(s) Oral daily      atorvastatin 40 milliGRAM(s) Oral at bedtime  dextrose 40% Gel 15 Gram(s) Oral once PRN  dextrose 50% Injectable 12.5 Gram(s) IV Push once  dextrose 50% Injectable 25 Gram(s) IV Push once  dextrose 50% Injectable 25 Gram(s) IV Push once  glucagon  Injectable 1 milliGRAM(s) IntraMuscular once PRN  insulin lispro (HumaLOG) corrective regimen sliding scale   SubCutaneous three times a day before meals  insulin lispro (HumaLOG) corrective regimen sliding scale   SubCutaneous at bedtime  levothyroxine 125 MICROGram(s) Oral <User Schedule>  levothyroxine 250 MICROGram(s) Oral <User Schedule>    dextrose 5%. 1000 milliLiter(s) IV Continuous <Continuous>  sodium chloride 0.9%. 1000 milliLiter(s) IV Continuous <Continuous>      PAST MEDICAL & SURGICAL HISTORY:  Hypothyroidism  PVD (peripheral vascular disease)  History of hyperprolactinemia  Hepatic cirrhosis, unspecified hepatic cirrhosis type  Anemia  ICD (implantable cardioverter-defibrillator) in place: [employer], Model [id number] , last interrogation 4/2017  Sleep apnea: not using CPAP  History of osteomyelitis: 2015, right foot 2nd toe  Presence of stent in coronary artery: 2 stents  Heart failure with reduced left ventricular function: EF 37%  Ischemic cardiomyopathy  Pituitary adenoma: as per patient chronic, no changes , recently restarted follow up with endocrinologist ( note in allscripts )  Coronary artery disease  Thrombocytopenia: Chronic  HLD (hyperlipidemia)  HTN (hypertension)  DM (diabetes mellitus): type 2, Hg A1C 8.2 % ( 7/10/17)  AICD lead malfunction: history of  History of amputation: right foot, 2nd toe, osteo 2015  AICD (automatic cardioverter/defibrillator) present: placement in (2006)  Stented coronary artery: RCA (1999)  Coronary atherosclerosis of artery bypass graft: CABG X 4 (1986)      FAMILY HISTORY:  Family history of MI (myocardial infarction) (Father)      SOCIAL HISTORY:  unchanged    REVIEW OF SYSTEMS:    CONSTITUTIONAL: No fever or chills.  appetite is normal  EYES: no visual disturbances  ENMT:  No epistaxis  RESPIRATORY: No cough, wheezing  or hemoptysis  CARDIOVASCULAR: see HPI  GASTROINTESTINAL: No abdominal pain. No nausea, vomiting, or hematemesis; No diarrhea or constipation. No melena or hematochezia.  GENITOURINARY: No dysuria, frequency, hematuria  NEUROLOGICAL: No headache.  No amaurosis.  No new focal motor or sensory disturbance  SKIN: No pruritis or rash   MUSCULOSKELETAL: No joint pain or swelling; No muscle, back, or extremity pain  PSYCHIATRIC: No depression, anxiety or difficulty sleeping  HEME/LYMPH: No easy bruising, or bleeding gums  	    [ ] All others negative	  [ ] Unable to obtain    PHYSICAL EXAM:  T(C): 36.8 (08-04-18 @ 11:02), Max: 36.9 (08-03-18 @ 19:24)  HR: 78 (08-04-18 @ 11:02) (68 - 81)  BP: 97/60 (08-04-18 @ 11:02) (92/54 - 99/62)  RR: 18 (08-04-18 @ 11:02) (18 - 18)  SpO2: 99% (08-04-18 @ 11:02) (99% - 100%)  Wt(kg): --  I&O's Summary    03 Aug 2018 07:01  -  04 Aug 2018 07:00  --------------------------------------------------------  IN: 1860 mL / OUT: 200 mL / NET: 1660 mL    04 Aug 2018 07:01  -  04 Aug 2018 13:54  --------------------------------------------------------  IN: 480 mL / OUT: 0 mL / NET: 480 mL    Physical exam:  Appearance: NAD  Eyes: PERRL, EOMI  HENT: Normal oral muscosa, NC/AT  Cardiovascular: normal S1 and S2, RRR, soft holosystolic murmur, no edema, normal JVP  Respiratory: Clear to auscultation bilaterally  Gastrointestinal: Soft, non-tender, non-distended, BS+  Musculoskeletal: No clubbing, no joint deformity   Skin: No rashes, no ecchymoses, no cyanosis  Lymphatic: No lymphadenopathy  Neurologic: Non-focal  Psychiatry: AAOx3, mood & affect appropriate  LABS:	 	    CBC Full  -  ( 04 Aug 2018 09:05 )  WBC Count : 7.21 K/uL  Hemoglobin : 9.1 g/dL  Hematocrit : 28.4 %  Platelet Count - Automated : 52 K/uL  Mean Cell Volume : 90.7 fl  Mean Cell Hemoglobin : 29.1 pg  Mean Cell Hemoglobin Concentration : 32.0 gm/dL  Auto Neutrophil # : x  Auto Lymphocyte # : x  Auto Monocyte # : x  Auto Eosinophil # : x  Auto Basophil # : x  Auto Neutrophil % : x  Auto Lymphocyte % : x  Auto Monocyte % : x  Auto Eosinophil % : x  Auto Basophil % : x    08-04    132<L>  |  102  |  42<H>  ----------------------------<  165<H>  4.3   |  16<L>  |  2.68<H>  08-03    132<L>  |  103  |  40<H>  ----------------------------<  291<H>  4.6   |  17<L>  |  1.87<H>    Ca    8.1<L>      04 Aug 2018 07:33  Ca    8.0<L>      03 Aug 2018 10
********Cardiology Progress Note***************    CC: "leg drained strawberry liquid."      INTERVAL HISTORY:    Right posterior foot drainage w/ tophaceous material per Dr. Tristan.  No diarrhea in 12 hrs.  Ambulating in room w/o sxs.  No chest, throat, jaw. arm or upper back pain.  No dyspnea, orthopnea, PND.  No edema. No palpitations, dizziness or syncope.           Allergies    No Known Allergies    Intolerances    	    MEDICATIONS:  aspirin enteric coated 81 milliGRAM(s) Oral daily  carvedilol 25 milliGRAM(s) Oral every 12 hours    vancomycin    Solution 125 milliGRAM(s) Oral every 6 hours      bromocriptine Capsule 5 milliGRAM(s) Oral daily      allopurinol 100 milliGRAM(s) Oral daily  atorvastatin 40 milliGRAM(s) Oral at bedtime  dextrose 40% Gel 15 Gram(s) Oral once PRN  dextrose 50% Injectable 12.5 Gram(s) IV Push once  dextrose 50% Injectable 25 Gram(s) IV Push once  dextrose 50% Injectable 25 Gram(s) IV Push once  glucagon  Injectable 1 milliGRAM(s) IntraMuscular once PRN  insulin lispro (HumaLOG) corrective regimen sliding scale   SubCutaneous three times a day before meals  insulin lispro (HumaLOG) corrective regimen sliding scale   SubCutaneous at bedtime  levothyroxine 125 MICROGram(s) Oral <User Schedule>  levothyroxine 250 MICROGram(s) Oral <User Schedule>    dextrose 5% 1000 milliLiter(s) IV Continuous <Continuous>  dextrose 5%. 1000 milliLiter(s) IV Continuous <Continuous>      PAST MEDICAL & SURGICAL HISTORY:  Hypothyroidism  PVD (peripheral vascular disease)  History of hyperprolactinemia  Hepatic cirrhosis, unspecified hepatic cirrhosis type  Anemia  ICD (implantable cardioverter-defibrillator) in place: [employer], Model [id number] , last interrogation 4/2017  Sleep apnea: not using CPAP  History of osteomyelitis: 2015, right foot 2nd toe  Presence of stent in coronary artery: 2 stents  Heart failure with reduced left ventricular function: EF 37%  Ischemic cardiomyopathy  Pituitary adenoma: as per patient chronic, no changes , recently restarted follow up with endocrinologist ( note in allscripts )  Coronary artery disease  Thrombocytopenia: Chronic  HLD (hyperlipidemia)  HTN (hypertension)  DM (diabetes mellitus): type 2, Hg A1C 8.2 % ( 7/10/17)  AICD lead malfunction: history of  History of amputation: right foot, 2nd toe, osteo 2015  AICD (automatic cardioverter/defibrillator) present: placement in (2006)  Stented coronary artery: RCA (1999)  Coronary atherosclerosis of artery bypass graft: CABG X 4 (1986)      FAMILY HISTORY:  Family history of MI (myocardial infarction) (Father)      SOCIAL HISTORY:  unchanged    REVIEW OF SYSTEMS:  CONSTITUTIONAL: No fever or chills.  appetite is normal  EYES: no visual disturbances  ENMT:  No epistaxis  RESPIRATORY: No cough, wheezing  or hemoptysis  CARDIOVASCULAR: see HPI  GASTROINTESTINAL: No abdominal pain. No nausea, vomiting, or hematemesis; No diarrhea or constipation. No melena or hematochezia.  GENITOURINARY: No dysuria, frequency, hematuria  NEUROLOGICAL: No headache.  No amaurosis.  No new focal motor or sensory disturbance  SKIN: No pruritis or rash   MUSCULOSKELETAL: No joint pain or swelling; No muscle, back, or extremity pain  PSYCHIATRIC: No depression, anxiety or difficulty sleeping  HEME/LYMPH: No easy bruising, or bleeding gums    PHYSICAL EXAM:  T(C): 36.6 (08-05-18 @ 04:20), Max: 36.8 (08-04-18 @ 11:02)  HR: 60 (08-05-18 @ 08:46) (60 - 78)  BP: 93/50 (08-05-18 @ 08:46) (90/54 - 97/60)  RR: 18 (08-05-18 @ 04:20) (18 - 18)  SpO2: 98% (08-05-18 @ 04:20) (95% - 99%)  Wt(kg): --  I&O's Summary    04 Aug 2018 07:01  -  05 Aug 2018 07:00  --------------------------------------------------------  IN: 2895 mL / OUT: 0 mL / NET: 2895 mL    Physical exam:  Appearance: NAD  Eyes: PERRL, EOMI  HENT: Normal oral muscosa, NC/AT  Cardiovascular: normal S1 and S2, RRR, soft holosystolic murmur, no edema, normal JVP  Respiratory: Clear to auscultation bilaterally  Gastrointestinal: Soft, non-tender, non-distended, BS+  Musculoskeletal: No clubbing, no joint deformity   Skin: No rashes, no ecchymoses, no cyanosis  Lymphatic: No lymphadenopathy  Neurologic: Non-focal  Psychiatry: AAOx3, mood & affect appropriate      LABS:	 	    CBC Full  -  ( 05 Aug 2018 08:36 )  WBC Count : 6.75 K/uL  Hemoglobin : 8.9 g/dL  Hematocrit : 26.0 %  Platelet Count - Automated : 54 K/uL  Mean Cell Volume : 90.0 fl  Mean Cell Hemoglobin : 30.8 pg  Mean Cell Hemoglobin Concentration : 34.2 gm/dL  Auto Neutrophil # : x  Auto Lymphocyte # : x  Auto Monocyte # : x  Auto Eosinophil # : x  Auto Basophil # : x  Auto Neutrophil % : x  Auto Lymphocyte % : x  Auto Monocyte % : x  Auto Eosinophil % : x  Auto Basophil % : x    08-05    130<L>  |  101  |  48<H>  ----------------------------<  193<H>  4.4   |  13<L>  |  3.94<H>  08-04    132<L>  |  102  |  42<H>  ----------------------------<  165<H>  4.3   |  16<L>  |  2.68<H>    Ca    7.9<L>      05 Aug 2018 06:59  Ca    8.1<L>      04 Aug 2018 07:33
CARLIN [id number]  68y  Male  Enterocolitis due to Clostridium difficile  H/o or current diagnosis of HF- ACEI/ARB contraindication unknown  H/o or current diagnosis of HF- no contraindication to ACEI/ARBs  H/o or current diagnosis of HF- ACEI/ARB contraindication unknown  H/o or current diagnosis of HF- Contraindication to ACEI/ARBs  H/o or current diagnosis of HF- ACEI/ARB contraindication unknown  Family history of MI (myocardial infarction) (Father)  Handoff  MEWS Score  Hypothyroidism  PVD (peripheral vascular disease)  History of hyperprolactinemia  Hepatic cirrhosis, unspecified hepatic cirrhosis type  Anemia  ICD (implantable cardioverter-defibrillator) in place  Sleep apnea  History of osteomyelitis  Presence of stent in coronary artery  Heart failure with reduced left ventricular function  Ischemic cardiomyopathy  Pituitary adenoma  Coronary artery disease  Thrombocytopenia  HLD (hyperlipidemia)  HTN (hypertension)  DM (diabetes mellitus)  AICD lead malfunction  Shock  C. difficile colitis  C. difficile diarrhea  Metabolic acidosis  Hyponatremia  CKD (chronic kidney disease), stage III  Acute kidney injury superimposed on CKD  Prophylactic measure  Hyperkalemia  HLD (hyperlipidemia)  Type 2 diabetes mellitus  Chronic systolic congestive heart failure  CAD (coronary artery disease) of bypass graft  Acute on chronic kidney failure  Right foot ulcer, with unspecified severity  C. difficile colitis  History of amputation  AICD (automatic cardioverter/defibrillator) present  Stented coronary artery  Coronary atherosclerosis of artery bypass graft  WEAK/DIARRHEA/NO APPETITE  27  MSSA (methicillin susceptible Staphylococcus aureus) infection  Hyponatremia  Chronic tophaceous gout  Paroxysmal atrial tachycardia  Type 2 diabetes mellitus with hyperglycemia, without long-term current use of insulin  CAD (coronary artery disease) of bypass graft  Acute renal failure superimposed on stage 3 chronic kidney disease  Foot ulcer, right, limited to breakdown of skin    allopurinol 100 milliGRAM(s) Oral daily  aspirin enteric coated 81 milliGRAM(s) Oral daily  atorvastatin 40 milliGRAM(s) Oral at bedtime  bromocriptine Capsule 5 milliGRAM(s) Oral daily  carvedilol 12.5 milliGRAM(s) Oral every 12 hours  ceFAZolin   IVPB      ceFAZolin   IVPB 1000 milliGRAM(s) IV Intermittent every 12 hours  dextrose 40% Gel 15 Gram(s) Oral once PRN  dextrose 5% 1000 milliLiter(s) IV Continuous <Continuous>  dextrose 5%. 1000 milliLiter(s) IV Continuous <Continuous>  dextrose 50% Injectable 12.5 Gram(s) IV Push once  dextrose 50% Injectable 25 Gram(s) IV Push once  dextrose 50% Injectable 25 Gram(s) IV Push once  glucagon  Injectable 1 milliGRAM(s) IntraMuscular once PRN  insulin lispro (HumaLOG) corrective regimen sliding scale   SubCutaneous three times a day before meals  insulin lispro (HumaLOG) corrective regimen sliding scale   SubCutaneous at bedtime  insulin lispro Injectable (HumaLOG) 3 Unit(s) SubCutaneous three times a day before meals  levothyroxine 125 MICROGram(s) Oral <User Schedule>  levothyroxine 250 MICROGram(s) Oral <User Schedule>  vancomycin    Solution 125 milliGRAM(s) Oral every 6 hours  No Known Allergies    CBC Full  -  ( 06 Aug 2018 07:42 )  WBC Count : 5.63 K/uL  Hemoglobin : 8.7 g/dL  Hematocrit : 25.3 %  Platelet Count - Automated : 56 K/uL  Mean Cell Volume : 86.9 fl  Mean Cell Hemoglobin : 29.9 pg  Mean Cell Hemoglobin Concentration : 34.4 gm/dL  Auto Neutrophil # : x  Auto Lymphocyte # : x  Auto Monocyte # : x  Auto Eosinophil # : x  Auto Basophil # : x  Auto Neutrophil % : x  Auto Lymphocyte % : x  Auto Monocyte % : x  Auto Eosinophil % : x  Auto Basophil % : x    Patient visited at bedside decreased swelling right leg with gouty tophi still emanating from posterior wound. Concern is for soft tissue infection of the achilles tendon. Appreciate Rhematology input for continued acute gout attakc with extensive amount of tophi exuding from wound. No asending cellulitis noted but patient needs continued IV antibiosis.  Flush wound with palpation to remove more gouty tophi and apply packing with dsd right foot. Patient could end up needed surgical debridement if area becomes infected but main concern is for Gout management per rhemmatology  podiatry will follow
CARLIN [id number]  68y  Male  Enterocolitis due to Clostridium difficile  H/o or current diagnosis of HF- ACEI/ARB contraindication unknown  H/o or current diagnosis of HF- no contraindication to ACEI/ARBs  H/o or current diagnosis of HF- ACEI/ARB contraindication unknown  H/o or current diagnosis of HF- Contraindication to ACEI/ARBs  H/o or current diagnosis of HF- ACEI/ARB contraindication unknown  Family history of MI (myocardial infarction) (Father)  Handoff  MEWS Score  Hypothyroidism  PVD (peripheral vascular disease)  History of hyperprolactinemia  Hepatic cirrhosis, unspecified hepatic cirrhosis type  Anemia  ICD (implantable cardioverter-defibrillator) in place  Sleep apnea  History of osteomyelitis  Presence of stent in coronary artery  Heart failure with reduced left ventricular function  Ischemic cardiomyopathy  Pituitary adenoma  Coronary artery disease  Thrombocytopenia  HLD (hyperlipidemia)  HTN (hypertension)  DM (diabetes mellitus)  AICD lead malfunction  Shock  C. difficile colitis  C. difficile diarrhea  Metabolic acidosis  Hyponatremia  CKD (chronic kidney disease), stage III  Acute kidney injury superimposed on CKD  Prophylactic measure  Hyperkalemia  HLD (hyperlipidemia)  Type 2 diabetes mellitus  Chronic systolic congestive heart failure  CAD (coronary artery disease) of bypass graft  Acute on chronic kidney failure  Right foot ulcer, with unspecified severity  C. difficile colitis  History of amputation  AICD (automatic cardioverter/defibrillator) present  Stented coronary artery  Coronary atherosclerosis of artery bypass graft  WEAK/DIARRHEA/NO APPETITE  27  Paroxysmal atrial tachycardia  Type 2 diabetes mellitus with hyperglycemia, without long-term current use of insulin  CAD (coronary artery disease) of bypass graft  Acute renal failure superimposed on stage 3 chronic kidney disease  Foot ulcer, right, limited to breakdown of skin    acetylcysteine  Oral Solution 1200 milliGRAM(s) Oral every 12 hours  aspirin enteric coated 81 milliGRAM(s) Oral daily  atorvastatin 40 milliGRAM(s) Oral at bedtime  bromocriptine Capsule 5 milliGRAM(s) Oral daily  carvedilol 25 milliGRAM(s) Oral every 12 hours  dextrose 40% Gel 15 Gram(s) Oral once PRN  dextrose 5%. 1000 milliLiter(s) IV Continuous <Continuous>  dextrose 50% Injectable 12.5 Gram(s) IV Push once  dextrose 50% Injectable 25 Gram(s) IV Push once  dextrose 50% Injectable 25 Gram(s) IV Push once  glucagon  Injectable 1 milliGRAM(s) IntraMuscular once PRN  insulin lispro (HumaLOG) corrective regimen sliding scale   SubCutaneous three times a day before meals  insulin lispro (HumaLOG) corrective regimen sliding scale   SubCutaneous at bedtime  levothyroxine 125 MICROGram(s) Oral <User Schedule>  levothyroxine 250 MICROGram(s) Oral <User Schedule>  sodium chloride 0.9%. 1000 milliLiter(s) IV Continuous <Continuous>  vancomycin    Solution 125 milliGRAM(s) Oral every 6 hours  No Known Allergies    Culture - Body Fluid with Gram Stain (08.03.18 @ 21:04)    Gram Stain:   Numerous polymorphonuclear leukocytes seen per low power field  Moderate Gram Positive Cocci in Clusters seen per oil power field    Specimen Source: .Body Fluid Synovial Fluid (<5mL)    Crystals, Synovial Fluid (08.03.18 @ 20:24)    Crystals, Synovial Fluid:   CONTAINER: MINT GREEN TUBE    COLOR:PINK        Ref Range: Colorless    CLARITY:SEMI SOLID      Ref Range: Clear    RESULT: MANY EXTRACELLULAR MONOSODIUM URATE CRYSTALS PRESENT  Ref Range:  None Seen    Patient seen at bedside with no signs of drainage from 22 gauge needle aspiration of fluid no ascending cellulitis decreased erythema at distal toe ulcer.   await final culture results. Patient saw rheumatology who agrees that right foot is consistent with gout. I would recommend continued IV antibiosis with gout treatment per rheumatology. Continue daily dressing changes podiatry will follow.
CARLIN [id number]  68y  Male  Enterocolitis due to Clostridium difficile  H/o or current diagnosis of HF- ACEI/ARB contraindication unknown  H/o or current diagnosis of HF- no contraindication to ACEI/ARBs  H/o or current diagnosis of HF- ACEI/ARB contraindication unknown  H/o or current diagnosis of HF- Contraindication to ACEI/ARBs  H/o or current diagnosis of HF- ACEI/ARB contraindication unknown  Family history of MI (myocardial infarction) (Father)  Handoff  MEWS Score  Hypothyroidism  PVD (peripheral vascular disease)  History of hyperprolactinemia  Hepatic cirrhosis, unspecified hepatic cirrhosis type  Anemia  ICD (implantable cardioverter-defibrillator) in place  Sleep apnea  History of osteomyelitis  Presence of stent in coronary artery  Heart failure with reduced left ventricular function  Ischemic cardiomyopathy  Pituitary adenoma  Coronary artery disease  Thrombocytopenia  HLD (hyperlipidemia)  HTN (hypertension)  DM (diabetes mellitus)  AICD lead malfunction  Shock  C. difficile colitis  C. difficile diarrhea  Metabolic acidosis  Hyponatremia  CKD (chronic kidney disease), stage III  Acute kidney injury superimposed on CKD  Prophylactic measure  Hyperkalemia  HLD (hyperlipidemia)  Type 2 diabetes mellitus  Chronic systolic congestive heart failure  CAD (coronary artery disease) of bypass graft  Acute on chronic kidney failure  Right foot ulcer, with unspecified severity  C. difficile colitis  History of amputation  AICD (automatic cardioverter/defibrillator) present  Stented coronary artery  Coronary atherosclerosis of artery bypass graft  WEAK/DIARRHEA/NO APPETITE  27  Paroxysmal atrial tachycardia  Type 2 diabetes mellitus with hyperglycemia, without long-term current use of insulin  CAD (coronary artery disease) of bypass graft  Acute renal failure superimposed on stage 3 chronic kidney disease  Foot ulcer, right, limited to breakdown of skin    acetylcysteine  Oral Solution 1200 milliGRAM(s) Oral every 12 hours  aspirin enteric coated 81 milliGRAM(s) Oral daily  atorvastatin 40 milliGRAM(s) Oral at bedtime  bromocriptine Capsule 5 milliGRAM(s) Oral daily  carvedilol 25 milliGRAM(s) Oral every 12 hours  dextrose 40% Gel 15 Gram(s) Oral once PRN  dextrose 5%. 1000 milliLiter(s) IV Continuous <Continuous>  dextrose 50% Injectable 12.5 Gram(s) IV Push once  dextrose 50% Injectable 25 Gram(s) IV Push once  dextrose 50% Injectable 25 Gram(s) IV Push once  glucagon  Injectable 1 milliGRAM(s) IntraMuscular once PRN  insulin lispro (HumaLOG) corrective regimen sliding scale   SubCutaneous three times a day before meals  insulin lispro (HumaLOG) corrective regimen sliding scale   SubCutaneous at bedtime  levothyroxine 125 MICROGram(s) Oral <User Schedule>  levothyroxine 250 MICROGram(s) Oral <User Schedule>  sodium chloride 0.9%. 1000 milliLiter(s) IV Continuous <Continuous>  vancomycin    Solution 125 milliGRAM(s) Oral every 6 hours  No Known Allergies    CBC Full  -  ( 02 Aug 2018 07:40 )  WBC Count : 11.78 K/uL  Hemoglobin : 9.9 g/dL  Hematocrit : 30.1 %  Platelet Count - Automated : 59 K/uL  Mean Cell Volume : 92.3 fl  Mean Cell Hemoglobin : 30.4 pg  Mean Cell Hemoglobin Concentration : 32.9 gm/dL  Auto Neutrophil # : x  Auto Lymphocyte # : x  Auto Monocyte # : x  Auto Eosinophil # : x  Auto Basophil # : x  Auto Neutrophil % : x  Auto Lymphocyte % : x  Auto Monocyte % : x  Auto Eosinophil % : x  Auto Basophil % : x    < from: CT Ankle w/ IV Cont, Right (08.02.18 @ 22:30) >    EXAM:  CT ANKLE ONLY IC RT                            PROCEDURE DATE:  08/02/2018            INTERPRETATION:    CT OF THE RIGHT ANKLE    CLINICAL INFORMATION: Redness, swelling, and pain of the right ankle for   5 to 6 days. Question abscess of theright posterior Achilles.  TECHNIQUE: Multidetector CT of the right ankle was performed following   the intravenous administration of 90 cc Omnipaque 350 (10 cc discarded).   Multiplanar reformats were generated for review.    COMPARISON: Right ankleradiographs 1 August 2018.    FINDINGS:    BONE: No acute fracture or dislocation. Healed chronic avulsion fracture   of the anterior distal fibula. No cortical destruction or periosteal new   bone formation. Chondral loss with marginal osteophyte formation   involving the tarsometatarsal joints. Type I os naviculare. Plantar   calcaneal heel spur. Enthesopathy at the insertion of the Achilles tendon   on the calcaneus.    SOFT TISSUE: A reniform fluid collection is identified surrounding the   mid and distal Achilles tendon consistent with retro-Achilles bursitis.   The bursal fluid collection measures approximately 27 x 43 x 80 mm and   has a partially mineralized wall, suggesting chronicity. Within the bursa   there is a large amount of fluid. The Achilles tendon is intact.   Remaining tendinous structures of the ankle are normal in CT appearance.   There is diffuse muscle atrophy. Skin thickening and subcutaneous edema   is seen diffusely about the ankle. Calcification of the neurovascular   structures.      IMPRESSION:  1.  Chronic appearing retro-Achilles bursitis. It is not possible to   determine on the basis of imaging alone if this is sterile or infected.   Consider percutaneous aspiration of the fluid for further evaluation.                    TIANNA MON M.D., ATTENDING RADIOLOGIST  This document has been electronically signed. Aug  3 2018  9:00AM                < end of copied text >    Patient signed informed consent for procedure to aspirate area posterior right heel at achilles tendon. Possible tophi noted with bloody discharge  Cytology, culture and pathology taken of aspirate.  22 gauge needle utilized right ankle. Patient tolerated needle aspirate well after scrubbing area with alcohol and betadine solution right leg  await culture results  strongly recommend rheumatology consult  apply iodine, 4x4 gauze, abd and kory right ankle. Podiatry will follow
CARLIN [id number]  68y  Male  Enterocolitis due to Clostridium difficile  H/o or current diagnosis of HF- ACEI/ARB contraindication unknown  H/o or current diagnosis of HF- no contraindication to ACEI/ARBs  H/o or current diagnosis of HF- ACEI/ARB contraindication unknown  H/o or current diagnosis of HF- Contraindication to ACEI/ARBs  H/o or current diagnosis of HF- ACEI/ARB contraindication unknown  Family history of MI (myocardial infarction) (Father)  Handoff  MEWS Score  Hypothyroidism  PVD (peripheral vascular disease)  History of hyperprolactinemia  Hepatic cirrhosis, unspecified hepatic cirrhosis type  Anemia  ICD (implantable cardioverter-defibrillator) in place  Sleep apnea  History of osteomyelitis  Presence of stent in coronary artery  Heart failure with reduced left ventricular function  Ischemic cardiomyopathy  Pituitary adenoma  Coronary artery disease  Thrombocytopenia  HLD (hyperlipidemia)  HTN (hypertension)  DM (diabetes mellitus)  AICD lead malfunction  Shock  C. difficile diarrhea  Prophylactic measure  Hyperkalemia  HLD (hyperlipidemia)  Type 2 diabetes mellitus  Chronic systolic congestive heart failure  CAD (coronary artery disease) of bypass graft  Acute on chronic kidney failure  Right foot ulcer, with unspecified severity  C. difficile colitis  History of amputation  AICD (automatic cardioverter/defibrillator) present  Stented coronary artery  Coronary atherosclerosis of artery bypass graft  WEAK/DIARRHEA/NO APPETITE  27  Foot ulcer, right, limited to breakdown of skin    aspirin enteric coated 81 milliGRAM(s) Oral daily  atorvastatin 40 milliGRAM(s) Oral at bedtime  bromocriptine Capsule 5 milliGRAM(s) Oral daily  carvedilol 25 milliGRAM(s) Oral every 12 hours  dextrose 40% Gel 15 Gram(s) Oral once PRN  dextrose 5%. 1000 milliLiter(s) IV Continuous <Continuous>  dextrose 50% Injectable 12.5 Gram(s) IV Push once  dextrose 50% Injectable 25 Gram(s) IV Push once  dextrose 50% Injectable 25 Gram(s) IV Push once  furosemide    Tablet 40 milliGRAM(s) Oral two times a day  glucagon  Injectable 1 milliGRAM(s) IntraMuscular once PRN  insulin lispro (HumaLOG) corrective regimen sliding scale   SubCutaneous three times a day before meals  insulin lispro (HumaLOG) corrective regimen sliding scale   SubCutaneous at bedtime  levothyroxine 125 MICROGram(s) Oral <User Schedule>  levothyroxine 250 MICROGram(s) Oral <User Schedule>  lisinopril 2.5 milliGRAM(s) Oral daily  spironolactone 25 milliGRAM(s) Oral two times a day  vancomycin    Solution 125 milliGRAM(s) Oral every 6 hours  No Known Allergies    CBC Full  -  ( 02 Aug 2018 07:40 )  WBC Count : 11.78 K/uL  Hemoglobin : 9.9 g/dL  Hematocrit : 30.1 %  Platelet Count - Automated : 59 K/uL  Mean Cell Volume : 92.3 fl  Mean Cell Hemoglobin : 30.4 pg  Mean Cell Hemoglobin Concentration : 32.9 gm/dL  Auto Neutrophil # : x  Auto Lymphocyte # : x  Auto Monocyte # : x  Auto Eosinophil # : x  Auto Basophil # : x  Auto Neutrophil % : x  Auto Lymphocyte % : x  Auto Monocyte % : x  Auto Eosinophil % : x  Auto Basophil % : x    Patient visited at bedside ulcer submet 5 right foot is much improved distal 3rd toe right foot ulcer decreased erythema. Persistent swelling of posterior right achilles region. recommend Rheumatology consult for Gout work up right achilles. Awaiting CT scan right leg  Continue daily dressing changes  Podiatry will follow
Follow Up:  C-diff, MSSA abscess    Interval History: pt stable and afebrile, has been on cefazolin, going for achilles wash out tomorrow    ROS:      All other systems negative    Constitutional: no fever, no chills  Head: no trauma  Eyes: no vision changes, no eye pain  ENT:  no sore throat, no rhinorrhea  Cardiovascular:  no chest pain, no palpitation  Respiratory:  no SOB, no cough  GI:  no abd pain, no vomiting, improved diarrhea, more formed  urinary: no dysuria, no hematuria, no flank pain  musculoskeletal:  drainage from the right heel  skin:  no rash  neurology:  no headache, no seizure, no change in mental status  psych: no anxiety, no depression         Allergies  No Known Allergies        ANTIMICROBIALS:  ceFAZolin   IVPB    ceFAZolin   IVPB 1000 every 12 hours  vancomycin    Solution 125 every 6 hours      OTHER MEDS:  allopurinol 100 milliGRAM(s) Oral daily  aspirin enteric coated 81 milliGRAM(s) Oral daily  atorvastatin 40 milliGRAM(s) Oral at bedtime  bromocriptine Capsule 5 milliGRAM(s) Oral daily  carvedilol 12.5 milliGRAM(s) Oral every 12 hours  Dakins Solution - 1/4 Strength 1 Application(s) Topical daily  dextrose 40% Gel 15 Gram(s) Oral once PRN  dextrose 5%. 1000 milliLiter(s) IV Continuous <Continuous>  dextrose 50% Injectable 12.5 Gram(s) IV Push once  dextrose 50% Injectable 25 Gram(s) IV Push once  dextrose 50% Injectable 25 Gram(s) IV Push once  glucagon  Injectable 1 milliGRAM(s) IntraMuscular once PRN  heparin  Injectable 5000 Unit(s) SubCutaneous every 8 hours  insulin lispro (HumaLOG) corrective regimen sliding scale   SubCutaneous three times a day before meals  insulin lispro (HumaLOG) corrective regimen sliding scale   SubCutaneous at bedtime  insulin lispro Injectable (HumaLOG) 4 Unit(s) SubCutaneous three times a day before meals  levothyroxine 125 MICROGram(s) Oral <User Schedule>  levothyroxine 250 MICROGram(s) Oral <User Schedule>      Vital Signs Last 24 Hrs  T(C): 36.6 (09 Aug 2018 09:59), Max: 36.9 (08 Aug 2018 20:01)  T(F): 97.9 (09 Aug 2018 09:59), Max: 98.5 (08 Aug 2018 20:01)  HR: 65 (09 Aug 2018 09:59) (61 - 79)  BP: 102/59 (09 Aug 2018 09:59) (102/59 - 111/69)  BP(mean): --  RR: 18 (09 Aug 2018 09:59) (18 - 18)  SpO2: 100% (09 Aug 2018 09:59) (96% - 100%)    Physical Exam:  General:    NAD,  non toxic, A&O x 3  Head: atraumatic, normocephalic  Eye: normal sclera and conjunctiva  ENT:    no oropharyngeal lesions,   no LAD,   neck supple  Cardio:     regular S1, S2,  no murmur  Respiratory:    clear b/l,    no wheezing  abd:     soft,   BS +,   no tenderness,    no organomegaly  :   no CVAT,  no suprapubic tenderness,   no  mckinnon  Musculoskeletal:  right heel and achilles area with purulent drainage  vascular: no lines, normal pulses  Skin:   b/l hyperpigmentation and chronic skin changes of legs  Neurologic:     no focal deficit  psych: normal affect, no suicidal ideation                        8.2    3.39  )-----------( 65       ( 09 Aug 2018 07:25 )             24.3       08-09    138  |  102  |  36<H>  ----------------------------<  142<H>  3.8   |  24  |  2.25<H>    Ca    8.0<L>      09 Aug 2018 06:49  Mg     1.5     08-09            MICROBIOLOGY:  v  .Body Fluid Synovial Fluid (<5mL)  08-03-18   Moderate Staphylococcus aureus  --  Staphylococcus aureus      .Abscess ankle right  08-03-18   Few Staphylococcus aureus  --  Staphylococcus aureus      .Urine Clean Catch (Midstream)  08-03-18   No growth  --  --      .Stool Stool  08-01-18 --  --  --      .Blood Blood  08-01-18   No growth at 5 days.  --  --      .Stool Feces  08-01-18   No enteric pathogens isolated.  (Stool culture examined for Salmonella,  Shigella, Campylobacter, Aeromonas, Plesiomonas,  Vibrio, E.coli O157 and Yersinia)  --  --      .Blood Blood  08-01-18   No growth at 5 days.  --  --                RADIOLOGY:  Images below reviewed personally  < from: CT Ankle w/ IV Cont, Right (08.02.18 @ 22:30) >    IMPRESSION:  1.  Chronic appearing retro-Achilles bursitis. It is not possible to   determine on the basis of imaging alone if this is sterile or infected.   Consider percutaneous aspiration of the fluid for further evaluation.
Follow Up:  C-diff, MSSA abscess    Interval History: pt stable and afebrile, renal function worsening and the culture of achilles grew MSSA    ROS:      All other systems negative    Constitutional: no fever, no chills  Head: no trauma  Eyes: no vision changes, no eye pain  ENT:  no sore throat, no rhinorrhea  Cardiovascular:  no chest pain, no palpitation  Respiratory:  no SOB, no cough  GI:  no abd pain, no vomiting, improved diarrhea, more formed  urinary: no dysuria, no hematuria, no flank pain  musculoskeletal:  drainage from the right heel  skin:  no rash  neurology:  no headache, no seizure, no change in mental status  psych: no anxiety, no depression           Allergies  No Known Allergies        ANTIMICROBIALS:  ceFAZolin   IVPB    ceFAZolin   IVPB 1000 every 12 hours  vancomycin    Solution 125 every 6 hours      OTHER MEDS:  allopurinol 100 milliGRAM(s) Oral daily  aspirin enteric coated 81 milliGRAM(s) Oral daily  atorvastatin 40 milliGRAM(s) Oral at bedtime  bromocriptine Capsule 5 milliGRAM(s) Oral daily  carvedilol 12.5 milliGRAM(s) Oral every 12 hours  Dakins Solution - 1/4 Strength 1 Application(s) Topical daily  dextrose 40% Gel 15 Gram(s) Oral once PRN  dextrose 5% 1000 milliLiter(s) IV Continuous <Continuous>  dextrose 5%. 1000 milliLiter(s) IV Continuous <Continuous>  dextrose 50% Injectable 12.5 Gram(s) IV Push once  dextrose 50% Injectable 25 Gram(s) IV Push once  dextrose 50% Injectable 25 Gram(s) IV Push once  glucagon  Injectable 1 milliGRAM(s) IntraMuscular once PRN  heparin  Injectable 5000 Unit(s) SubCutaneous every 8 hours  insulin lispro (HumaLOG) corrective regimen sliding scale   SubCutaneous three times a day before meals  insulin lispro (HumaLOG) corrective regimen sliding scale   SubCutaneous at bedtime  insulin lispro Injectable (HumaLOG) 4 Unit(s) SubCutaneous three times a day before meals  levothyroxine 125 MICROGram(s) Oral <User Schedule>  levothyroxine 250 MICROGram(s) Oral <User Schedule>      Vital Signs Last 24 Hrs  T(C): 36.8 (07 Aug 2018 16:08), Max: 36.8 (07 Aug 2018 16:08)  T(F): 98.2 (07 Aug 2018 16:08), Max: 98.2 (07 Aug 2018 16:08)  HR: 64 (07 Aug 2018 16:08) (59 - 78)  BP: 102/68 (07 Aug 2018 16:08) (91/53 - 110/66)  BP(mean): --  RR: 189 (07 Aug 2018 16:08) (18 - 189)  SpO2: 99% (07 Aug 2018 16:08) (96% - 100%)    Physical Exam:  General:    NAD,  non toxic, A&O x 3  Head: atraumatic, normocephalic  Eye: normal sclera and conjunctiva  ENT:    no oropharyngeal lesions,   no LAD,   neck supple  Cardio:     regular S1, S2,  no murmur  Respiratory:    clear b/l,    no wheezing  abd:     soft,   BS +,   no tenderness,    no organomegaly  :   no CVAT,  no suprapubic tenderness,   no  mckinnon  Musculoskeletal:  right heel and achilles area with purulent drainage  vascular: no lines, normal pulses  Skin:   b/l hyperpigmentation and chronic skin changes of legs  Neurologic:     no focal deficit  psych: normal affect, no suicidal ideation                        8.7    5.63  )-----------( 56       ( 06 Aug 2018 07:42 )             25.3       08-07    132<L>  |  97  |  54<H>  ----------------------------<  191<H>  4.2   |  20<L>  |  4.41<H>    Ca    7.9<L>      07 Aug 2018 06:46            MICROBIOLOGY:  v  .Body Fluid Synovial Fluid (<5mL)  08-03-18   Moderate Staphylococcus aureus  --  Staphylococcus aureus      .Abscess ankle right  08-03-18   Few Staphylococcus aureus  --  Staphylococcus aureus      .Urine Clean Catch (Midstream)  08-03-18   No growth  --  --      .Stool Stool  08-01-18 --  --  --      .Blood Blood  08-01-18   No growth at 5 days.  --  --      .Stool Feces  08-01-18   No enteric pathogens isolated.  (Stool culture examined for Salmonella,  Shigella, Campylobacter, Aeromonas, Plesiomonas,  Vibrio, E.coli O157 and Yersinia)  --  --      .Blood Blood  08-01-18   No growth at 5 days.  --  --            Clostridium difficile GDH Toxins A&amp;B, EIA:   Positive (08-01-18 @ 14:07)  Clostridium difficile GDH Interpretation: Positive for toxigenic C. Difficile.  This specimen is positive for C.  Difficile glutamate dehydrogenase (GDH) antigen and positive for C.  Difficile Toxins A & B, by EIA.  GDH is a highly sensitive marker for C.  Difficile that is produced in largeamounts by all C. Difficile strains,  both toxigenic and nontoxigenic.  This assay has not been validated as a  test of cure.  The results of this assay should always be interpreted in  conjunction with patient's clinical history. (08-01-18 @ 14:07)      RADIOLOGY:  Images below reviewed personally  < from: CT Ankle w/ IV Cont, Right (08.02.18 @ 22:30) >    IMPRESSION:  1.  Chronic appearing retro-Achilles bursitis. It is not possible to   determine on the basis of imaging alone if this is sterile or infected.   Consider percutaneous aspiration of the fluid for further evaluation.
Follow Up:  C-diff, MSSA abscess    Interval History: pt stable and afebrile, renal function worsening and the culture of achilles grew MSSa    ROS:      All other systems negative    Constitutional: no fever, no chills  Head: no trauma  Eyes: no vision changes, no eye pain  ENT:  no sore throat, no rhinorrhea  Cardiovascular:  no chest pain, no palpitation  Respiratory:  no SOB, no cough  GI:  no abd pain, no vomiting, improved diarrhea, more formed  urinary: no dysuria, no hematuria, no flank pain  musculoskeletal:  drainage from the right heel  skin:  no rash  neurology:  no headache, no seizure, no change in mental status  psych: no anxiety, no depression         Allergies  No Known Allergies        ANTIMICROBIALS:  ceFAZolin   IVPB    vancomycin    Solution 125 every 6 hours      OTHER MEDS:  allopurinol 100 milliGRAM(s) Oral daily  aspirin enteric coated 81 milliGRAM(s) Oral daily  atorvastatin 40 milliGRAM(s) Oral at bedtime  bromocriptine Capsule 5 milliGRAM(s) Oral daily  carvedilol 12.5 milliGRAM(s) Oral every 12 hours  dextrose 40% Gel 15 Gram(s) Oral once PRN  dextrose 5% 1000 milliLiter(s) IV Continuous <Continuous>  dextrose 5%. 1000 milliLiter(s) IV Continuous <Continuous>  dextrose 50% Injectable 12.5 Gram(s) IV Push once  dextrose 50% Injectable 25 Gram(s) IV Push once  dextrose 50% Injectable 25 Gram(s) IV Push once  glucagon  Injectable 1 milliGRAM(s) IntraMuscular once PRN  insulin lispro (HumaLOG) corrective regimen sliding scale   SubCutaneous three times a day before meals  insulin lispro (HumaLOG) corrective regimen sliding scale   SubCutaneous at bedtime  insulin lispro Injectable (HumaLOG) 3 Unit(s) SubCutaneous three times a day before meals  levothyroxine 125 MICROGram(s) Oral <User Schedule>  levothyroxine 250 MICROGram(s) Oral <User Schedule>      Vital Signs Last 24 Hrs  T(C): 36.8 (06 Aug 2018 04:54), Max: 36.8 (06 Aug 2018 04:54)  T(F): 98.3 (06 Aug 2018 04:54), Max: 98.3 (06 Aug 2018 04:54)  HR: 68 (06 Aug 2018 08:25) (58 - 68)  BP: 104/66 (06 Aug 2018 08:25) (95/61 - 104/66)  BP(mean): --  RR: 18 (06 Aug 2018 08:25) (18 - 18)  SpO2: 100% (06 Aug 2018 08:25) (95% - 100%)    Physical Exam:  General:    NAD,  non toxic, A&O x 3  Head: atraumatic, normocephalic  Eye: normal sclera and conjunctiva  ENT:    no oropharyngeal lesions,   no LAD,   neck supple  Cardio:     regular S1, S2,  no murmur  Respiratory:    clear b/l,    no wheezing  abd:     soft,   BS +,   no tenderness,    no organomegaly  :   no CVAT,  no suprapubic tenderness,   no  mckinnon  Musculoskeletal:  right heel and achilles area with purulent drainage  vascular: no lines, normal pulses  Skin:   b/l hyperpigmentation and chronic skin changes of legs  Neurologic:     no focal deficit  psych: normal affect, no suicidal ideation                          8.7    5.63  )-----------( 56       ( 06 Aug 2018 07:42 )             25.3       08-06    127<L>  |  97  |  52<H>  ----------------------------<  212<H>  4.4   |  16<L>  |  4.25<H>    Ca    7.7<L>      06 Aug 2018 06:00            MICROBIOLOGY:  v  .Body Fluid Synovial Fluid (<5mL)  08-03-18   Moderate Staphylococcus aureus  --  Staphylococcus aureus      .Abscess ankle right  08-03-18   Few Staphylococcus aureus  --  Staphylococcus aureus      .Urine Clean Catch (Midstream)  08-03-18   No growth  --  --      .Stool Stool  08-01-18 --  --  --      .Blood Blood  08-01-18   No growth to date.  --  --      .Stool Feces  08-01-18   No enteric pathogens isolated.  (Stool culture examined for Salmonella,  Shigella, Campylobacter, Aeromonas, Plesiomonas,  Vibrio, E.coli O157 and Yersinia)  --  --      .Blood Blood  08-01-18   No growth to date.  --  --            Clostridium difficile GDH Toxins A&amp;B, EIA:   Positive (08-01-18 @ 14:07)  Clostridium difficile GDH Interpretation: Positive for toxigenic C. Difficile.  This specimen is positive for C.  Difficile glutamate dehydrogenase (GDH) antigen and positive for C.  Difficile Toxins A & B, by EIA.  GDH is a highly sensitive marker for C.  Difficile that is produced in largeamounts by all C. Difficile strains,  both toxigenic and nontoxigenic.  This assay has not been validated as a  test of cure.  The results of this assay should always be interpreted in  conjunction with patient's clinical history. (08-01-18 @ 14:07)      RADIOLOGY:  Images below reviewed personally  < from: CT Ankle w/ IV Cont, Right (08.02.18 @ 22:30) >    IMPRESSION:  1.  Chronic appearing retro-Achilles bursitis. It is not possible to   determine on the basis of imaging alone if this is sterile or infected.   Consider percutaneous aspiration of the fluid for further evaluation.
Follow Up:  MSSA tendinitis, C diff    Interval History/ROS: happy to be going home, Diarrhea resolved several days ago, Modest ankle pain, no GI distress, good appetite    Allergies  No Known Allergies        ANTIMICROBIALS:  ceFAZolin   IVPB 1000 every 12 hours  vancomycin    Solution 125 every 6 hours      OTHER MEDS:  MEDICATIONS  (STANDING):  acetaminophen   Tablet. 650 every 6 hours PRN  acetaminophen  IVPB. 1000 once PRN  allopurinol 100 daily  aspirin enteric coated 81 daily  atorvastatin 40 at bedtime  bromocriptine Capsule 5 daily  carvedilol 12.5 every 12 hours  dextrose 50% Injectable 12.5 once  dextrose 50% Injectable 25 once  dextrose 50% Injectable 25 once  furosemide    Tablet 40 daily  glucagon  Injectable 1 once PRN  heparin  Injectable 5000 every 8 hours  HYDROmorphone  Injectable 0.5 once PRN  insulin lispro (HumaLOG) corrective regimen sliding scale  at bedtime  insulin lispro (HumaLOG) corrective regimen sliding scale  three times a day before meals  insulin lispro Injectable (HumaLOG) 4 three times a day before meals  levothyroxine 125 <User Schedule>  levothyroxine 250 <User Schedule>  morphine  - Injectable 2 every 4 hours PRN  ondansetron Injectable 4 once PRN  oxyCODONE    5 mG/acetaminophen 325 mG 1 every 4 hours PRN  spironolactone 25 two times a day      Vital Signs Last 24 Hrs  T(C): 36.4 (13 Aug 2018 11:49), Max: 36.8 (12 Aug 2018 19:48)  T(F): 97.6 (13 Aug 2018 11:49), Max: 98.2 (12 Aug 2018 19:48)  HR: 60 (13 Aug 2018 11:49) (60 - 77)  BP: 100/62 (13 Aug 2018 11:49) (100/62 - 117/69)  BP(mean): --  RR: 18 (13 Aug 2018 11:49) (18 - 18)  SpO2: 95% (13 Aug 2018 11:49) (95% - 96%)    PHYSICAL EXAM:  General: WN/WD NAD, Non-toxic  Neurology: A&Ox3, nonfocal  Respiratory: no resp distress  Extremities: dressing clean and dry  Line Sites: Clear                        8.2    3.04  )-----------( 72       ( 13 Aug 2018 07:37 )             26.3       08-13    139  |  101  |  18  ----------------------------<  107<H>  4.0   |  26  |  1.22    Ca    8.2<L>      13 Aug 2018 07:12      MICROBIOLOGY:  .Other Other, right foot deep wound culture  08-11-18   Testing in progress  --  Staphylococcus aureus      .Body Fluid Synovial Fluid (<5mL)  08-03-18   Moderate Staphylococcus aureus  --  Staphylococcus aureus      .Abscess ankle right  08-03-18   Few Staphylococcus aureus  --  Staphylococcus aureus      .Urine Clean Catch (Midstream)  08-03-18   No growth  --  --      .Stool Stool  08-01-18   No growth at 1 week.  --  --      .Blood Blood  08-01-18   No growth at 5 days.  --  --      .Stool Feces  08-01-18   No enteric pathogens isolated.  (Stool culture examined for Salmonella,  Shigella, Campylobacter, Aeromonas, Plesiomonas,  Vibrio, E.coli O157 and Yersinia)  --  --      .Blood Blood  08-01-18   No growth at 5 days.  --  --      RADIOLOGY:  < from: CT Ankle w/ IV Cont, Right (08.02.18 @ 22:30) >  IMPRESSION:  1.  Chronic appearing retro-Achilles bursitis. It is not possible to   determine on the basis of imaging alone if this is sterile or infected.   Consider percutaneous aspiration of the fluid for further evaluation.    < end of copied text >      Gopi Grace MD; Division of Infectious Disease; Pager: [phone]; nights and weekends: [phone]
HPI:  Feeling better each day. Ambulatory, steady, no dizziness or lightheadedness, no dyspnea. Diarrhea [id number].    Medications:  allopurinol 100 milliGRAM(s) Oral daily  aspirin enteric coated 81 milliGRAM(s) Oral daily  atorvastatin 40 milliGRAM(s) Oral at bedtime  bromocriptine Capsule 5 milliGRAM(s) Oral daily  carvedilol 12.5 milliGRAM(s) Oral every 12 hours  ceFAZolin   IVPB      ceFAZolin   IVPB 1000 milliGRAM(s) IV Intermittent every 12 hours  Dakins Solution - 1/4 Strength 1 Application(s) Topical daily  dextrose 40% Gel 15 Gram(s) Oral once PRN  dextrose 5% 1000 milliLiter(s) IV Continuous <Continuous>  dextrose 5%. 1000 milliLiter(s) IV Continuous <Continuous>  dextrose 50% Injectable 12.5 Gram(s) IV Push once  dextrose 50% Injectable 25 Gram(s) IV Push once  dextrose 50% Injectable 25 Gram(s) IV Push once  glucagon  Injectable 1 milliGRAM(s) IntraMuscular once PRN  heparin  Injectable 5000 Unit(s) SubCutaneous every 8 hours  insulin lispro (HumaLOG) corrective regimen sliding scale   SubCutaneous three times a day before meals  insulin lispro (HumaLOG) corrective regimen sliding scale   SubCutaneous at bedtime  insulin lispro Injectable (HumaLOG) 4 Unit(s) SubCutaneous three times a day before meals  levothyroxine 125 MICROGram(s) Oral <User Schedule>  levothyroxine 250 MICROGram(s) Oral <User Schedule>  vancomycin    Solution 125 milliGRAM(s) Oral every 6 hours    PMH/PSH/FH/SH:  Unchanged    ROS:  CONSTITUTIONAL: No fever or chills.  appetite is normal  EYES: no visual disturbances  ENMT:  No epistaxis  RESPIRATORY: No cough, wheezing  or hemoptysis  CARDIOVASCULAR: see HPI  GASTROINTESTINAL: No abdominal pain. No nausea, vomiting, or hematemesis; No diarrhea or constipation. No melena or hematochezia.  GENITOURINARY: No dysuria, frequency, hematuria  NEUROLOGICAL: No headache.  No amaurosis.  No new focal motor or sensory disturbance  SKIN: No pruritis or rash   MUSCULOSKELETAL: No joint pain or swelling; No muscle, back, or extremity pain  PSYCHIATRIC: No depression, anxiety or difficulty sleeping  HEME/LYMPH: No easy bruising, or bleeding gums    Vitals:  T(C): 36.6 (18 @ 05:26), Max: 36.8 (18 @ 16:08)  HR: 66 (18 @ 05:26) (64 - 67)  BP: 104/63 (18 @ 05:26) (102/65 - 111/63)  BP(mean): --  RR: 17 (18 @ 05:26) (17 - 189)  SpO2: 98% (18 @ 05:26) (97% - 99%)  Wt(kg): --  Daily     Daily Weight in k.5 (08 Aug 2018 05:26)    Physical exam:  Appearance: NAD  Eyes: PERRL, EOMI  HENT: Normal oral mucosa, NC/AT  Cardiovascular: RRR, normal S1 and S2, soft holosystolic murmur, trace left and 1+ right pre-tibial edema, normal JVP  Respiratory: Clear to auscultation bilaterally  Gastrointestinal: Soft, non-tender, non-distended, BS+  Musculoskeletal: No clubbing, no joint deformity   Skin: No rashes, no ecchymoses, no cyanosis  Lymphatic: No lymphadenopathy  Neurologic: Non-focal  Psychiatry: AAOx3, mood & affect appropriate      I&O's Summary    07 Aug 2018 07:01  -  08 Aug 2018 07:00  --------------------------------------------------------  IN: 1420 mL / OUT: 1950 mL / NET: -530 mL              133<L>  |  97  |  48<H>  ----------------------------<  181<H>  4.1   |  23  |  3.38<H>    Ca    7.9<L>      08 Aug 2018 06:40
HPI:  Feeling much better, C. Diff diarrhea resolved, no longer on isolation. No palpitations, no dyspnea. Anticipate discharge to home today.    Medications:  acetaminophen   Tablet. 650 milliGRAM(s) Oral every 6 hours PRN  acetaminophen  IVPB. 1000 milliGRAM(s) IV Intermittent once PRN  allopurinol 100 milliGRAM(s) Oral daily  aspirin enteric coated 81 milliGRAM(s) Oral daily  atorvastatin 40 milliGRAM(s) Oral at bedtime  bromocriptine Capsule 5 milliGRAM(s) Oral daily  carvedilol 12.5 milliGRAM(s) Oral every 12 hours  ceFAZolin   IVPB 1000 milliGRAM(s) IV Intermittent every 12 hours  chlorhexidine 4% Liquid 1 Application(s) Topical once  Dakins Solution - 1/4 Strength 1 Application(s) Topical daily  dextrose 5%. 1000 milliLiter(s) IV Continuous <Continuous>  dextrose 50% Injectable 12.5 Gram(s) IV Push once  dextrose 50% Injectable 25 Gram(s) IV Push once  dextrose 50% Injectable 25 Gram(s) IV Push once  furosemide    Tablet 40 milliGRAM(s) Oral daily  glucagon  Injectable 1 milliGRAM(s) IntraMuscular once PRN  heparin  Injectable 5000 Unit(s) SubCutaneous every 8 hours  HYDROmorphone  Injectable 0.5 milliGRAM(s) IV Push once PRN  insulin lispro (HumaLOG) corrective regimen sliding scale   SubCutaneous at bedtime  insulin lispro (HumaLOG) corrective regimen sliding scale   SubCutaneous three times a day before meals  insulin lispro Injectable (HumaLOG) 4 Unit(s) SubCutaneous three times a day before meals  levothyroxine 125 MICROGram(s) Oral <User Schedule>  levothyroxine 250 MICROGram(s) Oral <User Schedule>  morphine  - Injectable 2 milliGRAM(s) IV Push every 4 hours PRN  ondansetron Injectable 4 milliGRAM(s) IV Push once PRN  oxyCODONE    5 mG/acetaminophen 325 mG 1 Tablet(s) Oral every 4 hours PRN  spironolactone 25 milliGRAM(s) Oral two times a day  vancomycin    Solution 125 milliGRAM(s) Oral every 6 hours    PMH/PSH/FH/SH:  Unchanged    ROS:  CONSTITUTIONAL: No fever or chills.  appetite is normal  EYES: no visual disturbances  ENMT:  No epistaxis  RESPIRATORY: No cough, wheezing  or hemoptysis  CARDIOVASCULAR: see HPI  GASTROINTESTINAL: No abdominal pain. No nausea, vomiting, or hematemesis; No diarrhea or constipation. No melena or hematochezia.  GENITOURINARY: No dysuria, frequency, hematuria  NEUROLOGICAL: No headache.  No amaurosis.  No new focal motor or sensory disturbance  SKIN: No pruritis or rash   MUSCULOSKELETAL: No joint pain or swelling; No muscle, back, or extremity pain  PSYCHIATRIC: No depression, anxiety or difficulty sleeping  HEME/LYMPH: No easy bruising, or bleeding gums    Vitals:  T(C): 36.5 (08-13-18 @ 05:50), Max: 36.8 (08-12-18 @ 19:48)  HR: 77 (08-13-18 @ 05:50) (65 - 77)  BP: 117/69 (08-13-18 @ 05:50) (93/56 - 117/69)  BP(mean): --  RR: 18 (08-13-18 @ 05:50) (18 - 18)  SpO2: 95% (08-13-18 @ 05:50) (95% - 97%)  Wt(kg): --  Daily     Daily     Physical exam:  Appearance: NAD  Eyes: PERRL, EOMI  HENT: Normal oral mucosa, NC/AT  Cardiovascular: RRR, normal S1 and S2, soft holosystolic murmur, only trace pre-tibial edema, normal JVP  Respiratory: Clear to auscultation bilaterally  Gastrointestinal: Soft, non-tender, non-distended, BS+  Musculoskeletal: No clubbing, no joint deformity   Skin: No rashes, no ecchymoses, no cyanosis  Lymphatic: No lymphadenopathy  Neurologic: Non-focal  Psychiatry: AAOx3, mood & affect appropriate    I&O's Summary    12 Aug 2018 07:01  -  13 Aug 2018 07:00  --------------------------------------------------------  IN: 1470 mL / OUT: 1100 mL / NET: 370 mL                              8.3    3.48  )-----------( 67       ( 11 Aug 2018 08:42 )             25.1
HPI:  Feeling reasonably well, ambulatory without dizziness or lightheadedness, no dyspnea or palpitation.    Medications:  allopurinol 100 milliGRAM(s) Oral daily  aspirin enteric coated 81 milliGRAM(s) Oral daily  atorvastatin 40 milliGRAM(s) Oral at bedtime  bromocriptine Capsule 5 milliGRAM(s) Oral daily  carvedilol 12.5 milliGRAM(s) Oral every 12 hours  ceFAZolin   IVPB      ceFAZolin   IVPB 1000 milliGRAM(s) IV Intermittent every 12 hours  dextrose 40% Gel 15 Gram(s) Oral once PRN  dextrose 5% 1000 milliLiter(s) IV Continuous <Continuous>  dextrose 5%. 1000 milliLiter(s) IV Continuous <Continuous>  dextrose 50% Injectable 12.5 Gram(s) IV Push once  dextrose 50% Injectable 25 Gram(s) IV Push once  dextrose 50% Injectable 25 Gram(s) IV Push once  glucagon  Injectable 1 milliGRAM(s) IntraMuscular once PRN  heparin  Injectable 5000 Unit(s) SubCutaneous every 8 hours  insulin lispro (HumaLOG) corrective regimen sliding scale   SubCutaneous three times a day before meals  insulin lispro (HumaLOG) corrective regimen sliding scale   SubCutaneous at bedtime  insulin lispro Injectable (HumaLOG) 3 Unit(s) SubCutaneous three times a day before meals  levothyroxine 125 MICROGram(s) Oral <User Schedule>  levothyroxine 250 MICROGram(s) Oral <User Schedule>  vancomycin    Solution 125 milliGRAM(s) Oral every 6 hours    PMH/PSH/FH/SH:  Unchanged    ROS:  CONSTITUTIONAL: No fever or chills.  appetite is normal  EYES: no visual disturbances  ENMT:  No epistaxis  RESPIRATORY: No cough, wheezing  or hemoptysis  CARDIOVASCULAR: see HPI  GASTROINTESTINAL: No abdominal pain. No nausea, vomiting, or hematemesis; No diarrhea or constipation. No melena or hematochezia.  GENITOURINARY: No dysuria, frequency, hematuria  NEUROLOGICAL: No headache.  No amaurosis.  No new focal motor or sensory disturbance  SKIN: No pruritis or rash   MUSCULOSKELETAL: No joint pain or swelling; No muscle, back, or extremity pain  PSYCHIATRIC: No depression, anxiety or difficulty sleeping  HEME/LYMPH: No easy bruising, or bleeding gums    Vitals:  T(C): 36.4 (18 @ 06:19), Max: 36.7 (18 @ 16:01)  HR: 66 (18 @ 06:19) (59 - 78)  BP: 103/59 (18 @ 06:19) (91/53 - 110/66)  BP(mean): --  RR: 18 (18 @ 06:19) (18 - 19)  SpO2: 99% (18 @ 06:19) (96% - 100%)  Wt(kg): --  Daily     Daily Weight in k.3 (07 Aug 2018 06:19)    Physical exam:  Appearance: NAD  Eyes: PERRL, EOMI  HENT: Normal oral mucosa, NC/AT  Cardiovascular: RRR, normal S1 and S2, soft holosystolic murmur, trace pre-tibial edema, normal JVP  Respiratory: Clear to auscultation bilaterally  Gastrointestinal: Soft, non-tender, non-distended, BS+  Musculoskeletal: No clubbing, no joint deformity   Skin: No rashes, no ecchymoses, no cyanosis  Lymphatic: No lymphadenopathy  Neurologic: Non-focal  Psychiatry: AAOx3, mood & affect appropriate    I&O's Summary    06 Aug 2018 07:01  -  07 Aug 2018 07:00  --------------------------------------------------------  IN: 2550 mL / OUT: 1450 mL / NET: 1100 mL                              8.7    5.63  )-----------( 56       ( 06 Aug 2018 07:42 )             25.3         132<L>  |  97  |  54<H>  ----------------------------<  191<H>  4.2   |  20<L>  |  4.41<H>    Ca    7.9<L>      07 Aug 2018 06:46
HPI:  Feeling reasonably well, no chest pain, no dyspnea, no palpitations and diarrhea has resolved.    Medications:  allopurinol 100 milliGRAM(s) Oral daily  aspirin enteric coated 81 milliGRAM(s) Oral daily  atorvastatin 40 milliGRAM(s) Oral at bedtime  bromocriptine Capsule 5 milliGRAM(s) Oral daily  carvedilol 12.5 milliGRAM(s) Oral every 12 hours  dextrose 40% Gel 15 Gram(s) Oral once PRN  dextrose 5% 1000 milliLiter(s) IV Continuous <Continuous>  dextrose 5%. 1000 milliLiter(s) IV Continuous <Continuous>  dextrose 50% Injectable 12.5 Gram(s) IV Push once  dextrose 50% Injectable 25 Gram(s) IV Push once  dextrose 50% Injectable 25 Gram(s) IV Push once  glucagon  Injectable 1 milliGRAM(s) IntraMuscular once PRN  insulin lispro (HumaLOG) corrective regimen sliding scale   SubCutaneous three times a day before meals  insulin lispro (HumaLOG) corrective regimen sliding scale   SubCutaneous at bedtime  insulin lispro Injectable (HumaLOG) 3 Unit(s) SubCutaneous three times a day before meals  levothyroxine 125 MICROGram(s) Oral <User Schedule>  levothyroxine 250 MICROGram(s) Oral <User Schedule>  vancomycin    Solution 125 milliGRAM(s) Oral every 6 hours    PMH/PSH/FH/SH:  Unchanged    ROS:  CONSTITUTIONAL: No fever or chills.  appetite is normal  EYES: no visual disturbances  ENMT:  No epistaxis  RESPIRATORY: No cough, wheezing  or hemoptysis  CARDIOVASCULAR: see HPI  GASTROINTESTINAL: No abdominal pain. No nausea, vomiting, or hematemesis; No diarrhea or constipation. No melena or hematochezia.  GENITOURINARY: No dysuria, frequency, hematuria  NEUROLOGICAL: No headache.  No amaurosis.  No new focal motor or sensory disturbance  SKIN: No pruritis or rash   MUSCULOSKELETAL: No joint pain or swelling; No muscle, back, or extremity pain  PSYCHIATRIC: No depression, anxiety or difficulty sleeping  HEME/LYMPH: No easy bruising, or bleeding gums    Vitals:  T(C): 36.8 (18 @ 04:54), Max: 36.8 (18 @ 04:54)  HR: 58 (18 @ 04:54) (58 - 68)  BP: 97/56 (08-06-18 @ 04:54) (93/50 - 100/60)  BP(mean): --  RR: 18 (18 @ 04:54) (18 - 18)  SpO2: 95% (18 @ 04:54) (95% - 99%)  Wt(kg): --  Daily     Daily Weight in k (06 Aug 2018 04:05)    Physical exam:  Appearance: NAD  Eyes: PERRL, EOMI  HENT: Normal oral mucosa, NC/AT  Cardiovascular: RRR, normal S1 and S2, soft holosystolic murmur, no edema, normal JVP  Respiratory: Clear to auscultation bilaterally  Gastrointestinal: Soft, non-tender, non-distended, BS+  Musculoskeletal: No clubbing, no joint deformity   Skin: No rashes, no ecchymoses, no cyanosis  Lymphatic: No lymphadenopathy  Neurologic: Non-focal  Psychiatry: AAOx3, mood & affect appropriate    I&O's Summary    05 Aug 2018 07:01  -  06 Aug 2018 07:00  --------------------------------------------------------  IN: 2475 mL / OUT: 425 mL / NET: 2050 mL                              8.9    6.75  )-----------( 54       ( 05 Aug 2018 08:36 )             26.0     08-06    127<L>  |  97  |  52<H>  ----------------------------<  212<H>  4.4   |  16<L>  |  4.25<H>    Ca    7.7<L>      06 Aug 2018 06:00
HPI:  Feeling well, no complaints, specifically no dyspnea and no palpitations.    Medications:  allopurinol 100 milliGRAM(s) Oral daily  aspirin enteric coated 81 milliGRAM(s) Oral daily  atorvastatin 40 milliGRAM(s) Oral at bedtime  bromocriptine Capsule 5 milliGRAM(s) Oral daily  carvedilol 12.5 milliGRAM(s) Oral every 12 hours  ceFAZolin   IVPB      ceFAZolin   IVPB 1000 milliGRAM(s) IV Intermittent every 12 hours  chlorhexidine 4% Liquid 1 Application(s) Topical once  Dakins Solution - 1/4 Strength 1 Application(s) Topical daily  dextrose 40% Gel 15 Gram(s) Oral once PRN  dextrose 5%. 1000 milliLiter(s) IV Continuous <Continuous>  dextrose 50% Injectable 12.5 Gram(s) IV Push once  dextrose 50% Injectable 25 Gram(s) IV Push once  dextrose 50% Injectable 25 Gram(s) IV Push once  furosemide    Tablet 40 milliGRAM(s) Oral daily  glucagon  Injectable 1 milliGRAM(s) IntraMuscular once PRN  heparin  Injectable 5000 Unit(s) SubCutaneous every 8 hours  insulin lispro (HumaLOG) corrective regimen sliding scale   SubCutaneous three times a day before meals  insulin lispro (HumaLOG) corrective regimen sliding scale   SubCutaneous at bedtime  insulin lispro Injectable (HumaLOG) 4 Unit(s) SubCutaneous three times a day before meals  levothyroxine 125 MICROGram(s) Oral <User Schedule>  levothyroxine 250 MICROGram(s) Oral <User Schedule>  sodium chloride 0.9%. 1000 milliLiter(s) IV Continuous <Continuous>  spironolactone 25 milliGRAM(s) Oral two times a day  vancomycin    Solution 125 milliGRAM(s) Oral every 6 hours    PMH/PSH/FH/SH:  Unchanged    ROS:  CONSTITUTIONAL: No fever or chills.  appetite is normal  EYES: no visual disturbances  ENMT:  No epistaxis  RESPIRATORY: No cough, wheezing  or hemoptysis  CARDIOVASCULAR: see HPI  GASTROINTESTINAL: No abdominal pain. No nausea, vomiting, or hematemesis; No diarrhea or constipation. No melena or hematochezia.  GENITOURINARY: No dysuria, frequency, hematuria  NEUROLOGICAL: No headache.  No amaurosis.  No new focal motor or sensory disturbance  SKIN: No pruritis or rash   MUSCULOSKELETAL: No joint pain or swelling; No muscle, back, or extremity pain  PSYCHIATRIC: No depression, anxiety or difficulty sleeping  HEME/LYMPH: No easy bruising, or bleeding gums    Vitals:  T(C): 36.8 (08-10-18 @ 05:36), Max: 36.8 (18 @ 16:17)  HR: 60 (08-10-18 @ 05:36) (59 - 66)  BP: 111/69 (08-10-18 @ 05:36) (102/59 - 112/70)  BP(mean): --  RR: 18 (08-10-18 @ 05:36) (18 - 18)  SpO2: 98% (08-10-18 @ 05:36) (97% - 100%)  Wt(kg): --  Daily     Daily Weight in k.4 (10 Aug 2018 04:15)    Physical exam:  Appearance: NAD  Eyes: PERRL, EOMI  HENT: Normal oral mucosa, NC/AT  Cardiovascular: RRR, normal S1 and S2, soft holosystolic murmur, increasing left and right pre-tibial edema now 1-2+, normal JVP  Respiratory: Clear to auscultation bilaterally  Gastrointestinal: Soft, non-tender, non-distended, BS+  Musculoskeletal: No clubbing, no joint deformity   Skin: No rashes, no ecchymoses, no cyanosis  Lymphatic: No lymphadenopathy  Neurologic: Non-focal  Psychiatry: AAOx3, mood & affect appropriate    I&O's Summary    09 Aug 2018 07:01  -  10 Aug 2018 07:00  --------------------------------------------------------  IN: 750 mL / OUT: 1900 mL / NET: -1150 mL                              8.2    3.39  )-----------( 65       ( 09 Aug 2018 07:25 )             24.3     08-10    134<L>  |  100  |  28<H>  ----------------------------<  128<H>  4.1   |  23  |  1.67<H>    Ca    8.2<L>      10 Aug 2018 06:45  Mg     1.5     
HPI:  No specific complaints except continued diarrhea. Denies dyspnea, denies palpitations, dizziness or lightheadedness. Ambulating in room.    Medications:  aspirin enteric coated 81 milliGRAM(s) Oral daily  atorvastatin 40 milliGRAM(s) Oral at bedtime  bromocriptine Capsule 5 milliGRAM(s) Oral daily  carvedilol 25 milliGRAM(s) Oral every 12 hours  dextrose 40% Gel 15 Gram(s) Oral once PRN  dextrose 5%. 1000 milliLiter(s) IV Continuous <Continuous>  dextrose 50% Injectable 12.5 Gram(s) IV Push once  dextrose 50% Injectable 25 Gram(s) IV Push once  dextrose 50% Injectable 25 Gram(s) IV Push once  furosemide    Tablet 40 milliGRAM(s) Oral two times a day  glucagon  Injectable 1 milliGRAM(s) IntraMuscular once PRN  insulin lispro (HumaLOG) corrective regimen sliding scale   SubCutaneous three times a day before meals  insulin lispro (HumaLOG) corrective regimen sliding scale   SubCutaneous at bedtime  levothyroxine 125 MICROGram(s) Oral <User Schedule>  levothyroxine 250 MICROGram(s) Oral <User Schedule>  lisinopril 2.5 milliGRAM(s) Oral daily  spironolactone 25 milliGRAM(s) Oral two times a day  vancomycin    Solution 125 milliGRAM(s) Oral every 6 hours    PMH/PSH/FH/SH:  Unchanged    ROS:  Constitutional: [ ] Fever [ ] Chills [ ] Fatigue [ ] Weight change   HEENT: [ ] Blurred vision [ ] Eye Pain [ ] Headache [ ] Runny nose [ ] Sore Throat   Respiratory: [ ] Cough [ ] Wheezing [ ] Shortness of breath  Cardiovascular: [ ] Chest Pain [ ] Palpitations [ ] RODRIGUEZ [ ] PND [ ] Orthopnea  Gastrointestinal: [ ] Abdominal Pain [x ] Diarrhea [ ] Constipation [ ] Hemorrhoids [ ] Nausea [ ] Vomiting  Genitourinary: [ ] Nocturia [ ] Dysuria [ ] Incontinence  Extremities: [ ] Swelling [x] Joint Pain  Neurologic: [ ] Focal deficit [ ] Paresthesias [ ] Syncope  Lymphatic: [ ] Swelling [ ] Lymphadenopathy   Skin: [ ] Rash [ ] Ecchymoses [ ] Wounds [ ] Lesions  Psychiatry: [ ] Depression [ ] Suicidal/Homicidal Ideation [ ] Anxiety [ ] Sleep Disturbances  [x] 10 point review of systems is otherwise negative except as mentioned above            [ ]Unable to obtain      Vitals:  T(C): 36.6 (08-03-18 @ 10:04), Max: 36.9 (08-02-18 @ 11:38)  HR: 70 (08-03-18 @ 10:04) (70 - 77)  BP: 90/48 (08-03-18 @ 10:04) (86/58 - 99/78)  BP(mean): --  RR: 18 (08-03-18 @ 10:04) (18 - 18)  SpO2: 98% (08-03-18 @ 10:04) (96% - 99%)  Wt(kg): --  Daily     Daily     Physical exam:  Appearance: NAD  Eyes: PERRL, EOMI  HENT: Normal oral [place], NC/AT  Cardiovascular: normal S1 and S2, RRR, soft holosystolic murmur, no edema, normal JVP  Respiratory: Clear to auscultation bilaterally  Gastrointestinal: Soft, non-tender, non-distended, BS+  Musculoskeletal: No clubbing, no joint deformity   Skin: No rashes, no ecchymoses, no cyanosis  Lymphatic: No lymphadenopathy  Neurologic: Non-focal  Psychiatry: AAOx3, mood & affect appropriate    I&O's Summary    02 Aug 2018 07:01  -  03 Aug 2018 07:00  --------------------------------------------------------  IN: 600 mL / OUT: 300 mL / NET: 300 mL                              9.9    11.78 )-----------( 59       ( 02 Aug 2018 07:40 )             30.1     08-03    132<L>  |  103  |  40<H>  ----------------------------<  291<H>  4.6   |  17<L>  |  1.87<H>    Ca    8.0<L>      03 Aug 2018 10:15    TPro  7.1  /  Alb  3.2<L>  /  TBili  1.0  /  DBili  x   /  AST  21  /  ALT  13  /  AlkPhos  112  08-01    PT/INR - ( 01 Aug 2018 12:00 )   PT: 15.5 sec;   INR: 1.41 ratio         PTT - ( 01 Aug 2018 12:00 )  PTT:27.8 sec
NO new events or symptoms    Vital Signs Last 24 Hrs  T(C): 36.7 (12 Aug 2018 05:53), Max: 37 (11 Aug 2018 19:50)  T(F): 98 (12 Aug 2018 05:53), Max: 98.6 (11 Aug 2018 19:50)  HR: 62 (12 Aug 2018 07:33) (60 - 70)  BP: 101/60 (12 Aug 2018 07:33) (100/62 - 113/70)  BP(mean): --  RR: 18 (12 Aug 2018 07:33) (18 - 18)  SpO2: 97% (12 Aug 2018 07:33) (97% - 100%)    GENERAL: NAD, Comfortable  HEAD:  Atraumatic, Normocephalic  EYES: EOMI, PERRLA, conjunctiva and sclera clear  NECK: Supple, No JVD  CHEST/LUNG: Clear to auscultation bilaterally; No wheeze  HEART: Regular rate and rhythm; No murmurs, rubs, or gallops  ABDOMEN: Soft, Nontender, Nondistended; Bowel sounds present  Neuro: AAO x 3, no focal deficit, 5/5 b/l extremities  EXTREMITIES:  2+ Peripheral Pulses, 1+ b/l LE edema, right foot, third toe, small ulcer, healing. small plantar hardened skin patch. no pus, no drainage. dressing d/c/i.   SKIN: No rashes or lesions    LABS:                        8.3    3.48  )-----------( 67       ( 11 Aug 2018 08:42 )             25.1     08-11    137  |  101  |  23  ----------------------------<  123<H>  3.9   |  26  |  1.46<H>    Ca    8.4      11 Aug 2018 07:22        CAPILLARY BLOOD GLUCOSE      POCT Blood Glucose.: 142 mg/dL (12 Aug 2018 08:31)  POCT Blood Glucose.: 226 mg/dL (11 Aug 2018 21:30)  POCT Blood Glucose.: 254 mg/dL (11 Aug 2018 17:16)  POCT Blood Glucose.: 201 mg/dL (11 Aug 2018 12:36)
No acute CP or SOB    Vital Signs Last 24 Hrs  T(C): 36.6 (08 Aug 2018 05:26), Max: 36.8 (07 Aug 2018 16:08)  T(F): 97.9 (08 Aug 2018 05:26), Max: 98.3 (07 Aug 2018 23:43)  HR: 73 (08 Aug 2018 10:10) (64 - 73)  BP: 103/62 (08 Aug 2018 10:10) (97/60 - 111/63)  BP(mean): --  RR: 17 (08 Aug 2018 05:26) (17 - 189)  SpO2: 98% (08 Aug 2018 05:26) (97% - 99%)    GENERAL: NAD, Comfortable  HEAD:  Atraumatic, Normocephalic  EYES: EOMI, PERRLA, conjunctiva and sclera clear  NECK: Supple, No JVD  CHEST/LUNG: Clear to auscultation bilaterally; No wheeze  HEART: Regular rate and rhythm; No murmurs, rubs, or gallops  ABDOMEN: Soft, Nontender, Nondistended; Bowel sounds present  Neuro: AAO x 3, no focal deficit, 5/5 b/l extremities  EXTREMITIES:  2+ Peripheral Pulses, No clubbing, cyanosis, or edema, right foot, third toe, small ulcer, healing. small plantar hardened skin patch. no pus, no drainage. dressing d/c/i.   SKIN: No rashes or lesions    LABS:                        8.9    4.11  )-----------( 63       ( 08 Aug 2018 07:52 )             26.0     08-08    133<L>  |  97  |  48<H>  ----------------------------<  181<H>  4.1   |  23  |  3.38<H>    Ca    7.9<L>      08 Aug 2018 06:40        CAPILLARY BLOOD GLUCOSE      POCT Blood Glucose.: 290 mg/dL (08 Aug 2018 12:30)  POCT Blood Glucose.: 199 mg/dL (08 Aug 2018 08:46)  POCT Blood Glucose.: 257 mg/dL (07 Aug 2018 21:46)  POCT Blood Glucose.: 248 mg/dL (07 Aug 2018 18:17)  POCT Blood Glucose.: 255 mg/dL (07 Aug 2018 17:04)
Patient is a 68y old  Male who presents with a chief complaint of diarrhea and weakness (02 Aug 2018 16:31)      SUBJECTIVE / OVERNIGHT EVENTS:  diarrhea resolving. Stool are formed.  the wife at bedside.  heel effusion burst opened. fluid sent. +gout/crystals  pain controlled.  Cr trending up.  started on bicarb drip.  no cp, no sob, no n/v/d. no abdominal pain.  no headache, no dizziness.       Vital Signs Last 24 Hrs  T(C): 36.6 (05 Aug 2018 13:58), Max: 36.8 (04 Aug 2018 20:33)  T(F): 97.8 (05 Aug 2018 13:58), Max: 98.2 (04 Aug 2018 20:33)  HR: 65 (05 Aug 2018 13:58) (60 - 74)  BP: 97/60 (05 Aug 2018 13:58) (90/54 - 97/60)  BP(mean): --  RR: 18 (05 Aug 2018 13:58) (18 - 18)  SpO2: 99% (05 Aug 2018 13:58) (95% - 99%)  I&O's Summary    04 Aug 2018 07:01  -  05 Aug 2018 07:00  --------------------------------------------------------  IN: 2895 mL / OUT: 0 mL / NET: 2895 mL    05 Aug 2018 07:01  -  05 Aug 2018 17:16  --------------------------------------------------------  IN: 1155 mL / OUT: 0 mL / NET: 1155 mL      PHYSICAL EXAM:  GENERAL: NAD, Comfortable  HEAD:  Atraumatic, Normocephalic  EYES: EOMI, PERRLA, conjunctiva and sclera clear  NECK: Supple, No JVD  CHEST/LUNG: Clear to auscultation bilaterally; No wheeze  HEART: Regular rate and rhythm; No murmurs, rubs, or gallops  ABDOMEN: Soft, Nontender, Nondistended; Bowel sounds present  Neuro: AAO x 3, no focal deficit, 5/5 b/l extremities  EXTREMITIES:  2+ Peripheral Pulses, No clubbing, cyanosis, or edema, right foot, third toe, small ulcer, healing. small plantar hardened skin patch. no pus, no drainage. dressing d/c/i.   SKIN: No rashes or lesions    LABS:                        8.9    6.75  )-----------( 54       ( 05 Aug 2018 08:36 )             26.0     08-05    130<L>  |  101  |  48<H>  ----------------------------<  193<H>  4.4   |  13<L>  |  3.94<H>    Ca    7.9<L>      05 Aug 2018 06:59        CAPILLARY BLOOD GLUCOSE      POCT Blood Glucose.: 334 mg/dL (05 Aug 2018 12:12)  POCT Blood Glucose.: 208 mg/dL (05 Aug 2018 08:30)  POCT Blood Glucose.: 279 mg/dL (04 Aug 2018 21:51)  POCT Blood Glucose.: 277 mg/dL (04 Aug 2018 17:33)            RADIOLOGY & ADDITIONAL TESTS:    Imaging Personally Reviewed:  [x] YES  [ ] NO    Consultant(s) Notes Reviewed:  [x] YES  [ ] NO      MEDICATIONS  (STANDING):  allopurinol 100 milliGRAM(s) Oral daily  aspirin enteric coated 81 milliGRAM(s) Oral daily  atorvastatin 40 milliGRAM(s) Oral at bedtime  bromocriptine Capsule 5 milliGRAM(s) Oral daily  carvedilol 25 milliGRAM(s) Oral every 12 hours  dextrose 5% 1000 milliLiter(s) (60 mL/Hr) IV Continuous <Continuous>  dextrose 5%. 1000 milliLiter(s) (50 mL/Hr) IV Continuous <Continuous>  dextrose 50% Injectable 12.5 Gram(s) IV Push once  dextrose 50% Injectable 25 Gram(s) IV Push once  dextrose 50% Injectable 25 Gram(s) IV Push once  insulin lispro (HumaLOG) corrective regimen sliding scale   SubCutaneous three times a day before meals  insulin lispro (HumaLOG) corrective regimen sliding scale   SubCutaneous at bedtime  insulin lispro Injectable (HumaLOG) 3 Unit(s) SubCutaneous three times a day before meals  levothyroxine 125 MICROGram(s) Oral <User Schedule>  levothyroxine 250 MICROGram(s) Oral <User Schedule>  vancomycin    Solution 125 milliGRAM(s) Oral every 6 hours    MEDICATIONS  (PRN):  dextrose 40% Gel 15 Gram(s) Oral once PRN Blood Glucose LESS THAN 70 milliGRAM(s)/deciliter  glucagon  Injectable 1 milliGRAM(s) IntraMuscular once PRN Glucose LESS THAN 70 milligrams/deciliter      Care Discussed with Consultants/Other Providers [x] YES  [ ] NO    HEALTH ISSUES - PROBLEM Dx:  Metabolic acidosis: Metabolic acidosis  Hyponatremia: Hyponatremia  CKD (chronic kidney disease), stage III: CKD (chronic kidney disease), stage III  Acute kidney injury superimposed on CKD: Acute kidney injury superimposed on CKD  Prophylactic measure: Prophylactic measure  Hyperkalemia: Hyperkalemia  HLD (hyperlipidemia): HLD (hyperlipidemia)  Type 2 diabetes mellitus: Type 2 diabetes mellitus  Chronic systolic congestive heart failure: Chronic systolic congestive heart failure  CAD (coronary artery disease) of bypass graft: CAD (coronary artery disease) of bypass graft  Acute on chronic kidney failure: Acute on chronic kidney failure  Right foot ulcer, with unspecified severity: Right foot ulcer, with unspecified severity  C. difficile colitis: C. difficile colitis
Patient is a 68y old  Male who presents with a chief complaint of diarrhea and weakness (02 Aug 2018 16:31)      SUBJECTIVE / OVERNIGHT EVENTS:  feels okay. still having "a lot of diarrhea every 2 hours"  no chest pain. no short of breath.  BP labile, but no HA/dizziness.   no n/v.  no abd pain.       Vital Signs Last 24 Hrs  T(C): 36.6 (03 Aug 2018 13:15), Max: 36.8 (03 Aug 2018 06:30)  T(F): 97.9 (03 Aug 2018 13:15), Max: 98.2 (03 Aug 2018 06:30)  HR: 75 (03 Aug 2018 13:15) (70 - 75)  BP: 95/61 (03 Aug 2018 13:15) (86/58 - 95/61)  BP(mean): --  RR: 18 (03 Aug 2018 13:15) (18 - 18)  SpO2: 99% (03 Aug 2018 13:15) (98% - 99%)  I&O's Summary    02 Aug 2018 07:01  -  03 Aug 2018 07:00  --------------------------------------------------------  IN: 600 mL / OUT: 300 mL / NET: 300 mL    03 Aug 2018 07:01  -  03 Aug 2018 16:26  --------------------------------------------------------  IN: 720 mL / OUT: 0 mL / NET: 720 mL        PHYSICAL EXAM:  GENERAL: NAD, Comfortable  HEAD:  Atraumatic, Normocephalic  EYES: EOMI, PERRLA, conjunctiva and sclera clear  NECK: Supple, No JVD  CHEST/LUNG: Clear to auscultation bilaterally; No wheeze  HEART: Regular rate and rhythm; No murmurs, rubs, or gallops  ABDOMEN: Soft, Nontender, Nondistended; Bowel sounds present  Neuro: AAO x 3, no focal deficit, 5/5 b/l extremities  EXTREMITIES:  2+ Peripheral Pulses, No clubbing, cyanosis, or edema, right foot, third toe, small ulcer, healing. small plantar hardened skin patch. no pus, no drainage. dressing d/c/i.   SKIN: No rashes or lesions      LABS:                        9.9    11.78 )-----------( 59       ( 02 Aug 2018 07:40 )             30.1     08-03    132<L>  |  103  |  40<H>  ----------------------------<  291<H>  4.6   |  17<L>  |  1.87<H>    Ca    8.0<L>      03 Aug 2018 10:15        CAPILLARY BLOOD GLUCOSE      POCT Blood Glucose.: 300 mg/dL (03 Aug 2018 12:21)  POCT Blood Glucose.: 224 mg/dL (03 Aug 2018 08:22)  POCT Blood Glucose.: 228 mg/dL (02 Aug 2018 23:30)  POCT Blood Glucose.: 247 mg/dL (02 Aug 2018 17:26)        Urinalysis Basic - ( 02 Aug 2018 02:46 )    Color: Yellow / Appearance: Clear / S.019 / pH: x  Gluc: x / Ketone: Trace  / Bili: Negative / Urobili: Negative   Blood: x / Protein: Trace / Nitrite: Negative   Leuk Esterase: Negative / RBC: x / WBC 3-5 /HPF   Sq Epi: x / Non Sq Epi: x / Bacteria: x        RADIOLOGY & ADDITIONAL TESTS:    Imaging Personally Reviewed:  [x] YES  [ ] NO    Consultant(s) Notes Reviewed:  [x] YES  [ ] NO      MEDICATIONS  (STANDING):  acetylcysteine  Oral Solution 1200 milliGRAM(s) Oral every 12 hours  aspirin enteric coated 81 milliGRAM(s) Oral daily  atorvastatin 40 milliGRAM(s) Oral at bedtime  bromocriptine Capsule 5 milliGRAM(s) Oral daily  carvedilol 25 milliGRAM(s) Oral every 12 hours  dextrose 5%. 1000 milliLiter(s) (50 mL/Hr) IV Continuous <Continuous>  dextrose 50% Injectable 12.5 Gram(s) IV Push once  dextrose 50% Injectable 25 Gram(s) IV Push once  dextrose 50% Injectable 25 Gram(s) IV Push once  insulin lispro (HumaLOG) corrective regimen sliding scale   SubCutaneous three times a day before meals  insulin lispro (HumaLOG) corrective regimen sliding scale   SubCutaneous at bedtime  levothyroxine 125 MICROGram(s) Oral <User Schedule>  levothyroxine 250 MICROGram(s) Oral <User Schedule>  sodium chloride 0.9%. 1000 milliLiter(s) (60 mL/Hr) IV Continuous <Continuous>  vancomycin    Solution 125 milliGRAM(s) Oral every 6 hours    MEDICATIONS  (PRN):  dextrose 40% Gel 15 Gram(s) Oral once PRN Blood Glucose LESS THAN 70 milliGRAM(s)/deciliter  glucagon  Injectable 1 milliGRAM(s) IntraMuscular once PRN Glucose LESS THAN 70 milligrams/deciliter      Care Discussed with Consultants/Other Providers [x] YES  [ ] NO    HEALTH ISSUES - PROBLEM Dx:  Metabolic acidosis: Metabolic acidosis  Hyponatremia: Hyponatremia  CKD (chronic kidney disease), stage III: CKD (chronic kidney disease), stage III  Acute kidney injury superimposed on CKD: Acute kidney injury superimposed on CKD  Prophylactic measure: Prophylactic measure  Hyperkalemia: Hyperkalemia  HLD (hyperlipidemia): HLD (hyperlipidemia)  Type 2 diabetes mellitus: Type 2 diabetes mellitus  Chronic systolic congestive heart failure: Chronic systolic congestive heart failure  CAD (coronary artery disease) of bypass graft: CAD (coronary artery disease) of bypass graft  Acute on chronic kidney failure: Acute on chronic kidney failure  Right foot ulcer, with unspecified severity: Right foot ulcer, with unspecified severity  C. difficile colitis: C. difficile colitis
Patient is a 68y old  Male who presents with a chief complaint of diarrhea and weakness (02 Aug 2018 16:31)    HPI:  68 y.o male with PMHx of CAD s/p CABG & stenting, systolic CHF ( Ef 37%) s/p ICD, Afib, Gout, idiopathic cirrhosis, thrombocytopenia, CKD stage 3, DM2, HLD, pituitary adenoma and recent right knee MSSA prepatellar bursitis presenting with diarrhea, weakness, and right foot ulcer. He started having diarrhea 2 weeks ago, with 6-8 episodes of watery non-bloody stools per day. Associated with generalized fatigue and decreased appetite. He attributed these symptoms to naproxen/ colchicine for his gout, subsequently stopped taking them in mid-July.  He was recently discharged from the hospital (5/23) with IV antibiotics for right knee septic bursitis, completing a full 3 weeks antibiotic course with 2 weeks of Cefazolin on 6/8/18. Denies any fevers, nausea/vomiting, chest pain, SOB, headaches, travel history and pus in stool.    He was also seen today by his podiatrist Dr. Tristan and had his right 2nd toe, heel ulcer debrided due to with mild yellow-drainage s/p debridement in office.     In the ED he received vanco, zosyn, Ns 500 ml and was found + Cdiff, remained hemodynamically stable (01 Aug 2018 17:54)  Po Vanco has been initiated  At present he notes discomfort with taking food - it induces nearly immediate GI distress and  diarrhea.  He never had Cdiff before.      PAST MEDICAL & SURGICAL HISTORY:  Hypothyroidism  PVD (peripheral vascular disease)  History of hyperprolactinemia  Hepatic cirrhosis, unspecified hepatic cirrhosis type  Anemia  ICD (implantable cardioverter-defibrillator) in place: [employer], Model [id number] , last interrogation 4/2017  Sleep apnea: not using CPAP  History of osteomyelitis: 2015, right foot 2nd toe  Presence of stent in coronary artery: 2 stents  Heart failure with reduced left ventricular function: EF 37%  Ischemic cardiomyopathy  Pituitary adenoma: as per patient chronic, no changes , recently restarted follow up with endocrinologist ( note in allscripts )  Coronary artery disease  Thrombocytopenia: Chronic  HLD (hyperlipidemia)  HTN (hypertension)  DM (diabetes mellitus): type 2, Hg A1C 8.2 % ( 7/10/17)  AICD lead malfunction: history of  History of amputation: right foot, 2nd toe, osteo 2015  AICD (automatic cardioverter/defibrillator) present: placement in (2006)  Stented coronary artery: RCA (1999)  Coronary atherosclerosis of artery bypass graft: CABG X 4 (1986)      Social history:     FAMILY HISTORY:  Family history of MI (myocardial infarction) (Father)      REVIEW OF SYSTEMS:  CONSTITUTIONAL: No weakness, fevers or chills  EYES/ENT: No visual changes;  No vertigo or throat pain   NECK: No pain or stiffness  RESPIRATORY: No cough, wheezing, hemoptysis; No shortness of breath  CARDIOVASCULAR: No chest pain or palpitations  GASTROINTESTINAL: No abdominal or epigastric pain. No nausea, vomiting, or hematemesis;+ diarrhea or constipation. No melena or hematochezia.  GENITOURINARY: No dysuria, frequency or hematuria  NEUROLOGICAL: No numbness or weakness  SKIN: No itching, burning, rashes, or lesions   Stiffness and discomfort in right achilles tendon  All other review of systems is negative unless indicated above    Allergies  No Known Allergies        Antimicrobials:  vancomycin    Solution 125 milliGRAM(s) Oral every 6 hours      Vital Signs Last 24 Hrs  T(C): 36.6 (02 Aug 2018 17:05), Max: 37 (01 Aug 2018 19:07)  T(F): 97.9 (02 Aug 2018 17:05), Max: 98.6 (01 Aug 2018 19:07)  HR: 71 (02 Aug 2018 17:05) (71 - 89)  BP: 93/56 (02 Aug 2018 17:05) (93/56 - 118/74)  BP(mean): --  RR: 18 (02 Aug 2018 17:05) (18 - 18)  SpO2: 98% (02 Aug 2018 17:05) (96% - 99%)    PHYSICAL EXAM:  General: thin,  NAD, Non-toxic  Neurology: A&Ox3, nonfocal  Respiratory: Clear to auscultation bilaterally  CV: RRR, S1S2, no murmurs, rubs or gallops  Abdominal: Soft, Non-tender, non-distended,   Extremities: No edema, + peripheral pulses, no right knee swelling/erythema  disolored LE consistent with arterial insufficiency, dressing over foot clean and dry  Line Sites: Clear  Skin: No rash                      9.9    11.78 )-----------( 59       ( 02 Aug 2018 07:40 )             30.1   WBC Count: 11.78 (08-02 @ 07:40)  WBC Count: 14.1 (08-01 @ 12:00)       08-02    132<L>  |  103  |  38<H>  ----------------------------<  200<H>  4.6   |  15<L>  |  1.46<H>    Creatinine: 1.46 (08-02 @ 06:42)    Creatinine: 1.66 (08-01 @ 20:34)    Creatinine: 1.93 (08-01 @ 12:00)     Ca    8.3<L>      02 Aug 2018 06:42    TPro  7.1  /  Alb  3.2<L>  /  TBili  1.0  /  DBili  x   /  AST  21  /  ALT  13  /  AlkPhos  112  08-01        MICROBIOLOGY:  .Stool Stool  08-01-18 --  --  --      C. difficile GDH &amp; toxins A/B by EIA . (08.01.18 @ 14:07)    Clostridium difficile GDH Toxins A&amp;B, EIA:   Positive    Clostridium difficile GDH Interpretation: Positive for toxigenic C. Difficile.  This specimen is positive for C.  Difficile glutamate dehydrogenase (GDH) antigen and positive for C.  Difficile Toxins A & B, by EIA.  GDH is a highly sensitive marker for C.  Difficile that is produced in largeamounts by all C. Difficile strains,  both toxigenic and nontoxigenic.  This assay has not been validated as a  test of cure.  The results of this assay should always be interpreted in  conjunction with patient's clinical history.      .Stool Feces  08-01-18   No enteric pathogens to date: Final culture pending  --  --      .Blood Blood  08-01-18   No growth to date.  --  --      Radiology:  < from: Xray Foot AP + Lateral + Oblique, Right (08.01.18 @ 16:29) >  IMPRESSION:  1.  No radiographic evidence of advanced osteomyelitis.  2.  No subcutaneous emphysema.    < end of copied text >      Gopi Grace MD; Division of Infectious Disease; Pager: [phone]; nights and weekends: [phone]
Patient is a 68y old  Male who presents with a chief complaint of diarrhea and weakness (02 Aug 2018 16:31)    INTERVAL HPI/OVERNIGHT EVENTS:  Patient seen and evaluated at bedside.  Pt is resting comfortable in NAD. Denies N/V/F/C.  Pain rated at 0/10    Allergies    No Known Allergies    Intolerances    Vital Signs Last 24 Hrs  T(C): 36.7 (12 Aug 2018 05:53), Max: 37 (11 Aug 2018 19:50)  T(F): 98 (12 Aug 2018 05:53), Max: 98.6 (11 Aug 2018 19:50)  HR: 62 (12 Aug 2018 07:33) (60 - 70)  BP: 101/60 (12 Aug 2018 07:33) (100/62 - 113/70)  BP(mean): --  RR: 18 (12 Aug 2018 07:33) (18 - 18)  SpO2: 97% (12 Aug 2018 07:33) (97% - 100%)    LABS:                        8.3    3.48  )-----------( 67       ( 11 Aug 2018 08:42 )             25.1     08-11    137  |  101  |  23  ----------------------------<  123<H>  3.9   |  26  |  1.46<H>    Ca    8.4      11 Aug 2018 07:22    CAPILLARY BLOOD GLUCOSE    POCT Blood Glucose.: 142 mg/dL (12 Aug 2018 08:31)  POCT Blood Glucose.: 226 mg/dL (11 Aug 2018 21:30)  POCT Blood Glucose.: 254 mg/dL (11 Aug 2018 17:16)  POCT Blood Glucose.: 201 mg/dL (11 Aug 2018 12:36)    Lower Extremity Physical Exam:  sutures intact no pus minimal tophi decreased edema and erythema     RADIOLOGY & ADDITIONAL TESTS:
Patient seen and examined  no complaints      No Known Allergies    Hospital Medications:   MEDICATIONS  (STANDING):  allopurinol 100 milliGRAM(s) Oral daily  aspirin enteric coated 81 milliGRAM(s) Oral daily  atorvastatin 40 milliGRAM(s) Oral at bedtime  bromocriptine Capsule 5 milliGRAM(s) Oral daily  carvedilol 12.5 milliGRAM(s) Oral every 12 hours  ceFAZolin   IVPB      ceFAZolin   IVPB 1000 milliGRAM(s) IV Intermittent every 12 hours  Dakins Solution - 1/4 Strength 1 Application(s) Topical daily  dextrose 5%. 1000 milliLiter(s) (50 mL/Hr) IV Continuous <Continuous>  dextrose 50% Injectable 12.5 Gram(s) IV Push once  dextrose 50% Injectable 25 Gram(s) IV Push once  dextrose 50% Injectable 25 Gram(s) IV Push once  heparin  Injectable 5000 Unit(s) SubCutaneous every 8 hours  insulin lispro (HumaLOG) corrective regimen sliding scale   SubCutaneous three times a day before meals  insulin lispro (HumaLOG) corrective regimen sliding scale   SubCutaneous at bedtime  insulin lispro Injectable (HumaLOG) 4 Unit(s) SubCutaneous three times a day before meals  levothyroxine 125 MICROGram(s) Oral <User Schedule>  levothyroxine 250 MICROGram(s) Oral <User Schedule>  vancomycin    Solution 125 milliGRAM(s) Oral every 6 hours      VITALS:  T(F): 97.9 (18 @ 14:22), Max: 98.3 (18 @ 23:43)  HR: 73 (18 @ 14:22)  BP: 104/64 (18 @ 14:22)  RR: 18 (18 @ 14:22)  SpO2: 100% (18 @ 14:22)  Wt(kg): --     @ 07:01  -   @ 07:00  --------------------------------------------------------  IN: 1420 mL / OUT: 1950 mL / NET: -530 mL     @ 07:01  -   @ 15:46  --------------------------------------------------------  IN: 920 mL / OUT: 300 mL / NET: 620 mL        PHYSICAL EXAM:  Constitutional: NAD  HEENT: anicteric sclera, oropharynx clear, MMM  Neck: No JVD  Respiratory: CTAB, no wheezes, rales or rhonchi  Cardiovascular: S1, S2, RRR  Gastrointestinal: BS+, soft, NT/ND  Extremities: 1+ peripheral edema  Neurological: A/O x 3, no focal deficits      LABS:      133<L>  |  97  |  48<H>  ----------------------------<  181<H>  4.1   |  23  |  3.38<H>    Ca    7.9<L>      08 Aug 2018 06:40      Creatinine Trend: 3.38 <--, 4.41 <--, 4.25 <--, 3.94 <--, 2.68 <--, 1.87 <--, 1.46 <--, 1.66 <--                        8.9    4.11  )-----------( 63       ( 08 Aug 2018 07:52 )             26.0     Urine Studies:  Urinalysis Basic - ( 02 Aug 2018 02:46 )    Color: Yellow / Appearance: Clear / S.019 / pH:   Gluc:  / Ketone: Trace  / Bili: Negative / Urobili: Negative   Blood:  / Protein: Trace / Nitrite: Negative   Leuk Esterase: Negative / RBC:  / WBC 3-5 /HPF   Sq Epi:  / Non Sq Epi:  / Bacteria:       Osmolality, Random Urine: 435 mos/kg ( @ 21:26)  Calcium, Random Urine: 1.0 mg/dL ( @ 21:26)  Sodium, Random Urine: 23 mmol/L ( @ 18:35)  Creatinine, Random Urine: 158 mg/dL ( @ 18:35)    RADIOLOGY & ADDITIONAL STUDIES:
Patient seen and examined  no complaints    No Known Allergies    Hospital Medications:   MEDICATIONS  (STANDING):  allopurinol 100 milliGRAM(s) Oral daily  aspirin enteric coated 81 milliGRAM(s) Oral daily  atorvastatin 40 milliGRAM(s) Oral at bedtime  bromocriptine Capsule 5 milliGRAM(s) Oral daily  carvedilol 12.5 milliGRAM(s) Oral every 12 hours  ceFAZolin   IVPB      ceFAZolin   IVPB 1000 milliGRAM(s) IV Intermittent every 12 hours  chlorhexidine 4% Liquid 1 Application(s) Topical once  Dakins Solution - 1/4 Strength 1 Application(s) Topical daily  dextrose 5%. 1000 milliLiter(s) (50 mL/Hr) IV Continuous <Continuous>  dextrose 50% Injectable 12.5 Gram(s) IV Push once  dextrose 50% Injectable 25 Gram(s) IV Push once  dextrose 50% Injectable 25 Gram(s) IV Push once  furosemide    Tablet 40 milliGRAM(s) Oral daily  heparin  Injectable 5000 Unit(s) SubCutaneous every 8 hours  insulin lispro (HumaLOG) corrective regimen sliding scale   SubCutaneous three times a day before meals  insulin lispro (HumaLOG) corrective regimen sliding scale   SubCutaneous at bedtime  insulin lispro Injectable (HumaLOG) 4 Unit(s) SubCutaneous three times a day before meals  levothyroxine 125 MICROGram(s) Oral <User Schedule>  levothyroxine 250 MICROGram(s) Oral <User Schedule>  sodium chloride 0.9%. 1000 milliLiter(s) (30 mL/Hr) IV Continuous <Continuous>  spironolactone 25 milliGRAM(s) Oral two times a day  vancomycin    Solution 125 milliGRAM(s) Oral every 6 hours        VITALS:  T(F): 98.1 (08-10-18 @ 12:19), Max: 98.3 (08-09-18 @ 16:17)  HR: 70 (08-10-18 @ 12:19)  BP: 117/70 (08-10-18 @ 12:19)  RR: 16 (08-10-18 @ 12:19)  SpO2: 97% (08-10-18 @ 12:19)  Wt(kg): --    08-09 @ 07:01  -  08-10 @ 07:00  --------------------------------------------------------  IN: 1250 mL / OUT: 2200 mL / NET: -950 mL      PHYSICAL EXAM:  Constitutional: NAD  HEENT: anicteric sclera, oropharynx clear, MMM  Neck: No JVD  Respiratory: CTAB, no wheezes, rales or rhonchi  Cardiovascular: S1, S2, RRR  Gastrointestinal: BS+, soft, NT/ND  Extremities: 2+ peripheral edema  Neurological: A/O x 3, no focal deficits    LABS:  08-10    134<L>  |  100  |  28<H>  ----------------------------<  128<H>  4.1   |  23  |  1.67<H>    Ca    8.2<L>      10 Aug 2018 06:45  Mg     1.5     08-09      Creatinine Trend: 1.67 <--, 2.25 <--, 3.38 <--, 4.41 <--, 4.25 <--, 3.94 <--, 2.68 <--                        8.5    4.19  )-----------( 72       ( 10 Aug 2018 08:03 )             26.2     Urine Studies:    Osmolality, Random Urine: 435 mos/kg (08-03 @ 21:26)  Calcium, Random Urine: 1.0 mg/dL (08-03 @ 21:26)  Sodium, Random Urine: 23 mmol/L (08-03 @ 18:35)  Creatinine, Random Urine: 158 mg/dL (08-03 @ 18:35)    RADIOLOGY & ADDITIONAL STUDIES:
Patient seen and examined  no complaints    No Known Allergies    Hospital Medications:   MEDICATIONS  (STANDING):  allopurinol 100 milliGRAM(s) Oral daily  aspirin enteric coated 81 milliGRAM(s) Oral daily  atorvastatin 40 milliGRAM(s) Oral at bedtime  bromocriptine Capsule 5 milliGRAM(s) Oral daily  carvedilol 12.5 milliGRAM(s) Oral every 12 hours  ceFAZolin   IVPB      ceFAZolin   IVPB 1000 milliGRAM(s) IV Intermittent every 12 hours  dextrose 5% 1000 milliLiter(s) (60 mL/Hr) IV Continuous <Continuous>  dextrose 5%. 1000 milliLiter(s) (50 mL/Hr) IV Continuous <Continuous>  dextrose 50% Injectable 12.5 Gram(s) IV Push once  dextrose 50% Injectable 25 Gram(s) IV Push once  dextrose 50% Injectable 25 Gram(s) IV Push once  insulin lispro (HumaLOG) corrective regimen sliding scale   SubCutaneous three times a day before meals  insulin lispro (HumaLOG) corrective regimen sliding scale   SubCutaneous at bedtime  insulin lispro Injectable (HumaLOG) 3 Unit(s) SubCutaneous three times a day before meals  levothyroxine 125 MICROGram(s) Oral <User Schedule>  levothyroxine 250 MICROGram(s) Oral <User Schedule>  vancomycin    Solution 125 milliGRAM(s) Oral every 6 hours      VITALS:  T(F): 98.3 (18 @ 04:54), Max: 98.3 (18 @ 04:54)  HR: 68 (18 @ 08:25)  BP: 104/66 (18 @ 08:25)  RR: 18 (18 @ 08:25)  SpO2: 100% (18 @ 08:25)  Wt(kg): --     @ 07:  -   @ 07:00  --------------------------------------------------------  IN: 2475 mL / OUT: 425 mL / NET: 2050 mL     @ 07:01  -   @ 14:34  --------------------------------------------------------  IN: 650 mL / OUT: 300 mL / NET: 350 mL        PHYSICAL EXAM:  Constitutional: NAD  HEENT: anicteric sclera, oropharynx clear, MMM  Neck: No JVD  Respiratory: CTAB, no wheezes, rales or rhonchi  Cardiovascular: S1, S2, RRR  Gastrointestinal: BS+, soft, NT/ND  Extremities: No cyanosis or clubbing. No peripheral edema  Neurological: A/O x 3, no focal deficits      LABS:      127<L>  |  97  |  52<H>  ----------------------------<  212<H>  4.4   |  16<L>  |  4.25<H>    Ca    7.7<L>      06 Aug 2018 06:00      Creatinine Trend: 4.25 <--, 3.94 <--, 2.68 <--, 1.87 <--, 1.46 <--, 1.66 <--, 1.93 <--                        8.7    5.63  )-----------( 56       ( 06 Aug 2018 07:42 )             25.3     Urine Studies:  Urinalysis Basic - ( 02 Aug 2018 02:46 )    Color: Yellow / Appearance: Clear / S.019 / pH:   Gluc:  / Ketone: Trace  / Bili: Negative / Urobili: Negative   Blood:  / Protein: Trace / Nitrite: Negative   Leuk Esterase: Negative / RBC:  / WBC 3-5 /HPF   Sq Epi:  / Non Sq Epi:  / Bacteria:       Osmolality, Random Urine: 435 mos/kg ( @ 21:26)  Calcium, Random Urine: 1.0 mg/dL ( @ 21:26)  Sodium, Random Urine: 23 mmol/L ( @ 18:35)  Creatinine, Random Urine: 158 mg/dL ( @ 18:35)    RADIOLOGY & ADDITIONAL STUDIES:
Patient seen and examined  no complaints    No Known Allergies    Hospital Medications:   MEDICATIONS  (STANDING):  allopurinol 100 milliGRAM(s) Oral daily  aspirin enteric coated 81 milliGRAM(s) Oral daily  atorvastatin 40 milliGRAM(s) Oral at bedtime  bromocriptine Capsule 5 milliGRAM(s) Oral daily  carvedilol 12.5 milliGRAM(s) Oral every 12 hours  ceFAZolin   IVPB 1000 milliGRAM(s) IV Intermittent every 12 hours  chlorhexidine 4% Liquid 1 Application(s) Topical once  Dakins Solution - 1/4 Strength 1 Application(s) Topical daily  dextrose 5%. 1000 milliLiter(s) (50 mL/Hr) IV Continuous <Continuous>  dextrose 50% Injectable 12.5 Gram(s) IV Push once  dextrose 50% Injectable 25 Gram(s) IV Push once  dextrose 50% Injectable 25 Gram(s) IV Push once  furosemide    Tablet 40 milliGRAM(s) Oral daily  heparin  Injectable 5000 Unit(s) SubCutaneous every 8 hours  insulin lispro (HumaLOG) corrective regimen sliding scale   SubCutaneous at bedtime  insulin lispro (HumaLOG) corrective regimen sliding scale   SubCutaneous three times a day before meals  insulin lispro Injectable (HumaLOG) 4 Unit(s) SubCutaneous three times a day before meals  levothyroxine 125 MICROGram(s) Oral <User Schedule>  levothyroxine 250 MICROGram(s) Oral <User Schedule>  spironolactone 25 milliGRAM(s) Oral two times a day  vancomycin    Solution 125 milliGRAM(s) Oral every 6 hours      VITALS:  T(F): 97.6 (08-11-18 @ 08:45), Max: 98.6 (08-10-18 @ 21:54)  HR: 59 (08-11-18 @ 08:45)  BP: 111/64 (08-11-18 @ 08:45)  RR: 18 (08-11-18 @ 08:45)  SpO2: 99% (08-11-18 @ 08:45)  Wt(kg): --    08-10 @ 07:01  -  08-11 @ 07:00  --------------------------------------------------------  IN: 520 mL / OUT: 1050 mL / NET: -530 mL    08-11 @ 07:01  -  08-11 @ 11:14  --------------------------------------------------------  IN: 0 mL / OUT: 600 mL / NET: -600 mL        PHYSICAL EXAM:  Constitutional: NAD  HEENT: anicteric sclera, oropharynx clear, MMM  Neck: No JVD  Respiratory: CTAB, no wheezes, rales or rhonchi  Cardiovascular: S1, S2, RRR  Gastrointestinal: BS+, soft, NT/ND  Extremities: 2+ peripheral edema  Neurological: A/O x 3, no focal deficits    LABS:  08-11    137  |  101  |  23  ----------------------------<  123<H>  3.9   |  26  |  1.46<H>    Ca    8.4      11 Aug 2018 07:22      Creatinine Trend: 1.46 <--, 1.67 <--, 2.25 <--, 3.38 <--, 4.41 <--, 4.25 <--, 3.94 <--                        8.3    3.48  )-----------( 67       ( 11 Aug 2018 08:42 )             25.1     Urine Studies:      RADIOLOGY & ADDITIONAL STUDIES:
Patient seen and examined  no complaints    No Known Allergies    Hospital Medications:   MEDICATIONS  (STANDING):  allopurinol 100 milliGRAM(s) Oral daily  aspirin enteric coated 81 milliGRAM(s) Oral daily  atorvastatin 40 milliGRAM(s) Oral at bedtime  bromocriptine Capsule 5 milliGRAM(s) Oral daily  carvedilol 12.5 milliGRAM(s) Oral every 12 hours  ceFAZolin   IVPB 1000 milliGRAM(s) IV Intermittent every 12 hours  chlorhexidine 4% Liquid 1 Application(s) Topical once  Dakins Solution - 1/4 Strength 1 Application(s) Topical daily  dextrose 5%. 1000 milliLiter(s) (50 mL/Hr) IV Continuous <Continuous>  dextrose 50% Injectable 12.5 Gram(s) IV Push once  dextrose 50% Injectable 25 Gram(s) IV Push once  dextrose 50% Injectable 25 Gram(s) IV Push once  furosemide    Tablet 40 milliGRAM(s) Oral daily  heparin  Injectable 5000 Unit(s) SubCutaneous every 8 hours  insulin lispro (HumaLOG) corrective regimen sliding scale   SubCutaneous at bedtime  insulin lispro (HumaLOG) corrective regimen sliding scale   SubCutaneous three times a day before meals  insulin lispro Injectable (HumaLOG) 4 Unit(s) SubCutaneous three times a day before meals  levothyroxine 125 MICROGram(s) Oral <User Schedule>  levothyroxine 250 MICROGram(s) Oral <User Schedule>  spironolactone 25 milliGRAM(s) Oral two times a day  vancomycin    Solution 125 milliGRAM(s) Oral every 6 hours      VITALS:  T(F): 97.6 (08-13-18 @ 11:49), Max: 98.2 (08-12-18 @ 19:48)  HR: 60 (08-13-18 @ 11:49)  BP: 100/62 (08-13-18 @ 11:49)  RR: 18 (08-13-18 @ 11:49)  SpO2: 95% (08-13-18 @ 11:49)  Wt(kg): --    08-12 @ 07:01  -  08-13 @ 07:00  --------------------------------------------------------  IN: 1470 mL / OUT: 1100 mL / NET: 370 mL    08-13 @ 07:01  -  08-13 @ 13:00  --------------------------------------------------------  IN: 360 mL / OUT: 0 mL / NET: 360 mL      PHYSICAL EXAM:  Constitutional: NAD  HEENT: anicteric sclera, oropharynx clear, MMM  Neck: No JVD  Respiratory: CTAB, no wheezes, rales or rhonchi  Cardiovascular: S1, S2, RRR  Gastrointestinal: BS+, soft, NT/ND  Extremities: 2+ peripheral edema  Neurological: A/O x 3, no focal deficits    LABS:  08-13    139  |  101  |  18  ----------------------------<  107<H>  4.0   |  26  |  1.22    Ca    8.2<L>      13 Aug 2018 07:12      Creatinine Trend: 1.22 <--, 1.46 <--, 1.67 <--, 2.25 <--, 3.38 <--, 4.41 <--                        8.2    3.04  )-----------( 72       ( 13 Aug 2018 07:37 )             26.3     Urine Studies:      RADIOLOGY & ADDITIONAL STUDIES:
Patient seen and examined  no complaints  denies any sob or worsening swelling    No Known Allergies    Hospital Medications:   MEDICATIONS  (STANDING):  allopurinol 100 milliGRAM(s) Oral daily  aspirin enteric coated 81 milliGRAM(s) Oral daily  atorvastatin 40 milliGRAM(s) Oral at bedtime  bromocriptine Capsule 5 milliGRAM(s) Oral daily  carvedilol 12.5 milliGRAM(s) Oral every 12 hours  ceFAZolin   IVPB      ceFAZolin   IVPB 1000 milliGRAM(s) IV Intermittent every 12 hours  Dakins Solution - 1/4 Strength 1 Application(s) Topical daily  dextrose 5% 1000 milliLiter(s) (60 mL/Hr) IV Continuous <Continuous>  dextrose 5%. 1000 milliLiter(s) (50 mL/Hr) IV Continuous <Continuous>  dextrose 50% Injectable 12.5 Gram(s) IV Push once  dextrose 50% Injectable 25 Gram(s) IV Push once  dextrose 50% Injectable 25 Gram(s) IV Push once  heparin  Injectable 5000 Unit(s) SubCutaneous every 8 hours  insulin lispro (HumaLOG) corrective regimen sliding scale   SubCutaneous three times a day before meals  insulin lispro (HumaLOG) corrective regimen sliding scale   SubCutaneous at bedtime  insulin lispro Injectable (HumaLOG) 4 Unit(s) SubCutaneous three times a day before meals  levothyroxine 125 MICROGram(s) Oral <User Schedule>  levothyroxine 250 MICROGram(s) Oral <User Schedule>  vancomycin    Solution 125 milliGRAM(s) Oral every 6 hours      VITALS:  T(F): 97.5 (18 @ 06:19), Max: 98.1 (18 @ 20:22)  HR: 66 (18 @ 06:19)  BP: 103/59 (18 @ 06:19)  RR: 18 (18 @ 06:19)  SpO2: 99% (18 @ 06:19)  Wt(kg): --     @ 07:01  -   @ 07:00  --------------------------------------------------------  IN: 2550 mL / OUT: 1450 mL / NET: 1100 mL        PHYSICAL EXAM:  Constitutional: NAD  HEENT: anicteric sclera, oropharynx clear, MMM  Neck: No JVD  Respiratory: CTAB, no wheezes, rales or rhonchi  Cardiovascular: S1, S2, RRR  Gastrointestinal: BS+, soft, NT/ND  Extremities: 1+ peripheral edema  Neurological: A/O x 3, no focal deficits    LABS:      132<L>  |  97  |  54<H>  ----------------------------<  191<H>  4.2   |  20<L>  |  4.41<H>    Ca    7.9<L>      07 Aug 2018 06:46      Creatinine Trend: 4.41 <--, 4.25 <--, 3.94 <--, 2.68 <--, 1.87 <--, 1.46 <--, 1.66 <--, 1.93 <--                        8.7    5.63  )-----------( 56       ( 06 Aug 2018 07:42 )             25.3     Urine Studies:  Urinalysis Basic - ( 02 Aug 2018 02:46 )    Color: Yellow / Appearance: Clear / S.019 / pH:   Gluc:  / Ketone: Trace  / Bili: Negative / Urobili: Negative   Blood:  / Protein: Trace / Nitrite: Negative   Leuk Esterase: Negative / RBC:  / WBC 3-5 /HPF   Sq Epi:  / Non Sq Epi:  / Bacteria:       Osmolality, Random Urine: 435 mos/kg ( @ 21:26)  Calcium, Random Urine: 1.0 mg/dL ( @ 21:26)  Sodium, Random Urine: 23 mmol/L ( @ 18:35)  Creatinine, Random Urine: 158 mg/dL ( @ 18:35)    RADIOLOGY & ADDITIONAL STUDIES:
[facility] - Madelin / Wan ALVAREZ /Benedicto/ ANTONIO Carter/ ANTONIO Nieto/ Ced Cortés / RAGHU Haque  ---------------------------------------------------------------------------------------------------------------  seen and examined today for JULIANNE on CKD  Interval : Feels better, no complaints, creatinine worse today  VITALS:  T(F): 98.2 (08-04-18 @ 14:24), Max: 98.4 (08-03-18 @ 19:24)  HR: 74 (08-04-18 @ 14:24)  BP: 96/59 (08-04-18 @ 14:24)  RR: 18 (08-04-18 @ 14:24)  SpO2: 95% (08-04-18 @ 14:24)  Wt(kg): --    08-03 @ 07:01  -  08-04 @ 07:00  --------------------------------------------------------  IN: 1860 mL / OUT: 200 mL / NET: 1660 mL    08-04 @ 07:01  -  08-04 @ 16:42  --------------------------------------------------------  IN: 1680 mL / OUT: 0 mL / NET: 1680 mL      Physical Exam :-  Constitutional: NAD  Neck: Supple.  Respiratory: Bilateral equal breath sounds, no Crackles present.  Cardiovascular: S1, S2 normal, positive Murmur  Gastrointestinal: Bowel Sounds present, soft, non tender.  Extremities: no Edema Feet  Neurological: Alert and Oriented x 3, no focal deficits  Psychiatric: Normal mood, normal affect  Data:-  Allergies :   No Known Allergies    Hospital Medications:   MEDICATIONS  (STANDING):  acetylcysteine  Oral Solution 1200 milliGRAM(s) Oral every 12 hours  aspirin enteric coated 81 milliGRAM(s) Oral daily  atorvastatin 40 milliGRAM(s) Oral at bedtime  bromocriptine Capsule 5 milliGRAM(s) Oral daily  carvedilol 25 milliGRAM(s) Oral every 12 hours  dextrose 5%. 1000 milliLiter(s) (50 mL/Hr) IV Continuous <Continuous>  dextrose 50% Injectable 12.5 Gram(s) IV Push once  dextrose 50% Injectable 25 Gram(s) IV Push once  dextrose 50% Injectable 25 Gram(s) IV Push once  insulin lispro (HumaLOG) corrective regimen sliding scale   SubCutaneous three times a day before meals  insulin lispro (HumaLOG) corrective regimen sliding scale   SubCutaneous at bedtime  levothyroxine 125 MICROGram(s) Oral <User Schedule>  levothyroxine 250 MICROGram(s) Oral <User Schedule>  sodium chloride 0.9%. 1000 milliLiter(s) (60 mL/Hr) IV Continuous <Continuous>  vancomycin    Solution 125 milliGRAM(s) Oral every 6 hours    08-04    132<L>  |  102  |  42<H>  ----------------------------<  165<H>  4.3   |  16<L>  |  2.68<H>    Ca    8.1<L>      04 Aug 2018 07:33      Creatinine Trend: 2.68 <--, 1.87 <--, 1.46 <--, 1.66 <--, 1.93 <--                        9.1    7.21  )-----------( 52       ( 04 Aug 2018 09:05 )             28.4
pt seen at bedside in NAD. dressing to right foot c/d/i  right heel/achilles wound from gout attack that opened up do to tophi noted  fibrotic tissue noted along with tophaceous material   continue abx  irrigated with NS and packed with wet to dry and covered in DSD  follow up rheumatology needs long term control of gout  will order Dakins for daily irrigation  will follow
pt seen at bedside in NAD. dressing to right foot c/d/i  right heel/achilles wound from gout attack that opened up do to tophi noted  fibrotic tissue noted along with tophaceous material   continue abx  irrigated with dakins and packed with wet to dry and covered in DSD  pt would benefit from a debridement with washout in the OR pt booked for OR tomorrow at 330pm pt agrees to planned procedure  NPO past midnight
Patient is a 68y old  Male who presents with a chief complaint of diarrhea and weakness (02 Aug 2018 16:31)      SUBJECTIVE / OVERNIGHT EVENTS:  diarrhea still ongoing every two hours.  loose.  this is actually improvement from yesterday per the pt.  comfortable.  foot ulcer healing nicely.  no drainage.  no cp, no sob, no n/v/d. no abdominal pain.  no headache, no dizziness.       Vital Signs Last 24 Hrs  T(C): 36.6 (02 Aug 2018 17:05), Max: 36.9 (02 Aug 2018 11:38)  T(F): 97.9 (02 Aug 2018 17:05), Max: 98.5 (02 Aug 2018 11:38)  HR: 71 (02 Aug 2018 17:05) (71 - 89)  BP: 93/56 (02 Aug 2018 17:05) (93/56 - 107/71)  BP(mean): --  RR: 18 (02 Aug 2018 17:05) (18 - 18)  SpO2: 98% (02 Aug 2018 17:05) (96% - 98%)  I&O's Summary    01 Aug 2018 07:01  -  02 Aug 2018 07:00  --------------------------------------------------------  IN: 240 mL / OUT: 600 mL / NET: -360 mL    02 Aug 2018 07:01  -  02 Aug 2018 20:04  --------------------------------------------------------  IN: 480 mL / OUT: 0 mL / NET: 480 mL        PHYSICAL EXAM:  GENERAL: NAD, Comfortable  HEAD:  Atraumatic, Normocephalic  EYES: EOMI, PERRLA, conjunctiva and sclera clear  NECK: Supple, No JVD  CHEST/LUNG: Clear to auscultation bilaterally; No wheeze  HEART: Regular rate and rhythm; No murmurs, rubs, or gallops  ABDOMEN: Soft, Nontender, Nondistended; Bowel sounds present  Neuro: AAO x 3, no focal deficit, 5/5 b/l extremities  EXTREMITIES:  2+ Peripheral Pulses, No clubbing, cyanosis, or edema, right foot, third toe, small ulcer, healing. small plantar harnden skin patch. no pus, no drainage. dressing d/c/i.   SKIN: No rashes or lesions    LABS:                        9.9    11.78 )-----------( 59       ( 02 Aug 2018 07:40 )             30.1     08-    132<L>  |  103  |  38<H>  ----------------------------<  200<H>  4.6   |  15<L>  |  1.46<H>    Ca    8.3<L>      02 Aug 2018 06:42    TPro  7.1  /  Alb  3.2<L>  /  TBili  1.0  /  DBili  x   /  AST  21  /  ALT  13  /  AlkPhos  112  08-    PT/INR - ( 01 Aug 2018 12:00 )   PT: 15.5 sec;   INR: 1.41 ratio         PTT - ( 01 Aug 2018 12:00 )  PTT:27.8 sec  CAPILLARY BLOOD GLUCOSE      POCT Blood Glucose.: 247 mg/dL (02 Aug 2018 17:26)  POCT Blood Glucose.: 230 mg/dL (02 Aug 2018 12:18)  POCT Blood Glucose.: 235 mg/dL (02 Aug 2018 08:38)  POCT Blood Glucose.: 254 mg/dL (01 Aug 2018 21:50)        Urinalysis Basic - ( 02 Aug 2018 02:46 )    Color: Yellow / Appearance: Clear / S.019 / pH: x  Gluc: x / Ketone: Trace  / Bili: Negative / Urobili: Negative   Blood: x / Protein: Trace / Nitrite: Negative   Leuk Esterase: Negative / RBC: x / WBC 3-5 /HPF   Sq Epi: x / Non Sq Epi: x / Bacteria: x        RADIOLOGY & ADDITIONAL TESTS:    Imaging Personally Reviewed:  [x] YES  [ ] NO    Consultant(s) Notes Reviewed:  [x] YES  [ ] NO      MEDICATIONS  (STANDING):  aspirin enteric coated 81 milliGRAM(s) Oral daily  atorvastatin 40 milliGRAM(s) Oral at bedtime  bromocriptine Capsule 5 milliGRAM(s) Oral daily  carvedilol 25 milliGRAM(s) Oral every 12 hours  dextrose 5%. 1000 milliLiter(s) (50 mL/Hr) IV Continuous <Continuous>  dextrose 50% Injectable 12.5 Gram(s) IV Push once  dextrose 50% Injectable 25 Gram(s) IV Push once  dextrose 50% Injectable 25 Gram(s) IV Push once  furosemide    Tablet 40 milliGRAM(s) Oral two times a day  insulin lispro (HumaLOG) corrective regimen sliding scale   SubCutaneous three times a day before meals  insulin lispro (HumaLOG) corrective regimen sliding scale   SubCutaneous at bedtime  levothyroxine 125 MICROGram(s) Oral <User Schedule>  levothyroxine 250 MICROGram(s) Oral <User Schedule>  lisinopril 2.5 milliGRAM(s) Oral daily  spironolactone 25 milliGRAM(s) Oral two times a day  vancomycin    Solution 125 milliGRAM(s) Oral every 6 hours    MEDICATIONS  (PRN):  dextrose 40% Gel 15 Gram(s) Oral once PRN Blood Glucose LESS THAN 70 milliGRAM(s)/deciliter  glucagon  Injectable 1 milliGRAM(s) IntraMuscular once PRN Glucose LESS THAN 70 milligrams/deciliter      Care Discussed with Consultants/Other Providers [x] YES  [ ] NO    HEALTH ISSUES - PROBLEM Dx:  Prophylactic measure: Prophylactic measure  Hyperkalemia: Hyperkalemia  HLD (hyperlipidemia): HLD (hyperlipidemia)  Type 2 diabetes mellitus: Type 2 diabetes mellitus  Chronic systolic congestive heart failure: Chronic systolic congestive heart failure  CAD (coronary artery disease) of bypass graft: CAD (coronary artery disease) of bypass graft  Acute on chronic kidney failure: Acute on chronic kidney failure  Right foot ulcer, with unspecified severity: Right foot ulcer, with unspecified severity  C. difficile colitis: C. difficile colitis
[facility] [place] [facility] - Madelin / Wan ALVAREZ /Benedicto/ ANTONIO Carter/ ANTONIO Nieto/ Ced Cortés / RAGHU Njeru  ---------------------------------------------------------------------------------------------------------------  seen and examined today for JULIANNE on CKD  Interval : Creatinine worsening but patient maintains good urine output  VITALS:  T(F): 97.9 (08-05-18 @ 04:20), Max: 98.2 (08-04-18 @ 11:02)  HR: 60 (08-05-18 @ 08:46)  BP: 93/50 (08-05-18 @ 08:46)  RR: 18 (08-05-18 @ 04:20)  SpO2: 98% (08-05-18 @ 04:20)  Wt(kg): --    08-04 @ 07:01  -  08-05 @ 07:00  --------------------------------------------------------  IN: 2895 mL / OUT: 0 mL / NET: 2895 mL      Physical Exam :-  Constitutional: NAD  Neck: Supple.  Respiratory: Bilateral equal breath sounds,  Crackles present.  Cardiovascular: S1, S2 normal, positive Murmur  Gastrointestinal: Bowel Sounds present, soft, non tender.  Extremities: no Edema Feet  Neurological: Alert and Oriented x 3, no focal deficits  Psychiatric: Normal mood, normal affect  Data:-  Allergies :   No Known Allergies    Hospital Medications:   MEDICATIONS  (STANDING):  allopurinol 100 milliGRAM(s) Oral daily  aspirin enteric coated 81 milliGRAM(s) Oral daily  atorvastatin 40 milliGRAM(s) Oral at bedtime  bromocriptine Capsule 5 milliGRAM(s) Oral daily  carvedilol 25 milliGRAM(s) Oral every 12 hours  dextrose 5% 1000 milliLiter(s) (60 mL/Hr) IV Continuous <Continuous>  dextrose 5%. 1000 milliLiter(s) (50 mL/Hr) IV Continuous <Continuous>  dextrose 50% Injectable 12.5 Gram(s) IV Push once  dextrose 50% Injectable 25 Gram(s) IV Push once  dextrose 50% Injectable 25 Gram(s) IV Push once  insulin lispro (HumaLOG) corrective regimen sliding scale   SubCutaneous three times a day before meals  insulin lispro (HumaLOG) corrective regimen sliding scale   SubCutaneous at bedtime  levothyroxine 125 MICROGram(s) Oral <User Schedule>  levothyroxine 250 MICROGram(s) Oral <User Schedule>  vancomycin    Solution 125 milliGRAM(s) Oral every 6 hours    08-05    130<L>  |  101  |  48<H>  ----------------------------<  193<H>  4.4   |  13<L>  |  3.94<H>    Ca    7.9<L>      05 Aug 2018 06:59      Creatinine Trend: 3.94 <--, 2.68 <--, 1.87 <--, 1.46 <--, 1.66 <--, 1.93 <--                        8.9    6.75  )-----------( 54       ( 05 Aug 2018 08:36 )             26.0
Breathing remains comfortable  no worsening of rt foot pain  Denies any SOB/CP    Vital Signs Last 24 Hrs  T(C): 36.6 (09 Aug 2018 09:59), Max: 36.9 (08 Aug 2018 20:01)  T(F): 97.9 (09 Aug 2018 09:59), Max: 98.5 (08 Aug 2018 20:01)  HR: 65 (09 Aug 2018 09:59) (61 - 79)  BP: 102/59 (09 Aug 2018 09:59) (102/59 - 111/69)  BP(mean): --  RR: 18 (09 Aug 2018 09:59) (18 - 18)  SpO2: 100% (09 Aug 2018 09:59) (96% - 100%)    GENERAL: NAD, Comfortable  HEAD:  Atraumatic, Normocephalic  EYES: EOMI, PERRLA, conjunctiva and sclera clear  NECK: Supple, No JVD  CHEST/LUNG: Clear to auscultation bilaterally; No wheeze  HEART: Regular rate and rhythm; No murmurs, rubs, or gallops  ABDOMEN: Soft, Nontender, Nondistended; Bowel sounds present  Neuro: AAO x 3, no focal deficit, 5/5 b/l extremities  EXTREMITIES:  2+ Peripheral Pulses, No clubbing, cyanosis, or edema, right foot, third toe, small ulcer, healing. small plantar hardened skin patch. no pus, no drainage. dressing d/c/i.   SKIN: No rashes or lesions    LABS:                        8.2    3.39  )-----------( 65       ( 09 Aug 2018 07:25 )             24.3     08-09    138  |  102  |  36<H>  ----------------------------<  142<H>  3.8   |  24  |  2.25<H>    Ca    8.0<L>      09 Aug 2018 06:49  Mg     1.5     08-09        CAPILLARY BLOOD GLUCOSE      POCT Blood Glucose.: 255 mg/dL (09 Aug 2018 12:45)  POCT Blood Glucose.: 172 mg/dL (09 Aug 2018 08:37)  POCT Blood Glucose.: 237 mg/dL (08 Aug 2018 22:00)  POCT Blood Glucose.: 217 mg/dL (08 Aug 2018 17:36)
No acute CP or SOB    Vital Signs Last 24 Hrs  T(C): 36.4 (11 Aug 2018 08:45), Max: 37 (10 Aug 2018 21:54)  T(F): 97.6 (11 Aug 2018 08:45), Max: 98.6 (10 Aug 2018 21:54)  HR: 59 (11 Aug 2018 08:45) (59 - 97)  BP: 111/64 (11 Aug 2018 08:45) (98/61 - 123/69)  BP(mean): 74 (10 Aug 2018 15:15) (74 - 94)  RR: 18 (11 Aug 2018 08:45) (14 - 18)  SpO2: 99% (11 Aug 2018 08:45) (94% - 100%)    GENERAL: NAD, Comfortable  HEAD:  Atraumatic, Normocephalic  EYES: EOMI, PERRLA, conjunctiva and sclera clear  NECK: Supple, No JVD  CHEST/LUNG: Clear to auscultation bilaterally; No wheeze  HEART: Regular rate and rhythm; No murmurs, rubs, or gallops  ABDOMEN: Soft, Nontender, Nondistended; Bowel sounds present  Neuro: AAO x 3, no focal deficit, 5/5 b/l extremities  EXTREMITIES:  2+ Peripheral Pulses, 1+ b/l LE edema, right foot, third toe, small ulcer, healing. small plantar hardened skin patch. no pus, no drainage. dressing d/c/i.   SKIN: No rashes or lesions    LABS:                        8.3    3.48  )-----------( 67       ( 11 Aug 2018 08:42 )             25.1     08-11    137  |  101  |  23  ----------------------------<  123<H>  3.9   |  26  |  1.46<H>    Ca    8.4      11 Aug 2018 07:22      PT/INR - ( 10 Aug 2018 07:55 )   PT: 14.2 sec;   INR: 1.25 ratio         PTT - ( 10 Aug 2018 07:55 )  PTT:29.1 sec  CAPILLARY BLOOD GLUCOSE      POCT Blood Glucose.: 142 mg/dL (11 Aug 2018 08:24)  POCT Blood Glucose.: 211 mg/dL (10 Aug 2018 21:51)  POCT Blood Glucose.: 120 mg/dL (10 Aug 2018 17:23)  POCT Blood Glucose.: 150 mg/dL (10 Aug 2018 11:42)
Patient is a 68y old  Male who presents with a chief complaint of diarrhea and weakness (02 Aug 2018 16:31)      SUBJECTIVE / OVERNIGHT EVENTS:  diarrhea improving.  only 4 x BM today as oppose to every 2 hours.  stool starts to become formed.  no cp, no sob, no n/v. no abdominal pain.  no headache, no dizziness.       Vital Signs Last 24 Hrs  T(C): 36.8 (04 Aug 2018 14:24), Max: 36.9 (03 Aug 2018 19:24)  T(F): 98.2 (04 Aug 2018 14:24), Max: 98.4 (03 Aug 2018 19:24)  HR: 74 (04 Aug 2018 14:24) (68 - 81)  BP: 96/59 (04 Aug 2018 14:24) (92/54 - 99/62)  BP(mean): --  RR: 18 (04 Aug 2018 14:24) (18 - 18)  SpO2: 95% (04 Aug 2018 14:24) (95% - 100%)  I&O's Summary    03 Aug 2018 07:01  -  04 Aug 2018 07:00  --------------------------------------------------------  IN: 1860 mL / OUT: 200 mL / NET: 1660 mL    04 Aug 2018 07:01  -  04 Aug 2018 17:21  --------------------------------------------------------  IN: 1680 mL / OUT: 0 mL / NET: 1680 mL      PHYSICAL EXAM:  GENERAL: NAD, Comfortable  HEAD:  Atraumatic, Normocephalic  EYES: EOMI, PERRLA, conjunctiva and sclera clear  NECK: Supple, No JVD  CHEST/LUNG: Clear to auscultation bilaterally; No wheeze  HEART: Regular rate and rhythm; No murmurs, rubs, or gallops  ABDOMEN: Soft, Nontender, Nondistended; Bowel sounds present  Neuro: AAO x 3, no focal deficit, 5/5 b/l extremities  EXTREMITIES:  2+ Peripheral Pulses, No clubbing, cyanosis, or edema, right foot, third toe, small ulcer, healing. small plantar hardened skin patch. no pus, no drainage. dressing d/c/i.   SKIN: No rashes or lesions    LABS:                        9.1    7.21  )-----------( 52       ( 04 Aug 2018 09:05 )             28.4     08-04    132<L>  |  102  |  42<H>  ----------------------------<  165<H>  4.3   |  16<L>  |  2.68<H>    Ca    8.1<L>      04 Aug 2018 07:33        CAPILLARY BLOOD GLUCOSE      POCT Blood Glucose.: 270 mg/dL (04 Aug 2018 12:20)  POCT Blood Glucose.: 206 mg/dL (04 Aug 2018 08:46)  POCT Blood Glucose.: 217 mg/dL (03 Aug 2018 21:55)            RADIOLOGY & ADDITIONAL TESTS:    Imaging Personally Reviewed:  [x] YES  [ ] NO    Consultant(s) Notes Reviewed:  [x] YES  [ ] NO      MEDICATIONS  (STANDING):  acetylcysteine  Oral Solution 1200 milliGRAM(s) Oral every 12 hours  aspirin enteric coated 81 milliGRAM(s) Oral daily  atorvastatin 40 milliGRAM(s) Oral at bedtime  bromocriptine Capsule 5 milliGRAM(s) Oral daily  carvedilol 25 milliGRAM(s) Oral every 12 hours  dextrose 5%. 1000 milliLiter(s) (50 mL/Hr) IV Continuous <Continuous>  dextrose 50% Injectable 12.5 Gram(s) IV Push once  dextrose 50% Injectable 25 Gram(s) IV Push once  dextrose 50% Injectable 25 Gram(s) IV Push once  insulin lispro (HumaLOG) corrective regimen sliding scale   SubCutaneous three times a day before meals  insulin lispro (HumaLOG) corrective regimen sliding scale   SubCutaneous at bedtime  levothyroxine 125 MICROGram(s) Oral <User Schedule>  levothyroxine 250 MICROGram(s) Oral <User Schedule>  sodium chloride 0.9%. 1000 milliLiter(s) (75 mL/Hr) IV Continuous <Continuous>  vancomycin    Solution 125 milliGRAM(s) Oral every 6 hours    MEDICATIONS  (PRN):  dextrose 40% Gel 15 Gram(s) Oral once PRN Blood Glucose LESS THAN 70 milliGRAM(s)/deciliter  glucagon  Injectable 1 milliGRAM(s) IntraMuscular once PRN Glucose LESS THAN 70 milligrams/deciliter      Care Discussed with Consultants/Other Providers [x] YES  [ ] NO    HEALTH ISSUES - PROBLEM Dx:  Metabolic acidosis: Metabolic acidosis  Hyponatremia: Hyponatremia  CKD (chronic kidney disease), stage III: CKD (chronic kidney disease), stage III  Acute kidney injury superimposed on CKD: Acute kidney injury superimposed on CKD  Prophylactic measure: Prophylactic measure  Hyperkalemia: Hyperkalemia  HLD (hyperlipidemia): HLD (hyperlipidemia)  Type 2 diabetes mellitus: Type 2 diabetes mellitus  Chronic systolic congestive heart failure: Chronic systolic congestive heart failure  CAD (coronary artery disease) of bypass graft: CAD (coronary artery disease) of bypass graft  Acute on chronic kidney failure: Acute on chronic kidney failure  Right foot ulcer, with unspecified severity: Right foot ulcer, with unspecified severity  C. difficile colitis: C. difficile colitis
Patient is a 68y old  Male who presents with a chief complaint of diarrhea and weakness (02 Aug 2018 16:31)      SUBJECTIVE / OVERNIGHT EVENTS:  feel well.  diarrhea has resolved.  Cr still trending up.  good urine outpt.  on bicarb drip.   no cp, no sob, no n/v/d. no abdominal pain.  no headache, no dizziness.       Vital Signs Last 24 Hrs  T(C): 36.8 (07 Aug 2018 16:08), Max: 36.8 (07 Aug 2018 16:08)  T(F): 98.2 (07 Aug 2018 16:08), Max: 98.2 (07 Aug 2018 16:08)  HR: 64 (07 Aug 2018 16:08) (59 - 78)  BP: 102/68 (07 Aug 2018 16:08) (91/53 - 110/66)  BP(mean): --  RR: 189 (07 Aug 2018 16:08) (18 - 189)  SpO2: 99% (07 Aug 2018 16:08) (96% - 100%)  I&O's Summary    06 Aug 2018 07:01  -  07 Aug 2018 07:00  --------------------------------------------------------  IN: 2550 mL / OUT: 1450 mL / NET: 1100 mL    07 Aug 2018 07:01  -  07 Aug 2018 18:29  --------------------------------------------------------  IN: 240 mL / OUT: 650 mL / NET: -410 mL        PHYSICAL EXAM:  GENERAL: NAD, Comfortable  HEAD:  Atraumatic, Normocephalic  EYES: EOMI, PERRLA, conjunctiva and sclera clear  NECK: Supple, No JVD  CHEST/LUNG: Clear to auscultation bilaterally; No wheeze  HEART: Regular rate and rhythm; No murmurs, rubs, or gallops  ABDOMEN: Soft, Nontender, Nondistended; Bowel sounds present  Neuro: AAO x 3, no focal deficit, 5/5 b/l extremities  EXTREMITIES:  2+ Peripheral Pulses, No clubbing, cyanosis, or edema, right foot, third toe, small ulcer, healing. small plantar hardened skin patch. no pus, no drainage. dressing d/c/i.   SKIN: No rashes or lesions      LABS:                        8.7    5.63  )-----------( 56       ( 06 Aug 2018 07:42 )             25.3     08-07    132<L>  |  97  |  54<H>  ----------------------------<  191<H>  4.2   |  20<L>  |  4.41<H>    Ca    7.9<L>      07 Aug 2018 06:46        CAPILLARY BLOOD GLUCOSE      POCT Blood Glucose.: 248 mg/dL (07 Aug 2018 18:17)  POCT Blood Glucose.: 255 mg/dL (07 Aug 2018 17:04)  POCT Blood Glucose.: 322 mg/dL (07 Aug 2018 12:45)  POCT Blood Glucose.: 200 mg/dL (07 Aug 2018 08:44)  POCT Blood Glucose.: 252 mg/dL (06 Aug 2018 21:47)            RADIOLOGY & ADDITIONAL TESTS:    Imaging Personally Reviewed:  [x] YES  [ ] NO    Consultant(s) Notes Reviewed:  [x] YES  [ ] NO      MEDICATIONS  (STANDING):  allopurinol 100 milliGRAM(s) Oral daily  aspirin enteric coated 81 milliGRAM(s) Oral daily  atorvastatin 40 milliGRAM(s) Oral at bedtime  bromocriptine Capsule 5 milliGRAM(s) Oral daily  carvedilol 12.5 milliGRAM(s) Oral every 12 hours  ceFAZolin   IVPB      ceFAZolin   IVPB 1000 milliGRAM(s) IV Intermittent every 12 hours  Dakins Solution - 1/4 Strength 1 Application(s) Topical daily  dextrose 5% 1000 milliLiter(s) (40 mL/Hr) IV Continuous <Continuous>  dextrose 5%. 1000 milliLiter(s) (50 mL/Hr) IV Continuous <Continuous>  dextrose 50% Injectable 12.5 Gram(s) IV Push once  dextrose 50% Injectable 25 Gram(s) IV Push once  dextrose 50% Injectable 25 Gram(s) IV Push once  heparin  Injectable 5000 Unit(s) SubCutaneous every 8 hours  insulin lispro (HumaLOG) corrective regimen sliding scale   SubCutaneous three times a day before meals  insulin lispro (HumaLOG) corrective regimen sliding scale   SubCutaneous at bedtime  insulin lispro Injectable (HumaLOG) 4 Unit(s) SubCutaneous three times a day before meals  levothyroxine 125 MICROGram(s) Oral <User Schedule>  levothyroxine 250 MICROGram(s) Oral <User Schedule>  vancomycin    Solution 125 milliGRAM(s) Oral every 6 hours    MEDICATIONS  (PRN):  dextrose 40% Gel 15 Gram(s) Oral once PRN Blood Glucose LESS THAN 70 milliGRAM(s)/deciliter  glucagon  Injectable 1 milliGRAM(s) IntraMuscular once PRN Glucose LESS THAN 70 milligrams/deciliter      Care Discussed with Consultants/Other Providers [x] YES  [ ] NO    HEALTH ISSUES - PROBLEM Dx:  Metabolic acidosis: Metabolic acidosis  Hyponatremia: Hyponatremia  CKD (chronic kidney disease), stage III: CKD (chronic kidney disease), stage III  Acute kidney injury superimposed on CKD: Acute kidney injury superimposed on CKD  Prophylactic measure: Prophylactic measure  Hyperkalemia: Hyperkalemia  HLD (hyperlipidemia): HLD (hyperlipidemia)  Type 2 diabetes mellitus: Type 2 diabetes mellitus  Chronic systolic congestive heart failure: Chronic systolic congestive heart failure  CAD (coronary artery disease) of bypass graft: CAD (coronary artery disease) of bypass graft  Acute on chronic kidney failure: Acute on chronic kidney failure  Right foot ulcer, with unspecified severity: Right foot ulcer, with unspecified severity  C. difficile colitis: C. difficile colitis

## 2018-08-13 NOTE — PROGRESS NOTE ADULT - PROBLEM SELECTOR PROBLEM 1
Acute on chronic kidney failure
Acute kidney injury superimposed on CKD
Acute on chronic kidney failure
C. difficile colitis
C. difficile colitis
Acute kidney injury superimposed on CKD
Acute on chronic kidney failure

## 2018-08-13 NOTE — PROGRESS NOTE ADULT - PROBLEM SELECTOR PROBLEM 3
Right foot ulcer, with unspecified severity
Bursitis
Hyponatremia
Right foot ulcer, with unspecified severity
Acute on chronic kidney failure
Bursitis
Hyponatremia
Right foot ulcer, with unspecified severity
Right foot ulcer, with unspecified severity
Acute on chronic kidney failure
Right foot ulcer, with unspecified severity
Right foot ulcer, with unspecified severity

## 2018-08-13 NOTE — PROGRESS NOTE ADULT - PROVIDER SPECIALTY LIST ADULT
Cardiology
Infectious Disease
Internal Medicine
Nephrology
Podiatry
Infectious Disease
Podiatry
Cardiology
Infectious Disease
Internal Medicine
Nephrology
Internal Medicine

## 2018-08-13 NOTE — PROGRESS NOTE ADULT - PROBLEM SELECTOR PLAN 4
Stable  Pt is currently asymptomatic,  continue ASA 81 mg
Stable  Pt is currently asymptomatic,  continue ASA 81 mg
Podiatry consult appreciated  right foot xray: no signs of OM   Pt is not a candidate for MRI due to ICD  rheum Dr. Goldberg consulted for right ankle gout.   Foot aspirate shows crystals consistent with gout.  high uric acid. started on allopurinol. pain control.  S/p achilles tendon washout 8/9/18.
likely 2/2 julianne on ckd/ and diarrhea  I took the liberty of changing his IVF to D5W with bicarb @60cc/hr for 24hrs to treat worsening acidosis due to JULIANNE
Stable  Pt is currently asymptomatic,  continue ASA 81 mg
Podiatry consult appreciated  right foot xray: no signs of OM   Pt is not a candidate for MRI due to ICD  rheum Dr. Goldberg consulted for right ankle gout.   Foot aspirate shows crystals consistent with gout.  high uric acid. started on allopurinol. pain control.  S/p achilles tendon washout 8/9/18.
Stable  Pt is currently asymptomatic,  continue ASA 81 mg
improved   trend bicarb
improved  trend bicarb  d/c bicarb gtt
likely 2/2 alban on ckd/ and diarrhea  Please start NaHCO3 650mg PO TID
likely 2/2 alban on ckd/ and diarrhea  c/wIVF to D5W with bicarb @60cc/hr for 24hrs   trend bicarb
likely 2/2 alban on ckd/ and diarrhea  dec IVF to D5W with bicarb @60cc/hr to 40cc hr  trend bicarb
Stable  Pt is currently asymptomatic,  continue ASA 81 mg
Stable  Pt is currently asymptomatic,  continue ASA 81 mg

## 2018-08-13 NOTE — PROGRESS NOTE ADULT - PROBLEM SELECTOR PROBLEM 4
CAD (coronary artery disease) of bypass graft
CAD (coronary artery disease) of bypass graft
Right foot ulcer, with unspecified severity
Metabolic acidosis
CAD (coronary artery disease) of bypass graft
Metabolic acidosis
Right foot ulcer, with unspecified severity
CAD (coronary artery disease) of bypass graft
CAD (coronary artery disease) of bypass graft

## 2018-08-13 NOTE — PROGRESS NOTE ADULT - ASSESSMENT
68 m with DM, HLD, CKD, CAD s/p CABG & stenting, systolic CHF ( Ef 37%) s/p ICD, Afib, Gout, idiopathic cirrhosis, thrombocytopenia,  pituitary adenoma and recent right knee MSSA prepatellar bursitis p/w diarrhea, weakness, and right foot/heel ulcer and purulent drainage, was found to have C-diff and started on PO vanco, the achilles tendon also aspirated and had purulence and tophaceous  material that grew MSSA, the diarrhea is improving but the renal function has been worsening    #Clostridium difficile colitis  * on PO vanco and clinically resolved, leukocytosis also resolved, will c/w PO vanco until a week after finishing the course of antibiotics    #achilles gouty tendinopathy and MSSA abscess and skin infection with bursitis s/p OR debridement and washout, OR cxs also MSSA  * increase cefazolin to 1 g q 8 as the renal function imrpoved, started 8/6 now day 7  * low suspicions for residual infection as per podiartry  * when ready for DC can switch to Keflex 500 PO q6 through 8/19

## 2018-08-13 NOTE — PROGRESS NOTE ADULT - PROBLEM SELECTOR PROBLEM 2
CKD (chronic kidney disease), stage III
C. difficile colitis
CKD (chronic kidney disease), stage III
Right foot ulcer, with unspecified severity
Right foot ulcer, with unspecified severity
C. difficile colitis

## 2018-08-13 NOTE — PROGRESS NOTE ADULT - PROBLEM SELECTOR PLAN 2
s/p 3 week IV abx for septic bursitis prior to this visit  + cdiff  Continue Vancomycin 125 mg L6mgbol, stools are formed  ID [place] (House) appreciated. well known to house service.  Plan for 2 weeks course of Ancef IV per ID.
Serum creatinine baseline around 1.5 apparently  See above
s/p 3 week IV abx for septic bursitis, found to have leukocytosis, acute on chronic RF and + cdiff  hemodynamically stable   IVF as above.    Continue Vancomycin 125 mg QID, stools fomred.   BRENDA brink (House) appreciated. well known to house service.
Podiatry consult appreciated  right foot xray: no signs of OM   Pt is not a candidate for MRI due to ICD  CT scan with IV contrast. renal Jody Tate consulted.   continue IV abx. gentle IVF for renal protection.
Serum creatinine baseline around 1.5 apparently  See above
Serum creatinine baseline around 1.5mg/dl apparently  See above
s/p 3 week IV abx for septic bursitis prior to this visit  + cdiff  Continue Vancomycin 125 mg [id number] (last day 8/23), stools are formed  ID [place] (House) appreciated. well known to house service.  Plan for 2 weeks course of Ancef IV (Day 7 of 14) per ID.
s/p 3 week IV abx for septic bursitis, found to have leukocytosis, acute on chronic RF and + cdiff  hemodynamically stable   IVF as above.    Continue Vancomycin 125 mg QID, stools fomred.   BRENDA brink (House) appreciated. well known to house service.
s/p 3 week IV abx for septic bursitis, found to have leukocytosis, acute on chronic RF and + cdiff  hemodynamically stable   IVF as above.    Continue Vancomycin 125 mg QID, stools fomred.   ID [place] (House) appreciated. well known to house service.  Plan for 2 weeks course of Ancef IV per ID.
Podiatry consult appreciated  right foot xray: no signs of OM   Pt is not a candidate for MRI due to ICD  Pending CT scan with IV contrast. renal Jody Tate consulted.   continue IV abx. gentle IVF for renal protection.
s/p 3 week IV abx for septic bursitis, found to have leukocytosis, acute on chronic RF and + cdiff  hemodynamically stable   IVF as above.    Continue Vancomycin 125 mg QID, stools fomred.   ID [place] (House) appreciated. well known to house service.  Plan for 2 weeks course of Ancef IV per ID.
s/p 3 week IV abx for septic bursitis, found to have leukocytosis, acute on chronic RF and + cdiff  hemodynamically stable   IVF as above.    Continue Vancomycin 125 mg QID  ID eval (House) appreciated. well known to house service.
s/p 3 week IV abx for septic bursitis, found to have leukocytosis, acute on chronic RF and + cdiff  hemodynamically stable   IVF as above.    Continue Vancomycin 125 mg QID, stools fomred.   ID [place] (House) appreciated. well known to house service.  Plan for 2 weeks course of Ancef IV per ID.

## 2018-08-13 NOTE — PROGRESS NOTE ADULT - ASSESSMENT
Assessment and Plan:   		  68 year old male with ICM EF 40%, CABG, HTN, DM II, HLD, pituitary tumor, paroxysmal atrial tachycardia, paroxysmal ventricular tackycardia, ICD admitted with C. Diff. Has recent sustained atrial tachycardia and reverted to sinus and need to maintain on his cardiac regimen in effort to maintain, but no longer on anticoagulation. Creatinine declined to baseline.    Ischemic cardiomyopathy    maintain carvedilol at decreased dose 12.5 mg BID in effort to increase blood pressure and allow improved renal perfusion and discharge on this dose    Ramipril 2.5 mg changed to lisinopril 2.5 mg in hospital, but discontinued due to JULIANNE, remain off until renal function fully recovers.    Spironolactone 25 mg BID resumed, discharge on this dose.    furosemide stopped due to JULIANNE which has recovered and manifesting mild volume accumulation, resumed furosemide 40 mg daily and discharge on this dose.    Continue aspirin 81 mg    Paroxysmal atrial tachycardia    manage by best treating ischemic cardiomyopathy and avoiding volume overload    If possible would not hold carvedilol doses, now at lower dose    no anticoagulation    Paroxysmal ventricular tachycardia    Has ICD.    Chronic thrombocytopenia    Preferable to not be on anticoagulation    JULIANNE / CKD    JULIANNE recovered, renal function essentially at baseline    Resume diuretic and aldosterone antagonist today. Avoided potentially nephrotoxic medications, but on discharge should resume ACE-i Ramipril 2.5 mg daily which he has at home.    Should probably never receive iodinated contrast for any procedures in future    Right heel, achilles wound, wash out accomplished by podiatry and is maintained on antibiotic.

## 2018-08-13 NOTE — PROGRESS NOTE ADULT - PROBLEM SELECTOR PLAN 1
Creatinine worsening  No protein in urine, low FeNa and low FeBUN suggesting damage is pre-renal  Most likely multifactorial with JOHN contributing  I took the liberty of changing his IVF to D5W with bicarb @60cc/hr for 24hrs to treat worsening acidosis due to JULIANNE  Daily BMP  Dose meds to GFR <15 for now  Avoid nephrotoxins  Will follow
Prerenal from diarrhea, fluid loss, vs. ATN sepsis/contrast.   Started on bicarb drip. continue per renal.   renal f/u appreciated. hold diuretics.  Avoid renal toxic medications.   continue to monitor Cr.  bladder scan with low PVR. not retaining.  US renal without hydronephrosis.  hopefully Cr will plateau and descend.
Creatinine worsening  No protein in urine, low FeNa and low FeBUN suggesting damage is pre-renal  Most likely multifactorial with JOHN contributing  Continue IV hydration, would increase NS to 75cc/hr  Daily BMP  Dose meds to GFR <15 for now  Avoid nephrotoxins  Will follow
Likely prerenal from diarrhea, fluid loss.   s/p  ml and then IVF to 75 cc/hr. Not improved.  Becoming acidosis.   Started on bicarb drip today.   renal f/u appreciated. hold diuretics.  Avoid renal toxic medications.   continue to monitor Cr.  check bladder scan to make sure he is not retaining.
Patient likely with ATN 2/2 sepsis/hypotension/contrast  cr now continues to improve  c/w lasix/spirinolactone as above  trend bmp  keep off nephrotoxins
Patient likely with ATN 2/2 sepsis/hypotension/contrast  cr now continues to improve  c/w lasix/spirinolactone as above  trend bmp  keep off nephrotoxins
Patient likely with ATN 2/2 sepsis/hypotension/contrast  cr now continues to improved  agree with cardio starting lasix/spirinolactone given inc lower etx edema  trend bmp  keep off nephrotoxins
Patient likely with ATN 2/2 sepsis/hypotension/contrast  currently off all diuretics  cr continues to rise, hopefully plateuing- asked for strict I/o;s for now  dec  d5 + 150meq hco3 @ 60cc/hr to 40cc/hr  monitor urineoutput and keep offnephrotoxins  trend bmp daily
Patient likely with ATN 2/2 sepsis/hypotension/contrast  currently off all diuretics  cr continues to rise- asked for strict I/o;s for now  will c/w d5 + 150meq hco3 @ 60cc/hr for another 24 hours  check bladder scan to rule out obstruction  monitor urineoutput and keep offnephrotoxins  trend bmp daily
Patient likely with ATN 2/2 sepsis/hypotension/contrast  currently off all diuretics  cr now improving- likely resolving atn  d/c ivf and continue to hold diuretics for now  monitor urineoutput and keep offnephrotoxins  trend bmp daily
Prerenal from diarrhea, fluid loss, vs. ATN sepsis/contrast.   Cr improved  renal f/u appreciated.   Avoid renal toxic medications.   bladder scan with low PVR. not retaining.  US renal without hydronephrosis.
Prerenal from diarrhea, fluid loss, vs. ATN sepsis/contrast.   c/w bicarb drip.   renal f/u appreciated. hold diuretics.  Avoid renal toxic medications.   continue to monitor Cr.  bladder scan with low PVR. not retaining.  US renal without hydronephrosis.
s/p 3 week IV abx for septic bursitis, found to have leukocytosis, acute on chronic RF and + cdiff  hemodynamically stable   s/p  ml.   Continue Vancomycin 125 mg QID  ID [place] (House) appreciated. well known to house service.
s/p 3 week IV abx for septic bursitis, found to have leukocytosis, acute on chronic RF and + cdiff  hemodynamically stable   s/p  ml   Continue Vancomycin 125 mg QID  ID [place] (House) appreciated. well known to house service.
Prerenal from diarrhea, fluid loss, vs. ATN sepsis/contrast.   Cr still improving  renal f/u appreciated.  still holding diuretics.  Avoid renal toxic medications.   bladder scan with low PVR. not retaining.  US renal without hydronephrosis.
Prerenal from diarrhea, fluid loss, vs. ATN sepsis/contrast.   Cr improved  renal f/u appreciated.   Avoid renal toxic medications.   bladder scan with low PVR. not retaining.  US renal without hydronephrosis.
Likely prerenal from diarrhea, fluid loss.   Cr worsened although intially improved.   s/p  ml. will increase IVF to 75 cc/hr.   renal f/u appreciated. hold Lasix.   Avoid renal toxic medications. monitor Cr.
Prerenal from diarrhea, fluid loss, vs. ATN sepsis/contrast.   c/w bicarb drip.   renal f/u appreciated. hold diuretics.  Avoid renal toxic medications.   continue to monitor Cr.  bladder scan with low PVR. not retaining.  US renal without hydronephrosis.  hopefully Cr will plateau and descend.

## 2018-08-14 LAB — SURGICAL PATHOLOGY STUDY: SIGNIFICANT CHANGE UP

## 2018-08-15 ENCOUNTER — APPOINTMENT (OUTPATIENT)
Dept: RHEUMATOLOGY | Facility: CLINIC | Age: 68
End: 2018-08-15
Payer: COMMERCIAL

## 2018-08-15 VITALS
HEART RATE: 75 BPM | WEIGHT: 203 LBS | DIASTOLIC BLOOD PRESSURE: 45 MMHG | SYSTOLIC BLOOD PRESSURE: 87 MMHG | HEIGHT: 72 IN | TEMPERATURE: 98.3 F | OXYGEN SATURATION: 98 % | BODY MASS INDEX: 27.5 KG/M2

## 2018-08-15 LAB
CULTURE RESULTS: SIGNIFICANT CHANGE UP
ORGANISM # SPEC MICROSCOPIC CNT: SIGNIFICANT CHANGE UP
ORGANISM # SPEC MICROSCOPIC CNT: SIGNIFICANT CHANGE UP
RHEUMATOID FACT SER QL: <10 IU/ML
SPECIMEN SOURCE: SIGNIFICANT CHANGE UP
URATE SERPL-MCNC: 5.7 MG/DL

## 2018-08-15 PROCEDURE — 99204 OFFICE O/P NEW MOD 45 MIN: CPT

## 2018-08-15 RX ORDER — GLIMEPIRIDE 4 MG/1
4 TABLET ORAL DAILY
Refills: 0 | Status: DISCONTINUED | COMMUNITY
Start: 2017-09-05 | End: 2018-08-15

## 2018-08-15 RX ORDER — HYDROCORTISONE VALERATE 2 MG/G
0.2 CREAM TOPICAL
Qty: 60 | Refills: 0 | Status: DISCONTINUED | COMMUNITY
Start: 2018-05-16 | End: 2018-08-15

## 2018-08-15 RX ORDER — METOLAZONE 5 MG/1
5 TABLET ORAL
Qty: 30 | Refills: 0 | Status: DISCONTINUED | COMMUNITY
Start: 2018-03-02 | End: 2018-08-15

## 2018-08-16 LAB
CCP AB SER IA-ACNC: 9 UNITS
RF+CCP IGG SER-IMP: NEGATIVE

## 2018-08-18 LAB — G6PD SER-CCNC: 19.6 U/G HGB

## 2018-08-20 ENCOUNTER — MOBILE ON CALL (OUTPATIENT)
Age: 68
End: 2018-08-20

## 2018-08-28 ENCOUNTER — MEDICATION RENEWAL (OUTPATIENT)
Age: 68
End: 2018-08-28

## 2018-09-04 ENCOUNTER — NON-APPOINTMENT (OUTPATIENT)
Age: 68
End: 2018-09-04

## 2018-09-04 ENCOUNTER — APPOINTMENT (OUTPATIENT)
Dept: CARDIOLOGY | Facility: CLINIC | Age: 68
End: 2018-09-04
Payer: COMMERCIAL

## 2018-09-04 VITALS
HEIGHT: 72 IN | BODY MASS INDEX: 26.41 KG/M2 | HEART RATE: 87 BPM | SYSTOLIC BLOOD PRESSURE: 92 MMHG | OXYGEN SATURATION: 99 % | DIASTOLIC BLOOD PRESSURE: 55 MMHG | WEIGHT: 195 LBS

## 2018-09-04 PROCEDURE — 93000 ELECTROCARDIOGRAM COMPLETE: CPT

## 2018-09-04 PROCEDURE — 99215 OFFICE O/P EST HI 40 MIN: CPT

## 2018-09-04 RX ORDER — VANCOMYCIN HYDROCHLORIDE 250 MG/1
250 CAPSULE ORAL
Refills: 0 | Status: COMPLETED | COMMUNITY
Start: 2018-08-15 | End: 2018-09-04

## 2018-09-05 LAB
ANION GAP SERPL CALC-SCNC: 14 MMOL/L
BUN SERPL-MCNC: 32 MG/DL
CALCIUM SERPL-MCNC: 9.1 MG/DL
CHLORIDE SERPL-SCNC: 97 MMOL/L
CO2 SERPL-SCNC: 25 MMOL/L
CREAT SERPL-MCNC: 1.28 MG/DL
GLUCOSE SERPL-MCNC: 298 MG/DL
POTASSIUM SERPL-SCNC: 5.2 MMOL/L
SODIUM SERPL-SCNC: 136 MMOL/L

## 2018-09-06 ENCOUNTER — INPATIENT (INPATIENT)
Facility: HOSPITAL | Age: 68
LOS: 3 days | Discharge: HOME | DRG: 617 | End: 2018-09-10
Attending: INTERNAL MEDICINE | Admitting: INTERNAL MEDICINE
Payer: COMMERCIAL

## 2018-09-06 VITALS
RESPIRATION RATE: 18 BRPM | TEMPERATURE: 98 F | OXYGEN SATURATION: 97 % | SYSTOLIC BLOOD PRESSURE: 99 MMHG | HEART RATE: 90 BPM | DIASTOLIC BLOOD PRESSURE: 67 MMHG | HEIGHT: 73 IN | WEIGHT: 194.01 LBS

## 2018-09-06 DIAGNOSIS — Z89.9 ACQUIRED ABSENCE OF LIMB, UNSPECIFIED: Chronic | ICD-10-CM

## 2018-09-06 DIAGNOSIS — E11.628 TYPE 2 DIABETES MELLITUS WITH OTHER SKIN COMPLICATIONS: ICD-10-CM

## 2018-09-06 LAB
ALBUMIN SERPL ELPH-MCNC: 3.4 G/DL — SIGNIFICANT CHANGE UP (ref 3.3–5)
ALP SERPL-CCNC: 256 U/L — HIGH (ref 40–120)
ALT FLD-CCNC: 30 U/L — SIGNIFICANT CHANGE UP (ref 10–45)
ANION GAP SERPL CALC-SCNC: 14 MMOL/L — SIGNIFICANT CHANGE UP (ref 5–17)
APTT BLD: 27.7 SEC — SIGNIFICANT CHANGE UP (ref 27.5–37.4)
AST SERPL-CCNC: 35 U/L — SIGNIFICANT CHANGE UP (ref 10–40)
BASOPHILS # BLD AUTO: 0 K/UL — SIGNIFICANT CHANGE UP (ref 0–0.2)
BASOPHILS NFR BLD AUTO: 0.4 % — SIGNIFICANT CHANGE UP (ref 0–2)
BILIRUB SERPL-MCNC: 0.4 MG/DL — SIGNIFICANT CHANGE UP (ref 0.2–1.2)
BLD GP AB SCN SERPL QL: NEGATIVE — SIGNIFICANT CHANGE UP
BUN SERPL-MCNC: 34 MG/DL — HIGH (ref 7–23)
CALCIUM SERPL-MCNC: 7.7 MG/DL — LOW (ref 8.4–10.5)
CHLORIDE SERPL-SCNC: 95 MMOL/L — LOW (ref 96–108)
CO2 SERPL-SCNC: 22 MMOL/L — SIGNIFICANT CHANGE UP (ref 22–31)
CREAT SERPL-MCNC: 1.12 MG/DL — SIGNIFICANT CHANGE UP (ref 0.5–1.3)
CRP SERPL-MCNC: 1.17 MG/DL — HIGH (ref 0–0.4)
EOSINOPHIL # BLD AUTO: 0 K/UL — SIGNIFICANT CHANGE UP (ref 0–0.5)
EOSINOPHIL NFR BLD AUTO: 0.9 % — SIGNIFICANT CHANGE UP (ref 0–6)
ERYTHROCYTE [SEDIMENTATION RATE] IN BLOOD: 46 MM/HR — HIGH (ref 0–20)
GAS PNL BLDV: SIGNIFICANT CHANGE UP
GAS PNL BLDV: SIGNIFICANT CHANGE UP
GLUCOSE BLDC GLUCOMTR-MCNC: 293 MG/DL — HIGH (ref 70–99)
GLUCOSE BLDC GLUCOMTR-MCNC: 352 MG/DL — HIGH (ref 70–99)
GLUCOSE SERPL-MCNC: 346 MG/DL — HIGH (ref 70–99)
HCT VFR BLD CALC: 30.3 % — LOW (ref 39–50)
HGB BLD-MCNC: 9.8 G/DL — LOW (ref 13–17)
INR BLD: 1.16 RATIO — SIGNIFICANT CHANGE UP (ref 0.88–1.16)
LACTATE SERPL-SCNC: 2.4 MMOL/L — HIGH (ref 0.7–2)
LYMPHOCYTES # BLD AUTO: 0.5 K/UL — LOW (ref 1–3.3)
LYMPHOCYTES # BLD AUTO: 9.9 % — LOW (ref 13–44)
MCHC RBC-ENTMCNC: 30.4 PG — SIGNIFICANT CHANGE UP (ref 27–34)
MCHC RBC-ENTMCNC: 32.1 GM/DL — SIGNIFICANT CHANGE UP (ref 32–36)
MCV RBC AUTO: 94.6 FL — SIGNIFICANT CHANGE UP (ref 80–100)
MONOCYTES # BLD AUTO: 0.2 K/UL — SIGNIFICANT CHANGE UP (ref 0–0.9)
MONOCYTES NFR BLD AUTO: 3.9 % — SIGNIFICANT CHANGE UP (ref 2–14)
NEUTROPHILS # BLD AUTO: 3.9 K/UL — SIGNIFICANT CHANGE UP (ref 1.8–7.4)
NEUTROPHILS NFR BLD AUTO: 84.8 % — HIGH (ref 43–77)
PLATELET # BLD AUTO: 51 K/UL — LOW (ref 150–400)
POTASSIUM SERPL-MCNC: 5 MMOL/L — SIGNIFICANT CHANGE UP (ref 3.5–5.3)
POTASSIUM SERPL-SCNC: 5 MMOL/L — SIGNIFICANT CHANGE UP (ref 3.5–5.3)
PROT SERPL-MCNC: 6.9 G/DL — SIGNIFICANT CHANGE UP (ref 6–8.3)
PROTHROM AB SERPL-ACNC: 12.7 SEC — SIGNIFICANT CHANGE UP (ref 9.8–12.7)
RBC # BLD: 3.21 M/UL — LOW (ref 4.2–5.8)
RBC # FLD: 15 % — HIGH (ref 10.3–14.5)
RH IG SCN BLD-IMP: POSITIVE — SIGNIFICANT CHANGE UP
SODIUM SERPL-SCNC: 131 MMOL/L — LOW (ref 135–145)
WBC # BLD: 4.6 K/UL — SIGNIFICANT CHANGE UP (ref 3.8–10.5)
WBC # FLD AUTO: 4.6 K/UL — SIGNIFICANT CHANGE UP (ref 3.8–10.5)

## 2018-09-06 PROCEDURE — 73630 X-RAY EXAM OF FOOT: CPT | Mod: 26,RT

## 2018-09-06 PROCEDURE — 99255 IP/OBS CONSLTJ NEW/EST HI 80: CPT | Mod: GC

## 2018-09-06 PROCEDURE — 73590 X-RAY EXAM OF LOWER LEG: CPT | Mod: 26,RT

## 2018-09-06 PROCEDURE — 99285 EMERGENCY DEPT VISIT HI MDM: CPT

## 2018-09-06 RX ORDER — INSULIN LISPRO 100/ML
VIAL (ML) SUBCUTANEOUS AT BEDTIME
Qty: 0 | Refills: 0 | Status: DISCONTINUED | OUTPATIENT
Start: 2018-09-06 | End: 2018-09-08

## 2018-09-06 RX ORDER — DEXTROSE 50 % IN WATER 50 %
15 SYRINGE (ML) INTRAVENOUS ONCE
Qty: 0 | Refills: 0 | Status: DISCONTINUED | OUTPATIENT
Start: 2018-09-06 | End: 2018-09-08

## 2018-09-06 RX ORDER — CARVEDILOL PHOSPHATE 80 MG/1
12.5 CAPSULE, EXTENDED RELEASE ORAL EVERY 12 HOURS
Qty: 0 | Refills: 0 | Status: DISCONTINUED | OUTPATIENT
Start: 2018-09-06 | End: 2018-09-08

## 2018-09-06 RX ORDER — INSULIN LISPRO 100/ML
VIAL (ML) SUBCUTANEOUS
Qty: 0 | Refills: 0 | Status: DISCONTINUED | OUTPATIENT
Start: 2018-09-06 | End: 2018-09-08

## 2018-09-06 RX ORDER — VANCOMYCIN HCL 1 G
1000 VIAL (EA) INTRAVENOUS ONCE
Qty: 0 | Refills: 0 | Status: COMPLETED | OUTPATIENT
Start: 2018-09-06 | End: 2018-09-06

## 2018-09-06 RX ORDER — HEPARIN SODIUM 5000 [USP'U]/ML
5000 INJECTION INTRAVENOUS; SUBCUTANEOUS EVERY 12 HOURS
Qty: 0 | Refills: 0 | Status: DISCONTINUED | OUTPATIENT
Start: 2018-09-06 | End: 2018-09-06

## 2018-09-06 RX ORDER — SODIUM CHLORIDE 9 MG/ML
1000 INJECTION INTRAMUSCULAR; INTRAVENOUS; SUBCUTANEOUS
Qty: 0 | Refills: 0 | Status: DISCONTINUED | OUTPATIENT
Start: 2018-09-06 | End: 2018-09-07

## 2018-09-06 RX ORDER — BROMOCRIPTINE MESYLATE 5 MG/1
5 CAPSULE ORAL DAILY
Qty: 0 | Refills: 0 | Status: DISCONTINUED | OUTPATIENT
Start: 2018-09-06 | End: 2018-09-08

## 2018-09-06 RX ORDER — LISINOPRIL 2.5 MG/1
10 TABLET ORAL DAILY
Qty: 0 | Refills: 0 | Status: DISCONTINUED | OUTPATIENT
Start: 2018-09-06 | End: 2018-09-07

## 2018-09-06 RX ORDER — DEXTROSE 50 % IN WATER 50 %
25 SYRINGE (ML) INTRAVENOUS ONCE
Qty: 0 | Refills: 0 | Status: DISCONTINUED | OUTPATIENT
Start: 2018-09-06 | End: 2018-09-08

## 2018-09-06 RX ORDER — ASPIRIN/CALCIUM CARB/MAGNESIUM 324 MG
81 TABLET ORAL DAILY
Qty: 0 | Refills: 0 | Status: DISCONTINUED | OUTPATIENT
Start: 2018-09-06 | End: 2018-09-08

## 2018-09-06 RX ORDER — OXYCODONE AND ACETAMINOPHEN 5; 325 MG/1; MG/1
1 TABLET ORAL EVERY 12 HOURS
Qty: 0 | Refills: 0 | Status: DISCONTINUED | OUTPATIENT
Start: 2018-09-06 | End: 2018-09-08

## 2018-09-06 RX ORDER — ALLOPURINOL 300 MG
100 TABLET ORAL DAILY
Qty: 0 | Refills: 0 | Status: DISCONTINUED | OUTPATIENT
Start: 2018-09-06 | End: 2018-09-08

## 2018-09-06 RX ORDER — ATORVASTATIN CALCIUM 80 MG/1
40 TABLET, FILM COATED ORAL AT BEDTIME
Qty: 0 | Refills: 0 | Status: DISCONTINUED | OUTPATIENT
Start: 2018-09-06 | End: 2018-09-08

## 2018-09-06 RX ORDER — POTASSIUM CHLORIDE 20 MEQ
20 PACKET (EA) ORAL DAILY
Qty: 0 | Refills: 0 | Status: DISCONTINUED | OUTPATIENT
Start: 2018-09-06 | End: 2018-09-08

## 2018-09-06 RX ORDER — PIPERACILLIN AND TAZOBACTAM 4; .5 G/20ML; G/20ML
3.38 INJECTION, POWDER, LYOPHILIZED, FOR SOLUTION INTRAVENOUS ONCE
Qty: 0 | Refills: 0 | Status: COMPLETED | OUTPATIENT
Start: 2018-09-06 | End: 2018-09-06

## 2018-09-06 RX ORDER — LEVOTHYROXINE SODIUM 125 MCG
125 TABLET ORAL DAILY
Qty: 0 | Refills: 0 | Status: DISCONTINUED | OUTPATIENT
Start: 2018-09-06 | End: 2018-09-08

## 2018-09-06 RX ORDER — SODIUM CHLORIDE 9 MG/ML
1000 INJECTION, SOLUTION INTRAVENOUS
Qty: 0 | Refills: 0 | Status: DISCONTINUED | OUTPATIENT
Start: 2018-09-06 | End: 2018-09-08

## 2018-09-06 RX ORDER — SODIUM CHLORIDE 9 MG/ML
500 INJECTION INTRAMUSCULAR; INTRAVENOUS; SUBCUTANEOUS ONCE
Qty: 0 | Refills: 0 | Status: COMPLETED | OUTPATIENT
Start: 2018-09-06 | End: 2018-09-06

## 2018-09-06 RX ORDER — FUROSEMIDE 40 MG
40 TABLET ORAL DAILY
Qty: 0 | Refills: 0 | Status: DISCONTINUED | OUTPATIENT
Start: 2018-09-06 | End: 2018-09-07

## 2018-09-06 RX ORDER — GLUCAGON INJECTION, SOLUTION 0.5 MG/.1ML
1 INJECTION, SOLUTION SUBCUTANEOUS ONCE
Qty: 0 | Refills: 0 | Status: DISCONTINUED | OUTPATIENT
Start: 2018-09-06 | End: 2018-09-08

## 2018-09-06 RX ORDER — SPIRONOLACTONE 25 MG/1
25 TABLET, FILM COATED ORAL
Qty: 0 | Refills: 0 | Status: DISCONTINUED | OUTPATIENT
Start: 2018-09-06 | End: 2018-09-08

## 2018-09-06 RX ORDER — DEXTROSE 50 % IN WATER 50 %
12.5 SYRINGE (ML) INTRAVENOUS ONCE
Qty: 0 | Refills: 0 | Status: DISCONTINUED | OUTPATIENT
Start: 2018-09-06 | End: 2018-09-08

## 2018-09-06 RX ORDER — PIPERACILLIN AND TAZOBACTAM 4; .5 G/20ML; G/20ML
3.38 INJECTION, POWDER, LYOPHILIZED, FOR SOLUTION INTRAVENOUS EVERY 8 HOURS
Qty: 0 | Refills: 0 | Status: DISCONTINUED | OUTPATIENT
Start: 2018-09-06 | End: 2018-09-08

## 2018-09-06 RX ADMIN — Medication 81 MILLIGRAM(S): at 17:30

## 2018-09-06 RX ADMIN — PIPERACILLIN AND TAZOBACTAM 25 GRAM(S): 4; .5 INJECTION, POWDER, LYOPHILIZED, FOR SOLUTION INTRAVENOUS at 21:24

## 2018-09-06 RX ADMIN — SODIUM CHLORIDE 50 MILLILITER(S): 9 INJECTION INTRAMUSCULAR; INTRAVENOUS; SUBCUTANEOUS at 21:23

## 2018-09-06 RX ADMIN — PIPERACILLIN AND TAZOBACTAM 200 GRAM(S): 4; .5 INJECTION, POWDER, LYOPHILIZED, FOR SOLUTION INTRAVENOUS at 14:05

## 2018-09-06 RX ADMIN — SODIUM CHLORIDE 500 MILLILITER(S): 9 INJECTION INTRAMUSCULAR; INTRAVENOUS; SUBCUTANEOUS at 14:05

## 2018-09-06 RX ADMIN — Medication 250 MILLIGRAM(S): at 15:28

## 2018-09-06 RX ADMIN — Medication 5: at 17:57

## 2018-09-06 RX ADMIN — Medication 1: at 22:05

## 2018-09-06 RX ADMIN — CARVEDILOL PHOSPHATE 12.5 MILLIGRAM(S): 80 CAPSULE, EXTENDED RELEASE ORAL at 17:30

## 2018-09-06 RX ADMIN — SPIRONOLACTONE 25 MILLIGRAM(S): 25 TABLET, FILM COATED ORAL at 22:05

## 2018-09-06 RX ADMIN — Medication 20 MILLIEQUIVALENT(S): at 17:30

## 2018-09-06 RX ADMIN — LISINOPRIL 10 MILLIGRAM(S): 2.5 TABLET ORAL at 17:31

## 2018-09-06 RX ADMIN — ATORVASTATIN CALCIUM 40 MILLIGRAM(S): 80 TABLET, FILM COATED ORAL at 21:24

## 2018-09-06 NOTE — CONSULT NOTE ADULT - SUBJECTIVE AND OBJECTIVE BOX
Patient seen and evaluated at bedside    Chief Complaint: Foot infection     HPI:  69 yo M w/ DM2, CAD s/p CABG and PCIs, ICM w/ EF 35% s/p ICD that was sent in by Dr. Tristan for right foot 3rd digit wound that has been worsening over the past few months. He states that other then his foot he feels well. He has no limitation in exercise capacity. He has not chest pain, edema, orthopnea or palpitations.       PAST MEDICAL & SURGICAL HISTORY:  Hypothyroidism  PVD (peripheral vascular disease)  History of hyperprolactinemia  Hepatic cirrhosis, unspecified hepatic cirrhosis type  Anemia  ICD (implantable cardioverter-defibrillator) in place: Medtronic, Model [id number] ,   Sleep apnea: not using CPAP  History of osteomyelitis: 2015, right foot 2nd toe  Presence of stent in coronary artery: 2 stents  Heart failure with reduced left ventricular function: EF 37%  Ischemic cardiomyopathy  Pituitary adenoma: as per patient chronic, no changes , recently restarted follow up with endocrinologist ( note in allscripts )  Coronary artery disease  Thrombocytopenia: Chronic  HLD (hyperlipidemia)  HTN (hypertension)  DM (diabetes mellitus): type 2, Hg A1C 8.2 % ( 7/10/17)  AICD lead malfunction: history of  History of amputation: right foot, 2nd toe, osteo (06 Sep 2018 15:21)      PMHx:   Hypothyroidism  PVD (peripheral vascular disease)  History of hyperprolactinemia  Hepatic cirrhosis, unspecified hepatic cirrhosis type  Anemia  ICD (implantable cardioverter-defibrillator) in place  Sleep apnea  History of osteomyelitis  Presence of stent in coronary artery  Heart failure with reduced left ventricular function  Ischemic cardiomyopathy  Pituitary adenoma  Coronary artery disease  Thrombocytopenia  HLD (hyperlipidemia)  HTN (hypertension)  DM (diabetes mellitus)  AICD lead malfunction  Shock      PSHx:   History of amputation  AICD (automatic cardioverter/defibrillator) present  Stented coronary artery  Coronary atherosclerosis of artery bypass graft      Allergies:  No Known Allergies      Home Meds:  Spironolactone 25mg  Ramipril 2.5 mg  Niaspan  Metformin   Januvia  Lasix 40 daily.   Atorvastatin 40  ASA    Current Medications:   allopurinol 100 milliGRAM(s) Oral daily  aspirin enteric coated 81 milliGRAM(s) Oral daily  atorvastatin 40 milliGRAM(s) Oral at bedtime  bromocriptine Capsule 5 milliGRAM(s) Oral daily  carvedilol 12.5 milliGRAM(s) Oral every 12 hours  dextrose 40% Gel 15 Gram(s) Oral once PRN  dextrose 5%. 1000 milliLiter(s) IV Continuous <Continuous>  dextrose 50% Injectable 12.5 Gram(s) IV Push once  dextrose 50% Injectable 25 Gram(s) IV Push once  dextrose 50% Injectable 25 Gram(s) IV Push once  furosemide    Tablet 40 milliGRAM(s) Oral daily  glucagon  Injectable 1 milliGRAM(s) IntraMuscular once PRN  insulin lispro (HumaLOG) corrective regimen sliding scale   SubCutaneous three times a day before meals  levothyroxine 125 MICROGram(s) Oral daily  lisinopril 10 milliGRAM(s) Oral daily  oxyCODONE    5 mG/acetaminophen 325 mG 1 Tablet(s) Oral every 12 hours PRN  piperacillin/tazobactam IVPB. 3.375 Gram(s) IV Intermittent every 8 hours  potassium chloride    Tablet ER 20 milliEquivalent(s) Oral daily  sodium chloride 0.9%. 1000 milliLiter(s) IV Continuous <Continuous>  spironolactone 25 milliGRAM(s) Oral two times a day      FAMILY HISTORY:  Family history of MI (myocardial infarction)      Social History: live w/ wife  Smoking History: None  Alcohol Use: None  Drug Use: None    REVIEW OF SYSTEMS:  Constitutional:     [ x] negative [ ] fevers [ ] chills [ ] weight loss [ ] weight gain  HEENT:                  [x ] negative [ ] dry eyes [ ] eye irritation [ ] postnasal drip [ ] nasal congestion  CV:                         [x ] negative  [ ] chest pain [ ] orthopnea [ ] palpitations [ ] murmur  Resp:                     [ x] negative [ ] cough [ ] shortness of breath [ ] dyspnea [ ] wheezing [ ] sputum [ ]hemoptysis  GI:                          [x ] negative [ ] nausea [ ] vomiting [ ] diarrhea [ ] constipation [ ] abd pain [ ] dysphagia   :                        [x ] negative [ ] dysuria [ ] nocturia [ ] hematuria [ ] increased urinary frequency  Musculoskeletal: [x ] negative [ ] back pain [ ] myalgias [ ] arthralgias [ ] fracture  Skin:                       [ x] negative [ ] rash [ ] itch  Neurological:        [x ] negative [ ] headache [ ] dizziness [ ] syncope [ ] weakness [ ] numbness  Psychiatric:           [x ] negative [ ] anxiety [ ] depression  Endocrine:            [x ] negative [ ] diabetes [ ] thyroid problem  Heme/Lymph:      [ x] negative [ ] anemia [ ] bleeding problem  Allergic/Immune: [x ] negative [ ] itchy eyes [ ] nasal discharge [ ] hives [ ] angioedema    [ ] All other systems negative  [ ] Unable to assess ROS because sedated with anoxic brain injury.      Physical Exam:  T(F): 97.8 (09-06), Max: 98 (09-06)  HR: 66 (09-06) (66 - 90)  BP: 97/61 (09-06) (97/59 - 99/67)  RR: 16 (09-06)  SpO2: 100% (09-06)  GENERAL: No acute distress, well-developed  HEAD:  Atraumatic, Normocephalic  ENT: EOMI, PERRLA, conjunctiva and sclera clear, Neck supple, No JVD, moist mucosa  CHEST/LUNG: Clear to auscultation bilaterally; No wheeze, equal breath sounds bilaterally   BACK: No spinal tenderness  HEART: Regular rate and rhythm; No murmurs, rubs, or gallops  ABDOMEN: Soft, Nontender, Nondistended; Bowel sounds present  EXTREMITIES:  No clubbing, cyanosis, or edema  PSYCH: Nl behavior, nl affect  NEUROLOGY: AAOx3, non-focal, cranial nerves intact  SKIN: Normal color, No rashes or lesions  LINES:    Cardiovascular Diagnostic Testing:    ECG: Personally reviewed:    Echo: Personally reviewed:  < from: TTE with Doppler (w/Cont) (12.23.12 @ 11:01) >  ------------------------------------------------------------------------  Conclusions:  1. Tethered mitral valve leaflets. Mild mitral  regurgitation.  2. Severely dilated left atrium. LA volume index = 45  cc/m2.  3. Severe left ventricular enlargement.  4. Endocardial visualization enhanced with intravenous  injection of echo contrast (Definity). Severe global left  ventricular systolic dysfunction. No LV apical thrombus.  5. Right ventricular enlargement with grossly normal right  ventricular systolic function. A device wire is noted in  the right heart.  6. Mild pulmonic regurgitation.  ------------------------------------------------------------------------  Confirmed on 12/23/2012 - 16:32:28 by Renetta Bullard M.D.    < end of copied text >    Stress Testing:  < from: Nuclear Stress Test, Pharmacologic (07.13.11 @ 09:07) >  ------------------------------------------------------------------------  IMPRESSIONS:Abnormal Study  * Chest Pain: No chest pain with exercise.  * Symptom: No Symptom.  * HR Response: Appropriate.  * BP Response: Appropriate.  * Heart Rhythm: Sinus Rhythm - Occassional VPD's - 60 BPM.  * Conduction defects: IVCD.  * Baseline ECG: Nonspecific ST-T wave abnormality.  * ECG Abnormalities: None.  * Arrhythmia: Occasional VPD's, Ventricular Couplets.  * Review of raw data shows: The study is of good technical  quality.  * The left ventricle was markedly dilated at stress. There  are large, severe defects in the inferior and  inferolateral walls, inferoseptal  that are partially  reversible, consistent with infarction and moderate  german-infarct ischemia.  There are  moderate defects in the  distal anterior and anteroapical and apical lateral walls  that are predominantly fixed consistent with infarct with  minimal german-infract ischemia.  * Post-stress gated wall motion analysis was performed  (LVEF = 22 %;LVEDV = 218 ml.), revealing severe global  hypokinesis.  * No previous Nuclear/Stress exam.  ------------------------------------------------------------------------  Confirmed on  7/13/2011 - 15:34:32 by Radha Huynh M.D.  ------------------------------------------------------------------------    < end of copied text >    Cath:    Imaging:    CXR: Personally reviewed    Labs: Personally reviewed                        9.8    4.6   )-----------( 51       ( 06 Sep 2018 14:11 )             30.3     09-06    131<L>  |  95<L>  |  34<H>  ----------------------------<  346<H>  5.0   |  22  |  1.12    Ca    7.7<L>      06 Sep 2018 14:11    TPro  6.9  /  Alb  3.4  /  TBili  0.4  /  DBili  x   /  AST  35  /  ALT  30  /  AlkPhos  256<H>  09-06    PT/INR - ( 06 Sep 2018 14:11 )   PT: 12.7 sec;   INR: 1.16 ratio         PTT - ( 06 Sep 2018 14:11 )  PTT:27.7 sec

## 2018-09-06 NOTE — ED PROVIDER NOTE - PMH
AICD lead malfunction  history of  Anemia    Coronary artery disease    DM (diabetes mellitus)  type 2, Hg A1C 8.2 % ( 7/10/17)  Heart failure with reduced left ventricular function  EF 37%  Hepatic cirrhosis, unspecified hepatic cirrhosis type    History of hyperprolactinemia    History of osteomyelitis  2015, right foot 2nd toe  HLD (hyperlipidemia)    HTN (hypertension)    Hypothyroidism    ICD (implantable cardioverter-defibrillator) in place  Medtronic, Model [id number] , last interrogation 4/2017  Ischemic cardiomyopathy    Pituitary adenoma  as per patient chronic, no changes , recently restarted follow up with endocrinologist ( note in allscripts )  Presence of stent in coronary artery  2 stents  PVD (peripheral vascular disease)    Sleep apnea  not using CPAP  Thrombocytopenia  Chronic

## 2018-09-06 NOTE — ED PROVIDER NOTE - CARE PLAN
Principal Discharge DX:	Diabetic foot infection Principal Discharge DX:	Diabetic foot infection  Goal:	R 2nd toe

## 2018-09-06 NOTE — ED PROVIDER NOTE - SKIN, MLM
RLE: ulceration to plantar surface of 3rd toe, no active drainage, surrounding erythema of toe and calf with swelling. No crepitus. Palpable distal pulse.

## 2018-09-06 NOTE — ED ADULT NURSE NOTE - OBJECTIVE STATEMENT
67 yo male with PMH CAD s/p CABG, PPM, DM2, sent to ED by his podiatrist for admission for infected wound to right third toe. Wound present x3 months, pt has been on antibiotics and had been feeling well, he was seen in office for routine follow up and then was sent to ED after evaluation. Pt denies pain and fever, no difficulty ambulating. Also reports recent infections to right knee and right aichilles that have been healing well. Podiatry at bedside

## 2018-09-06 NOTE — ED PROVIDER NOTE - MEDICAL DECISION MAKING DETAILS
68 M sent for admission for diabetic foot ulcer and possible OM. Per podiatry, vanc/zosyn, and admit to [facility]. Will obtain labs, XR, abx and admit 68 M sent for admission for diabetic foot ulcer and possible OM. Per podiatry, vanc/zosyn, and admit to [facility]. Will obtain labs, XR, abx and admit  Attending Statement: Agree with the above.  Diabetic ulcer of foot, does not meet sepsis criteria.  Nontoxic.  Requires vanc/zos and admission for likely resection as per podiatry.  --BMM

## 2018-09-06 NOTE — CONSULT NOTE ADULT - SUBJECTIVE AND OBJECTIVE BOX
Patient is a 68y old  Male who presents with a chief complaint of right foot 3rd digit wound    HPI: 67 yo M sent in by Dr. Tristan for right foot 3rd digit wound.  Patient noted wound on 3rd R toe a few months ago. unsure about what abx he has been on.  Some purulent drainage. Was seen by podiatry today for routine follow up and sent for admission. Patient denies NVFC or SOb.     PAST MEDICAL & SURGICAL HISTORY:  Hypothyroidism  PVD (peripheral vascular disease)  History of hyperprolactinemia  Hepatic cirrhosis, unspecified hepatic cirrhosis type  Anemia  ICD (implantable cardioverter-defibrillator) in place: Loop, Model [id number] , last interrogation 4/2017  Sleep apnea: not using CPAP  History of osteomyelitis: 2015, right foot 2nd toe  Presence of stent in coronary artery: 2 stents  Heart failure with reduced left ventricular function: EF 37%  Ischemic cardiomyopathy  Pituitary adenoma: as per patient chronic, no changes , recently restarted follow up with endocrinologist ( note in allscripts )  Coronary artery disease  Thrombocytopenia: Chronic  HLD (hyperlipidemia)  HTN (hypertension)  DM (diabetes mellitus): type 2, Hg A1C 8.2 % ( 7/10/17)  AICD lead malfunction: history of  History of amputation: right foot, 2nd toe, osteo 2015  AICD (automatic cardioverter/defibrillator) present: placement in (2006)  Stented coronary artery: RCA (1999)  Coronary atherosclerosis of artery bypass graft: CABG X 4 (1986)      MEDICATIONS  (STANDING):  piperacillin/tazobactam IVPB. 3.375 Gram(s) IV Intermittent once  sodium chloride 0.9% Bolus 500 milliLiter(s) IV Bolus once  vancomycin  IVPB 1000 milliGRAM(s) IV Intermittent once    MEDICATIONS  (PRN):      Allergies    No Known Allergies    Intolerances        VITALS:    Vital Signs Last 24 Hrs  T(C): 36.7 (06 Sep 2018 12:54), Max: 36.7 (06 Sep 2018 12:54)  T(F): 98 (06 Sep 2018 12:54), Max: 98 (06 Sep 2018 12:54)  HR: 86 (06 Sep 2018 12:54) (86 - 90)  BP: 97/59 (06 Sep 2018 12:54) (97/59 - 99/67)  BP(mean): --  RR: 17 (06 Sep 2018 12:54) (17 - 18)  SpO2: 98% (06 Sep 2018 12:54) (97% - 98%)    LABS:                CAPILLARY BLOOD GLUCOSE              LOWER EXTREMITY PHYSICAL EXAM:    Vascular: DP/PT _/4, B/L, CFT <_ seconds B/L, Temperature gradient _, B/L.   Neuro: Epicritic sensation _ to the level of _, B/L.  Musculoskeletal/Ortho:  Skin:  Wound #1:   Location:  Size:  Depth:  Wound bed:   Drainage:   Odor:   Periwound:  Etiology:     RADIOLOGY & ADDITIONAL STUDIES: Patient is a 68y old  Male who presents with a chief complaint of right foot 3rd digit wound    HPI: 69 yo M sent in by Dr. Tristan for right foot 3rd digit wound.  Patient noted wound on 3rd R toe a few months ago. unsure about what abx he has been on.  Some purulent drainage. Was seen by podiatry today for routine follow up and sent for admission. Patient denies NVFC or SOb.     PAST MEDICAL & SURGICAL HISTORY:  Hypothyroidism  PVD (peripheral vascular disease)  History of hyperprolactinemia  Hepatic cirrhosis, unspecified hepatic cirrhosis type  Anemia  ICD (implantable cardioverter-defibrillator) in place: [employer], Model [id number] , last interrogation 4/2017  Sleep apnea: not using CPAP  History of osteomyelitis: 2015, right foot 2nd toe  Presence of stent in coronary artery: 2 stents  Heart failure with reduced left ventricular function: EF 37%  Ischemic cardiomyopathy  Pituitary adenoma: as per patient chronic, no changes , recently restarted follow up with endocrinologist ( note in allscripts )  Coronary artery disease  Thrombocytopenia: Chronic  HLD (hyperlipidemia)  HTN (hypertension)  DM (diabetes mellitus): type 2, Hg A1C 8.2 % ( 7/10/17)  AICD lead malfunction: history of  History of amputation: right foot, 2nd toe, osteo 2015  AICD (automatic cardioverter/defibrillator) present: placement in (2006)  Stented coronary artery: RCA (1999)  Coronary atherosclerosis of artery bypass graft: CABG X 4 (1986)      MEDICATIONS  (STANDING):  piperacillin/tazobactam IVPB. 3.375 Gram(s) IV Intermittent once  sodium chloride 0.9% Bolus 500 milliLiter(s) IV Bolus once  vancomycin  IVPB 1000 milliGRAM(s) IV Intermittent once    MEDICATIONS  (PRN):      Allergies    No Known Allergies    Intolerances        VITALS:    Vital Signs Last 24 Hrs  T(C): 36.7 (06 Sep 2018 12:54), Max: 36.7 (06 Sep 2018 12:54)  T(F): 98 (06 Sep 2018 12:54), Max: 98 (06 Sep 2018 12:54)  HR: 86 (06 Sep 2018 12:54) (86 - 90)  BP: 97/59 (06 Sep 2018 12:54) (97/59 - 99/67)  BP(mean): --  RR: 17 (06 Sep 2018 12:54) (17 - 18)  SpO2: 98% (06 Sep 2018 12:54) (97% - 98%)    LABS:                CAPILLARY BLOOD GLUCOSE              LOWER EXTREMITY PHYSICAL EXAM:    Vascular: DP/PT 2/4, B/L, CFT <3 seconds B/L, Temperature gradient warm, B/L.   Neuro: Epicritic sensation absent to b/l feet  Musculoskeletal/Ortho: right foot partial 2nd digit amp  Skin:   Wound #1: right foot distal 3rd digit wound  Depth: probes to bone  Wound bed: bone, subcutantous tissue  Drainage: scant purulence  Odor: none  Periwound: hyperkeratotic and erythematous w/ edema focal to the 3rd digit, no ascending streaking  Etiology: 2/2 to hammered digit    Wound #2: posteromedial right achilles tendon, to the level of subcutanous tissue, does not probe or undermine, fibrogranular wound bed, no drainage, no malodor, no erythema, no signs of infection    RADIOLOGY & ADDITIONAL STUDIES:  < from: Xray Foot AP + Lateral + Oblique, Right (08.01.18 @ 16:29) >  NTERPRETATION:      CLINICAL INDICATION: Wound of the second digit and heel. Question   subcutaneous emphysema and osteomyelitis.  TECHNIQUE: AP, oblique, and lateral radiographs of the right ankle. AP,   oblique, and lateral radiographs of the right foot.    COMPARISON: Right foot radiographs 19 June 2015.    FINDINGS:    Smallsoft tissue ulceration is identified at the tip of the second toe.   No additional soft tissue ulcerations are identified. No cortical   destruction or periosteal new bone formation. Plantar calcaneal heel   spur. Enthesopathy at the insertion of theAchilles tendon on the   calcaneus. Severe degenerative change of the IP joint of the hallux with   osseous remodeling of the phalanges, increased in severity when compared   to prior study. Patient status post partial surgical amputation of the   second toe to the level of the proximal phalanx. The surgical margin is   sharp. The ankle mortise is congruent. Normal alignment of the   tarsometatarsal joints. Diffuse thickening of the Achilles tendon,   suggestive of Achilles tendinopathy or partial-thickness tearing.   Vascular calcifications. No subcutaneous emphysema. No radiopaque foreign   body.        IMPRESSION:  1.  No radiographic evidence of advanced osteomyelitis.  2.  No subcutaneous emphysema.      < end of copied text >

## 2018-09-06 NOTE — H&P ADULT - ASSESSMENT
68 y.o male with   PMHx of CAD s/p CABG & stents,    systolic CHF ( Ef 37%) s/p ICD,  c/c  anemia   Afib, Gout, idiopathic cirrhosis, thrombocytopenia, CKD stage 3, DM2, HLD, pituitary adenoma ,   right knee septic bursitis    admitted  with   right foot ulcer / plantar  3rd   toe  ulcer   seen by podiatry in er.  on  zosyn  awaiting partial  right 3rd  toe amputation   dvt ppx   wound  care per podiatry  normal wbc 68 y.o male with   PMHx of CAD s/p CABG & stents,    systolic CHF ( Ef 37%) s/p ICD,  c/c  anemia,  right second  toe ampuatation   Afib, Gout, idiopathic cirrhosis, thrombocytopenia, CKD stage 3, DM2, HLD, pituitary adenoma ,   right knee septic bursitis    admitted  with   right foot ulcer / plantar  3rd   toe  ulcer/ osteomyelitis     seen by podiatry in er.  on  zosyn  awaiting partial  right 3rd  toe amputation   dvt ppx   wound  care per podiatry  normal wbc   card clearance/  house 68 y.o male with   PMHx of CAD s/p CABG & stents,    systolic CHF ( Ef 37%) s/p ICD,  c/c  anemia,  right second  toe ampuatation   Afib, Gout, idiopathic cirrhosis, thrombocytopenia, CKD stage 3, DM2, HLD, pituitary adenoma ,   right knee septic bursitis    admitted  with   right foot ulcer / plantar  3rd   toe  ulcer/ osteomyelitis     seen by podiatry in er.  on  zosyn  awaiting partial  right 3rd  toe amputation   dvt ppx   wound  care per podiatry  normal wbc  hyponatremia, from hypergycemia,  dm/ endo  dr chau called   card clearance/  house

## 2018-09-06 NOTE — H&P ADULT - PMH
AICD lead malfunction  history of  Anemia    Coronary artery disease    DM (diabetes mellitus)  type 2, Hg A1C 8.2 % ( 7/10/17)  Heart failure with reduced left ventricular function  EF 37%  Hepatic cirrhosis, unspecified hepatic cirrhosis type    History of hyperprolactinemia    History of osteomyelitis  2015, right foot 2nd toe  HLD (hyperlipidemia)    HTN (hypertension)    Hypothyroidism    ICD (implantable cardioverter-defibrillator) in place  Medtronic, Model [id number] , last interrogation 4/2017  Ischemic cardiomyopathy    Pituitary adenoma  as per patient chronic, no changes , recently restarted follow up with endocrinologist ( note in allscripts )  Presence of stent in coronary artery  2 stents  PVD (peripheral vascular disease)    Sleep apnea  not using CPAP  Thrombocytopenia  Chronic

## 2018-09-06 NOTE — H&P ADULT - NSHPPHYSICALEXAM_GEN_ALL_CORE
PHYSICAL EXAMINATION:  Vital Signs Last 24 Hrs  T(C): 36.7 (06 Sep 2018 12:54), Max: 36.7 (06 Sep 2018 12:54)  T(F): 98 (06 Sep 2018 12:54), Max: 98 (06 Sep 2018 12:54)  HR: 86 (06 Sep 2018 12:54) (86 - 90)  BP: 97/59 (06 Sep 2018 12:54) (97/59 - 99/67)  BP(mean): --  RR: 17 (06 Sep 2018 12:54) (17 - 18)  SpO2: 98% (06 Sep 2018 12:54) (97% - 98%)  CAPILLARY BLOOD GLUCOSE            GENERAL: NAD, well-groomed,  HEAD:  atraumatic, normocephalic  EYES: sclera anicteric  ENMT: mucous membranes moist  NECK: supple, No JVD  CHEST/LUNG: clear to auscultation bilaterally;    no      rales   ,   no rhonchi,   HEART: normal S1, S2  ABDOMEN: BS+, soft, ND, NT   EXTREMITIES:    plantar ulcer,  3rd  right toe  NEURO: awake, ,     moves all extremities  SKIN: no     rash

## 2018-09-06 NOTE — H&P ADULT - HISTORY OF PRESENT ILLNESS
: 67 yo M sent in by Dr. Tristan for right foot 3rd digit wound.    Patient noted wound on 3rd R toe a few months ago. unsure about what abx he has been on. , has  purulent drainage.   Was seen by podiatry today for routine follow up and sent for admission. Patient denies cp/ SOb./ adb pain/ fevers     PAST MEDICAL & SURGICAL HISTORY:  Hypothyroidism  PVD (peripheral vascular disease)  History of hyperprolactinemia  Hepatic cirrhosis, unspecified hepatic cirrhosis type  Anemia  ICD (implantable cardioverter-defibrillator) in place: Medtronic, Model [id number] ,   Sleep apnea: not using CPAP  History of osteomyelitis: 2015, right foot 2nd toe  Presence of stent in coronary artery: 2 stents  Heart failure with reduced left ventricular function: EF 37%  Ischemic cardiomyopathy  Pituitary adenoma: as per patient chronic, no changes , recently restarted follow up with endocrinologist ( note in allscripts )  Coronary artery disease  Thrombocytopenia: Chronic  HLD (hyperlipidemia)  HTN (hypertension)  DM (diabetes mellitus): type 2, Hg A1C 8.2 % ( 7/10/17)  AICD lead malfunction: history of  History of amputation: right foot, 2nd toe, osteo 2015  AICD (automatic cardiover : 69 yo M sent in by Dr. Tristan for right foot 3rd digit wound.    Patient noted wound on 3rd R toe a few months ago. unsure about what abx he has been on. , has  purulent drainage.   Was seen by podiatry today for routine follow up and sent for admission. Patient denies cp/ SOb./ adb pain/ fevers     PAST MEDICAL & SURGICAL HISTORY:  Hypothyroidism  PVD (peripheral vascular disease)  History of hyperprolactinemia  Hepatic cirrhosis, unspecified hepatic cirrhosis type  Anemia  ICD (implantable cardioverter-defibrillator) in place: Medtronic, Model [id number] ,   Sleep apnea: not using CPAP  History of osteomyelitis: 2015, right foot 2nd toe  Presence of stent in coronary artery: 2 stents  Heart failure with reduced left ventricular function: EF 37%  Ischemic cardiomyopathy  Pituitary adenoma: as per patient chronic, no changes , recently restarted follow up with endocrinologist ( note in allscripts )  Coronary artery disease  Thrombocytopenia: Chronic  HLD (hyperlipidemia)  HTN (hypertension)  DM (diabetes mellitus): type 2, Hg A1C 8.2 % ( 7/10/17)  AICD lead malfunction: history of  History of amputation: right foot, 2nd toe, osteo

## 2018-09-06 NOTE — ED PROVIDER NOTE - OBJECTIVE STATEMENT
68 M h/o CAD s/p CABG and stent, PPM, DM on oral agents, systolic HF, sent from podiatry (Dr Tristan) for admission for diabetic foot infection and possible OM. Patient noted wound on 3rd R toe a few months ago. unsure about what abx he has been on.  Occ purulent drainage. Also with worsening calf swelling. Was seen by podiatry today for routine follow up and sent for admission. Patient denies fevers/chills/CP/SOB/abd pain/diarrhea/vomiting.

## 2018-09-06 NOTE — H&P ADULT - NSHPLABSRESULTS_GEN_ALL_CORE
LABS:                        9.8    4.6   )-----------( 51       ( 06 Sep 2018 14:11 )             30.3     09-06    131<L>  |  95<L>  |  34<H>  ----------------------------<  346<H>  5.0   |  22  |  1.12    Ca    7.7<L>      06 Sep 2018 14:11    TPro  6.9  /  Alb  3.4  /  TBili  0.4  /  DBili  x   /  AST  35  /  ALT  30  /  AlkPhos  256<H>  09-06    PT/INR - ( 06 Sep 2018 14:11 )   PT: 12.7 sec;   INR: 1.16 ratio         PTT - ( 06 Sep 2018 14:11 )  PTT:27.7 sec            09-06 @ 14:11  4.8  30    Hemoglobin A1C, Whole Blood: 6.7 % (08-02 @ 07:40)

## 2018-09-06 NOTE — CONSULT NOTE ADULT - SUBJECTIVE AND OBJECTIVE BOX
CHIEF COMPLAINT:Patient is a 68y old  Male who presents with a chief complaint of right foot wound (06 Sep 2018 15:21)      HPI:  : 69 yo M sent in by Dr. Tristan for right foot 3rd digit wound.    Patient noted wound on 3rd R toe a few months ago. unsure about what abx he has been on. , has  purulent drainage.   Was seen by podiatry today for routine follow up and sent for admission. Patient denies cp./ adb pain/ fevers  pt with known hx of cad, chf, s/p AICD with class 2 chf symptoms     PAST MEDICAL & SURGICAL HISTORY:  Hypothyroidism  PVD (peripheral vascular disease)  History of hyperprolactinemia  Hepatic cirrhosis, unspecified hepatic cirrhosis type  Anemia  ICD (implantable cardioverter-defibrillator) in place: Medtronic, Model [id number] ,   Sleep apnea: not using CPAP  History of osteomyelitis: 2015, right foot 2nd toe  Presence of stent in coronary artery: 2 stents  Heart failure with reduced left ventricular function: EF 37%  Ischemic cardiomyopathy  Pituitary adenoma: as per patient chronic, no changes , recently restarted follow up with endocrinologist ( note in allscripts )  Coronary artery disease  Thrombocytopenia: Chronic  HLD (hyperlipidemia)  HTN (hypertension)  DM (diabetes mellitus): type 2, Hg A1C 8.2 % ( 7/10/17)  AICD lead malfunction: history of  History of amputation: right foot, 2nd toe, osteo (06 Sep 2018 15:21)      PAST MEDICAL & SURGICAL HISTORY:  Hypothyroidism  PVD (peripheral vascular disease)  History of hyperprolactinemia  Hepatic cirrhosis, unspecified hepatic cirrhosis type  Anemia  ICD (implantable cardioverter-defibrillator) in place: Medtronic, Model [id number] , last interrogation 4/2017  Sleep apnea: not using CPAP  History of osteomyelitis: 2015, right foot 2nd toe  Presence of stent in coronary artery: 2 stents  Heart failure with reduced left ventricular function: EF 37%  Ischemic cardiomyopathy  Pituitary adenoma: as per patient chronic, no changes , recently restarted follow up with endocrinologist ( note in allscripts )  Coronary artery disease  Thrombocytopenia: Chronic  HLD (hyperlipidemia)  HTN (hypertension)  DM (diabetes mellitus): type 2, Hg A1C 8.2 % ( 7/10/17)  AICD lead malfunction: history of  History of amputation: right foot, 2nd toe, osteo 2015  AICD (automatic cardioverter/defibrillator) present: placement in (2006)  Stented coronary artery: RCA (1999)  Coronary atherosclerosis of artery bypass graft: CABG X 4 (1986)      MEDICATIONS  (STANDING):  allopurinol 100 milliGRAM(s) Oral daily  aspirin enteric coated 81 milliGRAM(s) Oral daily  atorvastatin 40 milliGRAM(s) Oral at bedtime  bromocriptine Capsule 5 milliGRAM(s) Oral daily  carvedilol 12.5 milliGRAM(s) Oral every 12 hours  dextrose 5%. 1000 milliLiter(s) (50 mL/Hr) IV Continuous <Continuous>  dextrose 50% Injectable 12.5 Gram(s) IV Push once  dextrose 50% Injectable 25 Gram(s) IV Push once  dextrose 50% Injectable 25 Gram(s) IV Push once  furosemide    Tablet 40 milliGRAM(s) Oral daily  insulin lispro (HumaLOG) corrective regimen sliding scale   SubCutaneous three times a day before meals  levothyroxine 125 MICROGram(s) Oral daily  lisinopril 10 milliGRAM(s) Oral daily  piperacillin/tazobactam IVPB. 3.375 Gram(s) IV Intermittent every 8 hours  potassium chloride    Tablet ER 20 milliEquivalent(s) Oral daily  sodium chloride 0.9%. 1000 milliLiter(s) (50 mL/Hr) IV Continuous <Continuous>  spironolactone 25 milliGRAM(s) Oral two times a day    MEDICATIONS  (PRN):  dextrose 40% Gel 15 Gram(s) Oral once PRN Blood Glucose LESS THAN 70 milliGRAM(s)/deciliter  glucagon  Injectable 1 milliGRAM(s) IntraMuscular once PRN Glucose LESS THAN 70 milligrams/deciliter  oxyCODONE    5 mG/acetaminophen 325 mG 1 Tablet(s) Oral every 12 hours PRN Moderate Pain (4 - 6)      FAMILY HISTORY:  Family history of MI (myocardial infarction)      SOCIAL HISTORY:    [ ] Non-smoker  [ ] Smoker  [ ] Alcohol    Allergies    No Known Allergies    Intolerances    	    REVIEW OF SYSTEMS:  CONSTITUTIONAL: No fever, weight loss, or fatigue  EYES: No eye pain, visual disturbances, or discharge  ENT:  No difficulty hearing, tinnitus, vertigo; No sinus or throat pain  NECK: No pain or stiffness  RESPIRATORY: No cough, wheezing, chills or hemoptysis; + exertional Shortness of Breath  CARDIOVASCULAR: No chest pain, palpitations, passing out, dizziness, or leg swelling  GASTROINTESTINAL: No abdominal or epigastric pain. No nausea, vomiting, or hematemesis; No diarrhea or constipation. No melena or hematochezia.  GENITOURINARY: No dysuria, frequency, hematuria, or incontinence  NEUROLOGICAL: No headaches, memory loss, loss of strength, numbness, or tremors  SKIN: No itching, burning, rashes, or lesions   LYMPH Nodes: No enlarged glands  ENDOCRINE: No heat or cold intolerance; No hair loss  MUSCULOSKELETAL: No joint pain or swelling; No muscle, back,  + purulent dc from 3rd toe extremity pain  PSYCHIATRIC: No depression, anxiety, mood swings, or difficulty sleeping  HEME/LYMPH: No easy bruising, or bleeding gums  ALLERGY AND IMMUNOLOGIC: No hives or eczema	    [ ] All others negative	  [ ] Unable to obtain    PHYSICAL EXAM:  T(C): 36.7 (09-06-18 @ 12:54), Max: 36.7 (09-06-18 @ 12:54)  HR: 86 (09-06-18 @ 12:54) (86 - 90)  BP: 97/59 (09-06-18 @ 12:54) (97/59 - 99/67)  RR: 17 (09-06-18 @ 12:54) (17 - 18)  SpO2: 98% (09-06-18 @ 12:54) (97% - 98%)  Wt(kg): --  I&O's Summary      Appearance: Normal	  HEENT:   Normal oral mucosa, PERRL, EOMI	  Lymphatic: No lymphadenopathy  Cardiovascular: Normal S1 S2, + JVD, + murmurs, No edema  Respiratory: Lungs clear to auscultation	  Psychiatry: A & O x 3, Mood & affect appropriate  Gastrointestinal:  Soft, Non-tender, + BS	  Skin: No rashes, No ecchymoses, No cyanosis	  Neurologic: Non-focal  Extremities: Normal range of motion,  right foot, 2nd toe,right foot 3rd digit wound  vascular can not asses    TELEMETRY: 	    ECG:  	  RADIOLOGY:  OTHER: 	  	  LABS:	 	    CARDIAC MARKERS:                              9.8    4.6   )-----------( 51       ( 06 Sep 2018 14:11 )             30.3     09-06    131<L>  |  95<L>  |  34<H>  ----------------------------<  346<H>  5.0   |  22  |  1.12    Ca    7.7<L>      06 Sep 2018 14:11    TPro  6.9  /  Alb  3.4  /  TBili  0.4  /  DBili  x   /  AST  35  /  ALT  30  /  AlkPhos  256<H>  09-06    proBNP:   Lipid Profile:   HgA1c:   TSH:   PT/INR - ( 06 Sep 2018 14:11 )   PT: 12.7 sec;   INR: 1.16 ratio         PTT - ( 06 Sep 2018 14:11 )  PTT:27.7 sec    PREVIOUS DIAGNOSTIC TESTING:    < from: TTE with Doppler (w/Cont) (12.23.12 @ 11:01) >  1. Tethered mitral valve leaflets. Mild mitral  regurgitation.  2. Severely dilated left atrium. LA volume index = 45  cc/m2.  3. Severe left ventricular enlargement.  4. Endocardial visualization enhanced with intravenous  injection of echo contrast (Definity). Severe global left  ventricular systolic dysfunction. No LV apical thrombus.  5. Right ventricular enlargement with grossly normal right  ventricular systolic function. A device wire is noted in  the right heart.  6. Mild pulmonic regurgitation.

## 2018-09-06 NOTE — ED ADULT NURSE NOTE - PMH
AICD lead malfunction  history of  Anemia    Coronary artery disease    DM (diabetes mellitus)  type 2, Hg A1C 8.2 % ( 7/10/17)  Heart failure with reduced left ventricular function  EF 37%  Hepatic cirrhosis, unspecified hepatic cirrhosis type    History of hyperprolactinemia    History of osteomyelitis  2015, right foot 2nd toe  HLD (hyperlipidemia)    HTN (hypertension)    Hypothyroidism    ICD (implantable cardioverter-defibrillator) in place  Medtronic, Model [id number] , last interrogation 4/2017  Ischemic cardiomyopathy    Pituitary adenoma  as per patient chronic, no changes , recently restarted follow up with endocrinologist ( note in allscripts )  Presence of stent in coronary artery  2 stents  PVD (peripheral vascular disease)    Sleep apnea  not using CPAP  Thrombocytopenia  Chronic

## 2018-09-07 ENCOUNTER — TRANSCRIPTION ENCOUNTER (OUTPATIENT)
Age: 68
End: 2018-09-07

## 2018-09-07 ENCOUNTER — APPOINTMENT (OUTPATIENT)
Dept: RHEUMATOLOGY | Facility: CLINIC | Age: 68
End: 2018-09-07

## 2018-09-07 LAB
ANION GAP SERPL CALC-SCNC: 13 MMOL/L — SIGNIFICANT CHANGE UP (ref 5–17)
BUN SERPL-MCNC: 33 MG/DL — HIGH (ref 7–23)
CALCIUM SERPL-MCNC: 8.6 MG/DL — SIGNIFICANT CHANGE UP (ref 8.4–10.5)
CHLORIDE SERPL-SCNC: 103 MMOL/L — SIGNIFICANT CHANGE UP (ref 96–108)
CHOLEST SERPL-MCNC: 132 MG/DL — SIGNIFICANT CHANGE UP (ref 10–199)
CO2 SERPL-SCNC: 21 MMOL/L — LOW (ref 22–31)
CREAT SERPL-MCNC: 1.23 MG/DL — SIGNIFICANT CHANGE UP (ref 0.5–1.3)
GLUCOSE BLDC GLUCOMTR-MCNC: 168 MG/DL — HIGH (ref 70–99)
GLUCOSE BLDC GLUCOMTR-MCNC: 225 MG/DL — HIGH (ref 70–99)
GLUCOSE BLDC GLUCOMTR-MCNC: 227 MG/DL — HIGH (ref 70–99)
GLUCOSE BLDC GLUCOMTR-MCNC: 237 MG/DL — HIGH (ref 70–99)
GLUCOSE SERPL-MCNC: 138 MG/DL — HIGH (ref 70–99)
HCT VFR BLD CALC: 26.5 % — LOW (ref 39–50)
HDLC SERPL-MCNC: 66 MG/DL — SIGNIFICANT CHANGE UP
HGB BLD-MCNC: 8.6 G/DL — LOW (ref 13–17)
LACTATE SERPL-SCNC: 1.1 MMOL/L — SIGNIFICANT CHANGE UP (ref 0.7–2)
LIPID PNL WITH DIRECT LDL SERPL: 54 MG/DL — SIGNIFICANT CHANGE UP
MCHC RBC-ENTMCNC: 30.4 PG — SIGNIFICANT CHANGE UP (ref 27–34)
MCHC RBC-ENTMCNC: 32.5 GM/DL — SIGNIFICANT CHANGE UP (ref 32–36)
MCV RBC AUTO: 93.6 FL — SIGNIFICANT CHANGE UP (ref 80–100)
PLATELET # BLD AUTO: 50 K/UL — LOW (ref 150–400)
POTASSIUM SERPL-MCNC: 4.4 MMOL/L — SIGNIFICANT CHANGE UP (ref 3.5–5.3)
POTASSIUM SERPL-SCNC: 4.4 MMOL/L — SIGNIFICANT CHANGE UP (ref 3.5–5.3)
RBC # BLD: 2.83 M/UL — LOW (ref 4.2–5.8)
RBC # FLD: 15.6 % — HIGH (ref 10.3–14.5)
SODIUM SERPL-SCNC: 137 MMOL/L — SIGNIFICANT CHANGE UP (ref 135–145)
TOTAL CHOLESTEROL/HDL RATIO MEASUREMENT: 2 RATIO — LOW (ref 3.4–9.6)
TRIGL SERPL-MCNC: 58 MG/DL — SIGNIFICANT CHANGE UP (ref 10–149)
WBC # BLD: 4.58 K/UL — SIGNIFICANT CHANGE UP (ref 3.8–10.5)
WBC # FLD AUTO: 4.58 K/UL — SIGNIFICANT CHANGE UP (ref 3.8–10.5)

## 2018-09-07 PROCEDURE — 99232 SBSQ HOSP IP/OBS MODERATE 35: CPT

## 2018-09-07 RX ORDER — LISINOPRIL 2.5 MG/1
2.5 TABLET ORAL DAILY
Qty: 0 | Refills: 0 | Status: DISCONTINUED | OUTPATIENT
Start: 2018-09-07 | End: 2018-09-08

## 2018-09-07 RX ORDER — VANCOMYCIN HCL 1 G
125 VIAL (EA) INTRAVENOUS EVERY 12 HOURS
Qty: 0 | Refills: 0 | Status: DISCONTINUED | OUTPATIENT
Start: 2018-09-07 | End: 2018-09-08

## 2018-09-07 RX ORDER — FUROSEMIDE 40 MG
20 TABLET ORAL DAILY
Qty: 0 | Refills: 0 | Status: DISCONTINUED | OUTPATIENT
Start: 2018-09-07 | End: 2018-09-08

## 2018-09-07 RX ADMIN — PIPERACILLIN AND TAZOBACTAM 25 GRAM(S): 4; .5 INJECTION, POWDER, LYOPHILIZED, FOR SOLUTION INTRAVENOUS at 15:46

## 2018-09-07 RX ADMIN — Medication 125 MICROGRAM(S): at 06:47

## 2018-09-07 RX ADMIN — SPIRONOLACTONE 25 MILLIGRAM(S): 25 TABLET, FILM COATED ORAL at 17:50

## 2018-09-07 RX ADMIN — Medication 20 MILLIEQUIVALENT(S): at 15:46

## 2018-09-07 RX ADMIN — Medication 125 MILLIGRAM(S): at 18:49

## 2018-09-07 RX ADMIN — CARVEDILOL PHOSPHATE 12.5 MILLIGRAM(S): 80 CAPSULE, EXTENDED RELEASE ORAL at 17:50

## 2018-09-07 RX ADMIN — ATORVASTATIN CALCIUM 40 MILLIGRAM(S): 80 TABLET, FILM COATED ORAL at 21:52

## 2018-09-07 RX ADMIN — PIPERACILLIN AND TAZOBACTAM 25 GRAM(S): 4; .5 INJECTION, POWDER, LYOPHILIZED, FOR SOLUTION INTRAVENOUS at 21:51

## 2018-09-07 RX ADMIN — Medication 2: at 17:50

## 2018-09-07 RX ADMIN — BROMOCRIPTINE MESYLATE 5 MILLIGRAM(S): 5 CAPSULE ORAL at 15:48

## 2018-09-07 RX ADMIN — OXYCODONE AND ACETAMINOPHEN 1 TABLET(S): 5; 325 TABLET ORAL at 21:55

## 2018-09-07 RX ADMIN — OXYCODONE AND ACETAMINOPHEN 1 TABLET(S): 5; 325 TABLET ORAL at 22:25

## 2018-09-07 RX ADMIN — PIPERACILLIN AND TAZOBACTAM 25 GRAM(S): 4; .5 INJECTION, POWDER, LYOPHILIZED, FOR SOLUTION INTRAVENOUS at 06:47

## 2018-09-07 RX ADMIN — Medication 1: at 08:41

## 2018-09-07 RX ADMIN — Medication 2: at 11:47

## 2018-09-07 RX ADMIN — Medication 81 MILLIGRAM(S): at 15:46

## 2018-09-07 NOTE — CONSULT NOTE ADULT - ASSESSMENT
69 yo M w/ DM2, CAD s/p CABG and PCIs, ICM w/ EF 35% s/p ICD that was sent in by Dr. Tristan for right foot 3rd digit wound    #Preop  - Pt has no signs of ADHF, ACS, or arrhythmia  - At this time he is optimized for surgery.     #HFrEF  - Pt does not seem to be on BB at home, will clarify w/ his out pt cardiologist in AM  - No need to start one now  - C/w ACEi, pt was on ramipril at home  - C/w Lasix 40 PO daily  - C/w spironolactone     #CAD  - C/w ASA through the procedure.   - c/w atorvastatin 40    #ICD  - Call EP service line in AM for ICD interrogation    Rachel Hudson MD  Cardiology Fellow - PGY-4  LIKARINE: [id number]  NS: [phone]  [id number]
Patient is a 68y male with 66 m with DM, HLD, CKD, CAD s/p CABG & stenting, systolic CHF ( Ef 37%) s/p ICD, Afib, Gout, idiopathic cirrhosis, thrombocytopenia,  pituitary adenoma and recent admission in August 2018 for  right knee MSSA prepatellar bursitis, developed diarrhea later diagnosed with clostridium difficile treated with po vancomycin,  and right foot/heel ulcer and purulent drainage,, the achilles tendon also aspirated and had purulence and tophaceous  material that grew MSSA he complete treatment on August 19 2018 as per notes in the chart. c diff diarrhea had also resolved   admitted now for right foot third toe infection he is being followed by podiatry they are planning for amputation of toe as per their notes    plan   1.	continue zosyn for now empiric pending surgery, if clean margin are obtained may be able to limit course of abx especially in setting of c diff hx  2.	po vanco 125 mg po bid prophylactic
pt with sig cardiac hx , s/p cabg,chf s/p aicd with PVD and possible osteo  continue current cardiac meds  vascular doppler  pt will need cardiac ischemia work up if needs vascular surgery  lipid panel  tsh  asa daily  abx
69 yo M w/ osteomyelitis of R 3rd digit   - Pt seen and examined  - Wound debrided to the level of subcutantous tissue but not beyond with a #15 blade, wound probes to bone  - Wound flushed and cultured  - Dressed w/ betadine 4x4 gauze  - Pt scheduled for R foot 3rd digit amputation Saturday at noon  - Please document medical clearance/optimization for local w/ sedation  - D/w attending

## 2018-09-07 NOTE — PROCEDURE NOTE - ADDITIONAL PROCEDURE DETAILS
-Indication for interrogation: Pre-op check for foot surgery   -Presenting rhythm: NSR with occasional atrial pacing, rate 70-80's   -Measured data WNL, NL ICD function, Pt is not PM dependent, Total V pace: 0.4%  -OptiVol fluid index above thresholds since 8/18/2018 and is ongoing but trending downward, this is indicat  -Total AT/AF burden: 14.9%  Since last interrogation on 4/18/2018, stored data revealed multiple atrial high rate episodes between 5/20/18-5/21/2018 lasting 6 seconds to 1 hour 49 mins, review of available EGMS c/w Aflutter with average ventricular rate 101-117 bpm. Also multiple ventricular high rate episodes between 4/20/18-5/20/18, review of available EGMs c/w dual tachycardia (Aflutter and VTach) which treated and terminated with ATP. No ICD shock noted. Patient was admitted in May 2018 for knee pain and fever at the time of VT storm. No events noted since 5/21/2018.    Changes made: none  -Above findings discussed with cardiology fellow, Dr. Hudson at [phone] -Indication for interrogation: Pre-op check for foot surgery   -Presenting rhythm: NSR with occasional atrial pacing, rate 70-80's   -Measured data WNL, NL ICD function, Pt is not PM dependent, Total V pace: 0.4%  -OptiVol fluid index above thresholds since 8/18/2018 and is ongoing but trending downward  -Total AT/AF burden: 14.9%  -Since last interrogation on 4/18/18, stored data revealed multiple atrial high rate episodes between 5/20/18-5/21/18 lasting 6 seconds to 1 hour 49 mins, review of available EGMS c/w Aflutter with average ventricular rate 101-117 bpm. Also multiple ventricular high rate episodes between 4/20/18-5/20/18, review of available EGMs c/w dual tachycardia (Aflutter and VTach) which treated and terminated with ATP. No ICD shock noted. Patient was admitted in May 2018 for knee pain and fever at the time of VT storm. No events noted since 5/21/2018.    -Changes made: none  -Above findings discussed with cardiology fellow, Dr. Hudson at [phone]

## 2018-09-07 NOTE — PROGRESS NOTE ADULT - ASSESSMENT
68 y.o male with   PMHx of CAD s/p CABG & stents,    systolic CHF ( Ef 37%) s/p ICD,  c/c  anemia,  right second  toe ampuatation   Afib, Gout, idiopathic cirrhosis, thrombocytopenia, CKD stage 3, DM2, HLD, pituitary adenoma ,        admitted  with   right foot ulcer / plantar  3rd   toe  ulcer/ osteomyelitis   seen by podiatry   on  zosyn  awaiting partial  right 3rd  toe amputation, on saturday   dvt ppx   wound  care per podiatry  hyponatremia, from hypergycemia,  dm/  npo after mid  night   card clearance/  house  cleraed  for   procedure in am 68 y.o male with   PMHx of CAD s/p CABG & stents,    systolic CHF ( Ef 37%) s/p ICD,  c/c  anemia,  right second  toe ampuatation   Afib, Gout, idiopathic cirrhosis, thrombocytopenia, CKD stage 3, DM2, HLD, pituitary adenoma ,        admitted  with   right foot ulcer / plantar  3rd   toe  ulcer/ osteomyelitis   seen by podiatry   on  zosyn  awaiting partial  right 3rd  toe amputation, on saturday   dvt ppx   wound  care per podiatry  hyponatremia, from hypergycemia,  bp meds lowered/ on coreg/ lisinopril/ lasix/  spironolactone  dm/  npo after mid  night   card clearance/  house  cleraed  for   procedure in am

## 2018-09-07 NOTE — PROGRESS NOTE ADULT - ASSESSMENT
69 yo M w/ DM2, CAD s/p CABG and PCIs, ICM w/ EF 35% s/p ICD that was sent in by Dr. Tristan for right foot 3rd digit wound    #Preop  - Would STOP all IVF, pt developing pedal edema already.   - Pt has no signs ACS, or arrhythmia  - At this time he is optimized for surgery.     #HFrEF  - Pt does not seem to be on BB at home, will clarify w/ his out pt cardiologist  - No need to start one now  - C/w ACEi, pt was on ramipril at home  - C/w Lasix 40 PO daily  - C/w spironolactone     #CAD  - C/w ASA through the procedure.   - c/w atorvastatin 40    #ICD  - Call EP service line ([phone]) in AM for ICD interrogation    Rachel Hudson MD  Cardiology Fellow - PGY-4  ALFONSO: [id number]  NS: [phone]  [id number] 67 yo M w/ DM2, CAD s/p CABG and PCIs, ICM w/ EF 35% s/p ICD that was sent in by Dr. Tristan for right foot 3rd digit wound    #Preop  - Would STOP all IVF, pt developing pedal edema already.   - Pt has no signs ACS, or arrhythmia  - At this time he is optimized for surgery.     #HFrEF  - c/w carvedilol  - C/w ACEi, pt was on ramipril at home  - C/w Lasix 40 PO daily  - C/w spironolactone     #CAD  - C/w ASA through the procedure.   - c/w atorvastatin 40    #ICD  - Call EP service line ([phone]) in AM for ICD interrogation    Rachel Hudson MD  Cardiology Fellow - PGY-4  ALFONSO: [id number]  NS: [phone]  [phone]

## 2018-09-07 NOTE — PROVIDER CONTACT NOTE (OTHER) - ACTION/TREATMENT ORDERED:
Hold AM blood pressure meds and diuretics  for 1 hr and re-check. reassess at that time. Will continue to monitor.

## 2018-09-07 NOTE — CONSULT NOTE ADULT - SUBJECTIVE AND OBJECTIVE BOX
HPI:   Patient is a 68y male with 66 m with DM, HLD, CKD, CAD s/p CABG & stenting, systolic CHF ( Ef 37%) s/p ICD, Afib, Gout, idiopathic cirrhosis, thrombocytopenia,  pituitary adenoma and recent admission in August 2018 for  right knee MSSA prepatellar bursitis, developed diarrhea later diagnosed with clostridium difficile treated with po vancomycin,  and right foot/heel ulcer and purulent drainage,, the achilles tendon also aspirated and had purulence and tophaceous  material that grew MSSA. He was seen by ID and was treated with IV cefazolin followed by po keflex which he was to complete on 8/19. He reports that the c diff resolved. He recent went to go see his podiatrist for follow visit and noted that his right foor third toe had a wound and he was instructed to go the hospital because he was told that he will require surgery to remove the infected bone from his foot.     REVIEW OF SYSTEMS:  All other review of systems negative (Comprehensive ROS)    PAST MEDICAL & SURGICAL HISTORY:  Hypothyroidism  PVD (peripheral vascular disease)  History of hyperprolactinemia  Hepatic cirrhosis, unspecified hepatic cirrhosis type  Anemia  ICD (implantable cardioverter-defibrillator) in place: [employer], Model [id number] , last interrogation 4/2017  Sleep apnea: not using CPAP  History of osteomyelitis: 2015, right foot 2nd toe  Presence of stent in coronary artery: 2 stents  Heart failure with reduced left ventricular function: EF 37%  Ischemic cardiomyopathy  Pituitary adenoma: as per patient chronic, no changes , recently restarted follow up with endocrinologist ( note in allscripts )  Coronary artery disease  Thrombocytopenia: Chronic  HLD (hyperlipidemia)  HTN (hypertension)  DM (diabetes mellitus): type 2, Hg A1C 8.2 % ( 7/10/17)  AICD lead malfunction: history of  History of amputation: right foot, 2nd toe, osteo 2015  AICD (automatic cardioverter/defibrillator) present: placement in (2006)  Stented coronary artery: RCA (1999)  Coronary atherosclerosis of artery bypass graft: CABG X 4 (1986)      Allergies    No Known Allergies    Intolerances            FAMILY HISTORY:  Family history of MI (myocardial infarction)      SOCIAL HISTORY:  Smoking: denies use     ETOH:  denies use     T(F): 98.1 (09-07-18 @ 09:00), Max: 98.3 (09-07-18 @ 01:20)  HR: 76 (09-07-18 @ 09:00)  BP: 90/44 (09-07-18 @ 08:54)  RR: 19 (09-07-18 @ 09:00)  SpO2: 98% (09-07-18 @ 09:00)  Wt(kg): --    PHYSICAL EXAM:  General: alert, no acute distress  Eyes:  anicteric, no conjunctival injection, no discharge  Oropharynx: no lesions or injection 	  Neck: supple, without adenopathy  Lungs: clear to auscultation  Heart: regular rate and rhythm; no murmur, rubs or gallops  Abdomen: soft, nondistended, nontender, without mass or organomegaly  Skin: no lesions  Extremities: no clubbing, cyanosis, or edema, right foot is wrapped with dressing   Neurologic: alert, oriented, moves all extremities    LAB RESULTS:                        9.8    4.6   )-----------( 51       ( 06 Sep 2018 14:11 )             30.3     09-07    137  |  103  |  33<H>  ----------------------------<  138<H>  4.4   |  21<L>  |  1.23    Ca    8.6      07 Sep 2018 07:28    TPro  6.9  /  Alb  3.4  /  TBili  0.4  /  DBili  x   /  AST  35  /  ALT  30  /  AlkPhos  256<H>  09-06    LIVER FUNCTIONS - ( 06 Sep 2018 14:11 )  Alb: 3.4 g/dL / Pro: 6.9 g/dL / ALK PHOS: 256 U/L / ALT: 30 U/L / AST: 35 U/L / GGT: x               MICROBIOLOGY:  RECENT CULTURES:        RADIOLOGY REVIEWED:      Antimicrobials Day #    piperacillin/tazobactam IVPB. 3.375 Gram(s) IV Intermittent every 8 hours    Other Medications:  allopurinol 100 milliGRAM(s) Oral daily  aspirin enteric coated 81 milliGRAM(s) Oral daily  atorvastatin 40 milliGRAM(s) Oral at bedtime  bromocriptine Capsule 5 milliGRAM(s) Oral daily  carvedilol 12.5 milliGRAM(s) Oral every 12 hours  dextrose 40% Gel 15 Gram(s) Oral once PRN  dextrose 5%. 1000 milliLiter(s) IV Continuous <Continuous>  dextrose 50% Injectable 12.5 Gram(s) IV Push once  dextrose 50% Injectable 25 Gram(s) IV Push once  dextrose 50% Injectable 25 Gram(s) IV Push once  furosemide    Tablet 20 milliGRAM(s) Oral daily  glucagon  Injectable 1 milliGRAM(s) IntraMuscular once PRN  insulin lispro (HumaLOG) corrective regimen sliding scale   SubCutaneous at bedtime  insulin lispro (HumaLOG) corrective regimen sliding scale   SubCutaneous three times a day before meals  levothyroxine 125 MICROGram(s) Oral daily  lisinopril 2.5 milliGRAM(s) Oral daily  oxyCODONE    5 mG/acetaminophen 325 mG 1 Tablet(s) Oral every 12 hours PRN  potassium chloride    Tablet ER 20 milliEquivalent(s) Oral daily  sodium chloride 0.9%. 1000 milliLiter(s) IV Continuous <Continuous>  spironolactone 25 milliGRAM(s) Oral two times a day

## 2018-09-07 NOTE — PROVIDER CONTACT NOTE (OTHER) - ASSESSMENT
Pt bp 90/50, other VSS. Pt denies lightheadedness or dizziness. Pt making adequate urine output throughout shift. Pt states that his blood pressure normally runs low.

## 2018-09-08 ENCOUNTER — RESULT REVIEW (OUTPATIENT)
Age: 68
End: 2018-09-08

## 2018-09-08 LAB
ANION GAP SERPL CALC-SCNC: 9 MMOL/L — SIGNIFICANT CHANGE UP (ref 5–17)
BUN SERPL-MCNC: 34 MG/DL — HIGH (ref 7–23)
CALCIUM SERPL-MCNC: 8.6 MG/DL — SIGNIFICANT CHANGE UP (ref 8.4–10.5)
CHLORIDE SERPL-SCNC: 103 MMOL/L — SIGNIFICANT CHANGE UP (ref 96–108)
CO2 SERPL-SCNC: 23 MMOL/L — SIGNIFICANT CHANGE UP (ref 22–31)
CREAT SERPL-MCNC: 1.34 MG/DL — HIGH (ref 0.5–1.3)
CULTURE RESULTS: SIGNIFICANT CHANGE UP
GLUCOSE BLDC GLUCOMTR-MCNC: 124 MG/DL — HIGH (ref 70–99)
GLUCOSE BLDC GLUCOMTR-MCNC: 236 MG/DL — HIGH (ref 70–99)
GLUCOSE SERPL-MCNC: 148 MG/DL — HIGH (ref 70–99)
GRAM STN FLD: SIGNIFICANT CHANGE UP
GRAM STN FLD: SIGNIFICANT CHANGE UP
HCT VFR BLD CALC: 26.7 % — LOW (ref 39–50)
HGB BLD-MCNC: 8.6 G/DL — LOW (ref 13–17)
MAGNESIUM SERPL-MCNC: 1.7 MG/DL — SIGNIFICANT CHANGE UP (ref 1.6–2.6)
MCHC RBC-ENTMCNC: 29.9 PG — SIGNIFICANT CHANGE UP (ref 27–34)
MCHC RBC-ENTMCNC: 32.2 GM/DL — SIGNIFICANT CHANGE UP (ref 32–36)
MCV RBC AUTO: 92.7 FL — SIGNIFICANT CHANGE UP (ref 80–100)
PHOSPHATE SERPL-MCNC: 3.7 MG/DL — SIGNIFICANT CHANGE UP (ref 2.5–4.5)
PLATELET # BLD AUTO: 51 K/UL — LOW (ref 150–400)
POTASSIUM SERPL-MCNC: 4.6 MMOL/L — SIGNIFICANT CHANGE UP (ref 3.5–5.3)
POTASSIUM SERPL-SCNC: 4.6 MMOL/L — SIGNIFICANT CHANGE UP (ref 3.5–5.3)
RBC # BLD: 2.88 M/UL — LOW (ref 4.2–5.8)
RBC # FLD: 15.7 % — HIGH (ref 10.3–14.5)
SODIUM SERPL-SCNC: 135 MMOL/L — SIGNIFICANT CHANGE UP (ref 135–145)
SPECIMEN SOURCE: SIGNIFICANT CHANGE UP
WBC # BLD: 4.43 K/UL — SIGNIFICANT CHANGE UP (ref 3.8–10.5)
WBC # FLD AUTO: 4.43 K/UL — SIGNIFICANT CHANGE UP (ref 3.8–10.5)

## 2018-09-08 PROCEDURE — 73630 X-RAY EXAM OF FOOT: CPT | Mod: 26,RT

## 2018-09-08 PROCEDURE — 88305 TISSUE EXAM BY PATHOLOGIST: CPT | Mod: 26

## 2018-09-08 PROCEDURE — 88311 DECALCIFY TISSUE: CPT | Mod: 26

## 2018-09-08 RX ORDER — LEVOTHYROXINE SODIUM 125 MCG
125 TABLET ORAL DAILY
Qty: 0 | Refills: 0 | Status: DISCONTINUED | OUTPATIENT
Start: 2018-09-08 | End: 2018-09-10

## 2018-09-08 RX ORDER — ONDANSETRON 8 MG/1
4 TABLET, FILM COATED ORAL ONCE
Qty: 0 | Refills: 0 | Status: DISCONTINUED | OUTPATIENT
Start: 2018-09-08 | End: 2018-09-08

## 2018-09-08 RX ORDER — CARVEDILOL PHOSPHATE 80 MG/1
12.5 CAPSULE, EXTENDED RELEASE ORAL EVERY 12 HOURS
Qty: 0 | Refills: 0 | Status: DISCONTINUED | OUTPATIENT
Start: 2018-09-08 | End: 2018-09-10

## 2018-09-08 RX ORDER — FUROSEMIDE 40 MG
20 TABLET ORAL DAILY
Qty: 0 | Refills: 0 | Status: DISCONTINUED | OUTPATIENT
Start: 2018-09-08 | End: 2018-09-10

## 2018-09-08 RX ORDER — LISINOPRIL 2.5 MG/1
2.5 TABLET ORAL DAILY
Qty: 0 | Refills: 0 | Status: DISCONTINUED | OUTPATIENT
Start: 2018-09-08 | End: 2018-09-08

## 2018-09-08 RX ORDER — SODIUM CHLORIDE 9 MG/ML
1000 INJECTION, SOLUTION INTRAVENOUS
Qty: 0 | Refills: 0 | Status: DISCONTINUED | OUTPATIENT
Start: 2018-09-08 | End: 2018-09-10

## 2018-09-08 RX ORDER — INSULIN LISPRO 100/ML
VIAL (ML) SUBCUTANEOUS AT BEDTIME
Qty: 0 | Refills: 0 | Status: DISCONTINUED | OUTPATIENT
Start: 2018-09-08 | End: 2018-09-10

## 2018-09-08 RX ORDER — ATORVASTATIN CALCIUM 80 MG/1
40 TABLET, FILM COATED ORAL AT BEDTIME
Qty: 0 | Refills: 0 | Status: DISCONTINUED | OUTPATIENT
Start: 2018-09-08 | End: 2018-09-10

## 2018-09-08 RX ORDER — OXYCODONE AND ACETAMINOPHEN 5; 325 MG/1; MG/1
1 TABLET ORAL EVERY 6 HOURS
Qty: 0 | Refills: 0 | Status: DISCONTINUED | OUTPATIENT
Start: 2018-09-08 | End: 2018-09-10

## 2018-09-08 RX ORDER — DEXTROSE 50 % IN WATER 50 %
25 SYRINGE (ML) INTRAVENOUS ONCE
Qty: 0 | Refills: 0 | Status: DISCONTINUED | OUTPATIENT
Start: 2018-09-08 | End: 2018-09-10

## 2018-09-08 RX ORDER — ACETAMINOPHEN 500 MG
650 TABLET ORAL EVERY 6 HOURS
Qty: 0 | Refills: 0 | Status: DISCONTINUED | OUTPATIENT
Start: 2018-09-08 | End: 2018-09-10

## 2018-09-08 RX ORDER — FUROSEMIDE 40 MG
20 TABLET ORAL DAILY
Qty: 0 | Refills: 0 | Status: DISCONTINUED | OUTPATIENT
Start: 2018-09-08 | End: 2018-09-08

## 2018-09-08 RX ORDER — INSULIN LISPRO 100/ML
VIAL (ML) SUBCUTANEOUS
Qty: 0 | Refills: 0 | Status: DISCONTINUED | OUTPATIENT
Start: 2018-09-08 | End: 2018-09-10

## 2018-09-08 RX ORDER — MORPHINE SULFATE 50 MG/1
2 CAPSULE, EXTENDED RELEASE ORAL EVERY 6 HOURS
Qty: 0 | Refills: 0 | Status: DISCONTINUED | OUTPATIENT
Start: 2018-09-08 | End: 2018-09-10

## 2018-09-08 RX ORDER — ACETAMINOPHEN 500 MG
1000 TABLET ORAL ONCE
Qty: 0 | Refills: 0 | Status: DISCONTINUED | OUTPATIENT
Start: 2018-09-08 | End: 2018-09-08

## 2018-09-08 RX ORDER — FENTANYL CITRATE 50 UG/ML
50 INJECTION INTRAVENOUS
Qty: 0 | Refills: 0 | Status: DISCONTINUED | OUTPATIENT
Start: 2018-09-08 | End: 2018-09-08

## 2018-09-08 RX ORDER — PIPERACILLIN AND TAZOBACTAM 4; .5 G/20ML; G/20ML
3.38 INJECTION, POWDER, LYOPHILIZED, FOR SOLUTION INTRAVENOUS EVERY 8 HOURS
Qty: 0 | Refills: 0 | Status: DISCONTINUED | OUTPATIENT
Start: 2018-09-08 | End: 2018-09-09

## 2018-09-08 RX ORDER — GLUCAGON INJECTION, SOLUTION 0.5 MG/.1ML
1 INJECTION, SOLUTION SUBCUTANEOUS ONCE
Qty: 0 | Refills: 0 | Status: DISCONTINUED | OUTPATIENT
Start: 2018-09-08 | End: 2018-09-10

## 2018-09-08 RX ORDER — POTASSIUM CHLORIDE 20 MEQ
20 PACKET (EA) ORAL DAILY
Qty: 0 | Refills: 0 | Status: DISCONTINUED | OUTPATIENT
Start: 2018-09-08 | End: 2018-09-10

## 2018-09-08 RX ORDER — DEXTROSE 50 % IN WATER 50 %
15 SYRINGE (ML) INTRAVENOUS ONCE
Qty: 0 | Refills: 0 | Status: DISCONTINUED | OUTPATIENT
Start: 2018-09-08 | End: 2018-09-10

## 2018-09-08 RX ORDER — VANCOMYCIN HCL 1 G
125 VIAL (EA) INTRAVENOUS EVERY 12 HOURS
Qty: 0 | Refills: 0 | Status: DISCONTINUED | OUTPATIENT
Start: 2018-09-08 | End: 2018-09-09

## 2018-09-08 RX ORDER — DEXTROSE 50 % IN WATER 50 %
12.5 SYRINGE (ML) INTRAVENOUS ONCE
Qty: 0 | Refills: 0 | Status: DISCONTINUED | OUTPATIENT
Start: 2018-09-08 | End: 2018-09-10

## 2018-09-08 RX ORDER — SODIUM CHLORIDE 9 MG/ML
1000 INJECTION INTRAMUSCULAR; INTRAVENOUS; SUBCUTANEOUS
Qty: 0 | Refills: 0 | Status: DISCONTINUED | OUTPATIENT
Start: 2018-09-08 | End: 2018-09-10

## 2018-09-08 RX ORDER — BROMOCRIPTINE MESYLATE 5 MG/1
5 CAPSULE ORAL DAILY
Qty: 0 | Refills: 0 | Status: DISCONTINUED | OUTPATIENT
Start: 2018-09-08 | End: 2018-09-10

## 2018-09-08 RX ORDER — CARVEDILOL PHOSPHATE 80 MG/1
12.5 CAPSULE, EXTENDED RELEASE ORAL EVERY 12 HOURS
Qty: 0 | Refills: 0 | Status: DISCONTINUED | OUTPATIENT
Start: 2018-09-08 | End: 2018-09-08

## 2018-09-08 RX ORDER — ALLOPURINOL 300 MG
100 TABLET ORAL DAILY
Qty: 0 | Refills: 0 | Status: DISCONTINUED | OUTPATIENT
Start: 2018-09-08 | End: 2018-09-10

## 2018-09-08 RX ORDER — ASPIRIN/CALCIUM CARB/MAGNESIUM 324 MG
81 TABLET ORAL DAILY
Qty: 0 | Refills: 0 | Status: DISCONTINUED | OUTPATIENT
Start: 2018-09-08 | End: 2018-09-10

## 2018-09-08 RX ORDER — FENTANYL CITRATE 50 UG/ML
25 INJECTION INTRAVENOUS
Qty: 0 | Refills: 0 | Status: DISCONTINUED | OUTPATIENT
Start: 2018-09-08 | End: 2018-09-08

## 2018-09-08 RX ORDER — LISINOPRIL 2.5 MG/1
2.5 TABLET ORAL DAILY
Qty: 0 | Refills: 0 | Status: DISCONTINUED | OUTPATIENT
Start: 2018-09-08 | End: 2018-09-10

## 2018-09-08 RX ORDER — SPIRONOLACTONE 25 MG/1
25 TABLET, FILM COATED ORAL
Qty: 0 | Refills: 0 | Status: DISCONTINUED | OUTPATIENT
Start: 2018-09-08 | End: 2018-09-10

## 2018-09-08 RX ADMIN — BROMOCRIPTINE MESYLATE 5 MILLIGRAM(S): 5 CAPSULE ORAL at 17:57

## 2018-09-08 RX ADMIN — OXYCODONE AND ACETAMINOPHEN 1 TABLET(S): 5; 325 TABLET ORAL at 22:49

## 2018-09-08 RX ADMIN — Medication 20 MILLIGRAM(S): at 17:57

## 2018-09-08 RX ADMIN — PIPERACILLIN AND TAZOBACTAM 25 GRAM(S): 4; .5 INJECTION, POWDER, LYOPHILIZED, FOR SOLUTION INTRAVENOUS at 21:56

## 2018-09-08 RX ADMIN — OXYCODONE AND ACETAMINOPHEN 1 TABLET(S): 5; 325 TABLET ORAL at 23:20

## 2018-09-08 RX ADMIN — Medication 125 MICROGRAM(S): at 05:38

## 2018-09-08 RX ADMIN — PIPERACILLIN AND TAZOBACTAM 25 GRAM(S): 4; .5 INJECTION, POWDER, LYOPHILIZED, FOR SOLUTION INTRAVENOUS at 05:43

## 2018-09-08 RX ADMIN — Medication 20 MILLIEQUIVALENT(S): at 17:56

## 2018-09-08 RX ADMIN — ATORVASTATIN CALCIUM 40 MILLIGRAM(S): 80 TABLET, FILM COATED ORAL at 21:56

## 2018-09-08 RX ADMIN — SPIRONOLACTONE 25 MILLIGRAM(S): 25 TABLET, FILM COATED ORAL at 05:37

## 2018-09-08 RX ADMIN — Medication 81 MILLIGRAM(S): at 17:57

## 2018-09-08 RX ADMIN — CARVEDILOL PHOSPHATE 12.5 MILLIGRAM(S): 80 CAPSULE, EXTENDED RELEASE ORAL at 05:38

## 2018-09-08 RX ADMIN — Medication 125 MILLIGRAM(S): at 05:38

## 2018-09-08 RX ADMIN — SPIRONOLACTONE 25 MILLIGRAM(S): 25 TABLET, FILM COATED ORAL at 17:58

## 2018-09-08 RX ADMIN — Medication 125 MILLIGRAM(S): at 17:58

## 2018-09-08 NOTE — PROGRESS NOTE ADULT - ASSESSMENT
68 y.o male with   PMHx of CAD s/p CABG & stents,    systolic CHF ( Ef 37%) s/p ICD,  c/c  anemia,  right second  toe ampuatation   Afib, Gout, idiopathic cirrhosis, thrombocytopenia, CKD stage 3, DM2, HLD, pituitary adenoma   pancytopenia  from cirrhosis,        admitted  with   right foot ulcer / plantar  3rd   toe  ulcer/ osteomyelitis   seen by podiatry   on  zosyn/  empiric  po vanco, per ID  awaiting partial  right 3rd  toe amputation, on saturday   dvt ppX   on coreg/ lisinopril/ lasix/  spironolactone  dm/ follow fs   for or  today

## 2018-09-09 ENCOUNTER — TRANSCRIPTION ENCOUNTER (OUTPATIENT)
Age: 68
End: 2018-09-09

## 2018-09-09 LAB
ANION GAP SERPL CALC-SCNC: 11 MMOL/L — SIGNIFICANT CHANGE UP (ref 5–17)
BASOPHILS # BLD AUTO: 0.01 K/UL — SIGNIFICANT CHANGE UP (ref 0–0.2)
BASOPHILS NFR BLD AUTO: 0.2 % — SIGNIFICANT CHANGE UP (ref 0–2)
BUN SERPL-MCNC: 27 MG/DL — HIGH (ref 7–23)
CALCIUM SERPL-MCNC: 8.7 MG/DL — SIGNIFICANT CHANGE UP (ref 8.4–10.5)
CHLORIDE SERPL-SCNC: 102 MMOL/L — SIGNIFICANT CHANGE UP (ref 96–108)
CO2 SERPL-SCNC: 22 MMOL/L — SIGNIFICANT CHANGE UP (ref 22–31)
CREAT SERPL-MCNC: 1.26 MG/DL — SIGNIFICANT CHANGE UP (ref 0.5–1.3)
EOSINOPHIL # BLD AUTO: 0.13 K/UL — SIGNIFICANT CHANGE UP (ref 0–0.5)
EOSINOPHIL NFR BLD AUTO: 3 % — SIGNIFICANT CHANGE UP (ref 0–6)
GLUCOSE BLDC GLUCOMTR-MCNC: 146 MG/DL — HIGH (ref 70–99)
GLUCOSE BLDC GLUCOMTR-MCNC: 183 MG/DL — HIGH (ref 70–99)
GLUCOSE BLDC GLUCOMTR-MCNC: 196 MG/DL — HIGH (ref 70–99)
GLUCOSE BLDC GLUCOMTR-MCNC: 215 MG/DL — HIGH (ref 70–99)
GLUCOSE BLDC GLUCOMTR-MCNC: 261 MG/DL — HIGH (ref 70–99)
GLUCOSE SERPL-MCNC: 133 MG/DL — HIGH (ref 70–99)
HCT VFR BLD CALC: 29.5 % — LOW (ref 39–50)
HGB BLD-MCNC: 9.4 G/DL — LOW (ref 13–17)
IMM GRANULOCYTES NFR BLD AUTO: 0 % — SIGNIFICANT CHANGE UP (ref 0–1.5)
LYMPHOCYTES # BLD AUTO: 0.78 K/UL — LOW (ref 1–3.3)
LYMPHOCYTES # BLD AUTO: 17.8 % — SIGNIFICANT CHANGE UP (ref 13–44)
MCHC RBC-ENTMCNC: 30 PG — SIGNIFICANT CHANGE UP (ref 27–34)
MCHC RBC-ENTMCNC: 31.9 GM/DL — LOW (ref 32–36)
MCV RBC AUTO: 94.2 FL — SIGNIFICANT CHANGE UP (ref 80–100)
MONOCYTES # BLD AUTO: 0.31 K/UL — SIGNIFICANT CHANGE UP (ref 0–0.9)
MONOCYTES NFR BLD AUTO: 7.1 % — SIGNIFICANT CHANGE UP (ref 2–14)
NEUTROPHILS # BLD AUTO: 3.15 K/UL — SIGNIFICANT CHANGE UP (ref 1.8–7.4)
NEUTROPHILS NFR BLD AUTO: 71.9 % — SIGNIFICANT CHANGE UP (ref 43–77)
NIGHT BLUE STAIN TISS: SIGNIFICANT CHANGE UP
PLATELET # BLD AUTO: 63 K/UL — LOW (ref 150–400)
POTASSIUM SERPL-MCNC: 4.8 MMOL/L — SIGNIFICANT CHANGE UP (ref 3.5–5.3)
POTASSIUM SERPL-SCNC: 4.8 MMOL/L — SIGNIFICANT CHANGE UP (ref 3.5–5.3)
RBC # BLD: 3.13 M/UL — LOW (ref 4.2–5.8)
RBC # FLD: 15.8 % — HIGH (ref 10.3–14.5)
SODIUM SERPL-SCNC: 135 MMOL/L — SIGNIFICANT CHANGE UP (ref 135–145)
SPECIMEN SOURCE: SIGNIFICANT CHANGE UP
WBC # BLD: 4.38 K/UL — SIGNIFICANT CHANGE UP (ref 3.8–10.5)
WBC # FLD AUTO: 4.38 K/UL — SIGNIFICANT CHANGE UP (ref 3.8–10.5)

## 2018-09-09 PROCEDURE — 99232 SBSQ HOSP IP/OBS MODERATE 35: CPT

## 2018-09-09 RX ORDER — MINOCYCLINE HYDROCHLORIDE 45 MG/1
100 TABLET, EXTENDED RELEASE ORAL
Qty: 0 | Refills: 0 | Status: DISCONTINUED | OUTPATIENT
Start: 2018-09-09 | End: 2018-09-10

## 2018-09-09 RX ADMIN — SPIRONOLACTONE 25 MILLIGRAM(S): 25 TABLET, FILM COATED ORAL at 05:48

## 2018-09-09 RX ADMIN — BROMOCRIPTINE MESYLATE 5 MILLIGRAM(S): 5 CAPSULE ORAL at 13:28

## 2018-09-09 RX ADMIN — Medication 20 MILLIGRAM(S): at 05:48

## 2018-09-09 RX ADMIN — CARVEDILOL PHOSPHATE 12.5 MILLIGRAM(S): 80 CAPSULE, EXTENDED RELEASE ORAL at 05:48

## 2018-09-09 RX ADMIN — PIPERACILLIN AND TAZOBACTAM 25 GRAM(S): 4; .5 INJECTION, POWDER, LYOPHILIZED, FOR SOLUTION INTRAVENOUS at 13:26

## 2018-09-09 RX ADMIN — OXYCODONE AND ACETAMINOPHEN 1 TABLET(S): 5; 325 TABLET ORAL at 21:23

## 2018-09-09 RX ADMIN — MINOCYCLINE HYDROCHLORIDE 100 MILLIGRAM(S): 45 TABLET, EXTENDED RELEASE ORAL at 18:30

## 2018-09-09 RX ADMIN — Medication 125 MILLIGRAM(S): at 05:49

## 2018-09-09 RX ADMIN — Medication 125 MICROGRAM(S): at 05:48

## 2018-09-09 RX ADMIN — OXYCODONE AND ACETAMINOPHEN 1 TABLET(S): 5; 325 TABLET ORAL at 21:53

## 2018-09-09 RX ADMIN — Medication 20 MILLIEQUIVALENT(S): at 13:29

## 2018-09-09 RX ADMIN — ATORVASTATIN CALCIUM 40 MILLIGRAM(S): 80 TABLET, FILM COATED ORAL at 21:23

## 2018-09-09 RX ADMIN — Medication 81 MILLIGRAM(S): at 13:26

## 2018-09-09 RX ADMIN — Medication 1: at 18:30

## 2018-09-09 RX ADMIN — Medication 3: at 13:41

## 2018-09-09 RX ADMIN — SPIRONOLACTONE 25 MILLIGRAM(S): 25 TABLET, FILM COATED ORAL at 18:30

## 2018-09-09 RX ADMIN — LISINOPRIL 2.5 MILLIGRAM(S): 2.5 TABLET ORAL at 05:48

## 2018-09-09 RX ADMIN — Medication 0: at 21:24

## 2018-09-09 RX ADMIN — PIPERACILLIN AND TAZOBACTAM 25 GRAM(S): 4; .5 INJECTION, POWDER, LYOPHILIZED, FOR SOLUTION INTRAVENOUS at 05:51

## 2018-09-09 NOTE — DISCHARGE NOTE ADULT - MEDICATION SUMMARY - MEDICATIONS TO TAKE
I will START or STAY ON the medications listed below when I get home from the hospital:    spironolactone 25 mg oral tablet  -- 1 tab(s) by mouth 2 times a day  -- Indication: For htn    oxyCODONE-acetaminophen 5 mg-325 mg oral tablet  -- 1 tab(s) by mouth every 6 hours, As Needed -Moderate Pain (4 - 6) MDD:4  -- Indication: For pain    aspirin 81 mg oral delayed release tablet  -- 1 tab(s) by mouth once a day  -- Indication: For cad    ramipril 2.5 mg oral capsule  -- 1 cap(s) by mouth once a day (in the morning)  -- Indication: For htn    Januvia 50 mg oral tablet  -- 1 tab(s) by mouth once a day  -- Indication: For diabetes    allopurinol 100 mg oral tablet  -- 1 tab(s) by mouth once a day  -- Indication: For gout    Niaspan ER 1000 mg oral tablet, extended release  -- 1 tab(s) by mouth once a day (at bedtime)  -- Indication: For hld    atorvastatin 40 mg oral tablet  -- 1 tab(s) by mouth once a day (at bedtime)  -- Indication: For hld    bromocriptine 5 mg oral capsule  -- 1 cap(s) by mouth once a day  -- Indication: For pituitary adenoma    carvedilol 12.5 mg oral tablet  -- 1 tab(s) by mouth every 12 hours  -- Indication: For htn    furosemide 20 mg oral tablet  -- 1 tab(s) by mouth once a day  -- Indication: For htn    potassium chloride 20 mEq oral tablet, extended release  -- 1 tab(s) by mouth once a day (in the morning)  -- Indication: For supplement     minocycline 100 mg oral capsule  -- 1 cap(s) by mouth 2 times a day (before meals)  -- Indication: For Other osteomyelitis of right foot    levothyroxine 125 mcg (0.125 mg) oral tablet  -- 1 tab(s) by mouth once a day  -- Indication: For hypothyroid

## 2018-09-09 NOTE — DISCHARGE NOTE ADULT - MEDICATION SUMMARY - MEDICATIONS TO STOP TAKING
I will STOP taking the medications listed below when I get home from the hospital:    vancomycin 125 mg oral capsule  -- 1 cap(s) by mouth every 6 hours

## 2018-09-09 NOTE — DISCHARGE NOTE ADULT - CARE PROVIDERS DIRECT ADDRESSES
,[email or web address]

## 2018-09-09 NOTE — DISCHARGE NOTE ADULT - CARE PROVIDER_API CALL
Andrea Tristan (SHANE), Podiatric Medicine and Surgery  [address] [place] [address]  Phone: [phone]  Fax: [phone]    Suha Thapa), Internal Medicine  [address] [place] [address]  Phone: [phone]  Fax: [phone] Andrea Tristan (SHANE), Podiatric Medicine and Surgery  [address] [facility] [place] [address]  Phone: [phone]  Fax: [phone]    Suha Thapa), Internal Medicine  [address] [place] [address]  Phone: [phone]  Fax: [phone]    Mauricio Black), Internal Medicine  [address] [place] [address]  Phone: [phone]  Fax: [phone]

## 2018-09-09 NOTE — PROGRESS NOTE ADULT - ASSESSMENT
Doing well after amputation without evidence of CHF.  Is back on usual regimen.  Can continue with present.  ? DC IV fluid since taking PO without difficulty.    RAGHU Willett  [phone]

## 2018-09-09 NOTE — DISCHARGE NOTE ADULT - CARE PLAN
Goal:	Medical management of surgical site  Assessment and plan of treatment:	WEIGHT BEARING:  Weight bearing as tolerated in surgical shoe at all times  WOUND CARE: Keep dressing clean, dry and intact till follow-up  ANTIBIOTICS: Complete course of antibiotics till completion as prescribed  FOLLOW-UP: Follow-up With Dr. Tristan within 5 days at [phone] Principal Discharge DX:	Other osteomyelitis of right foot  Goal:	Medical management of surgical site  Assessment and plan of treatment:	Right 3rd toe amputation   WEIGHT BEARING:  Weight bearing as tolerated in surgical shoe at all times  WOUND CARE: Keep dressing clean, dry and intact till follow-up  ANTIBIOTICS: Complete course of antibiotics till completion as prescribed  FOLLOW-UP: Follow-up With Dr. Tristan within 5 days at [phone]  Secondary Diagnosis:	Type 2 diabetes mellitus without complication, without long-term current use of insulin  Assessment and plan of treatment:	HgA1C this admission - 6.7  Make sure you get your HgA1c checked every three months.  If you take oral diabetes medications, check your blood glucose two times a day.  If you take insulin, check your blood glucose before meals and at bedtime.  It's important not to skip any meals.  Keep a log of your blood glucose results and always take it with you to your doctor appointments.  Keep a list of your current medications including injectables and over the counter medications and bring this medication list with you to all your doctor appointments.  If you have not seen your ophthalmologist this year call for appointment.  Check your feet daily for redness, sores, or openings. Do not self treat. If no improvement in two days call your primary care physician for an appointment.  Low blood sugar (hypoglycemia) is a blood sugar below 70mg/dl. Check your blood sugar if you feel signs/symptoms of hypoglycemia. If your blood sugar is below 70 take 15 grams of carbohydrates (ex 4 oz of apple juice, 3-4 glucose tablets, or 4-6 oz of regular soda) wait 15 minutes and repeat blood sugar to make sure it comes up above 70.  If your blood sugar is above 70 and you are due for a meal, have a meal.  If you are not due for a meal have a snack.  This snack helps keeps your blood sugar at a safe range.  Secondary Diagnosis:	Essential hypertension  Assessment and plan of treatment:	Take medications for your blood pressure as recommended.  Eat a heart healthy diet that is low in saturated fats and salt, and includes whole grains, fruits, vegetables and lean protein   Exercise regularly (consult with your physician or cardiologist first); maintain a heart healthy weight.   If you smoke - quit (A resource to help you stop smoking is the [facility] – phone number 130-623-8746.). Continue to follow with your primary physician or cardiologist.   Seek medical help for dizziness, Lightheadedness, Blurry vision, Headache, Chest pain, Shortness of breath  Follow up with your medical doctor to establish long term blood pressure treatment goals.

## 2018-09-09 NOTE — DISCHARGE NOTE ADULT - PLAN OF CARE
Medical management of surgical site WEIGHT BEARING:  Weight bearing as tolerated in surgical shoe at all times  WOUND CARE: Keep dressing clean, dry and intact till follow-up  ANTIBIOTICS: Complete course of antibiotics till completion as prescribed  FOLLOW-UP: Follow-up With Dr. Tristan within 5 days at [phone] Right 3rd toe amputation   WEIGHT BEARING:  Weight bearing as tolerated in surgical shoe at all times  WOUND CARE: Keep dressing clean, dry and intact till follow-up  ANTIBIOTICS: Complete course of antibiotics till completion as prescribed  FOLLOW-UP: Follow-up With Dr. Tristan within 5 days at [phone] HgA1C this admission - 6.7  Make sure you get your HgA1c checked every three months.  If you take oral diabetes medications, check your blood glucose two times a day.  If you take insulin, check your blood glucose before meals and at bedtime.  It's important not to skip any meals.  Keep a log of your blood glucose results and always take it with you to your doctor appointments.  Keep a list of your current medications including injectables and over the counter medications and bring this medication list with you to all your doctor appointments.  If you have not seen your ophthalmologist this year call for appointment.  Check your feet daily for redness, sores, or openings. Do not self treat. If no improvement in two days call your primary care physician for an appointment.  Low blood sugar (hypoglycemia) is a blood sugar below 70mg/dl. Check your blood sugar if you feel signs/symptoms of hypoglycemia. If your blood sugar is below 70 take 15 grams of carbohydrates (ex 4 oz of apple juice, 3-4 glucose tablets, or 4-6 oz of regular soda) wait 15 minutes and repeat blood sugar to make sure it comes up above 70.  If your blood sugar is above 70 and you are due for a meal, have a meal.  If you are not due for a meal have a snack.  This snack helps keeps your blood sugar at a safe range. Take medications for your blood pressure as recommended.  Eat a heart healthy diet that is low in saturated fats and salt, and includes whole grains, fruits, vegetables and lean protein   Exercise regularly (consult with your physician or cardiologist first); maintain a heart healthy weight.   If you smoke - quit (A resource to help you stop smoking is the [facility] – phone number 165-255-2625.). Continue to follow with your primary physician or cardiologist.   Seek medical help for dizziness, Lightheadedness, Blurry vision, Headache, Chest pain, Shortness of breath  Follow up with your medical doctor to establish long term blood pressure treatment goals.

## 2018-09-09 NOTE — DISCHARGE NOTE ADULT - ADDITIONAL INSTRUCTIONS
Follow up with PMD in 1 week  Follow up With Podiatrist Dr. Tristan within 5 days at [phone] Follow up with PMD in 1 week  Follow up With Podiatrist Dr. Tristan within 5 days at [phone]  Follow up with Infectious disease in 1-2 weeks

## 2018-09-09 NOTE — DISCHARGE NOTE ADULT - HOSPITAL COURSE
68y male with 66 m with DM, HLD, CKD, CAD s/p CABG & stenting, systolic CHF ( Ef 37%) s/p ICD, Afib, Gout, idiopathic cirrhosis, thrombocytopenia,  pituitary adenoma and recent admission in August 2018 for  right knee MSSA prepatellar bursitis, developed diarrhea later diagnosed with clostridium difficile treated with po vancomycin,  and right foot/heel ulcer and purulent drainage,, the achilles tendon also aspirated and had purulence and tophaceous  material that grew MSSA he complete treatment on August 19 2018 as per notes in the chart. c diff diarrhea had also resolved   admitted now for right foot third toe infection he is being followed by podiatry, s/p right third digit to proximal phalanx, low concern for residual infection as per operative note, wound cx grew MRSA, d/c with po abx. 68y male with 66 m with DM, HLD, CKD, CAD s/p CABG & stenting, systolic CHF ( Ef 37%) s/p ICD, Afib, Gout, idiopathic cirrhosis, thrombocytopenia,  pituitary adenoma and recent admission in August 2018 for  right knee MSSA prepatellar bursitis, developed diarrhea later diagnosed with clostridium difficile treated with po vancomycin,  and right foot/heel ulcer and purulent drainage,, the achilles tendon also aspirated and had purulence and tophaceous  material that grew MSSA he complete treatment on August 19 2018 as per notes in the chart. c diff diarrhea had also resolved   admitted now for right foot third toe infection he is being followed by podiatry, s/p right third digit to proximal phalanx, low concern for residual infection as per operative note, wound cx grew MRSA, d/c with po abx for 6 weeks.

## 2018-09-09 NOTE — PROGRESS NOTE ADULT - ASSESSMENT
Assessment:   Patient is s/p RIGHT foot P3rd digit amp. POD #1.    Plan:   - Pt seen and examined.  - Re-dressed foot using xeroform + DSD.  - Continue IV abx.   - Awaiting OR culutures/pathology.  - Continue weight bear as tolerated in surgical shoe to RIGHT foot.  - Pod surg stable for discharge on oral Abx. Will determine outpt Abx tomorrow during AM rounds with attending  - Discharge ppwk started for pod surg recommendations     Discussed with attending:  [x] YES  [ ] NO

## 2018-09-09 NOTE — DISCHARGE NOTE ADULT - PATIENT PORTAL LINK FT
You can access the [facility], offered by [facility], by registering with the following website: [email or web address]

## 2018-09-09 NOTE — PROGRESS NOTE ADULT - ASSESSMENT
68 y.o male with   PMHx of CAD s/p CABG & stents,    systolic CHF ( Ef 37%) s/p ICD,  c/c  anemia,  right second  toe ampuatation   Afib, Gout, idiopathic cirrhosis, thrombocytopenia, CKD stage 3, DM2, HLD, pituitary adenoma   pancytopenia  from cirrhosis,        admitted  with   right foot ulcer / plantar  3rd   toe  ulcer/ osteomyelitis   seen by podiatry   on  zosyn/  empiric  po vanco, per ID   partial  right 3rd  toe amputation, on saturday   dvt ppX   on coreg/ lisinopril/ lasix/  spironolactone  dm/ follow fs   follow c/s, d/c  , when cleared by ID and podiatry

## 2018-09-10 VITALS — WEIGHT: 189.6 LBS

## 2018-09-10 LAB
ANION GAP SERPL CALC-SCNC: 12 MMOL/L — SIGNIFICANT CHANGE UP (ref 5–17)
BUN SERPL-MCNC: 34 MG/DL — HIGH (ref 7–23)
CALCIUM SERPL-MCNC: 8.5 MG/DL — SIGNIFICANT CHANGE UP (ref 8.4–10.5)
CHLORIDE SERPL-SCNC: 102 MMOL/L — SIGNIFICANT CHANGE UP (ref 96–108)
CO2 SERPL-SCNC: 22 MMOL/L — SIGNIFICANT CHANGE UP (ref 22–31)
CREAT SERPL-MCNC: 1.3 MG/DL — SIGNIFICANT CHANGE UP (ref 0.5–1.3)
GLUCOSE BLDC GLUCOMTR-MCNC: 155 MG/DL — HIGH (ref 70–99)
GLUCOSE BLDC GLUCOMTR-MCNC: 196 MG/DL — HIGH (ref 70–99)
GLUCOSE SERPL-MCNC: 144 MG/DL — HIGH (ref 70–99)
HCT VFR BLD CALC: 27 % — LOW (ref 39–50)
HGB BLD-MCNC: 8.9 G/DL — LOW (ref 13–17)
MCHC RBC-ENTMCNC: 30.5 PG — SIGNIFICANT CHANGE UP (ref 27–34)
MCHC RBC-ENTMCNC: 33 GM/DL — SIGNIFICANT CHANGE UP (ref 32–36)
MCV RBC AUTO: 92.5 FL — SIGNIFICANT CHANGE UP (ref 80–100)
PLATELET # BLD AUTO: 58 K/UL — LOW (ref 150–400)
POTASSIUM SERPL-MCNC: 4.8 MMOL/L — SIGNIFICANT CHANGE UP (ref 3.5–5.3)
POTASSIUM SERPL-SCNC: 4.8 MMOL/L — SIGNIFICANT CHANGE UP (ref 3.5–5.3)
RBC # BLD: 2.92 M/UL — LOW (ref 4.2–5.8)
RBC # FLD: 15.6 % — HIGH (ref 10.3–14.5)
SODIUM SERPL-SCNC: 136 MMOL/L — SIGNIFICANT CHANGE UP (ref 135–145)
WBC # BLD: 3.46 K/UL — LOW (ref 3.8–10.5)
WBC # FLD AUTO: 3.46 K/UL — LOW (ref 3.8–10.5)

## 2018-09-10 PROCEDURE — 83735 ASSAY OF MAGNESIUM: CPT

## 2018-09-10 PROCEDURE — 82947 ASSAY GLUCOSE BLOOD QUANT: CPT

## 2018-09-10 PROCEDURE — 86850 RBC ANTIBODY SCREEN: CPT

## 2018-09-10 PROCEDURE — 88311 DECALCIFY TISSUE: CPT

## 2018-09-10 PROCEDURE — 84132 ASSAY OF SERUM POTASSIUM: CPT

## 2018-09-10 PROCEDURE — 85652 RBC SED RATE AUTOMATED: CPT

## 2018-09-10 PROCEDURE — 73590 X-RAY EXAM OF LOWER LEG: CPT

## 2018-09-10 PROCEDURE — 87186 SC STD MICRODIL/AGAR DIL: CPT

## 2018-09-10 PROCEDURE — 85027 COMPLETE CBC AUTOMATED: CPT

## 2018-09-10 PROCEDURE — 86900 BLOOD TYPING SEROLOGIC ABO: CPT

## 2018-09-10 PROCEDURE — 85014 HEMATOCRIT: CPT

## 2018-09-10 PROCEDURE — 83605 ASSAY OF LACTIC ACID: CPT

## 2018-09-10 PROCEDURE — 86901 BLOOD TYPING SEROLOGIC RH(D): CPT

## 2018-09-10 PROCEDURE — 80061 LIPID PANEL: CPT

## 2018-09-10 PROCEDURE — 87015 SPECIMEN INFECT AGNT CONCNTJ: CPT

## 2018-09-10 PROCEDURE — 82803 BLOOD GASES ANY COMBINATION: CPT

## 2018-09-10 PROCEDURE — 84100 ASSAY OF PHOSPHORUS: CPT

## 2018-09-10 PROCEDURE — 84295 ASSAY OF SERUM SODIUM: CPT

## 2018-09-10 PROCEDURE — 86140 C-REACTIVE PROTEIN: CPT

## 2018-09-10 PROCEDURE — 97161 PT EVAL LOW COMPLEX 20 MIN: CPT

## 2018-09-10 PROCEDURE — 99285 EMERGENCY DEPT VISIT HI MDM: CPT

## 2018-09-10 PROCEDURE — 73630 X-RAY EXAM OF FOOT: CPT

## 2018-09-10 PROCEDURE — 80048 BASIC METABOLIC PNL TOTAL CA: CPT

## 2018-09-10 PROCEDURE — 87102 FUNGUS ISOLATION CULTURE: CPT

## 2018-09-10 PROCEDURE — 85730 THROMBOPLASTIN TIME PARTIAL: CPT

## 2018-09-10 PROCEDURE — 87116 MYCOBACTERIA CULTURE: CPT

## 2018-09-10 PROCEDURE — 99233 SBSQ HOSP IP/OBS HIGH 50: CPT | Mod: GC

## 2018-09-10 PROCEDURE — 88305 TISSUE EXAM BY PATHOLOGIST: CPT

## 2018-09-10 PROCEDURE — 85610 PROTHROMBIN TIME: CPT

## 2018-09-10 PROCEDURE — 87070 CULTURE OTHR SPECIMN AEROBIC: CPT

## 2018-09-10 PROCEDURE — 82435 ASSAY OF BLOOD CHLORIDE: CPT

## 2018-09-10 PROCEDURE — 87075 CULTR BACTERIA EXCEPT BLOOD: CPT

## 2018-09-10 PROCEDURE — 82330 ASSAY OF CALCIUM: CPT

## 2018-09-10 PROCEDURE — 87206 SMEAR FLUORESCENT/ACID STAI: CPT

## 2018-09-10 PROCEDURE — 87205 SMEAR GRAM STAIN: CPT

## 2018-09-10 PROCEDURE — 80053 COMPREHEN METABOLIC PANEL: CPT

## 2018-09-10 PROCEDURE — 82962 GLUCOSE BLOOD TEST: CPT

## 2018-09-10 RX ORDER — MINOCYCLINE HYDROCHLORIDE 45 MG/1
1 TABLET, EXTENDED RELEASE ORAL
Qty: 80 | Refills: 0 | OUTPATIENT
Start: 2018-09-10 | End: 2018-10-19

## 2018-09-10 RX ORDER — MINOCYCLINE HYDROCHLORIDE 45 MG/1
1 TABLET, EXTENDED RELEASE ORAL
Qty: 0 | Refills: 0 | COMMUNITY
Start: 2018-09-10

## 2018-09-10 RX ORDER — LEVOTHYROXINE SODIUM 125 MCG
1 TABLET ORAL
Qty: 0 | Refills: 0 | COMMUNITY
Start: 2018-09-10

## 2018-09-10 RX ORDER — CARVEDILOL PHOSPHATE 80 MG/1
12.5 CAPSULE, EXTENDED RELEASE ORAL EVERY 12 HOURS
Qty: 0 | Refills: 0 | Status: DISCONTINUED | OUTPATIENT
Start: 2018-09-10 | End: 2018-09-10

## 2018-09-10 RX ORDER — LEVOTHYROXINE SODIUM 125 MCG
1 TABLET ORAL
Qty: 0 | Refills: 0 | COMMUNITY

## 2018-09-10 RX ORDER — FUROSEMIDE 40 MG
1 TABLET ORAL
Qty: 0 | Refills: 0 | COMMUNITY
Start: 2018-09-10

## 2018-09-10 RX ORDER — LEVOTHYROXINE SODIUM 125 MCG
2 TABLET ORAL
Qty: 0 | Refills: 0 | COMMUNITY

## 2018-09-10 RX ADMIN — Medication 1: at 07:42

## 2018-09-10 RX ADMIN — Medication 20 MILLIGRAM(S): at 05:38

## 2018-09-10 RX ADMIN — CARVEDILOL PHOSPHATE 12.5 MILLIGRAM(S): 80 CAPSULE, EXTENDED RELEASE ORAL at 05:38

## 2018-09-10 RX ADMIN — SPIRONOLACTONE 25 MILLIGRAM(S): 25 TABLET, FILM COATED ORAL at 05:38

## 2018-09-10 RX ADMIN — Medication 125 MICROGRAM(S): at 05:38

## 2018-09-10 RX ADMIN — Medication 1: at 13:22

## 2018-09-10 RX ADMIN — Medication 81 MILLIGRAM(S): at 11:58

## 2018-09-10 RX ADMIN — BROMOCRIPTINE MESYLATE 5 MILLIGRAM(S): 5 CAPSULE ORAL at 11:57

## 2018-09-10 RX ADMIN — LISINOPRIL 2.5 MILLIGRAM(S): 2.5 TABLET ORAL at 05:38

## 2018-09-10 RX ADMIN — MINOCYCLINE HYDROCHLORIDE 100 MILLIGRAM(S): 45 TABLET, EXTENDED RELEASE ORAL at 06:18

## 2018-09-10 RX ADMIN — Medication 20 MILLIEQUIVALENT(S): at 11:57

## 2018-09-10 NOTE — PROGRESS NOTE ADULT - ASSESSMENT
68 y.o male with   PMHx of CAD s/p CABG & stents,    systolic CHF ( Ef 37%) s/p ICD,  c/c  anemia,  right second  toe ampuatation   Afib, Gout, idiopathic cirrhosis, thrombocytopenia, CKD stage 3, DM2, HLD, pituitary adenoma   pancytopenia  from cirrhosis,        admitted  with   right foot ulcer / plantar  3rd   toe  ulcer/ osteomyelitis   seen by podiatry   on  zosyn/  empiric  po vanco, per ID   partial  right 3rd  toe amputation, on saturday   dvt ppX   on coreg/ lisinopril/ lasix/  spironolactone  dm/ follow fs   c/s, gram positive  cocci,. awaiting ID

## 2018-09-10 NOTE — PHYSICAL THERAPY INITIAL EVALUATION ADULT - PERTINENT HX OF CURRENT PROBLEM, REHAB EVAL
Patient is a 68y old  Male who presents with a chief complaint of right foot wound. Pt is s/p partial right third toe amp on Sep 8, 2018.

## 2018-09-10 NOTE — PROGRESS NOTE ADULT - REASON FOR ADMISSION
right foot wound

## 2018-09-10 NOTE — PROGRESS NOTE ADULT - PROVIDER SPECIALTY LIST ADULT
Cardiology
Cardiology
Infectious Disease
Internal Medicine
Podiatry
Infectious Disease
Cardiology

## 2018-09-10 NOTE — PROGRESS NOTE ADULT - SUBJECTIVE AND OBJECTIVE BOX
Patient is a 68y male with 66 m with DM, HLD, CKD, CAD s/p CABG & stenting, systolic CHF ( Ef 37%) s/p ICD, Afib, Gout, idiopathic cirrhosis, thrombocytopenia,  pituitary adenoma and recent admission in August 2018 for  right knee MSSA prepatellar bursitis, developed diarrhea later diagnosed with clostridium difficile treated with po vancomycin,  and right foot/heel ulcer and purulent drainage,, the achilles tendon also aspirated and had purulence and tophaceous  material that grew MSSA he complete treatment on August 19 2018 as per notes in the chart. c diff diarrhea had also resolved   admitted now for right foot third toe infection he is being followed by podiatry they are planning for amputation of toe as per their notes  s/p right third digit to proximal phalanx, low concern for residual infection as per operative note    reviewed wound cx grew MRSA     plan   1.	DC zosyn, start oral minocycline 100 mg po bid for 7 days , as per operative report low concern for residual infection  2.	can dc po vanco since it was empiric
Patient seen and examined at bedside.    Overnight Events: Pt states he feels well.       REVIEW OF SYSTEMS:  Constitutional:     [x ] negative [ ] fevers [ ] chills [ ] weight loss [ ] weight gain  HEENT:                  [ ] negative [ ] dry eyes [ ] eye irritation [ ] postnasal drip [ ] nasal congestion  CV:                         [x ] negative  [ ] chest pain [ ] orthopnea [ ] palpitations [ ] murmur  Resp:                     [ x] negative [ ] cough [ ] shortness of breath [ ] dyspnea [ ] wheezing [ ] sputum [ ]hemoptysis  GI:                          [ ] negative [ ] nausea [ ] vomiting [ ] diarrhea [ ] constipation [ ] abd pain [ ] dysphagia   :                        [ ] negative [ ] dysuria [ ] nocturia [ ] hematuria [ ] increased urinary frequency  Musculoskeletal: [ ] negative [ ] back pain [ ] myalgias [ ] arthralgias [ ] fracture  Skin:                       [ ] negative [ ] rash [ ] itch  Neurological:        [ ] negative [ ] headache [ ] dizziness [ ] syncope [ ] weakness [ ] numbness  Psychiatric:           [ ] negative [ ] anxiety [ ] depression  Endocrine:            [ ] negative [ ] diabetes [ ] thyroid problem  Heme/Lymph:      [ ] negative [ ] anemia [ ] bleeding problem  Allergic/Immune: [ ] negative [ ] itchy eyes [ ] nasal discharge [ ] hives [ ] angioedema    [ x] All other systems negative  [ ] Unable to assess ROS because sedated with anoxic brain injury.    Current Meds:  allopurinol 100 milliGRAM(s) Oral daily  aspirin enteric coated 81 milliGRAM(s) Oral daily  atorvastatin 40 milliGRAM(s) Oral at bedtime  bromocriptine Capsule 5 milliGRAM(s) Oral daily  carvedilol 12.5 milliGRAM(s) Oral every 12 hours  dextrose 40% Gel 15 Gram(s) Oral once PRN  dextrose 5%. 1000 milliLiter(s) IV Continuous <Continuous>  dextrose 50% Injectable 12.5 Gram(s) IV Push once  dextrose 50% Injectable 25 Gram(s) IV Push once  dextrose 50% Injectable 25 Gram(s) IV Push once  furosemide    Tablet 20 milliGRAM(s) Oral daily  glucagon  Injectable 1 milliGRAM(s) IntraMuscular once PRN  insulin lispro (HumaLOG) corrective regimen sliding scale   SubCutaneous at bedtime  insulin lispro (HumaLOG) corrective regimen sliding scale   SubCutaneous three times a day before meals  levothyroxine 125 MICROGram(s) Oral daily  lisinopril 2.5 milliGRAM(s) Oral daily  oxyCODONE    5 mG/acetaminophen 325 mG 1 Tablet(s) Oral every 12 hours PRN  piperacillin/tazobactam IVPB. 3.375 Gram(s) IV Intermittent every 8 hours  potassium chloride    Tablet ER 20 milliEquivalent(s) Oral daily  sodium chloride 0.9%. 1000 milliLiter(s) IV Continuous <Continuous>  spironolactone 25 milliGRAM(s) Oral two times a day      PAST MEDICAL & SURGICAL HISTORY:  Hypothyroidism  PVD (peripheral vascular disease)  History of hyperprolactinemia  Hepatic cirrhosis, unspecified hepatic cirrhosis type  Anemia  ICD (implantable cardioverter-defibrillator) in place: [employer], Model [id number] , last interrogation 4/2017  Sleep apnea: not using CPAP  History of osteomyelitis: 2015, right foot 2nd toe  Presence of stent in coronary artery: 2 stents  Heart failure with reduced left ventricular function: EF 37%  Ischemic cardiomyopathy  Pituitary adenoma: as per patient chronic, no changes , recently restarted follow up with endocrinologist ( note in allscripts )  Coronary artery disease  Thrombocytopenia: Chronic  HLD (hyperlipidemia)  HTN (hypertension)  DM (diabetes mellitus): type 2, Hg A1C 8.2 % ( 7/10/17)  AICD lead malfunction: history of  History of amputation: right foot, 2nd toe, osteo 2015  AICD (automatic cardioverter/defibrillator) present: placement in (2006)  Stented coronary artery: RCA (1999)  Coronary atherosclerosis of artery bypass graft: CABG X 4 (1986)      Vitals:  T(F): 97.9 (09-07), Max: 98.3 (09-07)  HR: 74 (09-07) (66 - 90)  BP: 90/50 (09-07) (90/50 - 104/59)  RR: 18 (09-07)  SpO2: 100% (09-07)  I&O's Summary    06 Sep 2018 07:01  -  07 Sep 2018 07:00  --------------------------------------------------------  IN: 0 mL / OUT: 500 mL / NET: -500 mL        Physical Exam:  Appearance: No acute distress; well appearing  Eyes: PERRL, EOMI, pink conjunctiva  HENT: Normal oral mucosa  Cardiovascular: RRR, S1, S2, systolic murmur 2/6, no rubs, or gallops; 2+ edema; no JVD  Respiratory: Clear to auscultation bilaterally  Gastrointestinal: soft, non-tender, non-distended with normal bowel sounds  Musculoskeletal: No clubbing; no joint deformity   Neurologic: Non-focal  Lymphatic: No lymphadenopathy  Psychiatry: AAOx3, mood & affect appropriate  Skin: No rashes, ecchymoses, or cyanosis                          9.8    4.6   )-----------( 51       ( 06 Sep 2018 14:11 )             30.3     09-06    131<L>  |  95<L>  |  34<H>  ----------------------------<  346<H>  5.0   |  22  |  1.12    Ca    7.7<L>      06 Sep 2018 14:11    TPro  6.9  /  Alb  3.4  /  TBili  0.4  /  DBili  x   /  AST  35  /  ALT  30  /  AlkPhos  256<H>  09-06    PT/INR - ( 06 Sep 2018 14:11 )   PT: 12.7 sec;   INR: 1.16 ratio         PTT - ( 06 Sep 2018 14:11 )  PTT:27.7 sec              New ECG(s): Personally reviewed    Echo:    Stress Testing:     Cath:    Imaging:    Interpretation of Telemetry: not on tele
CARLIN [id number]  68y  Male  Type 2 diabetes mellitus with other skin complication  H/o or current diagnosis of HF- ACEI/ARB contraindication unknown  H/o or current diagnosis of HF- no contraindication to ACEI/ARBs  H/o or current diagnosis of HF- ACEI/ARB contraindication unknown  H/o or current diagnosis of HF- Contraindication to ACEI/ARBs  H/o or current diagnosis of HF- ACEI/ARB contraindication unknown  Family history of MI (myocardial infarction)  Handoff  MEWS Score  Hypothyroidism  PVD (peripheral vascular disease)  History of hyperprolactinemia  Hepatic cirrhosis, unspecified hepatic cirrhosis type  Anemia  ICD (implantable cardioverter-defibrillator) in place  Sleep apnea  History of osteomyelitis  Presence of stent in coronary artery  Heart failure with reduced left ventricular function  Ischemic cardiomyopathy  Pituitary adenoma  Coronary artery disease  Thrombocytopenia  HLD (hyperlipidemia)  HTN (hypertension)  DM (diabetes mellitus)  AICD lead malfunction  Shock  Diabetic foot infection  History of amputation  AICD (automatic cardioverter/defibrillator) present  Stented coronary artery  Coronary atherosclerosis of artery bypass graft  OSTEOMNYELITIS 3RD RT TOE  23    allopurinol 100 milliGRAM(s) Oral daily  aspirin enteric coated 81 milliGRAM(s) Oral daily  atorvastatin 40 milliGRAM(s) Oral at bedtime  bromocriptine Capsule 5 milliGRAM(s) Oral daily  carvedilol 12.5 milliGRAM(s) Oral every 12 hours  dextrose 40% Gel 15 Gram(s) Oral once PRN  dextrose 5%. 1000 milliLiter(s) IV Continuous <Continuous>  dextrose 50% Injectable 12.5 Gram(s) IV Push once  dextrose 50% Injectable 25 Gram(s) IV Push once  dextrose 50% Injectable 25 Gram(s) IV Push once  furosemide    Tablet 20 milliGRAM(s) Oral daily  glucagon  Injectable 1 milliGRAM(s) IntraMuscular once PRN  insulin lispro (HumaLOG) corrective regimen sliding scale   SubCutaneous at bedtime  insulin lispro (HumaLOG) corrective regimen sliding scale   SubCutaneous three times a day before meals  levothyroxine 125 MICROGram(s) Oral daily  lisinopril 2.5 milliGRAM(s) Oral daily  oxyCODONE    5 mG/acetaminophen 325 mG 1 Tablet(s) Oral every 12 hours PRN  piperacillin/tazobactam IVPB. 3.375 Gram(s) IV Intermittent every 8 hours  potassium chloride    Tablet ER 20 milliEquivalent(s) Oral daily  spironolactone 25 milliGRAM(s) Oral two times a day  vancomycin    Solution 125 milliGRAM(s) Oral every 12 hours  No Known Allergies    CBC Full  -  ( 07 Sep 2018 09:05 )  WBC Count : 4.58 K/uL  Hemoglobin : 8.6 g/dL  Hematocrit : 26.5 %  Platelet Count - Automated : 50 K/uL  Mean Cell Volume : 93.6 fl  Mean Cell Hemoglobin : 30.4 pg  Mean Cell Hemoglobin Concentration : 32.5 gm/dL  Auto Neutrophil # : x  Auto Lymphocyte # : x  Auto Monocyte # : x  Auto Eosinophil # : x  Auto Basophil # : x  Auto Neutrophil % : x  Auto Lymphocyte % : x  Auto Monocyte % : x  Auto Eosinophil % : x  Auto Basophil % : x    Patient visited at bedside with infected right 3rd toe probing to bone    < from: Xray Foot AP + Lateral + Oblique, Right (09.06.18 @ 14:02) >    EXAM:  TIBIA AND FIBULA AP AND LAT RT                          EXAM:  FOOT COMPLETE RIGHT (MIN 3 VIEW)                            PROCEDURE DATE:  09/06/2018            INTERPRETATION:  CLINICAL INFORMATION: Right third toe infection with   historyof diabetes mellitus, evaluate for osteomyelitis.    TECHNIQUE: 3 views of the right foot and 2 views of the right   tibia/fibula.    COMPARISON: Right foot x-ray 8/1/2018. Right knee x-ray 5/18/2018.    FINDINGS:     The patient is status post partial resection of the right second digit at   the level of the distal aspect of the proximal second phalanx. The   surgical margin is sharp.  There is new cortical erosion of the third distal phalanx with overlying   soft tissue expansion and ulceration, suspicious for osteomyelitis.  No cortical erosions or periosteal reaction in the remaining bones of the   right foot. No subcutaneous gas tracking along the deep soft tissues of   the right foot. There is severe arthrosis of the first interphalangeal   joint and moderate arthrosis of the first metatarsophalangeal joint.   Retrocalcaneal and plantar calcaneal bone spurring is noted.  There is no acute fracture, cortical erosion, or periosteal reaction of   the right tibia/fibula. There is diffuse soft tissue swelling about the   tibia and fibula. There are vascular calcifications.    IMPRESSION:  Cortical erosion of the third distal phalanx with overlying soft tissue   ulceration, highly concerning for osteomyelitis. If clinically warranted,  further evaluation for abscess may be performed with contrast-enhanced   MRI.                LUCIE SANTOS M.D., RADIOLOGY RESIDENT  This document has been electronically signed.  JENNIFER ISIDRO M.D., ATTENDING RADIOLOGIST  This document has been electronically signed. Sep  7 2018  8:50AM                < end of copied text >  Clinical and radiographic signs of osteomeylitis. patient scheduled for operating room Saturday at 12:00 noon. patient to be NPO after midnight  Reapply betadine and dsd right foot and leg  posterior ulcer right leg improving  appreciate rheumatology in put since patient was supposed to get gout infusion treatment and missed his appointemnt due to toe infection  Podiatry to follow
CC: f/u for osteo of foot    Patient reports he feels well    REVIEW OF SYSTEMS:  All other review of systems negative (Comprehensive ROS)    Antimicrobials Day #  :pod 2/42  minocycline 100 milliGRAM(s) Oral two times a day before meals    Other Medications Reviewed    T(F): 98.2 (09-10-18 @ 09:18), Max: 98.4 (09-09-18 @ 13:23)  HR: 78 (09-10-18 @ 09:35)  BP: 94/60 (09-10-18 @ 09:35)  RR: 18 (09-10-18 @ 09:18)  SpO2: 99% (09-10-18 @ 09:34)  Wt(kg): --    PHYSICAL EXAM:  General: alert, no acute distress  Eyes:  anicteric, no conjunctival injection, no discharge  Oropharynx: no lesions or injection 	  Neck: supple, without adenopathy  Lungs: clear to auscultation  Heart: regular rate and rhythm; no murmur, rubs or gallops  Abdomen: soft, nondistended, nontender, without mass or organomegaly  Skin: no lesions  Extremities: no clubbing, cyanosis,. brawny changes in the leg, right foot wrapped, refuses dressing take down  Neurologic: alert, oriented, moves all extremities    LAB RESULTS:                        8.9    3.46  )-----------( 58       ( 10 Sep 2018 08:06 )             27.0     09-10    136  |  102  |  34<H>  ----------------------------<  144<H>  4.8   |  22  |  1.30    Ca    8.5      10 Sep 2018 07:03          MICROBIOLOGY:  RECENT CULTURES:  09-08 @ 21:43 .Tissue Other, THIRD TOE DISTAL RIGHT     Testing in progress    No polymorphonuclear leukocytes seen per low power field  No organisms seen    09-08 @ 21:41 .Tissue Other, PROXIMAL THIRD RIGHT PHALYNX     Growth in fluid media only Gram positive cocci in pairs    No polymorphonuclear leukocytes seen per low power field  No organisms seen    09-06 @ 16:32 .Abscess right 3rd toe foot wound Methicillin resistant Staphylococcus aureus    Numerous Methicillin resistant Staphylococcus aureus          RADIOLOGY REVIEWED:    < from: Xray Foot AP + Lateral + Oblique, Right (09.06.18 @ 14:02) >    IMPRESSION:  Cortical erosion of the third distal phalanx with overlying soft tissue   ulceration, highly concerning for osteomyelitis. If clinically warranted,  further evaluation for abscess may be performed with contrast-enhanced   MRI.            < end of copied text >    Assessment:  Patient with right foot contiguous focus osteo of the 3rd toe distal phalynx s/p toe amputation. Proximal margin is growing so will plan 6 weeks of minocycline instead of just a week  Plan: complete another 40 days of po minocycline  must follow up in our office in 1-2 weeks and with podiatry
Patient is a 68y old  Male who presents with a chief complaint of right foot wound (07 Sep 2018 17:28)      INTERVAL HPI/OVERNIGHT EVENTS:   Pt is scheduled for RIGHT foot P3RR with Dr. Tristan at NOON . Patient is aware of procedure and is NPO since midnight.    MEDICATIONS  (STANDING):  allopurinol 100 milliGRAM(s) Oral daily  aspirin enteric coated 81 milliGRAM(s) Oral daily  atorvastatin 40 milliGRAM(s) Oral at bedtime  bromocriptine Capsule 5 milliGRAM(s) Oral daily  carvedilol 12.5 milliGRAM(s) Oral every 12 hours  dextrose 5%. 1000 milliLiter(s) (50 mL/Hr) IV Continuous <Continuous>  dextrose 50% Injectable 12.5 Gram(s) IV Push once  dextrose 50% Injectable 25 Gram(s) IV Push once  dextrose 50% Injectable 25 Gram(s) IV Push once  furosemide    Tablet 20 milliGRAM(s) Oral daily  insulin lispro (HumaLOG) corrective regimen sliding scale   SubCutaneous at bedtime  insulin lispro (HumaLOG) corrective regimen sliding scale   SubCutaneous three times a day before meals  levothyroxine 125 MICROGram(s) Oral daily  lisinopril 2.5 milliGRAM(s) Oral daily  piperacillin/tazobactam IVPB. 3.375 Gram(s) IV Intermittent every 8 hours  potassium chloride    Tablet ER 20 milliEquivalent(s) Oral daily  spironolactone 25 milliGRAM(s) Oral two times a day  vancomycin    Solution 125 milliGRAM(s) Oral every 12 hours    MEDICATIONS  (PRN):  dextrose 40% Gel 15 Gram(s) Oral once PRN Blood Glucose LESS THAN 70 milliGRAM(s)/deciliter  glucagon  Injectable 1 milliGRAM(s) IntraMuscular once PRN Glucose LESS THAN 70 milligrams/deciliter  oxyCODONE    5 mG/acetaminophen 325 mG 1 Tablet(s) Oral every 12 hours PRN Moderate Pain (4 - 6)    Allergies    No Known Allergies    Intolerances    Vital Signs Last 24 Hrs  T(C): 36.5 (08 Sep 2018 05:10), Max: 36.8 (07 Sep 2018 21:34)  T(F): 97.7 (08 Sep 2018 05:10), Max: 98.2 (07 Sep 2018 21:34)  HR: 54 (08 Sep 2018 05:10) (54 - 76)  BP: 115/76 (08 Sep 2018 05:10) (90/44 - 115/76)  BP(mean): --  RR: 18 (08 Sep 2018 05:10) (18 - 19)  SpO2: 99% (08 Sep 2018 05:10) (94% - 100%)    LABS:                        8.6    4.58  )-----------( 50       ( 07 Sep 2018 09:05 )             26.5     09-08    135  |  103  |  34<H>  ----------------------------<  148<H>  4.6   |  23  |  1.34<H>    Ca    8.6      08 Sep 2018 05:14  Phos  3.7     09-08  Mg     1.7     09-08    TPro  6.9  /  Alb  3.4  /  TBili  0.4  /  DBili  x   /  AST  35  /  ALT  30  /  AlkPhos  256<H>  09-06    PT/INR - ( 06 Sep 2018 14:11 )   PT: 12.7 sec;   INR: 1.16 ratio         PTT - ( 06 Sep 2018 14:11 )  PTT:27.7 sec    CAPILLARY BLOOD GLUCOSE    POCT Blood Glucose.: 227 mg/dL (07 Sep 2018 21:32)  POCT Blood Glucose.: 225 mg/dL (07 Sep 2018 17:21)  POCT Blood Glucose.: 237 mg/dL (07 Sep 2018 11:42)  POCT Blood Glucose.: 168 mg/dL (07 Sep 2018 07:57)    RADIOLOGY & ADDITIONAL TESTS:
Patient is a 68y old  Male who presents with a chief complaint of right foot wound (09 Sep 2018 07:50)      INTERVAL HPI/OVERNIGHT EVENTS:   Pt is s/p RF Partial 3rd digit amp.  Pt is POD #1.  Pt reports pain well controlled, no overnight events. Pt in NAD.    MEDICATIONS  (STANDING):  allopurinol 100 milliGRAM(s) Oral daily  aspirin enteric coated 81 milliGRAM(s) Oral daily  atorvastatin 40 milliGRAM(s) Oral at bedtime  bromocriptine Capsule 5 milliGRAM(s) Oral daily  carvedilol 12.5 milliGRAM(s) Oral every 12 hours  dextrose 5%. 1000 milliLiter(s) (50 mL/Hr) IV Continuous <Continuous>  dextrose 50% Injectable 12.5 Gram(s) IV Push once  dextrose 50% Injectable 25 Gram(s) IV Push once  dextrose 50% Injectable 25 Gram(s) IV Push once  furosemide    Tablet 20 milliGRAM(s) Oral daily  insulin lispro (HumaLOG) corrective regimen sliding scale   SubCutaneous at bedtime  insulin lispro (HumaLOG) corrective regimen sliding scale   SubCutaneous three times a day before meals  levothyroxine 125 MICROGram(s) Oral daily  lisinopril 2.5 milliGRAM(s) Oral daily  piperacillin/tazobactam IVPB. 3.375 Gram(s) IV Intermittent every 8 hours  potassium chloride    Tablet ER 20 milliEquivalent(s) Oral daily  sodium chloride 0.9%. 1000 milliLiter(s) (60 mL/Hr) IV Continuous <Continuous>  spironolactone 25 milliGRAM(s) Oral two times a day  vancomycin    Solution 125 milliGRAM(s) Oral every 12 hours    MEDICATIONS  (PRN):  acetaminophen    Suspension .. 650 milliGRAM(s) Oral every 6 hours PRN Mild Pain (1 - 3)  dextrose 40% Gel 15 Gram(s) Oral once PRN Blood Glucose LESS THAN 70 milliGRAM(s)/deciliter  glucagon  Injectable 1 milliGRAM(s) IntraMuscular once PRN Glucose LESS THAN 70 milligrams/deciliter  morphine  - Injectable 2 milliGRAM(s) IV Push every 6 hours PRN Severe Pain (7 - 10)  oxyCODONE    5 mG/acetaminophen 325 mG 1 Tablet(s) Oral every 6 hours PRN Moderate Pain (4 - 6)      Allergies    No Known Allergies    Intolerances    Vital Signs Last 24 Hrs  T(C): 36.7 (09 Sep 2018 05:43), Max: 36.8 (08 Sep 2018 21:27)  T(F): 98.1 (09 Sep 2018 05:43), Max: 98.3 (08 Sep 2018 21:27)  HR: 73 (09 Sep 2018 05:43) (62 - 83)  BP: 104/68 (09 Sep 2018 05:43) (102/59 - 134/66)  BP(mean): 77 (08 Sep 2018 14:30) (77 - 95)  RR: 18 (09 Sep 2018 05:43) (18 - 18)  SpO2: 99% (09 Sep 2018 05:43) (98% - 100%)    LABS:                        8.6    4.43  )-----------( 51       ( 08 Sep 2018 06:50 )             26.7     09-09    135  |  102  |  27<H>  ----------------------------<  133<H>  4.8   |  22  |  1.26    Ca    8.7      09 Sep 2018 06:59  Phos  3.7     09-08  Mg     1.7     09-08      PT/INR - ( 08 Sep 2018 06:50 )   PT: 13.3 sec;   INR: 1.17 ratio         PTT - ( 08 Sep 2018 06:50 )  PTT:27.0 sec    CAPILLARY BLOOD GLUCOSE      POCT Blood Glucose.: 146 mg/dL (09 Sep 2018 07:44)  POCT Blood Glucose.: 236 mg/dL (08 Sep 2018 21:15)  POCT Blood Glucose.: 124 mg/dL (08 Sep 2018 17:26)      RADIOLOGY & ADDITIONAL TESTS:    Imaging Personally Reviewed:  [x] YES  [ ] NO    Consultant(s) Notes Reviewed:  [x] YES  [ ] NO    Lower Extremity Physical Exam:    surgical site stable --> no signs of dehiscence, sutures intact, no active bleeding or oozing. Digit stump slightly dusky but warm to touch.
Patient seen and examined at bedside.    Overnight Events: N o acute events overnight. Pt states that he feels well. No pain around the surgical site.       REVIEW OF SYSTEMS:  Constitutional:     [ ] negative [ ] fevers [ ] chills [ ] weight loss [ ] weight gain  HEENT:                  [ ] negative [ ] dry eyes [ ] eye irritation [ ] postnasal drip [ ] nasal congestion  CV:                         [x ] negative  [ ] chest pain [ ] orthopnea [ ] palpitations [ ] murmur  Resp:                     [x ] negative [ ] cough [ ] shortness of breath [ ] dyspnea [ ] wheezing [ ] sputum [ ]hemoptysis  GI:                          [ ] negative [ ] nausea [ ] vomiting [ ] diarrhea [ ] constipation [ ] abd pain [ ] dysphagia   :                        [ ] negative [ ] dysuria [ ] nocturia [ ] hematuria [ ] increased urinary frequency  Musculoskeletal: [ ] negative [ ] back pain [ ] myalgias [ ] arthralgias [ ] fracture  Skin:                       [ ] negative [ ] rash [ ] itch  Neurological:        [ ] negative [ ] headache [ ] dizziness [ ] syncope [ ] weakness [ ] numbness  Psychiatric:           [ ] negative [ ] anxiety [ ] depression  Endocrine:            [ ] negative [ ] diabetes [ ] thyroid problem  Heme/Lymph:      [ ] negative [ ] anemia [ ] bleeding problem  Allergic/Immune: [ ] negative [ ] itchy eyes [ ] nasal discharge [ ] hives [ ] angioedema    [x] All other systems negative  [ ] Unable to assess ROS because sedated with anoxic brain injury.    Current Meds:  acetaminophen    Suspension .. 650 milliGRAM(s) Oral every 6 hours PRN  allopurinol 100 milliGRAM(s) Oral daily  aspirin enteric coated 81 milliGRAM(s) Oral daily  atorvastatin 40 milliGRAM(s) Oral at bedtime  bromocriptine Capsule 5 milliGRAM(s) Oral daily  carvedilol 12.5 milliGRAM(s) Oral every 12 hours  dextrose 40% Gel 15 Gram(s) Oral once PRN  dextrose 5%. 1000 milliLiter(s) IV Continuous <Continuous>  dextrose 50% Injectable 12.5 Gram(s) IV Push once  dextrose 50% Injectable 25 Gram(s) IV Push once  dextrose 50% Injectable 25 Gram(s) IV Push once  furosemide    Tablet 20 milliGRAM(s) Oral daily  glucagon  Injectable 1 milliGRAM(s) IntraMuscular once PRN  insulin lispro (HumaLOG) corrective regimen sliding scale   SubCutaneous at bedtime  insulin lispro (HumaLOG) corrective regimen sliding scale   SubCutaneous three times a day before meals  levothyroxine 125 MICROGram(s) Oral daily  lisinopril 2.5 milliGRAM(s) Oral daily  minocycline 100 milliGRAM(s) Oral two times a day before meals  morphine  - Injectable 2 milliGRAM(s) IV Push every 6 hours PRN  oxyCODONE    5 mG/acetaminophen 325 mG 1 Tablet(s) Oral every 6 hours PRN  potassium chloride    Tablet ER 20 milliEquivalent(s) Oral daily  spironolactone 25 milliGRAM(s) Oral two times a day      PAST MEDICAL & SURGICAL HISTORY:  Hypothyroidism  PVD (peripheral vascular disease)  History of hyperprolactinemia  Hepatic cirrhosis, unspecified hepatic cirrhosis type  Anemia  ICD (implantable cardioverter-defibrillator) in place: [employer], Model X751MOC , last interrogation 4/2017  Sleep apnea: not using CPAP  History of osteomyelitis: 2015, right foot 2nd toe  Presence of stent in coronary artery: 2 stents  Heart failure with reduced left ventricular function: EF 37%  Ischemic cardiomyopathy  Pituitary adenoma: as per patient chronic, no changes , recently restarted follow up with endocrinologist ( note in allscripts )  Coronary artery disease  Thrombocytopenia: Chronic  HLD (hyperlipidemia)  HTN (hypertension)  DM (diabetes mellitus): type 2, Hg A1C 8.2 % ( 7/10/17)  AICD lead malfunction: history of  History of amputation: right foot, 2nd toe, osteo 2015  AICD (automatic cardioverter/defibrillator) present: placement in (2006)  Stented coronary artery: RCA (1999)  Coronary atherosclerosis of artery bypass graft: CABG X 4 (1986)      Vitals:  T(F): 97.6 (09-10), Max: 98.4 (09-09)  HR: 67 (09-10) (60 - 99)  BP: 114/72 (09-10) (91/53 - 116/73)  RR: 18 (09-10)  SpO2: 99% (09-10)  I&O's Summary    09 Sep 2018 07:01  -  10 Sep 2018 07:00  --------------------------------------------------------  IN: 800 mL / OUT: 300 mL / NET: 500 mL        Physical Exam:  Appearance: No acute distress; well appearing  Eyes: PERRL, EOMI, pink conjunctiva  HENT: Normal oral mucosa  Cardiovascular: RRR, S1, S2, no murmurs, rubs, or gallops; trace edema; no JVD  Respiratory: Clear to auscultation bilaterally  Gastrointestinal: soft, non-tender, non-distended with normal bowel sounds  Musculoskeletal: No clubbing; no joint deformity   Neurologic: Non-focal  Lymphatic: No lymphadenopathy  Psychiatry: AAOx3, mood & affect appropriate  Skin: No rashes, ecchymoses, or cyanosis                          9.4    4.38  )-----------( 63       ( 09 Sep 2018 08:42 )             29.5     09-09    135  |  102  |  27<H>  ----------------------------<  133<H>  4.8   |  22  |  1.26    Ca    8.7      09 Sep 2018 06:59                    New ECG(s): Personally reviewed    Echo:  < from: TTE with Doppler (w/Cont) (12.23.12 @ 11:01) >  ------------------------------------------------------------------------  Conclusions:  1. Tethered mitral valve leaflets. Mild mitral  regurgitation.  2. Severely dilated left atrium. LA volume index = 45  cc/m2.  3. Severe left ventricular enlargement.  4. Endocardial visualization enhanced with intravenous  injection of echo contrast (Definity). Severe global left  ventricular systolic dysfunction. No LV apical thrombus.  5. Right ventricular enlargement with grossly normal right  ventricular systolic function. A device wire is noted in  the right heart.  6. Mild pulmonic regurgitation.  ------------------------------------------------------------------------  Confirmed on 12/23/2012 - 16:32:28 by Renetta Bullard M.D.  ------------------------------------------------------------------------    < end of copied text >    Stress Testing:     Cath:    Imaging:    Interpretation of Telemetry:
no  compalints  REVIEW OF SYSTEMS:  GEN: no fever,    no chills  RESP: no SOB,   no cough  CVS: no chest pain,   no palpitations  GI: no abdominal pain,   no nausea,   no vomiting,   no constipation,   no diarrhea  : no dysuria,   no frequency  NEURO: no headache,   no dizziness  PSYCH: no depression,   not anxious  Derm : no rash    MEDICATIONS  (STANDING):  allopurinol 100 milliGRAM(s) Oral daily  aspirin enteric coated 81 milliGRAM(s) Oral daily  atorvastatin 40 milliGRAM(s) Oral at bedtime  bromocriptine Capsule 5 milliGRAM(s) Oral daily  carvedilol 12.5 milliGRAM(s) Oral every 12 hours  dextrose 5%. 1000 milliLiter(s) (50 mL/Hr) IV Continuous <Continuous>  dextrose 50% Injectable 12.5 Gram(s) IV Push once  dextrose 50% Injectable 25 Gram(s) IV Push once  dextrose 50% Injectable 25 Gram(s) IV Push once  furosemide    Tablet 40 milliGRAM(s) Oral daily  insulin lispro (HumaLOG) corrective regimen sliding scale   SubCutaneous at bedtime  insulin lispro (HumaLOG) corrective regimen sliding scale   SubCutaneous three times a day before meals  levothyroxine 125 MICROGram(s) Oral daily  lisinopril 10 milliGRAM(s) Oral daily  piperacillin/tazobactam IVPB. 3.375 Gram(s) IV Intermittent every 8 hours  potassium chloride    Tablet ER 20 milliEquivalent(s) Oral daily  sodium chloride 0.9%. 1000 milliLiter(s) (50 mL/Hr) IV Continuous <Continuous>  spironolactone 25 milliGRAM(s) Oral two times a day    MEDICATIONS  (PRN):  dextrose 40% Gel 15 Gram(s) Oral once PRN Blood Glucose LESS THAN 70 milliGRAM(s)/deciliter  glucagon  Injectable 1 milliGRAM(s) IntraMuscular once PRN Glucose LESS THAN 70 milligrams/deciliter  oxyCODONE    5 mG/acetaminophen 325 mG 1 Tablet(s) Oral every 12 hours PRN Moderate Pain (4 - 6)      Vital Signs Last 24 Hrs  T(C): 36.6 (07 Sep 2018 04:57), Max: 36.8 (06 Sep 2018 19:48)  T(F): 97.9 (07 Sep 2018 04:57), Max: 98.3 (07 Sep 2018 01:20)  HR: 74 (07 Sep 2018 04:57) (66 - 90)  BP: 90/50 (07 Sep 2018 06:47) (90/50 - 104/59)  BP(mean): --  RR: 18 (07 Sep 2018 04:57) (16 - 18)  SpO2: 100% (07 Sep 2018 04:57) (97% - 100%)  CAPILLARY BLOOD GLUCOSE      POCT Blood Glucose.: 293 mg/dL (06 Sep 2018 21:38)  POCT Blood Glucose.: 352 mg/dL (06 Sep 2018 17:55)    I&O's Summary    06 Sep 2018 07:01  -  07 Sep 2018 07:00  --------------------------------------------------------  IN: 0 mL / OUT: 500 mL / NET: -500 mL        PHYSICAL EXAM:  HEAD:  Atraumatic, Normocephalic  NECK: Supple, No   JVD  CHEST/LUNG:   no     rales,     no,    rhonchi  HEART: Regular rate and rhythm;         murmur  ABDOMEN: Soft, Nontender, ;   EXTREMITIES: right  foot  dressing/       edema  NEUROLOGY:  alert    LABS:                        9.8    4.6   )-----------( 51       ( 06 Sep 2018 14:11 )             30.3     09-06    131<L>  |  95<L>  |  34<H>  ----------------------------<  346<H>  5.0   |  22  |  1.12    Ca    7.7<L>      06 Sep 2018 14:11    TPro  6.9  /  Alb  3.4  /  TBili  0.4  /  DBili  x   /  AST  35  /  ALT  30  /  AlkPhos  256<H>  09-06    PT/INR - ( 06 Sep 2018 14:11 )   PT: 12.7 sec;   INR: 1.16 ratio         PTT - ( 06 Sep 2018 14:11 )  PTT:27.7 sec        Lactate, Blood: 2.4 mmol/L (09-06 @ 21:24)      09-06 @ 15:41  4.8  33    Hemoglobin A1C, Whole Blood: 6.7 % (08-02 @ 07:40)            Consultant(s) Notes Reviewed:      Care Discussed with Consultants/Other Providers:
no  complaints    REVIEW OF SYSTEMS:  GEN: no fever,    no chills  RESP: no SOB,   no cough  CVS: no chest pain,   no palpitations  GI: no abdominal pain,   no nausea,   no vomiting,   no constipation,   no diarrhea  : no dysuria,   no frequency  NEURO: no headache,   no dizziness  PSYCH: no depression,   not anxious  Derm : no rash    MEDICATIONS  (STANDING):  allopurinol 100 milliGRAM(s) Oral daily  aspirin enteric coated 81 milliGRAM(s) Oral daily  atorvastatin 40 milliGRAM(s) Oral at bedtime  bromocriptine Capsule 5 milliGRAM(s) Oral daily  carvedilol 12.5 milliGRAM(s) Oral every 12 hours  dextrose 5%. 1000 milliLiter(s) (50 mL/Hr) IV Continuous <Continuous>  dextrose 50% Injectable 12.5 Gram(s) IV Push once  dextrose 50% Injectable 25 Gram(s) IV Push once  dextrose 50% Injectable 25 Gram(s) IV Push once  furosemide    Tablet 20 milliGRAM(s) Oral daily  insulin lispro (HumaLOG) corrective regimen sliding scale   SubCutaneous at bedtime  insulin lispro (HumaLOG) corrective regimen sliding scale   SubCutaneous three times a day before meals  levothyroxine 125 MICROGram(s) Oral daily  lisinopril 2.5 milliGRAM(s) Oral daily  piperacillin/tazobactam IVPB. 3.375 Gram(s) IV Intermittent every 8 hours  potassium chloride    Tablet ER 20 milliEquivalent(s) Oral daily  spironolactone 25 milliGRAM(s) Oral two times a day  vancomycin    Solution 125 milliGRAM(s) Oral every 12 hours    MEDICATIONS  (PRN):  dextrose 40% Gel 15 Gram(s) Oral once PRN Blood Glucose LESS THAN 70 milliGRAM(s)/deciliter  glucagon  Injectable 1 milliGRAM(s) IntraMuscular once PRN Glucose LESS THAN 70 milligrams/deciliter  oxyCODONE    5 mG/acetaminophen 325 mG 1 Tablet(s) Oral every 12 hours PRN Moderate Pain (4 - 6)      Vital Signs Last 24 Hrs  T(C): 36.5 (08 Sep 2018 05:10), Max: 36.8 (07 Sep 2018 21:34)  T(F): 97.7 (08 Sep 2018 05:10), Max: 98.2 (07 Sep 2018 21:34)  HR: 54 (08 Sep 2018 05:10) (54 - 76)  BP: 115/76 (08 Sep 2018 05:10) (90/44 - 115/76)  BP(mean): --  RR: 18 (08 Sep 2018 05:10) (18 - 19)  SpO2: 99% (08 Sep 2018 05:10) (94% - 100%)  CAPILLARY BLOOD GLUCOSE      POCT Blood Glucose.: 227 mg/dL (07 Sep 2018 21:32)  POCT Blood Glucose.: 225 mg/dL (07 Sep 2018 17:21)  POCT Blood Glucose.: 237 mg/dL (07 Sep 2018 11:42)  POCT Blood Glucose.: 168 mg/dL (07 Sep 2018 07:57)    I&O's Summary    07 Sep 2018 07:01  -  08 Sep 2018 07:00  --------------------------------------------------------  IN: 460 mL / OUT: 1450 mL / NET: -990 mL        PHYSICAL EXAM:  HEAD:  Atraumatic, Normocephalic  NECK: Supple, No   JVD  CHEST/LUNG:   no     rales,     no,    rhonchi  HEART: Regular rate and rhythm;         murmur  ABDOMEN: Soft, Nontender, ;   EXTREMITIES:   right  foot dresing  NEUROLOGY:  alert    LABS:                        8.6    4.58  )-----------( 50       ( 07 Sep 2018 09:05 )             26.5     09-08    135  |  103  |  34<H>  ----------------------------<  148<H>  4.6   |  23  |  1.34<H>    Ca    8.6      08 Sep 2018 05:14  Phos  3.7     09-08  Mg     1.7     09-08    TPro  6.9  /  Alb  3.4  /  TBili  0.4  /  DBili  x   /  AST  35  /  ALT  30  /  AlkPhos  256<H>  09-06    PT/INR - ( 06 Sep 2018 14:11 )   PT: 12.7 sec;   INR: 1.16 ratio         PTT - ( 06 Sep 2018 14:11 )  PTT:27.7 sec        Lactate, Blood: 1.1 mmol/L (09-07 @ 07:25)  Lactate, Blood: 2.4 mmol/L (09-06 @ 21:24)      09-06 @ 15:41  4.8  33    Hemoglobin A1C, Whole Blood: 6.7 % (08-02 @ 07:40)            Consultant(s) Notes Reviewed:      Care Discussed with Consultants/Other Providers:
no complaints, surgery was without incident.      PAST MEDICAL & SURGICAL HISTORY:  Hypothyroidism  PVD (peripheral vascular disease)  History of hyperprolactinemia  Hepatic cirrhosis, unspecified hepatic cirrhosis type  Anemia  ICD (implantable cardioverter-defibrillator) in place: [employer], Model C031THL , last interrogation 4/2017  Sleep apnea: not using CPAP  History of osteomyelitis: 2015, right foot 2nd toe  Presence of stent in coronary artery: 2 stents  Heart failure with reduced left ventricular function: EF 37%  Ischemic cardiomyopathy  Pituitary adenoma: as per patient chronic, no changes , recently restarted follow up with endocrinologist ( note in allscripts )  Coronary artery disease  Thrombocytopenia: Chronic  HLD (hyperlipidemia)  HTN (hypertension)  DM (diabetes mellitus): type 2, Hg A1C 8.2 % ( 7/10/17)  AICD lead malfunction: history of  History of amputation: right foot, 2nd toe, osteo 2015  AICD (automatic cardioverter/defibrillator) present: placement in (2006)  Stented coronary artery: RCA (1999)  Coronary atherosclerosis of artery bypass graft: CABG X 4 (1986)    Medications:  acetaminophen    Suspension .. 650 milliGRAM(s) Oral every 6 hours PRN  allopurinol 100 milliGRAM(s) Oral daily  aspirin enteric coated 81 milliGRAM(s) Oral daily  atorvastatin 40 milliGRAM(s) Oral at bedtime  bromocriptine Capsule 5 milliGRAM(s) Oral daily  carvedilol 12.5 milliGRAM(s) Oral every 12 hours  dextrose 40% Gel 15 Gram(s) Oral once PRN  dextrose 5%. 1000 milliLiter(s) IV Continuous <Continuous>  dextrose 50% Injectable 12.5 Gram(s) IV Push once  dextrose 50% Injectable 25 Gram(s) IV Push once  dextrose 50% Injectable 25 Gram(s) IV Push once  furosemide    Tablet 20 milliGRAM(s) Oral daily  glucagon  Injectable 1 milliGRAM(s) IntraMuscular once PRN  insulin lispro (HumaLOG) corrective regimen sliding scale   SubCutaneous at bedtime  insulin lispro (HumaLOG) corrective regimen sliding scale   SubCutaneous three times a day before meals  levothyroxine 125 MICROGram(s) Oral daily  lisinopril 2.5 milliGRAM(s) Oral daily  morphine  - Injectable 2 milliGRAM(s) IV Push every 6 hours PRN  oxyCODONE    5 mG/acetaminophen 325 mG 1 Tablet(s) Oral every 6 hours PRN  piperacillin/tazobactam IVPB. 3.375 Gram(s) IV Intermittent every 8 hours  potassium chloride    Tablet ER 20 milliEquivalent(s) Oral daily  sodium chloride 0.9%. 1000 milliLiter(s) IV Continuous <Continuous>  spironolactone 25 milliGRAM(s) Oral two times a day  vancomycin    Solution 125 milliGRAM(s) Oral every 12 hours      Vitals:  T(C): 36.7 (09-09-18 @ 05:43), Max: 36.8 (09-08-18 @ 21:27)  HR: 73 (09-09-18 @ 05:43) (62 - 83)  BP: 104/68 (09-09-18 @ 05:43) (102/59 - 134/66)  BP(mean): 77 (09-08-18 @ 14:30) (77 - 95)  RR: 18 (09-09-18 @ 05:43) (18 - 18)  SpO2: 99% (09-09-18 @ 05:43) (98% - 100%)  Wt(kg): --  Daily     Daily   I&O's Summary    08 Sep 2018 07:01  -  09 Sep 2018 07:00  --------------------------------------------------------  IN: 820 mL / OUT: 700 mL / NET: 120 mL        Physical Exam:  Appearance: [ x] Normal [ ] NAD  Eyes: [ ] PERRL [ ] EOMI  HENT: [x ] Normal oral mus[place]a [ ]NC/AT  Cardiovascular: [x ] S1 [ x] S2 [ x] RRR [ ] No m/r/g [ ]No edema [ ] JVP  Procedural Access Site: [ ] No hematoma [ ] Non-tender to palpation [ ] 2+ pulse [ ] No bruit [ ] No Ecchymosis  Respiratory: [x ] Clear to auscultation bilaterally  Gastrointestinal: [ ] Soft [ ] Non-tender [ ] Non-distended [ ] BS+  Musculoskeletal: [ ] No clubbing [ ] No joint deformity   Neurologic: [ ] Non-focal  Lymphatic: [ ] No lymphadenopathy  Psychiatry: [ ] AAOx3 [ ] Mood & affect appropriate  Skin: [ ] No rashes [ ] No ecchymoses [ ] No cyanosis    09-09    135  |  102  |  27<H>  ----------------------------<  133<H>  4.8   |  22  |  1.26    Ca    8.7      09 Sep 2018 06:59  Phos  3.7     09-08  Mg     1.7     09-08      PT/INR - ( 08 Sep 2018 06:50 )   PT: 13.3 sec;   INR: 1.17 ratio         PTT - ( 08 Sep 2018 06:50 )  PTT:27.0 sec              ECG:    Echo:    Stress Testing:     Cath:    Imaging:    Interpretation of Telemetry:
post op, doing well    REVIEW OF SYSTEMS:  GEN: no fever,    no chills  RESP: no SOB,   no cough  CVS: no chest pain,   no palpitations  GI: no abdominal pain,   no nausea,   no vomiting,   no constipation,   no diarrhea  : no dysuria,   no frequency  NEURO: no headache,   no dizziness  PSYCH: no depression,   not anxious  Derm : no rash    MEDICATIONS  (STANDING):  allopurinol 100 milliGRAM(s) Oral daily  aspirin enteric coated 81 milliGRAM(s) Oral daily  atorvastatin 40 milliGRAM(s) Oral at bedtime  bromocriptine Capsule 5 milliGRAM(s) Oral daily  carvedilol 12.5 milliGRAM(s) Oral every 12 hours  dextrose 5%. 1000 milliLiter(s) (50 mL/Hr) IV Continuous <Continuous>  dextrose 50% Injectable 12.5 Gram(s) IV Push once  dextrose 50% Injectable 25 Gram(s) IV Push once  dextrose 50% Injectable 25 Gram(s) IV Push once  furosemide    Tablet 20 milliGRAM(s) Oral daily  insulin lispro (HumaLOG) corrective regimen sliding scale   SubCutaneous at bedtime  insulin lispro (HumaLOG) corrective regimen sliding scale   SubCutaneous three times a day before meals  levothyroxine 125 MICROGram(s) Oral daily  lisinopril 2.5 milliGRAM(s) Oral daily  piperacillin/tazobactam IVPB. 3.375 Gram(s) IV Intermittent every 8 hours  potassium chloride    Tablet ER 20 milliEquivalent(s) Oral daily  sodium chloride 0.9%. 1000 milliLiter(s) (60 mL/Hr) IV Continuous <Continuous>  spironolactone 25 milliGRAM(s) Oral two times a day  vancomycin    Solution 125 milliGRAM(s) Oral every 12 hours    MEDICATIONS  (PRN):  acetaminophen    Suspension .. 650 milliGRAM(s) Oral every 6 hours PRN Mild Pain (1 - 3)  dextrose 40% Gel 15 Gram(s) Oral once PRN Blood Glucose LESS THAN 70 milliGRAM(s)/deciliter  glucagon  Injectable 1 milliGRAM(s) IntraMuscular once PRN Glucose LESS THAN 70 milligrams/deciliter  morphine  - Injectable 2 milliGRAM(s) IV Push every 6 hours PRN Severe Pain (7 - 10)  oxyCODONE    5 mG/acetaminophen 325 mG 1 Tablet(s) Oral every 6 hours PRN Moderate Pain (4 - 6)      Vital Signs Last 24 Hrs  T(C): 36.7 (09 Sep 2018 05:43), Max: 36.8 (08 Sep 2018 21:27)  T(F): 98.1 (09 Sep 2018 05:43), Max: 98.3 (08 Sep 2018 21:27)  HR: 73 (09 Sep 2018 05:43) (62 - 83)  BP: 104/68 (09 Sep 2018 05:43) (94/58 - 134/66)  BP(mean): 77 (08 Sep 2018 14:30) (77 - 95)  RR: 18 (09 Sep 2018 05:43) (18 - 18)  SpO2: 99% (09 Sep 2018 05:43) (98% - 100%)  CAPILLARY BLOOD GLUCOSE      POCT Blood Glucose.: 236 mg/dL (08 Sep 2018 21:15)  POCT Blood Glucose.: 124 mg/dL (08 Sep 2018 17:26)    I&O's Summary    08 Sep 2018 07:01  -  09 Sep 2018 07:00  --------------------------------------------------------  IN: 820 mL / OUT: 700 mL / NET: 120 mL        PHYSICAL EXAM:  HEAD:  Atraumatic, Normocephalic  NECK: Supple, No   JVD  CHEST/LUNG:   no     rales,     no,    rhonchi  HEART: Regular rate and rhythm;         murmur  ABDOMEN: Soft, Nontender, ;   EXTREMITIES:    dressing, foot     NEUROLOGY:  alert    LABS:                        8.6    4.43  )-----------( 51       ( 08 Sep 2018 06:50 )             26.7     09-09    135  |  102  |  27<H>  ----------------------------<  133<H>  4.8   |  22  |  1.26    Ca    8.7      09 Sep 2018 06:59  Phos  3.7     09-08  Mg     1.7     09-08      PT/INR - ( 08 Sep 2018 06:50 )   PT: 13.3 sec;   INR: 1.17 ratio         PTT - ( 08 Sep 2018 06:50 )  PTT:27.0 sec              Hemoglobin A1C, Whole Blood: 6.7 % (08-02 @ 07:40)          Culture - Tissue with Gram Stain (collected 09-08-18 @ 21:43)  Source: .Tissue Other, THIRD TOE DISTAL RIGHT  Gram Stain (09-08-18 @ 23:45):    No polymorphonuclear leukocytes seen per low power field    No organisms seen    Culture - Tissue with Gram Stain (collected 09-08-18 @ 21:41)  Source: .Tissue Other, PROXIMAL THIRD RIGHT PHALYNX  Gram Stain (09-08-18 @ 23:46):    No polymorphonuclear leukocytes seen per low power field    No organisms seen        Consultant(s) Notes Reviewed:      Care Discussed with Consultants/Other Providers:
post op, no comaplints  REVIEW OF SYSTEMS:  GEN: no fever,    no chills  RESP: no SOB,   no cough  CVS: no chest pain,   no palpitations  GI: no abdominal pain,   no nausea,   no vomiting,   no constipation,   no diarrhea  : no dysuria,   no frequency  NEURO: no headache,   no dizziness  PSYCH: no depression,   not anxious  Derm : no rash    MEDICATIONS  (STANDING):  allopurinol 100 milliGRAM(s) Oral daily  aspirin enteric coated 81 milliGRAM(s) Oral daily  atorvastatin 40 milliGRAM(s) Oral at bedtime  bromocriptine Capsule 5 milliGRAM(s) Oral daily  carvedilol 12.5 milliGRAM(s) Oral every 12 hours  dextrose 5%. 1000 milliLiter(s) (50 mL/Hr) IV Continuous <Continuous>  dextrose 50% Injectable 12.5 Gram(s) IV Push once  dextrose 50% Injectable 25 Gram(s) IV Push once  dextrose 50% Injectable 25 Gram(s) IV Push once  furosemide    Tablet 20 milliGRAM(s) Oral daily  insulin lispro (HumaLOG) corrective regimen sliding scale   SubCutaneous at bedtime  insulin lispro (HumaLOG) corrective regimen sliding scale   SubCutaneous three times a day before meals  levothyroxine 125 MICROGram(s) Oral daily  lisinopril 2.5 milliGRAM(s) Oral daily  minocycline 100 milliGRAM(s) Oral two times a day before meals  potassium chloride    Tablet ER 20 milliEquivalent(s) Oral daily  spironolactone 25 milliGRAM(s) Oral two times a day    MEDICATIONS  (PRN):  acetaminophen    Suspension .. 650 milliGRAM(s) Oral every 6 hours PRN Mild Pain (1 - 3)  dextrose 40% Gel 15 Gram(s) Oral once PRN Blood Glucose LESS THAN 70 milliGRAM(s)/deciliter  glucagon  Injectable 1 milliGRAM(s) IntraMuscular once PRN Glucose LESS THAN 70 milligrams/deciliter  morphine  - Injectable 2 milliGRAM(s) IV Push every 6 hours PRN Severe Pain (7 - 10)  oxyCODONE    5 mG/acetaminophen 325 mG 1 Tablet(s) Oral every 6 hours PRN Moderate Pain (4 - 6)      Vital Signs Last 24 Hrs  T(C): 36.4 (10 Sep 2018 05:33), Max: 36.9 (09 Sep 2018 13:23)  T(F): 97.6 (10 Sep 2018 05:33), Max: 98.4 (09 Sep 2018 13:23)  HR: 67 (10 Sep 2018 05:33) (60 - 99)  BP: 114/72 (10 Sep 2018 05:33) (91/53 - 116/73)  BP(mean): --  RR: 18 (10 Sep 2018 05:33) (18 - 18)  SpO2: 99% (10 Sep 2018 05:33) (98% - 99%)  CAPILLARY BLOOD GLUCOSE      POCT Blood Glucose.: 155 mg/dL (10 Sep 2018 07:40)  POCT Blood Glucose.: 215 mg/dL (09 Sep 2018 21:20)  POCT Blood Glucose.: 183 mg/dL (09 Sep 2018 18:15)  POCT Blood Glucose.: 261 mg/dL (09 Sep 2018 13:19)  POCT Blood Glucose.: 196 mg/dL (09 Sep 2018 12:05)    I&O's Summary    09 Sep 2018 07:01  -  10 Sep 2018 07:00  --------------------------------------------------------  IN: 800 mL / OUT: 300 mL / NET: 500 mL        PHYSICAL EXAM:  HEAD:  Atraumatic, Normocephalic  NECK: Supple, No   JVD  CHEST/LUNG:   no     rales,     no,    rhonchi  HEART: Regular rate and rhythm;         murmur  ABDOMEN: Soft, Nontender, ;   EXTREMITIES:   dressing, foot  NEUROLOGY:  alert    LABS:                        9.4    4.38  )-----------( 63       ( 09 Sep 2018 08:42 )             29.5     09-10    136  |  102  |  34<H>  ----------------------------<  144<H>  4.8   |  22  |  1.30    Ca    8.5      10 Sep 2018 07:03                    Hemoglobin A1C, Whole Blood: 6.7 % (08-02 @ 07:40)            Consultant(s) Notes Reviewed:      Care Discussed with Consultants/Other Providers:

## 2018-09-10 NOTE — PROGRESS NOTE ADULT - ASSESSMENT
69 yo M w/ DM2, CAD s/p CABG and PCIs, ICM w/ EF 35% s/p ICD that was sent in by Dr. Tristan for right foot 3rd digit wound s/p amputation     #HFrEF  - c/w carvedilol, d/c pt on currently dose.   - C/w ACEi pt was on ramipril at home, could d/c on home dose.   - increase to Lasix 40 PO daily, pts home dose.   - C/w spironolactone     #CAD  - C/w ASA through the procedure.   - c/w atorvastatin 40      - Out pt cardiology f/u after d/c     Rachel Hudson MD  Cardiology Fellow - PGY-4  [facility]: [id number]  NS: [phone]  [id number] 67 yo M w/ DM2, CAD s/p CABG and PCIs, ICM w/ EF 35% s/p ICD that was sent in by Dr. Tristan for right foot 3rd digit wound s/p amputation     #HFrEF  - c/w carvedilol, d/c pt on current dose.   - C/w ACEi pt was on ramipril at home, could d/c on home dose.   - increase to Lasix 40 PO daily, pts home dose.   - C/w spironolactone     #CAD  - C/w ASA through the procedure.   - c/w atorvastatin 40      - Out pt cardiology f/u with Dr. Rivers after d/c     Rachel Hudson MD  Cardiology Fellow - PGY-4  [facility]: [id number]  NS: [phone]  [id number]

## 2018-09-10 NOTE — PROVIDER CONTACT NOTE (CRITICAL VALUE NOTIFICATION) - ASSESSMENT
Pain management effective with percocet. Pt voiding adequately. All VSS. No s/s of distress. Tolerating diet

## 2018-09-10 NOTE — PROGRESS NOTE ADULT - ATTENDING COMMENTS
68 year old man with longstanding ischemic cardiomyopathy.    Ischemic cardiomyopathy    combined alpha/beta blocker, Carvedilol    low dose ACE-i    spironolactone    aspirin 81 mg    statin    Paroxysmal atrial tachycardia    manage by best treating ischemic cardiomyopathy and avoiding volume overload    If possible would not hold carvedilol doses, only hold for symptomatic hypotension    no anticoagulation    Paroxysmal ventricular tachycardia    Has ICD.    Chronic thrombocytopenia    Preferable to not be on anticoagulation    Recent JULIANNE / CKD, recovered    JULIANNE recovered, renal function essentially at baseline    Resumed diuretic and aldosterone antagonist, subsequently low dose ACE-i, otherwise avoiding potentially nephrotoxic medications.    At home Ramipril 2.5 mg daily, substituting lisinopril in hospital.    Should probably never receive iodinated contrast for any procedures in future

## 2018-09-11 LAB
-  AMPICILLIN/SULBACTAM: SIGNIFICANT CHANGE UP
-  AMPICILLIN/SULBACTAM: SIGNIFICANT CHANGE UP
-  CEFAZOLIN: SIGNIFICANT CHANGE UP
-  CEFAZOLIN: SIGNIFICANT CHANGE UP
-  CLINDAMYCIN: SIGNIFICANT CHANGE UP
-  CLINDAMYCIN: SIGNIFICANT CHANGE UP
-  ERYTHROMYCIN: SIGNIFICANT CHANGE UP
-  ERYTHROMYCIN: SIGNIFICANT CHANGE UP
-  GENTAMICIN: SIGNIFICANT CHANGE UP
-  GENTAMICIN: SIGNIFICANT CHANGE UP
-  OXACILLIN: SIGNIFICANT CHANGE UP
-  OXACILLIN: SIGNIFICANT CHANGE UP
-  PENICILLIN: SIGNIFICANT CHANGE UP
-  PENICILLIN: SIGNIFICANT CHANGE UP
-  RIFAMPIN: SIGNIFICANT CHANGE UP
-  RIFAMPIN: SIGNIFICANT CHANGE UP
-  TETRACYCLINE: SIGNIFICANT CHANGE UP
-  TETRACYCLINE: SIGNIFICANT CHANGE UP
-  TRIMETHOPRIM/SULFAMETHOXAZOLE: SIGNIFICANT CHANGE UP
-  TRIMETHOPRIM/SULFAMETHOXAZOLE: SIGNIFICANT CHANGE UP
-  VANCOMYCIN: SIGNIFICANT CHANGE UP
-  VANCOMYCIN: SIGNIFICANT CHANGE UP
METHOD TYPE: SIGNIFICANT CHANGE UP
METHOD TYPE: SIGNIFICANT CHANGE UP

## 2018-09-13 LAB
CULTURE RESULTS: SIGNIFICANT CHANGE UP
ORGANISM # SPEC MICROSCOPIC CNT: SIGNIFICANT CHANGE UP
SPECIMEN SOURCE: SIGNIFICANT CHANGE UP
SPECIMEN SOURCE: SIGNIFICANT CHANGE UP

## 2018-09-17 LAB — SURGICAL PATHOLOGY STUDY: SIGNIFICANT CHANGE UP

## 2018-09-21 ENCOUNTER — APPOINTMENT (OUTPATIENT)
Dept: RHEUMATOLOGY | Facility: CLINIC | Age: 68
End: 2018-09-21

## 2018-09-22 LAB
CULTURE RESULTS: SIGNIFICANT CHANGE UP
SPECIMEN SOURCE: SIGNIFICANT CHANGE UP

## 2018-09-23 ENCOUNTER — FORM ENCOUNTER (OUTPATIENT)
Age: 68
End: 2018-09-23

## 2018-09-24 ENCOUNTER — OUTPATIENT (OUTPATIENT)
Dept: OUTPATIENT SERVICES | Facility: HOSPITAL | Age: 68
LOS: 1 days | End: 2018-09-24
Payer: COMMERCIAL

## 2018-09-24 ENCOUNTER — APPOINTMENT (OUTPATIENT)
Dept: ULTRASOUND IMAGING | Facility: CLINIC | Age: 68
End: 2018-09-24
Payer: COMMERCIAL

## 2018-09-24 ENCOUNTER — LABORATORY RESULT (OUTPATIENT)
Age: 68
End: 2018-09-24

## 2018-09-24 DIAGNOSIS — K74.60 UNSPECIFIED CIRRHOSIS OF LIVER: ICD-10-CM

## 2018-09-24 DIAGNOSIS — Z89.9 ACQUIRED ABSENCE OF LIMB, UNSPECIFIED: Chronic | ICD-10-CM

## 2018-09-24 PROCEDURE — 76700 US EXAM ABDOM COMPLETE: CPT | Mod: 26,59

## 2018-09-24 PROCEDURE — 93975 VASCULAR STUDY: CPT

## 2018-09-24 PROCEDURE — 76700 US EXAM ABDOM COMPLETE: CPT

## 2018-09-24 PROCEDURE — 93976 VASCULAR STUDY: CPT | Mod: 26

## 2018-09-25 LAB
AFP-TM SERPL-MCNC: 1.5 NG/ML
ALBUMIN SERPL ELPH-MCNC: 3.9 G/DL
ALP BLD-CCNC: 217 U/L
ALT SERPL-CCNC: 30 U/L
ANION GAP SERPL CALC-SCNC: 16 MMOL/L
AST SERPL-CCNC: 35 U/L
BASOPHILS # BLD AUTO: 0.01 K/UL
BASOPHILS NFR BLD AUTO: 0.2 %
BILIRUB SERPL-MCNC: 0.6 MG/DL
BUN SERPL-MCNC: 51 MG/DL
CALCIUM SERPL-MCNC: 9.3 MG/DL
CHLORIDE SERPL-SCNC: 99 MMOL/L
CO2 SERPL-SCNC: 22 MMOL/L
CREAT SERPL-MCNC: 1.61 MG/DL
EOSINOPHIL # BLD AUTO: 0.16 K/UL
EOSINOPHIL NFR BLD AUTO: 2.8 %
GLUCOSE SERPL-MCNC: 163 MG/DL
HCT VFR BLD CALC: 32.9 %
HGB BLD-MCNC: 10.1 G/DL
IMM GRANULOCYTES NFR BLD AUTO: 0.4 %
INR PPP: 1.07 RATIO
LYMPHOCYTES # BLD AUTO: 0.93 K/UL
LYMPHOCYTES NFR BLD AUTO: 16.5 %
MAN DIFF?: NORMAL
MCHC RBC-ENTMCNC: 28.5 PG
MCHC RBC-ENTMCNC: 30.7 GM/DL
MCV RBC AUTO: 92.7 FL
MONOCYTES # BLD AUTO: 0.29 K/UL
MONOCYTES NFR BLD AUTO: 5.2 %
NEUTROPHILS # BLD AUTO: 4.21 K/UL
NEUTROPHILS NFR BLD AUTO: 74.9 %
PLATELET # BLD AUTO: 67 K/UL
POTASSIUM SERPL-SCNC: 5 MMOL/L
PROT SERPL-MCNC: 7.4 G/DL
PT BLD: 12.1 SEC
RBC # BLD: 3.55 M/UL
RBC # FLD: 15.7 %
SODIUM SERPL-SCNC: 137 MMOL/L
WBC # FLD AUTO: 5.62 K/UL

## 2018-09-26 ENCOUNTER — APPOINTMENT (OUTPATIENT)
Dept: INFECTIOUS DISEASE | Facility: CLINIC | Age: 68
End: 2018-09-26
Payer: COMMERCIAL

## 2018-09-26 VITALS
OXYGEN SATURATION: 100 % | DIASTOLIC BLOOD PRESSURE: 70 MMHG | HEIGHT: 72 IN | HEART RATE: 70 BPM | SYSTOLIC BLOOD PRESSURE: 112 MMHG | TEMPERATURE: 97.7 F | BODY MASS INDEX: 26.01 KG/M2 | WEIGHT: 192 LBS

## 2018-09-26 PROCEDURE — 99213 OFFICE O/P EST LOW 20 MIN: CPT | Mod: 25

## 2018-09-26 PROCEDURE — G0008: CPT

## 2018-09-26 PROCEDURE — 90686 IIV4 VACC NO PRSV 0.5 ML IM: CPT

## 2018-10-01 ENCOUNTER — APPOINTMENT (OUTPATIENT)
Dept: HEPATOLOGY | Facility: CLINIC | Age: 68
End: 2018-10-01
Payer: COMMERCIAL

## 2018-10-01 VITALS
HEART RATE: 90 BPM | TEMPERATURE: 97.8 F | SYSTOLIC BLOOD PRESSURE: 109 MMHG | DIASTOLIC BLOOD PRESSURE: 72 MMHG | RESPIRATION RATE: 17 BRPM | WEIGHT: 193 LBS | BODY MASS INDEX: 26.14 KG/M2 | HEIGHT: 72 IN

## 2018-10-01 PROCEDURE — 99214 OFFICE O/P EST MOD 30 MIN: CPT

## 2018-10-01 RX ORDER — CEPHALEXIN 500 MG/1
500 CAPSULE ORAL
Refills: 0 | Status: DISCONTINUED | COMMUNITY
Start: 2018-08-15 | End: 2018-10-01

## 2018-10-02 ENCOUNTER — NON-APPOINTMENT (OUTPATIENT)
Age: 68
End: 2018-10-02

## 2018-10-02 ENCOUNTER — APPOINTMENT (OUTPATIENT)
Dept: CARDIOLOGY | Facility: CLINIC | Age: 68
End: 2018-10-02
Payer: COMMERCIAL

## 2018-10-02 VITALS
HEIGHT: 72 IN | BODY MASS INDEX: 25.47 KG/M2 | DIASTOLIC BLOOD PRESSURE: 70 MMHG | WEIGHT: 188 LBS | OXYGEN SATURATION: 98 % | SYSTOLIC BLOOD PRESSURE: 112 MMHG | HEART RATE: 83 BPM

## 2018-10-02 PROCEDURE — 93283 PRGRMG EVAL IMPLANTABLE DFB: CPT

## 2018-10-02 PROCEDURE — 93290 INTERROG DEV EVAL ICPMS IP: CPT | Mod: 26

## 2018-10-02 PROCEDURE — 99213 OFFICE O/P EST LOW 20 MIN: CPT

## 2018-10-04 ENCOUNTER — LABORATORY RESULT (OUTPATIENT)
Age: 68
End: 2018-10-04

## 2018-10-04 ENCOUNTER — APPOINTMENT (OUTPATIENT)
Dept: RHEUMATOLOGY | Facility: CLINIC | Age: 68
End: 2018-10-04

## 2018-10-05 ENCOUNTER — APPOINTMENT (OUTPATIENT)
Dept: NEPHROLOGY | Facility: CLINIC | Age: 68
End: 2018-10-05
Payer: COMMERCIAL

## 2018-10-05 VITALS
HEART RATE: 84 BPM | OXYGEN SATURATION: 100 % | BODY MASS INDEX: 26.28 KG/M2 | DIASTOLIC BLOOD PRESSURE: 66 MMHG | WEIGHT: 194 LBS | HEIGHT: 72 IN | SYSTOLIC BLOOD PRESSURE: 113 MMHG

## 2018-10-05 DIAGNOSIS — G47.33 OBSTRUCTIVE SLEEP APNEA (ADULT) (PEDIATRIC): ICD-10-CM

## 2018-10-05 DIAGNOSIS — Z82.62 FAMILY HISTORY OF OSTEOPOROSIS: ICD-10-CM

## 2018-10-05 DIAGNOSIS — Z82.49 FAMILY HISTORY OF ISCHEMIC HEART DISEASE AND OTHER DISEASES OF THE CIRCULATORY SYSTEM: ICD-10-CM

## 2018-10-05 DIAGNOSIS — Z87.39 PERSONAL HISTORY OF OTHER DISEASES OF THE MUSCULOSKELETAL SYSTEM AND CONNECTIVE TISSUE: ICD-10-CM

## 2018-10-05 DIAGNOSIS — I25.10 ATHEROSCLEROTIC HEART DISEASE OF NATIVE CORONARY ARTERY W/OUT ANGINA PECTORIS: ICD-10-CM

## 2018-10-05 PROCEDURE — 99215 OFFICE O/P EST HI 40 MIN: CPT

## 2018-10-08 ENCOUNTER — RESULT REVIEW (OUTPATIENT)
Age: 68
End: 2018-10-08

## 2018-10-08 LAB
ALBUMIN SERPL ELPH-MCNC: 3.9 G/DL
ANION GAP SERPL CALC-SCNC: 14 MMOL/L
APPEARANCE: CLEAR
BACTERIA: NEGATIVE
BASOPHILS # BLD AUTO: 0.02 K/UL
BASOPHILS NFR BLD AUTO: 0.5 %
BILIRUBIN URINE: NEGATIVE
BLOOD URINE: NEGATIVE
BUN SERPL-MCNC: 50 MG/DL
CALCIUM SERPL-MCNC: 9.3 MG/DL
CHLORIDE SERPL-SCNC: 97 MMOL/L
CO2 SERPL-SCNC: 22 MMOL/L
COLOR: YELLOW
CREAT SERPL-MCNC: 1.43 MG/DL
CREAT SPEC-SCNC: 54 MG/DL
CREAT/PROT UR: 0.2 RATIO
DEPRECATED KAPPA LC FREE/LAMBDA SER: 1.77 RATIO
DEPRECATED KAPPA LC FREE/LAMBDA SER: 1.79 RATIO
EOSINOPHIL # BLD AUTO: 0.24 K/UL
EOSINOPHIL NFR BLD AUTO: 5.8 %
GLUCOSE QUALITATIVE U: 1000 MG/DL
GLUCOSE SERPL-MCNC: 324 MG/DL
HAPTOGLOB SERPL-MCNC: 20 MG/DL
HCT VFR BLD CALC: 33 %
HGB BLD-MCNC: 10.8 G/DL
HYALINE CASTS: 1 /LPF
IMM GRANULOCYTES NFR BLD AUTO: 0.5 %
KAPPA LC CSF-MCNC: 2.57 MG/DL
KAPPA LC CSF-MCNC: 2.69 MG/DL
KAPPA LC SERPL-MCNC: 4.55 MG/DL
KAPPA LC SERPL-MCNC: 4.82 MG/DL
KETONES URINE: NEGATIVE
LDH SERPL-CCNC: 282 U/L
LEUKOCYTE ESTERASE URINE: NEGATIVE
LYMPHOCYTES # BLD AUTO: 0.74 K/UL
LYMPHOCYTES NFR BLD AUTO: 17.7 %
MAN DIFF?: NORMAL
MCHC RBC-ENTMCNC: 29.9 PG
MCHC RBC-ENTMCNC: 32.7 GM/DL
MCV RBC AUTO: 91.4 FL
MICROSCOPIC-UA: NORMAL
MONOCYTES # BLD AUTO: 0.37 K/UL
MONOCYTES NFR BLD AUTO: 8.9 %
NEUTROPHILS # BLD AUTO: 2.78 K/UL
NEUTROPHILS NFR BLD AUTO: 66.6 %
NITRITE URINE: NEGATIVE
PH URINE: 5.5
PHOSPHATE SERPL-MCNC: 3.4 MG/DL
PLATELET # BLD AUTO: 40 K/UL
POTASSIUM SERPL-SCNC: 5 MMOL/L
PROT UR-MCNC: 9 MG/DL
PROTEIN URINE: NEGATIVE MG/DL
RBC # BLD: 3.61 M/UL
RBC # FLD: 15.7 %
RED BLOOD CELLS URINE: 0 /HPF
SODIUM ?TM SUB UR QN: 94 MMOL/L
SODIUM SERPL-SCNC: 133 MMOL/L
SPECIFIC GRAVITY URINE: 1.02
SQUAMOUS EPITHELIAL CELLS: 1 /HPF
UROBILINOGEN URINE: NEGATIVE MG/DL
WBC # FLD AUTO: 4.17 K/UL
WHITE BLOOD CELLS URINE: 1 /HPF

## 2018-10-09 LAB
IGA 24H UR QL IFE: NORMAL
M PROTEIN SPEC IFE-MCNC: NORMAL

## 2018-10-10 LAB
CULTURE RESULTS: SIGNIFICANT CHANGE UP
SPECIMEN SOURCE: SIGNIFICANT CHANGE UP

## 2018-10-16 ENCOUNTER — FORM ENCOUNTER (OUTPATIENT)
Age: 68
End: 2018-10-16

## 2018-10-17 ENCOUNTER — OUTPATIENT (OUTPATIENT)
Dept: OUTPATIENT SERVICES | Facility: HOSPITAL | Age: 68
LOS: 1 days | End: 2018-10-17
Payer: COMMERCIAL

## 2018-10-17 ENCOUNTER — APPOINTMENT (OUTPATIENT)
Dept: ULTRASOUND IMAGING | Facility: HOSPITAL | Age: 68
End: 2018-10-17
Payer: COMMERCIAL

## 2018-10-17 DIAGNOSIS — Z89.9 ACQUIRED ABSENCE OF LIMB, UNSPECIFIED: Chronic | ICD-10-CM

## 2018-10-17 DIAGNOSIS — K74.60 UNSPECIFIED CIRRHOSIS OF LIVER: ICD-10-CM

## 2018-10-17 PROCEDURE — 76705 ECHO EXAM OF ABDOMEN: CPT

## 2018-10-17 PROCEDURE — C9744: CPT

## 2018-10-17 PROCEDURE — 76705 ECHO EXAM OF ABDOMEN: CPT | Mod: 26,76

## 2018-10-19 ENCOUNTER — APPOINTMENT (OUTPATIENT)
Dept: RHEUMATOLOGY | Facility: CLINIC | Age: 68
End: 2018-10-19
Payer: COMMERCIAL

## 2018-10-19 ENCOUNTER — RX RENEWAL (OUTPATIENT)
Age: 68
End: 2018-10-19

## 2018-10-19 PROCEDURE — 96375 TX/PRO/DX INJ NEW DRUG ADDON: CPT

## 2018-10-19 PROCEDURE — 96415 CHEMO IV INFUSION ADDL HR: CPT

## 2018-10-19 PROCEDURE — 96413 CHEMO IV INFUSION 1 HR: CPT

## 2018-10-22 ENCOUNTER — RX RENEWAL (OUTPATIENT)
Age: 68
End: 2018-10-22

## 2018-10-31 ENCOUNTER — LABORATORY RESULT (OUTPATIENT)
Age: 68
End: 2018-10-31

## 2018-10-31 LAB
CULTURE RESULTS: SIGNIFICANT CHANGE UP
SPECIMEN SOURCE: SIGNIFICANT CHANGE UP

## 2018-11-01 LAB
AFP-TM SERPL-MCNC: 1.7 NG/ML
ANION GAP SERPL CALC-SCNC: 12 MMOL/L
BUN SERPL-MCNC: 44 MG/DL
CALCIUM SERPL-MCNC: 9.4 MG/DL
CHLORIDE SERPL-SCNC: 102 MMOL/L
CO2 SERPL-SCNC: 23 MMOL/L
CREAT SERPL-MCNC: 1.48 MG/DL
GLUCOSE SERPL-MCNC: 233 MG/DL
POTASSIUM SERPL-SCNC: 5.2 MMOL/L
SODIUM SERPL-SCNC: 137 MMOL/L

## 2018-11-02 ENCOUNTER — APPOINTMENT (OUTPATIENT)
Dept: RHEUMATOLOGY | Facility: CLINIC | Age: 68
End: 2018-11-02
Payer: COMMERCIAL

## 2018-11-02 PROCEDURE — 96375 TX/PRO/DX INJ NEW DRUG ADDON: CPT

## 2018-11-02 PROCEDURE — 96413 CHEMO IV INFUSION 1 HR: CPT

## 2018-11-07 ENCOUNTER — TRANSCRIPTION ENCOUNTER (OUTPATIENT)
Age: 68
End: 2018-11-07

## 2018-11-07 ENCOUNTER — APPOINTMENT (OUTPATIENT)
Dept: PLASTIC SURGERY | Facility: CLINIC | Age: 68
End: 2018-11-07
Payer: COMMERCIAL

## 2018-11-07 VITALS — WEIGHT: 192 LBS | HEIGHT: 73 IN | BODY MASS INDEX: 25.45 KG/M2

## 2018-11-07 PROCEDURE — 99203 OFFICE O/P NEW LOW 30 MIN: CPT

## 2018-11-08 ENCOUNTER — FORM ENCOUNTER (OUTPATIENT)
Age: 68
End: 2018-11-08

## 2018-11-09 ENCOUNTER — APPOINTMENT (OUTPATIENT)
Dept: CT IMAGING | Facility: CLINIC | Age: 68
End: 2018-11-09
Payer: COMMERCIAL

## 2018-11-09 ENCOUNTER — OUTPATIENT (OUTPATIENT)
Dept: OUTPATIENT SERVICES | Facility: HOSPITAL | Age: 68
LOS: 1 days | End: 2018-11-09
Payer: COMMERCIAL

## 2018-11-09 DIAGNOSIS — Z00.8 ENCOUNTER FOR OTHER GENERAL EXAMINATION: ICD-10-CM

## 2018-11-09 DIAGNOSIS — Z89.9 ACQUIRED ABSENCE OF LIMB, UNSPECIFIED: Chronic | ICD-10-CM

## 2018-11-09 DIAGNOSIS — R16.0 HEPATOMEGALY, NOT ELSEWHERE CLASSIFIED: ICD-10-CM

## 2018-11-09 PROCEDURE — 74170 CT ABD WO CNTRST FLWD CNTRST: CPT

## 2018-11-09 PROCEDURE — 74170 CT ABD WO CNTRST FLWD CNTRST: CPT | Mod: 26

## 2018-11-12 ENCOUNTER — LABORATORY RESULT (OUTPATIENT)
Age: 68
End: 2018-11-12

## 2018-11-13 ENCOUNTER — TRANSCRIPTION ENCOUNTER (OUTPATIENT)
Age: 68
End: 2018-11-13

## 2018-11-14 ENCOUNTER — APPOINTMENT (OUTPATIENT)
Dept: RHEUMATOLOGY | Facility: CLINIC | Age: 68
End: 2018-11-14
Payer: COMMERCIAL

## 2018-11-14 ENCOUNTER — RX RENEWAL (OUTPATIENT)
Age: 68
End: 2018-11-14

## 2018-11-14 PROCEDURE — 96413 CHEMO IV INFUSION 1 HR: CPT

## 2018-11-14 PROCEDURE — 96375 TX/PRO/DX INJ NEW DRUG ADDON: CPT

## 2018-11-20 ENCOUNTER — CHART COPY (OUTPATIENT)
Age: 68
End: 2018-11-20

## 2018-11-24 NOTE — PRE-OP CHECKLIST - BP NONINVASIVE DIASTOLIC (MM HG)
Discussion/Summary   improved plt count.        Verified Results  CBC WITHOUT DIFFERENTIAL [id number] 11:10AM FRANDY MICHEL   [May 25, 2017 9:40AM FRANDY MICHEL]  improved plt count.     Test Name Result Flag Reference   WHITE BLOOD COUNT 6.2 K/mcL  4.2-11.0   RED CELL COUNT 4.38 mil/mcL L 4.50-5.90   HEMOGLOBIN 15.0 g/dl  13.0-17.0   HEMATOCRIT 43.3 %  39.0-51.0   MEAN CORPUSCULAR VOLUME 98.9 fL  78.0-100.0   MEAN CORPUSCULAR HEMOGLOBIN 34.2 pg H 26.0-34.0   MEAN CORPUSCULAR HGB CONC 34.6 g/dl  32.0-36.5   RDW-CV 12.1 %  11.0-15.0   PLATELET COUNT 119 K/mcL L 140-450       
70

## 2018-11-29 ENCOUNTER — APPOINTMENT (OUTPATIENT)
Dept: RHEUMATOLOGY | Facility: CLINIC | Age: 68
End: 2018-11-29
Payer: COMMERCIAL

## 2018-11-29 PROCEDURE — 96415 CHEMO IV INFUSION ADDL HR: CPT

## 2018-11-29 PROCEDURE — 96413 CHEMO IV INFUSION 1 HR: CPT

## 2018-12-04 ENCOUNTER — OUTPATIENT (OUTPATIENT)
Dept: OUTPATIENT SERVICES | Facility: HOSPITAL | Age: 68
LOS: 1 days | End: 2018-12-04
Payer: COMMERCIAL

## 2018-12-04 VITALS
SYSTOLIC BLOOD PRESSURE: 130 MMHG | TEMPERATURE: 98 F | WEIGHT: 315 LBS | DIASTOLIC BLOOD PRESSURE: 80 MMHG | HEIGHT: 72 IN | HEART RATE: 68 BPM | RESPIRATION RATE: 16 BRPM | OXYGEN SATURATION: 98 %

## 2018-12-04 DIAGNOSIS — Z89.9 ACQUIRED ABSENCE OF LIMB, UNSPECIFIED: Chronic | ICD-10-CM

## 2018-12-04 DIAGNOSIS — Z95.810 PRESENCE OF AUTOMATIC (IMPLANTABLE) CARDIAC DEFIBRILLATOR: ICD-10-CM

## 2018-12-04 DIAGNOSIS — E11.9 TYPE 2 DIABETES MELLITUS WITHOUT COMPLICATIONS: ICD-10-CM

## 2018-12-04 DIAGNOSIS — Z95.5 PRESENCE OF CORONARY ANGIOPLASTY IMPLANT AND GRAFT: ICD-10-CM

## 2018-12-04 DIAGNOSIS — I10 ESSENTIAL (PRIMARY) HYPERTENSION: ICD-10-CM

## 2018-12-04 DIAGNOSIS — L97.412 NON-PRESSURE CHRONIC ULCER OF RIGHT HEEL AND MIDFOOT WITH FAT LAYER EXPOSED: ICD-10-CM

## 2018-12-04 DIAGNOSIS — G47.30 SLEEP APNEA, UNSPECIFIED: ICD-10-CM

## 2018-12-04 DIAGNOSIS — Z01.818 ENCOUNTER FOR OTHER PREPROCEDURAL EXAMINATION: ICD-10-CM

## 2018-12-04 DIAGNOSIS — L97.319 NON-PRESSURE CHRONIC ULCER OF RIGHT ANKLE WITH UNSPECIFIED SEVERITY: ICD-10-CM

## 2018-12-04 LAB — HBA1C BLD-MCNC: 11.8 % — HIGH (ref 4–5.6)

## 2018-12-04 PROCEDURE — G0463: CPT

## 2018-12-04 PROCEDURE — 83036 HEMOGLOBIN GLYCOSYLATED A1C: CPT

## 2018-12-04 RX ORDER — SODIUM CHLORIDE 9 MG/ML
3 INJECTION INTRAMUSCULAR; INTRAVENOUS; SUBCUTANEOUS EVERY 8 HOURS
Qty: 0 | Refills: 0 | Status: DISCONTINUED | OUTPATIENT
Start: 2018-12-07 | End: 2018-12-22

## 2018-12-04 RX ORDER — LIDOCAINE HCL 20 MG/ML
0.2 VIAL (ML) INJECTION ONCE
Qty: 0 | Refills: 0 | Status: DISCONTINUED | OUTPATIENT
Start: 2018-12-07 | End: 2018-12-22

## 2018-12-04 NOTE — H&P PST ADULT - PMH
AICD lead malfunction  history of  Anemia    Coronary artery disease    DM (diabetes mellitus)  type 2, Hg A1C 8.2 % ( 7/10/17)  Elevated prolactin level  dx: ' 70's  mediclly managed  Elevated triglycerides with high cholesterol    Gout  medically managed and I.V. drug every 2 weeks  Heart failure with reduced left ventricular function  EF 37%  Hepatic cirrhosis, unspecified hepatic cirrhosis type    History of hyperprolactinemia    History of osteomyelitis  2015, right foot 2nd toe  HLD (hyperlipidemia)    HTN (hypertension)    Hypothyroidism    ICD (implantable cardioverter-defibrillator) in place  [employer], Model [id number] , last interrogation 4/2017  Ischemic cardiomyopathy    Liver mass  dx: 10/2018   incidental finding. Currently awaitng furthur workup  Pituitary adenoma  as per patient chronic, no changes , recently restarted follow up with endocrinologist ( note in allscripts )  Presence of stent in coronary artery  2 stents  PVD (peripheral vascular disease)    Sleep apnea  not using CPAP  Thrombocytopenia  Chronic  Vitamin D deficiency AICD lead malfunction  history of  Anemia    Coronary artery disease    DM (diabetes mellitus)  type 2, Hg A1C 8.2 % ( 7/10/17)  Elevated prolactin level  dx: ' 70's  mediclly managed  Elevated triglycerides with high cholesterol    Gout  medically managed and I.V. drug every 2 weeks  Heart failure with reduced left ventricular function  EF 37%  Hepatic cirrhosis, unspecified hepatic cirrhosis type    History of hyperprolactinemia    History of osteomyelitis  2015, right foot 2nd toe  HLD (hyperlipidemia)    HTN (hypertension)    Hypothyroidism    ICD (implantable cardioverter-defibrillator) in place  [employer], Model [id number] , last interrogation 4/2017  Ischemic cardiomyopathy    Liver mass  dx: 10/2018   incidental finding. Currently awaitng furthur workup  Pituitary adenoma  as per patient chronic, no changes , recently restarted follow up with endocrinologist ( note in allscripts )  Presence of stent in coronary artery  2 stents  PVD (peripheral vascular disease)    Sleep apnea  not using CPAP  Thrombocytopenia  Chronic  Ulcer of right ankle    Vitamin D deficiency

## 2018-12-04 NOTE — H&P PST ADULT - PROBLEM SELECTOR PLAN 1
I+D with Application Stravix to Right Ankle I+D with Application Stravix to Right Ankle    Medical history and lab values reviewed by Dr. Raines, anesthsiologist. Dr. Raines discussed same with Surgeon. As per surgeon: No monopolar use. Case can be done in [facility] as per Dr. Raines: will do a local block. Surgeon request Medical Evaluation including addressing thrombocytopenia. Anesthesia requests Cardiac evaluation. Spoke to Wilda,  @ [facility] Dr. Thapa's office. As per same: labs were repeated, awaiting results. Requested Wilda inform Dr. Thapa: address thrombocytopenia in Medical Evaluation note and fax to [facility].

## 2018-12-04 NOTE — H&P PST ADULT - NS MD HP INPLANTS MED DEV
Automatic Implantable Cardioverter Defibrillator/2 cardiac stents/AUCD/ PPM: [employer]onic: Model [id number]/Pacemaker

## 2018-12-04 NOTE — H&P PST ADULT - MALLAMPATI CLASS
Class II - visualization of the soft palate, fauces, and uvula *small aperture/Class II - visualization of the soft palate, fauces, and uvula

## 2018-12-04 NOTE — H&P PST ADULT - CARDIOVASCULAR COMMENTS
CAD: s/p ('86) CABG X4/ s/p Coronary Stents X2 '99/ HTN/ HLD/ elevated triglycerides/ + AICD/PPM Left subclavian AICD/PPM

## 2018-12-04 NOTE — H&P PST ADULT - HISTORY OF PRESENT ILLNESS
68 y/o Male with a pmhx significant for Type 2 DM with HTN, HLD, thrombocytopenia, CAD, CABG x 4 (1986),  s/p 2 cardiac stents ( 1999) on ASA, CHF, Medtronic AICD, hypothyroidism,  Anemia, liver cirrhosis unknown etiology, presents to [facility] for scheduled EGD and colonoscopy on 8/29/17. Denies fever, chills, no acute complains. 67 y/o male with PMH: Type 2 DM, HTN, HLD, thrombocytopenia, CAD, CABG x 4 (1986),  s/p 2 cardiac stents ( 1999) on ASA, CHF, Medtronic AICD/ PPM: Model # [id number],  hypothyroidism,  Anemia, liver cirrhosis unknown etiology, Poorly healing Right Ankle Ulcer: s/p (8/2018): I+D of Right Ankle Ulcer. Now scheduled: I+D with Application Stravix to Right Ankle.

## 2018-12-05 ENCOUNTER — APPOINTMENT (OUTPATIENT)
Dept: CARDIOLOGY | Facility: CLINIC | Age: 68
End: 2018-12-05
Payer: COMMERCIAL

## 2018-12-05 ENCOUNTER — NON-APPOINTMENT (OUTPATIENT)
Age: 68
End: 2018-12-05

## 2018-12-05 VITALS
SYSTOLIC BLOOD PRESSURE: 90 MMHG | TEMPERATURE: 97.7 F | OXYGEN SATURATION: 100 % | HEIGHT: 73 IN | WEIGHT: 189 LBS | HEART RATE: 76 BPM | DIASTOLIC BLOOD PRESSURE: 62 MMHG | BODY MASS INDEX: 25.05 KG/M2

## 2018-12-05 PROBLEM — E55.9 VITAMIN D DEFICIENCY, UNSPECIFIED: Chronic | Status: ACTIVE | Noted: 2018-12-04

## 2018-12-05 PROCEDURE — 99215 OFFICE O/P EST HI 40 MIN: CPT

## 2018-12-05 PROCEDURE — 93000 ELECTROCARDIOGRAM COMPLETE: CPT

## 2018-12-05 NOTE — REASON FOR VISIT
[Follow-Up - Clinic] : a clinic follow-up of [Cardiomyopathy] : cardiomyopathy [Coronary Artery Disease] : coronary artery disease [Ventricular Tachycardia] : ventricular tachycardia

## 2018-12-05 NOTE — DISCUSSION/SUMMARY
[Cardiomyopathy] : cardiomyopathy [NYHA Class II] : NYHA Class II [ACC/AHA Stage C] : ACC/AHA Stage C [Ischemic Cardiomyopathy] : ischemic cardiomyopathy [Coronary Artery Disease] : coronary artery disease [Stable] : stable [None] : none [Chronic Systolic Heart Failure] : chronic systolic congestive heart failure [Compensated] : compensated [Patient] : the patient [de-identified] : spontanieously reverted to sinus and maintained [FreeTextEntry1] : \par Planning for Podiatric surgical procedure on right foot for incision and drainage. Cardiovascular condition is stable with congestive heart failure well compensated, no exacerbation in months, chronic coronary artery disease stable with no acute ischemia in years, rhythm stable with sinus rhythm maintained, no recurrence of atrial tachycardia and has ICD. There is no significant valvular pathology. Thus there is no contraindication to surgical procedure and anesthesia. He should remain on all cardiac medications. He is not on anticoagulation.

## 2018-12-05 NOTE — HISTORY OF PRESENT ILLNESS
[FreeTextEntry1] : Mr. Jamil Harvey is a 68-year-old man with ischemic cardiomyopathy, ventricular tachycardia and appropriate ICD device firing. Uvaldo lead fractured and was capped and a new generator and lead implanted. After device adjustments to alleviate potential for pause dependent ventricular tachycardia has had no events. Otherwise, despite his long history of severe multivessel coronary artery disease and very remote coronary bypass surgery with severe ventricular dysfunction, has always been active and never experiences chest pain or dyspnea. Recurrent foot infection managed by podiatrist and ID and healed, but recently recurrent cellulitis with hospital stay for IV antibiotics and complication of JULIANNE, ultimately resolved. He always sleeps in recliner to manage obstructive sleep apnea as has been unable to tolerate any mask or device. Regular exercise includes walking, riding stationary bike 6 miles a day and golf. Now over 30 years coming to this practice since coronary bypass surgery.\par \par Few months ago onset of anasarca, diuretic dose increased with minimal response. Then added metolazone with dramatic response, 65 pound weight reduction and now weighs much less than at anytime in our record. Was feeling much better and denies dizziness, lightheadedness. Started on anticoagulation with plan for cardioversion, but instead admitted with septic bursitis of knee requiring orthopedic procedure and remarkably spontaneously reverted to sinus rhythm which is maintained. Breathing improved, not retaining excess volume, but remains physically limited by painful knee slowly recuperating from septic arthritis.

## 2018-12-05 NOTE — REVIEW OF SYSTEMS
[Lower Ext Edema] : lower extremity edema [Joint Pain] : joint pain [Negative] : Heme/Lymph [Shortness Of Breath] : no shortness of breath [Dyspnea on exertion] : not dyspnea during exertion [Chest Pain] : no chest pain [Leg Claudication] : no intermittent leg claudication

## 2018-12-05 NOTE — PHYSICAL EXAM
[General Appearance - Well Developed] : well developed [Normal Appearance] : normal appearance [Well Groomed] : well groomed [General Appearance - Well Nourished] : well nourished [No Deformities] : no deformities [General Appearance - In No Acute Distress] : no acute distress [Normal Conjunctiva] : the conjunctiva exhibited no abnormalities [Eyelids - No Xanthelasma] : the eyelids demonstrated no xanthelasmas [Normal Oral Mucosa] : normal oral mucosa [No Oral Pallor] : no oral pallor [No Oral Cyanosis] : no oral cyanosis [JVD Elevated _____cm] : JVD elevated [unfilled] ~U cm above clavicle [Normal Jugular Venous V Waves Present] : normal jugular venous V waves present [No Jugular Venous Storm A Waves] : no jugular venous storm A waves [Respiration, Rhythm And Depth] : normal respiratory rhythm and effort [Exaggerated Use Of Accessory Muscles For Inspiration] : no accessory muscle use [Auscultation Breath Sounds / Voice Sounds] : lungs were clear to auscultation bilaterally [Not Palpable] : not palpable [Normal Rate] : normal [Rhythm Regular] : regular [Normal S1] : normal S1 [Normal S2] : normal S2 [No Gallop] : no gallop heard [No Murmur] : no murmurs heard [1+] : left 1+ [2+] : left 2+ [No Abnormalities] : the abdominal aorta was not enlarged and no bruit was heard [Abdomen Soft] : soft [Abdomen Tenderness] : non-tender [Abdomen Mass (___ Cm)] : no abdominal mass palpated [Abnormal Walk] : normal gait [Nail Clubbing] : no clubbing of the fingernails [Cyanosis, Localized] : no localized cyanosis [Petechial Hemorrhages (___cm)] : no petechial hemorrhages [Skin Color & Pigmentation] : normal skin color and pigmentation [] : no rash [Skin Lesions] : no skin lesions [No Skin Ulcers] : no skin ulcer [No Xanthoma] : no  xanthoma was observed [Oriented To Time, Place, And Person] : oriented to person, place, and time [Affect] : the affect was normal [Mood] : the mood was normal [No Anxiety] : not feeling anxious [___ +] : bilateral [unfilled]U+ pretibial pitting edema [Right Carotid Bruit] : no bruit heard over the right carotid [Left Carotid Bruit] : no bruit heard over the left carotid [Rt] : no varicose veins of the right leg [FreeTextEntry1] : venous stasis discoloration of distal lower extremities

## 2018-12-06 ENCOUNTER — TRANSCRIPTION ENCOUNTER (OUTPATIENT)
Age: 68
End: 2018-12-06

## 2018-12-07 ENCOUNTER — OUTPATIENT (OUTPATIENT)
Dept: OUTPATIENT SERVICES | Facility: HOSPITAL | Age: 68
LOS: 1 days | End: 2018-12-07
Payer: COMMERCIAL

## 2018-12-07 VITALS
DIASTOLIC BLOOD PRESSURE: 62 MMHG | RESPIRATION RATE: 16 BRPM | OXYGEN SATURATION: 100 % | SYSTOLIC BLOOD PRESSURE: 110 MMHG | HEART RATE: 60 BPM

## 2018-12-07 VITALS
HEIGHT: 72 IN | DIASTOLIC BLOOD PRESSURE: 80 MMHG | TEMPERATURE: 98 F | WEIGHT: 195.99 LBS | SYSTOLIC BLOOD PRESSURE: 130 MMHG | HEART RATE: 68 BPM | RESPIRATION RATE: 16 BRPM | OXYGEN SATURATION: 98 %

## 2018-12-07 DIAGNOSIS — Z01.818 ENCOUNTER FOR OTHER PREPROCEDURAL EXAMINATION: ICD-10-CM

## 2018-12-07 DIAGNOSIS — Z89.9 ACQUIRED ABSENCE OF LIMB, UNSPECIFIED: Chronic | ICD-10-CM

## 2018-12-07 DIAGNOSIS — L97.412 NON-PRESSURE CHRONIC ULCER OF RIGHT HEEL AND MIDFOOT WITH FAT LAYER EXPOSED: ICD-10-CM

## 2018-12-07 PROCEDURE — 11042 DBRDMT SUBQ TIS 1ST 20SQCM/<: CPT

## 2018-12-07 PROCEDURE — 99261: CPT

## 2018-12-07 RX ORDER — ONDANSETRON 8 MG/1
4 TABLET, FILM COATED ORAL ONCE
Qty: 0 | Refills: 0 | Status: DISCONTINUED | OUTPATIENT
Start: 2018-12-07 | End: 2018-12-22

## 2018-12-07 RX ORDER — OXYCODONE HYDROCHLORIDE 5 MG/1
10 TABLET ORAL ONCE
Qty: 0 | Refills: 0 | Status: DISCONTINUED | OUTPATIENT
Start: 2018-12-07 | End: 2018-12-07

## 2018-12-07 RX ORDER — OXYCODONE HYDROCHLORIDE 5 MG/1
5 TABLET ORAL ONCE
Qty: 0 | Refills: 0 | Status: DISCONTINUED | OUTPATIENT
Start: 2018-12-07 | End: 2018-12-07

## 2018-12-07 RX ORDER — SODIUM CHLORIDE 9 MG/ML
1000 INJECTION, SOLUTION INTRAVENOUS
Qty: 0 | Refills: 0 | Status: DISCONTINUED | OUTPATIENT
Start: 2018-12-07 | End: 2018-12-22

## 2018-12-07 NOTE — PRE-ANESTHESIA EVALUATION ADULT - NS MD HP INPLANTS MED DEV
Automatic Implantable Cardioverter Defibrillator/Pacemaker/2 cardiac stents/AUCD/ PPM: [employer]tronic: Model [id number]

## 2018-12-07 NOTE — ASU PATIENT PROFILE, ADULT - VISION (WITH CORRECTIVE LENSES IF THE PATIENT USUALLY WEARS THEM):
Normal vision: sees adequately in most situations; can see medication labels, newsprint distance/Normal vision: sees adequately in most situations; can see medication labels, newsprint

## 2018-12-07 NOTE — ASU DISCHARGE PLAN (ADULT/PEDIATRIC). - NOTIFY
Swelling that continues/Persistent Nausea and Vomiting/Bleeding that does not stop/Pain not relieved by Medications/Fever greater than 101/Numbness, color, or temperature change to extremity

## 2018-12-07 NOTE — ASU PATIENT PROFILE, ADULT - PMH
AICD lead malfunction  history of  Anemia    Coronary artery disease    DM (diabetes mellitus)  type 2, Hg A1C 8.2 % ( 7/10/17)  Elevated prolactin level  dx: ' 70's  mediclly managed  Elevated triglycerides with high cholesterol    Gout  medically managed and I.V. drug every 2 weeks  Heart failure with reduced left ventricular function  EF 37%  Hepatic cirrhosis, unspecified hepatic cirrhosis type    History of hyperprolactinemia    History of osteomyelitis  2015, right foot 2nd toe  HLD (hyperlipidemia)    HTN (hypertension)    Hypothyroidism    ICD (implantable cardioverter-defibrillator) in place  [employer], Model [id number] , last interrogation 4/2017  Ischemic cardiomyopathy    Liver mass  dx: 10/2018   incidental finding. Currently awaitng furthur workup  Pituitary adenoma  as per patient chronic, no changes , recently restarted follow up with endocrinologist ( note in allscripts )  Presence of stent in coronary artery  2 stents  PVD (peripheral vascular disease)    Sleep apnea  not using CPAP  Thrombocytopenia  Chronic  Ulcer of right ankle    Vitamin D deficiency

## 2018-12-10 PROBLEM — R16.0 HEPATOMEGALY, NOT ELSEWHERE CLASSIFIED: Chronic | Status: ACTIVE | Noted: 2018-12-04

## 2018-12-13 ENCOUNTER — APPOINTMENT (OUTPATIENT)
Dept: RHEUMATOLOGY | Facility: CLINIC | Age: 68
End: 2018-12-13
Payer: COMMERCIAL

## 2018-12-13 PROCEDURE — 96413 CHEMO IV INFUSION 1 HR: CPT

## 2018-12-13 PROCEDURE — 96375 TX/PRO/DX INJ NEW DRUG ADDON: CPT

## 2018-12-13 PROCEDURE — 96415 CHEMO IV INFUSION ADDL HR: CPT

## 2018-12-14 ENCOUNTER — APPOINTMENT (OUTPATIENT)
Dept: INTERVENTIONAL RADIOLOGY/VASCULAR | Facility: CLINIC | Age: 68
End: 2018-12-14
Payer: COMMERCIAL

## 2018-12-14 DIAGNOSIS — Z86.19 PERSONAL HISTORY OF OTHER INFECTIOUS AND PARASITIC DISEASES: ICD-10-CM

## 2018-12-14 DIAGNOSIS — Z86.79 PERSONAL HISTORY OF OTHER DISEASES OF THE CIRCULATORY SYSTEM: ICD-10-CM

## 2018-12-14 PROCEDURE — 99243 OFF/OP CNSLTJ NEW/EST LOW 30: CPT

## 2018-12-17 ENCOUNTER — TRANSCRIPTION ENCOUNTER (OUTPATIENT)
Age: 68
End: 2018-12-17

## 2018-12-20 PROBLEM — Z86.79 HISTORY OF ATRIAL FIBRILLATION: Status: RESOLVED | Noted: 2017-10-27 | Resolved: 2018-12-20

## 2018-12-20 RX ORDER — MINOCYCLINE HYDROCHLORIDE 100 MG/1
100 CAPSULE ORAL
Refills: 0 | Status: DISCONTINUED | COMMUNITY
End: 2018-12-20

## 2018-12-20 RX ORDER — POTASSIUM CHLORIDE 1500 MG/1
20 TABLET, FILM COATED, EXTENDED RELEASE ORAL
Qty: 30 | Refills: 1 | Status: DISCONTINUED | COMMUNITY
Start: 2018-03-02 | End: 2018-12-20

## 2018-12-20 RX ORDER — RAMIPRIL 2.5 MG/1
2.5 CAPSULE ORAL
Qty: 90 | Refills: 3 | Status: DISCONTINUED | COMMUNITY
Start: 2017-10-10 | End: 2018-12-20

## 2018-12-20 RX ORDER — UBIDECARENONE/VIT E ACET 100MG-5
50 MCG CAPSULE ORAL DAILY
Refills: 0 | Status: ACTIVE | COMMUNITY
Start: 2018-12-20

## 2018-12-20 RX ORDER — BROMOCRIPTINE MESYLATE 2.5 MG/1
2.5 TABLET ORAL
Qty: 90 | Refills: 0 | Status: DISCONTINUED | COMMUNITY
Start: 2017-07-29 | End: 2018-12-20

## 2018-12-20 RX ORDER — PREDNISONE 2.5 MG/1
2.5 TABLET ORAL 3 TIMES DAILY
Qty: 90 | Refills: 2 | Status: DISCONTINUED | COMMUNITY
Start: 2018-11-14 | End: 2018-12-20

## 2018-12-20 RX ORDER — PREDNISONE 10 MG/1
10 TABLET ORAL
Qty: 30 | Refills: 4 | Status: DISCONTINUED | COMMUNITY
Start: 2018-08-15 | End: 2018-12-20

## 2018-12-27 ENCOUNTER — APPOINTMENT (OUTPATIENT)
Dept: RHEUMATOLOGY | Facility: CLINIC | Age: 68
End: 2018-12-27
Payer: COMMERCIAL

## 2018-12-27 PROCEDURE — 96375 TX/PRO/DX INJ NEW DRUG ADDON: CPT

## 2018-12-27 PROCEDURE — 96413 CHEMO IV INFUSION 1 HR: CPT

## 2018-12-27 PROCEDURE — 96415 CHEMO IV INFUSION ADDL HR: CPT

## 2018-12-31 ENCOUNTER — RX RENEWAL (OUTPATIENT)
Age: 68
End: 2018-12-31

## 2019-01-02 ENCOUNTER — APPOINTMENT (OUTPATIENT)
Dept: ENDOCRINOLOGY | Facility: CLINIC | Age: 69
End: 2019-01-02
Payer: COMMERCIAL

## 2019-01-02 VITALS
HEART RATE: 75 BPM | HEIGHT: 73 IN | OXYGEN SATURATION: 98 % | DIASTOLIC BLOOD PRESSURE: 76 MMHG | SYSTOLIC BLOOD PRESSURE: 118 MMHG | WEIGHT: 193 LBS | BODY MASS INDEX: 25.58 KG/M2

## 2019-01-02 LAB
GLUCOSE BLDC GLUCOMTR-MCNC: 398
HBA1C MFR BLD HPLC: 10.8

## 2019-01-02 PROCEDURE — 83036 HEMOGLOBIN GLYCOSYLATED A1C: CPT | Mod: QW

## 2019-01-02 PROCEDURE — 82962 GLUCOSE BLOOD TEST: CPT

## 2019-01-02 PROCEDURE — 99214 OFFICE O/P EST MOD 30 MIN: CPT | Mod: 25

## 2019-01-02 RX ORDER — BLOOD-GLUCOSE METER
70 EACH MISCELLANEOUS
Qty: 120 | Refills: 5 | Status: ACTIVE | COMMUNITY
Start: 2019-01-02 | End: 1900-01-01

## 2019-01-02 RX ORDER — GLUCAGON 1 MG
1 KIT INJECTION
Qty: 1 | Refills: 5 | Status: ACTIVE | COMMUNITY
Start: 2018-01-08 | End: 1900-01-01

## 2019-01-02 NOTE — DISCHARGE NOTE ADULT - MEDICATION SUMMARY - MEDICATIONS TO CHANGE
Ambulance Service
I will SWITCH the dose or number of times a day I take the medications listed below when I get home from the hospital:    spironolactone 25 mg oral tablet  -- 1 tab(s) by mouth once a day (in the evening)

## 2019-01-03 LAB
T4 FREE SERPL-MCNC: 1.2 NG/DL
TSH SERPL-ACNC: 4.34 UIU/ML

## 2019-01-04 ENCOUNTER — APPOINTMENT (OUTPATIENT)
Dept: HEPATOLOGY | Facility: CLINIC | Age: 69
End: 2019-01-04

## 2019-01-07 ENCOUNTER — RX RENEWAL (OUTPATIENT)
Age: 69
End: 2019-01-07

## 2019-01-07 RX ORDER — BLOOD SUGAR DIAGNOSTIC
STRIP MISCELLANEOUS 3 TIMES DAILY
Qty: 3 | Refills: 2 | Status: ACTIVE | COMMUNITY
Start: 2019-01-07 | End: 1900-01-01

## 2019-01-07 RX ORDER — LANCETS 28 GAUGE
EACH MISCELLANEOUS
Qty: 1 | Refills: 3 | Status: ACTIVE | COMMUNITY
Start: 2019-01-07 | End: 1900-01-01

## 2019-01-07 RX ORDER — BLOOD-GLUCOSE METER
KIT MISCELLANEOUS
Qty: 1 | Refills: 0 | Status: ACTIVE | COMMUNITY
Start: 2019-01-07 | End: 1900-01-01

## 2019-01-09 ENCOUNTER — LABORATORY RESULT (OUTPATIENT)
Age: 69
End: 2019-01-09

## 2019-01-11 ENCOUNTER — LABORATORY RESULT (OUTPATIENT)
Age: 69
End: 2019-01-11

## 2019-01-11 ENCOUNTER — APPOINTMENT (OUTPATIENT)
Dept: HEPATOLOGY | Facility: CLINIC | Age: 69
End: 2019-01-11
Payer: COMMERCIAL

## 2019-01-11 VITALS
TEMPERATURE: 98 F | RESPIRATION RATE: 16 BRPM | BODY MASS INDEX: 25.31 KG/M2 | WEIGHT: 191 LBS | DIASTOLIC BLOOD PRESSURE: 56 MMHG | HEIGHT: 73 IN | SYSTOLIC BLOOD PRESSURE: 96 MMHG | HEART RATE: 81 BPM

## 2019-01-11 LAB
AFP-TM SERPL-MCNC: 1.2 NG/ML
BASOPHILS # BLD AUTO: 0.02 K/UL
BASOPHILS NFR BLD AUTO: 0.5 %
EOSINOPHIL # BLD AUTO: 0.23 K/UL
EOSINOPHIL NFR BLD AUTO: 5.9 %
HCT VFR BLD CALC: 30.8 %
HGB BLD-MCNC: 9.6 G/DL
IMM GRANULOCYTES NFR BLD AUTO: 0.3 %
INR PPP: 1.19 RATIO
LYMPHOCYTES # BLD AUTO: 0.65 K/UL
LYMPHOCYTES NFR BLD AUTO: 16.8 %
MAN DIFF?: NORMAL
MCHC RBC-ENTMCNC: 29.2 PG
MCHC RBC-ENTMCNC: 31.2 GM/DL
MCV RBC AUTO: 93.6 FL
MONOCYTES # BLD AUTO: 0.48 K/UL
MONOCYTES NFR BLD AUTO: 12.4 %
NEUTROPHILS # BLD AUTO: 2.49 K/UL
NEUTROPHILS NFR BLD AUTO: 64.1 %
PLATELET # BLD AUTO: 63 K/UL
PT BLD: 13.4 SEC
RBC # BLD: 3.29 M/UL
RBC # FLD: 15 %
WBC # FLD AUTO: 3.88 K/UL

## 2019-01-11 PROCEDURE — 99214 OFFICE O/P EST MOD 30 MIN: CPT

## 2019-01-11 RX ORDER — PREDNISONE 5 MG/1
5 TABLET ORAL
Refills: 0 | Status: DISCONTINUED | COMMUNITY
Start: 2018-12-20 | End: 2019-01-11

## 2019-01-11 NOTE — REVIEW OF SYSTEMS
[Lower Ext Edema] : lower extremity edema [SOB on Exertion] : shortness of breath during exertion [As Noted in HPI] : as noted in HPI [Joint Pain] : joint pain [Easy Bleeding] : a tendency for easy bleeding [Easy Bruising] : a tendency for easy bruising [Fever] : no fever [Chills] : no chills [Feeling Poorly] : not feeling poorly [Feeling Tired] : not feeling tired [Eye Pain] : no eye pain [Dry Eyes] : no dryness of the eyes [Heart Rate Is Slow] : the heart rate was not slow [Heart Rate Is Fast] : the heart rate was not fast [Chest Pain] : no chest pain [Palpitations] : no palpitations [Leg Claudication] : no intermittent leg claudication [Shortness Of Breath] : no shortness of breath [Wheezing] : no wheezing [Cough] : no cough [Abdominal Pain] : no abdominal pain [Vomiting] : no vomiting [Constipation] : no constipation [Diarrhea] : no diarrhea [Heartburn] : no heartburn [Melena] : no melena [Arthralgias] : no arthralgias [Limb Pain] : no limb pain [Limb Swelling] : no limb swelling [Confused] : no confusion [FreeTextEntry5] : stasis dermatitis (bilateral lower legs) [FreeTextEntry9] : patient has gout

## 2019-01-11 NOTE — CONSULT LETTER
[Dear  ___] : Dear  [unfilled], [Courtesy Letter:] : I had the pleasure of seeing your patient, [unfilled], in my office today. [Please see my note below.] : Please see my note below. [Consult Closing:] : Thank you very much for allowing me to participate in the care of this patient.  If you have any questions, please do not hesitate to contact me. [Sincerely,] : Sincerely, [Inderjit Alaniz M.D.] : Inderjit Alaniz M.D. [FreeTextEntry2] : Suha Thapa MD

## 2019-01-11 NOTE — PHYSICAL EXAM
[Splenomegaly] : splenomegaly [Cognitive Mini-Mental Status Normal?] : Cognitive Mini Mental Status Exam is normal [General Appearance - Alert] : alert [General Appearance - In No Acute Distress] : in no acute distress [General Appearance - Well Developed] : well developed [Sclera] : the sclera and conjunctiva were normal [] : no respiratory distress [Respiration, Rhythm And Depth] : normal respiratory rhythm and effort [Auscultation Breath Sounds / Voice Sounds] : lungs were clear to auscultation bilaterally [Heart Sounds] : normal S1 and S2 [Irregularly Irregular] : the rhythm was irregularly irregular [Edema] : there was no peripheral edema [Bowel Sounds] : normal bowel sounds [Abdomen Soft] : soft [Abdomen Tenderness] : non-tender [___ Cm Below Costal Margin] : and was palpable [unfilled] cm below the costal margin [Spleen Enlargement] : was enlarged [Supraclavicular Lymph Nodes Enlarged Bilaterally] : supraclavicular [Oriented To Time, Place, And Person] : oriented to person, place, and time [Scleral Icterus] : No Scleral Icterus [Abdominal  Ascites] : no ascites [Liver Palpable ___ Finger Breadths Below Costal Margin] : was not palpable below costal margin [Asterixis] : no asterixis observed [Jaundice] : No jaundice [Palmar Erythema] : no Palmar Erythema [FreeTextEntry1] : bilateral leg edema (1+)

## 2019-01-11 NOTE — ASSESSMENT
[FreeTextEntry1] : A 67 yo man with DM2 with retinopathy, CAD s/p CABG (1986), CHF with AICD/PPM (Medtronic 2006), hypothyroidism, ?prolactinoma (over 30 years ago) on Bromocriptine, HTN, HLD, obesity returns for followup regarding cirrhosis and liver mass consistent with HCC. \par \par He denies decompensated liver disease,  esophageal varices/bleed, confusion or jaundice.\par Workup for chronic liver disease/cirrhosis was unrevealing. \par \par 8/2017 EGD reported small varices and a mild portal gastropathy. \par \par Patient has CKD and s/p JULIANNE, s/p heart failure in 2/2018  and recent c. diff, and right ankle infection in 8/2018  and right mid toe infection in early 9/2018. \par \par 9/2018  AFP normal, WBC 5.6, Hb 10.1, WBC 67 K,  AST 35, ALT 30, , TB 0.6. , Cr 1.61 INR 1.07  Na 137, MELD-Na was 14\par \par A liver mass in right lobe was seen on  abdominal US in  9/2018, Further contrast ultrasound from, 10/2018 revealed liver mass of 4.9 cm consistent with HCC. Abdominal CT with contrast from 11/2018 reported a 4.3 cm mass with bulging capsule suspicious for HCC in segment 7, and a second suspicious lesion of 1.5 cm in segment 5.  small gallstones, splenomegaly\par \par He is awake, alert, oriented x3, he has no jaundice, ascites, or asterixis. 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 \par \par I have explained to him at length the natural history of cirrhosis, and HCC,  complications of cirrhosis including but not limited to hepatic encephalopathy, ascites, esophageal varices with bleeding. \par \par He will start Doptelet prior to IR embolization given significant thrombocytopenia. He is referred to Hepatology pharmacist for counseling of use of Doptelet. \par \par I have requested blood work today.\par I have recommended lifestyle modification with low carbohydrate diet , regular exercise,  gradual loss to reduce risks of MetS associated NAFLD with FERNANDES, hepatitis A and B vaccination if not immune,  a low Na diet , up to 2 BM per day, avoidance of hepatotoxic agents,  monitoring  renal function and adjust diuretics per cardiologist and  and a return visit in 3 months.

## 2019-01-11 NOTE — HISTORY OF PRESENT ILLNESS
[___ Month(s) Ago] : [unfilled] month(s) ago [None] : ~he/she~ had no significant interval events [Transfusion before 1992] : transfusion before 1992 [Needlestick Exposure] : no needlestick exposure [Infected Sexual Partner] : no infected sexual partner [IV Drug Use] : no IV drug use [Tattoo] : no tattoos [Body Piercing] : no body piercing [Hemodialysis] : no hemodialysis [Transplant before 1992] : no transplant before 1992 [Incarceration] : no incarceration [Alcohol Abuse] : no alcohol abuse [Autoimmune Disorder] : no autoimmune disorder [Household Contact to HBV] : no household contact to HBV [Travel to Endemic Area] : no travel to an endemic area [Occupational Exposure] : no occupational exposure [de-identified] : A 67 yo man with DM2 with retinopathy, CAD s/p CABG (1986), CHF with AICD/PPM (Medtronic 2006), hypothyroidism, ?prolactinoma (over 30 years ago) on Bromocriptine, HTN, HLD, obesity returns for followup regarding cirrhosis.\par \par He had a blood transfusion 2 days after he was born. He had a CABG in 1986.\par He was on bromocriptine for a "possible pituitary tumor" for years.  His has had abnormal A1c.\par \par He has had abnormal liver tests  and thrombocytopenia for several years. \par He denies decompensated liver disease,  esophageal varices/bleed, confusion or jaundice.\par \par US abdomen from 7/2017 reported nodular liver, Cirrhosis, Gallbladder with mobile stones and Splenomegaly 18.5cm. No ascites \par \par 7/2017 Fibroscan reported S2 steatosis and F3 fibrosis. \par 8/2017 EGD reported small varices and mild portal gastropathy. Colonoscopy reported left-sided diverticular disease and internal hemorrhoids.\par \par 9/2017 sodium 146, BUN 37, creatinine 1.69, AST 42, ALT 28, alkaline 114 total bilirubin 1.0\par Repeat later in 9/2017 INR 1.19, WBC 3.7, Hb 9.7, PLT 47,000, , K 4.4, Cr 1.45, AST 50, ALT 41, , TB 0.8\par \par Patient  had worsening cardiac status with fluid retention and dyspnea, swelling of legs and scrotum in 2/2018,  responded to  increased dose of Furosemide 40 mg bid per cardiologist and treated with metolazone. Leg swelling and abdominal swelling decreased, dyspnea also improved but Cr. worsened.\par \par 3/2018 INR 1.3, WBC 3.55, Hb 10.1, PLT 49,000, Na 144, K 4.0, Cr 1.45, AST 44, ALT 32, , TB 0.7 , MELD Na 13. \par 3/2018 Abdominal US with Duplex reported Cirrhosis. No focal hepatic lesions No portal vein thrombosis. Mild splenomegaly. Small amount of ascites and Cholelithiasis.\par 5/2018 Patient had  right knee infection with drainage and s/p IV antibiotics,  Atrial fibrillation reported spontaneously converted. \par 6/1  AST 33, ALT 12, , TB 0.5, Cr 1.14, Na 137, WBC 3.72, Hb 9.4, PLT 87K \par \par He was hospitalized at [facility] 8/1-8/13 for c. diff, JULIANNE and right ankle and right middle toe infection s/p I and D of right ankle.\par Again, hospitalized at [facility] from 9/6-9/10  for right middle toe infection. \par \par 9/2018  AFP normal, WBC 5.6, Hb 10.1, WBC 67 K,  AST 35, ALT 30, , TB 0.6. , Cr 1.61 INR 1.07  Na 137, MELD-Na was 14\par \par 9/2018 abdominal US showed an exophytic mass of 3.7 cm in right hepatic lobe,  splenomegaly and biliary sludge and no ascites.  \par \par Interval and Current History:\par Patient was seen 3  months ago. \par 10/2018 abdominal  contrast ultrasound reported a 4.9 cm lesion in posterior right hepatic lobe most consistent with HCC\par \par 11/1//2018 Cr 1.48, BUN 44, \par 11/2018 abdominal CT with contrast reported a 4.3 cm mass with bulging capsule suspicious for HCC in segment 7, and a second suspicious lesion of 1.5 cm in segment 5.  small gallstones, splenomegaly\par \par Patient was seen by IR and scheduled for embolization of a large liver mass. He will start Dopelet prior to procedure given significant thrombocytopenia. \par \par He has CKD . He denies fever, chills, anorexia, nausea, vomiting, weight change,  abdominal pain, GI bleeding, jaundice, pruritus, or fatigue but is getting treatment for gout involving his left hand.

## 2019-01-12 ENCOUNTER — APPOINTMENT (OUTPATIENT)
Dept: RHEUMATOLOGY | Facility: CLINIC | Age: 69
End: 2019-01-12
Payer: COMMERCIAL

## 2019-01-12 PROCEDURE — 96413 CHEMO IV INFUSION 1 HR: CPT

## 2019-01-12 PROCEDURE — 96375 TX/PRO/DX INJ NEW DRUG ADDON: CPT

## 2019-01-14 LAB
ALBUMIN SERPL ELPH-MCNC: 3.4 G/DL
ALP BLD-CCNC: 139 U/L
ALT SERPL-CCNC: 23 U/L
ANION GAP SERPL CALC-SCNC: 13 MMOL/L
AST SERPL-CCNC: 29 U/L
BILIRUB SERPL-MCNC: 0.4 MG/DL
BUN SERPL-MCNC: 40 MG/DL
CALCIUM SERPL-MCNC: 8.9 MG/DL
CHLORIDE SERPL-SCNC: 98 MMOL/L
CO2 SERPL-SCNC: 23 MMOL/L
CREAT SERPL-MCNC: 1.73 MG/DL
GLUCOSE SERPL-MCNC: 286 MG/DL
HBV CORE IGG+IGM SER QL: NONREACTIVE
HBV SURFACE AB SER QL: NONREACTIVE
HBV SURFACE AG SER QL: NONREACTIVE
HEPATITIS A IGG ANTIBODY: REACTIVE
POTASSIUM SERPL-SCNC: 5 MMOL/L
PROT SERPL-MCNC: 6.8 G/DL
SODIUM SERPL-SCNC: 134 MMOL/L

## 2019-01-18 ENCOUNTER — APPOINTMENT (OUTPATIENT)
Dept: NEPHROLOGY | Facility: CLINIC | Age: 69
End: 2019-01-18
Payer: COMMERCIAL

## 2019-01-18 VITALS
HEART RATE: 59 BPM | BODY MASS INDEX: 26.11 KG/M2 | HEIGHT: 73 IN | OXYGEN SATURATION: 99 % | WEIGHT: 197 LBS | SYSTOLIC BLOOD PRESSURE: 89 MMHG | DIASTOLIC BLOOD PRESSURE: 53 MMHG

## 2019-01-18 VITALS — DIASTOLIC BLOOD PRESSURE: 60 MMHG | SYSTOLIC BLOOD PRESSURE: 100 MMHG

## 2019-01-18 PROCEDURE — 99214 OFFICE O/P EST MOD 30 MIN: CPT

## 2019-01-18 NOTE — ASSESSMENT
[FreeTextEntry1] : JULIANNE : Pt. noted to have prior episodes of JULIANNE that had completely resolved, and noted to have baseline normal renal function. \par -Last Scr increased to 1.73 on labs done on 1/11/19. Pt. likely with hemodynamically mediated JULIANNE in the setting of lasix, ramipril, and aldactone use, with recent use of IV contrast and relative hypotension. \par -UA has been bland and spot urine TP/CR was WNL, and abdominal US done on 9/24/18 showed normal sized kidneys without any hydronephrosis or evidence of CKD. \par -Check renal panel, UA, spot urine TP/CR.\par - serum free light chains, SIFE, HBSAg and HCV antibody were WNL.\par -Pt. has multiple risk factors to develop contrast induced nephropathy after use of IV contrast, including elevated Scr, anemia, CHF and diabetes. Explained the risk for worsening of renal function with use of IV contrast to the patient. \par -If another contrast study is absolutely required, pt. advised to hold ramipril, lasix and aldactone a day prior and on the day of the contrast study. Can consider hydration with IV NS @1ml/kg/hr for 6 hours pre, during and post contrast study. \par -Avoid nephrotoxins, NSAIDs and IV contrast (if possible). \par \par Hypotension: Noted to have low BP readings during today;s visit. Asymptomatic. Advised to monitor BP, may need to down titrate antihypertensives if BP remains low. \par \par Follow up in 6 weeks.

## 2019-01-18 NOTE — PHYSICAL EXAM
[General Appearance - Alert] : alert [General Appearance - In No Acute Distress] : in no acute distress [General Appearance - Well Nourished] : well nourished [General Appearance - Well Developed] : well developed [General Appearance - Well-Appearing] : healthy appearing [Sclera] : the sclera and conjunctiva were normal [Outer Ear] : the ears and nose were normal in appearance [Hearing Threshold Finger Rub Not [place]] : hearing was normal [Neck Appearance] : the appearance of the neck was normal [Jugular Venous Distention Increased] : there was no jugular-venous distention [] : no respiratory distress [Exaggerated Use Of Accessory Muscles For Inspiration] : no accessory muscle use [Auscultation Breath Sounds / Voice Sounds] : lungs were clear to auscultation bilaterally [Apical Impulse] : the apical impulse was normal [Heart Sounds] : normal S1 and S2 [Heart Sounds Pericardial Friction Rub] : no pericardial rub [Abdomen Soft] : soft [No CVA Tenderness] : no ~M costovertebral angle tenderness [Oriented To Time, Place, And Person] : oriented to person, place, and time [Impaired Insight] : insight and judgment were intact [Affect] : the affect was normal [Mood] : the mood was normal [FreeTextEntry1] : bilateral LE hyperpigmentation noted

## 2019-01-18 NOTE — HISTORY OF PRESENT ILLNESS
[FreeTextEntry1] : 68 year old  male, retired (used to work as  for a firm in [place]), with DM x 15 years, with diabetic neuropathy, CHF, HLD, CAD s/p CABG (1986) and stent placement (1999), fatty liver, gouty arthritis, AFib, s/p pacemaker and defibrillator, sleep apnea, hypothyroidism, ?prolactinoma, and multiple episodes of JULIANNE in the past that had resolved, presents for follow up of elevated Scr. \par \par Pt. was initailly seen on 10/8/18. At that time, he was noted to have decrease in Scr and was thought to have hemodynamically mediated JULIANNE in the setting of lasix, aldactone and ramipril use. He was advised to hold all of these medicines a day prior to planned contrast study. \par \par Currently, pt. overall feels well and denies any new complaints. His chronic LE swelling is stable and denies any cp, sob, nausea, vomiting or diarrhea. He also denies use of NSAIDs or nephrotoxins. He states that he has been diagnosed with HCC and will have a procedure done on 2/7/18 that may also involve use of IV contrast.

## 2019-01-18 NOTE — CONSULT LETTER
[( Thank you for referring [unfilled] for consultation for _____ )] : Thank you for referring [unfilled] for consultation for [unfilled] [Please see my note below.] : Please see my note below. [Consult Closing:] : Thank you very much for allowing me to participate in the care of this patient.  If you have any questions, please do not hesitate to contact me. [Sincerely,] : Sincerely, [Dear  ___] : Dear  [unfilled], [Courtesy Letter:] : I had the pleasure of seeing your patient, [unfilled], in my office today. [FreeTextEntry3] : Kaylee Clement MD

## 2019-01-18 NOTE — REVIEW OF SYSTEMS
[Negative] : Heme/Lymph [FreeTextEntry9] : right heel and toe pain [de-identified] : multiple bruises over both arms

## 2019-01-22 ENCOUNTER — APPOINTMENT (OUTPATIENT)
Dept: CARDIOLOGY | Facility: CLINIC | Age: 69
End: 2019-01-22

## 2019-01-22 ENCOUNTER — LABORATORY RESULT (OUTPATIENT)
Age: 69
End: 2019-01-22

## 2019-01-22 ENCOUNTER — APPOINTMENT (OUTPATIENT)
Dept: CARDIOLOGY | Facility: CLINIC | Age: 69
End: 2019-01-22
Payer: COMMERCIAL

## 2019-01-22 LAB
ALBUMIN SERPL ELPH-MCNC: 3.5 G/DL
ANION GAP SERPL CALC-SCNC: 10 MMOL/L
APPEARANCE: CLEAR
BACTERIA: NEGATIVE
BILIRUBIN URINE: NEGATIVE
BLOOD URINE: NEGATIVE
BUN SERPL-MCNC: 39 MG/DL
CALCIUM SERPL-MCNC: 9.2 MG/DL
CHLORIDE SERPL-SCNC: 103 MMOL/L
CO2 SERPL-SCNC: 24 MMOL/L
COLOR: YELLOW
CREAT SERPL-MCNC: 1.66 MG/DL
CREAT SPEC-SCNC: 160 MG/DL
CREAT/PROT UR: 0.1 RATIO
GLUCOSE QUALITATIVE U: 250 MG/DL
GLUCOSE SERPL-MCNC: 159 MG/DL
HYALINE CASTS: 4 /LPF
KETONES URINE: ABNORMAL
LEUKOCYTE ESTERASE URINE: NEGATIVE
MICROSCOPIC-UA: NORMAL
NITRITE URINE: NEGATIVE
PH URINE: 5.5
PHOSPHATE SERPL-MCNC: 3.1 MG/DL
POTASSIUM SERPL-SCNC: 5 MMOL/L
PROT UR-MCNC: 18 MG/DL
PROTEIN URINE: NEGATIVE MG/DL
RED BLOOD CELLS URINE: 5 /HPF
SODIUM ?TM SUB UR QN: 40 MMOL/L
SODIUM SERPL-SCNC: 138 MMOL/L
SPECIFIC GRAVITY URINE: 1.02
SQUAMOUS EPITHELIAL CELLS: 1 /HPF
UROBILINOGEN URINE: NEGATIVE MG/DL
UUN UR-MCNC: 794 MG/DL
WHITE BLOOD CELLS URINE: 2 /HPF

## 2019-01-22 PROCEDURE — 93290 INTERROG DEV EVAL ICPMS IP: CPT | Mod: 26

## 2019-01-22 PROCEDURE — 99213 OFFICE O/P EST LOW 20 MIN: CPT

## 2019-01-22 NOTE — PHYSICAL EXAM
[General Appearance - Well Developed] : well developed [Normal Appearance] : normal appearance [Well Groomed] : well groomed [General Appearance - Well Nourished] : well nourished [No Deformities] : no deformities [General Appearance - In No Acute Distress] : no acute distress [Heart Rate And Rhythm] : heart rate and rhythm were normal [Heart Sounds] : normal S1 and S2 [Murmurs] : no murmurs present [Respiration, Rhythm And Depth] : normal respiratory rhythm and effort [Exaggerated Use Of Accessory Muscles For Inspiration] : no accessory muscle use [Auscultation Breath Sounds / Voice Sounds] : lungs were clear to auscultation bilaterally [Clean] : clean [Dry] : dry [Well-Healed] : well-healed [Abdomen Soft] : soft [Abdomen Tenderness] : non-tender [Abdomen Mass (___ Cm)] : no abdominal mass palpated [Nail Clubbing] : no clubbing of the fingernails [Cyanosis, Localized] : no localized cyanosis [Petechial Hemorrhages (___cm)] : no petechial hemorrhages [] : no ischemic changes [FreeTextEntry1] : trace pre-tibial edema

## 2019-01-22 NOTE — DISCUSSION/SUMMARY
[Pacemaker Function Normal] : normal pacemaker function [AICD Function Normal] : normal AICD function [Patient] : the patient [de-identified] : Repeat interrogation in only 2 months as approaching NÉSTOR [FreeTextEntry1] : Optivol just exceeded baseline and patient observes few pound weight gain last few days, but now declining. If does not decline to his usual weight take extra dose of diuretic.

## 2019-01-22 NOTE — HISTORY OF PRESENT ILLNESS
[None] : The patient complains of no symptoms [de-identified] : Has had no symptoms and no pacemaker shocks or issues.

## 2019-01-22 NOTE — PROCEDURE
[No] : not [NSR] : normal sinus rhythm [ICD] : Implantable cardioverter-defibrillator [DDD] : DDD [Atrial] : Atrial [Ventricular] : Ventricular [___ ms] : [unfilled] ms [Counters Reset] : the counters were reset [Apace-Vpace ___ %] : Apace-Vpace [unfilled]% [Voltage: ___ volts] : Voltage was [unfilled] volts [Charge Time: ___ sec] : charge time was [unfilled] seconds [Lead Imp:  ___ohms] : lead impedance was [unfilled] ohms [Sensing Amplitude ___mv] : sensing amplitude was [unfilled] mv [___V @] : [unfilled] V [Asense-Vsense ___ %] : Asense-Vsense [unfilled]% [Asense-Vpace ___ %] : Asense-Vpace [unfilled]% [Apace-Vsense ___ %] : Apace-Vsense [unfilled]% [de-identified] : Medtronic Protecta XT  [de-identified] : XT  [id number] [de-identified] : [id number] [de-identified] : 12/24/2012 [de-identified] : 62 [de-identified] : Normal lead impedances with Fidellis lead capped, a new defibrillator coil implanted after presenting with defibrillator storm due to Fidellis lead fracture. No recurrence of atrial tachycardia since spontaneous reversion to sinus. No events, no NSVT. No mode switch. Optivol recording above threshold just exceeded threshold in last few days.

## 2019-01-24 ENCOUNTER — APPOINTMENT (OUTPATIENT)
Dept: RHEUMATOLOGY | Facility: CLINIC | Age: 69
End: 2019-01-24
Payer: COMMERCIAL

## 2019-01-24 PROCEDURE — 96375 TX/PRO/DX INJ NEW DRUG ADDON: CPT

## 2019-01-24 PROCEDURE — 96413 CHEMO IV INFUSION 1 HR: CPT

## 2019-01-28 ENCOUNTER — RX RENEWAL (OUTPATIENT)
Age: 69
End: 2019-01-28

## 2019-01-28 RX ORDER — BLOOD-GLUCOSE METER
KIT MISCELLANEOUS
Qty: 1 | Refills: 0 | Status: ACTIVE | COMMUNITY
Start: 2019-01-28 | End: 1900-01-01

## 2019-01-31 ENCOUNTER — OUTPATIENT (OUTPATIENT)
Dept: OUTPATIENT SERVICES | Facility: HOSPITAL | Age: 69
LOS: 1 days | End: 2019-01-31
Payer: COMMERCIAL

## 2019-01-31 VITALS
SYSTOLIC BLOOD PRESSURE: 90 MMHG | OXYGEN SATURATION: 100 % | WEIGHT: 199.08 LBS | RESPIRATION RATE: 16 BRPM | DIASTOLIC BLOOD PRESSURE: 57 MMHG | TEMPERATURE: 98 F | HEIGHT: 72.5 IN | HEART RATE: 79 BPM

## 2019-01-31 DIAGNOSIS — Z01.89 ENCOUNTER FOR OTHER SPECIFIED SPECIAL EXAMINATIONS: Chronic | ICD-10-CM

## 2019-01-31 DIAGNOSIS — E11.9 TYPE 2 DIABETES MELLITUS WITHOUT COMPLICATIONS: ICD-10-CM

## 2019-01-31 DIAGNOSIS — I25.10 ATHEROSCLEROTIC HEART DISEASE OF NATIVE CORONARY ARTERY WITHOUT ANGINA PECTORIS: ICD-10-CM

## 2019-01-31 DIAGNOSIS — C22.0 LIVER CELL CARCINOMA: ICD-10-CM

## 2019-01-31 DIAGNOSIS — Z89.9 ACQUIRED ABSENCE OF LIMB, UNSPECIFIED: Chronic | ICD-10-CM

## 2019-01-31 DIAGNOSIS — Z95.810 PRESENCE OF AUTOMATIC (IMPLANTABLE) CARDIAC DEFIBRILLATOR: ICD-10-CM

## 2019-01-31 DIAGNOSIS — G47.30 SLEEP APNEA, UNSPECIFIED: ICD-10-CM

## 2019-01-31 DIAGNOSIS — Z01.84 ENCOUNTER FOR ANTIBODY RESPONSE EXAMINATION: ICD-10-CM

## 2019-01-31 LAB
APTT BLD: 31.1 SEC — SIGNIFICANT CHANGE UP (ref 27.5–36.3)
BLD GP AB SCN SERPL QL: NEGATIVE — SIGNIFICANT CHANGE UP
INR BLD: 1.12 RATIO — SIGNIFICANT CHANGE UP (ref 0.88–1.16)
PROTHROM AB SERPL-ACNC: 12.6 SEC — SIGNIFICANT CHANGE UP (ref 10–13.1)
RH IG SCN BLD-IMP: POSITIVE — SIGNIFICANT CHANGE UP

## 2019-01-31 PROCEDURE — 86901 BLOOD TYPING SEROLOGIC RH(D): CPT

## 2019-01-31 PROCEDURE — 85730 THROMBOPLASTIN TIME PARTIAL: CPT

## 2019-01-31 PROCEDURE — 85610 PROTHROMBIN TIME: CPT

## 2019-01-31 PROCEDURE — G0463: CPT

## 2019-01-31 PROCEDURE — 86900 BLOOD TYPING SEROLOGIC ABO: CPT

## 2019-01-31 PROCEDURE — 86850 RBC ANTIBODY SCREEN: CPT

## 2019-01-31 NOTE — H&P PST ADULT - PMH
AICD lead malfunction  history of  Anemia    Coronary artery disease    DM (diabetes mellitus)  type 2, Hg A1C 8.2 % ( 7/10/17)  Elevated prolactin level  dx: ' 70's  mediclly managed  Elevated triglycerides with high cholesterol    Gout  medically managed and I.V. drug every 2 weeks  Heart failure with reduced left ventricular function  EF 37%  Hepatic cirrhosis, unspecified hepatic cirrhosis type    History of hyperprolactinemia    History of osteomyelitis  2015, right foot 2nd toe  HLD (hyperlipidemia)    HTN (hypertension)    Hypothyroidism    ICD (implantable cardioverter-defibrillator) in place  [employer], Model [id number] , last interrogation 4/2017  Ischemic cardiomyopathy    Liver mass  dx: 10/2018   incidental finding. Currently awaitng furthur workup  Pituitary adenoma  as per patient chronic, no changes , recently restarted follow up with endocrinologist ( note in allscripts )  Presence of stent in coronary artery  2 stents  PVD (peripheral vascular disease)    Sleep apnea  not using CPAP  Thrombocytopenia  Chronic  Ulcer of right ankle    Vitamin D deficiency AICD lead malfunction  history of resolved stable since being folow-up by Dr Rivers  Anemia    Coronary artery disease    DM (diabetes mellitus)  type 2, Hg A1C 8.2 % ( 7/10/17)   Dec 2019 9 due to prednisone started on Lantus Jan 2019  Elevated prolactin level  dx: ' 70's  mediclly managed  Elevated triglycerides with high cholesterol  on meds  Gout  medically managed and I.V. drug every 2 weeks  Heart failure with reduced left ventricular function  EF 37%  Hepatic cirrhosis, unspecified hepatic cirrhosis type    History of hyperprolactinemia    History of osteomyelitis  2015, right foot 2nd toe   follow-up by podiatrist every 2 weeks  HTN (hypertension)    Hypothyroidism    ICD (implantable cardioverter-defibrillator) in place  Medtronic, Model [id number] , last interrogation 12/2018? will call Dr Rivers office  Ischemic cardiomyopathy    Liver mass  dx: 10/2018   incidental finding. Currently awaitng furthur workup  Pituitary adenoma  as per patient chronic, no changes , recently restarted follow up with endocrinologist ( note in allscripts )  Presence of stent in coronary artery  2 stents  PVD (peripheral vascular disease)    Sleep apnea  not using CPAP  Thrombocytopenia  Chronic  Ulcer of right ankle  has surgery done Dec 2018  Vitamin D deficiency AICD lead malfunction  history of resolved stable since being folow-up by Dr Rivers  Anemia    Coronary artery disease    DM (diabetes mellitus)  type 2, Hg A1C 8.2 % ( 7/10/17)   Dec 2019 9 due to prednisone started on Lantus Jan 2019  Elevated prolactin level  dx: ' 70's  mediclly managed  Elevated triglycerides with high cholesterol  on meds  Gout  medically managed and I.V. drug every 2 weeks  Heart failure with reduced left ventricular function  EF 37%  Hepatic cirrhosis, unspecified hepatic cirrhosis type    History of hyperprolactinemia    History of osteomyelitis  2015, right foot 2nd toe   follow-up by podiatrist every 2 weeks  HTN (hypertension)    Hypothyroidism    ICD (implantable cardioverter-defibrillator) in place  [employer]tronic, Model [id number] , last interrogation 12/2018? will call Dr Rivers office  Ischemic cardiomyopathy    Liver mass  dx: 10/2018   incidental finding. Currently awaitng furthur workup  Pituitary adenoma  as per patient chronic, no changes , recently restarted follow up with endocrinologist ( note in allscripts )  Presence of stent in coronary artery  2 stents  PVD (peripheral vascular disease)    Sleep apnea  not using CPAP  Thrombocytopenia  Chronic  Ulcer of right ankle  has surgery done Dec 2018  Vitamin D deficiency

## 2019-01-31 NOTE — H&P PST ADULT - HISTORY OF PRESENT ILLNESS
67 y/o male with hx of ischemic cardiomyopathy has ICD implant since 2006 follow-up by Dr Rivers , CAD CABG x 4  1986,DM type 2 , hypothyroidism , sleep apnea not on CPAP, gouty arthritis, Afib resolved . Patient was recently diagnosed with liver mass  , was referred to DR Hoyt , evaluated and indicated this procedure for treatment.

## 2019-01-31 NOTE — H&P PST ADULT - PSH
AICD (automatic cardioverter/defibrillator) present  placement in (2006)  Coronary atherosclerosis of artery bypass graft  CABG X 4 (1986)  History of amputation  right foot, 2nd toe, osteo 2015  Stented coronary artery  RCA (1999) AICD (automatic cardioverter/defibrillator) present  placement in (2006)  Coronary atherosclerosis of artery bypass graft  CABG X 4 (1986)  Encounter for incision and drainage procedure  Dec 2018 right foot/ankle  History of amputation  right foot, 2nd toe, osteo 2015  Stented coronary artery  RCA (1999)

## 2019-01-31 NOTE — H&P PST ADULT - PROBLEM SELECTOR PLAN 3
Has recent foot surgery  Dr Rivers cardiology note  Dec 5, 2018 on chart    Will call his office for recent AICD interrogation report Has recent foot surgery  Dr Rivers cardiology note  Dec 5, 2018 on chart    Will call his office for recent AICD interrogation report Jan 22,2019 on alscript

## 2019-01-31 NOTE — H&P PST ADULT - PROBLEM SELECTOR PLAN 1
Liver angiogram and embolization   Has CBC/CMP result on HIE done 1/22/2019   Type and screen and PT/INR/PT/PTT drawn pending result

## 2019-01-31 NOTE — H&P PST ADULT - EXTREMITIES COMMENTS
bilateral leg with brownish discoloration of skin right foot with dressing intact benign  no foul smell no drainage noted

## 2019-01-31 NOTE — H&P PST ADULT - NS MD HP INPLANTS MED DEV
Pacemaker/2 cardiac stents/AUCD/ PPM: [employer]: Model [id number]/Automatic Implantable Cardioverter Defibrillator 2 cardiac stents/AICD/ PPM: [employer]: Model [id number]/Automatic Implantable Cardioverter Defibrillator/Pacemaker

## 2019-02-01 PROBLEM — E78.2 MIXED HYPERLIPIDEMIA: Chronic | Status: ACTIVE | Noted: 2018-12-04

## 2019-02-01 PROBLEM — Z87.39 PERSONAL HISTORY OF OTHER DISEASES OF THE MUSCULOSKELETAL SYSTEM AND CONNECTIVE TISSUE: Chronic | Status: ACTIVE | Noted: 2017-08-22

## 2019-02-01 PROBLEM — L97.319 NON-PRESSURE CHRONIC ULCER OF RIGHT ANKLE WITH UNSPECIFIED SEVERITY: Chronic | Status: ACTIVE | Noted: 2018-12-04

## 2019-02-04 RX ORDER — SODIUM CHLORIDE 9 MG/ML
1000 INJECTION INTRAMUSCULAR; INTRAVENOUS; SUBCUTANEOUS
Qty: 0 | Refills: 0 | Status: DISCONTINUED | OUTPATIENT
Start: 2019-02-07 | End: 2019-02-08

## 2019-02-07 ENCOUNTER — APPOINTMENT (OUTPATIENT)
Dept: RHEUMATOLOGY | Facility: CLINIC | Age: 69
End: 2019-02-07

## 2019-02-07 ENCOUNTER — INPATIENT (INPATIENT)
Facility: HOSPITAL | Age: 69
LOS: 0 days | Discharge: HOME | DRG: 988 | End: 2019-02-08
Attending: STUDENT IN AN ORGANIZED HEALTH CARE EDUCATION/TRAINING PROGRAM | Admitting: STUDENT IN AN ORGANIZED HEALTH CARE EDUCATION/TRAINING PROGRAM
Payer: COMMERCIAL

## 2019-02-07 ENCOUNTER — CHART COPY (OUTPATIENT)
Age: 69
End: 2019-02-07

## 2019-02-07 VITALS
HEIGHT: 73 IN | WEIGHT: 206.13 LBS | HEART RATE: 84 BPM | SYSTOLIC BLOOD PRESSURE: 127 MMHG | DIASTOLIC BLOOD PRESSURE: 75 MMHG | OXYGEN SATURATION: 99 % | RESPIRATION RATE: 18 BRPM

## 2019-02-07 DIAGNOSIS — Z89.9 ACQUIRED ABSENCE OF LIMB, UNSPECIFIED: Chronic | ICD-10-CM

## 2019-02-07 DIAGNOSIS — C22.0 LIVER CELL CARCINOMA: ICD-10-CM

## 2019-02-07 DIAGNOSIS — E11.9 TYPE 2 DIABETES MELLITUS WITHOUT COMPLICATIONS: ICD-10-CM

## 2019-02-07 DIAGNOSIS — I50.1 LEFT VENTRICULAR FAILURE, UNSPECIFIED: ICD-10-CM

## 2019-02-07 DIAGNOSIS — N18.3 CHRONIC KIDNEY DISEASE, STAGE 3 (MODERATE): ICD-10-CM

## 2019-02-07 DIAGNOSIS — I10 ESSENTIAL (PRIMARY) HYPERTENSION: ICD-10-CM

## 2019-02-07 DIAGNOSIS — Z01.89 ENCOUNTER FOR OTHER SPECIFIED SPECIAL EXAMINATIONS: Chronic | ICD-10-CM

## 2019-02-07 DIAGNOSIS — M06.9 RHEUMATOID ARTHRITIS, UNSPECIFIED: ICD-10-CM

## 2019-02-07 DIAGNOSIS — E03.8 OTHER SPECIFIED HYPOTHYROIDISM: ICD-10-CM

## 2019-02-07 DIAGNOSIS — E78.49 OTHER HYPERLIPIDEMIA: ICD-10-CM

## 2019-02-07 LAB
ANION GAP SERPL CALC-SCNC: 12 MMOL/L — SIGNIFICANT CHANGE UP (ref 5–17)
BUN SERPL-MCNC: 49 MG/DL — HIGH (ref 7–23)
CALCIUM SERPL-MCNC: 9.2 MG/DL — SIGNIFICANT CHANGE UP (ref 8.4–10.5)
CHLORIDE SERPL-SCNC: 102 MMOL/L — SIGNIFICANT CHANGE UP (ref 96–108)
CO2 SERPL-SCNC: 23 MMOL/L — SIGNIFICANT CHANGE UP (ref 22–31)
CREAT SERPL-MCNC: 1.46 MG/DL — HIGH (ref 0.5–1.3)
GLUCOSE BLDC GLUCOMTR-MCNC: 110 MG/DL — HIGH (ref 70–99)
GLUCOSE BLDC GLUCOMTR-MCNC: 359 MG/DL — HIGH (ref 70–99)
GLUCOSE SERPL-MCNC: 168 MG/DL — HIGH (ref 70–99)
HCT VFR BLD CALC: 29 % — LOW (ref 39–50)
HGB BLD-MCNC: 9.8 G/DL — LOW (ref 13–17)
MCHC RBC-ENTMCNC: 31.2 PG — SIGNIFICANT CHANGE UP (ref 27–34)
MCHC RBC-ENTMCNC: 33.9 GM/DL — SIGNIFICANT CHANGE UP (ref 32–36)
MCV RBC AUTO: 91.9 FL — SIGNIFICANT CHANGE UP (ref 80–100)
PLATELET # BLD AUTO: 39 K/UL — LOW (ref 150–400)
PLATELET # BLD AUTO: 40 K/UL — LOW (ref 150–400)
POTASSIUM SERPL-MCNC: 4.8 MMOL/L — SIGNIFICANT CHANGE UP (ref 3.5–5.3)
POTASSIUM SERPL-SCNC: 4.8 MMOL/L — SIGNIFICANT CHANGE UP (ref 3.5–5.3)
RBC # BLD: 3.15 M/UL — LOW (ref 4.2–5.8)
RBC # FLD: 14 % — SIGNIFICANT CHANGE UP (ref 10.3–14.5)
SODIUM SERPL-SCNC: 137 MMOL/L — SIGNIFICANT CHANGE UP (ref 135–145)
WBC # BLD: 3.2 K/UL — LOW (ref 3.8–10.5)
WBC # FLD AUTO: 3.2 K/UL — LOW (ref 3.8–10.5)

## 2019-02-07 PROCEDURE — 76937 US GUIDE VASCULAR ACCESS: CPT | Mod: 26

## 2019-02-07 PROCEDURE — 36247 INS CATH ABD/L-EXT ART 3RD: CPT

## 2019-02-07 PROCEDURE — 36248 INS CATH ABD/L-EXT ART ADDL: CPT

## 2019-02-07 PROCEDURE — 76380 CAT SCAN FOLLOW-UP STUDY: CPT | Mod: 26,59

## 2019-02-07 PROCEDURE — 37243 VASC EMBOLIZE/OCCLUDE ORGAN: CPT

## 2019-02-07 PROCEDURE — 99223 1ST HOSP IP/OBS HIGH 75: CPT

## 2019-02-07 RX ORDER — SODIUM CHLORIDE 9 MG/ML
1000 INJECTION, SOLUTION INTRAVENOUS
Qty: 0 | Refills: 0 | Status: DISCONTINUED | OUTPATIENT
Start: 2019-02-07 | End: 2019-02-08

## 2019-02-07 RX ORDER — DEXTROSE 50 % IN WATER 50 %
25 SYRINGE (ML) INTRAVENOUS ONCE
Qty: 0 | Refills: 0 | Status: DISCONTINUED | OUTPATIENT
Start: 2019-02-07 | End: 2019-02-08

## 2019-02-07 RX ORDER — ATORVASTATIN CALCIUM 80 MG/1
40 TABLET, FILM COATED ORAL AT BEDTIME
Qty: 0 | Refills: 0 | Status: DISCONTINUED | OUTPATIENT
Start: 2019-02-07 | End: 2019-02-08

## 2019-02-07 RX ORDER — NIACIN 50 MG
1000 TABLET ORAL AT BEDTIME
Qty: 0 | Refills: 0 | Status: DISCONTINUED | OUTPATIENT
Start: 2019-02-07 | End: 2019-02-08

## 2019-02-07 RX ORDER — ASPIRIN/CALCIUM CARB/MAGNESIUM 324 MG
81 TABLET ORAL DAILY
Qty: 0 | Refills: 0 | Status: DISCONTINUED | OUTPATIENT
Start: 2019-02-07 | End: 2019-02-08

## 2019-02-07 RX ORDER — CARVEDILOL PHOSPHATE 80 MG/1
12.5 CAPSULE, EXTENDED RELEASE ORAL EVERY 12 HOURS
Qty: 0 | Refills: 0 | Status: DISCONTINUED | OUTPATIENT
Start: 2019-02-07 | End: 2019-02-08

## 2019-02-07 RX ORDER — INSULIN LISPRO 100/ML
VIAL (ML) SUBCUTANEOUS
Qty: 0 | Refills: 0 | Status: DISCONTINUED | OUTPATIENT
Start: 2019-02-07 | End: 2019-02-08

## 2019-02-07 RX ORDER — INSULIN GLARGINE 100 [IU]/ML
20 INJECTION, SOLUTION SUBCUTANEOUS AT BEDTIME
Qty: 0 | Refills: 0 | Status: DISCONTINUED | OUTPATIENT
Start: 2019-02-07 | End: 2019-02-08

## 2019-02-07 RX ORDER — DEXTROSE 50 % IN WATER 50 %
15 SYRINGE (ML) INTRAVENOUS ONCE
Qty: 0 | Refills: 0 | Status: DISCONTINUED | OUTPATIENT
Start: 2019-02-07 | End: 2019-02-08

## 2019-02-07 RX ORDER — BACITRACIN ZINC 500 UNIT/G
1 OINTMENT IN PACKET (EA) TOPICAL ONCE
Qty: 0 | Refills: 0 | Status: COMPLETED | OUTPATIENT
Start: 2019-02-07 | End: 2019-02-07

## 2019-02-07 RX ORDER — DEXTROSE 50 % IN WATER 50 %
12.5 SYRINGE (ML) INTRAVENOUS ONCE
Qty: 0 | Refills: 0 | Status: DISCONTINUED | OUTPATIENT
Start: 2019-02-07 | End: 2019-02-08

## 2019-02-07 RX ORDER — GLUCAGON INJECTION, SOLUTION 0.5 MG/.1ML
1 INJECTION, SOLUTION SUBCUTANEOUS ONCE
Qty: 0 | Refills: 0 | Status: DISCONTINUED | OUTPATIENT
Start: 2019-02-07 | End: 2019-02-08

## 2019-02-07 RX ORDER — BROMOCRIPTINE MESYLATE 5 MG/1
5 CAPSULE ORAL DAILY
Qty: 0 | Refills: 0 | Status: DISCONTINUED | OUTPATIENT
Start: 2019-02-07 | End: 2019-02-08

## 2019-02-07 RX ORDER — TRAMADOL HYDROCHLORIDE 50 MG/1
25 TABLET ORAL EVERY 6 HOURS
Qty: 0 | Refills: 0 | Status: DISCONTINUED | OUTPATIENT
Start: 2019-02-07 | End: 2019-02-08

## 2019-02-07 RX ORDER — LEVOTHYROXINE SODIUM 125 MCG
125 TABLET ORAL DAILY
Qty: 0 | Refills: 0 | Status: DISCONTINUED | OUTPATIENT
Start: 2019-02-07 | End: 2019-02-08

## 2019-02-07 RX ORDER — HEPARIN SODIUM 5000 [USP'U]/ML
5000 INJECTION INTRAVENOUS; SUBCUTANEOUS EVERY 12 HOURS
Qty: 0 | Refills: 0 | Status: DISCONTINUED | OUTPATIENT
Start: 2019-02-07 | End: 2019-02-07

## 2019-02-07 RX ADMIN — TRAMADOL HYDROCHLORIDE 25 MILLIGRAM(S): 50 TABLET ORAL at 22:20

## 2019-02-07 RX ADMIN — Medication 1 APPLICATION(S): at 21:32

## 2019-02-07 RX ADMIN — CARVEDILOL PHOSPHATE 12.5 MILLIGRAM(S): 80 CAPSULE, EXTENDED RELEASE ORAL at 21:31

## 2019-02-07 RX ADMIN — ATORVASTATIN CALCIUM 40 MILLIGRAM(S): 80 TABLET, FILM COATED ORAL at 21:32

## 2019-02-07 RX ADMIN — Medication 1000 MILLIGRAM(S): at 21:32

## 2019-02-07 RX ADMIN — INSULIN GLARGINE 20 UNIT(S): 100 INJECTION, SOLUTION SUBCUTANEOUS at 21:31

## 2019-02-07 RX ADMIN — TRAMADOL HYDROCHLORIDE 25 MILLIGRAM(S): 50 TABLET ORAL at 23:57

## 2019-02-07 NOTE — PROGRESS NOTE ADULT - SUBJECTIVE AND OBJECTIVE BOX
Vascular & Interventional Radiology Post-Procedure Note    Pre-Procedure Diagnosis: HCC  Post-Procedure Diagnosis: Same as pre.  Indications for Procedure: tumor embo    : Lai  Assistant(s): none    Procedure Details/Findings: hypervascular right hepatic tumor correlates with CTA. Successful 75 micron particle embolization.    Complications: none  Estimated Blood Loss: minimal  Specimen: none  Contrast: 51 cc  Sedation: MAC  Patient Condition/Disposition: stable, to RR.    Plan: pain management. hydration.    Shady Hoyt MD  [phone]

## 2019-02-07 NOTE — H&P ADULT - PMH
AICD lead malfunction  history of resolved stable since being folow-up by Dr Rivers  Anemia    Coronary artery disease    DM (diabetes mellitus)  type 2, Hg A1C 8.2 % ( 7/10/17)   Dec 2019 9 due to prednisone started on Lantus Jan 2019  Elevated prolactin level  dx: ' 70's  mediclly managed  Elevated triglycerides with high cholesterol  on meds  Gout  medically managed and I.V. drug every 2 weeks  Heart failure with reduced left ventricular function  EF 37%  Hepatic cirrhosis, unspecified hepatic cirrhosis type    History of hyperprolactinemia    History of osteomyelitis  2015, right foot 2nd toe   follow-up by podiatrist every 2 weeks  HTN (hypertension)    Hypothyroidism    ICD (implantable cardioverter-defibrillator) in place  [employer], Model [id number] , last interrogation 12/2018? will call Dr Rivers office  Ischemic cardiomyopathy    Liver mass  dx: 10/2018   incidental finding. Currently awaitng furthur workup  Pituitary adenoma  as per patient chronic, no changes , recently restarted follow up with endocrinologist ( note in allscripts )  Presence of stent in coronary artery  2 stents  PVD (peripheral vascular disease)    Sleep apnea  not using CPAP  Thrombocytopenia  Chronic  Ulcer of right ankle  has surgery done Dec 2018  Vitamin D deficiency

## 2019-02-07 NOTE — H&P ADULT - ASSESSMENT
Patient is a 68 year old male with atrial fibrillation, DM2, history of VT s/p AICD, hypothyroidism, CAD, HTN, CKD stage 3 and HCC presents to [facility] for elective IR guided liver tumor embolization.

## 2019-02-07 NOTE — H&P ADULT - NSHPREVIEWOFSYSTEMS_GEN_ALL_CORE
GENERAL: No fevers, no chills.  EYES: No blurry vision,  No photophobia  ENT: No sore throat.  No dysphagia  Cardiovascular: No chest pain, palpitations, orthopnea  Pulmonary: No cough, no wheezing. No shortness of breath  Gastrointestinal: No abdominal pain, no diarrhea, no constipation.   : No dysuria.  No hematuria  Musculoskeletal: No weakness.  No myalgias.  Dermatology:  No rashes.  Neuro: No Headache.  No vertigo.  No dizziness.  Psych: No anxiety, no depression.  Denies suicidal thoughts.

## 2019-02-07 NOTE — H&P ADULT - NSHPPHYSICALEXAM_GEN_ALL_CORE
Vital Signs Last 24 Hrs  T(C): --  T(F): --  HR: --  BP: --  BP(mean): --  RR: --  SpO2: --    GENERAL: NAD, well-developed  HEAD:  Atraumatic, Normocephalic  EYES: EOMI, PERRLA, conjunctiva and sclera clear  ENT: Pharynx not erythematous  PULMONARY: Clear to auscultation bilaterally; No wheeze  CARDIOVASCULAR: Regular rate and rhythm; No murmurs, rubs, or gallops  ABDOMEN: Soft, Nontender, mild abdominal distension, Bowel sounds present  EXTREMITIES:  2+ Peripheral Pulses, No clubbing, cyanosis, or edema  MUSCULOSKELETAL: No calf tenderness  PSYCH: AAOx3, normal affect  SKIN: warm and dry, No rashes or lesions

## 2019-02-07 NOTE — H&P ADULT - PROBLEM SELECTOR PLAN 7
- not in acute exacerbation  - c/w aspirin, statin, beta blocker; hold ace inhibitor, lasix and spironolactone

## 2019-02-07 NOTE — H&P ADULT - ATTENDING COMMENTS
dvt ppx: early ambulation; no heparin given thrombocytopenia; no lovenox given ckd  communication: discussed with wife at bedside

## 2019-02-07 NOTE — H&P ADULT - PSH
AICD (automatic cardioverter/defibrillator) present  placement in (2006)  Coronary atherosclerosis of artery bypass graft  CABG X 4 (1986)  Encounter for incision and drainage procedure  Dec 2018 right foot/ankle  History of amputation  right foot, 2nd toe, osteo 2015  Stented coronary artery  RCA (1999)

## 2019-02-07 NOTE — H&P ADULT - HISTORY OF PRESENT ILLNESS
Patient is a 68 year old male with atrial fibrillation, DM2, history of VT s/p AICD, hypothyroidism, CAD, HTN, CKD stage 3 and HCC presents to [facility] for elective IR guided liver tumor embolization. Patient is now s/p procedure with no complications. Seen with wife at bedside. He has no pain. Denies nausea. Overall, feels well.     Labs- pancytopenia, cr 1.46.

## 2019-02-07 NOTE — PROGRESS NOTE ADULT - SUBJECTIVE AND OBJECTIVE BOX
Interventional Radiology Pre-Procedure Note    This is a 68y Male with multiple comorbidities including Afib, DMII, Vtavh s/p AICD/ PPM (interrogation report in chart from 1/22/19), hypothyroid. CAD,      PAST MEDICAL & SURGICAL HISTORY:  Ulcer of right ankle: has surgery done Dec 2018  Liver mass: dx: 10/2018   incidental finding. Currently awaitng furthur workup  Gout: medically managed and I.V. drug every 2 weeks  Elevated prolactin level: dx: &#x27; 70&#x27;s  mediclly managed  Vitamin D deficiency  Elevated triglycerides with high cholesterol: on meds  Hypothyroidism  PVD (peripheral vascular disease)  History of hyperprolactinemia  Hepatic cirrhosis, unspecified hepatic cirrhosis type  Anemia  ICD (implantable cardioverter-defibrillator) in place: [employer], Model [id number] , last interrogation 12/2018? will call Dr Rivers office  Sleep apnea: not using CPAP  History of osteomyelitis: 2015, right foot 2nd toe   follow-up by podiatrist every 2 weeks  Presence of stent in coronary artery: 2 stents  Heart failure with reduced left ventricular function: EF 37%  Ischemic cardiomyopathy  Pituitary adenoma: as per patient chronic, no changes , recently restarted follow up with endocrinologist ( note in allscripts )  Coronary artery disease  Thrombocytopenia: Chronic  HTN (hypertension)  DM (diabetes mellitus): type 2, Hg A1C 8.2 % ( 7/10/17)   Dec 2019 9 due to prednisone started on Lantus Jan 2019  AICD lead malfunction: history of resolved stable since being folow-up by Dr Rivers  Encounter for incision and drainage procedure: Dec 2018 right foot/ankle  History of amputation: right foot, 2nd toe, osteo 2015  AICD (automatic cardioverter/defibrillator) present: placement in (2006)  Stented coronary artery: RCA (1999)  Coronary atherosclerosis of artery bypass graft: CABG X 4 (1986)       Vitals:Vital Signs Last 24 Hrs  T(C): --  T(F): --  HR: --  BP: --  BP(mean): --  RR: --  SpO2: --    Allergies: Allergies    No Known Allergies    Intolerances        Physical Exam:     Labs:                         9.8    3.2   )-----------( 39       ( 07 Feb 2019 07:42 )             29.0     02-07    137  |  102  |  49<H>  ----------------------------<  168<H>  4.8   |  23  |  1.46<H>    Ca    9.2      07 Feb 2019 08:41          Informed consent obtained. All questions and concerns have been addressed at this time. Interventional Radiology Pre-Procedure Note    This is a 68y Male with multiple comorbidities including Afib, DMII, Vtavh s/p AICD/ PPM (interrogation report in chart from 1/22/19), hypothyroid. CAD, HTN, cirrhosis found to have hepatic lesion concern for HCC who presents to IR for liver embolization. Patient seen in out patient consult with Dr. Hoyt on 12/14/18, note reviewed in chart.  Patient with h/o contrast induced nephropathy, as per nephrology instructions pt held Ramipril, Lasix and Aldactone x24hrs (last dose on 2/5/19) and will give IVF.     Last dose of metformin yesterday. NPO since midnight. Denies adverse events from anesthesia.     PAST MEDICAL & SURGICAL HISTORY:  Ulcer of right ankle: has surgery done Dec 2018  Liver mass: dx: 10/2018   incidental finding. Currently awaitng furthur workup  Gout: medically managed and I.V. drug every 2 weeks  Elevated prolactin level: dx: &#x27; 70&#x27;s  mediclly managed  Vitamin D deficiency  Elevated triglycerides with high cholesterol: on meds  Hypothyroidism  PVD (peripheral vascular disease)  History of hyperprolactinemia  Hepatic cirrhosis, unspecified hepatic cirrhosis type  Anemia  ICD (implantable cardioverter-defibrillator) in place: [employer], Model [id number] , last interrogation 12/2018? will call Dr Rivers office  Sleep apnea: not using CPAP  History of osteomyelitis: 2015, right foot 2nd toe   follow-up by podiatrist every 2 weeks  Presence of stent in coronary artery: 2 stents  Heart failure with reduced left ventricular function: EF 37%  Ischemic cardiomyopathy  Pituitary adenoma: as per patient chronic, no changes , recently restarted follow up with endocrinologist ( note in allscripts )  Coronary artery disease  Thrombocytopenia: Chronic  HTN (hypertension)  DM (diabetes mellitus): type 2, Hg A1C 8.2 % ( 7/10/17)   Dec 2019 9 due to prednisone started on Lantus Jan 2019  AICD lead malfunction: history of resolved stable since being folow-up by Dr Rivers  Encounter for incision and drainage procedure: Dec 2018 right foot/ankle  History of amputation: right foot, 2nd toe, osteo 2015  AICD (automatic cardioverter/defibrillator) present: placement in (2006)  Stented coronary artery: RCA (1999)  Coronary atherosclerosis of artery bypass graft: CABG X 4 (1986)       Vitals: See paper chart    Allergies: No Known Allergies    Physical Exam: Gen: NAD, A&Ox3    Labs:                         9.8    3.2   )-----------( 39       ( 07 Feb 2019 07:42 )             29.0     02-07    137  |  102  |  49<H>  ----------------------------<  168<H>  4.8   |  23  |  1.46<H>    Ca    9.2      07 Feb 2019 08:41    Prothrombin Time and INR, Plasma (01.31.19 @ 14:08)    Prothrombin Time, Plasma: 12.6 sec    INR: 1.12: Recommended ranges for therapeutic INR:    2.0-3.0 for most medical and surgical thromboembolic states    2.0-3.0 for atrial fibrillation    2.0-3.0 for bileaflet mechanical valve in aortic position    2.5-3.5 for mechanical heart valves    Chest 2004;126:[id number]  The presence of direct thrombin inhibitors (argatroban, refludan)  may falsely increase results. ratio    Type + Screen (01.31.19 @ 12:19)    ABO Interpretation: O    Rh Interpretation: Positive    Antibody Screen: Negative    Blood available until 2/21/19    A/P: 67y/o male with multiple comorbidities who presents for hepatic angio/ embolization of hepatic lesion.   -Patient with thrombocytopenia plt today 39, will receive 1U of platelet with repeat platelet count prior to procedure  -Patient to receive IV fluids to prevent contrast induced nephropathy  -Plan is for hepatic angiogram with possible embolization. The full procedure/ risks/ benefits/ alternatives were discussed with the pt and Informed consent obtained. All questions and concerns have been addressed at this time.   -Please contact IR at [phone] with any questions/ concerns regarding above

## 2019-02-07 NOTE — H&P ADULT - PROBLEM SELECTOR PLAN 3
- cr at baseline  - given IVF prior and after procedure to prevent JOHN  - hold ace inhibitor, lasix and spironlactone for today; if cr stable in the am, resume meds  - repeat bmp in the am

## 2019-02-07 NOTE — H&P ADULT - PROBLEM SELECTOR PLAN 2
- fs controlled  - fs achs  - start carbohydrate controlled/ DASH idet  - start sliding scale insulin with lantus at bedtime; hold meformin and januvia

## 2019-02-07 NOTE — H&P ADULT - PROBLEM SELECTOR PLAN 1
- s/p liver tumor embolization  - mckinnon catheter to be removed now  - start tramadol prn for pain control ( no nsaids given ckd, no tylenol given HCC)  - pancytopenia in the setting of HCC; given 1 platelets prior to procedure  - repeat cbc in the am

## 2019-02-07 NOTE — H&P ADULT - NSHPLABSRESULTS_GEN_ALL_CORE
.  LABS:                         x      x     )-----------( 40       ( 07 Feb 2019 10:02 )             x        02-07    137  |  102  |  49<H>  ----------------------------<  168<H>  4.8   |  23  |  1.46<H>    Ca    9.2      07 Feb 2019 08:41                RADIOLOGY, EKG & ADDITIONAL TESTS: Reviewed.

## 2019-02-07 NOTE — PRE-ANESTHESIA EVALUATION ADULT - NS MD HP INPLANTS MED DEV
Automatic Implantable Cardioverter Defibrillator/2 cardiac stents/AICD/ PPM: [employer]: Model [id number]/Pacemaker

## 2019-02-08 ENCOUNTER — TRANSCRIPTION ENCOUNTER (OUTPATIENT)
Age: 69
End: 2019-02-08

## 2019-02-08 VITALS
DIASTOLIC BLOOD PRESSURE: 76 MMHG | HEART RATE: 80 BPM | WEIGHT: 205.03 LBS | OXYGEN SATURATION: 100 % | TEMPERATURE: 98 F | SYSTOLIC BLOOD PRESSURE: 123 MMHG

## 2019-02-08 LAB
ALBUMIN SERPL ELPH-MCNC: 3.7 G/DL — SIGNIFICANT CHANGE UP (ref 3.3–5)
ALP SERPL-CCNC: 176 U/L — HIGH (ref 40–120)
ALT FLD-CCNC: 36 U/L — SIGNIFICANT CHANGE UP (ref 10–45)
ANION GAP SERPL CALC-SCNC: 14 MMOL/L — SIGNIFICANT CHANGE UP (ref 5–17)
AST SERPL-CCNC: 48 U/L — HIGH (ref 10–40)
BASOPHILS # BLD AUTO: 0 K/UL — SIGNIFICANT CHANGE UP (ref 0–0.2)
BASOPHILS NFR BLD AUTO: 0.8 % — SIGNIFICANT CHANGE UP (ref 0–2)
BILIRUB SERPL-MCNC: 0.6 MG/DL — SIGNIFICANT CHANGE UP (ref 0.2–1.2)
BUN SERPL-MCNC: 36 MG/DL — HIGH (ref 7–23)
CALCIUM SERPL-MCNC: 8.9 MG/DL — SIGNIFICANT CHANGE UP (ref 8.4–10.5)
CHLORIDE SERPL-SCNC: 103 MMOL/L — SIGNIFICANT CHANGE UP (ref 96–108)
CO2 SERPL-SCNC: 20 MMOL/L — LOW (ref 22–31)
CREAT SERPL-MCNC: 1.41 MG/DL — HIGH (ref 0.5–1.3)
EOSINOPHIL # BLD AUTO: 0.2 K/UL — SIGNIFICANT CHANGE UP (ref 0–0.5)
EOSINOPHIL NFR BLD AUTO: 4.6 % — SIGNIFICANT CHANGE UP (ref 0–6)
GLUCOSE BLDC GLUCOMTR-MCNC: 179 MG/DL — HIGH (ref 70–99)
GLUCOSE SERPL-MCNC: 167 MG/DL — HIGH (ref 70–99)
HBA1C BLD-MCNC: 9.9 % — HIGH (ref 4–5.6)
HCT VFR BLD CALC: 31.6 % — LOW (ref 39–50)
HGB BLD-MCNC: 10.3 G/DL — LOW (ref 13–17)
LYMPHOCYTES # BLD AUTO: 0.6 K/UL — LOW (ref 1–3.3)
LYMPHOCYTES # BLD AUTO: 16.5 % — SIGNIFICANT CHANGE UP (ref 13–44)
MAGNESIUM SERPL-MCNC: 2 MG/DL — SIGNIFICANT CHANGE UP (ref 1.6–2.6)
MCHC RBC-ENTMCNC: 30.1 PG — SIGNIFICANT CHANGE UP (ref 27–34)
MCHC RBC-ENTMCNC: 32.7 GM/DL — SIGNIFICANT CHANGE UP (ref 32–36)
MCV RBC AUTO: 91.8 FL — SIGNIFICANT CHANGE UP (ref 80–100)
MONOCYTES # BLD AUTO: 0.3 K/UL — SIGNIFICANT CHANGE UP (ref 0–0.9)
MONOCYTES NFR BLD AUTO: 10 % — SIGNIFICANT CHANGE UP (ref 2–14)
NEUTROPHILS # BLD AUTO: 2.3 K/UL — SIGNIFICANT CHANGE UP (ref 1.8–7.4)
NEUTROPHILS NFR BLD AUTO: 68.1 % — SIGNIFICANT CHANGE UP (ref 43–77)
PHOSPHATE SERPL-MCNC: 3.4 MG/DL — SIGNIFICANT CHANGE UP (ref 2.5–4.5)
PLATELET # BLD AUTO: 43 K/UL — LOW (ref 150–400)
POTASSIUM SERPL-MCNC: 4.6 MMOL/L — SIGNIFICANT CHANGE UP (ref 3.5–5.3)
POTASSIUM SERPL-SCNC: 4.6 MMOL/L — SIGNIFICANT CHANGE UP (ref 3.5–5.3)
PROT SERPL-MCNC: 7.2 G/DL — SIGNIFICANT CHANGE UP (ref 6–8.3)
RBC # BLD: 3.44 M/UL — LOW (ref 4.2–5.8)
RBC # FLD: 13.8 % — SIGNIFICANT CHANGE UP (ref 10.3–14.5)
SODIUM SERPL-SCNC: 137 MMOL/L — SIGNIFICANT CHANGE UP (ref 135–145)
WBC # BLD: 3.3 K/UL — LOW (ref 3.8–10.5)
WBC # FLD AUTO: 3.3 K/UL — LOW (ref 3.8–10.5)

## 2019-02-08 PROCEDURE — 83735 ASSAY OF MAGNESIUM: CPT

## 2019-02-08 PROCEDURE — 82962 GLUCOSE BLOOD TEST: CPT

## 2019-02-08 PROCEDURE — 85027 COMPLETE CBC AUTOMATED: CPT

## 2019-02-08 PROCEDURE — 80048 BASIC METABOLIC PNL TOTAL CA: CPT

## 2019-02-08 PROCEDURE — C1760: CPT

## 2019-02-08 PROCEDURE — P9037: CPT

## 2019-02-08 PROCEDURE — 36247 INS CATH ABD/L-EXT ART 3RD: CPT

## 2019-02-08 PROCEDURE — 85049 AUTOMATED PLATELET COUNT: CPT

## 2019-02-08 PROCEDURE — C1894: CPT

## 2019-02-08 PROCEDURE — 76937 US GUIDE VASCULAR ACCESS: CPT

## 2019-02-08 PROCEDURE — C1889: CPT

## 2019-02-08 PROCEDURE — 76380 CAT SCAN FOLLOW-UP STUDY: CPT | Mod: 59

## 2019-02-08 PROCEDURE — 37243 VASC EMBOLIZE/OCCLUDE ORGAN: CPT

## 2019-02-08 PROCEDURE — 83036 HEMOGLOBIN GLYCOSYLATED A1C: CPT

## 2019-02-08 PROCEDURE — 36248 INS CATH ABD/L-EXT ART ADDL: CPT

## 2019-02-08 PROCEDURE — C1887: CPT

## 2019-02-08 PROCEDURE — 80053 COMPREHEN METABOLIC PANEL: CPT

## 2019-02-08 PROCEDURE — 84100 ASSAY OF PHOSPHORUS: CPT

## 2019-02-08 PROCEDURE — 36430 TRANSFUSION BLD/BLD COMPNT: CPT

## 2019-02-08 PROCEDURE — C1769: CPT

## 2019-02-08 PROCEDURE — 99239 HOSP IP/OBS DSCHRG MGMT >30: CPT

## 2019-02-08 RX ORDER — METFORMIN HYDROCHLORIDE 850 MG/1
1 TABLET ORAL
Qty: 0 | Refills: 0 | COMMUNITY

## 2019-02-08 RX ADMIN — Medication 1: at 09:33

## 2019-02-08 RX ADMIN — CARVEDILOL PHOSPHATE 12.5 MILLIGRAM(S): 80 CAPSULE, EXTENDED RELEASE ORAL at 05:18

## 2019-02-08 RX ADMIN — Medication 2.5 MILLIGRAM(S): at 05:18

## 2019-02-08 RX ADMIN — Medication 125 MICROGRAM(S): at 05:18

## 2019-02-08 NOTE — DISCHARGE NOTE ADULT - MEDICATION SUMMARY - MEDICATIONS TO TAKE
I will START or STAY ON the medications listed below when I get home from the hospital:    Chrystexxa  -- 1 dose(s) intravenously every 2 weeks: prophylactic treatment  of GHout  -- Indication: For gout    predniSONE 5 mg oral tablet  -- 2.5 mg  tab(s) by mouth once a day  -- Indication: For gout    Aldactone 25 mg oral tablet  -- 1 tab(s) by mouth 2 times a day  -- Indication: For Cirrhosis    Ecotrin Adult Low Strength 81 mg oral delayed release tablet  -- 1 tab(s) by mouth once a day. * Remain on  -- Indication: For CAD    ramipril 2.5 mg oral capsule  -- 1 cap(s) by mouth once a day (in the morning)  -- Indication: For Hypertension    Januvia 50 mg oral tablet  -- 1 tab(s) by mouth once a day. * Last dose is 12-6-18  -- Indication: For Diabetes    metFORMIN 1000 mg oral tablet  -- 1 tab(s) by mouth 2 times a day., restart 2/9/19  -- Indication: For Diabetes    Lantus 100 units/mL subcutaneous solution  -- 20  subcutaneous once a day (at bedtime)  -- Indication: For Diabetes    Niaspan ER 1000 mg oral tablet, extended release  -- 1 tab(s) by mouth once a day (at bedtime)  -- Indication: For Hyperlipidemia    atorvastatin 40 mg oral tablet  -- 1 tab(s) by mouth once a day (at bedtime)  -- Indication: For Hyperlipidemia    bromocriptine 5 mg oral capsule  -- 1 cap(s) by mouth once a day  -- Indication: For Hyperprolactinemia    carvedilol 12.5 mg oral tablet  -- 1 tab(s) by mouth every 12 hours  -- Indication: For CAD    furosemide 40 mg oral tablet  -- 1 tab(s) by mouth once a day  -- Indication: For Heart failure    potassium chloride 20 mEq oral tablet, extended release  -- 1 tab(s) by mouth once a day (in the morning)  -- Indication: For Prophylactic measure    levothyroxine 125 mcg (0.125 mg) oral tablet  -- 1 tab(s) by mouth once a day  -- Indication: For Hypothyroidism    Vitamin D3 2000 intl units oral tablet  -- 1 tab(s) by mouth once a day  -- Indication: For Prophylactic measure

## 2019-02-08 NOTE — DISCHARGE NOTE ADULT - CARE PLAN
Principal Discharge DX:	HCC (hepatocellular carcinoma)  Goal:	Pt will have decreased enhancement of liver tumor on follow up imaging.  Assessment and plan of treatment:	You had a hepatic angiogram and particle embolization of your liver tumor by Dr. Hoyt in Interventional Radiology on 2/7/19.    Please have a follow up MRI in 4-6 weeks, then schedule follow up with Dr. Hoyt to review imaging.  Monitor right groin site for symptoms of bleeding, hard area underneath the skin, bruising, numbness, intense pain, or inability to move.  If you have any of these symptoms, contact Dr. Hoyt and seek immediate medical attention  You may continue to have mild abdominal pain, nausea, and low grade fever.  These are normal after your procedure, due to post embolization syndrome and they should resolve within 3-5 days.  Please call Interventional Radiology if you have a fever > 102.0 F.  Secondary Diagnosis:	CKD (chronic kidney disease), stage III  Assessment and plan of treatment:	Please follow up with Dr. Clement within 1 week for repeat blood work

## 2019-02-08 NOTE — DISCHARGE NOTE ADULT - CARE PROVIDER_API CALL
Shady Hoyt (MD)  Diagnostic Radiology; [facility]  [address], [address] [place] [address]  Phone: [phone]  Fax: [phone]  Follow Up Time:     Kaylee Clement)  Internal Medicine  [address], [address] [place] [address]  Phone: [phone]  Fax: [phone]  Follow Up Time:

## 2019-02-08 NOTE — PROGRESS NOTE ADULT - SUBJECTIVE AND OBJECTIVE BOX
Interventional Radiology Follow- Up Note      68y Male s/p___________ on _________ in Interventional Radiology with ____________________.     Patient seen and examined @ bedside. Site c/d/i with _______cc output. Cx results ___________.   No complaints offered.    Vitals: T(F): 97.9 (02-08-19 @ 04:05), Max: 97.9 (02-07-19 @ 21:28)  HR: 82 (02-08-19 @ 04:05) (82 - 87)  BP: 116/61 (02-08-19 @ 04:05) (105/63 - 127/75)  RR: 18 (02-08-19 @ 04:05) (18 - 18)  SpO2: 99% (02-08-19 @ 04:05) (98% - 99%)  Wt(kg): --    LABS:                        10.3   3.3   )-----------( 43       ( 08 Feb 2019 06:45 )             31.6     02-08    137  |  103  |  36<H>  ----------------------------<  167<H>  4.6   |  20<L>  |  1.41<H>    Ca    8.9      08 Feb 2019 06:45  Phos  3.4     02-08  Mg     2.0     02-08    TPro  7.2  /  Alb  3.7  /  TBili  0.6  /  DBili  x   /  AST  48<H>  /  ALT  36  /  AlkPhos  176<H>  02-08      I&O's Detail    07 Feb 2019 07:01  -  08 Feb 2019 07:00  --------------------------------------------------------  IN:  Total IN: 0 mL    OUT:    Voided: 1150 mL  Total OUT: 1150 mL    Total NET: -1150 mL            PHYSICAL EXAM:  General: Nontoxic, in NAD  Neuro:  Alert & oriented x 3  CV: +S1+S2 regular rate and rhythm  Lung: clear to ausculation bilaterally, respirations nonlabored, good inspiratory effort  Abdomen: soft, NTND. Normactive BS  Extremities: no pedal edema or calf tenderness noted         Impression: 68y Male admitted with Hepatocellular carcinoma  Hepatocellular carcinoma        Plan:  -continue to monitor ouput    -Flush drain per doctor orders  -trend vitals, labs  -will discuss with IR attending     Please call IR at extension [phone] with any questions, concerns, or issues regarding above. Interventional Radiology Follow- Up Note      68y Male s/p hepatic angiogram and embolization on  2/7/19 in Interventional Radiology with Dr Hoyt.   Patient seen and examined @ bedside.  Denies pain, n;/v.  Tolerating clear liquids, sitting OOB in chair.    No complaints offered.    Vitals: T(F): 97.9 (02-08-19 @ 04:05), Max: 97.9 (02-07-19 @ 21:28)  HR: 82 (02-08-19 @ 04:05) (82 - 87)  BP: 116/61 (02-08-19 @ 04:05) (105/63 - 127/75)  RR: 18 (02-08-19 @ 04:05) (18 - 18)  SpO2: 99% (02-08-19 @ 04:05) (98% - 99%)  Wt(kg): --    LABS:                        10.3   3.3   )-----------( 43       ( 08 Feb 2019 06:45 )             31.6     02-08    137  |  103  |  36<H>  ----------------------------<  167<H>  4.6   |  20<L>  |  1.41<H>    Ca    8.9      08 Feb 2019 06:45  Phos  3.4     02-08  Mg     2.0     02-08    TPro  7.2  /  Alb  3.7  /  TBili  0.6  /  DBili  x   /  AST  48<H>  /  ALT  36  /  AlkPhos  176<H>  02-08      I&O's Detail    07 Feb 2019 07:01  -  08 Feb 2019 07:00  --------------------------------------------------------  IN:  Total IN: 0 mL    OUT:    Voided: 1150 mL  Total OUT: 1150 mL    Total NET: -1150 mL    PHYSICAL EXAM:  General: Nontoxic, in NAD  Neuro:  Alert & oriented x 3  Lung: respirations nonlabored, good inspiratory effort  Abdomen: soft, NTND  Extremities: no pedal edema or calf tenderness noted, right groin dsg with old dried blood, intact, no evidence of hematoma, +2 femoral pulse, +2 DP, warm to touch, +movement.  Pt with hx of peripheral neuropathy, pt denies any change in sensation      Impression: 68y Male admitted with Hepatocellular carcinoma now s/p hepatic angiogram and particle embolization in IR      Plan:  -continue to monitor  -advance diet  -d/c IVF  -OOB ambulate  -d/c planning  -f/u CT in 1 mth, then with Dr. Hoyt (rx given)  -creatinine stable, f/u with Dr. Clement in 1-2 weeks  -f/u Dr. Alaniz  -Pt evaluated with Dr. Hoyt  -further care per primary team  If patient tolerates solid foods, no objection to d/c home, d/w Medicine team     Please call IR at extension [phone] with any questions, concerns, or issues regarding above.

## 2019-02-08 NOTE — DISCHARGE NOTE ADULT - CARE PROVIDERS DIRECT ADDRESSES
[email or web address]

## 2019-02-08 NOTE — DISCHARGE NOTE ADULT - PLAN OF CARE
You had a hepatic angiogram and particle embolization of your liver tumor by Dr. Hoyt in Interventional Radiology on 2/7/19.    Please have a follow up MRI in 4-6 weeks, then schedule follow up with Dr. Hoyt to review imaging.  Monitor right groin site for symptoms of bleeding, hard area underneath the skin, bruising, numbness, intense pain, or inability to move.  If you have any of these symptoms, contact Dr. Hoyt and seek immediate medical attention  You may continue to have mild abdominal pain, nausea, and low grade fever.  These are normal after your procedure, due to post embolization syndrome and they should resolve within 3-5 days.  Please call Interventional Radiology if you have a fever > 102.0 F. Please follow up with Dr. Clement within 1 week for repeat blood work Pt will have decreased enhancement of liver tumor on follow up imaging.

## 2019-02-08 NOTE — DISCHARGE NOTE ADULT - HOSPITAL COURSE
Patient is a 68 year old male with atrial fibrillation, DM2, history of VT s/p AICD, hypothyroidism, CAD, HTN, CKD stage 3 and HCC presents to [facility] for elective IR guided liver tumor embolization on 2/8. Patient tolerated procedure well and was able to tolerate diet.  Deemed stable for discharge w/ f/u w/ nephrology for repeat BMP in 1 week. Patient is a 68 year old male with atrial fibrillation, DM2, history of VT s/p AICD, hypothyroidism, CAD, HTN, CKD stage 3 and HCC presents to [facility] for elective IR guided liver tumor embolization on 2/8. Patient tolerated procedure well and was able to tolerate diet.  Deemed stable for discharge w/ f/u w/ nephrology for repeat BMP in 1 week.   Patient is to follow up with his PCP/Nephrologist/ GI / and IR as recommended.  Patient is aware to prevent fall and injury since platelet is low.   DC time 45 mns

## 2019-02-08 NOTE — DISCHARGE NOTE ADULT - PATIENT PORTAL LINK FT
You can access the [facility], offered by [facility], by registering with the following website: [email or web address]

## 2019-02-08 NOTE — DISCHARGE NOTE ADULT - NS AS ACTIVITY OBS
x 48 hours then resume prior activity/Showering allowed/Do not drive or operate machinery/No Heavy lifting/straining/Do not make important decisions

## 2019-02-19 ENCOUNTER — APPOINTMENT (OUTPATIENT)
Dept: CARDIOLOGY | Facility: CLINIC | Age: 69
End: 2019-02-19

## 2019-02-21 ENCOUNTER — TRANSCRIPTION ENCOUNTER (OUTPATIENT)
Age: 69
End: 2019-02-21

## 2019-02-21 ENCOUNTER — RX RENEWAL (OUTPATIENT)
Age: 69
End: 2019-02-21

## 2019-02-21 ENCOUNTER — APPOINTMENT (OUTPATIENT)
Dept: RHEUMATOLOGY | Facility: CLINIC | Age: 69
End: 2019-02-21
Payer: COMMERCIAL

## 2019-02-21 PROCEDURE — 96413 CHEMO IV INFUSION 1 HR: CPT

## 2019-02-22 ENCOUNTER — TRANSCRIPTION ENCOUNTER (OUTPATIENT)
Age: 69
End: 2019-02-22

## 2019-02-26 NOTE — ASU DISCHARGE PLAN (ADULT/PEDIATRIC). - DRIVING
Plan: Treated in office today with liquid nitrogen Detail Level: Detailed No Plan: Treated in office today with electrodessication

## 2019-03-01 ENCOUNTER — APPOINTMENT (OUTPATIENT)
Dept: NEPHROLOGY | Facility: CLINIC | Age: 69
End: 2019-03-01
Payer: COMMERCIAL

## 2019-03-01 VITALS
DIASTOLIC BLOOD PRESSURE: 61 MMHG | HEIGHT: 73 IN | OXYGEN SATURATION: 100 % | SYSTOLIC BLOOD PRESSURE: 101 MMHG | BODY MASS INDEX: 27.3 KG/M2 | HEART RATE: 64 BPM | WEIGHT: 206 LBS

## 2019-03-01 PROCEDURE — 99214 OFFICE O/P EST MOD 30 MIN: CPT

## 2019-03-01 NOTE — CONSULT LETTER
[Dear  ___] : Dear  [unfilled], [Courtesy Letter:] : I had the pleasure of seeing your patient, [unfilled], in my office today. [( Thank you for referring [unfilled] for consultation for _____ )] : Thank you for referring [unfilled] for consultation for [unfilled] [Please see my note below.] : Please see my note below. [Consult Closing:] : Thank you very much for allowing me to participate in the care of this patient.  If you have any questions, please do not hesitate to contact me. [Sincerely,] : Sincerely, [FreeTextEntry3] : Kaylee Clement MD resolved. s/p flagyl x 10 d

## 2019-03-01 NOTE — PHYSICAL EXAM
[General Appearance - Alert] : alert [General Appearance - In No Acute Distress] : in no acute distress [General Appearance - Well Nourished] : well nourished [General Appearance - Well Developed] : well developed [General Appearance - Well-Appearing] : healthy appearing [Sclera] : the sclera and conjunctiva were normal [Outer Ear] : the ears and nose were normal in appearance [Hearing Threshold Finger Rub Not [place]] : hearing was normal [Neck Appearance] : the appearance of the neck was normal [Jugular Venous Distention Increased] : there was no jugular-venous distention [] : no respiratory distress [Exaggerated Use Of Accessory Muscles For Inspiration] : no accessory muscle use [Auscultation Breath Sounds / Voice Sounds] : lungs were clear to auscultation bilaterally [Apical Impulse] : the apical impulse was normal [Heart Sounds] : normal S1 and S2 [Heart Sounds Pericardial Friction Rub] : no pericardial rub [Abdomen Soft] : soft [No CVA Tenderness] : no ~M costovertebral angle tenderness [Oriented To Time, Place, And Person] : oriented to person, place, and time [Impaired Insight] : insight and judgment were intact [Affect] : the affect was normal [Mood] : the mood was normal [FreeTextEntry1] : bilateral LE hyperpigmentation noted

## 2019-03-01 NOTE — HISTORY OF PRESENT ILLNESS
[FreeTextEntry1] : 69 year old  male, retired (used to work as  for a firm in [place]), with DM x 15 years, with diabetic neuropathy and ?retinopathy, CHF, HLD, CAD s/p CABG (1986) and stent placement (1999), fatty liver with recent diagnosis of HCC, gouty arthritis, AFib, s/p pacemaker and defibrillator, sleep apnea, hypothyroidism, ?prolactinoma, and multiple episodes of JULIANNE in the past that had resolved, presents for follow up of elevated Scr. \par \par Pt. was last seen on 1/18/19. At that time, he was noted to have decrease in Scr and was thought to have hemodynamically mediated JULIANNE in the setting of lasix, aldactone and ramipril use. He was advised to hold all of these medicines a day prior to any planned contrast study in the future. \par \par Currently, pt. overall feels well and denies any new complaints. His chronic LE swelling is stable and denies any cp, sob, nausea, vomiting or diarrhea. He also denies use of NSAIDs or nephrotoxins. He states that he is going to have another CT abdomen done on 3/12/19 which will involve use of IV contrast.

## 2019-03-01 NOTE — REVIEW OF SYSTEMS
[Negative] : Heme/Lymph [FreeTextEntry9] : right heel and toe pain [de-identified] : multiple bruises over both arms

## 2019-03-01 NOTE — ASSESSMENT
[FreeTextEntry1] : JULIANNE : Pt. noted to have prior episodes of JULIANNE that had completely resolved, and noted to have baseline normal renal function. \par -Last Scr decreased to 1.48 labs done on 1/22/19. Pt. likely with hemodynamically mediated JULIANNE in the setting of lasix, ramipril, and aldactone use, with recent use of IV contrast and hypotension. \par -UA has been bland and spot urine TP/CR was WNL, and abdominal US done on 9/24/18 showed normal sized kidneys without any hydronephrosis or evidence of CKD. \par -Check renal panel, UA, spot urine TP/CR.\par - serum free light chains, SIFE, HBSAg and HCV antibody were WNL.\par -Pt. has multiple risk factors to develop contrast induced nephropathy after use of IV contrast, including elevated Scr, anemia, CHF and diabetes. Explained the risk for worsening of renal function with use of IV contrast to the patient. \par -If another contrast study is absolutely required, pt. advised to hold ramipril, lasix and aldactone a day prior and on the day of the contrast study. Can consider hydration with IV NS @1ml/kg/hr for 6 hours pre, during and post contrast study. \par -Avoid nephrotoxins, NSAIDs and IV contrast (if possible). \par \par Edema: in the setting of JULIANNE and HCC/cirrhosis. Stable at present. Monitor weight. COntinue aldactone and lasix.\par \par Follow up in 2 months.

## 2019-03-02 ENCOUNTER — TRANSCRIPTION ENCOUNTER (OUTPATIENT)
Age: 69
End: 2019-03-02

## 2019-03-04 ENCOUNTER — RX RENEWAL (OUTPATIENT)
Age: 69
End: 2019-03-04

## 2019-03-04 LAB
ALBUMIN SERPL ELPH-MCNC: 4 G/DL
ANION GAP SERPL CALC-SCNC: 13 MMOL/L
BUN SERPL-MCNC: 45 MG/DL
CALCIUM SERPL-MCNC: 9.4 MG/DL
CHLORIDE SERPL-SCNC: 98 MMOL/L
CO2 SERPL-SCNC: 27 MMOL/L
CREAT SERPL-MCNC: 1.52 MG/DL
GLUCOSE SERPL-MCNC: 125 MG/DL
PHOSPHATE SERPL-MCNC: 3.4 MG/DL
POTASSIUM SERPL-SCNC: 5 MMOL/L
SODIUM SERPL-SCNC: 138 MMOL/L

## 2019-03-07 ENCOUNTER — APPOINTMENT (OUTPATIENT)
Dept: RHEUMATOLOGY | Facility: CLINIC | Age: 69
End: 2019-03-07
Payer: COMMERCIAL

## 2019-03-07 PROCEDURE — 96413 CHEMO IV INFUSION 1 HR: CPT

## 2019-03-07 PROCEDURE — 96415 CHEMO IV INFUSION ADDL HR: CPT

## 2019-03-08 ENCOUNTER — RX RENEWAL (OUTPATIENT)
Age: 69
End: 2019-03-08

## 2019-03-11 ENCOUNTER — FORM ENCOUNTER (OUTPATIENT)
Age: 69
End: 2019-03-11

## 2019-03-12 ENCOUNTER — OUTPATIENT (OUTPATIENT)
Dept: OUTPATIENT SERVICES | Facility: HOSPITAL | Age: 69
LOS: 1 days | End: 2019-03-12
Payer: COMMERCIAL

## 2019-03-12 ENCOUNTER — APPOINTMENT (OUTPATIENT)
Dept: CT IMAGING | Facility: CLINIC | Age: 69
End: 2019-03-12
Payer: COMMERCIAL

## 2019-03-12 DIAGNOSIS — R16.0 HEPATOMEGALY, NOT ELSEWHERE CLASSIFIED: ICD-10-CM

## 2019-03-12 DIAGNOSIS — Z00.8 ENCOUNTER FOR OTHER GENERAL EXAMINATION: ICD-10-CM

## 2019-03-12 DIAGNOSIS — Z01.89 ENCOUNTER FOR OTHER SPECIFIED SPECIAL EXAMINATIONS: Chronic | ICD-10-CM

## 2019-03-12 DIAGNOSIS — Z89.9 ACQUIRED ABSENCE OF LIMB, UNSPECIFIED: Chronic | ICD-10-CM

## 2019-03-12 PROCEDURE — 74170 CT ABD WO CNTRST FLWD CNTRST: CPT | Mod: 26

## 2019-03-12 PROCEDURE — 74170 CT ABD WO CNTRST FLWD CNTRST: CPT

## 2019-03-14 ENCOUNTER — FORM ENCOUNTER (OUTPATIENT)
Age: 69
End: 2019-03-14

## 2019-03-15 ENCOUNTER — OUTPATIENT (OUTPATIENT)
Dept: OUTPATIENT SERVICES | Facility: HOSPITAL | Age: 69
LOS: 1 days | End: 2019-03-15
Payer: COMMERCIAL

## 2019-03-15 ENCOUNTER — APPOINTMENT (OUTPATIENT)
Dept: INTERVENTIONAL RADIOLOGY/VASCULAR | Facility: CLINIC | Age: 69
End: 2019-03-15

## 2019-03-15 VITALS
TEMPERATURE: 97.7 F | SYSTOLIC BLOOD PRESSURE: 114 MMHG | RESPIRATION RATE: 16 BRPM | DIASTOLIC BLOOD PRESSURE: 70 MMHG | OXYGEN SATURATION: 100 % | HEART RATE: 80 BPM

## 2019-03-15 DIAGNOSIS — K76.9 LIVER DISEASE, UNSPECIFIED: ICD-10-CM

## 2019-03-15 DIAGNOSIS — Z01.89 ENCOUNTER FOR OTHER SPECIFIED SPECIAL EXAMINATIONS: Chronic | ICD-10-CM

## 2019-03-15 DIAGNOSIS — Z89.9 ACQUIRED ABSENCE OF LIMB, UNSPECIFIED: Chronic | ICD-10-CM

## 2019-03-15 DIAGNOSIS — R16.0 HEPATOMEGALY, NOT ELSEWHERE CLASSIFIED: ICD-10-CM

## 2019-03-15 PROCEDURE — 76978 US TRGT DYN MBUBB 1ST LES: CPT

## 2019-03-15 PROCEDURE — 76978 US TRGT DYN MBUBB 1ST LES: CPT | Mod: 26

## 2019-03-15 RX ORDER — AVATROMBOPAG MALEATE 20 MG/1
20 TABLET, FILM COATED ORAL
Qty: 10 | Refills: 0 | Status: DISCONTINUED | COMMUNITY
Start: 2018-12-26 | End: 2019-03-15

## 2019-03-18 NOTE — DATA REVIEWED
[FreeTextEntry1] : \par \par EXAM: CT ABDOMEN ONLY [facility] IC \par \par \par PROCEDURE DATE: 03/12/2019 \par \par \par \par INTERPRETATION: CLINICAL INFORMATION: Hepatocellular cancer status post \par embolization of a right hepatic lobe lesion 2/7/2019. \par \par COMPARISON: CT 11/9/2018 \par \par PROCEDURE: \par CT of the Abdomen was performed with and without intravenous contrast. \par Precontrast, Arterial and Portal Venous phases were acquired. \par Intravenous contrast: 90 ml Omnipaque 350. 10 ml discarded. \par Oral contrast: None. \par Sagittal and coronal reformats were performed. \par \par FINDINGS: \par \par LOWER CHEST: Cardiomegaly. Cardiac device leads partially visualized. \par \par LIVER: Cirrhosis. Focal liver lesions as follows: \par \par Lesion #: 1 \par Location: Segment 7 \par Size: 3.5 cm, previously 4.3 cm (5:27). \par AP Hyperenhancement: NO. Complete lack of enhancement consistent with \par treatment response. \par PV/Equilibrium Washout: NO \par Capsule Appearance: NO \par Threshold Growth (>50% size increase in <= 6 months): NO \par Ancillary features: None \par LI-RADS v 2018 Category: LR-TR Nonviable \par \par Mild perfusion abnormality is noted peripheral to the lesion. \par \par Lesion #: 2 \par Location: Junction of segments 4A and 8 \par Size: 1.8 cm, previously 1.7 cm (5:20). \par AP Hyperenhancement: YES \par PV/Equilibrium Washout: YES \par Capsule Appearance: NO \par Threshold Growth (>50% size increase in <= 6 months): NO \par Ancillary features: None \par LI-RADS v 2018 Category: LI-RADS 5 \par \par BILE DUCTS: Normal caliber. \par GALLBLADDER: Cholelithiasis. \par SPLEEN: Splenomegaly. \par PANCREAS: Within normal limits. \par ADRENALS: Within normal limits. \par KIDNEYS/URETERS: Within normal limits. \par \par VISUALIZED PORTIONS: \par \par BOWEL: Within normal limits. \par PERITONEUM: No ascites. \par VESSELS: Atherosclerotic changes. Hepatic and portal veins are patent. Upper \par abdominal varices including a patent paraumbilical vein. \par RETROPERITONEUM: No lymphadenopathy. \par ABDOMINAL WALL: Within normal limits. \par BONES: Degenerative changes. \par \par IMPRESSION: \par \par Cirrhosis. \par \par Complete response post embolization of a segment 7 lesion (LR-TR Nonviable). \par \par A 1.8 cm LI-RADS 5 lesion at the junction of segments 4A and 8, not \par significantly changed. \par \par \par \par \par \par \par \par EXAM: CT ABDOMEN ONLY [facility] IC \par \par \par PROCEDURE DATE: 03/12/2019 \par \par \par \par INTERPRETATION: CLINICAL INFORMATION: Hepatocellular cancer status post \par embolization of a right hepatic lobe lesion 2/7/2019. \par \par COMPARISON: CT 11/9/2018 \par \par PROCEDURE: \par CT of the Abdomen was performed with and without intravenous contrast. \par Precontrast, Arterial and Portal Venous phases were acquired. \par Intravenous contrast: 90 ml Omnipaque 350. 10 ml discarded. \par Oral contrast: None. \par Sagittal and coronal reformats were performed. \par \par FINDINGS: \par \par LOWER CHEST: Cardiomegaly. Cardiac device leads partially visualized. \par \par LIVER: Cirrhosis. Focal liver lesions as follows: \par \par Lesion #: 1 \par Location: Segment 7 \par Size: 3.5 cm, previously 4.3 cm (5:27). \par AP Hyperenhancement: NO. Complete lack of enhancement consistent with \par treatment response. \par PV/Equilibrium Washout: NO \par Capsule Appearance: NO \par Threshold Growth (>50% size increase in <= 6 months): NO \par Ancillary features: None \par LI-RADS v 2018 Category: LR-TR Nonviable \par \par Mild perfusion abnormality is noted peripheral to the lesion. \par \par Lesion #: 2 \par Location: Junction of segments 4A and 8 \par Size: 1.8 cm, previously 1.7 cm (5:20). \par AP Hyperenhancement: YES \par PV/Equilibrium Washout: YES \par Capsule Appearance: NO \par Threshold Growth (>50% size increase in <= 6 months): NO \par Ancillary features: None \par LI-RADS v 2018 Category: LI-RADS 5 \par \par BILE DUCTS: Normal caliber. \par GALLBLADDER: Cholelithiasis. \par SPLEEN: Splenomegaly. \par PANCREAS: Within normal limits. \par ADRENALS: Within normal limits. \par KIDNEYS/URETERS: Within normal limits. \par \par VISUALIZED PORTIONS: \par \par BOWEL: Within normal limits. \par PERITONEUM: No ascites. \par VESSELS: Atherosclerotic changes. Hepatic and portal veins are patent. Upper \par abdominal varices including a patent paraumbilical vein. \par RETROPERITONEUM: No lymphadenopathy. \par ABDOMINAL WALL: Within normal limits. \par BONES: Degenerative changes. \par \par IMPRESSION: \par \par Cirrhosis. \par \par Complete response post embolization of a segment 7 lesion (LR-TR Nonviable). \par \par A 1.8 cm LI-RADS 5 lesion at the junction of segments 4A and 8, not \par significantly changed.

## 2019-03-18 NOTE — HISTORY OF PRESENT ILLNESS
[FreeTextEntry1] : Mr. Harvey is a 69 y/o male with pmhx of ischemic cardiomyopathy, CHF, V-tach s/p AICD/PPM, HLD, HTN, BEHZAD, gouty arthritis, T2DM, FERNANDES, and cirrhosis who presents today for follow up for liver directed therapy. Pt was originally diagnosed with cirrhosis of the liver in 2017 after workup for elevated liver enzymes. He has been followed with serial imaging by Dr. Chapo Alaniz. He was referred to IR by Dr. Alaniz after imaging revealed liver lesion. He is now s/p hepatic angiogram and embolization of 4 cm right hepatic tumor on 2/7/19.  He recently had Moh's procedure on his left face, but other wise reports to be feeling well.  Denies abdominal pain, change in skin color, hemoptysis, melena, or hematochezia. \par \par He reports having issues with fluid retention and CHF exacerbations over the last year. He has worked with Dr. Rivers to adjust his diuretic regimen and had a significant amount of fluid weight loss in March. He has currently been stable over the last few months, with no CHF exacerbations. \par \par He has been following with Dr. Tristan and was hospitalized in August and September for recurrent cellulitis and gouty arthritis. On 12/7/18, he underwent another I+D of the right ankle for a non-healing ankle wound.

## 2019-03-18 NOTE — CONSULT LETTER
[Dear  ___] : Dear  [unfilled], [Courtesy Letter:] : I had the pleasure of seeing your patient, [unfilled], in my office today. [Please see my note below.] : Please see my note below. [Sincerely,] : Sincerely, [FreeTextEntry3] : Shady Hoyt MD

## 2019-03-18 NOTE — PHYSICAL EXAM
[Alert] : alert [No Acute Distress] : no acute distress [Well Nourished] : well nourished [Well Developed] : well developed [Healthy Appearance] : healthy appearance [Normal Voice/Communication] : normal voice communication [Normal Sclera/Conjunctiva] : normal sclera/conjunctiva [No Respiratory Distress] : no respiratory distress [Normal Rate and Effort] : normal respiratory rhythm and effort [No Accessory Muscle Use] : no accessory muscle use [Normal Gait] : normal gait [Oriented x3] : oriented to person, place, and time [Normal Insight/Judgement] : insight and judgment were intact [Normal Affect] : the affect was normal [Normal Mood] : the mood was normal [Recent Memory Normal] : recent memory was not impaired [Remote Memory Normal] : remote memory was not impaired [Restricted in physically strenuous activity but ambulatory and able to carry out work of a light or sedentary nature] : Restricted in physically strenuous activity but ambulatory and able to carry out work of a light or sedentary nature, e.g., light house work, office work [de-identified] : dsg on right face/neck, c/d/i

## 2019-03-19 ENCOUNTER — APPOINTMENT (OUTPATIENT)
Dept: ENDOCRINOLOGY | Facility: CLINIC | Age: 69
End: 2019-03-19
Payer: COMMERCIAL

## 2019-03-19 ENCOUNTER — APPOINTMENT (OUTPATIENT)
Dept: CARDIOLOGY | Facility: CLINIC | Age: 69
End: 2019-03-19
Payer: COMMERCIAL

## 2019-03-19 VITALS
WEIGHT: 210 LBS | HEART RATE: 82 BPM | SYSTOLIC BLOOD PRESSURE: 110 MMHG | OXYGEN SATURATION: 98 % | DIASTOLIC BLOOD PRESSURE: 70 MMHG | HEIGHT: 73 IN | BODY MASS INDEX: 27.83 KG/M2

## 2019-03-19 LAB
GLUCOSE BLDC GLUCOMTR-MCNC: 93
HBA1C MFR BLD HPLC: 8.3

## 2019-03-19 PROCEDURE — 82962 GLUCOSE BLOOD TEST: CPT

## 2019-03-19 PROCEDURE — 93290 INTERROG DEV EVAL ICPMS IP: CPT | Mod: 26

## 2019-03-19 PROCEDURE — 99213 OFFICE O/P EST LOW 20 MIN: CPT

## 2019-03-19 PROCEDURE — 99214 OFFICE O/P EST MOD 30 MIN: CPT | Mod: 25

## 2019-03-19 PROCEDURE — 83036 HEMOGLOBIN GLYCOSYLATED A1C: CPT | Mod: QW

## 2019-03-19 PROCEDURE — 93283 PRGRMG EVAL IMPLANTABLE DFB: CPT

## 2019-03-19 NOTE — DISCUSSION/SUMMARY
[AICD Function Normal] : normal AICD function [Pacemaker Function Normal] : normal pacemaker function [Patient] : the patient [Pulse Generator Replacement] : replace pulse generator [FreeTextEntry1] : Scheduling generator replacement for NÉSTOR just reached yesterday

## 2019-03-19 NOTE — PHYSICAL EXAM
[Alert] : alert [No Proptosis] : no proptosis [No Acute Distress] : no acute distress [No Accessory Muscle Use] : no accessory muscle use [Normal Rate and Effort] : normal respiratory rhythm and effort [No Respiratory Distress] : no respiratory distress [Normal Rate] : heart rate was normal  [Clear to Auscultation] : lungs were clear to auscultation bilaterally [Normal Bowel Sounds] : normal bowel sounds [Not Tender] : non-tender [Soft] : abdomen soft [No Joint Swelling] : no joint swelling seen [Normal Affect] : the affect was normal [Normal Insight/Judgement] : insight and judgment were intact [de-identified] : left neck scar, had squamous cell removed [Normal Mood] : the mood was normal [de-identified] : deferred foot exam, follows podiatry every two weeks, dressing in place

## 2019-03-19 NOTE — PROCEDURE
[No] : not [ICD] : Implantable cardioverter-defibrillator [NSR] : normal sinus rhythm [DDD] : DDD [Atrial] : Atrial [Charge Time: ___ sec] : charge time was [unfilled] seconds [Ventricular] : Ventricular [Lead Imp:  ___ohms] : lead impedance was [unfilled] ohms [___V @] : [unfilled] V [___ ms] : [unfilled] ms [Counters Reset] : the counters were reset [Asense-Vpace ___ %] : Asense-Vpace [unfilled]% [Apace-Vpace ___ %] : Apace-Vpace [unfilled]% [Voltage: ___ volts] : Voltage was [unfilled] volts [Sensing Amplitude ___mv] : sensing amplitude was [unfilled] mv [Asense-Vsense ___ %] : Asense-Vsense [unfilled]% [Apace-Vsense ___ %] : Apace-Vsense [unfilled]% [de-identified] : Medtronic Protecta XT  [de-identified] : XT  [id number] [de-identified] : [id number] [de-identified] : 12/24/2012 [de-identified] : 60 [de-identified] : Reached NÉSTOR 3/18/2019 [de-identified] : Normal lead impedances with Fidellis lead capped, a new defibrillator coil implanted after presenting with defibrillator storm due to Fidellis lead fracture. No recurrence of atrial tachycardia since spontaneous reversion to sinus. Single episode of ventricular tachycardia, successful ATP 2/1/2019, no NSVT. No mode switch. Optivol recording above threshold 1/18/2019 until 3/2/2019 and now normal range.

## 2019-03-19 NOTE — HISTORY OF PRESENT ILLNESS
[None] : The patient complains of no symptoms [de-identified] : Has had no symptoms and no pacemaker shocks or issues.

## 2019-03-19 NOTE — ASSESSMENT
[FreeTextEntry1] : Patient is a 70 yo man with type 2 diabetes and hypothyroidism\par \par 1. T2DM\par -patient's A1c has improved significant compared to last visit. No hypoglycemia\par -continue lantus 24 HS, metformin 1000 mg BID and januvia 100 mg.  GFR is stable\par -anticipate improvement in A1c as prednisone is tapered and ultimately discontinued\par -UTD with podiatry and eye exam\par -no hypoglycemia\par \par 2. Hypothyroid\par -clinically and chemically euthyroid\par -continue current dose of levothyroxine\par -repeat TFTs at the next visit\par \par Follow up in 4 months [Diabetes Foot Care] : diabetes foot care [Hypoglycemia Management] : hypoglycemia management [Long Term Vascular Complications] : long term vascular complications of diabetes [Importance of Diet and Exercise] : importance of diet and exercise to improve glycemic control, achieve weight loss and improve cardiovascular health [Insulin Self-Administration] : insulin self-administration [Self Monitoring of Blood Glucose] : self monitoring of blood glucose [Injection Technique, Storage, Sharps Disposal] : injection technique, storage, and sharps disposal [Retinopathy Screening] : Patient was referred to ophthalmology for retinopathy screening

## 2019-03-19 NOTE — HISTORY OF PRESENT ILLNESS
[FreeTextEntry1] : 69 year old man with T2DM, hypothyroid, cirrhosis/HCV, ventricular tachycardia, s/p implantation of cardiac device here for follow up visit.\par \par 1. Type DM - Dx in 2008 on routine exam. Was on metformin 1000 mg twice daily, Januvia 50 mg daily, Amaryl 4 mg once a day at bedtime. He is adherent to medications. At the last visit, the patient had worse A1c in the setting of being on steroids so Lantus 24 unit was started. Medicines at present include, Lantus 24, januvia 100 mg daily, metformin 100  mg BID was continued. GFR 47. Patient had IR embolization. On prednisone 1.25 for gout\par Checks sugars twice a day, lowest value was 98 but otherwise 100-190s\par Diet: liberal with holidays recently\par Breakfast - fruit, cereal sometimes sweetened cereals), milk\par Lunch- soup, sandwich, diet soda\par Dinner - meat, potato, rice \par Macrovascular cx - No known MI, ? CAD, Hx Vtach and cardiac device, No known TIA,CVA. PAD\par Microvascular cx - +DM nephropathy, + DM neuropathy, + recent foot ulcers\par Podiatry visit- follows regularly with Dr. Tristan goes every two weeks\par Dilated eye exam every six months\par \par 2. Hyperprolactinemia\par 07/2017 - Prolactin level - 1.1 \par Takes bromocriptine x 30 years, attempted trial off but patient prefers to be on it. \par \par 3. Hypothyroidism\par Synthroid 125 mcg daily on empty stomach, adherent to it, no side effects\par Pt has no thyroid Sx like fatigue, palpitations, cold or heat intolerance\par

## 2019-03-19 NOTE — PHYSICAL EXAM
[Normal Appearance] : normal appearance [General Appearance - Well Developed] : well developed [Well Groomed] : well groomed [General Appearance - Well Nourished] : well nourished [No Deformities] : no deformities [General Appearance - In No Acute Distress] : no acute distress [Heart Rate And Rhythm] : heart rate and rhythm were normal [Heart Sounds] : normal S1 and S2 [Murmurs] : no murmurs present [Respiration, Rhythm And Depth] : normal respiratory rhythm and effort [Exaggerated Use Of Accessory Muscles For Inspiration] : no accessory muscle use [Clean] : clean [Auscultation Breath Sounds / Voice Sounds] : lungs were clear to auscultation bilaterally [Dry] : dry [Abdomen Soft] : soft [Well-Healed] : well-healed [Abdomen Tenderness] : non-tender [Cyanosis, Localized] : no localized cyanosis [Nail Clubbing] : no clubbing of the fingernails [Abdomen Mass (___ Cm)] : no abdominal mass palpated [] : no ischemic changes [Petechial Hemorrhages (___cm)] : no petechial hemorrhages [FreeTextEntry1] : 1+ pre-tibial edema

## 2019-03-19 NOTE — REVIEW OF SYSTEMS
[Negative] : Constitutional [All other systems negative] : All other systems negative [Fatigue] : no fatigue [Decreased Appetite] : appetite not decreased [Visual Field Defect] : no visual field defect [Blurry Vision] : no blurred vision [Dry Eyes] : no dryness of the eyes [Eyes Itch] : no itching of the eyes [Dysphagia] : no dysphagia [Dysphonia] : no dysphonia [Neck Pain] : no neck pain [Nasal Congestion] : no nasal congestion [Chest Pain] : no chest pain [Palpitations] : no palpitations [Heart Rate Is Slow] : the heart rate was not slow [Heart Rate Is Fast] : the heart rate was not fast [Shortness Of Breath] : no shortness of breath [Cough] : no cough [Wheezing] : no wheezing was heard [Nausea] : no nausea [SOB on Exertion] : no shortness of breath during exertion [Vomiting] : no vomiting was observed [Constipation] : no constipation [Diarrhea] : no diarrhea

## 2019-03-20 ENCOUNTER — APPOINTMENT (OUTPATIENT)
Dept: RHEUMATOLOGY | Facility: CLINIC | Age: 69
End: 2019-03-20
Payer: COMMERCIAL

## 2019-03-20 ENCOUNTER — OTHER (OUTPATIENT)
Age: 69
End: 2019-03-20

## 2019-03-20 PROCEDURE — 96375 TX/PRO/DX INJ NEW DRUG ADDON: CPT

## 2019-03-20 PROCEDURE — 96415 CHEMO IV INFUSION ADDL HR: CPT

## 2019-03-20 PROCEDURE — 96413 CHEMO IV INFUSION 1 HR: CPT

## 2019-03-26 ENCOUNTER — RX RENEWAL (OUTPATIENT)
Age: 69
End: 2019-03-26

## 2019-04-01 ENCOUNTER — APPOINTMENT (OUTPATIENT)
Dept: RHEUMATOLOGY | Facility: CLINIC | Age: 69
End: 2019-04-01

## 2019-04-02 ENCOUNTER — RX RENEWAL (OUTPATIENT)
Age: 69
End: 2019-04-02

## 2019-04-03 ENCOUNTER — OUTPATIENT (OUTPATIENT)
Dept: OUTPATIENT SERVICES | Facility: HOSPITAL | Age: 69
LOS: 1 days | End: 2019-04-03
Payer: COMMERCIAL

## 2019-04-03 VITALS
HEIGHT: 73 IN | WEIGHT: 214.95 LBS | RESPIRATION RATE: 18 BRPM | SYSTOLIC BLOOD PRESSURE: 105 MMHG | HEART RATE: 73 BPM | TEMPERATURE: 98 F | DIASTOLIC BLOOD PRESSURE: 60 MMHG | OXYGEN SATURATION: 98 %

## 2019-04-03 DIAGNOSIS — Z89.9 ACQUIRED ABSENCE OF LIMB, UNSPECIFIED: Chronic | ICD-10-CM

## 2019-04-03 DIAGNOSIS — Z01.89 ENCOUNTER FOR OTHER SPECIFIED SPECIAL EXAMINATIONS: Chronic | ICD-10-CM

## 2019-04-03 DIAGNOSIS — Z45.018 ENCOUNTER FOR ADJUSTMENT AND MANAGEMENT OF OTHER PART OF CARDIAC PACEMAKER: ICD-10-CM

## 2019-04-03 LAB
ANION GAP SERPL CALC-SCNC: 12 MMOL/L — SIGNIFICANT CHANGE UP (ref 5–17)
APTT BLD: 30.1 SEC — SIGNIFICANT CHANGE UP (ref 27.5–36.3)
BUN SERPL-MCNC: 38 MG/DL — HIGH (ref 7–23)
CALCIUM SERPL-MCNC: 9 MG/DL — SIGNIFICANT CHANGE UP (ref 8.4–10.5)
CHLORIDE SERPL-SCNC: 102 MMOL/L — SIGNIFICANT CHANGE UP (ref 96–108)
CO2 SERPL-SCNC: 24 MMOL/L — SIGNIFICANT CHANGE UP (ref 22–31)
CREAT SERPL-MCNC: 1.47 MG/DL — HIGH (ref 0.5–1.3)
GLUCOSE BLDC GLUCOMTR-MCNC: 129 MG/DL — HIGH (ref 70–99)
GLUCOSE SERPL-MCNC: 144 MG/DL — HIGH (ref 70–99)
HCT VFR BLD CALC: 29.4 % — LOW (ref 39–50)
HGB BLD-MCNC: 9.4 G/DL — LOW (ref 13–17)
INR BLD: 1.28 RATIO — HIGH (ref 0.88–1.16)
MCHC RBC-ENTMCNC: 29.2 PG — SIGNIFICANT CHANGE UP (ref 27–34)
MCHC RBC-ENTMCNC: 32.1 GM/DL — SIGNIFICANT CHANGE UP (ref 32–36)
MCV RBC AUTO: 90.8 FL — SIGNIFICANT CHANGE UP (ref 80–100)
PLATELET # BLD AUTO: 38 K/UL — LOW (ref 150–400)
POTASSIUM SERPL-MCNC: 4.9 MMOL/L — SIGNIFICANT CHANGE UP (ref 3.5–5.3)
POTASSIUM SERPL-SCNC: 4.9 MMOL/L — SIGNIFICANT CHANGE UP (ref 3.5–5.3)
PROTHROM AB SERPL-ACNC: 14.7 SEC — HIGH (ref 10–12.9)
RBC # BLD: 3.23 M/UL — LOW (ref 4.2–5.8)
RBC # FLD: 14.1 % — SIGNIFICANT CHANGE UP (ref 10.3–14.5)
SODIUM SERPL-SCNC: 138 MMOL/L — SIGNIFICANT CHANGE UP (ref 135–145)
WBC # BLD: 3.1 K/UL — LOW (ref 3.8–10.5)
WBC # FLD AUTO: 3.1 K/UL — LOW (ref 3.8–10.5)

## 2019-04-03 PROCEDURE — 85610 PROTHROMBIN TIME: CPT

## 2019-04-03 PROCEDURE — 93010 ELECTROCARDIOGRAM REPORT: CPT

## 2019-04-03 PROCEDURE — 33264 RMVL & RPLCMT DFB GEN MLT LD: CPT

## 2019-04-03 PROCEDURE — 93005 ELECTROCARDIOGRAM TRACING: CPT

## 2019-04-03 PROCEDURE — 93010 ELECTROCARDIOGRAM REPORT: CPT | Mod: 77

## 2019-04-03 PROCEDURE — 85730 THROMBOPLASTIN TIME PARTIAL: CPT

## 2019-04-03 PROCEDURE — 99203 OFFICE O/P NEW LOW 30 MIN: CPT

## 2019-04-03 PROCEDURE — 85027 COMPLETE CBC AUTOMATED: CPT

## 2019-04-03 PROCEDURE — 82962 GLUCOSE BLOOD TEST: CPT

## 2019-04-03 PROCEDURE — 80048 BASIC METABOLIC PNL TOTAL CA: CPT

## 2019-04-03 PROCEDURE — C1721: CPT

## 2019-04-03 RX ORDER — INSULIN GLARGINE 100 [IU]/ML
20 INJECTION, SOLUTION SUBCUTANEOUS
Qty: 0 | Refills: 0 | COMMUNITY

## 2019-04-03 RX ORDER — CEPHALEXIN 500 MG
1 CAPSULE ORAL
Qty: 3 | Refills: 1 | OUTPATIENT
Start: 2019-04-03 | End: 2019-04-04

## 2019-04-03 RX ORDER — SITAGLIPTIN 50 MG/1
1 TABLET, FILM COATED ORAL
Qty: 0 | Refills: 0 | COMMUNITY

## 2019-04-03 NOTE — H&P CARDIOLOGY - HISTORY OF PRESENT ILLNESS
Patient is a 68 year old  male with atrial fibrillation not on AC, DM2, history of VT s/p AICD, hypothyroidism, CAD s/p CABG 4V, HTN, CKD stage 3 and HCC, elective IR guided liver tumor embolization 2/7/2019 who presents for AICD generator change for NÉSTOR.  Pt. denies chest pain/pressure, SOB/RODRIGUEZ, dizziness, diaphoresis, palpitations, nausea, vomiting, peripheral edema, recent weight gain, or synocpe. Patient is a 68 year old  male with atrial fibrillation not on AC, DM2, history of VT s/p AICD, CHF, hypothyroidism, CAD s/p CABG 4V, HTN, CKD stage 3 and HCC, elective IR guided liver tumor embolization 2/7/2019 who presents for AICD generator change for NÉSTOR.  Pt. denies chest pain/pressure, SOB/RODRIGUEZ, dizziness, diaphoresis, palpitations, nausea, vomiting, peripheral edema, recent weight gain, or synocpe.

## 2019-04-03 NOTE — H&P CARDIOLOGY - PMH
AICD lead malfunction  history of resolved stable since being folow-up by Dr Rivers  Anemia    Coronary artery disease    DM (diabetes mellitus)  type 2, Hg A1C 8.2 % ( 7/10/17)   Dec 2019 9 due to prednisone started on Lantus Jan 2019  Elevated prolactin level  dx: ' 70's  mediclly managed  Elevated triglycerides with high cholesterol  on meds  Gout  medically managed and I.V. drug every 2 weeks  Heart failure with reduced left ventricular function  EF 37%  Hepatic cirrhosis, unspecified hepatic cirrhosis type    History of hyperprolactinemia    History of osteomyelitis  2015, right foot 2nd toe   follow-up by podiatrist every 2 weeks  HTN (hypertension)    Hypothyroidism    ICD (implantable cardioverter-defibrillator) in place  Medtronic, Model [id number] , last interrogation 12/2018? will call Dr Rivers office  Ischemic cardiomyopathy    Liver mass  dx: 10/2018   incidental finding. Currently awaitng furthur workup  Pituitary adenoma  as per patient chronic, no changes , recently restarted follow up with endocrinologist ( note in allscripts )  Presence of stent in coronary artery  2 stents  PVD (peripheral vascular disease)    Sleep apnea  not using CPAP  Thrombocytopenia  Chronic  Ulcer of right ankle  has surgery done Dec 2018  Vitamin D deficiency

## 2019-04-04 ENCOUNTER — APPOINTMENT (OUTPATIENT)
Dept: RHEUMATOLOGY | Facility: CLINIC | Age: 69
End: 2019-04-04

## 2019-04-08 LAB — URATE SERPL-MCNC: 7.5 MG/DL

## 2019-04-11 ENCOUNTER — OUTPATIENT (OUTPATIENT)
Dept: OUTPATIENT SERVICES | Facility: HOSPITAL | Age: 69
LOS: 1 days | End: 2019-04-11
Payer: COMMERCIAL

## 2019-04-11 VITALS
DIASTOLIC BLOOD PRESSURE: 67 MMHG | SYSTOLIC BLOOD PRESSURE: 106 MMHG | WEIGHT: 209 LBS | HEIGHT: 71 IN | TEMPERATURE: 98 F | OXYGEN SATURATION: 98 % | HEART RATE: 65 BPM | RESPIRATION RATE: 15 BRPM

## 2019-04-11 DIAGNOSIS — R16.0 HEPATOMEGALY, NOT ELSEWHERE CLASSIFIED: ICD-10-CM

## 2019-04-11 DIAGNOSIS — I50.9 HEART FAILURE, UNSPECIFIED: ICD-10-CM

## 2019-04-11 DIAGNOSIS — Z89.9 ACQUIRED ABSENCE OF LIMB, UNSPECIFIED: Chronic | ICD-10-CM

## 2019-04-11 DIAGNOSIS — Z01.89 ENCOUNTER FOR OTHER SPECIFIED SPECIAL EXAMINATIONS: Chronic | ICD-10-CM

## 2019-04-11 DIAGNOSIS — C22.0 LIVER CELL CARCINOMA: ICD-10-CM

## 2019-04-11 DIAGNOSIS — G47.30 SLEEP APNEA, UNSPECIFIED: ICD-10-CM

## 2019-04-11 DIAGNOSIS — Z95.810 PRESENCE OF AUTOMATIC (IMPLANTABLE) CARDIAC DEFIBRILLATOR: ICD-10-CM

## 2019-04-11 DIAGNOSIS — E11.9 TYPE 2 DIABETES MELLITUS WITHOUT COMPLICATIONS: ICD-10-CM

## 2019-04-11 DIAGNOSIS — Z95.5 PRESENCE OF CORONARY ANGIOPLASTY IMPLANT AND GRAFT: ICD-10-CM

## 2019-04-11 DIAGNOSIS — Z01.818 ENCOUNTER FOR OTHER PREPROCEDURAL EXAMINATION: ICD-10-CM

## 2019-04-11 LAB
BLD GP AB SCN SERPL QL: NEGATIVE — SIGNIFICANT CHANGE UP
HBA1C BLD-MCNC: 8.5 % — HIGH (ref 4–5.6)
RH IG SCN BLD-IMP: POSITIVE — SIGNIFICANT CHANGE UP

## 2019-04-11 PROCEDURE — 86900 BLOOD TYPING SEROLOGIC ABO: CPT

## 2019-04-11 PROCEDURE — 83036 HEMOGLOBIN GLYCOSYLATED A1C: CPT

## 2019-04-11 PROCEDURE — G0463: CPT

## 2019-04-11 PROCEDURE — 86901 BLOOD TYPING SEROLOGIC RH(D): CPT

## 2019-04-11 PROCEDURE — 86850 RBC ANTIBODY SCREEN: CPT

## 2019-04-11 RX ORDER — INSULIN GLARGINE 100 [IU]/ML
24 INJECTION, SOLUTION SUBCUTANEOUS
Qty: 0 | Refills: 0 | COMMUNITY

## 2019-04-11 NOTE — H&P PST ADULT - ACTIVITY
able to walk, climb, can take care of self walks, climb steps, ADLs, golf, bicycle 3x week, swims, moderate activities

## 2019-04-11 NOTE — H&P PST ADULT - MALLAMPATI CLASS
Class II - visualization of the soft palate, fauces, and uvula/*small aperture Class II - visualization of the soft palate, fauces, and uvula Class III - visualization of the soft palate and the base of the uvula

## 2019-04-11 NOTE — H&P PST ADULT - MUSCULOSKELETAL
details… detailed exam fingers has mild swelling non tender fingers has mild swelling non tender/no calf tenderness

## 2019-04-11 NOTE — H&P PST ADULT - HISTORY OF PRESENT ILLNESS
67 y/o male with hx of ischemic cardiomyopathy has ICD implant since 2006 follow-up by Dr Rivers , CAD CABG x 4  1986,DM type 2 , hypothyroidism , sleep apnea not on CPAP, gouty arthritis, Afib resolved . Patient was recently diagnosed with liver mass  , was referred to DR Hoyt , evaluated and indicated this procedure for treatment. 68 y/o male with hx of ischemic cardiomyopathy has ICD implant since 2006 follow-up by Dr Rivers , CAD (CABG x 4 1986), DM type 2, hypothyroidism , BEHZAD (not on CPAP), gouty arthritis, Afib resolved . Patient was recently diagnosed with liver mass, was referred to Dr. Hoyt , evaluated and indicated this procedure for treatment. s/p ICD generator change 4/3/19 68 y/o male with PMHx of ischemic cardiomyopathy (s/p ICD/PPM implant 2006, generator change 4/3/19), CAD (CABG x 4 1986), T2DM, hypothyroidism, Afib (resolved on own 2018- not on A/C followed by Dr. Bowers), HTN, HLD, BEHZAD (not on CPAP), gouty arthritis, FERNANDES, and cirrhosis, was diagnosed with cirrhosis of the liver in 2017 after workup for elevated liver enzymes. Patient has been followed with serial imaging by Dr. Chapo Alaniz and was referred to IR by Dr. Alaniz after imaging revealed 2 liver lesions. s/p hepatic angiogram and embolization of 4 cm right hepatic tumor on 2/7/19. Denies abdominal pain, change in skin color, hemoptysis, melena, or hematochezia. Presents to [facility] for  scheduled liver angio and embo on 4/18/2019 for 2nd lesion. 68 y/o male with PMHx of ischemic cardiomyopathy (s/p ICD/PPM implant 2006, generator change 4/3/19), CAD (CABG x 4 1986), T2DM, hypothyroidism, Afib (resolved on own 2018- not on A/C followed by Dr. Bowers), HTN, HLD, BEHZAD (not on CPAP), gouty arthritis, FERNANDES, and cirrhosis, was diagnosed with cirrhosis of the liver in 2017 after workup for elevated liver enzymes. Patient has been followed with serial imaging by Dr. Chapo Alaniz and was referred to IR by Dr. Alaniz after imaging revealed 2 liver lesions. s/p hepatic angiogram and embolization of 4 cm right hepatic tumor on 2/7/19, requiring platelets day of procedure for thrombocytopenia. Denies abdominal pain, change in skin color, hemoptysis, melena, or hematochezia. Presents to PST for  scheduled liver angio and embo on 4/18/2019 for 2nd lesion.

## 2019-04-11 NOTE — H&P PST ADULT - OTHER CARE PROVIDERS
Douglas Rivers (cardiologist) [phone] Douglas Rivers (cardiologist) [phone]//Dr. Hurd/Ele (EP) [phone]

## 2019-04-11 NOTE — H&P PST ADULT - NS MD HP INPLANTS MED DEV
Automatic Implantable Cardioverter Defibrillator/Pacemaker/2 cardiac stents/AICD/ PPM: Medtronic: Model [id number] Automatic Implantable Cardioverter Defibrillator/Pacemaker/2 cardiac stents/AICD/ PPM: Medtronic Pacemaker/Automatic Implantable Cardioverter Defibrillator/2 cardiac stents/AICD/ PPM: Medtronic #[id number]

## 2019-04-11 NOTE — H&P PST ADULT - NSICDXPROBLEM_GEN_ALL_CORE_FT
PROBLEM DIAGNOSES  Problem: Liver mass  Assessment and Plan: Scheduled liver angio and embo 4/18/2019  Pre-op instructions given to pt, including chlorhexidine soap  Labwork in [place] 4/3/19  T&S and A1c sent at PST    Problem: ICD (implantable cardioverter-defibrillator) in place  Assessment and Plan: Generator change on 4/3/19- spoke with Dr. Brown' office-  to fax interrogation report to [facility]    Problem: Presence of stent in coronary artery  Assessment and Plan: Pt to continue aspirin for stent protection PROBLEM DIAGNOSES  Problem: Liver mass  Assessment and Plan: Scheduled liver angio and embo 4/18/2019  Pre-op instructions given to pt, including chlorhexidine soap  Lab work in [place] 4/3/19  T&S and A1c sent at PST    Problem: ICD (implantable cardioverter-defibrillator) in place  Assessment and Plan: Generator change on 4/3/19- spoke with Dr. Glass' office-  to fax interrogation report to [facility]    Problem: Heart failure  Assessment and Plan: Patient to continue Furosemide    Problem: T2DM (type 2 diabetes mellitus)  Assessment and Plan: Pt to hold Metformin and Januvia DOS, last dose 4/17am. HgA1c sent at PST. Patient to take Lantus 16 units night before surgery.     Problem: Presence of stent in coronary artery  Assessment and Plan: Pt to continue aspirin for stent protection    Problem: Sleep apnea  Assessment and Plan: IR notified PROBLEM DIAGNOSES  Problem: Thrombocytopenia, acquired  Assessment and Plan: Message left with Dr. Hoyt re: CBC day of procedure PROBLEM DIAGNOSES  Problem: Liver mass  Assessment and Plan: Scheduled for Liver angio and embolization  Labs pending.  Preop instructions given.    Problem: Sleep apnea  Assessment and Plan: BEHZAD precautions OR booking notified    Problem: T2DM (type 2 diabetes mellitus)  Assessment and Plan: A1c pending.  Instructed to stop Metformin an Januvia 4/17 after morning dose.  FS upon admission to IR.    Problem: Presence of stent in coronary artery  Assessment and Plan: Instructed to hold ASA starting 4/17.    Problem: ICD (implantable cardioverter-defibrillator) in place  Assessment and Plan: Dr. Hurd's office called for completed procedure note and recent interrogation, pending.    Problem: Thrombocytopenia, acquired  Assessment and Plan: Message left with Dr. Hoyt, spoke to Maria Isabel the NP, patient to have CBC and Hepatic profile as outpatient the day or two prior to procedure.

## 2019-04-11 NOTE — H&P PST ADULT - NSICDXPASTSURGICALHX_GEN_ALL_CORE_FT
PAST SURGICAL HISTORY:  AICD (automatic cardioverter/defibrillator) present placement in (2006), generator change 4/3/19    Coronary atherosclerosis of artery bypass graft CABG X 4 (1986)    Encounter for incision and drainage procedure Dec 2018 right foot/ankle    History of amputation right foot, 2nd toe, osteo 2015    Stented coronary artery RCA (1999)

## 2019-04-11 NOTE — H&P PST ADULT - ENDOCRINE COMMENTS
hx DM type 2 on meds on Lantus this month hx: DM type 2 on meds on Lantus Hx: DM type 2 on meds on Lantus

## 2019-04-11 NOTE — H&P PST ADULT - NSICDXPASTMEDICALHX_GEN_ALL_CORE_FT
PAST MEDICAL HISTORY:  AICD lead malfunction history of resolved stable since being follow-up by Dr Rivers    Anemia     Coronary artery disease     DM (diabetes mellitus) type 2, Hg A1C 9.9% 2/2019  Dec 2019  due to prednisone started on Lantus Jan 2019    Elevated prolactin level dx: ' 70's  mediclly managed Bromocriptine    Elevated triglycerides with high cholesterol on meds    Gout medically managed and I.V. drug every 2 weeks    Heart failure with reduced left ventricular function EF 37%    Hepatic cirrhosis, unspecified hepatic cirrhosis type     History of hyperprolactinemia     History of osteomyelitis 2015, right foot 2nd toe   follow-up by podiatrist every 2 weeks    HTN (hypertension)     Hypothyroidism     ICD (implantable cardioverter-defibrillator) in place Medtronic, Model [id number] , last interrogation 3/27/19 , generator change 4/3/19    Ischemic cardiomyopathy     Liver mass dx: 10/2018   incidental finding. Currently awaitng furthur workup    PAF (paroxysmal atrial fibrillation) pt reports ~9 mos, [id number], resolved - monitored by Dr. Rivers    Pituitary adenoma as per patient chronic, no changes , recently restarted follow up with endocrinologist ( note in allscripts )    Presence of stent in coronary artery 2 stents    PVD (peripheral vascular disease)     Sleep apnea not using CPAP    Thrombocytopenia Chronic    Ulcer of right ankle has surgery done Dec 2018    Vitamin D deficiency PAST MEDICAL HISTORY:  Afib pt reports ~9 mos, [id number], resolved - monitored by Dr. Rivers    AICD lead malfunction history of - fixed follow-up by Dr Rivers    Anemia     Coronary artery disease     DM (diabetes mellitus) type 2, Hg A1C 9.9% 2/2019  Dec 2019  due to prednisone started on Lantus Jan 2019    Elevated prolactin level dx: ' 70's  medically managed Bromocriptine    Elevated triglycerides with high cholesterol on meds    Gout medically managed    Heart failure with reduced left ventricular function last EF 43% (2/2018)    Hepatic cirrhosis, unspecified hepatic cirrhosis type     History of hyperprolactinemia     History of osteomyelitis 2015, right foot 2nd toe   follow-up by podiatrist every 2 weeks    HTN (hypertension)     Hypothyroidism     ICD (implantable cardioverter-defibrillator) in place Medtronic, Model [id number] , last interrogation 3/27/19, generator change 4/3/19    Ischemic cardiomyopathy     Liver mass dx: 10/2018   incidental finding. Currently awaitng furthur workup    Pituitary adenoma as per patient chronic, no changes , recently restarted follow up with endocrinologist ( note in allscripts )    Presence of stent in coronary artery 2 stents    PVD (peripheral vascular disease)     Sleep apnea not using CPAP    Thrombocytopenia Chronic    Ulcer of right ankle has surgery done Dec 2018    Vitamin D deficiency PAST MEDICAL HISTORY:  Afib pt reports ~9 mos, [id number], resolved - monitored by Dr. Rivers    AICD lead malfunction history of - fixed follow-up by Dr Rivers    Anemia     Coronary artery disease     DM (diabetes mellitus) type 2, Hg A1C 9.9% 2/2019  Dec 2019  due to prednisone started on Lantus Jan 2019    Elevated prolactin level dx: ' 70's  medically managed Bromocriptine    Elevated triglycerides with high cholesterol on meds    Gout medically managed    Heart failure with reduced left ventricular function last EF 43% (2/2018)    Hepatic cirrhosis, unspecified hepatic cirrhosis type     History of hyperprolactinemia     History of osteomyelitis 2015, right foot 2nd toe   follow-up by podiatrist every 2 weeks    HTN (hypertension)     Hypothyroidism     ICD (implantable cardioverter-defibrillator) in place Medtronic, Old Model [id number] , last interrogation 3/27/19, generator change 4/3/19 New Model # [id number]    Ischemic cardiomyopathy     Liver mass dx: 10/2018   incidental finding. Currently awaitng furthur workup    Pituitary adenoma as per patient chronic, no changes , recently restarted follow up with endocrinologist ( note in allscripts )    Presence of stent in coronary artery 2 stents    PVD (peripheral vascular disease)     Sleep apnea not using CPAP    Thrombocytopenia Chronic    Ulcer of right ankle has surgery done Dec 2018    Vitamin D deficiency

## 2019-04-11 NOTE — H&P PST ADULT - NSICDXFAMILYHX_GEN_ALL_CORE_FT
FAMILY HISTORY:  Father  Still living? Unknown  Family history of MI (myocardial infarction), Age at diagnosis: Age Unknown

## 2019-04-12 DIAGNOSIS — D69.6 THROMBOCYTOPENIA, UNSPECIFIED: ICD-10-CM

## 2019-04-12 PROBLEM — E22.9 HYPERFUNCTION OF PITUITARY GLAND, UNSPECIFIED: Chronic | Status: ACTIVE | Noted: 2018-12-04

## 2019-04-12 PROBLEM — I48.91 UNSPECIFIED ATRIAL FIBRILLATION: Chronic | Status: ACTIVE | Noted: 2019-04-11

## 2019-04-12 PROBLEM — Z95.810 PRESENCE OF AUTOMATIC (IMPLANTABLE) CARDIAC DEFIBRILLATOR: Chronic | Status: ACTIVE | Noted: 2017-08-22

## 2019-04-12 PROBLEM — M10.9 GOUT, UNSPECIFIED: Chronic | Status: ACTIVE | Noted: 2018-12-04

## 2019-04-16 ENCOUNTER — APPOINTMENT (OUTPATIENT)
Dept: ELECTROPHYSIOLOGY | Facility: CLINIC | Age: 69
End: 2019-04-16
Payer: COMMERCIAL

## 2019-04-16 VITALS
WEIGHT: 212 LBS | DIASTOLIC BLOOD PRESSURE: 64 MMHG | HEART RATE: 77 BPM | SYSTOLIC BLOOD PRESSURE: 101 MMHG | BODY MASS INDEX: 28.1 KG/M2 | HEIGHT: 73 IN | OXYGEN SATURATION: 100 %

## 2019-04-16 PROCEDURE — 99024 POSTOP FOLLOW-UP VISIT: CPT

## 2019-04-16 PROCEDURE — 93283 PRGRMG EVAL IMPLANTABLE DFB: CPT

## 2019-04-16 RX ORDER — SODIUM CHLORIDE 9 MG/ML
1000 INJECTION INTRAMUSCULAR; INTRAVENOUS; SUBCUTANEOUS
Qty: 0 | Refills: 0 | Status: DISCONTINUED | OUTPATIENT
Start: 2019-04-18 | End: 2019-04-19

## 2019-04-17 ENCOUNTER — APPOINTMENT (OUTPATIENT)
Dept: RHEUMATOLOGY | Facility: CLINIC | Age: 69
End: 2019-04-17

## 2019-04-18 ENCOUNTER — OUTPATIENT (OUTPATIENT)
Dept: INPATIENT UNIT | Facility: HOSPITAL | Age: 69
LOS: 1 days | End: 2019-04-18
Payer: COMMERCIAL

## 2019-04-18 VITALS
RESPIRATION RATE: 16 BRPM | HEART RATE: 90 BPM | OXYGEN SATURATION: 98 % | SYSTOLIC BLOOD PRESSURE: 109 MMHG | TEMPERATURE: 97 F | DIASTOLIC BLOOD PRESSURE: 66 MMHG

## 2019-04-18 DIAGNOSIS — Z01.818 ENCOUNTER FOR OTHER PREPROCEDURAL EXAMINATION: ICD-10-CM

## 2019-04-18 DIAGNOSIS — E03.9 HYPOTHYROIDISM, UNSPECIFIED: ICD-10-CM

## 2019-04-18 DIAGNOSIS — I25.10 ATHEROSCLEROTIC HEART DISEASE OF NATIVE CORONARY ARTERY WITHOUT ANGINA PECTORIS: ICD-10-CM

## 2019-04-18 DIAGNOSIS — I10 ESSENTIAL (PRIMARY) HYPERTENSION: ICD-10-CM

## 2019-04-18 DIAGNOSIS — N18.3 CHRONIC KIDNEY DISEASE, STAGE 3 (MODERATE): ICD-10-CM

## 2019-04-18 DIAGNOSIS — C22.0 LIVER CELL CARCINOMA: ICD-10-CM

## 2019-04-18 DIAGNOSIS — Z79.899 OTHER LONG TERM (CURRENT) DRUG THERAPY: ICD-10-CM

## 2019-04-18 DIAGNOSIS — I50.42 CHRONIC COMBINED SYSTOLIC (CONGESTIVE) AND DIASTOLIC (CONGESTIVE) HEART FAILURE: ICD-10-CM

## 2019-04-18 DIAGNOSIS — Z01.89 ENCOUNTER FOR OTHER SPECIFIED SPECIAL EXAMINATIONS: Chronic | ICD-10-CM

## 2019-04-18 DIAGNOSIS — Z89.9 ACQUIRED ABSENCE OF LIMB, UNSPECIFIED: Chronic | ICD-10-CM

## 2019-04-18 DIAGNOSIS — E78.5 HYPERLIPIDEMIA, UNSPECIFIED: ICD-10-CM

## 2019-04-18 DIAGNOSIS — E11.9 TYPE 2 DIABETES MELLITUS WITHOUT COMPLICATIONS: ICD-10-CM

## 2019-04-18 DIAGNOSIS — Z29.9 ENCOUNTER FOR PROPHYLACTIC MEASURES, UNSPECIFIED: ICD-10-CM

## 2019-04-18 LAB
ALBUMIN SERPL ELPH-MCNC: 3.8 G/DL
ALP BLD-CCNC: 178 U/L
ALT SERPL-CCNC: 28 U/L
ANION GAP SERPL CALC-SCNC: 10 MMOL/L
AST SERPL-CCNC: 40 U/L
BASOPHILS # BLD AUTO: 0.03 K/UL
BASOPHILS NFR BLD AUTO: 0.9 %
BILIRUB SERPL-MCNC: 0.5 MG/DL
BUN SERPL-MCNC: 38 MG/DL
CALCIUM SERPL-MCNC: 8.9 MG/DL
CHLORIDE SERPL-SCNC: 107 MMOL/L
CO2 SERPL-SCNC: 23 MMOL/L
CREAT SERPL-MCNC: 1.51 MG/DL
EOSINOPHIL # BLD AUTO: 0.39 K/UL
EOSINOPHIL NFR BLD AUTO: 11.2 %
GLUCOSE BLDC GLUCOMTR-MCNC: 116 MG/DL — HIGH (ref 70–99)
GLUCOSE BLDC GLUCOMTR-MCNC: 118 MG/DL — HIGH (ref 70–99)
GLUCOSE BLDC GLUCOMTR-MCNC: 266 MG/DL — HIGH (ref 70–99)
GLUCOSE SERPL-MCNC: 140 MG/DL
HCT VFR BLD CALC: 29 %
HGB BLD-MCNC: 8.9 G/DL
IMM GRANULOCYTES NFR BLD AUTO: 0.6 %
LYMPHOCYTES # BLD AUTO: 0.77 K/UL
LYMPHOCYTES NFR BLD AUTO: 22.1 %
MAN DIFF?: NORMAL
MCHC RBC-ENTMCNC: 28.3 PG
MCHC RBC-ENTMCNC: 30.7 GM/DL
MCV RBC AUTO: 92.1 FL
MONOCYTES # BLD AUTO: 0.45 K/UL
MONOCYTES NFR BLD AUTO: 12.9 %
NEUTROPHILS # BLD AUTO: 1.82 K/UL
NEUTROPHILS NFR BLD AUTO: 52.3 %
PLATELET # BLD AUTO: 50 K/UL
POTASSIUM SERPL-SCNC: 5.2 MMOL/L
PROT SERPL-MCNC: 6.7 G/DL
RBC # BLD: 3.15 M/UL
RBC # FLD: 15.3 %
SODIUM SERPL-SCNC: 140 MMOL/L
WBC # FLD AUTO: 3.48 K/UL

## 2019-04-18 PROCEDURE — 36248 INS CATH ABD/L-EXT ART ADDL: CPT

## 2019-04-18 PROCEDURE — 76937 US GUIDE VASCULAR ACCESS: CPT | Mod: 26

## 2019-04-18 PROCEDURE — 37243 VASC EMBOLIZE/OCCLUDE ORGAN: CPT

## 2019-04-18 PROCEDURE — 36247 INS CATH ABD/L-EXT ART 3RD: CPT

## 2019-04-18 RX ORDER — INSULIN LISPRO 100/ML
VIAL (ML) SUBCUTANEOUS
Qty: 0 | Refills: 0 | Status: DISCONTINUED | OUTPATIENT
Start: 2019-04-18 | End: 2019-05-03

## 2019-04-18 RX ORDER — ATORVASTATIN CALCIUM 80 MG/1
40 TABLET, FILM COATED ORAL AT BEDTIME
Qty: 0 | Refills: 0 | Status: DISCONTINUED | OUTPATIENT
Start: 2019-04-18 | End: 2019-05-03

## 2019-04-18 RX ORDER — INSULIN LISPRO 100/ML
VIAL (ML) SUBCUTANEOUS AT BEDTIME
Qty: 0 | Refills: 0 | Status: DISCONTINUED | OUTPATIENT
Start: 2019-04-18 | End: 2019-05-03

## 2019-04-18 RX ORDER — DEXTROSE 50 % IN WATER 50 %
12.5 SYRINGE (ML) INTRAVENOUS ONCE
Qty: 0 | Refills: 0 | Status: DISCONTINUED | OUTPATIENT
Start: 2019-04-18 | End: 2019-05-03

## 2019-04-18 RX ORDER — INSULIN GLARGINE 100 [IU]/ML
10 INJECTION, SOLUTION SUBCUTANEOUS AT BEDTIME
Qty: 0 | Refills: 0 | Status: DISCONTINUED | OUTPATIENT
Start: 2019-04-18 | End: 2019-05-03

## 2019-04-18 RX ORDER — DEXTROSE 50 % IN WATER 50 %
25 SYRINGE (ML) INTRAVENOUS ONCE
Qty: 0 | Refills: 0 | Status: DISCONTINUED | OUTPATIENT
Start: 2019-04-18 | End: 2019-05-03

## 2019-04-18 RX ORDER — DEXTROSE 50 % IN WATER 50 %
15 SYRINGE (ML) INTRAVENOUS ONCE
Qty: 0 | Refills: 0 | Status: DISCONTINUED | OUTPATIENT
Start: 2019-04-18 | End: 2019-05-03

## 2019-04-18 RX ORDER — SPIRONOLACTONE 25 MG/1
25 TABLET, FILM COATED ORAL DAILY
Qty: 0 | Refills: 0 | Status: DISCONTINUED | OUTPATIENT
Start: 2019-04-18 | End: 2019-05-03

## 2019-04-18 RX ORDER — CARVEDILOL PHOSPHATE 80 MG/1
12.5 CAPSULE, EXTENDED RELEASE ORAL EVERY 12 HOURS
Qty: 0 | Refills: 0 | Status: DISCONTINUED | OUTPATIENT
Start: 2019-04-18 | End: 2019-05-03

## 2019-04-18 RX ORDER — BROMOCRIPTINE MESYLATE 5 MG/1
5 CAPSULE ORAL DAILY
Qty: 0 | Refills: 0 | Status: DISCONTINUED | OUTPATIENT
Start: 2019-04-18 | End: 2019-05-03

## 2019-04-18 RX ORDER — SODIUM CHLORIDE 9 MG/ML
1000 INJECTION, SOLUTION INTRAVENOUS
Qty: 0 | Refills: 0 | Status: DISCONTINUED | OUTPATIENT
Start: 2019-04-18 | End: 2019-05-03

## 2019-04-18 RX ORDER — GLUCAGON INJECTION, SOLUTION 0.5 MG/.1ML
1 INJECTION, SOLUTION SUBCUTANEOUS ONCE
Qty: 0 | Refills: 0 | Status: DISCONTINUED | OUTPATIENT
Start: 2019-04-18 | End: 2019-05-03

## 2019-04-18 RX ORDER — FUROSEMIDE 40 MG
40 TABLET ORAL DAILY
Qty: 0 | Refills: 0 | Status: DISCONTINUED | OUTPATIENT
Start: 2019-04-18 | End: 2019-05-03

## 2019-04-18 RX ORDER — LEVOTHYROXINE SODIUM 125 MCG
125 TABLET ORAL DAILY
Qty: 0 | Refills: 0 | Status: DISCONTINUED | OUTPATIENT
Start: 2019-04-18 | End: 2019-05-03

## 2019-04-18 RX ADMIN — SODIUM CHLORIDE 95 MILLILITER(S): 9 INJECTION INTRAMUSCULAR; INTRAVENOUS; SUBCUTANEOUS at 20:15

## 2019-04-18 NOTE — H&P ADULT - NSHPOUTPATIENTPROVIDERS_GEN_ALL_CORE
Dr. Calixto (PMD)  Dr. Bowers (cards) Dr. Suha Thapa (PMD): [phone]  Dr. Douglas Bowers (cards): [phone]

## 2019-04-18 NOTE — H&P ADULT - PROBLEM SELECTOR PLAN 9
SCDs in setting of low platelets  Diabetic/DASH diet    Ana Carrasco MD, PhD  PGY-2| Internal Medicine  [phone] / [phone]

## 2019-04-18 NOTE — H&P ADULT - PROBLEM SELECTOR PLAN 6
Pt on lasix, ramipril, asa at home.  Held in setting of procedure.  - will restart tomorrow Pt on lasix, ramipril, asa at home.  Held in setting of procedure.  - will restart lasix and asa in am  - c/w aldactone  - holding ramipril for concern for JOHN. Pt on lasix, ramipril, asa at home.  Held in setting of procedure.  - will restart lasix   - c/w aldactone  - holding ramipril for concern for JOHN.

## 2019-04-18 NOTE — H&P ADULT - HISTORY OF PRESENT ILLNESS
69M PMH ischemic cardiomyopathy s/p ICD/PPM in 2006, generator change last week), CAD s/p CABG, T2DM, hypothyroidism, afib (per pt resolved on own, not on AC), HTN, HLD, BEHZAD (not on CPAP), gout, FERNANDES and cirrhosis with newly diagnosed HCC admitted after IR embolization of 2 hypervascular tumors of right lobe with embozene today.  Pt was admitted after IR procedure for observation.     Per notes, no issues during procedure.  At time of examination, pt denied fevers, chills, sob, chest pain.  Denied any bleeding, abdominal pain. Pt states he had eaten his dinner with no issues.

## 2019-04-18 NOTE — H&P ADULT - PROBLEM SELECTOR PLAN 2
Pt with GFR47 on admission, given IV hydration post procedure and pre-procedure for JOHN ppx  - will f/u BMP in am.  - will d/c IVF after 6 hours.

## 2019-04-18 NOTE — H&P ADULT - ATTENDING COMMENTS
69M PMH ischemic cardiomyopathy s/p ICD/PPM , CAD s/p CABG, T2DM, hypothyroidism, afib , HTN, HLD, BEHZAD (not on CPAP), gout, FERNANDES and cirrhosis with newly diagnosed HCC admitted post  IR embolization of tumors, procedure well tolerated without complication , currently asymptomatic, will obtain updated labs , monitor hgb and platelet count , and CT scan in am per IR recommendations.

## 2019-04-18 NOTE — H&P ADULT - PROBLEM SELECTOR PLAN 8
Pt unsure of medications he takes at home  - confirm with pharmacy in am. Pt unsure of medications he takes at home  - confirm with pharmacy in am.  - question of whether or not pt is taking 2.5 mg prednisone. c/w statin and niaspan

## 2019-04-18 NOTE — H&P ADULT - NSHPREVIEWOFSYSTEMS_GEN_ALL_CORE

## 2019-04-18 NOTE — PROGRESS NOTE ADULT - SUBJECTIVE AND OBJECTIVE BOX
Interventional Radiology Brief- Operative Note    Procedure: visceral angio and embolization    Operators: Shady Hoyt    Anesthesia (type): MAC    Contrast: 97 cc    EBL: minimal    Findings/Follow up Plan of Care: 2 right lobe hypervascular tumors fed by 3 branches embolized with 75 micron embozene    Specimens Removed: none    Implants: embozene particles    Complications: none    Condition/Disposition: stable, to RR.    Please call Interventional Radiology x [phone] with any questions, concerns, or issues.

## 2019-04-18 NOTE — H&P ADULT - NSHPLABSRESULTS_GEN_ALL_CORE
No current labs available. No current labs available. ordered for morning                       CAPILLARY BLOOD GLUCOSE      POCT Blood Glucose.: 266 mg/dL (18 Apr 2019 23:43)  POCT Blood Glucose.: 118 mg/dL (18 Apr 2019 18:31)  POCT Blood Glucose.: 116 mg/dL (18 Apr 2019 13:57)      Imaging:      EKG 4/3/19 personally reviewed:  sinus rhythm , 1st degree block

## 2019-04-18 NOTE — H&P ADULT - PROBLEM SELECTOR PLAN 3
Pt with DM2, hold oral hypoglycemics while in hospital (on metformin and januvia)  - will give 10U lantus tonight given pt has had minimal PO intake  - ISS  - diabetic diet.

## 2019-04-18 NOTE — H&P ADULT - ASSESSMENT
69M PMH ischemic cardiomyopathy s/p ICD/PPM in 2006, generator change last week), CAD s/p CABG, T2DM, hypothyroidism, afib (per pt resolved on own, not on AC), HTN, HLD, BEHZAD (not on CPAP), gout, FERNANDES and cirrhosis with newly diagnosed HCC admitted after IR embolization of 2 hypervascular tumors of right lobe with embozene today.  Pt was admitted after IR procedure for observation.

## 2019-04-18 NOTE — PROGRESS NOTE ADULT - SUBJECTIVE AND OBJECTIVE BOX
Interventional Radiology  Pre-Procedure Note    HPI:  70 y/o male with PMHx of ischemic cardiomyopathy (s/p ICD/PPM implant 2006, generator change 4/3/19), CAD (CABG x 4 1986), T2DM, hypothyroidism, Afib (resolved on own 2018- not on A/C followed by Dr. Bowers), HTN, HLD, BEHZAD (not on CPAP), gouty arthritis, FERNANDES, CKD, and cirrhosis, was diagnosed with cirrhosis of the liver in 2017 after workup for elevated liver enzymes. Patient has been followed with serial imaging by Dr. Chapo Alaniz and was referred to IR by Dr. Alaniz after imaging revealed 2 liver lesions. Pt s/p hepatic angiogram and embolization of 4 cm right hepatic tumor on 2/7/19, requiring platelets day of procedure for thrombocytopenia.     Pt presents today for hepatic angiogram/embolization to treat segment 4A/8 lesion.  Denies abdominal pain, change in skin color, hemoptysis, melena, or hematochezia.     NPO since 2200 on 4/17/19  Last dose of aspirin was 4/17  Last dose of Ramipril, Lasix, and Aldactone was 4/16    PAST MEDICAL & SURGICAL HISTORY:  Afib: pt reports ~9 mos, [id number], resolved - monitored by Dr. Rivers  Ulcer of right ankle: has surgery done Dec 2018  Liver mass: dx: 10/2018    gout: medically managed  Elevated prolactin level: dx: &#x27; 70&#x27;s  medically managed Bromocriptine  Vitamin D deficiency  Elevated triglycerides with high cholesterol: on meds  Hypothyroidism  PVD (peripheral vascular disease)  History of hyperprolactinemia  Hepatic cirrhosis, unspecified hepatic cirrhosis type  Anemia  ICD (implantable cardioverter-defibrillator) in place: Medtronic, Old Model [id number] , last interrogation 3/27/19, generator change 4/3/19 New Model # [id number]  Sleep apnea: not using CPAP  History of osteomyelitis: 2015, right foot 2nd toe   follow-up by podiatrist every 2 weeks  Presence of stent in coronary artery: 2 stents  Heart failure with reduced left ventricular function: last EF 43% (2/2018)  Ischemic cardiomyopathy  Pituitary adenoma: as per patient chronic, no changes , recently restarted follow up with endocrinologist ( note in allscripts )  Coronary artery disease  Thrombocytopenia: Chronic  HTN (hypertension)  DM (diabetes mellitus): type 2, Hg A1C 9.9% 2/2019  Dec 2019  due to prednisone started on Lantus Jan 2019  AICD lead malfunction: history of - fixed follow-up by Dr Rivers  Encounter for incision and drainage procedure: Dec 2018 right foot/ankle  History of amputation: right foot, 2nd toe, osteo 2015  AICD (automatic cardioverter/defibrillator) present: placement in (2006), generator change 4/3/19  Stented coronary artery: RCA (1999)  Coronary atherosclerosis of artery bypass graft: CABG X 4 (1986)      Social History:       FAMILY HISTORY:  Family history of MI (myocardial infarction) (Father)      Allergies: No Known Allergies      Current Medications: sodium chloride 0.9%. 1000 milliLiter(s) IV Continuous <Continuous>      Labs:               HCG:     Blood Bank:   Blood Available Until:    Assessment/Plan:   This is a 69y ____ Male  presents with   Patient presents to IR for _____________.  Procedure/ risks/ benefits/ goals/ alternatives were explained. All questions answered. Informed content obtained from patient. Consent placed in chart. Interventional Radiology  Pre-Procedure Note    HPI:  70 y/o male with PMHx of ischemic cardiomyopathy (s/p ICD/PPM implant 2006, generator change 4/3/19), CAD (CABG x 4 1986), T2DM, hypothyroidism, Afib (resolved on own 2018- not on A/C followed by Dr. Bowers), HTN, HLD, BEHZAD (not on CPAP), gouty arthritis, FERNANDES, CKD, and cirrhosis who presents today for hepatic angiogram and embolization of liver tumor.  He was diagnosed with cirrhosis of the liver in 2017 after workup for elevated liver enzymes. Patient has been followed with serial imaging by Dr. Chapo Alaniz and was referred to IR by Dr. Alaniz after imaging revealed 2 liver lesions. Pt s/p hepatic angiogram and embolization of 4 cm right hepatic tumor on 2/7/19, requiring platelets day of procedure for thrombocytopenia.     Pt presents today for hepatic angiogram/embolization to treat segment 4A/8 lesion.  Denies abdominal pain, change in skin color, hemoptysis, melena, or hematochezia.     NPO since 2200 on 4/17/19  Last dose of aspirin was 4/17  Last dose of Ramipril, Lasix, and Aldactone was 4/16    PAST MEDICAL & SURGICAL HISTORY:  Afib: pt reports ~9 mos, [id number], resolved - monitored by Dr. Rivers  Ulcer of right ankle: has surgery done Dec 2018  Liver mass: dx: 10/2018    gout: medically managed  Elevated prolactin level: dx: &#x27; 70&#x27;s  medically managed Bromocriptine  Vitamin D deficiency  Elevated triglycerides with high cholesterol: on meds  Hypothyroidism  PVD (peripheral vascular disease)  History of hyperprolactinemia  Hepatic cirrhosis, unspecified hepatic cirrhosis type  Anemia  ICD (implantable cardioverter-defibrillator) in place: Medtronic, Old Model [id number] , last interrogation 3/27/19, generator change 4/3/19 New Model # [id number]  Sleep apnea: not using CPAP  History of osteomyelitis: 2015, right foot 2nd toe   follow-up by podiatrist every 2 weeks  Presence of stent in coronary artery: 2 stents  Heart failure with reduced left ventricular function: last EF 43% (2/2018)  Ischemic cardiomyopathy  Pituitary adenoma: as per patient chronic, no changes , recently restarted follow up with endocrinologist ( note in allscripts )  Coronary artery disease  Thrombocytopenia: Chronic  HTN (hypertension)  DM (diabetes mellitus): type 2, Hg A1C 9.9% 2/2019  Dec 2019  due to prednisone started on Lantus Jan 2019  AICD lead malfunction: history of - fixed follow-up by Dr Rivers  Encounter for incision and drainage procedure: Dec 2018 right foot/ankle  History of amputation: right foot, 2nd toe, osteo 2015  AICD (automatic cardioverter/defibrillator) present: placement in (2006), generator change 4/3/19  Stented coronary artery: RCA (1999)  Coronary atherosclerosis of artery bypass graft: CABG X 4 (1986)      Social History:   , 2 children, retired  non-smoker  does not use illicit drugs  rare social ETOH use    FAMILY HISTORY:  Family history of MI (myocardial infarction) (Father)    Allergies: No Known Allergies    Current Medications: sodium chloride 0.9%. 1000 milliLiter(s) IV Continuous <Continuous>    Labs:   4/17/19  WBC 3.48  HG/H 8.9/29.0  PLT 50    Na 140  K 5.2  BUN/Cr 38/1.51  GFR 47  tbili 0.5    4/3/19  INR 1.28     Blood Bank: O +    Assessment/Plan:     This is a 70 y/o male with PMHx of ischemic cardiomyopathy (s/p ICD/PPM implant 2006, generator change 4/3/19), CAD (CABG x 4 1986), T2DM, hypothyroidism, Afib (resolved on own 2018- not on A/C followed by Dr. Bowers), HTN, HLD, BEHZAD (not on CPAP), gouty arthritis, FERNANDES, CKD, and cirrhosis who presents today for hepatic angiogram and embolization of liver tumor.     1.  HCC  -plan for hepatic angio/embo of liver tumor  -Procedure/ risks/ benefits/ goals/ alternatives were explained. All questions answered. Informed content obtained from patient. Consent placed in chart.   -plts 50, to receive 1 unit of platelets just prior to procedure, am CBC    2. CKD  -GFR 47  -Plan per nephrology, Dr. Clement: Pt held Lasix, Ramipril, and Aldactone 2 days prior to procedure  -pt to receive IV hydration, NS @ 1cc/kg/hr for 6 hours pre and post procedure, weight 94.8 kg.  -f/u BMP in am  -outpatient f/u with Dr. Clement prior to f/u imaging (scheduled for 5/2/19)

## 2019-04-18 NOTE — H&P ADULT - PROBLEM SELECTOR PLAN 1
Pt with HCC, here for elective tumor embolization.  - no mckinnon in place now  - tramadol prn pain control, not in pain at this time.  - Pt received 1U platelets prior to procedure, will check CBC in am

## 2019-04-18 NOTE — H&P ADULT - NSHPPHYSICALEXAM_GEN_ALL_CORE
Vital Signs Last 24 Hrs  T(C): 36.7 (18 Apr 2019 21:09), Max: 36.8 (18 Apr 2019 20:17)  T(F): 98 (18 Apr 2019 21:09), Max: 98.2 (18 Apr 2019 20:17)  HR: 90 (18 Apr 2019 21:09) (82 - 91)  BP: 108/67 (18 Apr 2019 22:52) (106/63 - 128/66)  BP(mean): 85 (18 Apr 2019 19:30) (81 - 87)  RR: 18 (18 Apr 2019 21:09) (15 - 18)  SpO2: 98% (18 Apr 2019 21:09) (98% - 100%)    Appearance: Sitting in bed, NAD  HEENT:   Normal oral mucosa, PERRL, EOMI, anicteric sclera  Cardiovascular: RRR, No JVD, No murmurs, gallops or rubs appreciated  Respiratory: Lungs clear to auscultation bilaterally, no wheezes, crackles appreciated  Gastrointestinal:  Soft, distended, no tenderness to palpation  Skin: toes with ulcers noted on right big toe and cut on right heel.  Neurologic: AOx3, CNII-XII grossly intact, motor and sensory function grossly intact  Extremities: Moving all extremities equally  Vascular: palpable dp, pt and radial pulses 2+ bilaterally  Psych:  Normal mood and affect, responds to questions appropriately

## 2019-04-18 NOTE — H&P ADULT - NSICDXPASTMEDICALHX_GEN_ALL_CORE_FT
PAST MEDICAL HISTORY:  Afib pt reports ~9 mos, [id number], resolved - monitored by Dr. Rivers    AICD lead malfunction history of - fixed follow-up by Dr Rivers    Anemia     Coronary artery disease     DM (diabetes mellitus) type 2, Hg A1C 9.9% 2/2019  Dec 2019  due to prednisone started on Lantus Jan 2019    Elevated prolactin level dx: ' 70's  medically managed Bromocriptine    Elevated triglycerides with high cholesterol on meds    Gout medically managed    Heart failure with reduced left ventricular function last EF 43% (2/2018)    Hepatic cirrhosis, unspecified hepatic cirrhosis type     History of hyperprolactinemia     History of osteomyelitis 2015, right foot 2nd toe   follow-up by podiatrist every 2 weeks    HTN (hypertension)     Hypothyroidism     ICD (implantable cardioverter-defibrillator) in place Medtronic, Old Model [id number] , last interrogation 3/27/19, generator change 4/3/19 New Model # [id number]    Ischemic cardiomyopathy     Liver mass dx: 10/2018   incidental finding. Currently awaitng furthur workup    Pituitary adenoma as per patient chronic, no changes , recently restarted follow up with endocrinologist ( note in allscripts )    Presence of stent in coronary artery 2 stents    PVD (peripheral vascular disease)     Sleep apnea not using CPAP    Thrombocytopenia Chronic    Ulcer of right ankle has surgery done Dec 2018    Vitamin D deficiency

## 2019-04-18 NOTE — PRE-ANESTHESIA EVALUATION ADULT - NS MD HP INPLANTS MED DEV
2 cardiac stents/AICD/ PPM: Medtronic #[id number]/Pacemaker/Automatic Implantable Cardioverter Defibrillator

## 2019-04-18 NOTE — H&P ADULT - PROBLEM SELECTOR PLAN 4
on ASA at home, holding in setting of procedure  - pt likely on coreg, unable to remember medications  - confirm coreg and start in am on ASA at home, holding in setting of procedure  - c/w carvedilol  - c/w statin

## 2019-04-19 ENCOUNTER — TRANSCRIPTION ENCOUNTER (OUTPATIENT)
Age: 69
End: 2019-04-19

## 2019-04-19 VITALS
TEMPERATURE: 98 F | HEART RATE: 73 BPM | RESPIRATION RATE: 18 BRPM | SYSTOLIC BLOOD PRESSURE: 113 MMHG | DIASTOLIC BLOOD PRESSURE: 70 MMHG | OXYGEN SATURATION: 98 %

## 2019-04-19 LAB
ALBUMIN SERPL ELPH-MCNC: 3.5 G/DL — SIGNIFICANT CHANGE UP (ref 3.3–5)
ALP SERPL-CCNC: 141 U/L — HIGH (ref 40–120)
ALT FLD-CCNC: 22 U/L — SIGNIFICANT CHANGE UP (ref 10–45)
ANION GAP SERPL CALC-SCNC: 11 MMOL/L — SIGNIFICANT CHANGE UP (ref 5–17)
AST SERPL-CCNC: 35 U/L — SIGNIFICANT CHANGE UP (ref 10–40)
BASOPHILS # BLD AUTO: 0 K/UL — SIGNIFICANT CHANGE UP (ref 0–0.2)
BASOPHILS NFR BLD AUTO: 0 % — SIGNIFICANT CHANGE UP (ref 0–2)
BILIRUB SERPL-MCNC: 0.6 MG/DL — SIGNIFICANT CHANGE UP (ref 0.2–1.2)
BUN SERPL-MCNC: 25 MG/DL — HIGH (ref 7–23)
CALCIUM SERPL-MCNC: 9 MG/DL — SIGNIFICANT CHANGE UP (ref 8.4–10.5)
CHLORIDE SERPL-SCNC: 107 MMOL/L — SIGNIFICANT CHANGE UP (ref 96–108)
CO2 SERPL-SCNC: 19 MMOL/L — LOW (ref 22–31)
CREAT SERPL-MCNC: 1.21 MG/DL — SIGNIFICANT CHANGE UP (ref 0.5–1.3)
EOSINOPHIL # BLD AUTO: 0.2 K/UL — SIGNIFICANT CHANGE UP (ref 0–0.5)
EOSINOPHIL NFR BLD AUTO: 8 % — HIGH (ref 0–6)
GLUCOSE BLDC GLUCOMTR-MCNC: 240 MG/DL — HIGH (ref 70–99)
GLUCOSE SERPL-MCNC: 119 MG/DL — HIGH (ref 70–99)
HCT VFR BLD CALC: 29.8 % — LOW (ref 39–50)
HCV AB S/CO SERPL IA: 0.14 S/CO — SIGNIFICANT CHANGE UP (ref 0–0.99)
HCV AB SERPL-IMP: SIGNIFICANT CHANGE UP
HGB BLD-MCNC: 9.3 G/DL — LOW (ref 13–17)
LYMPHOCYTES # BLD AUTO: 0.5 K/UL — LOW (ref 1–3.3)
LYMPHOCYTES # BLD AUTO: 16.2 % — SIGNIFICANT CHANGE UP (ref 13–44)
MAGNESIUM SERPL-MCNC: 1.6 MG/DL — SIGNIFICANT CHANGE UP (ref 1.6–2.6)
MCHC RBC-ENTMCNC: 28.5 PG — SIGNIFICANT CHANGE UP (ref 27–34)
MCHC RBC-ENTMCNC: 31.2 GM/DL — LOW (ref 32–36)
MCV RBC AUTO: 91.3 FL — SIGNIFICANT CHANGE UP (ref 80–100)
MONOCYTES # BLD AUTO: 0.4 K/UL — SIGNIFICANT CHANGE UP (ref 0–0.9)
MONOCYTES NFR BLD AUTO: 11.7 % — SIGNIFICANT CHANGE UP (ref 2–14)
NEUTROPHILS # BLD AUTO: 2 K/UL — SIGNIFICANT CHANGE UP (ref 1.8–7.4)
NEUTROPHILS NFR BLD AUTO: 64.1 % — SIGNIFICANT CHANGE UP (ref 43–77)
PHOSPHATE SERPL-MCNC: 3.4 MG/DL — SIGNIFICANT CHANGE UP (ref 2.5–4.5)
PLATELET # BLD AUTO: 34 K/UL — LOW (ref 150–400)
POTASSIUM SERPL-MCNC: 4.3 MMOL/L — SIGNIFICANT CHANGE UP (ref 3.5–5.3)
POTASSIUM SERPL-SCNC: 4.3 MMOL/L — SIGNIFICANT CHANGE UP (ref 3.5–5.3)
PROT SERPL-MCNC: 6.7 G/DL — SIGNIFICANT CHANGE UP (ref 6–8.3)
RBC # BLD: 3.26 M/UL — LOW (ref 4.2–5.8)
RBC # FLD: 14.8 % — HIGH (ref 10.3–14.5)
SODIUM SERPL-SCNC: 137 MMOL/L — SIGNIFICANT CHANGE UP (ref 135–145)
WBC # BLD: 3.1 K/UL — LOW (ref 3.8–10.5)
WBC # FLD AUTO: 3.1 K/UL — LOW (ref 3.8–10.5)

## 2019-04-19 PROCEDURE — 76937 US GUIDE VASCULAR ACCESS: CPT

## 2019-04-19 PROCEDURE — C1887: CPT

## 2019-04-19 PROCEDURE — 85027 COMPLETE CBC AUTOMATED: CPT

## 2019-04-19 PROCEDURE — C1760: CPT

## 2019-04-19 PROCEDURE — C1894: CPT

## 2019-04-19 PROCEDURE — 80053 COMPREHEN METABOLIC PANEL: CPT

## 2019-04-19 PROCEDURE — 36430 TRANSFUSION BLD/BLD COMPNT: CPT

## 2019-04-19 PROCEDURE — 82962 GLUCOSE BLOOD TEST: CPT

## 2019-04-19 PROCEDURE — 84100 ASSAY OF PHOSPHORUS: CPT

## 2019-04-19 PROCEDURE — 37243 VASC EMBOLIZE/OCCLUDE ORGAN: CPT

## 2019-04-19 PROCEDURE — 86803 HEPATITIS C AB TEST: CPT

## 2019-04-19 PROCEDURE — C1769: CPT

## 2019-04-19 PROCEDURE — 83735 ASSAY OF MAGNESIUM: CPT

## 2019-04-19 PROCEDURE — C1889: CPT

## 2019-04-19 PROCEDURE — P9011: CPT

## 2019-04-19 PROCEDURE — 36247 INS CATH ABD/L-EXT ART 3RD: CPT

## 2019-04-19 PROCEDURE — 36248 INS CATH ABD/L-EXT ART ADDL: CPT

## 2019-04-19 RX ORDER — METFORMIN HYDROCHLORIDE 850 MG/1
1 TABLET ORAL
Qty: 0 | Refills: 0 | COMMUNITY

## 2019-04-19 RX ORDER — ASPIRIN/CALCIUM CARB/MAGNESIUM 324 MG
1 TABLET ORAL
Qty: 0 | Refills: 0 | COMMUNITY

## 2019-04-19 RX ORDER — RAMIPRIL 5 MG
1 CAPSULE ORAL
Qty: 0 | Refills: 0 | COMMUNITY

## 2019-04-19 RX ORDER — ASPIRIN/CALCIUM CARB/MAGNESIUM 324 MG
81 TABLET ORAL DAILY
Qty: 0 | Refills: 0 | Status: DISCONTINUED | OUTPATIENT
Start: 2019-04-19 | End: 2019-05-03

## 2019-04-19 RX ADMIN — Medication 125 MICROGRAM(S): at 05:35

## 2019-04-19 RX ADMIN — SODIUM CHLORIDE 95 MILLILITER(S): 9 INJECTION INTRAMUSCULAR; INTRAVENOUS; SUBCUTANEOUS at 05:34

## 2019-04-19 RX ADMIN — Medication 2: at 00:04

## 2019-04-19 RX ADMIN — Medication 4: at 10:04

## 2019-04-19 RX ADMIN — Medication 40 MILLIGRAM(S): at 05:35

## 2019-04-19 RX ADMIN — Medication 81 MILLIGRAM(S): at 11:18

## 2019-04-19 RX ADMIN — BROMOCRIPTINE MESYLATE 5 MILLIGRAM(S): 5 CAPSULE ORAL at 11:18

## 2019-04-19 RX ADMIN — CARVEDILOL PHOSPHATE 12.5 MILLIGRAM(S): 80 CAPSULE, EXTENDED RELEASE ORAL at 05:36

## 2019-04-19 RX ADMIN — SODIUM CHLORIDE 95 MILLILITER(S): 9 INJECTION INTRAMUSCULAR; INTRAVENOUS; SUBCUTANEOUS at 07:14

## 2019-04-19 RX ADMIN — SPIRONOLACTONE 25 MILLIGRAM(S): 25 TABLET, FILM COATED ORAL at 05:35

## 2019-04-19 RX ADMIN — INSULIN GLARGINE 10 UNIT(S): 100 INJECTION, SOLUTION SUBCUTANEOUS at 00:05

## 2019-04-19 NOTE — PROGRESS NOTE ADULT - SUBJECTIVE AND OBJECTIVE BOX
Authored by Dr Stevo Robb [phone] [facility] / [id number] [facility]    Patient is a 69y old  Male who presents with a chief complaint of Monitoring after HCC ablation. (18 Apr 2019 22:56)    SUBJECTIVE / OVERNIGHT EVENTS:     69M PMH ischemic cardiomyopathy s/p ICD/PPM in 2006, generator change last week), CAD s/p CABG, T2DM, hypothyroidism, afib (per pt resolved on own, not on AC), HTN, HLD, BEHZAD (not on CPAP), gout, FERNANDES and cirrhosis with newly diagnosed HCC admitted after IR embolization of 2 hypervascular tumors of right lobe with embozene today.  Pt was admitted after IR procedure for observation.     Per notes, no issues during procedure.  At time of examination, pt denied fevers, chills, sob, chest pain.  Denied any bleeding, abdominal pain. Pt states he had eaten his dinner with no issues.    Overnight pt continued on post procedure IVF    MEDICATIONS  (STANDING):  atorvastatin 40 milliGRAM(s) Oral at bedtime  bromocriptine Capsule 5 milliGRAM(s) Oral daily  carvedilol 12.5 milliGRAM(s) Oral every 12 hours  dextrose 5%. 1000 milliLiter(s) (50 mL/Hr) IV Continuous <Continuous>  dextrose 50% Injectable 12.5 Gram(s) IV Push once  dextrose 50% Injectable 25 Gram(s) IV Push once  dextrose 50% Injectable 25 Gram(s) IV Push once  furosemide    Tablet 40 milliGRAM(s) Oral daily  insulin glargine Injectable (LANTUS) 10 Unit(s) SubCutaneous at bedtime  insulin lispro (HumaLOG) corrective regimen sliding scale   SubCutaneous three times a day before meals  insulin lispro (HumaLOG) corrective regimen sliding scale   SubCutaneous at bedtime  levothyroxine 125 MICROGram(s) Oral daily  sodium chloride 0.9%. 1000 milliLiter(s) (95 mL/Hr) IV Continuous <Continuous>  spironolactone 25 milliGRAM(s) Oral daily    MEDICATIONS  (PRN):  dextrose 40% Gel 15 Gram(s) Oral once PRN Blood Glucose LESS THAN 70 milliGRAM(s)/deciliter  glucagon  Injectable 1 milliGRAM(s) IntraMuscular once PRN Glucose LESS THAN 70 milligrams/deciliter      Vital Signs Last 24 Hrs  T(C): 37.1 (19 Apr 2019 05:10), Max: 37.4 (19 Apr 2019 01:27)  T(F): 98.8 (19 Apr 2019 05:10), Max: 99.3 (19 Apr 2019 01:27)  HR: 77 (19 Apr 2019 05:10) (77 - 91)  BP: 112/71 (19 Apr 2019 05:10) (101/60 - 128/66)  BP(mean): 85 (18 Apr 2019 19:30) (81 - 87)  RR: 18 (19 Apr 2019 05:10) (15 - 18)  SpO2: 99% (19 Apr 2019 05:10) (98% - 100%)  CAPILLARY BLOOD GLUCOSE      POCT Blood Glucose.: 266 mg/dL (18 Apr 2019 23:43)  POCT Blood Glucose.: 118 mg/dL (18 Apr 2019 18:31)  POCT Blood Glucose.: 116 mg/dL (18 Apr 2019 13:57)    I&O's Summary    18 Apr 2019 07:01  -  19 Apr 2019 07:00  --------------------------------------------------------  IN: 790 mL / OUT: 2025 mL / NET: -1235 mL        PHYSICAL EXAM  General: Sitting in bed, NAD  HEENT:   Normal oral mucosa, PERRL, EOMI, anicteric sclera  Cardiovascular: RRR, No JVD, No murmurs, gallops or rubs appreciated  Respiratory: Lungs clear to auscultation bilaterally, no wheezes, crackles appreciated  Gastrointestinal:  Soft, distended, no tenderness to palpation  Skin: toes with ulcers noted on right big toe and cut on right heel.  Neurologic: AOx3, CNII-XII grossly intact, motor and sensory function grossly intact  Extremities: Moving all extremities equally  Vascular: palpable dp, pt and radial pulses 2+ bilaterally  Psych:  Normal mood and affect, responds to questions appropriately    LABS:      Comprehensive Metabolic Panel in AM (04.19.19 @ 07:08)    Sodium, Serum: 137 mmol/L    Potassium, Serum: 4.3 mmol/L    Chloride, Serum: 107 mmol/L    Carbon Dioxide, Serum: 19 mmol/L    Anion Gap, Serum: 11 mmol/L    Blood Urea Nitrogen, Serum: 25 mg/dL    Creatinine, Serum: 1.21 mg/dL    Glucose, Serum: 119 mg/dL    Calcium, Total Serum: 9.0 mg/dL    Protein Total, Serum: 6.7 g/dL    Albumin, Serum: 3.5 g/dL    Bilirubin Total, Serum: 0.6 mg/dL    Alkaline Phosphatase, Serum: 141 U/L    Aspartate Aminotransferase (AST/SGOT): 35: Mild hemolysis results may be falsely elevated U/L    Alanine Aminotransferase (ALT/SGPT): 22 U/L    eGFR if Non : 62:              RADIOLOGY & ADDITIONAL TESTS:    Imaging Personally Reviewed:  Consultant(s) Notes Reviewed:    Care Discussed with Consultants/Other Providers: Authored by Dr Stevo Robb [phone] [facility] / [id number] [facility]    Patient is a 69y old  Male who presents with a chief complaint of Monitoring after HCC ablation. (18 Apr 2019 22:56)    SUBJECTIVE / OVERNIGHT EVENTS:     69M PMH ischemic cardiomyopathy s/p ICD/PPM in 2006, generator change last week), CAD s/p CABG, T2DM, hypothyroidism, afib (per pt resolved on own, not on AC), HTN, HLD, BEHZAD (not on CPAP), gout, FERNANDES and cirrhosis with newly diagnosed HCC admitted after IR embolization of 2 hypervascular tumors of right lobe with embozene today.  Pt was admitted after IR procedure for observation.     Per notes, no issues during procedure.  At time of examination, pt denied fevers, chills, sob, chest pain.  Denied any bleeding, abdominal pain. Pt states he had eaten his dinner with no issues.    Overnight pt continued on post procedure IVF    This morning pt reports he feels with without any pain. He endorses a small amount of bleed in the access site    MEDICATIONS  (STANDING):  atorvastatin 40 milliGRAM(s) Oral at bedtime  bromocriptine Capsule 5 milliGRAM(s) Oral daily  carvedilol 12.5 milliGRAM(s) Oral every 12 hours  dextrose 5%. 1000 milliLiter(s) (50 mL/Hr) IV Continuous <Continuous>  dextrose 50% Injectable 12.5 Gram(s) IV Push once  dextrose 50% Injectable 25 Gram(s) IV Push once  dextrose 50% Injectable 25 Gram(s) IV Push once  furosemide    Tablet 40 milliGRAM(s) Oral daily  insulin glargine Injectable (LANTUS) 10 Unit(s) SubCutaneous at bedtime  insulin lispro (HumaLOG) corrective regimen sliding scale   SubCutaneous three times a day before meals  insulin lispro (HumaLOG) corrective regimen sliding scale   SubCutaneous at bedtime  levothyroxine 125 MICROGram(s) Oral daily  sodium chloride 0.9%. 1000 milliLiter(s) (95 mL/Hr) IV Continuous <Continuous>  spironolactone 25 milliGRAM(s) Oral daily    MEDICATIONS  (PRN):  dextrose 40% Gel 15 Gram(s) Oral once PRN Blood Glucose LESS THAN 70 milliGRAM(s)/deciliter  glucagon  Injectable 1 milliGRAM(s) IntraMuscular once PRN Glucose LESS THAN 70 milligrams/deciliter      Vital Signs Last 24 Hrs  T(C): 37.1 (19 Apr 2019 05:10), Max: 37.4 (19 Apr 2019 01:27)  T(F): 98.8 (19 Apr 2019 05:10), Max: 99.3 (19 Apr 2019 01:27)  HR: 77 (19 Apr 2019 05:10) (77 - 91)  BP: 112/71 (19 Apr 2019 05:10) (101/60 - 128/66)  BP(mean): 85 (18 Apr 2019 19:30) (81 - 87)  RR: 18 (19 Apr 2019 05:10) (15 - 18)  SpO2: 99% (19 Apr 2019 05:10) (98% - 100%)  CAPILLARY BLOOD GLUCOSE      POCT Blood Glucose.: 266 mg/dL (18 Apr 2019 23:43)  POCT Blood Glucose.: 118 mg/dL (18 Apr 2019 18:31)  POCT Blood Glucose.: 116 mg/dL (18 Apr 2019 13:57)    I&O's Summary    18 Apr 2019 07:01  -  19 Apr 2019 07:00  --------------------------------------------------------  IN: 790 mL / OUT: 2025 mL / NET: -1235 mL        PHYSICAL EXAM  General: Sitting in bed, NAD  HEENT:   Normal oral mucosa, PERRL, EOMI, anicteric sclera  Cardiovascular: RRR, No JVD, No murmurs, gallops or rubs appreciated  Respiratory: Lungs clear to auscultation bilaterally, no wheezes, crackles appreciated  Gastrointestinal:  Soft, distended, no tenderness to palpation  Skin: toes with ulcers noted on right big toe and cut on right heel.  Neurologic: AOx3, CNII-XII grossly intact, motor and sensory function grossly intact  Extremities: Moving all extremities equally  Vascular: right femoral site with small clot over wound, no active bleeding  Psych:  Normal mood and affect, responds to questions appropriately    LABS:      Comprehensive Metabolic Panel in AM (04.19.19 @ 07:08)    Sodium, Serum: 137 mmol/L    Potassium, Serum: 4.3 mmol/L    Chloride, Serum: 107 mmol/L    Carbon Dioxide, Serum: 19 mmol/L    Anion Gap, Serum: 11 mmol/L    Blood Urea Nitrogen, Serum: 25 mg/dL    Creatinine, Serum: 1.21 mg/dL    Glucose, Serum: 119 mg/dL    Calcium, Total Serum: 9.0 mg/dL    Protein Total, Serum: 6.7 g/dL    Albumin, Serum: 3.5 g/dL    Bilirubin Total, Serum: 0.6 mg/dL    Alkaline Phosphatase, Serum: 141 U/L    Aspartate Aminotransferase (AST/SGOT): 35: Mild hemolysis results may be falsely elevated U/L    Alanine Aminotransferase (ALT/SGPT): 22 U/L    eGFR if Non : 62:                          9.3    3.1   )-----------( 34       ( 19 Apr 2019 07:08 )             29.8             RADIOLOGY & ADDITIONAL TESTS:    Imaging Personally Reviewed:  Consultant(s) Notes Reviewed:    Care Discussed with Consultants/Other Providers:

## 2019-04-19 NOTE — DISCHARGE NOTE PROVIDER - NSDCCPCAREPLAN_GEN_ALL_CORE_FT
PRINCIPAL DISCHARGE DIAGNOSIS  Diagnosis: HCC (hepatocellular carcinoma)  Assessment and Plan of Treatment: .You had a hepatic angiogram and bland embolization of your liver tumor (s) by Dr. Hoyt in Interventional Radiology on 4/18/19.    Please have a follow up CT in 4 weeks, then schedule follow up with Dr. Hoyt to review imaging.  Monitor right groin site for symptoms of bleeding, hard area underneath the skin, bruising, numbness, intense pain, or inability to move.  If you have any of these symptoms, contact Dr. Hoyt and seek immediate medical attention  You may continue to have mild abdominal pain, nausea, and low grade fever.  These are normal after your procedure, due to post embolization syndrome and they should resolve within 3-5 days, but can last up to 1 week.  Please call Interventional Radiology if you have a fever > 102.0 F.        SECONDARY DISCHARGE DIAGNOSES  Diagnosis: Type 2 diabetes mellitus without complication, with long-term current use of insulin  Assessment and Plan of Treatment: You may resume Metformin 4/20/19.    Diagnosis: CKD (chronic kidney disease) stage 3, GFR 30-59 ml/min  Assessment and Plan of Treatment: Your renal function after the procedure is at baseline.    You may resume your previously prescribed diuretics and Ramipril.  You may resume your Metformin on 4/20/19.   Follow up with Dr. Clement on 5/2/19 for evaluation prior to having CT scan.

## 2019-04-19 NOTE — DISCHARGE NOTE PROVIDER - CARE PROVIDER_API CALL
Shady Hoyt (MD)  Diagnostic Radiology  [address] [place] [address]  Phone: [phone]  Fax: [phone]  Follow Up Time:     Kaylee Clement)  Internal Medicine  [address], [address] [place] [address]  Phone: [phone]  Fax: [phone]  Follow Up Time:

## 2019-04-19 NOTE — PROGRESS NOTE ADULT - PROBLEM SELECTOR PLAN 9
SCDs in setting of low platelets  Diabetic/DASH diet    Ana Carrasco MD, PhD  PGY-2| Internal Medicine  [phone] / [phone]

## 2019-04-19 NOTE — DISCHARGE NOTE PROVIDER - CARE PROVIDERS DIRECT ADDRESSES
[email or web address]

## 2019-04-19 NOTE — DISCHARGE NOTE PROVIDER - NSDCACTIVITY_GEN_ALL_CORE
No heavy lifting/straining/Do not make important decisions/Do not drive or operate machinery/x 48 hours, then resume prior activity level

## 2019-04-19 NOTE — PROGRESS NOTE ADULT - PROBLEM SELECTOR PLAN 1
Pt with HCC, here for elective tumor embolization.  - no mckinnon in place now  - tramadol prn pain control, not in pain at this time.  - Pt received 1U platelets prior to procedure, will check CBC in am Pt with HCC, here for elective tumor embolization.  - no mckinnon in place now  - tramadol prn pain control, not in pain at this time.  - Pt received 1U platelets prior to procedure, will AM CBC stable

## 2019-04-19 NOTE — PROGRESS NOTE ADULT - PROBLEM SELECTOR PLAN 3
Pt with DM2, hold oral hypoglycemics while in hospital (on metformin and januvia)  - will give 10U lantus tonight given pt has had minimal PO intake  - ISS  - diabetic diet. Pt with DM2, hold oral hypoglycemics while in hospital (on metformin and januvia)  - recieved 10U lantus last night given pt has had minimal PO intake  - ISS  - diabetic diet.

## 2019-04-19 NOTE — DISCHARGE NOTE PROVIDER - HOSPITAL COURSE
Mr. Harvey is a 69 year old M with PMH of ischemic cardiomyopathy s/p ICD/PPM in 2006 (generator change last week), CAD s/p CABG, T2DM (A1c 8.5 on 4/11/19), hypothyroidism, Afib (per pt resolved on own, not on AC), HTN, HLD, BEHZAD (not on CPAP), gout, FERNANDES and cirrhosis with newly diagnosed HCC admitted after IR embolization of 2 hypervascular tumors of right lobe with embolized 4/18. Admitted after IR procedure for observation. Pt was continued on IVF hydration for 6 hrs post procedure give Hx of CKD. BMP in the morning showing improved GFR compare to pre-admission labs. CBC remained stable.         Patient is currently stable for discharge with IR followup with Dr. Hoyt and repeat CT in 4 wks . Nephrology followup with Dr. Clement for CKD. Mr. Harvey is a 69 year old M with PMH of ischemic cardiomyopathy s/p ICD/PPM in 2006 (generator change last week), CAD s/p CABG, T2DM (A1c 8.5 on 4/11/19), hypothyroidism, Afib (per pt resolved on own, not on AC), HTN, HLD, BEHZAD (not on CPAP), gout, FERNANDES and cirrhosis with newly diagnosed HCC admitted after IR embolization of 2 hypervascular tumors of right lobe with embolized 4/18. Admitted after IR procedure for observation. Pt was continued on IVF hydration for 6 hrs post procedure give Hx of CKD. BMP in the morning showing improved GFR compare to pre-admission labs. CBC remained stable.         Patient is currently stable for discharge with IR followup with Dr. Hoyt and repeat CT in 4 wks . Nephrology followup with Dr. Clement for CKD

## 2019-04-19 NOTE — DISCHARGE NOTE NURSING/CASE MANAGEMENT/SOCIAL WORK - NSDCDPATPORTLINK_GEN_ALL_CORE
You can access the [facility] Patient Portal, offered by [facility], by registering with the following website: [email or web address]

## 2019-04-19 NOTE — PROGRESS NOTE ADULT - SUBJECTIVE AND OBJECTIVE BOX
Interventional Radiology Follow- Up Note      69y Male s/p hepatic angiogram and bland embolization of liver tumor on 4/18/19 in Interventional Radiology with Dr Hoyt.     Patient seen and examined @ bedside. No complaints offered.  Denies n/v, abdominal pain, fever, chills.  Voiding.    Vitals: T(F): 98.8 (04-19-19 @ 05:10), Max: 99.3 (04-19-19 @ 01:27)  HR: 77 (04-19-19 @ 05:10) (77 - 91)  BP: 112/71 (04-19-19 @ 05:10) (101/60 - 128/66)  RR: 18 (04-19-19 @ 05:10) (15 - 18)  SpO2: 99% (04-19-19 @ 05:10) (98% - 100%)  Wt(kg): --    LABS:                        9.3    3.1   )-----------( 34       ( 19 Apr 2019 07:08 )             29.8     04-19    137  |  107  |  25<H>  ----------------------------<  119<H>  4.3   |  19<L>  |  1.21    Ca    9.0      19 Apr 2019 07:08  Phos  3.4     04-19  Mg     1.6     04-19    TPro  6.7  /  Alb  3.5  /  TBili  0.6  /  DBili  x   /  AST  35  /  ALT  22  /  AlkPhos  141<H>  04-19      I&O's Detail    18 Apr 2019 07:01  -  19 Apr 2019 07:00  --------------------------------------------------------  IN:    sodium chloride 0.9%.: 790 mL  Total IN: 790 mL    OUT:    Voided: 2025 mL  Total OUT: 2025 mL    Total NET: -1235 mL    PHYSICAL EXAM:  General: Nontoxic, in NAD  Neuro:  Alert & oriented x 3  Lung: respirations nonlabored, good inspiratory effort  Abdomen: soft, NTND.   Extremities: no pedal edema or calf tenderness noted, right groin puncture site dsg saturated with old dried blood, dried blood noted on gown.  Dsg removed no evidence of active bleeding, ecchymosis, or hematoma, area cleansed with NS, dry dsg placed.  +2 femoral and dp pulse, +movement, +sensation, warm to touch.       Impression: This is a 68 y/o male with PMHx of ischemic cardiomyopathy (s/p ICD/PPM implant 2006, generator change 4/3/19), CAD (CABG x 4 1986), T2DM, hypothyroidism, Afib (resolved on own 2018- not on A/C followed by Dr. Bowers), HTN, HLD, BEHZAD (not on CPAP), gouty arthritis, FERNANDES, CKD, and cirrhosis who was admitted for hepatic angiogram and embolization of liver tumor.       Plan:  1.  HCC  -stable s/p hepatic angiogram /embolization  -monitor right groin site  -OOB ambulate  -repeat CT in 4 weeks (rx given), then f/u with Dr. Hoyt in IR  -discharge planning, d/c once cleared by Medicine team.   -may resume baby aspirin    2.  CKD  -renal function stable post angiogram  -resume previously prescribed Ramipril and diuretics  -resume Metformin 4/20/19  -f/u with nephrology, Dr. Clement, on 5/2 for evaluation prior to CT     -Will d/w Dr. Hoyt and Medicine team.  Please call IR at extension [phone] with any questions, concerns, or issues regarding above.

## 2019-04-19 NOTE — DISCHARGE NOTE NURSING/CASE MANAGEMENT/SOCIAL WORK - NSTRANSFERBELONGINGSRESP_GEN_A_NUR
Message   Recorded as Task   Date: 01/27/2017 02:51 PM, Created By: Cecilia Bai   Task Name: Provider Clinical Communication   Assigned To: Cecilia Bai   Regarding Patient: DANITZA FLORES, Status: Active   Comment:    Cecilia Bai - 27 Jan 2017 2:51 PM     TASK CREATED  Son left message, reports that patient fell this morning.   Cecilia Bai - 30 Jan 2017 9:41 AM     TASK EDITED  Son was phoned back, he states his grandmother was bending over and fell picking something up.   He states this is because the home is in disrepair and untidy. He has spoken to the PCP and is attempting to get her placed in a long term care facility.     CT was completed on Friday January 27, 2017, WNL, no evidence of hemorrhage or fracture.   Grandson just wanted her chart noted as they may need evidence for placement.     Signatures   Electronically signed by : Cecilia Bai R.N.; Jan 30 2017  9:41AM CST    
yes

## 2019-04-19 NOTE — PROGRESS NOTE ADULT - PROBLEM SELECTOR PLAN 2
Pt with GFR47 on admission, given IV hydration post procedure and pre-procedure for JOHN ppx  - will d/c IVF after 6 hours.  - AM BMP shows improved GFR compared to 4/3

## 2019-04-19 NOTE — PROGRESS NOTE ADULT - ATTENDING COMMENTS
s/p embolization for HCC. improved now, tolerating po, no pain. advised pt to monitor groin for signs/symptoms of hematoma.   pt medically stable for dc home. he will f/u with pmd vs oncology 1 week for repeat bmp to assess kidney function. cont all home meds on dc  dc time 42 mins

## 2019-04-20 RX ORDER — METFORMIN HYDROCHLORIDE 850 MG/1
1 TABLET ORAL
Qty: 0 | Refills: 0 | COMMUNITY
Start: 2019-04-20

## 2019-04-24 ENCOUNTER — APPOINTMENT (OUTPATIENT)
Dept: CARDIOLOGY | Facility: CLINIC | Age: 69
End: 2019-04-24
Payer: COMMERCIAL

## 2019-04-24 ENCOUNTER — NON-APPOINTMENT (OUTPATIENT)
Age: 69
End: 2019-04-24

## 2019-04-24 VITALS
DIASTOLIC BLOOD PRESSURE: 64 MMHG | BODY MASS INDEX: 27.7 KG/M2 | HEART RATE: 62 BPM | WEIGHT: 209 LBS | HEIGHT: 73 IN | SYSTOLIC BLOOD PRESSURE: 102 MMHG | OXYGEN SATURATION: 100 %

## 2019-04-24 DIAGNOSIS — C22.0 LIVER CELL CARCINOMA: ICD-10-CM

## 2019-04-24 PROCEDURE — 99215 OFFICE O/P EST HI 40 MIN: CPT

## 2019-04-24 PROCEDURE — 93000 ELECTROCARDIOGRAM COMPLETE: CPT

## 2019-04-24 RX ORDER — CEPHALEXIN 500 MG/1
500 CAPSULE ORAL
Qty: 10 | Refills: 0 | Status: DISCONTINUED | COMMUNITY
Start: 2019-04-16 | End: 2019-04-24

## 2019-04-24 NOTE — HISTORY OF PRESENT ILLNESS
[FreeTextEntry1] : Mr. Jamil Harvey is a 69-year-old man with ischemic cardiomyopathy, ventricular tachycardia and appropriate ICD device firing. Uvaldo lead fractured and was capped and a new generator and lead implanted. After device adjustments to alleviate potential for pause dependent ventricular tachycardia has had no events. Otherwise, despite his long history of severe multivessel coronary artery disease and very remote coronary bypass surgery with severe ventricular dysfunction, has always been active and never experiences chest pain or dyspnea. Recurrent foot infection managed by podiatrist and ID and healed, but recently recurrent cellulitis with hospital stay for IV antibiotics and complication of JULIANNE, ultimately resolved. He always sleeps in recliner to manage obstructive sleep apnea as has been unable to tolerate any mask or device. Regular exercise includes walking, riding stationary bike 6 miles a day and golf. Now over 30 years coming to this practice since coronary bypass surgery.\par \par Few months ago onset of anasarca, diuretic dose increased with minimal response. Then added metolazone with dramatic response, 65 pound weight reduction and now weighs much less than at anytime in our record. Was feeling much better and denies dizziness, lightheadedness. Started on anticoagulation with plan for cardioversion, but instead admitted with septic bursitis of knee requiring orthopedic procedure and remarkably spontaneously reverted to sinus rhythm which is maintained. Breathing improved, not retaining excess volume. Had ICD generator change for NÉSTOR and has also been in and out of hospital for IR embolization of HCC.

## 2019-04-24 NOTE — PHYSICAL EXAM
[General Appearance - Well Developed] : well developed [Normal Appearance] : normal appearance [Well Groomed] : well groomed [General Appearance - Well Nourished] : well nourished [No Deformities] : no deformities [General Appearance - In No Acute Distress] : no acute distress [Eyelids - No Xanthelasma] : the eyelids demonstrated no xanthelasmas [Normal Conjunctiva] : the conjunctiva exhibited no abnormalities [No Oral Pallor] : no oral pallor [Normal Oral Mucosa] : normal oral mucosa [JVD Elevated _____cm] : JVD elevated [unfilled] ~U cm above clavicle [No Oral Cyanosis] : no oral cyanosis [Normal Jugular Venous V Waves Present] : normal jugular venous V waves present [No Jugular Venous Storm A Waves] : no jugular venous storm A waves [Respiration, Rhythm And Depth] : normal respiratory rhythm and effort [Exaggerated Use Of Accessory Muscles For Inspiration] : no accessory muscle use [Not Palpable] : not palpable [Auscultation Breath Sounds / Voice Sounds] : lungs were clear to auscultation bilaterally [Normal Rate] : normal [Rhythm Regular] : regular [No Gallop] : no gallop heard [Normal S1] : normal S1 [Normal S2] : normal S2 [No Murmur] : no murmurs heard [1+] : right 1+ [2+] : left 2+ [No Abnormalities] : the abdominal aorta was not enlarged and no bruit was heard [Abdomen Tenderness] : non-tender [Abdomen Soft] : soft [Abdomen Mass (___ Cm)] : no abdominal mass palpated [Abnormal Walk] : normal gait [Cyanosis, Localized] : no localized cyanosis [Nail Clubbing] : no clubbing of the fingernails [Petechial Hemorrhages (___cm)] : no petechial hemorrhages [] : no rash [Skin Color & Pigmentation] : normal skin color and pigmentation [No Skin Ulcers] : no skin ulcer [Skin Lesions] : no skin lesions [No Xanthoma] : no  xanthoma was observed [Affect] : the affect was normal [Oriented To Time, Place, And Person] : oriented to person, place, and time [No Anxiety] : not feeling anxious [Mood] : the mood was normal [Right Carotid Bruit] : no bruit heard over the right carotid [Left Carotid Bruit] : no bruit heard over the left carotid [Rt] : no varicose veins of the right leg [FreeTextEntry1] : venous stasis discoloration of distal lower extremities

## 2019-04-24 NOTE — REASON FOR VISIT
[Cardiomyopathy] : cardiomyopathy [Follow-Up - Clinic] : a clinic follow-up of [Ventricular Tachycardia] : ventricular tachycardia [Coronary Artery Disease] : coronary artery disease

## 2019-04-24 NOTE — DISCUSSION/SUMMARY
[Cardiomyopathy] : cardiomyopathy [NYHA Class II] : NYHA Class II [ACC/AHA Stage C] : ACC/AHA Stage C [Ischemic Cardiomyopathy] : ischemic cardiomyopathy [Coronary Artery Disease] : coronary artery disease [Stable] : stable [Paroxysmal Ventricular Tachycardia] : paroxysmal [Chronic Systolic Heart Failure] : chronic systolic congestive heart failure [Compensated] : compensated [Patient] : the patient [None] : There are no changes in medication management [de-identified] : ICD generator replaced for NÉSTOR [de-identified] : spontanieously reverted to sinus and maintained for prolonged period

## 2019-04-25 ENCOUNTER — FORM ENCOUNTER (OUTPATIENT)
Age: 69
End: 2019-04-25

## 2019-04-26 ENCOUNTER — APPOINTMENT (OUTPATIENT)
Dept: RHEUMATOLOGY | Facility: CLINIC | Age: 69
End: 2019-04-26
Payer: COMMERCIAL

## 2019-04-26 VITALS
SYSTOLIC BLOOD PRESSURE: 113 MMHG | DIASTOLIC BLOOD PRESSURE: 71 MMHG | BODY MASS INDEX: 28.4 KG/M2 | WEIGHT: 212 LBS | TEMPERATURE: 97.7 F | RESPIRATION RATE: 16 BRPM | HEIGHT: 72.5 IN | OXYGEN SATURATION: 99 % | HEART RATE: 76 BPM

## 2019-04-26 PROCEDURE — 99213 OFFICE O/P EST LOW 20 MIN: CPT

## 2019-04-26 PROCEDURE — 73130 X-RAY EXAM OF HAND: CPT | Mod: RT

## 2019-04-30 ENCOUNTER — FORM ENCOUNTER (OUTPATIENT)
Age: 69
End: 2019-04-30

## 2019-05-01 ENCOUNTER — APPOINTMENT (OUTPATIENT)
Dept: RHEUMATOLOGY | Facility: CLINIC | Age: 69
End: 2019-05-01
Payer: COMMERCIAL

## 2019-05-01 ENCOUNTER — RX RENEWAL (OUTPATIENT)
Age: 69
End: 2019-05-01

## 2019-05-01 ENCOUNTER — LABORATORY RESULT (OUTPATIENT)
Age: 69
End: 2019-05-01

## 2019-05-01 VITALS
BODY MASS INDEX: 28.13 KG/M2 | RESPIRATION RATE: 16 BRPM | TEMPERATURE: 97.8 F | OXYGEN SATURATION: 96 % | HEART RATE: 83 BPM | DIASTOLIC BLOOD PRESSURE: 72 MMHG | HEIGHT: 72.5 IN | WEIGHT: 210 LBS | SYSTOLIC BLOOD PRESSURE: 106 MMHG

## 2019-05-01 DIAGNOSIS — M25.511 PAIN IN RIGHT SHOULDER: ICD-10-CM

## 2019-05-01 PROCEDURE — 73020 X-RAY EXAM OF SHOULDER: CPT | Mod: RT

## 2019-05-01 PROCEDURE — 96372 THER/PROPH/DIAG INJ SC/IM: CPT

## 2019-05-01 PROCEDURE — 99213 OFFICE O/P EST LOW 20 MIN: CPT | Mod: 25

## 2019-05-02 ENCOUNTER — INPATIENT (INPATIENT)
Facility: HOSPITAL | Age: 69
LOS: 4 days | Discharge: HOME | DRG: 871 | End: 2019-05-07
Attending: HOSPITALIST | Admitting: HOSPITALIST
Payer: COMMERCIAL

## 2019-05-02 ENCOUNTER — APPOINTMENT (OUTPATIENT)
Dept: HEPATOLOGY | Facility: CLINIC | Age: 69
End: 2019-05-02

## 2019-05-02 VITALS
TEMPERATURE: 98 F | RESPIRATION RATE: 18 BRPM | WEIGHT: 212.97 LBS | DIASTOLIC BLOOD PRESSURE: 69 MMHG | SYSTOLIC BLOOD PRESSURE: 120 MMHG | HEART RATE: 96 BPM | OXYGEN SATURATION: 95 % | HEIGHT: 72 IN

## 2019-05-02 DIAGNOSIS — Z01.89 ENCOUNTER FOR OTHER SPECIFIED SPECIAL EXAMINATIONS: Chronic | ICD-10-CM

## 2019-05-02 DIAGNOSIS — R50.9 FEVER, UNSPECIFIED: ICD-10-CM

## 2019-05-02 DIAGNOSIS — Z89.9 ACQUIRED ABSENCE OF LIMB, UNSPECIFIED: Chronic | ICD-10-CM

## 2019-05-02 LAB
ALBUMIN SERPL ELPH-MCNC: 3.7 G/DL — SIGNIFICANT CHANGE UP (ref 3.3–5)
ALBUMIN SERPL ELPH-MCNC: 4 G/DL
ALP BLD-CCNC: 134 U/L
ALP SERPL-CCNC: 127 U/L — HIGH (ref 40–120)
ALT FLD-CCNC: 20 U/L — SIGNIFICANT CHANGE UP (ref 10–45)
ALT SERPL-CCNC: 22 U/L
ANION GAP SERPL CALC-SCNC: 12 MMOL/L — SIGNIFICANT CHANGE UP (ref 5–17)
ANION GAP SERPL CALC-SCNC: 13 MMOL/L
APPEARANCE UR: CLEAR — SIGNIFICANT CHANGE UP
AST SERPL-CCNC: 24 U/L — SIGNIFICANT CHANGE UP (ref 10–40)
AST SERPL-CCNC: 30 U/L
BACTERIA # UR AUTO: NEGATIVE — SIGNIFICANT CHANGE UP
BASE EXCESS BLDV CALC-SCNC: -3.9 MMOL/L — LOW (ref -2–2)
BASOPHILS # BLD AUTO: 0 K/UL
BASOPHILS # BLD AUTO: 0 K/UL — SIGNIFICANT CHANGE UP (ref 0–0.2)
BASOPHILS NFR BLD AUTO: 0 %
BILIRUB SERPL-MCNC: 1 MG/DL — SIGNIFICANT CHANGE UP (ref 0.2–1.2)
BILIRUB SERPL-MCNC: 1.1 MG/DL
BILIRUB UR-MCNC: NEGATIVE — SIGNIFICANT CHANGE UP
BUN SERPL-MCNC: 65 MG/DL — HIGH (ref 7–23)
BUN SERPL-MCNC: 66 MG/DL
CALCIUM SERPL-MCNC: 9.1 MG/DL
CALCIUM SERPL-MCNC: 9.6 MG/DL — SIGNIFICANT CHANGE UP (ref 8.4–10.5)
CHLORIDE SERPL-SCNC: 100 MMOL/L
CHLORIDE SERPL-SCNC: 96 MMOL/L — SIGNIFICANT CHANGE UP (ref 96–108)
CO2 BLDV-SCNC: 22 MMOL/L — SIGNIFICANT CHANGE UP (ref 22–30)
CO2 SERPL-SCNC: 18 MMOL/L — LOW (ref 22–31)
CO2 SERPL-SCNC: 20 MMOL/L
COLOR SPEC: SIGNIFICANT CHANGE UP
CREAT SERPL-MCNC: 2.37 MG/DL — HIGH (ref 0.5–1.3)
CREAT SERPL-MCNC: 2.5 MG/DL
CRP SERPL-MCNC: 12.63 MG/DL
DIFF PNL FLD: ABNORMAL
EOSINOPHIL # BLD AUTO: 0 K/UL — SIGNIFICANT CHANGE UP (ref 0–0.5)
EOSINOPHIL # BLD AUTO: 0.07 K/UL
EOSINOPHIL NFR BLD AUTO: 1 %
EPI CELLS # UR: 0 /HPF — SIGNIFICANT CHANGE UP
ERYTHROCYTE [SEDIMENTATION RATE] IN BLOOD BY WESTERGREN METHOD: 110 MM/HR
GAS PNL BLDV: SIGNIFICANT CHANGE UP
GLUCOSE SERPL-MCNC: 264 MG/DL
GLUCOSE SERPL-MCNC: 322 MG/DL — HIGH (ref 70–99)
GLUCOSE UR QL: ABNORMAL
HCO3 BLDV-SCNC: 21 MMOL/L — SIGNIFICANT CHANGE UP (ref 21–29)
HCT VFR BLD CALC: 31.9 % — LOW (ref 39–50)
HCT VFR BLD CALC: 32.4 %
HGB BLD-MCNC: 10 G/DL
HGB BLD-MCNC: 10.5 G/DL — LOW (ref 13–17)
HYALINE CASTS # UR AUTO: 0 /LPF — SIGNIFICANT CHANGE UP (ref 0–2)
KETONES UR-MCNC: NEGATIVE — SIGNIFICANT CHANGE UP
LEUKOCYTE ESTERASE UR-ACNC: NEGATIVE — SIGNIFICANT CHANGE UP
LYMPHOCYTES # BLD AUTO: 0.3 K/UL — LOW (ref 1–3.3)
LYMPHOCYTES # BLD AUTO: 0.56 K/UL
LYMPHOCYTES # BLD AUTO: 5 % — LOW (ref 13–44)
LYMPHOCYTES NFR BLD AUTO: 8 %
MAN DIFF?: NORMAL
MCHC RBC-ENTMCNC: 28.1 PG
MCHC RBC-ENTMCNC: 29.1 PG — SIGNIFICANT CHANGE UP (ref 27–34)
MCHC RBC-ENTMCNC: 30.9 GM/DL
MCHC RBC-ENTMCNC: 32.8 GM/DL — SIGNIFICANT CHANGE UP (ref 32–36)
MCV RBC AUTO: 88.8 FL — SIGNIFICANT CHANGE UP (ref 80–100)
MCV RBC AUTO: 91 FL
MONOCYTES # BLD AUTO: 0.21 K/UL
MONOCYTES # BLD AUTO: 0.4 K/UL — SIGNIFICANT CHANGE UP (ref 0–0.9)
MONOCYTES NFR BLD AUTO: 3 %
MONOCYTES NFR BLD AUTO: 4 % — SIGNIFICANT CHANGE UP (ref 2–14)
NEUTROPHILS # BLD AUTO: 5.6 K/UL — SIGNIFICANT CHANGE UP (ref 1.8–7.4)
NEUTROPHILS # BLD AUTO: 6.12 K/UL
NEUTROPHILS NFR BLD AUTO: 83 %
NEUTROPHILS NFR BLD AUTO: 83 % — HIGH (ref 43–77)
NITRITE UR-MCNC: NEGATIVE — SIGNIFICANT CHANGE UP
PCO2 BLDV: 38 MMHG — SIGNIFICANT CHANGE UP (ref 35–50)
PH BLDV: 7.36 — SIGNIFICANT CHANGE UP (ref 7.35–7.45)
PH UR: 6 — SIGNIFICANT CHANGE UP (ref 5–8)
PLATELET # BLD AUTO: 55 K/UL
PLATELET # BLD AUTO: 62 K/UL — LOW (ref 150–400)
PO2 BLDV: <20 MMHG — LOW (ref 25–45)
POTASSIUM SERPL-MCNC: 5.3 MMOL/L — SIGNIFICANT CHANGE UP (ref 3.5–5.3)
POTASSIUM SERPL-SCNC: 5.3 MMOL/L — SIGNIFICANT CHANGE UP (ref 3.5–5.3)
POTASSIUM SERPL-SCNC: 6.2 MMOL/L
PROT SERPL-MCNC: 7.6 G/DL
PROT SERPL-MCNC: 8 G/DL — SIGNIFICANT CHANGE UP (ref 6–8.3)
PROT UR-MCNC: ABNORMAL
RAPID RVP RESULT: SIGNIFICANT CHANGE UP
RBC # BLD: 3.56 M/UL
RBC # BLD: 3.59 M/UL — LOW (ref 4.2–5.8)
RBC # FLD: 14.1 % — SIGNIFICANT CHANGE UP (ref 10.3–14.5)
RBC # FLD: 15.5 %
RBC CASTS # UR COMP ASSIST: 10 /HPF — HIGH (ref 0–4)
SAO2 % BLDV: 19 % — LOW (ref 67–88)
SODIUM SERPL-SCNC: 126 MMOL/L — LOW (ref 135–145)
SODIUM SERPL-SCNC: 133 MMOL/L
SP GR SPEC: 1.01 — SIGNIFICANT CHANGE UP (ref 1.01–1.02)
URATE SERPL-MCNC: 9.3 MG/DL
UROBILINOGEN FLD QL: NEGATIVE — SIGNIFICANT CHANGE UP
WBC # BLD: 6.3 K/UL — SIGNIFICANT CHANGE UP (ref 3.8–10.5)
WBC # FLD AUTO: 6.3 K/UL — SIGNIFICANT CHANGE UP (ref 3.8–10.5)
WBC # FLD AUTO: 6.96 K/UL
WBC UR QL: 1 /HPF — SIGNIFICANT CHANGE UP (ref 0–5)

## 2019-05-02 PROCEDURE — 12345: CPT | Mod: NC,GC

## 2019-05-02 PROCEDURE — 99285 EMERGENCY DEPT VISIT HI MDM: CPT

## 2019-05-02 PROCEDURE — 71045 X-RAY EXAM CHEST 1 VIEW: CPT | Mod: 26

## 2019-05-02 RX ORDER — ATORVASTATIN CALCIUM 80 MG/1
40 TABLET, FILM COATED ORAL AT BEDTIME
Qty: 0 | Refills: 0 | Status: DISCONTINUED | OUTPATIENT
Start: 2019-05-02 | End: 2019-05-07

## 2019-05-02 RX ORDER — ACETAMINOPHEN 500 MG
975 TABLET ORAL ONCE
Qty: 0 | Refills: 0 | Status: COMPLETED | OUTPATIENT
Start: 2019-05-02 | End: 2019-05-02

## 2019-05-02 RX ORDER — LEVOTHYROXINE SODIUM 125 MCG
125 TABLET ORAL DAILY
Qty: 0 | Refills: 0 | Status: DISCONTINUED | OUTPATIENT
Start: 2019-05-02 | End: 2019-05-07

## 2019-05-02 RX ORDER — INSULIN GLARGINE 100 [IU]/ML
20 INJECTION, SOLUTION SUBCUTANEOUS AT BEDTIME
Qty: 0 | Refills: 0 | Status: DISCONTINUED | OUTPATIENT
Start: 2019-05-02 | End: 2019-05-03

## 2019-05-02 RX ORDER — VANCOMYCIN HCL 1 G
1000 VIAL (EA) INTRAVENOUS ONCE
Qty: 0 | Refills: 0 | Status: COMPLETED | OUTPATIENT
Start: 2019-05-02 | End: 2019-05-02

## 2019-05-02 RX ORDER — ACETAMINOPHEN 500 MG
650 TABLET ORAL EVERY 6 HOURS
Qty: 0 | Refills: 0 | Status: DISCONTINUED | OUTPATIENT
Start: 2019-05-02 | End: 2019-05-07

## 2019-05-02 RX ORDER — CARVEDILOL PHOSPHATE 80 MG/1
12.5 CAPSULE, EXTENDED RELEASE ORAL EVERY 12 HOURS
Qty: 0 | Refills: 0 | Status: DISCONTINUED | OUTPATIENT
Start: 2019-05-02 | End: 2019-05-03

## 2019-05-02 RX ORDER — OXYCODONE HYDROCHLORIDE 5 MG/1
5 TABLET ORAL EVERY 6 HOURS
Qty: 0 | Refills: 0 | Status: DISCONTINUED | OUTPATIENT
Start: 2019-05-02 | End: 2019-05-07

## 2019-05-02 RX ORDER — CHOLECALCIFEROL (VITAMIN D3) 125 MCG
1 CAPSULE ORAL
Qty: 0 | Refills: 0 | COMMUNITY

## 2019-05-02 RX ORDER — OXYCODONE HYDROCHLORIDE 5 MG/1
5 TABLET ORAL ONCE
Qty: 0 | Refills: 0 | Status: DISCONTINUED | OUTPATIENT
Start: 2019-05-02 | End: 2019-05-02

## 2019-05-02 RX ORDER — SITAGLIPTIN 50 MG/1
2 TABLET, FILM COATED ORAL
Qty: 0 | Refills: 0 | COMMUNITY

## 2019-05-02 RX ORDER — PIPERACILLIN AND TAZOBACTAM 4; .5 G/20ML; G/20ML
3.38 INJECTION, POWDER, LYOPHILIZED, FOR SOLUTION INTRAVENOUS ONCE
Qty: 0 | Refills: 0 | Status: COMPLETED | OUTPATIENT
Start: 2019-05-02 | End: 2019-05-02

## 2019-05-02 RX ORDER — SODIUM CHLORIDE 9 MG/ML
1000 INJECTION INTRAMUSCULAR; INTRAVENOUS; SUBCUTANEOUS ONCE
Qty: 0 | Refills: 0 | Status: COMPLETED | OUTPATIENT
Start: 2019-05-02 | End: 2019-05-02

## 2019-05-02 RX ORDER — HEPARIN SODIUM 5000 [USP'U]/ML
5000 INJECTION INTRAVENOUS; SUBCUTANEOUS EVERY 8 HOURS
Qty: 0 | Refills: 0 | Status: DISCONTINUED | OUTPATIENT
Start: 2019-05-02 | End: 2019-05-02

## 2019-05-02 RX ORDER — ASPIRIN/CALCIUM CARB/MAGNESIUM 324 MG
81 TABLET ORAL DAILY
Qty: 0 | Refills: 0 | Status: DISCONTINUED | OUTPATIENT
Start: 2019-05-02 | End: 2019-05-07

## 2019-05-02 RX ORDER — BROMOCRIPTINE MESYLATE 5 MG/1
5 CAPSULE ORAL DAILY
Qty: 0 | Refills: 0 | Status: DISCONTINUED | OUTPATIENT
Start: 2019-05-02 | End: 2019-05-07

## 2019-05-02 RX ORDER — LIDOCAINE 4 G/100G
2 CREAM TOPICAL DAILY
Qty: 0 | Refills: 0 | Status: DISCONTINUED | OUTPATIENT
Start: 2019-05-02 | End: 2019-05-07

## 2019-05-02 RX ORDER — INSULIN GLARGINE 100 [IU]/ML
20 INJECTION, SOLUTION SUBCUTANEOUS
Qty: 0 | Refills: 0 | COMMUNITY

## 2019-05-02 RX ADMIN — Medication 975 MILLIGRAM(S): at 17:27

## 2019-05-02 RX ADMIN — Medication 975 MILLIGRAM(S): at 19:45

## 2019-05-02 RX ADMIN — LIDOCAINE 2 PATCH: 4 CREAM TOPICAL at 23:20

## 2019-05-02 RX ADMIN — OXYCODONE HYDROCHLORIDE 5 MILLIGRAM(S): 5 TABLET ORAL at 23:19

## 2019-05-02 RX ADMIN — Medication 250 MILLIGRAM(S): at 22:14

## 2019-05-02 RX ADMIN — PIPERACILLIN AND TAZOBACTAM 200 GRAM(S): 4; .5 INJECTION, POWDER, LYOPHILIZED, FOR SOLUTION INTRAVENOUS at 21:56

## 2019-05-02 RX ADMIN — OXYCODONE HYDROCHLORIDE 5 MILLIGRAM(S): 5 TABLET ORAL at 21:56

## 2019-05-02 RX ADMIN — SODIUM CHLORIDE 1000 MILLILITER(S): 9 INJECTION INTRAMUSCULAR; INTRAVENOUS; SUBCUTANEOUS at 21:56

## 2019-05-02 RX ADMIN — OXYCODONE HYDROCHLORIDE 5 MILLIGRAM(S): 5 TABLET ORAL at 19:44

## 2019-05-02 RX ADMIN — SODIUM CHLORIDE 1000 MILLILITER(S): 9 INJECTION INTRAMUSCULAR; INTRAVENOUS; SUBCUTANEOUS at 19:23

## 2019-05-02 RX ADMIN — SODIUM CHLORIDE 1000 MILLILITER(S): 9 INJECTION INTRAMUSCULAR; INTRAVENOUS; SUBCUTANEOUS at 19:45

## 2019-05-02 RX ADMIN — SODIUM CHLORIDE 1000 MILLILITER(S): 9 INJECTION INTRAMUSCULAR; INTRAVENOUS; SUBCUTANEOUS at 17:27

## 2019-05-02 NOTE — H&P ADULT - NSHPPHYSICALEXAM_GEN_ALL_CORE
Vital Signs Last 24 Hrs  T(C): 36.7 (02 May 2019 23:06), Max: 39 (02 May 2019 17:30)  T(F): 98.1 (02 May 2019 23:06), Max: 102.2 (02 May 2019 17:30)  HR: 94 (02 May 2019 23:06) (91 - 96)  BP: 108/69 (02 May 2019 23:06) (96/57 - 120/69)  BP(mean): --  RR: 24 (02 May 2019 23:06) (16 - 24)  SpO2: 97% (02 May 2019 23:06) (95% - 97%)    General: no acute distress, sitting up in bed, speaking in full sentences  Neurology: A&Ox3, 5/5 strength and sensation preserved in lower extremities bilaterally;  range of motion and strength assessment limited in bilateral upper extremities secondary to pain   Head:  Normocephalic, atraumatic  ENT:  dry mucosa, no ulcerations  Lymphatic:  No palpable cervical, supraclavicular, adenopathy  Respiratory: bilateral lower lobe crackles  CV: RRR, S1S2, no murmurs; ICD without any TTP  Abdominal: Soft, distended, no tenderness to palpation, +BS  MSK: 2+ edema to the ankles, venous stasis dermatitis bilaterally on feet; right foot s/p amputation of digits 2/3; incision scar noted over posterior ankle on right foot from prior I&D

## 2019-05-02 NOTE — H&P ADULT - PROBLEM SELECTOR PLAN 7
- patient thrombocytopenic at baseline 30s-60s  - currently at baseline  - likely 2/2 to cirrhosis  - no evidence of bleeding

## 2019-05-02 NOTE — H&P ADULT - NSHPLABSRESULTS_GEN_ALL_CORE
10.5   6.3   )-----------( 62       ( 02 May 2019 17:15 )             31.9           126<L>  |  96  |  65<H>  ----------------------------<  322<H>  5.3   |  18<L>  |  2.37<H>    Ca    9.6      02 May 2019 17:15    TPro  8.0  /  Alb  3.7  /  TBili  1.0  /  DBili  x   /  AST  24  /  ALT  20  /  AlkPhos  127<H>                  17:15 - VBG - pH: 7.36  | pCO2: 38    | pO2: <20   | Lactate: 2.7        Urinalysis Basic - ( 02 May 2019 19:31 )    Color: Light Yellow / Appearance: Clear / S.014 / pH: x  Gluc: x / Ketone: Negative  / Bili: Negative / Urobili: Negative   Blood: x / Protein: 30 mg/dL / Nitrite: Negative   Leuk Esterase: Negative / RBC: 10 /hpf / WBC 1 /HPF   Sq Epi: x / Non Sq Epi: 0 /hpf / Bacteria: Negative        EKG:   CXR:   < from: Xray Chest 1 View AP/PA (19 @ 18:18) >      INTERPRETATION:  clear lungs    VICTOR M LORD M.D., RADIOLOGY RESIDENT    < end of copied text >    Xray shoulder: no fractures or dislocations 10.5   6.3   )-----------( 62       ( 02 May 2019 17:15 )             31.9         126<L>  |  96  |  65<H>  ----------------------------<  322<H>  5.3   |  18<L>  |  2.37<H>    Ca    9.6      02 May 2019 17:15    TPro  8.0  /  Alb  3.7  /  TBili  1.0  /  DBili  x   /  AST  24  /  ALT  20  /  AlkPhos  127<H>      17:15 - VBG - pH: 7.36  | pCO2: 38    | pO2: <20   | Lactate: 2.7      Urinalysis Basic - ( 02 May 2019 19:31 )    Color: Light Yellow / Appearance: Clear / S.014 / pH: x  Gluc: x / Ketone: Negative  / Bili: Negative / Urobili: Negative   Blood: x / Protein: 30 mg/dL / Nitrite: Negative   Leuk Esterase: Negative / RBC: 10 /hpf / WBC 1 /HPF   Sq Epi: x / Non Sq Epi: 0 /hpf / Bacteria: Negative    EKG:   CXR:   < from: Xray Chest 1 View AP/PA (19 @ 18:18) >  INTERPRETATION:  clear lungs    VICTOR M LORD M.D., RADIOLOGY RESIDENT    < end of copied text >    Xray shoulder: no fractures or dislocations

## 2019-05-02 NOTE — H&P ADULT - ASSESSMENT
69M with PMHx of ischemic cardiomyopathy s/p ICD/PPM in 2006, generator change 2 weeks prior,, and CABG in 1986, T2DM on lantus, hypothyroidism, Afib (spontaneously cardioverted and not on AC), HTN, HLD, BEHZAD (not on CPAP), FERNANDES and cirrhosis with newly diagnosed HCC s/p IR embolization x 2 (last on 4/18), presents to the ED with shoulder pain and fevers/weakness, admitted for SIRS with course complicated by JULIANNE on CKD and shoulder pain.

## 2019-05-02 NOTE — H&P ADULT - PROBLEM SELECTOR PLAN 8
- patient on metformin , januvia and lantus 20units at home  - FS elevated in hospital and will likely be more elevated than baseline given steroids (at home FS>400)  - c/w lantus 20units and moderate sliding scale

## 2019-05-02 NOTE — H&P ADULT - PROBLEM SELECTOR PLAN 3
- xrays from 5/1 do not indicate any evidence of fractures or subq edema  - per rheum notes- patient given medrol injection and outpatient prednisone  - patient with long standing history of gout, was on Pegloticase until last month due to increase in uric acid likely from his tumor  - patient may have gout flare in shoulders- will continue with prednisone while inpatient and obtain Ct of shoulder to evaluate for effusion (prednisone 2.5 TID  per rheum)  - rheum consult in AM for further management - per rheum notes- patient given medrol injection and outpatient prednisone  - patient with long standing history of gout, was on Pegloticase until last month due to increase in uric acid likely from his tumor  - Patient was treated outpatient for gout (prednisone 2.5 TID  per rheum)  -F/u CT shoulder

## 2019-05-02 NOTE — H&P ADULT - NSHPREVIEWOFSYSTEMS_GEN_ALL_CORE
REVIEW OF SYSTEMS:    CONSTITUTIONAL: + weakness, fevers and chills  EYES/ENT: No visual changes;  No vertigo or throat pain   NECK: No pain or stiffness  RESPIRATORY: No cough, wheezing, hemoptysis; No shortness of breath  CARDIOVASCULAR: No chest pain or palpitations  GASTROINTESTINAL: No abdominal or epigastric pain. No nausea, vomiting, or hematemesis; No diarrhea or constipation. No melena or hematochezia.  GENITOURINARY: No dysuria, frequency or hematuria  NEUROLOGICAL: No numbness or weakness  SKIN: No itching, rashes  MSK: shoulder pain

## 2019-05-02 NOTE — ED PROVIDER NOTE - PHYSICAL EXAMINATION
Kobe GHOTRA MD PGY1:   PHYSICAL EXAM:    GENERAL: Uncomfortable, lethargic  HEENT:  Atraumatic, Normocephalic  CHEST/LUNG: Chest rise equal bilaterally. CTAB.   HEART: Regular rate and rhythm  ABDOMEN: Soft, Nontender, Nondistended  EXTREMITIES:  2+ Peripheral Pulses.  PSYCH: A&Ox3  SKIN: No obvious rashes or lesions

## 2019-05-02 NOTE — H&P ADULT - PROBLEM SELECTOR PLAN 2
- patient with baseline Cr ~1.2-1.6, currently 2.37 and hyponatremia to 126  - ddx includes prerenal JULIANNE in setting of decreased PO intake and sepsis  - no signs of oliguria; strict Is/Os  - s/p 2L NS; will hold diuretics and ACE inhibitor overnight and evaluate renal function in AM  - follow urine lytes including Urine Na, Cr, Urea and Protein  - will obtain renal US in AM if lack of improvement in renal function after fluids

## 2019-05-02 NOTE — H&P ADULT - PROBLEM SELECTOR PLAN 6
- c/w prednisone 2.5mg TID at this time  - no evidence of gout flare in hands, elbows, knees or ankle joints - c/w prednisone 2.5mg TID at this time  - no evidence of gout flare in hands, elbows, knees or ankle joints  - pain control with oxycodone 5mg IR and tylenol - Will hold prednisone at this time given sepsis and bacteremia  - pain control with oxycodone 5mg IR and tylenol  -Will cont.

## 2019-05-02 NOTE — ED ADULT TRIAGE NOTE - PAIN RATING/NUMBER SCALE (0-10): ACTIVITY
This patient is willing to change to generic.  The Rx recently sent was generic, I think?  If not I can re do rx.    3

## 2019-05-02 NOTE — H&P ADULT - PROBLEM SELECTOR PLAN 4
- patient with long standing CAD s/p CABG and ischemic cardiomyopathy s/p PPM  - PPM generator changed 2 weeks ago, interrogation on 4/19 revealed sinus rhythm  - will hold diuretics and ACE inhibitor overnight; patient with some evidence of  fluid overload with lower extremity edema and mild crackles- however he states it is his baseline and in setting of infectious evaluation and JULIANNE and dehydration, will not administer at this time  - hold carvedilol in setting of hypotension  - reevaluate fluid status in AM  - last echo outpatient 3/2018 EF 43% and segmental wall dysfunction - patient with long standing CAD s/p CABG and ischemic cardiomyopathy s/p ICD/PPM  - PPM generator changed 2 weeks ago, interrogation on 4/19 revealed sinus rhythm  - will hold diuretics and ACE inhibitor overnight; patient with some evidence of  fluid overload with lower extremity edema and mild crackles- however he states it is his baseline and in setting of infectious evaluation and JULIANNE and dehydration, will not administer at this time  - hold carvedilol in setting of hypotension  - reevaluate fluid status in AM  - last echo outpatient 3/2018 EF 43% and segmental wall dysfunction

## 2019-05-02 NOTE — ED ADULT NURSE NOTE - NSIMPLEMENTINTERV_GEN_ALL_ED
Implemented All Fall with Harm Risk Interventions:  [place] to call system. Call bell, personal items and telephone within reach. Instruct patient to call for assistance. Room bathroom lighting operational. Non-slip footwear when patient is off stretcher. Physically safe environment: no spills, clutter or unnecessary equipment. Stretcher in lowest position, wheels locked, appropriate side rails in place. Provide visual cue, wrist band, yellow gown, etc. Monitor gait and stability. Monitor for mental status changes and reorient to person, place, and time. Review medications for side effects contributing to fall risk. Reinforce activity limits and safety measures with patient and family. Provide visual clues: red socks.

## 2019-05-02 NOTE — ED PROVIDER NOTE - PMH
Afib  pt reports ~9 mos, [id number], resolved - monitored by Dr. Rivers  AICD lead malfunction  history of - fixed follow-up by Dr Rivers  Anemia    Coronary artery disease    DM (diabetes mellitus)  type 2, Hg A1C 9.9% 2/2019  Dec 2019  due to prednisone started on Lantus Jan 2019  Elevated prolactin level  dx: ' 70's  medically managed Bromocriptine  Elevated triglycerides with high cholesterol  on meds  Gout  medically managed  Heart failure with reduced left ventricular function  last EF 43% (2/2018)  Hepatic cirrhosis, unspecified hepatic cirrhosis type    History of hyperprolactinemia    History of osteomyelitis  2015, right foot 2nd toe   follow-up by podiatrist every 2 weeks  HTN (hypertension)    Hypothyroidism    ICD (implantable cardioverter-defibrillator) in place  Medtronic, Old Model [id number] , last interrogation 3/27/19, generator change 4/3/19 New Model # [id number]  Ischemic cardiomyopathy    Liver mass  dx: 10/2018   incidental finding. Currently awaitng furthur workup  Pituitary adenoma  as per patient chronic, no changes , recently restarted follow up with endocrinologist ( note in allscripts )  Presence of stent in coronary artery  2 stents  PVD (peripheral vascular disease)    Sleep apnea  not using CPAP  Thrombocytopenia  Chronic  Ulcer of right ankle  has surgery done Dec 2018  Vitamin D deficiency

## 2019-05-02 NOTE — ED ADULT NURSE NOTE - PSH
AICD (automatic cardioverter/defibrillator) present  placement in (2006), generator change 4/3/19  Coronary atherosclerosis of artery bypass graft  CABG X 4 (1986)  Encounter for incision and drainage procedure  Dec 2018 right foot/ankle  History of amputation  right foot, 2nd toe, osteo 2015  Stented coronary artery  RCA (1999)

## 2019-05-02 NOTE — H&P ADULT - NSICDXPASTMEDICALHX_GEN_ALL_CORE_FT
PAST MEDICAL HISTORY:  Afib pt reports ~9 mos, [phone], resolved - monitored by Dr. Rivers    AICD lead malfunction history of - fixed follow-up by Dr Rivers    Anemia     Coronary artery disease     DM (diabetes mellitus) type 2, Hg A1C 9.9% 2/2019  Dec 2019  due to prednisone started on Lantus Jan 2019    Elevated prolactin level dx: ' 70's  medically managed Bromocriptine    Elevated triglycerides with high cholesterol on meds    Gout medically managed    Heart failure with reduced left ventricular function last EF 43% (2/2018)    Hepatic cirrhosis, unspecified hepatic cirrhosis type     History of hyperprolactinemia     History of osteomyelitis 2015, right foot 2nd toe   follow-up by podiatrist every 2 weeks    HTN (hypertension)     Hypothyroidism     ICD (implantable cardioverter-defibrillator) in place Medtronic, Old Model [id number] , last interrogation 3/27/19, generator change 4/3/19 New Model # [id number]    Ischemic cardiomyopathy     Liver mass dx: 10/2018   incidental finding. Currently awaitng furthur workup    Pituitary adenoma as per patient chronic, no changes , recently restarted follow up with endocrinologist ( note in allscripts )    Presence of stent in coronary artery 2 stents    PVD (peripheral vascular disease)     Sleep apnea not using CPAP    Thrombocytopenia Chronic    Ulcer of right ankle has surgery done Dec 2018    Vitamin D deficiency

## 2019-05-02 NOTE — ED PROVIDER NOTE - OBJECTIVE STATEMENT
Kobe GHOTRA MD PGY1: 69 M  PMH ischemic cardiomyopathy s/p ICD/PPM in 2006, generator change last week), CAD s/p CABG, T2DM, hypothyroidism, afib (per pt resolved on own, not on AC), HTN, HLD, BEHZAD (not on CPAP), gout, FERNANDES and cirrhosis with newly diagnosed HCC admitted after IR embolization of 2 hypervascular tumors of right lobe with embozene p/w fevers, rigors and chills x 2 days with 3 days of lack of PO intake.

## 2019-05-02 NOTE — H&P ADULT - PROBLEM SELECTOR PLAN 1
- patient presenting with fevers to 102 and tachycardia, with new JULIANNE and elevated lactate to 2.7  - unclear source of infection at this time; patient only localizing pain and symptoms to his shoulders  - c/w vanc and zosyn at this time given patient with recent hospitalization 2 weeks prior and recent instrumentations (ICD replacement)  - follow up blood cultures; UA without evidence for infection  - will obtain chest US to evaluate the ICD pocket, TTE to evaluate leads and valves, and CT of the right shoulder to evaluate joint space - patient presenting with fevers to 102 and tachycardia, with new JULIANNE and elevated lactate to 2.7  - unclear source of infection at this time; patient only localizing pain and symptoms to his shoulders  - c/w vanc and zosyn at this time given patient with recent hospitalization 2 weeks prior and recent instrumentations (ICD replacement)  - follow up blood cultures; UA without evidence for infection  - will obtain chest US to evaluate the ICD pocket, CT chest and CT shoulder of the right shoulder to evaluate joint space - patient presenting with fevers to 102 and tachycardia, with new JULIANNE and elevated lactate to 2.7  - unclear source of infection at this time, likely related to one of recent procedures; patient only localizing pain and symptoms to his shoulders  - c/w vanc and zosyn at this time given patient with recent hospitalization 2 weeks prior and recent instrumentations (ICD replacement)  - F/u BCx speciations, suspicious for Group B strep  - will obtain chest US to evaluate the ICD pocket, CT chest and CT shoulder of the right shoulder to evaluate joint space  -Ortho consult in AM for septic joint.  -Would also consult ID in AM for better Rx of Group B strep bacteremia - patient presenting with fevers to 102 and tachycardia, with new JULIANNE and elevated lactate to 2.7  - unclear source of infection at this time, likely related to one of recent procedures; patient only localizing pain and symptoms to his shoulders  - c/w vanc and zosyn at this time given patient with recent hospitalization 2 weeks prior and recent instrumentations (ICD replacement)  - F/u BCx speciations, suspicious for Group B strep  - will obtain chest US to evaluate the ICD pocket, CT chest and CT shoulder of the right shoulder to evaluate joint space  -Ortho consult at 8AM for possible septic joint.  -Would also consult ID in AM for better Rx of Group B strep bacteremia

## 2019-05-02 NOTE — H&P ADULT - ATTENDING COMMENTS
I have reviewed the labs and imaging. I have edited the above note as appropriate and agree with above. Briefly, 69M w/ complicated medical hx including FERNANDES cirrhosis, IDDM2, gout on steroids p/w sepsis and joint pains s/p multiple recent procedures. BCxs now growing Group B strep consistent with bacteremia. Some concern for septic joint. Pt being covered broadly with IV antibiotics overnight.  -Reconsult Ortho at 8am, Much better yield if able to perform MRI first, note pt has ICD/PPM so unlikely to be able to perform MRI  -Cont. IV Zosyn and Vancomycin for now  -CT shoulder ordered  -ID consult in AM  -F/u BCxs  -Pain control  -Will cont. low dose prednisone to prevent adrenal insufficiency as pt has been taking semi-chronically at home.  -TTE  -Hold antihypertensives, resume lasix as clinical status allows  -Cont. sliding scale and Lantus 20U for now although need to monitor FS closely in setting of JULIANNE. Glucose will likely be elevated in setting of steroids  -Trend plts  -DVT PPx, SCDs I have reviewed the labs and imaging. I have edited the above note as appropriate and agree with above. Briefly, 69M w/ complicated medical hx including FERNANDES cirrhosis, IDDM2, gout on steroids p/w sepsis and joint pains s/p multiple recent procedures. BCxs now growing Group B strep consistent with bacteremia. Some concern for septic joint. Pt being covered broadly with IV antibiotics overnight.  -Reconsult Ortho at 8am, Much better yield if able to perform MRI first if plans for tap first, note pt has ICD/PPM so unlikely to be able to perform MRI  -Cont. IV Zosyn and Vancomycin for now  -CT shoulder ordered  -ID consult in AM  -F/u BCxs  -Pain control  -Will cont. low dose prednisone to prevent adrenal insufficiency as pt has been taking semi-chronically at home.  -TTE  -Hold antihypertensives, resume lasix as clinical status allows  -Cont. sliding scale and Lantus 20U for now although need to monitor FS closely in setting of JULIANNE. Glucose will likely be elevated in setting of steroids  -Trend plts  -DVT PPx, SCDs

## 2019-05-02 NOTE — H&P ADULT - PROBLEM SELECTOR PLAN 5
- HCC 2/2 cirrhosis 2/2 FERNANDES  - patient s/p IR embolization on 4/18  - due for repeat imaging in 2 weeks  - MELD NA score= 25 (based on INR from last visit)

## 2019-05-02 NOTE — H&P ADULT - HISTORY OF PRESENT ILLNESS
69M with PMHx of ischemic cardiomyopathy s/p ICD/PPM in 2006, generator change last week, and CABG in 1986, T2DM on lantus, hypothyroidism, Afib (spontaneously cardioverted and not on AC), HTN, HLD, BEHZAD (not on CPAP), gout, FERNANDES and cirrhosis with newly diagnosed HCC s/p IR embolization x 2 (last on 4/18), with embozene presents to the ED with fevers, weakness and shoulder pain.    Patient was doing well after his IR procedure until Monday, when he spike a fever at home of 102. Per the wife, his fever broke on  his own, but he continued to have fevers daily. He has been having decreased oral intake, and worsening weakness. Patient also started to develop new right shoulder pain, which has now travelled to the left. Shoulder pain is exacerbation by movement, worse with palpation.  He went to see his rheumatologist, who gave him a medrol injection and prednisone to continue to take as an outpatient. Today, his wife took a half day off from work because she noted in the morning he was not doing too well. When she came home, he was febrile to 102, and was incontinent and intermittently incoherent. She tried to get him up to clean him off the recliner, but he was too weak and slid down the recliner. Due to his worsening status, she brought him to the emergency room.    In regards, to his liver cancer, he was diagnosed in Sept 2018 and underwent IR embolization on 4.18 without any issues. Patient has had previous  hospitalizations for knee bursitis, c diff, JULIANNE, heart failure and gout.     In the ED, patient's VS T max of 102.2, 120/69 (lowest 90s/60s) , HR 96, RR18, T 97.9. Patient received 2L NS and vanc + zosyn.

## 2019-05-02 NOTE — ED ADULT NURSE NOTE - PMH
Afib  pt reports ~9 mos, [id number], resolved - monitored by Dr. Rivers  AICD lead malfunction  history of - fixed follow-up by Dr Rivers  Anemia    Coronary artery disease    DM (diabetes mellitus)  type 2, Hg A1C 9.9% 2/2019  Dec 2019  due to prednisone started on Lantus Jan 2019  Elevated prolactin level  dx: ' 70's  medically managed Bromocriptine  Elevated triglycerides with high cholesterol  on meds  Gout  medically managed  Heart failure with reduced left ventricular function  last EF 43% (2/2018)  Hepatic cirrhosis, unspecified hepatic cirrhosis type    History of hyperprolactinemia    History of osteomyelitis  2015, right foot 2nd toe   follow-up by podiatrist every 2 weeks  HTN (hypertension)    Hypothyroidism    ICD (implantable cardioverter-defibrillator) in place  [employer], Old Model [id number] , last interrogation 3/27/19, generator change 4/3/19 New Model # [id number]  Ischemic cardiomyopathy    Liver mass  dx: 10/2018   incidental finding. Currently awaitng furthur workup  Pituitary adenoma  as per patient chronic, no changes , recently restarted follow up with endocrinologist ( note in allscripts )  Presence of stent in coronary artery  2 stents  PVD (peripheral vascular disease)    Sleep apnea  not using CPAP  Thrombocytopenia  Chronic  Ulcer of right ankle  has surgery done Dec 2018  Vitamin D deficiency

## 2019-05-02 NOTE — ED ADULT NURSE NOTE - OBJECTIVE STATEMENT
69 year old male presents to the ED complaining of right sided shoulder pain, intermittent fevers, weakness, and fatigue. Pt has Hx of gout, RA, CABG, AICD placement, DM2. Pt was recently placed on steroids for the RA PAIN. As per EMS the Pt was unable to ambulate today and slid himself onto the floor and was unable to getup. Pt has strong use of all extremities. Wife at bedside. Pt is A&O x 4, VSS, afebrile in ED. Pt denies NVD, SOB, or chest pain. Pt arrived with 20G IV on left forearm, absent of SS of infiltration. Pt took tylenol at 1300 today.

## 2019-05-02 NOTE — ED PROVIDER NOTE - ATTENDING CONTRIBUTION TO CARE
I performed a history and physical exam of the patient and discussed their management with the resident and /or advanced care provider. I reviewed the resident and /or ACP's note and agree with the documented findings and plan of care. My medical decison making and observations are found above.  Abd soft, lungs clear, lt shoulder tender to palpation.

## 2019-05-02 NOTE — H&P ADULT - NSHPSOCIALHISTORY_GEN_ALL_CORE
Nonsmoker  Nondrinker  Retired- worked as  along with other jobs  Normally independent of ADLS; engages in activities such as golfing

## 2019-05-02 NOTE — ED PROVIDER NOTE - CLINICAL SUMMARY MEDICAL DECISION MAKING FREE TEXT BOX
Leonardo: pt with fever for 3 days. prednisone started 2 days ago. followed by rheum. so weak today with high fever that he could not walk.  Will hydrate and treat as infectious. will need admission as we search for source. mildly immunocompromised.

## 2019-05-03 ENCOUNTER — TRANSCRIPTION ENCOUNTER (OUTPATIENT)
Age: 69
End: 2019-05-03

## 2019-05-03 DIAGNOSIS — D69.6 THROMBOCYTOPENIA, UNSPECIFIED: ICD-10-CM

## 2019-05-03 DIAGNOSIS — I25.5 ISCHEMIC CARDIOMYOPATHY: ICD-10-CM

## 2019-05-03 DIAGNOSIS — M00.9 PYOGENIC ARTHRITIS, UNSPECIFIED: ICD-10-CM

## 2019-05-03 DIAGNOSIS — R65.10 SYSTEMIC INFLAMMATORY RESPONSE SYNDROME (SIRS) OF NON-INFECTIOUS ORIGIN WITHOUT ACUTE ORGAN DYSFUNCTION: ICD-10-CM

## 2019-05-03 DIAGNOSIS — E11.9 TYPE 2 DIABETES MELLITUS WITHOUT COMPLICATIONS: ICD-10-CM

## 2019-05-03 DIAGNOSIS — C22.0 LIVER CELL CARCINOMA: ICD-10-CM

## 2019-05-03 DIAGNOSIS — M10.9 GOUT, UNSPECIFIED: ICD-10-CM

## 2019-05-03 DIAGNOSIS — N17.9 ACUTE KIDNEY FAILURE, UNSPECIFIED: ICD-10-CM

## 2019-05-03 DIAGNOSIS — Z29.9 ENCOUNTER FOR PROPHYLACTIC MEASURES, UNSPECIFIED: ICD-10-CM

## 2019-05-03 DIAGNOSIS — M25.519 PAIN IN UNSPECIFIED SHOULDER: ICD-10-CM

## 2019-05-03 DIAGNOSIS — A41.9 SEPSIS, UNSPECIFIED ORGANISM: ICD-10-CM

## 2019-05-03 LAB
ALBUMIN SERPL ELPH-MCNC: 3.1 G/DL — LOW (ref 3.3–5)
ALP SERPL-CCNC: 109 U/L — SIGNIFICANT CHANGE UP (ref 40–120)
ALT FLD-CCNC: 17 U/L — SIGNIFICANT CHANGE UP (ref 10–45)
ANION GAP SERPL CALC-SCNC: 11 MMOL/L — SIGNIFICANT CHANGE UP (ref 5–17)
ANION GAP SERPL CALC-SCNC: 13 MMOL/L — SIGNIFICANT CHANGE UP (ref 5–17)
AST SERPL-CCNC: 19 U/L — SIGNIFICANT CHANGE UP (ref 10–40)
BASOPHILS # BLD AUTO: 0 K/UL — SIGNIFICANT CHANGE UP (ref 0–0.2)
BASOPHILS NFR BLD AUTO: 0.2 % — SIGNIFICANT CHANGE UP (ref 0–2)
BILIRUB SERPL-MCNC: 0.8 MG/DL — SIGNIFICANT CHANGE UP (ref 0.2–1.2)
BLD GP AB SCN SERPL QL: NEGATIVE — SIGNIFICANT CHANGE UP
BUN SERPL-MCNC: 58 MG/DL — HIGH (ref 7–23)
BUN SERPL-MCNC: 58 MG/DL — HIGH (ref 7–23)
CALCIUM SERPL-MCNC: 8.9 MG/DL — SIGNIFICANT CHANGE UP (ref 8.4–10.5)
CALCIUM SERPL-MCNC: 9 MG/DL — SIGNIFICANT CHANGE UP (ref 8.4–10.5)
CHLORIDE SERPL-SCNC: 100 MMOL/L — SIGNIFICANT CHANGE UP (ref 96–108)
CHLORIDE SERPL-SCNC: 101 MMOL/L — SIGNIFICANT CHANGE UP (ref 96–108)
CO2 SERPL-SCNC: 17 MMOL/L — LOW (ref 22–31)
CO2 SERPL-SCNC: 18 MMOL/L — LOW (ref 22–31)
CREAT SERPL-MCNC: 1.99 MG/DL — HIGH (ref 0.5–1.3)
CREAT SERPL-MCNC: 2.02 MG/DL — HIGH (ref 0.5–1.3)
EOSINOPHIL # BLD AUTO: 0 K/UL — SIGNIFICANT CHANGE UP (ref 0–0.5)
EOSINOPHIL NFR BLD AUTO: 0.2 % — SIGNIFICANT CHANGE UP (ref 0–6)
GLUCOSE SERPL-MCNC: 277 MG/DL — HIGH (ref 70–99)
GLUCOSE SERPL-MCNC: 288 MG/DL — HIGH (ref 70–99)
GP B STREP DNA BLD POS QL NAA+NON-PROBE: SIGNIFICANT CHANGE UP
GRAM STN FLD: SIGNIFICANT CHANGE UP
GRAM STN FLD: SIGNIFICANT CHANGE UP
HCT VFR BLD CALC: 32 % — LOW (ref 39–50)
HGB BLD-MCNC: 9.9 G/DL — LOW (ref 13–17)
LYMPHOCYTES # BLD AUTO: 0.9 K/UL — LOW (ref 1–3.3)
LYMPHOCYTES # BLD AUTO: 12 % — LOW (ref 13–44)
MAGNESIUM SERPL-MCNC: 1.7 MG/DL — SIGNIFICANT CHANGE UP (ref 1.6–2.6)
MCHC RBC-ENTMCNC: 27.6 PG — SIGNIFICANT CHANGE UP (ref 27–34)
MCHC RBC-ENTMCNC: 30.8 GM/DL — LOW (ref 32–36)
MCV RBC AUTO: 89.6 FL — SIGNIFICANT CHANGE UP (ref 80–100)
METHOD TYPE: SIGNIFICANT CHANGE UP
MONOCYTES # BLD AUTO: 0.8 K/UL — SIGNIFICANT CHANGE UP (ref 0–0.9)
MONOCYTES NFR BLD AUTO: 11.7 % — SIGNIFICANT CHANGE UP (ref 2–14)
NEUTROPHILS # BLD AUTO: 5.4 K/UL — SIGNIFICANT CHANGE UP (ref 1.8–7.4)
NEUTROPHILS NFR BLD AUTO: 76 % — SIGNIFICANT CHANGE UP (ref 43–77)
PHOSPHATE SERPL-MCNC: 3.2 MG/DL — SIGNIFICANT CHANGE UP (ref 2.5–4.5)
PLATELET # BLD AUTO: 58 K/UL — LOW (ref 150–400)
POTASSIUM SERPL-MCNC: 5.1 MMOL/L — SIGNIFICANT CHANGE UP (ref 3.5–5.3)
POTASSIUM SERPL-MCNC: 5.4 MMOL/L — HIGH (ref 3.5–5.3)
POTASSIUM SERPL-SCNC: 5.1 MMOL/L — SIGNIFICANT CHANGE UP (ref 3.5–5.3)
POTASSIUM SERPL-SCNC: 5.4 MMOL/L — HIGH (ref 3.5–5.3)
PROT SERPL-MCNC: 7.3 G/DL — SIGNIFICANT CHANGE UP (ref 6–8.3)
RBC # BLD: 3.57 M/UL — LOW (ref 4.2–5.8)
RBC # FLD: 14.3 % — SIGNIFICANT CHANGE UP (ref 10.3–14.5)
RH IG SCN BLD-IMP: POSITIVE — SIGNIFICANT CHANGE UP
SODIUM SERPL-SCNC: 130 MMOL/L — LOW (ref 135–145)
SODIUM SERPL-SCNC: 130 MMOL/L — LOW (ref 135–145)
SPECIMEN SOURCE: SIGNIFICANT CHANGE UP
SPECIMEN SOURCE: SIGNIFICANT CHANGE UP
WBC # BLD: 7.1 K/UL — SIGNIFICANT CHANGE UP (ref 3.8–10.5)
WBC # FLD AUTO: 7.1 K/UL — SIGNIFICANT CHANGE UP (ref 3.8–10.5)

## 2019-05-03 PROCEDURE — 76377 3D RENDER W/INTRP POSTPROCES: CPT | Mod: 26

## 2019-05-03 PROCEDURE — 99223 1ST HOSP IP/OBS HIGH 75: CPT

## 2019-05-03 PROCEDURE — 76770 US EXAM ABDO BACK WALL COMP: CPT | Mod: 26

## 2019-05-03 PROCEDURE — 99223 1ST HOSP IP/OBS HIGH 75: CPT | Mod: GC

## 2019-05-03 PROCEDURE — 74176 CT ABD & PELVIS W/O CONTRAST: CPT | Mod: 26

## 2019-05-03 PROCEDURE — 76604 US EXAM CHEST: CPT | Mod: 26

## 2019-05-03 PROCEDURE — 93010 ELECTROCARDIOGRAM REPORT: CPT

## 2019-05-03 PROCEDURE — 71250 CT THORAX DX C-: CPT | Mod: 26

## 2019-05-03 PROCEDURE — 73200 CT UPPER EXTREMITY W/O DYE: CPT | Mod: 26,RT

## 2019-05-03 RX ORDER — DEXTROSE 50 % IN WATER 50 %
15 SYRINGE (ML) INTRAVENOUS ONCE
Qty: 0 | Refills: 0 | Status: DISCONTINUED | OUTPATIENT
Start: 2019-05-03 | End: 2019-05-07

## 2019-05-03 RX ORDER — INSULIN LISPRO 100/ML
VIAL (ML) SUBCUTANEOUS
Qty: 0 | Refills: 0 | Status: DISCONTINUED | OUTPATIENT
Start: 2019-05-03 | End: 2019-05-07

## 2019-05-03 RX ORDER — CEFTRIAXONE 500 MG/1
2 INJECTION, POWDER, FOR SOLUTION INTRAMUSCULAR; INTRAVENOUS EVERY 24 HOURS
Qty: 0 | Refills: 0 | Status: DISCONTINUED | OUTPATIENT
Start: 2019-05-03 | End: 2019-05-07

## 2019-05-03 RX ORDER — SACCHAROMYCES BOULARDII 250 MG
250 POWDER IN PACKET (EA) ORAL
Qty: 0 | Refills: 0 | Status: DISCONTINUED | OUTPATIENT
Start: 2019-05-03 | End: 2019-05-07

## 2019-05-03 RX ORDER — DEXTROSE 50 % IN WATER 50 %
25 SYRINGE (ML) INTRAVENOUS ONCE
Qty: 0 | Refills: 0 | Status: DISCONTINUED | OUTPATIENT
Start: 2019-05-03 | End: 2019-05-07

## 2019-05-03 RX ORDER — INSULIN LISPRO 100/ML
VIAL (ML) SUBCUTANEOUS AT BEDTIME
Qty: 0 | Refills: 0 | Status: DISCONTINUED | OUTPATIENT
Start: 2019-05-03 | End: 2019-05-07

## 2019-05-03 RX ORDER — METOPROLOL TARTRATE 50 MG
25 TABLET ORAL
Qty: 0 | Refills: 0 | Status: DISCONTINUED | OUTPATIENT
Start: 2019-05-03 | End: 2019-05-03

## 2019-05-03 RX ORDER — METOPROLOL TARTRATE 50 MG
25 TABLET ORAL
Qty: 0 | Refills: 0 | Status: DISCONTINUED | OUTPATIENT
Start: 2019-05-03 | End: 2019-05-04

## 2019-05-03 RX ORDER — INSULIN LISPRO 100/ML
2 VIAL (ML) SUBCUTANEOUS ONCE
Qty: 0 | Refills: 0 | Status: COMPLETED | OUTPATIENT
Start: 2019-05-03 | End: 2019-05-03

## 2019-05-03 RX ORDER — INSULIN GLARGINE 100 [IU]/ML
24 INJECTION, SOLUTION SUBCUTANEOUS AT BEDTIME
Qty: 0 | Refills: 0 | Status: DISCONTINUED | OUTPATIENT
Start: 2019-05-03 | End: 2019-05-06

## 2019-05-03 RX ORDER — PIPERACILLIN AND TAZOBACTAM 4; .5 G/20ML; G/20ML
3.38 INJECTION, POWDER, LYOPHILIZED, FOR SOLUTION INTRAVENOUS EVERY 8 HOURS
Qty: 0 | Refills: 0 | Status: DISCONTINUED | OUTPATIENT
Start: 2019-05-03 | End: 2019-05-03

## 2019-05-03 RX ORDER — INSULIN LISPRO 100/ML
3 VIAL (ML) SUBCUTANEOUS
Qty: 0 | Refills: 0 | Status: DISCONTINUED | OUTPATIENT
Start: 2019-05-03 | End: 2019-05-06

## 2019-05-03 RX ORDER — DEXTROSE 50 % IN WATER 50 %
12.5 SYRINGE (ML) INTRAVENOUS ONCE
Qty: 0 | Refills: 0 | Status: DISCONTINUED | OUTPATIENT
Start: 2019-05-03 | End: 2019-05-07

## 2019-05-03 RX ORDER — SODIUM CHLORIDE 9 MG/ML
1000 INJECTION, SOLUTION INTRAVENOUS
Qty: 0 | Refills: 0 | Status: DISCONTINUED | OUTPATIENT
Start: 2019-05-03 | End: 2019-05-07

## 2019-05-03 RX ORDER — GLUCAGON INJECTION, SOLUTION 0.5 MG/.1ML
1 INJECTION, SOLUTION SUBCUTANEOUS ONCE
Qty: 0 | Refills: 0 | Status: DISCONTINUED | OUTPATIENT
Start: 2019-05-03 | End: 2019-05-07

## 2019-05-03 RX ADMIN — PIPERACILLIN AND TAZOBACTAM 25 GRAM(S): 4; .5 INJECTION, POWDER, LYOPHILIZED, FOR SOLUTION INTRAVENOUS at 10:53

## 2019-05-03 RX ADMIN — Medication 2: at 21:49

## 2019-05-03 RX ADMIN — Medication 2 UNIT(S): at 00:55

## 2019-05-03 RX ADMIN — INSULIN GLARGINE 20 UNIT(S): 100 INJECTION, SOLUTION SUBCUTANEOUS at 01:00

## 2019-05-03 RX ADMIN — INSULIN GLARGINE 24 UNIT(S): 100 INJECTION, SOLUTION SUBCUTANEOUS at 21:49

## 2019-05-03 RX ADMIN — OXYCODONE HYDROCHLORIDE 5 MILLIGRAM(S): 5 TABLET ORAL at 19:00

## 2019-05-03 RX ADMIN — LIDOCAINE 2 PATCH: 4 CREAM TOPICAL at 07:03

## 2019-05-03 RX ADMIN — OXYCODONE HYDROCHLORIDE 5 MILLIGRAM(S): 5 TABLET ORAL at 07:01

## 2019-05-03 RX ADMIN — Medication 250 MILLIGRAM(S): at 18:16

## 2019-05-03 RX ADMIN — OXYCODONE HYDROCHLORIDE 5 MILLIGRAM(S): 5 TABLET ORAL at 23:51

## 2019-05-03 RX ADMIN — ATORVASTATIN CALCIUM 40 MILLIGRAM(S): 80 TABLET, FILM COATED ORAL at 21:49

## 2019-05-03 RX ADMIN — Medication 3 UNIT(S): at 18:15

## 2019-05-03 RX ADMIN — CEFTRIAXONE 100 GRAM(S): 500 INJECTION, POWDER, FOR SOLUTION INTRAMUSCULAR; INTRAVENOUS at 18:16

## 2019-05-03 RX ADMIN — LIDOCAINE 2 PATCH: 4 CREAM TOPICAL at 23:53

## 2019-05-03 RX ADMIN — Medication 81 MILLIGRAM(S): at 18:14

## 2019-05-03 RX ADMIN — Medication 2.5 MILLIGRAM(S): at 12:44

## 2019-05-03 RX ADMIN — Medication 6: at 12:45

## 2019-05-03 RX ADMIN — Medication 125 MICROGRAM(S): at 09:16

## 2019-05-03 RX ADMIN — Medication 4: at 18:15

## 2019-05-03 RX ADMIN — OXYCODONE HYDROCHLORIDE 5 MILLIGRAM(S): 5 TABLET ORAL at 00:00

## 2019-05-03 RX ADMIN — OXYCODONE HYDROCHLORIDE 5 MILLIGRAM(S): 5 TABLET ORAL at 18:19

## 2019-05-03 RX ADMIN — BROMOCRIPTINE MESYLATE 5 MILLIGRAM(S): 5 CAPSULE ORAL at 12:45

## 2019-05-03 RX ADMIN — Medication 25 MILLIGRAM(S): at 18:16

## 2019-05-03 RX ADMIN — LIDOCAINE 2 PATCH: 4 CREAM TOPICAL at 12:45

## 2019-05-03 RX ADMIN — Medication 6: at 09:15

## 2019-05-03 RX ADMIN — OXYCODONE HYDROCHLORIDE 5 MILLIGRAM(S): 5 TABLET ORAL at 08:00

## 2019-05-03 RX ADMIN — LIDOCAINE 2 PATCH: 4 CREAM TOPICAL at 11:00

## 2019-05-03 RX ADMIN — LIDOCAINE 2 PATCH: 4 CREAM TOPICAL at 21:14

## 2019-05-03 NOTE — DIETITIAN INITIAL EVALUATION ADULT. - ADHERENCE
Pt did not provide diet recall, Pt seemed agitated by multiple staff members talking to him, would not make eye contact and gave short answers to questions. Took potassium chloride, metformin, Januvia and lantus PTA. States blood sugar is usually 110-160mg/dL when he checks at home.

## 2019-05-03 NOTE — DIETITIAN INITIAL EVALUATION ADULT. - ENERGY NEEDS
ht: 73 inches, dry wt: 205 pounds, BMI: 28 kg/m2, IBW: 184 pounds (+/- 10%), 115 %IBW  Edema: 3+ ave foot. Skin per nursing documentation: healing wound on R heel.  Other pertinent information: 69M with PMHx of ischemic cardiomyopathy s/p ICD/PPM in 2006, generator change 2 weeks prior, T2DM, Afib, HTN, HLD, BEHZAD, FERNANDES and cirrhosis with newly diagnosed HCC s/p IR embolization x 2 (last on 4/18), presents to the ED with shoulder pain and fevers/weakness.

## 2019-05-03 NOTE — PROVIDER CONTACT NOTE (CRITICAL VALUE NOTIFICATION) - TEST AND RESULT REPORTED:
positive blood cx collected on 5/2 growth in aerobic and anaerobic bottle gram positive cocci in pairs and chaines
Preliminary growth in aerobic and anaerobic bottles; gram positive cocci in pairs and chains

## 2019-05-03 NOTE — PROGRESS NOTE ADULT - PROBLEM SELECTOR PLAN 1
- patient presenting with fevers to 102 and tachycardia, with new JULIANNE and elevated lactate to 2.7  - unclear source of infection at this time, likely related to one of recent procedures; patient only localizing pain and symptoms to his shoulders  - c/w vanc and zosyn at this time given patient with recent hospitalization 2 weeks prior and recent instrumentations (ICD replacement)  - F/u BCx speciations, suspicious for Group B strep  - will obtain chest US to evaluate the ICD pocket, CT chest and CT shoulder of the right shoulder to evaluate joint space  -Ortho consult at 8AM for possible septic joint.  -Would also consult ID in AM for better Rx of Group B strep bacteremia Found to be febrile to 102, JULIANNE, mildly hypotensive and lactate > 2. s/p IVF  likely 2/2 to strep agalactiae bacteremia 2/2 to possible right shoulder septic arthritis.  - now resolved and hemodynamically stable

## 2019-05-03 NOTE — DISCHARGE NOTE PROVIDER - NSDCQMCOGNITION_NEU_ALL_CORE
Left msg for annita sundays mother, to return our call regarding pt's abdominal x ray   No difficulties

## 2019-05-03 NOTE — DISCHARGE NOTE PROVIDER - NSDCCPGOAL_GEN_ALL_CORE_FT
To get better and follow your care plan as instructed. Streptococcus agalactiae infection - completion of IV antibiotic course and follow up with Dr. Gu  Right Shoulder Pain - continue steroids as prescribed and follow up with Dr. Moreno  Ischemic Cardiomyopathy - hold lasix for now and follow up with Dr. Rivers  Type 2 Diabetes Mellitus - Please continue your Januvia, Metformin and 24U of Lantus at bedtime and follow up with Dr. Thapa

## 2019-05-03 NOTE — CONSULT NOTE ADULT - ASSESSMENT
69M with PMHx of ischemic cardiomyopathy s/p ICD/PPM in 2006, generator change last week, and CABG in 1986, T2DM on lantus, hypothyroidism, Afib (spontaneously cardioverted and not on AC), HTN, HLD, BEHZAD (not on CPAP), gout, FERNANDES and cirrhosis with newly diagnosed HCC s/p IR embolization x 2 (last on 4/18), admitted 5/2/19 with:    Group B Strep agalactiae bacteremia  fever  JULIANNE improving  shoulder pain: does not appear infected   Group B strep can colonize GI/ tracts and oral cavity. Invasive infections in adults have been associated with DM, obesity, malignancy. Skin and soft tissue infections, UTI, non localizing bacteremias can be seen. Endocarditis can be caused by this bacteria.    Antibiotics  Zosyn 5/2-->  Vanco 5/2    Suggest  discontinue Zosyn  Ceftriaxone 2 gms IVSS daily  repeat blood cultures  Florastor (Sacchromyces boulardii)    discussed with primary team  ID service will see this weekend

## 2019-05-03 NOTE — PROGRESS NOTE ADULT - PROBLEM SELECTOR PLAN 6
- Will hold prednisone at this time given sepsis and bacteremia  - pain control with oxycodone 5mg IR and tylenol  -Will cont. - Will hold prednisone at this time given sepsis and bacteremia  - Will ISTOP and adjust pain control - Will cont low dose pred at this time   - Will ISTOP and adjust pain control

## 2019-05-03 NOTE — DISCHARGE NOTE PROVIDER - PROVIDER TOKENS
PROVIDER:[[id number]]],PROVIDER:[[id number]]],PROVIDER:[[id number]]],PROVIDER:[TOKEN:[[id number]]] PROVIDER:[TOKEN:[[id number]]],PROVIDER:[TOKEN:[[id number]]],PROVIDER:[TOKEN:[[id number]]],PROVIDER:[TOKEN:[[id number]]],PROVIDER:[TOKEN:[[id number]]]

## 2019-05-03 NOTE — PROGRESS NOTE ADULT - PROBLEM SELECTOR PLAN 4
- patient with long standing CAD s/p CABG and ischemic cardiomyopathy s/p ICD/PPM  - PPM generator changed 2 weeks ago, interrogation on 4/19 revealed sinus rhythm  - will hold diuretics and ACE inhibitor overnight; patient with some evidence of  fluid overload with lower extremity edema and mild crackles- however he states it is his baseline and in setting of infectious evaluation and JULIANNE and dehydration, will not administer at this time  - hold carvedilol in setting of hypotension  - reevaluate fluid status in AM  - last echo outpatient 3/2018 EF 43% and segmental wall dysfunction patient with long standing CAD s/p CABG and ischemic cardiomyopathy s/p ICD/PPM; PPM generator changed 2 weeks ago, interrogation on 4/19 revealed sinus rhythm; last echo outpatient 3/2018 EF 43% and segmental wall dysfunction  - Holding diuretics and Coreg (switch to metoprolol 25mg BID for rate control due to history of Afib)

## 2019-05-03 NOTE — PROVIDER CONTACT NOTE (CRITICAL VALUE NOTIFICATION) - ACTION/TREATMENT ORDERED:
MD aware, no recommendations at this time. will continue to monitor. MD aware, continue present care. will continue to monitor.

## 2019-05-03 NOTE — DIETITIAN INITIAL EVALUATION ADULT. - PROBLEM SELECTOR PLAN 4
- patient with long standing CAD s/p CABG and ischemic cardiomyopathy s/p ICD/PPM  - PPM generator changed 2 weeks ago, interrogation on 4/19 revealed sinus rhythm  - will hold diuretics and ACE inhibitor overnight; patient with some evidence of  fluid overload with lower extremity edema and mild crackles- however he states it is his baseline and in setting of infectious evaluation and JULIANNE and dehydration, will not administer at this time  - hold carvedilol in setting of hypotension  - reevaluate fluid status in AM  - last echo outpatient 3/2018 EF 43% and segmental wall dysfunction

## 2019-05-03 NOTE — DISCHARGE NOTE PROVIDER - NSDCFUADDINST_GEN_ALL_CORE_FT
- Please complete your IV Ceftriaxone until May 13th and follow up with Dr. Gu in 1-2 weeks  - Please hold the Lasix and follow up with Dr. Rivers in the next 1-2 weeks  - Please continue Metformin and Januvia as prescribed and your Lantus was increased to 24U at bedtime, please check your blood sugars 4 times a day (before meals and at bedtime) and follow up with Dr. Thapa  - Please follow up with Dr. Moreno for your right shoulder pain and continue the prednisone 2.5mg once/day - Please complete your IV Ceftriaxone until May 13th and follow up with Dr. Gu in 1-2 weeks  - Please hold the Lasix and follow up with Dr. Rivers in the next 1-2 weeks  - Please continue Metformin and Januvia as prescribed and your Lantus was increased to 24U at bedtime, please check your blood sugars 4 times a day (before meals and at bedtime) and follow up with Dr. Nicole Hennessy  - Please follow up with Dr. Moreno for your right shoulder pain and continue the prednisone 2.5mg once/day

## 2019-05-03 NOTE — DIETITIAN INITIAL EVALUATION ADULT. - OTHER INFO
Nutrition consult received for unintentional Wt loss. Per chart review Pt weighed 240 pounds in June 2014. On recent admission Pt's standing weight on 2/8/19 was 205 pounds, current Wt 212 pounds. Pt states he intentionally lost a couple of pounds over the past month but weight changes indicate a weight gain. Denies nausea, vomiting, constipation and diarrhea. Last BM 5/1. No difficulty chewing/swallowing. Nutrition consult received for decreased oral intake for >5 days PTA. Per chart review Pt weighed 240 pounds in June 2014. On recent admission Pt's standing weight on 2/8/19 was 205 pounds, current Wt 212 pounds. Pt states he intentionally lost a couple of pounds over the past month but weight changes indicate a weight gain. Denies nausea, vomiting, constipation and diarrhea. Last BM 5/1. No difficulty chewing/swallowing.

## 2019-05-03 NOTE — DISCHARGE NOTE PROVIDER - CARE PROVIDER_API CALL
Suha Thapa)  Internal Medicine  [address] [place] [address]  Phone: [phone]  Fax: [phone]  Follow Up Time:     Douglas Moreno)  Internal Medicine; Rheumatology  [address] [facility] [address]  [place] [address]  Phone: [phone]  Fax: [phone]  Follow Up Time:     Davonte Gu)  Infectious Disease; Internal Medicine  [address] [place] [address]  Phone: [phone]  Fax: [phone]  Follow Up Time:     Douglas Rivers)  Cardiovascular Disease; Internal Medicine; Nuclear Cardiology  [address] [facility] [address]  [place] [address]  Phone: [phone]  Fax: [phone]  Follow Up Time: Suha Thapa)  Internal Medicine  [address] [place] [address]  Phone: [phone]  Fax: [phone]  Follow Up Time:     Douglas Moreno)  Internal Medicine; Rheumatology  [address] [facility] [address]  [place] [address]  Phone: [phone]  Fax: [phone]  Follow Up Time:     Davonte Gu)  Infectious Disease; Internal Medicine  [address] [place] [address]  Phone: [phone]  Fax: [phone]  Follow Up Time:     Douglas Rivers)  Cardiovascular Disease; Internal Medicine; Nuclear Cardiology  [address] [facility] [address]  [place] [address]  Phone: [phone]  Fax: [phone]  Follow Up Time:     Nicole Hennessy)  EndocrinologyMetabDiabetes; Internal Medicine  [address] [facility] [address]  [place] [address]  Phone: [phone]  Fax: [phone]  Follow Up Time:

## 2019-05-03 NOTE — DIETITIAN INITIAL EVALUATION ADULT. - ORAL INTAKE PTA
fair/Pt reports decreased appetite for the past few days otherwise appetite was good and eating well.

## 2019-05-03 NOTE — DISCHARGE NOTE PROVIDER - NSDCCPCAREPLAN_GEN_ALL_CORE_FT
PRINCIPAL DISCHARGE DIAGNOSIS  Diagnosis: Fever of unknown origin  Assessment and Plan of Treatment: PRINCIPAL DISCHARGE DIAGNOSIS  Diagnosis: Streptococcus agalactiae infection  Assessment and Plan of Treatment: You presented with a fever. You were found to have bacteria in the blood. You were started on IV antibiotics. Infectious disease was consulted and recommended IV ceftriaxone for 10 days until May 13th. A PICC line was placed and you will go home. Please follow up with Dr. Gu in 1 to 2 weeks after discharge.      SECONDARY DISCHARGE DIAGNOSES  Diagnosis: Right shoulder pain  Assessment and Plan of Treatment: You presented with fever and right shoulder pain (and left shoulder pain) concerning for an infection in your shoulder. You had an arthrocentesis of the shoulder that showed no signs of infection and was likely due to your history of gout. Due to recent steroid use you were prescribed a small dose of steroids 2.5mg of Prednisone once/day. Please continue the steroids until you set up an appointment with Dr. Moreno in the next 1-2 weeks.    Diagnosis: Ischemic cardiomyopathy  Assessment and Plan of Treatment: During your hospital stay we held your BP meds due to low BP initially. Slowly your medications were restarted. However your lasix (water pill) was held due to no concern of fluid overload based on your lung sounds and minimal edema in the legs. Please follow up with Dr. Rivers in the next 1-2 weeks to see if the Lasix should be resumed.    Diagnosis: Type 2 diabetes mellitus  Assessment and Plan of Treatment: During your hospital stay you had elevated blood sugars likely from the steroids. Your insulin was tweaked and for now you will be discharged with Lantus 24U at bedtime. Please follow up with Dr. Thapa in the next 1-2 weeks if he wishes to change your insulin regimen. PRINCIPAL DISCHARGE DIAGNOSIS  Diagnosis: Streptococcus agalactiae infection  Assessment and Plan of Treatment: You presented with a fever. You were found to have bacteria in the blood. You were started on IV antibiotics. Infectious disease was consulted and recommended IV ceftriaxone for 10 days until May 13th. A PICC line was placed and you will go home. Please follow up with Dr. Gu in 1 to 2 weeks after discharge.      SECONDARY DISCHARGE DIAGNOSES  Diagnosis: Type 2 diabetes mellitus  Assessment and Plan of Treatment: During your hospital stay you had elevated blood sugars likely from the steroids. Your insulin was tweaked and for now you will be discharged with Lantus 24U at bedtime. Please follow up with Dr. Nicole Hennessy your endocrinologist in the next 1-2 weeks if he wishes to change your insulin regimen.    Diagnosis: Ischemic cardiomyopathy  Assessment and Plan of Treatment: During your hospital stay we held your BP meds due to low BP initially. Slowly your medications were restarted. However your lasix (water pill) was held due to no concern of fluid overload based on your lung sounds and minimal edema in the legs. Please follow up with Dr. Rivers in the next 1-2 weeks to see if the Lasix should be resumed.    Diagnosis: Right shoulder pain  Assessment and Plan of Treatment: You presented with fever and right shoulder pain (and left shoulder pain) concerning for an infection in your shoulder. You had an arthrocentesis of the shoulder that showed no signs of infection and was likely due to your history of gout. Due to recent steroid use you were prescribed a small dose of steroids 2.5mg of Prednisone once/day. Please continue the steroids until you set up an appointment with Dr. Moreno in the next 1-2 weeks.

## 2019-05-03 NOTE — PROGRESS NOTE ADULT - PROBLEM SELECTOR PLAN 3
- per rheum notes- patient given medrol injection and outpatient prednisone  - patient with long standing history of gout, was on Pegloticase until last month due to increase in uric acid likely from his tumor  - Patient was treated outpatient for gout (prednisone 2.5 TID  per rheum)  -F/u CT shoulder patient with baseline Cr ~1.2-1.6, Cr now downtrending likely 2/2 to sepsis (hemodynamically mediated)  - s/p 2L IVF will trend Cr and monitor UOP, Renal US ordered will f/u and urine lytes  - Avoid nephrotoxins  - Will hold Ramipril and Aldactone for now

## 2019-05-03 NOTE — DIETITIAN INITIAL EVALUATION ADULT. - PROBLEM SELECTOR PLAN 3
- per rheum notes- patient given medrol injection and outpatient prednisone  - patient with long standing history of gout, was on Pegloticase until last month due to increase in uric acid likely from his tumor  - Patient was treated outpatient for gout (prednisone 2.5 TID  per rheum)  -F/u CT shoulder

## 2019-05-03 NOTE — DIETITIAN INITIAL EVALUATION ADULT. - NS AS NUTRI INTERV ED CONTENT
Pt not receptive to diet education at time of visit, made aware RD remains available for review of diet education upon request

## 2019-05-03 NOTE — PROGRESS NOTE ADULT - SUBJECTIVE AND OBJECTIVE BOX
James Trevino, PGY1  Pager: [phone]    CHIEF COMPLAINT: Patient is a 69y old  Male who presents with a chief complaint of fevers, weakness and shoulder pain. (02 May 2019 23:25)      INTERVAL HPI/OVERNIGHT EVENTS:    MEDICATIONS (STANDING):  aspirin enteric coated 81 milliGRAM(s) Oral daily  atorvastatin 40 milliGRAM(s) Oral at bedtime  bromocriptine Capsule 5 milliGRAM(s) Oral daily  dextrose 5%. 1000 milliLiter(s) IV Continuous <Continuous>  dextrose 50% Injectable 12.5 Gram(s) IV Push once  dextrose 50% Injectable 25 Gram(s) IV Push once  dextrose 50% Injectable 25 Gram(s) IV Push once  insulin glargine Injectable (LANTUS) 20 Unit(s) SubCutaneous at bedtime  insulin lispro (HumaLOG) corrective regimen sliding scale   SubCutaneous three times a day before meals  insulin lispro (HumaLOG) corrective regimen sliding scale   SubCutaneous at bedtime  levothyroxine 125 MICROGram(s) Oral daily  lidocaine   Patch 2 Patch Transdermal daily  piperacillin/tazobactam IVPB. 3.375 Gram(s) IV Intermittent every 8 hours  predniSONE   Tablet 2.5 milliGRAM(s) Oral daily    MEDICATIONS  (PRN):  acetaminophen   Tablet .. 650 milliGRAM(s) Oral every 6 hours PRN  dextrose 40% Gel 15 Gram(s) Oral once PRN  glucagon  Injectable 1 milliGRAM(s) IntraMuscular once PRN  oxyCODONE    IR 5 milliGRAM(s) Oral every 6 hours PRN      REVIEW OF SYSTEMS:  CONSTITUTIONAL: No fever, weight loss, or fatigue  EYES: No eye pain, visual disturbances, or discharge  ENMT:  No difficulty hearing, tinnitus, vertigo; No sinus or throat pain  NECK: No pain or stiffness  RESPIRATORY: No cough, wheezing, chills or hemoptysis; No shortness of breath  CARDIOVASCULAR: No chest pain, palpitations, dizziness, or leg swelling  GASTROINTESTINAL: No abdominal or epigastric pain. No nausea, vomiting, or hematemesis; No diarrhea or constipation. No melena or hematochezia.  GENITOURINARY: No dysuria, frequency, hematuria, or incontinence  NEUROLOGICAL: No headaches, memory loss, loss of strength, numbness, or tremors  SKIN: No itching, burning, rashes, or lesions   MUSCULOSKELETAL: No joint pain or swelling; No muscle, back, or extremity pain    T(F): 98 (19 @ 07:00), Max: 102.2 (19 @ 17:30)  HR: 89 (19 @ 07:00) (85 - 96)  BP: 104/68 (19 @ 07:00) (96/57 - 120/69)  RR: 18 (19 @ 07:00) (16 - 24)  SpO2: 99% (19 @ 07:00) (95% - 99%)  Wt(kg): --  CAPILLARY BLOOD GLUCOSE      POCT Blood Glucose.: 281 mg/dL (03 May 2019 00:21)    I&O's Summary    02 May 2019 07:01  -  03 May 2019 07:00  --------------------------------------------------------  IN: 0 mL / OUT: 300 mL / NET: -300 mL        PHYSICAL EXAM:  GENERAL: NAD, well-groomed, well-developed  HEAD:  Atraumatic, Normocephalic  EYES: EOMI, PERRLA, conjunctiva and sclera clear  ENMT: No tonsillar erythema, exudates, or enlargement; Moist mucous membranes  NECK: Supple, No JVD, Normal thyroid  NERVOUS SYSTEM:  Alert & Oriented X3, Good concentration; Motor Strength 5/5 B/L upper and lower extremities  CHEST/LUNG: Clear to auscultation bilaterally; No rales, rhonchi, wheezing, or rubs  HEART: Regular rate and rhythm; No murmurs, rubs, or gallops  ABDOMEN: Soft, Nontender, Nondistended; Bowel sounds present  EXTREMITIES:  2+ Peripheral Pulses, No clubbing, cyanosis, or edema  LYMPH: No lymphadenopathy noted  SKIN: No rashes or lesions    LABS:                        10.5   6.3   )-----------( 62       ( 02 May 2019 17:15 )             31.9     05-02    126<L>  |  96  |  65<H>  ----------------------------<  322<H>  5.3   |  18<L>  |  2.37<H>    Ca    9.6      02 May 2019 17:15    TPro  8.0  /  Alb  3.7  /  TBili  1.0  /  DBili  x   /  AST  24  /  ALT  20  /  AlkPhos  127<H>  05-02      Urinalysis Basic - ( 02 May 2019 19:31 )    Color: Light Yellow / Appearance: Clear / S.014 / pH: x  Gluc: x / Ketone: Negative  / Bili: Negative / Urobili: Negative   Blood: x / Protein: 30 mg/dL / Nitrite: Negative   Leuk Esterase: Negative / RBC: 10 /hpf / WBC 1 /HPF   Sq Epi: x / Non Sq Epi: 0 /hpf / Bacteria: Negative          RADIOLOGY & ADDITIONAL TESTS: James Trevino, PGY1  Pager: [phone]    CHIEF COMPLAINT: Patient is a 69y old  Male who presents with a chief complaint of fevers, weakness and shoulder pain. (02 May 2019 23:25)      INTERVAL HPI/OVERNIGHT EVENTS:  Patient continues to have 7/10 right shoulder pain and mild pain in left shoulder. Limited ROM bilaterally in shoulders. No fevers/chills, chest pain, shortness of breath, abdominal pain, nausea or vomiting.     MEDICATIONS (STANDING):  aspirin enteric coated 81 milliGRAM(s) Oral daily  atorvastatin 40 milliGRAM(s) Oral at bedtime  bromocriptine Capsule 5 milliGRAM(s) Oral daily  dextrose 5%. 1000 milliLiter(s) IV Continuous <Continuous>  dextrose 50% Injectable 12.5 Gram(s) IV Push once  dextrose 50% Injectable 25 Gram(s) IV Push once  dextrose 50% Injectable 25 Gram(s) IV Push once  insulin glargine Injectable (LANTUS) 20 Unit(s) SubCutaneous at bedtime  insulin lispro (HumaLOG) corrective regimen sliding scale   SubCutaneous three times a day before meals  insulin lispro (HumaLOG) corrective regimen sliding scale   SubCutaneous at bedtime  levothyroxine 125 MICROGram(s) Oral daily  lidocaine   Patch 2 Patch Transdermal daily  piperacillin/tazobactam IVPB. 3.375 Gram(s) IV Intermittent every 8 hours  predniSONE   Tablet 2.5 milliGRAM(s) Oral daily    MEDICATIONS  (PRN):  acetaminophen   Tablet .. 650 milliGRAM(s) Oral every 6 hours PRN  dextrose 40% Gel 15 Gram(s) Oral once PRN  glucagon  Injectable 1 milliGRAM(s) IntraMuscular once PRN  oxyCODONE    IR 5 milliGRAM(s) Oral every 6 hours PRN    T(F): 98 (19 @ 07:00), Max: 102.2 (19 @ 17:30)  HR: 89 (19 @ 07:00) (85 - 96)  BP: 104/68 (19 @ 07:00) (96/57 - 120/69)  RR: 18 (19 @ 07:00) (16 - 24)  SpO2: 99% (19 @ 07:00) (95% - 99%)  Wt(kg): --  CAPILLARY BLOOD GLUCOSE      POCT Blood Glucose.: 281 mg/dL (03 May 2019 00:21)    I&O's Summary    02 May 2019 07:01  -  03 May 2019 07:00  --------------------------------------------------------  IN: 0 mL / OUT: 300 mL / NET: -300 mL        PHYSICAL EXAM:  GENERAL: NAD, well-groomed, well-developed  HEAD:  Atraumatic, Normocephalic  EYES: EOMI, PERRLA, conjunctiva and sclera clear  ENMT: No tonsillar erythema, exudates, or enlargement; Moist mucous membranes  NECK: Supple  NERVOUS SYSTEM:  Alert & Oriented X3, Good concentration; Motor strength 4/5 bilaterally UE, with limited ROM due to pain. LE 5/5 bilaterally. Sensation intact throughout.  CHEST/LUNG: Clear to auscultation bilaterally; No rales, rhonchi, wheezing, or rubs. Has a ICD/PPM left anterior chest not tender or erythematous.  HEART: Regular rate and rhythm; No murmurs, rubs, or gallops  ABDOMEN: Soft, Nontender, distended but no fluid wave appreciated. Bowel sounds present.  EXTREMITIES:  2+ Peripheral Pulses. Tender to palpation right shoulder with some swelling and no erythema. Mildly tender to palpation left shoulder joint. Limited ROM. Trace LE edema.  LYMPH: No lymphadenopathy noted  SKIN: Venous stasis dermatitis LE bilaterally    LABS:                        10.5   6.3   )-----------( 62       ( 02 May 2019 17:15 )             31.9         126<L>  |  96  |  65<H>  ----------------------------<  322<H>  5.3   |  18<L>  |  2.37<H>    Ca    9.6      02 May 2019 17:15    TPro  8.0  /  Alb  3.7  /  TBili  1.0  /  DBili  x   /  AST  24  /  ALT  20  /  AlkPhos  127<H>  05-      Urinalysis Basic - ( 02 May 2019 19:31 )    Color: Light Yellow / Appearance: Clear / S.014 / pH: x  Gluc: x / Ketone: Negative  / Bili: Negative / Urobili: Negative   Blood: x / Protein: 30 mg/dL / Nitrite: Negative   Leuk Esterase: Negative / RBC: 10 /hpf / WBC 1 /HPF   Sq Epi: x / Non Sq Epi: 0 /hpf / Bacteria: Negative          RADIOLOGY & ADDITIONAL TESTS:

## 2019-05-03 NOTE — DIETITIAN INITIAL EVALUATION ADULT. - PROBLEM SELECTOR PLAN 6
- Will hold prednisone at this time given sepsis and bacteremia  - pain control with oxycodone 5mg IR and tylenol  -Will cont.

## 2019-05-03 NOTE — CONSULT NOTE ADULT - SUBJECTIVE AND OBJECTIVE BOX
Patient is a 69y old  Male who presents with a chief complaint of fevers, weakness and shoulder pain. (03 May 2019 12:53)    HPI:  69M with PMHx of ischemic cardiomyopathy s/p ICD/PPM in 2006, generator change last week, and CABG in 1986, T2DM on lantus, hypothyroidism, Afib (spontaneously cardioverted and not on AC), HTN, HLD, BEHZAD (not on CPAP), gout, FERNANDES and cirrhosis with newly diagnosed HCC s/p IR embolization x 2 (last on 4/18), with embozene presents to the ED with fevers, weakness and shoulder pain.    Patient was doing well after his IR procedure until Monday, when he spike a fever at home of 102. Per the wife, his fever broke on  his own, but he continued to have fevers daily. He has been having decreased oral intake, and worsening weakness. Patient also started to develop new right shoulder pain, which has now travelled to the left. Shoulder pain is exacerbation by movement, worse with palpation.  He went to see his rheumatologist, who gave him a medrol injection and prednisone to continue to take as an outpatient. Today, his wife took a half day off from work because she noted in the morning he was not doing too well. When she came home, he was febrile to 102, and was incontinent and intermittently incoherent. She tried to get him up to clean him off the recliner, but he was too weak and slid down the recliner. Due to his worsening status, she brought him to the emergency room.    In regards, to his liver cancer, he was diagnosed in Sept 2018 and underwent IR embolization on 4.18 without any issues. Patient has had previous  hospitalizations for knee bursitis, c diff, JULIANNE, heart failure and gout.     In the ED, patient's VS T max of 102.2, 120/69 (lowest 90s/60s) , HR 96, RR18, T 97.9. Patient received 2L NS and vanc + zosyn. (02 May 2019 23:25)    At present, Mr Harvey notes right shoulder discomfort. It is improved. He has restricted active ROM limited by pain. No paain at ICD/PPM generator site recently changed. He has decreased appetite, no dysuria, no diarrhea, no abd pain    PAST MEDICAL & SURGICAL HISTORY:  Afib: pt reports ~9 mos, [id number], resolved - monitored by Dr. Rivers  Ulcer of right ankle: has surgery done Dec 2018  Liver mass: dx: 10/2018   incidental finding. Currently awaitng furthur workup  Gout: medically managed  Elevated prolactin level: dx: &#x27; 70&#x27;s  medically managed Bromocriptine  Vitamin D deficiency  Elevated triglycerides with high cholesterol: on meds  Hypothyroidism  PVD (peripheral vascular disease)  History of hyperprolactinemia  Hepatic cirrhosis, unspecified hepatic cirrhosis type  Anemia  ICD (implantable cardioverter-defibrillator) in place: [employer], Old Model [id number] , last interrogation 3/27/19, generator change 4/3/19 New Model # [id number]  Sleep apnea: not using CPAP  History of osteomyelitis: 2015, right foot 2nd toe   follow-up by podiatrist every 2 weeks  Presence of stent in coronary artery: 2 stents  Heart failure with reduced left ventricular function: last EF 43% (2/2018)  Ischemic cardiomyopathy  Pituitary adenoma: as per patient chronic, no changes , recently restarted follow up with endocrinologist ( note in allscripts )  Coronary artery disease  Thrombocytopenia: Chronic  HTN (hypertension)  DM (diabetes mellitus): type 2, Hg A1C 9.9% 2/2019  Dec 2019  due to prednisone started on Lantus Jan 2019  AICD lead malfunction: history of - fixed follow-up by Dr Rivers  Encounter for incision and drainage procedure: Dec 2018 right foot/ankle  History of amputation: right foot, 2nd toe, osteo 2015  AICD (automatic cardioverter/defibrillator) present: placement in (2006), generator change 4/3/19  Stented coronary artery: RCA (1999)  Coronary atherosclerosis of artery bypass graft: CABG X 4 (1986)      Social history:     FAMILY HISTORY:  Family history of MI (myocardial infarction)    REVIEW OF SYSTEMS:  CONSTITUTIONAL: No weakness, + fevers or chills  EYES/ENT: No visual changes;  No vertigo or throat pain   NECK: No pain or stiffness  RESPIRATORY: No cough, wheezing, hemoptysis; No shortness of breath  CARDIOVASCULAR: No chest pain or palpitations  GASTROINTESTINAL: No abdominal or epigastric pain. No nausea, vomiting, or hematemesis; No diarrhea or constipation. No melena or hematochezia.  GENITOURINARY: No dysuria, frequency or hematuria  NEUROLOGICAL: No numbness or weakness  SKIN: No itching, burning, rashes, or lesions   All other review of systems is negative unless indicated above    Allergies  No Known Allergies    Antimicrobials:  piperacillin/tazobactam IVPB. 3.375 Gram(s) IV Intermittent every 8 hours    Vital Signs Last 24 Hrs  T(C): 37.5 (03 May 2019 14:38), Max: 39 (02 May 2019 17:30)  T(F): 99.5 (03 May 2019 14:38), Max: 102.2 (02 May 2019 17:30)  HR: 80 (03 May 2019 14:38) (80 - 96)  BP: 114/67 (03 May 2019 14:38) (96/57 - 120/69)  BP(mean): --  RR: 18 (03 May 2019 14:38) (16 - 24)  SpO2: 96% (03 May 2019 14:38) (95% - 99%)    PHYSICAL EXAM:  General: WN/WD NAD, Non-toxic  Neurology: A&Ox3, nonfocal  Respiratory: Clear to auscultation bilaterally  CV: RRR, S1S2, no murmurs, rubs or gallops  Abdominal: Soft, Non-tender, mildly distended, normal bowel sounds  Extremities: No edema,   Line Sites: Clear  Skin: No rash, no erythema at generator pocket sige                        9.9    7.1   )-----------( 58       ( 03 May 2019 07:10 )             32.0   WBC Count: 7.1 (05-03 @ 07:10)  WBC Count: 6.3 (05-02 @ 17:15)    05-03    130<L>  |  101  |  58<H>  ----------------------------<  277<H>  5.1   |  18<L>  |  1.99<H>  Creatinine: 1.99 (05-03 @ 10:59)    Creatinine: 2.02 (05-03 @ 07:10)    Creatinine: 2.37 (05-02 @ 17:15    Ca    8.9      03 May 2019 10:59  Phos  3.2     05-03  Mg     1.7     05-03    TPro  7.3  /  Alb  3.1<L>  /  TBili  0.8  /  DBili  x   /  AST  19  /  ALT  17  /  AlkPhos  109  05-03    Hemoglobin A1C, Whole Blood (04.11.19 @ 15:27)    Hemoglobin A1C, Whole Blood: 8.5%        MICROBIOLOGY:  .Blood  05-02-19   Growth in aerobic and anaerobic bottles: Gram Positive Cocci in Pairs and  Chains  Culture - Blood (05.02.19 @ 19:22)    -  Streptococcus agalactiae (Group B): Detec    Gram Stain:   Growth in aerobic and anaerobic bottles: Gram Positive Cocci in Pairs and  Chains    Radiology:  < from: US Kidney and Bladder (05.03.19 @ 11:37) >  No hydronephrosis    < end of copied text >  < from: CT 3D Reconstruct w/ Workstation (05.03.19 @ 09:49) >  No evidence of osteomyelitis. No large glenohumeral joint effusion.   Likely small fluid in the AC joint.    < end of copied text >      Gopi Grace MD; Division of Infectious Disease; Pager: [phone]; nights and weekends: [phone]

## 2019-05-03 NOTE — PROVIDER CONTACT NOTE (CRITICAL VALUE NOTIFICATION) - SITUATION
Preliminary growth in aerobic and anaerobic bottles; gram positive cocci in pairs and chains
blood cx collected on 5/2 growth in aerobic and anaerobic bottle gram positive cocci in pairs and chaines

## 2019-05-03 NOTE — DISCHARGE NOTE PROVIDER - HOSPITAL COURSE
69M with PMHx of ischemic cardiomyopathy s/p ICD/PPM in 2006, generator change last week, and CABG in 1986, T2DM on lantus, hypothyroidism, Afib (spontaneously cardioverted and not on AC), HTN, HLD, BEHZAD (not on CPAP), gout, FERNANDES and cirrhosis with newly diagnosed HCC s/p IR embolization x 2 (last on 4/18), with embozene admitted for septic shock initially 2/2 to concern for septic arthritis. Blood cultures positive for Strep agalactiae. Patient is hemodynamically stable s/p IVF. Ortho consulted for arthrocentesis. CT of the right shoulder showed no osteo. ID consulted and abx changed from Zosyn to Ceftriaxone. Arthrocentesis was a dry tap. 69M with PMHx of ischemic cardiomyopathy s/p ICD/PPM in 2006, generator change last week, and CABG in 1986, T2DM on lantus, hypothyroidism, Afib (spontaneously cardioverted and not on AC), HTN, HLD, BEHZAD (not on CPAP), gout, FERNANDES and cirrhosis with newly diagnosed HCC s/p IR embolization x 2 (last on 4/18), with embozene admitted for septic shock initially 2/2 to concern for septic arthritis. Blood cultures positive for Strep agalactiae. Patient is hemodynamically stable s/p IVF. Ortho consulted for arthrocentesis. CT of the right shoulder showed no osteo. ID consulted and abx changed from Zosyn to Ceftriaxone. Repeat blood cultures 5/4 NGTD. Arthrocentesis was a dry tap. 69M with PMHx of ischemic cardiomyopathy s/p ICD/PPM in 2006, generator change last week, and CABG in 1986, T2DM on lantus, hypothyroidism, Afib (spontaneously cardioverted and not on AC), HTN, HLD, BEHZAD (not on CPAP), gout, FERNANDES and cirrhosis with newly diagnosed HCC s/p IR embolization x 2 (last on 4/18), with embozene admitted for septic shock initially 2/2 to concern for septic arthritis. Blood cultures positive for Strep agalactiae. Patient is hemodynamically stable s/p IVF. Ortho consulted for arthrocentesis. CT of the right shoulder showed no osteo. ID consulted and abx changed from Zosyn to Ceftriaxone. Repeat blood cultures 5/4 NGTD. Arthrocentesis was a dry tap. As per ID plan for Ceftriaxone for 10 days starting 5/4 (last recent negative blood cultures). Patient had a PICC line placed. Restarted on ACEi and Aldactone, but holding Lasix as patient was euvolemivic. PT recommended _____. Patient is hemodynamically stable and medically optimized for a safe discharge 69M with PMHx of ischemic cardiomyopathy s/p ICD/PPM in 2006, generator change last week, and CABG in 1986, T2DM on lantus, hypothyroidism, Afib (spontaneously cardioverted and not on AC), HTN, HLD, BEHZAD (not on CPAP), gout, FERNANDES and cirrhosis with newly diagnosed HCC s/p IR embolization x 2 (last on 4/18), with embozene admitted for septic shock initially 2/2 to concern for septic arthritis. Blood cultures positive for Strep agalactiae. Patient is hemodynamically stable s/p IVF. Ortho consulted for arthrocentesis. CT of the right shoulder showed no osteo. ID consulted and abx changed from Zosyn to Ceftriaxone. Repeat blood cultures 5/4 NGTD. Arthrocentesis was a dry tap. As per ID plan for Ceftriaxone for 10 days starting 5/4 (last recent negative blood cultures). Patient had a PICC line placed. Restarted on ACEi and Aldactone, but holding Lasix as patient was euvolemivic. PT recommended home with home PT. Patient is hemodynamically stable and medically optimized for a safe discharge 69M with PMHx of ischemic cardiomyopathy s/p ICD/PPM in 2006, generator change last week, and CABG in 1986, T2DM on lantus, hypothyroidism, Afib (spontaneously cardioverted and not on AC), HTN, HLD, BEHZAD (not on CPAP), gout, FERNANDES and cirrhosis with newly diagnosed HCC s/p IR embolization x 2 (last on 4/18), with embozene admitted for septic shock initially 2/2 to concern for septic arthritis. Blood cultures positive for Strep agalactiae. Patient is hemodynamically stable s/p IVF. Ortho consulted for arthrocentesis. CT of the right shoulder showed no osteo. ID consulted and abx changed from Zosyn to Ceftriaxone. Repeat blood cultures 5/4 NGTD. Arthrocentesis was a dry tap. As per ID plan for Ceftriaxone for 10 days starting 5/4 (last recent negative blood cultures). Patient had a PICC line placed. Restarted on ACEi and Aldactone, but holding Lasix as patient was euvolemivic. PT recommended home with home PT. Patient is hemodynamically stable and medically optimized for a safe discharge home.

## 2019-05-03 NOTE — DIETITIAN INITIAL EVALUATION ADULT. - PROBLEM SELECTOR PLAN 1
- patient presenting with fevers to 102 and tachycardia, with new JULIANNE and elevated lactate to 2.7  - unclear source of infection at this time, likely related to one of recent procedures; patient only localizing pain and symptoms to his shoulders  - c/w vanc and zosyn at this time given patient with recent hospitalization 2 weeks prior and recent instrumentations (ICD replacement)  - F/u BCx speciations, suspicious for Group B strep  - will obtain chest US to evaluate the ICD pocket, CT chest and CT shoulder of the right shoulder to evaluate joint space  -Ortho consult at 8AM for possible septic joint.  -Would also consult ID in AM for better Rx of Group B strep bacteremia

## 2019-05-03 NOTE — DIETITIAN INITIAL EVALUATION ADULT. - NS AS NUTRI INTERV MEALS SNACK
When diet able to be advance, initiate Consistent Carbohydrate (no evening snacks), DASH diet. Encourage po intake w/ snacks ordered at meals. Provide food preferences within diet restrictions as feasible./Carbohydrate - modified diet/General/healthful diet

## 2019-05-03 NOTE — PROGRESS NOTE ADULT - PROBLEM SELECTOR PLAN 2
- patient with baseline Cr ~1.2-1.6, currently 2.37 and hyponatremia to 126  - ddx includes prerenal JULIANNE in setting of decreased PO intake and sepsis  - no signs of oliguria; strict Is/Os  - s/p 2L NS; will hold diuretics and ACE inhibitor overnight and evaluate renal function in AM  - follow urine lytes including Urine Na, Cr, Urea and Protein  - will obtain renal US in AM if lack of improvement in renal function after fluids Concern for right shoulder septic arthritis with positive blood cultures. Recently medrol injection left shoulder and prednisone 20mg x 2 by rheumatologist.  - Will consult Ortho for urgent arthrocentesis  - Will check ESR and CRP  - Xray of bilateral shoulders  - Started on broad spectrum abx but with strep no need for vanc will continue Zosyn and consult ID for antibiotic management  - For pain will ISTOP and address pain regimen Concern for right shoulder septic arthritis with positive blood cultures. Recently medrol injection left shoulder and prednisone 20mg x 2 by rheumatologist.  - Will consult Ortho for urgent arthrocentesis  - Will check ESR and CRP  - imaging of shoulders  - Started on broad spectrum abx but with strep no need for vanc will continue Zosyn and consult ID for antibiotic management  - For pain will ISTOP and address pain regimen

## 2019-05-03 NOTE — DISCHARGE NOTE PROVIDER - CARE PROVIDERS DIRECT ADDRESSES
,[email or web address]

## 2019-05-03 NOTE — CHART NOTE - NSCHARTNOTEFT_GEN_A_CORE
ID: [id number]     05/01/2019 05/01/2019 oxycodone-acetaminophen 5-325 mg tab  28 7 Selena Gallagher     09/10/2018 09/10/2018 oxycodone-acetaminophen 5-325 mg tab  16 4 Alisha Roldan (ANP)     08/13/2018 08/13/2018 oxycodone-acetaminophen 5-325 mg tab  12 3 Heather Nichole (NP)     05/25/2018 05/25/2018 oxycodone hcl 5 mg tablet  30 5 Tahir Perez (MS)

## 2019-05-03 NOTE — CONSULT NOTE ADULT - SUBJECTIVE AND OBJECTIVE BOX
69y Male presents admitted with fever and chills on 5/2/19. Orthopedics consulted to rule out right septic shoulder. Patient stated began having right shoulder pain on Monday. Patient states he was fine on the weekend, played golf on Saturday. Patient has a significant history of gout throughout several of his joints and sees a rheumatologist as an outpatient regularly. Most recently, patient saw his rheumatologist a couple of days ago and received a medrol injection into his right shoulder. Patient also complains of pain in his left shoulder but not as severe. Also, he had his pacemaker changed 2 weeks ago with a interrogation on 4/19. Denies trauma. Denies numbness/tingling. Did have fever/chills upon arriving to ED. Denies pain/injury elsewhere. No other complaints.    HEALTH ISSUES - PROBLEM Dx:  Sepsis, due to unspecified organism: Sepsis, due to unspecified organism  Need for prophylactic measure: Need for prophylactic measure  DM (diabetes mellitus): DM (diabetes mellitus)  Thrombocytopenia: Thrombocytopenia  Gout: Gout  Hepatocellular carcinoma: Hepatocellular carcinoma  Ischemic cardiomyopathy: Ischemic cardiomyopathy  Shoulder pain: Shoulder pain  JULIANNE (acute kidney injury): JULIANNE (acute kidney injury)  SIRS (systemic inflammatory response syndrome): SIRS (systemic inflammatory response syndrome)        MEDICATIONS  (STANDING):  aspirin enteric coated 81 milliGRAM(s) Oral daily  atorvastatin 40 milliGRAM(s) Oral at bedtime  bromocriptine Capsule 5 milliGRAM(s) Oral daily  dextrose 5%. 1000 milliLiter(s) IV Continuous <Continuous>  dextrose 50% Injectable 12.5 Gram(s) IV Push once  dextrose 50% Injectable 25 Gram(s) IV Push once  dextrose 50% Injectable 25 Gram(s) IV Push once  insulin glargine Injectable (LANTUS) 20 Unit(s) SubCutaneous at bedtime  insulin lispro (HumaLOG) corrective regimen sliding scale   SubCutaneous three times a day before meals  insulin lispro (HumaLOG) corrective regimen sliding scale   SubCutaneous at bedtime  levothyroxine 125 MICROGram(s) Oral daily  lidocaine   Patch 2 Patch Transdermal daily  piperacillin/tazobactam IVPB. 3.375 Gram(s) IV Intermittent every 8 hours  predniSONE   Tablet 2.5 milliGRAM(s) Oral daily    Allergies    No Known Allergies    Intolerances      Imaging: XR demonstrates distal radius fracture                        9.9    7.1   )-----------( 58       ( 03 May 2019 07:10 )             32.0     03 May 2019 10:59    130    |  101    |  58     ----------------------------<  277    5.1     |  18     |  1.99     Ca    8.9        03 May 2019 10:59  Phos  3.2       03 May 2019 07:10  Mg     1.7       03 May 2019 07:10    TPro  7.3    /  Alb  3.1    /  TBili  0.8    /  DBili  x      /  AST  19     /  ALT  17     /  AlkPhos  109    03 May 2019 07:10      Vital Signs Last 24 Hrs  T(C): 36.7 (05-03-19 @ 07:00), Max: 39 (05-02-19 @ 17:30)  T(F): 98 (05-03-19 @ 07:00), Max: 102.2 (05-02-19 @ 17:30)  HR: 89 (05-03-19 @ 07:00) (85 - 96)  BP: 104/68 (05-03-19 @ 07:00) (96/57 - 120/69)  BP(mean): --  RR: 18 (05-03-19 @ 07:00) (16 - 24)  SpO2: 99% (05-03-19 @ 07:00) (95% - 99%)  imaging: CT R shoulder  No evidence of osteomyelitis. No large glenohumeral joint effusion.   Likely small fluid in the AC joint.      Physical Exam  Gen: NAD  rue/lue: NO gross effusion,     Skin intact, no soft tissue swelling, no effusion, no ecchymosis, no erythema/warmth of bilateral shoulders, + TTP over shoulder (superior, anterior and posterior), AROM: 0-30 PROM 0-90 of flexion and abduction, no pain with micromotion   Good ROM at elbow, wrist and fingers.   + AIN/PIN/M/R/U  + SILT C2-T1  +DP/PT Pulse 2+/4.  SILT L2-S1, +EHL/FHL/TA/Gastroc, soft compartments, no calf ttp.  Procedure: Joint aspirated using sterile precautions. ___ cc fluid sent for crystals/culture/gram stain/ cell count STAT. Placed in sterile dressing w/ compressive wrap. Post procedure exam unchanged, NV intact. Patient tolerated well.     A/P: 69y Male with _______  Pain control  F/U crystals/culture/gram stain/ cell count  Ice/elevation   WBAT 69y Male presents admitted with fever and chills on 5/2/19. Orthopedics consulted to rule out right septic shoulder. Patient stated began having right shoulder pain on Monday. Patient states he was fine on the weekend, played golf on Saturday. Patient has a significant history of gout throughout several of his joints and sees a rheumatologist as an outpatient regularly. Most recently, patient saw his rheumatologist a couple of days ago and received a medrol injection into his right shoulder. Patient also complains of pain in his left shoulder but not as severe. Also, he had his pacemaker changed 2 weeks ago with a interrogation on 4/19. Denies trauma. Denies numbness/tingling. Did have fever/chills upon arriving to ED. Denies pain/injury elsewhere. No other complaints.    HEALTH ISSUES - PROBLEM Dx:  Sepsis, due to unspecified organism: Sepsis, due to unspecified organism  Need for prophylactic measure: Need for prophylactic measure  DM (diabetes mellitus): DM (diabetes mellitus)  Thrombocytopenia: Thrombocytopenia  Gout: Gout  Hepatocellular carcinoma: Hepatocellular carcinoma  Ischemic cardiomyopathy: Ischemic cardiomyopathy  Shoulder pain: Shoulder pain  JULIANNE (acute kidney injury): JULIANNE (acute kidney injury)  SIRS (systemic inflammatory response syndrome): SIRS (systemic inflammatory response syndrome)        MEDICATIONS  (STANDING):  aspirin enteric coated 81 milliGRAM(s) Oral daily  atorvastatin 40 milliGRAM(s) Oral at bedtime  bromocriptine Capsule 5 milliGRAM(s) Oral daily  dextrose 5%. 1000 milliLiter(s) IV Continuous <Continuous>  dextrose 50% Injectable 12.5 Gram(s) IV Push once  dextrose 50% Injectable 25 Gram(s) IV Push once  dextrose 50% Injectable 25 Gram(s) IV Push once  insulin glargine Injectable (LANTUS) 20 Unit(s) SubCutaneous at bedtime  insulin lispro (HumaLOG) corrective regimen sliding scale   SubCutaneous three times a day before meals  insulin lispro (HumaLOG) corrective regimen sliding scale   SubCutaneous at bedtime  levothyroxine 125 MICROGram(s) Oral daily  lidocaine   Patch 2 Patch Transdermal daily  piperacillin/tazobactam IVPB. 3.375 Gram(s) IV Intermittent every 8 hours  predniSONE   Tablet 2.5 milliGRAM(s) Oral daily    Allergies    No Known Allergies    Intolerances      Imaging: XR demonstrates distal radius fracture                        9.9    7.1   )-----------( 58       ( 03 May 2019 07:10 )             32.0     03 May 2019 10:59    130    |  101    |  58     ----------------------------<  277    5.1     |  18     |  1.99     Ca    8.9        03 May 2019 10:59  Phos  3.2       03 May 2019 07:10  Mg     1.7       03 May 2019 07:10    TPro  7.3    /  Alb  3.1    /  TBili  0.8    /  DBili  x      /  AST  19     /  ALT  17     /  AlkPhos  109    03 May 2019 07:10      Vital Signs Last 24 Hrs  T(C): 36.7 (05-03-19 @ 07:00), Max: 39 (05-02-19 @ 17:30)  T(F): 98 (05-03-19 @ 07:00), Max: 102.2 (05-02-19 @ 17:30)  HR: 89 (05-03-19 @ 07:00) (85 - 96)  BP: 104/68 (05-03-19 @ 07:00) (96/57 - 120/69)  BP(mean): --  RR: 18 (05-03-19 @ 07:00) (16 - 24)  SpO2: 99% (05-03-19 @ 07:00) (95% - 99%)  imaging: CT R shoulder  No evidence of osteomyelitis. No large glenohumeral joint effusion.   Likely small fluid in the AC joint.      Physical Exam  Gen: NAD  rue/lue: NO gross effusion,     Skin intact, no soft tissue swelling, no effusion, no ecchymosis, no erythema/warmth of bilateral shoulders, + TTP over shoulder (superior, anterior and posterior), AROM: 0-30 PROM 0-90 of flexion and abduction, no pain with micromotion   Good ROM at elbow, wrist and fingers.   + AIN/PIN/M/R/U  + SILT C2-T1  +DP/PT Pulse 2+/4.  SILT L2-S1, +EHL/FHL/TA/Gastroc, soft compartments, no calf ttp.  Procedure: Joint aspirated using sterile precautions. 0 cc fluid sent for crystals/culture/gram stain/ cell count STAT. Placed in sterile dressing w/ compressive wrap. Post procedure exam unchanged, NV intact. Patient tolerated well.     A/P: 69y Male with bilateral shoulder pain, r/o septic right shoulder   aspiration of right shoulder attempted  dry tap  septic right shoulder unlikely   agree with ID   Pain control  WBAT  will continue to follow   discussed with Dr. Castro, agrees with above

## 2019-05-03 NOTE — PROGRESS NOTE ADULT - SUBJECTIVE AND OBJECTIVE BOX
CARLIN [id number]  69y  Male  Fever  H/o or current diagnosis of HF- ACEI/ARB contraindication unknown  H/o or current diagnosis of HF- no contraindication to ACEI/ARBs  H/o or current diagnosis of HF- ACEI/ARB contraindication unknown  H/o or current diagnosis of HF- Contraindication to ACEI/ARBs  H/o or current diagnosis of HF- ACEI/ARB contraindication unknown  Family history of MI (myocardial infarction)  Handoff  MEWS Score  Afib  PAF (paroxysmal atrial fibrillation)  Ulcer of right ankle  Liver mass  Gout  Elevated prolactin level  Vitamin D deficiency  Elevated triglycerides with high cholesterol  Hypothyroidism  PVD (peripheral vascular disease)  History of hyperprolactinemia  Hepatic cirrhosis, unspecified hepatic cirrhosis type  Anemia  ICD (implantable cardioverter-defibrillator) in place  Sleep apnea  History of osteomyelitis  Presence of stent in coronary artery  Heart failure with reduced left ventricular function  Ischemic cardiomyopathy  Pituitary adenoma  Coronary artery disease  Thrombocytopenia  HLD (hyperlipidemia)  HTN (hypertension)  DM (diabetes mellitus)  AICD lead malfunction  Shock  Fever of unknown origin  Septic arthritis  Septic shock  Sepsis, due to unspecified organism  Need for prophylactic measure  DM (diabetes mellitus)  Thrombocytopenia  Gout  Hepatocellular carcinoma  Ischemic cardiomyopathy  Shoulder pain  JULIANNE (acute kidney injury)  SIRS (systemic inflammatory response syndrome)  Encounter for incision and drainage procedure  History of amputation  AICD (automatic cardioverter/defibrillator) present  Stented coronary artery  Coronary atherosclerosis of artery bypass graft  ENCOUNTER FOR ADJUST AND MGMT  83    acetaminophen   Tablet .. 650 milliGRAM(s) Oral every 6 hours PRN  aspirin enteric coated 81 milliGRAM(s) Oral daily  atorvastatin 40 milliGRAM(s) Oral at bedtime  bromocriptine Capsule 5 milliGRAM(s) Oral daily  cefTRIAXone   IVPB 2 Gram(s) IV Intermittent every 24 hours  dextrose 40% Gel 15 Gram(s) Oral once PRN  dextrose 5%. 1000 milliLiter(s) IV Continuous <Continuous>  dextrose 50% Injectable 12.5 Gram(s) IV Push once  dextrose 50% Injectable 25 Gram(s) IV Push once  dextrose 50% Injectable 25 Gram(s) IV Push once  glucagon  Injectable 1 milliGRAM(s) IntraMuscular once PRN  insulin glargine Injectable (LANTUS) 20 Unit(s) SubCutaneous at bedtime  insulin lispro (HumaLOG) corrective regimen sliding scale   SubCutaneous three times a day before meals  insulin lispro (HumaLOG) corrective regimen sliding scale   SubCutaneous at bedtime  levothyroxine 125 MICROGram(s) Oral daily  lidocaine   Patch 2 Patch Transdermal daily  metoprolol tartrate 25 milliGRAM(s) Oral two times a day  oxyCODONE    IR 5 milliGRAM(s) Oral every 6 hours PRN  predniSONE   Tablet 2.5 milliGRAM(s) Oral daily  saccharomyces boulardii 250 milliGRAM(s) Oral two times a day  No Known Allergies    CBC Full  -  ( 03 May 2019 07:10 )  WBC Count : 7.1 K/uL  RBC Count : 3.57 M/uL  Hemoglobin : 9.9 g/dL  Hematocrit : 32.0 %  Platelet Count - Automated : 58 K/uL  Mean Cell Volume : 89.6 fl  Mean Cell Hemoglobin : 27.6 pg  Mean Cell Hemoglobin Concentration : 30.8 gm/dL  Auto Neutrophil # : 5.4 K/uL  Auto Lymphocyte # : 0.9 K/uL  Auto Monocyte # : 0.8 K/uL  Auto Eosinophil # : 0.0 K/uL  Auto Basophil # : 0.0 K/uL  Auto Neutrophil % : 76.0 %  Auto Lymphocyte % : 12.0 %  Auto Monocyte % : 11.7 %  Auto Eosinophil % : 0.2 %  Auto Basophil % : 0.2 %  Patient visited at bedside for evaluation of ulcerations right foot. Posterior achilles tendon wound with scab and no signs of drainage. superficial scab plantar right 5th head with no signs of infection. Minimal abrasion distal plantar right hallux with no signs of infection. Helaing wounds right foot with DP and PT non-palpable due to + 1 edema both legs and venous insufficiency.  recommend vascular evaluation for venous insufficiency and swelling both legs  Apply saline moistened dressing to three wounds right foot with gauze and kory daily. no debridement of wounds is needed at this time.  podiatry to follow as needed

## 2019-05-03 NOTE — PROGRESS NOTE ADULT - PROBLEM SELECTOR PLAN 9
- SCDS for DVT ppx given thrombocytopenia  - DASH/CC diet  -PT eval - SCDS for DVT ppx given thrombocytopenia  - DASH/CC diet  - PT eval

## 2019-05-04 DIAGNOSIS — R78.81 BACTEREMIA: ICD-10-CM

## 2019-05-04 LAB
-  AMPICILLIN: SIGNIFICANT CHANGE UP
-  CEFTRIAXONE: SIGNIFICANT CHANGE UP
-  CIPROFLOXACIN: SIGNIFICANT CHANGE UP
-  CLINDAMYCIN: SIGNIFICANT CHANGE UP
-  LEVOFLOXACIN: SIGNIFICANT CHANGE UP
-  LEVOFLOXACIN: SIGNIFICANT CHANGE UP
-  NITROFURANTOIN: SIGNIFICANT CHANGE UP
-  PENICILLIN: SIGNIFICANT CHANGE UP
-  TETRACYCLINE: SIGNIFICANT CHANGE UP
-  VANCOMYCIN: SIGNIFICANT CHANGE UP
-  VANCOMYCIN: SIGNIFICANT CHANGE UP
ANION GAP SERPL CALC-SCNC: 13 MMOL/L — SIGNIFICANT CHANGE UP (ref 5–17)
BASOPHILS # BLD AUTO: 0 K/UL — SIGNIFICANT CHANGE UP (ref 0–0.2)
BASOPHILS NFR BLD AUTO: 0 % — SIGNIFICANT CHANGE UP (ref 0–2)
BUN SERPL-MCNC: 52 MG/DL — HIGH (ref 7–23)
CALCIUM SERPL-MCNC: 9.2 MG/DL — SIGNIFICANT CHANGE UP (ref 8.4–10.5)
CHLORIDE SERPL-SCNC: 101 MMOL/L — SIGNIFICANT CHANGE UP (ref 96–108)
CO2 SERPL-SCNC: 19 MMOL/L — LOW (ref 22–31)
CREAT SERPL-MCNC: 1.76 MG/DL — HIGH (ref 0.5–1.3)
CULTURE RESULTS: SIGNIFICANT CHANGE UP
CULTURE RESULTS: SIGNIFICANT CHANGE UP
EOSINOPHIL # BLD AUTO: 0.1 K/UL — SIGNIFICANT CHANGE UP (ref 0–0.5)
EOSINOPHIL NFR BLD AUTO: 1.6 % — SIGNIFICANT CHANGE UP (ref 0–6)
GLUCOSE SERPL-MCNC: 211 MG/DL — HIGH (ref 70–99)
HCT VFR BLD CALC: 29.2 % — LOW (ref 39–50)
HGB BLD-MCNC: 9.4 G/DL — LOW (ref 13–17)
LYMPHOCYTES # BLD AUTO: 0.7 K/UL — LOW (ref 1–3.3)
LYMPHOCYTES # BLD AUTO: 12.4 % — LOW (ref 13–44)
MAGNESIUM SERPL-MCNC: 1.9 MG/DL — SIGNIFICANT CHANGE UP (ref 1.6–2.6)
MCHC RBC-ENTMCNC: 28.9 PG — SIGNIFICANT CHANGE UP (ref 27–34)
MCHC RBC-ENTMCNC: 32.2 GM/DL — SIGNIFICANT CHANGE UP (ref 32–36)
MCV RBC AUTO: 89.6 FL — SIGNIFICANT CHANGE UP (ref 80–100)
METHOD TYPE: SIGNIFICANT CHANGE UP
MONOCYTES # BLD AUTO: 0.5 K/UL — SIGNIFICANT CHANGE UP (ref 0–0.9)
MONOCYTES NFR BLD AUTO: 8.6 % — SIGNIFICANT CHANGE UP (ref 2–14)
NEUTROPHILS # BLD AUTO: 4.6 K/UL — SIGNIFICANT CHANGE UP (ref 1.8–7.4)
NEUTROPHILS NFR BLD AUTO: 77.4 % — HIGH (ref 43–77)
ORGANISM # SPEC MICROSCOPIC CNT: SIGNIFICANT CHANGE UP
PHOSPHATE SERPL-MCNC: 3.3 MG/DL — SIGNIFICANT CHANGE UP (ref 2.5–4.5)
PLATELET # BLD AUTO: 61 K/UL — LOW (ref 150–400)
POTASSIUM SERPL-MCNC: 4.9 MMOL/L — SIGNIFICANT CHANGE UP (ref 3.5–5.3)
POTASSIUM SERPL-SCNC: 4.9 MMOL/L — SIGNIFICANT CHANGE UP (ref 3.5–5.3)
RBC # BLD: 3.26 M/UL — LOW (ref 4.2–5.8)
RBC # FLD: 14.1 % — SIGNIFICANT CHANGE UP (ref 10.3–14.5)
SODIUM SERPL-SCNC: 133 MMOL/L — LOW (ref 135–145)
SPECIMEN SOURCE: SIGNIFICANT CHANGE UP
SPECIMEN SOURCE: SIGNIFICANT CHANGE UP
WBC # BLD: 5.9 K/UL — SIGNIFICANT CHANGE UP (ref 3.8–10.5)
WBC # FLD AUTO: 5.9 K/UL — SIGNIFICANT CHANGE UP (ref 3.8–10.5)

## 2019-05-04 PROCEDURE — 99233 SBSQ HOSP IP/OBS HIGH 50: CPT | Mod: GC

## 2019-05-04 PROCEDURE — 99232 SBSQ HOSP IP/OBS MODERATE 35: CPT

## 2019-05-04 PROCEDURE — 73030 X-RAY EXAM OF SHOULDER: CPT | Mod: 26,50

## 2019-05-04 RX ORDER — CARVEDILOL PHOSPHATE 80 MG/1
12.5 CAPSULE, EXTENDED RELEASE ORAL EVERY 12 HOURS
Qty: 0 | Refills: 0 | Status: DISCONTINUED | OUTPATIENT
Start: 2019-05-04 | End: 2019-05-07

## 2019-05-04 RX ADMIN — BROMOCRIPTINE MESYLATE 5 MILLIGRAM(S): 5 CAPSULE ORAL at 11:25

## 2019-05-04 RX ADMIN — OXYCODONE HYDROCHLORIDE 5 MILLIGRAM(S): 5 TABLET ORAL at 19:30

## 2019-05-04 RX ADMIN — Medication 125 MICROGRAM(S): at 05:21

## 2019-05-04 RX ADMIN — Medication 6: at 13:32

## 2019-05-04 RX ADMIN — LIDOCAINE 2 PATCH: 4 CREAM TOPICAL at 11:26

## 2019-05-04 RX ADMIN — Medication 3 UNIT(S): at 13:33

## 2019-05-04 RX ADMIN — Medication 3 UNIT(S): at 18:25

## 2019-05-04 RX ADMIN — Medication 81 MILLIGRAM(S): at 11:25

## 2019-05-04 RX ADMIN — Medication 250 MILLIGRAM(S): at 18:13

## 2019-05-04 RX ADMIN — Medication 2.5 MILLIGRAM(S): at 05:21

## 2019-05-04 RX ADMIN — Medication 250 MILLIGRAM(S): at 05:21

## 2019-05-04 RX ADMIN — Medication 3 UNIT(S): at 08:51

## 2019-05-04 RX ADMIN — ATORVASTATIN CALCIUM 40 MILLIGRAM(S): 80 TABLET, FILM COATED ORAL at 21:59

## 2019-05-04 RX ADMIN — CEFTRIAXONE 100 GRAM(S): 500 INJECTION, POWDER, FOR SOLUTION INTRAMUSCULAR; INTRAVENOUS at 18:31

## 2019-05-04 RX ADMIN — Medication 6: at 18:25

## 2019-05-04 RX ADMIN — LIDOCAINE 2 PATCH: 4 CREAM TOPICAL at 20:17

## 2019-05-04 RX ADMIN — OXYCODONE HYDROCHLORIDE 5 MILLIGRAM(S): 5 TABLET ORAL at 00:30

## 2019-05-04 RX ADMIN — Medication 4: at 08:51

## 2019-05-04 RX ADMIN — INSULIN GLARGINE 24 UNIT(S): 100 INJECTION, SOLUTION SUBCUTANEOUS at 22:47

## 2019-05-04 RX ADMIN — OXYCODONE HYDROCHLORIDE 5 MILLIGRAM(S): 5 TABLET ORAL at 18:13

## 2019-05-04 RX ADMIN — CARVEDILOL PHOSPHATE 12.5 MILLIGRAM(S): 80 CAPSULE, EXTENDED RELEASE ORAL at 21:59

## 2019-05-04 NOTE — PROGRESS NOTE ADULT - SUBJECTIVE AND OBJECTIVE BOX
Michelle Viera MD  Internal Medicine PGY 2  [facility] Resident   Pager Number [phone]      Subjective: No events overnight. Bilateral shoulder pain is improved this morning. Denies any other complaints.         VITAL SIGNS:  Vital Signs Last 24 Hrs  T(C): 36.8 (04 May 2019 13:47), Max: 36.9 (03 May 2019 23:55)  T(F): 98.2 (04 May 2019 13:47), Max: 98.4 (03 May 2019 23:55)  HR: 66 (04 May 2019 13:47) (66 - 86)  BP: 118/72 (04 May 2019 13:47) (109/68 - 119/72)  BP(mean): --  RR: 16 (04 May 2019 13:47) (16 - 18)  SpO2: 95% (04 May 2019 13:47) (94% - 98%)      PHYSICAL EXAM:     GENERAL: no acute distress  HEENT: PERRLA, EOMI, moist oropharynx   RESPIRATORY: CTAB, no wheezes or crackles   CARDIOVASCULAR: RRR, no murmurs  ABDOMINAL: soft, non-tender, non-distended, positive bowel sounds   EXTREMITIES: no clubbing, cyanosis, or edema  NEUROLOGICAL: alert and oriented x 3, non-focal  SKIN: venous stasis changes in LE bilaterally   MUSCULOSKELETAL: good ROM with b/l shoulder but still with tenderness to palpation                          9.4    5.9   )-----------( 61       ( 04 May 2019 07:06 )             29.2     05-04    133<L>  |  101  |  52<H>  ----------------------------<  211<H>  4.9   |  19<L>  |  1.76<H>    Ca    9.2      04 May 2019 07:06  Phos  3.3     05-04  Mg     1.9     05-04    TPro  7.3  /  Alb  3.1<L>  /  TBili  0.8  /  DBili  x   /  AST  19  /  ALT  17  /  AlkPhos  109  05-03      CAPILLARY BLOOD GLUCOSE      POCT Blood Glucose.: 252 mg/dL (04 May 2019 13:29)  POCT Blood Glucose.: 220 mg/dL (04 May 2019 08:41)  POCT Blood Glucose.: 252 mg/dL (03 May 2019 21:46)  POCT Blood Glucose.: 241 mg/dL (03 May 2019 17:54)      MEDICATIONS  (STANDING):  aspirin enteric coated 81 milliGRAM(s) Oral daily  atorvastatin 40 milliGRAM(s) Oral at bedtime  bromocriptine Capsule 5 milliGRAM(s) Oral daily  cefTRIAXone   IVPB 2 Gram(s) IV Intermittent every 24 hours  dextrose 5%. 1000 milliLiter(s) (50 mL/Hr) IV Continuous <Continuous>  dextrose 50% Injectable 12.5 Gram(s) IV Push once  dextrose 50% Injectable 25 Gram(s) IV Push once  dextrose 50% Injectable 25 Gram(s) IV Push once  insulin glargine Injectable (LANTUS) 24 Unit(s) SubCutaneous at bedtime  insulin lispro (HumaLOG) corrective regimen sliding scale   SubCutaneous three times a day before meals  insulin lispro (HumaLOG) corrective regimen sliding scale   SubCutaneous at bedtime  insulin lispro Injectable (HumaLOG) 3 Unit(s) SubCutaneous three times a day before meals  levothyroxine 125 MICROGram(s) Oral daily  lidocaine   Patch 2 Patch Transdermal daily  metoprolol tartrate 25 milliGRAM(s) Oral two times a day  predniSONE   Tablet 2.5 milliGRAM(s) Oral daily  saccharomyces boulardii 250 milliGRAM(s) Oral two times a day    MEDICATIONS  (PRN):  acetaminophen   Tablet .. 650 milliGRAM(s) Oral every 6 hours PRN Mild Pain (1 - 3)  dextrose 40% Gel 15 Gram(s) Oral once PRN Blood Glucose LESS THAN 70 milliGRAM(s)/deciliter  glucagon  Injectable 1 milliGRAM(s) IntraMuscular once PRN Glucose LESS THAN 70 milligrams/deciliter  oxyCODONE    IR 5 milliGRAM(s) Oral every 6 hours PRN moderate to severe pain

## 2019-05-04 NOTE — PROGRESS NOTE ADULT - ASSESSMENT
69M with PMHx of ischemic cardiomyopathy s/p ICD/PPM in 2006, generator change 2 weeks prior, and CABG in 1986, T2DM on lantus, hypothyroidism, Afib (spontaneously cardioverted and not on AC), HTN, HLD, BEHZAD (not on CPAP), FERNANDES and cirrhosis with newly diagnosed HCC s/p IR embolization x 2 (last on 4/18), presents with acute on chronic right shoulder pain for 5 days found to have Strep agalactiae bacteremia and septic shock.

## 2019-05-04 NOTE — PROGRESS NOTE ADULT - PROBLEM SELECTOR PLAN 2
Concern for right shoulder septic arthritis with positive blood cultures  - arthrocentesis by ortho negative, likely not the source of bacteremia

## 2019-05-04 NOTE — PROGRESS NOTE ADULT - PROBLEM SELECTOR PLAN 6
- Will cont low dose prednisone at this time but will consider stress dose steroids if hemodynamics change

## 2019-05-04 NOTE — PROGRESS NOTE ADULT - PROBLEM SELECTOR PLAN 1
- patient with blood cultures positive for streptococcus  - appreciate ID recs, treated with ceftriaxone 2gm q24 hours, repeat BCx collected  - likely a GI source but unclear --> CT A/P negative for abscess, possibly 2/2 to recent IR embolization?

## 2019-05-04 NOTE — PROGRESS NOTE ADULT - PROBLEM SELECTOR PLAN 3
- initially presented with JULIANNE likely secondary to sepsis, now improving   - Ramipril and Aldactone held but can consider re-starting now that hemodynamics are improved

## 2019-05-04 NOTE — PROGRESS NOTE ADULT - ASSESSMENT
69M with PMHx of ischemic cardiomyopathy s/p ICD/PPM in 2006, generator change last week, and CABG in 1986, T2DM on lantus, hypothyroidism, Afib (spontaneously cardioverted and not on AC), HTN, HLD, BEHZAD (not on CPAP), gout, FERNANDES and cirrhosis with newly diagnosed HCC s/p IR embolization x 2 (last on 4/18), admitted 5/2/19 with:    Group B Strep agalactiae bacteremia  Fever  JULIANNE improving  Shoulder pain: does not appear infected  Group B strep can colonize GI/ tracts and oral cavity. Invasive infections in adults have been associated with DM, obesity, malignancy. Skin and soft tissue infections, UTI, non localizing bacteremias can be seen. Endocarditis can be caused by this bacteria.    Antibiotics  Zosyn 5/2-->5/3  Vanco 5/2  Ceftriaxone 5/3 -->    Suggest  Ceftriaxone 2 gm IVSS daily  Repeat blood cultures  Florastor  Check TTE  F/U Ortho, Podiatry    Andrea Sparks MD  Pager [phone]  After 5pm and on weekends call [phone]

## 2019-05-04 NOTE — PROGRESS NOTE ADULT - SUBJECTIVE AND OBJECTIVE BOX
Patient seen and examined. Pain controlled. No acute events overnight    Vital Signs Last 24 Hrs  T(C): 36.7 (05-04-19 @ 04:10), Max: 37.5 (05-03-19 @ 14:38)  T(F): 98 (05-04-19 @ 04:10), Max: 99.5 (05-03-19 @ 14:38)  HR: 76 (05-04-19 @ 04:10) (76 - 86)  BP: 111/70 (05-04-19 @ 04:10) (109/68 - 119/72)  BP(mean): --  RR: 18 (05-04-19 @ 04:10) (18 - 18)  SpO2: 96% (05-04-19 @ 04:10) (94% - 98%)    Physical Exam  Gen: NAD  RUE:   skin c/d/i  +AIN/PIN/Med/Msk/Uln function  C5-T1 silt  limited AROM  PROM unchanged  Rad pulse intact  Compartments soft  No calf ttp    A/P: 69y Male right shoulder pain  likely 2/2 gout or rheumatologic issues  very low likelihood of septic joint based on clinical and physical exam  Pain control  DVT ppx  PT/WBAT/OOB  FU labs  Dispo planning  no acute orthopedic intervention at this time  ortho stable  consult PRN  D/w attending

## 2019-05-04 NOTE — PROGRESS NOTE ADULT - SUBJECTIVE AND OBJECTIVE BOX
CARLIN [id number]  69y  Male  Fever  H/o or current diagnosis of HF- ACEI/ARB contraindication unknown  H/o or current diagnosis of HF- no contraindication to ACEI/ARBs  H/o or current diagnosis of HF- ACEI/ARB contraindication unknown  H/o or current diagnosis of HF- Contraindication to ACEI/ARBs  H/o or current diagnosis of HF- ACEI/ARB contraindication unknown  Family history of MI (myocardial infarction)  Handoff  MEWS Score  Afib  PAF (paroxysmal atrial fibrillation)  Ulcer of right ankle  Liver mass  Gout  Elevated prolactin level  Vitamin D deficiency  Elevated triglycerides with high cholesterol  Hypothyroidism  PVD (peripheral vascular disease)  History of hyperprolactinemia  Hepatic cirrhosis, unspecified hepatic cirrhosis type  Anemia  ICD (implantable cardioverter-defibrillator) in place  Sleep apnea  History of osteomyelitis  Presence of stent in coronary artery  Heart failure with reduced left ventricular function  Ischemic cardiomyopathy  Pituitary adenoma  Coronary artery disease  Thrombocytopenia  HLD (hyperlipidemia)  HTN (hypertension)  DM (diabetes mellitus)  AICD lead malfunction  Shock  Fever of unknown origin  Septic arthritis  Septic shock  Sepsis, due to unspecified organism  Need for prophylactic measure  DM (diabetes mellitus)  Thrombocytopenia  Gout  Hepatocellular carcinoma  Ischemic cardiomyopathy  Shoulder pain  JULIANNE (acute kidney injury)  SIRS (systemic inflammatory response syndrome)  Encounter for incision and drainage procedure  History of amputation  AICD (automatic cardioverter/defibrillator) present  Stented coronary artery  Coronary atherosclerosis of artery bypass graft  ENCOUNTER FOR ADJUST AND MGMT  83    acetaminophen   Tablet .. 650 milliGRAM(s) Oral every 6 hours PRN  aspirin enteric coated 81 milliGRAM(s) Oral daily  atorvastatin 40 milliGRAM(s) Oral at bedtime  bromocriptine Capsule 5 milliGRAM(s) Oral daily  cefTRIAXone   IVPB 2 Gram(s) IV Intermittent every 24 hours  dextrose 40% Gel 15 Gram(s) Oral once PRN  dextrose 5%. 1000 milliLiter(s) IV Continuous <Continuous>  dextrose 50% Injectable 12.5 Gram(s) IV Push once  dextrose 50% Injectable 25 Gram(s) IV Push once  dextrose 50% Injectable 25 Gram(s) IV Push once  glucagon  Injectable 1 milliGRAM(s) IntraMuscular once PRN  insulin glargine Injectable (LANTUS) 24 Unit(s) SubCutaneous at bedtime  insulin lispro (HumaLOG) corrective regimen sliding scale   SubCutaneous three times a day before meals  insulin lispro (HumaLOG) corrective regimen sliding scale   SubCutaneous at bedtime  insulin lispro Injectable (HumaLOG) 3 Unit(s) SubCutaneous three times a day before meals  levothyroxine 125 MICROGram(s) Oral daily  lidocaine   Patch 2 Patch Transdermal daily  metoprolol tartrate 25 milliGRAM(s) Oral two times a day  oxyCODONE    IR 5 milliGRAM(s) Oral every 6 hours PRN  predniSONE   Tablet 2.5 milliGRAM(s) Oral daily  saccharomyces boulardii 250 milliGRAM(s) Oral two times a day  No Known Allergies    CBC Full  -  ( 03 May 2019 07:10 )  WBC Count : 7.1 K/uL  RBC Count : 3.57 M/uL  Hemoglobin : 9.9 g/dL  Hematocrit : 32.0 %  Platelet Count - Automated : 58 K/uL  Mean Cell Volume : 89.6 fl  Mean Cell Hemoglobin : 27.6 pg  Mean Cell Hemoglobin Concentration : 30.8 gm/dL  Auto Neutrophil # : 5.4 K/uL  Auto Lymphocyte # : 0.9 K/uL  Auto Monocyte # : 0.8 K/uL  Auto Eosinophil # : 0.0 K/uL  Auto Basophil # : 0.0 K/uL  Auto Neutrophil % : 76.0 %  Auto Lymphocyte % : 12.0 %  Auto Monocyte % : 11.7 %  Auto Eosinophil % : 0.2 %  Auto Basophil % : 0.2 %  Patient visited at bedside INAD No elevation of WBC. Stable ulcerations posterior right achilles tendon and plantar 5th met head. No signs of erythema no signs of cellulitis. Reapply wound gel with dsd right foot. patient relates decreased pain in shoulders. recommend daily dressing changes Podiatry will follow as needed

## 2019-05-04 NOTE — PROGRESS NOTE ADULT - PROBLEM SELECTOR PLAN 8
- patient on metformin , januvia and lantus 20units at home  - FS elevated in hospital and will likely be more elevated than baseline given steroids (at home FS>400)  - c/w lantus 20 units and moderate sliding scale

## 2019-05-04 NOTE — PROGRESS NOTE ADULT - PROBLEM SELECTOR PLAN 4
patient with long standing CAD s/p CABG and ischemic cardiomyopathy s/p ICD/PPM; PPM generator changed 2 weeks ago, interrogation on 4/19 revealed sinus rhythm; last echo outpatient 3/2018 EF 43% and segmental wall dysfunction  - can restart home coreg (switch to metoprolol 25mg BID for rate control due to history of Afib) and will slowly introduce diuretics

## 2019-05-04 NOTE — PROGRESS NOTE ADULT - SUBJECTIVE AND OBJECTIVE BOX
CC: F/U for Bacteremia    Saw/spoke to patient. No fevers, no chills. No new complaints. Still with shoulder pain, bilateral.    Allergies  No Known Allergies    ANTIMICROBIALS:  cefTRIAXone   IVPB 2 every 24 hours    PE:    Vital Signs Last 24 Hrs  T(C): 36.6 (04 May 2019 10:35), Max: 37.5 (03 May 2019 14:38)  T(F): 97.9 (04 May 2019 10:35), Max: 99.5 (03 May 2019 14:38)  HR: 76 (04 May 2019 10:35) (76 - 86)  BP: 112/73 (04 May 2019 10:35) (109/68 - 119/72)  RR: 18 (04 May 2019 10:35) (18 - 18)  SpO2: 96% (04 May 2019 10:35) (94% - 98%)    Gen: AOx3, NAD, non-toxic, pleasant  CV: S1+S2 normal, nontachycardic  Resp: Clear bilat, no resp distress, no crackles/wheezes  Abd: Soft, nontender, +BS  Ext: R foot bandaged wound, chronic appearing, no spreading erythema/ no purulence    LABS:                        9.4    5.9   )-----------( 61       ( 04 May 2019 07:06 )             29.2     05-04    133<L>  |  101  |  52<H>  ----------------------------<  211<H>  4.9   |  19<L>  |  1.76<H>    Ca    9.2      04 May 2019 07:06  Phos  3.3     05-04  Mg     1.9     05-04    TPro  7.3  /  Alb  3.1<L>  /  TBili  0.8  /  DBili  x   /  AST  19  /  ALT  17  /  AlkPhos  109  05-03    Urinalysis Basic - ( 02 May 2019 19:31 )    Color: Light Yellow / Appearance: Clear / S.014 / pH: x  Gluc: x / Ketone: Negative  / Bili: Negative / Urobili: Negative   Blood: x / Protein: 30 mg/dL / Nitrite: Negative   Leuk Esterase: Negative / RBC: 10 /hpf / WBC 1 /HPF   Sq Epi: x / Non Sq Epi: 0 /hpf / Bacteria: Negative    MICROBIOLOGY:    .Urine  19   10,000 - 49,000 CFU/mL Gram positive organisms      .Blood  19   Growth in aerobic and anaerobic bottles: Streptococcus agalactiae (Group  B)    Rapid RVP Result: NotDetec ( @ 17:15)    (otherwise reviewed)    RADIOLOGY:    5/3 CT:    FINDINGS:    LUNGS AND LARGE AIRWAYS: Patent central airways.  Bilateral lower lobe   linear atelectasis. Calcified granuloma in the right upper lobe.    PLEURA: Trace bilateral pleural effusions.    VESSELS: Left-sided AICD with leads terminating in the right atrium and   right ventricle. Aortic calcifications. Status post CABG.    HEART: Cardiomegaly.. No pericardial effusion.    MEDIASTINUM AND OLE: No lymphadenopathy.    CHEST WALL AND LOWER NECK: Within normal limits.    VISUALIZED UPPER ABDOMEN: Splenomegaly. Nodularity of the contour of the   liver. Small ascites.    BONES: Degenerative changes of the spine. There is a bony spur in the   anterior aspect of the spinous process of T6 that protrudes into the   spinal canal and contacts the thecal sac. Status post median sternotomy.   See dedicated right shoulder CT report from the same day.    IMPRESSION:     No pneumonia.    5/3 CT:    FINDINGS:    No acute fracture is seen in the right shoulder. No dislocation. No acute   cortical erosive changes or abnormal periosteal reaction is seen. No   large glenohumeral joint effusion. There is likely small fluid in the AC   joint. No abnormal soft tissue gas. No focal fluid collections in the   soft tissues.    Atherosclerotic calcifications of the thoracic aorta. Cardiac pacemaker   leads partially imaged. Degenerative changes in the thoracic spine.    IMPRESSION:    No evidence of osteomyelitis. No large glenohumeral joint effusion.   Likely small fluid in the AC joint.

## 2019-05-05 ENCOUNTER — MED ADMIN CHARGE (OUTPATIENT)
Age: 69
End: 2019-05-05

## 2019-05-05 LAB
ANION GAP SERPL CALC-SCNC: 13 MMOL/L — SIGNIFICANT CHANGE UP (ref 5–17)
BUN SERPL-MCNC: 41 MG/DL — HIGH (ref 7–23)
CALCIUM SERPL-MCNC: 9.1 MG/DL — SIGNIFICANT CHANGE UP (ref 8.4–10.5)
CHLORIDE SERPL-SCNC: 103 MMOL/L — SIGNIFICANT CHANGE UP (ref 96–108)
CO2 SERPL-SCNC: 20 MMOL/L — LOW (ref 22–31)
CREAT SERPL-MCNC: 1.28 MG/DL — SIGNIFICANT CHANGE UP (ref 0.5–1.3)
CULTURE RESULTS: SIGNIFICANT CHANGE UP
GLUCOSE SERPL-MCNC: 167 MG/DL — HIGH (ref 70–99)
POTASSIUM SERPL-MCNC: 5 MMOL/L — SIGNIFICANT CHANGE UP (ref 3.5–5.3)
POTASSIUM SERPL-SCNC: 5 MMOL/L — SIGNIFICANT CHANGE UP (ref 3.5–5.3)
SODIUM SERPL-SCNC: 136 MMOL/L — SIGNIFICANT CHANGE UP (ref 135–145)
SPECIMEN SOURCE: SIGNIFICANT CHANGE UP

## 2019-05-05 PROCEDURE — 99232 SBSQ HOSP IP/OBS MODERATE 35: CPT

## 2019-05-05 PROCEDURE — 99233 SBSQ HOSP IP/OBS HIGH 50: CPT | Mod: GC

## 2019-05-05 RX ORDER — METHYLPRED ACET/NACL,ISO-OS/PF 40 MG/ML
40 VIAL (ML) INJECTION
Qty: 1 | Refills: 0 | Status: COMPLETED | OUTPATIENT
Start: 2019-05-05

## 2019-05-05 RX ORDER — LISINOPRIL 2.5 MG/1
10 TABLET ORAL DAILY
Qty: 0 | Refills: 0 | Status: DISCONTINUED | OUTPATIENT
Start: 2019-05-05 | End: 2019-05-07

## 2019-05-05 RX ADMIN — CARVEDILOL PHOSPHATE 12.5 MILLIGRAM(S): 80 CAPSULE, EXTENDED RELEASE ORAL at 17:38

## 2019-05-05 RX ADMIN — OXYCODONE HYDROCHLORIDE 5 MILLIGRAM(S): 5 TABLET ORAL at 22:21

## 2019-05-05 RX ADMIN — Medication 3 UNIT(S): at 18:05

## 2019-05-05 RX ADMIN — Medication 3 UNIT(S): at 13:12

## 2019-05-05 RX ADMIN — Medication 81 MILLIGRAM(S): at 11:21

## 2019-05-05 RX ADMIN — Medication 3 UNIT(S): at 09:12

## 2019-05-05 RX ADMIN — LIDOCAINE 2 PATCH: 4 CREAM TOPICAL at 23:25

## 2019-05-05 RX ADMIN — Medication 4: at 22:17

## 2019-05-05 RX ADMIN — Medication 2.5 MILLIGRAM(S): at 05:40

## 2019-05-05 RX ADMIN — Medication 250 MILLIGRAM(S): at 05:40

## 2019-05-05 RX ADMIN — Medication 125 MICROGRAM(S): at 05:40

## 2019-05-05 RX ADMIN — CEFTRIAXONE 100 GRAM(S): 500 INJECTION, POWDER, FOR SOLUTION INTRAMUSCULAR; INTRAVENOUS at 21:07

## 2019-05-05 RX ADMIN — LIDOCAINE 2 PATCH: 4 CREAM TOPICAL at 11:22

## 2019-05-05 RX ADMIN — LIDOCAINE 2 PATCH: 4 CREAM TOPICAL at 19:53

## 2019-05-05 RX ADMIN — Medication 250 MILLIGRAM(S): at 17:38

## 2019-05-05 RX ADMIN — Medication 6: at 18:05

## 2019-05-05 RX ADMIN — BROMOCRIPTINE MESYLATE 5 MILLIGRAM(S): 5 CAPSULE ORAL at 11:21

## 2019-05-05 RX ADMIN — LIDOCAINE 2 PATCH: 4 CREAM TOPICAL at 00:30

## 2019-05-05 RX ADMIN — METHYLPREDNISOLONE ACETATE MG/ML: 40 INJECTION, SUSPENSION INTRA-ARTICULAR; INTRALESIONAL; INTRAMUSCULAR; SOFT TISSUE at 00:00

## 2019-05-05 RX ADMIN — LISINOPRIL 10 MILLIGRAM(S): 2.5 TABLET ORAL at 15:52

## 2019-05-05 RX ADMIN — Medication 2: at 13:12

## 2019-05-05 RX ADMIN — CARVEDILOL PHOSPHATE 12.5 MILLIGRAM(S): 80 CAPSULE, EXTENDED RELEASE ORAL at 05:41

## 2019-05-05 RX ADMIN — ATORVASTATIN CALCIUM 40 MILLIGRAM(S): 80 TABLET, FILM COATED ORAL at 21:07

## 2019-05-05 RX ADMIN — INSULIN GLARGINE 24 UNIT(S): 100 INJECTION, SOLUTION SUBCUTANEOUS at 22:17

## 2019-05-05 RX ADMIN — Medication 2: at 09:12

## 2019-05-05 RX ADMIN — OXYCODONE HYDROCHLORIDE 5 MILLIGRAM(S): 5 TABLET ORAL at 22:55

## 2019-05-05 NOTE — PROGRESS NOTE ADULT - ASSESSMENT
69 M with PMHx of ischemic cardiomyopathy s/p ICD/PPM in 2006, generator change last week, and CABG in 1986, T2DM on lantus, hypothyroidism, Afib (spontaneously cardioverted and not on AC), HTN, HLD, BEHZAD (not on CPAP), gout, FERNANDES and cirrhosis with newly diagnosed HCC s/p IR embolization x 2 (last on 4/18), admitted 5/2/19 with:    Group B Strep agalactiae bacteremia  Fever  JULIANNE improving  Shoulder pain: does not appear infected  Group B strep can colonize GI/ tracts and oral cavity. Invasive infections in adults have been associated with DM, obesity, malignancy. Skin and soft tissue infections, UTI, non localizing bacteremias can be seen. Endocarditis can be caused by this bacteria.    Antibiotics  Zosyn 5/2-->5/3  Vanco 5/2  Ceftriaxone 5/3 -->    Suggest  Ceftriaxone 2 gm IVSS daily  Repeat blood cultures for clearance  Florastor  Check TTE  UCX w/ low CFU E faecalis likely contam/colonizer, monitor  F/U Ortho, Podiatry    Andrea Sparks MD  Pager [phone]  After 5pm and on weekends call [phone]

## 2019-05-05 NOTE — PROGRESS NOTE ADULT - PROBLEM SELECTOR PLAN 1
patient with blood cultures positive for streptococcus  - appreciate ID recs, treated with ceftriaxone 2gm q24 hours, repeat BCx collected will f/u  - likely a GI source but unclear --> CT A/P negative for abscess, possibly 2/2 to recent IR embolization? patient with blood cultures positive for streptococcus  - appreciate ID recs, treated with ceftriaxone 2gm q24 hours, repeat BCx collected will f/u, will treat for 2 weeks starting last clear BCx will need a PICC line   - likely a GI source but unclear --> CT A/P negative for abscess, possibly 2/2 to recent IR embolization? TTE pending

## 2019-05-05 NOTE — PROGRESS NOTE ADULT - SUBJECTIVE AND OBJECTIVE BOX
CC: F/U for bacteremia    Saw/spoke to patient. Patient asking to go home. Still with L > R shoulder pain. No other new complaints.    Allergies  No Known Allergies    ANTIMICROBIALS:  cefTRIAXone   IVPB 2 every 24 hours    PE:    Vital Signs Last 24 Hrs  T(C): 36.7 (05 May 2019 04:44), Max: 36.8 (04 May 2019 13:47)  T(F): 98 (05 May 2019 04:44), Max: 98.2 (04 May 2019 13:47)  HR: 63 (05 May 2019 04:44) (63 - 76)  BP: 110/65 (05 May 2019 05:06) (110/65 - 126/78)  RR: 18 (05 May 2019 04:44) (16 - 18)  SpO2: 96% (05 May 2019 04:44) (95% - 96%)    Gen: AOx3, NAD, non-toxic, pleasant  CV: S1+S2 normal, nontachycardic  Resp: Clear bilat, no resp distress, no crackles/wheezes  Abd: Soft, nontender, +BS  Ext: No LE edema, no wounds  Msk: Pain limiting L > R shoulder rotation    LABS:                        9.4    5.9   )-----------( 61       ( 04 May 2019 07:06 )             29.2     05-05    136  |  103  |  41<H>  ----------------------------<  167<H>  5.0   |  20<L>  |  1.28    Ca    9.1      05 May 2019 07:20  Phos  3.3     05-04  Mg     1.9     05-04    MICROBIOLOGY:    .Urine  05-03-19   10,000 - 49,000 CFU/mL Enterococcus faecalis  --  Enterococcus faecalis    .Blood  05-02-19   Growth in aerobic and anaerobic bottles: Streptococcus agalactiae (Group  B)    Rapid RVP Result: NotDetec (05-02 @ 17:15)    (otherwise reviewed)    RADIOLOGY:    5/3 CT:    IMPRESSION:   1.  No bowel obstruction.   2.  The liver is cirrhotic.  3.  Cholelithiasis.    5/4 XR:    IMPRESSION:     No fracture or dislocation. Mild bilateral acromioclavicular joint   arthrosis. Preserved glenohumeral joint spaces bilaterally.    Partially imaged left-sided pacemaker

## 2019-05-05 NOTE — PROGRESS NOTE ADULT - PROBLEM SELECTOR PLAN 9
- SCDS for DVT ppx given thrombocytopenia  - DASH/CC diet  - PT eval - SCDS for DVT ppx given thrombocytopenia  - DASH/CC diet  - PT pending

## 2019-05-05 NOTE — PROGRESS NOTE ADULT - SUBJECTIVE AND OBJECTIVE BOX
James Trevino, PGY1  Pager: [phone]    CHIEF COMPLAINT: Patient is a 69y old  Male who presents with a chief complaint of fevers, weakness and shoulder pain. (04 May 2019 15:14)      INTERVAL HPI/OVERNIGHT EVENTS:    MEDICATIONS (STANDING):  aspirin enteric coated 81 milliGRAM(s) Oral daily  atorvastatin 40 milliGRAM(s) Oral at bedtime  bromocriptine Capsule 5 milliGRAM(s) Oral daily  carvedilol 12.5 milliGRAM(s) Oral every 12 hours  cefTRIAXone   IVPB 2 Gram(s) IV Intermittent every 24 hours  dextrose 5%. 1000 milliLiter(s) IV Continuous <Continuous>  dextrose 50% Injectable 12.5 Gram(s) IV Push once  dextrose 50% Injectable 25 Gram(s) IV Push once  dextrose 50% Injectable 25 Gram(s) IV Push once  insulin glargine Injectable (LANTUS) 24 Unit(s) SubCutaneous at bedtime  insulin lispro (HumaLOG) corrective regimen sliding scale   SubCutaneous three times a day before meals  insulin lispro (HumaLOG) corrective regimen sliding scale   SubCutaneous at bedtime  insulin lispro Injectable (HumaLOG) 3 Unit(s) SubCutaneous three times a day before meals  levothyroxine 125 MICROGram(s) Oral daily  lidocaine   Patch 2 Patch Transdermal daily  predniSONE   Tablet 2.5 milliGRAM(s) Oral daily  saccharomyces boulardii 250 milliGRAM(s) Oral two times a day    MEDICATIONS  (PRN):  acetaminophen   Tablet .. 650 milliGRAM(s) Oral every 6 hours PRN  dextrose 40% Gel 15 Gram(s) Oral once PRN  glucagon  Injectable 1 milliGRAM(s) IntraMuscular once PRN  oxyCODONE    IR 5 milliGRAM(s) Oral every 6 hours PRN      REVIEW OF SYSTEMS:  CONSTITUTIONAL: No fever, weight loss, or fatigue  EYES: No eye pain, visual disturbances, or discharge  ENMT:  No difficulty hearing, tinnitus, vertigo; No sinus or throat pain  NECK: No pain or stiffness  RESPIRATORY: No cough, wheezing, chills or hemoptysis; No shortness of breath  CARDIOVASCULAR: No chest pain, palpitations, dizziness, or leg swelling  GASTROINTESTINAL: No abdominal or epigastric pain. No nausea, vomiting, or hematemesis; No diarrhea or constipation. No melena or hematochezia.  GENITOURINARY: No dysuria, frequency, hematuria, or incontinence  NEUROLOGICAL: No headaches, memory loss, loss of strength, numbness, or tremors  SKIN: No itching, burning, rashes, or lesions   MUSCULOSKELETAL: No joint pain or swelling; No muscle, back, or extremity pain    T(F): 98 (05-05-19 @ 04:44), Max: 98.2 (05-04-19 @ 13:47)  HR: 63 (05-05-19 @ 04:44) (63 - 76)  BP: 110/65 (05-05-19 @ 05:06) (110/65 - 126/78)  RR: 18 (05-05-19 @ 04:44) (16 - 18)  SpO2: 96% (05-05-19 @ 04:44) (95% - 96%)  Wt(kg): --  CAPILLARY BLOOD GLUCOSE      POCT Blood Glucose.: 198 mg/dL (04 May 2019 22:40)  POCT Blood Glucose.: 252 mg/dL (04 May 2019 18:18)  POCT Blood Glucose.: 252 mg/dL (04 May 2019 13:29)  POCT Blood Glucose.: 220 mg/dL (04 May 2019 08:41)    I&O's Summary    04 May 2019 07:01  -  05 May 2019 07:00  --------------------------------------------------------  IN: 0 mL / OUT: 2150 mL / NET: -2150 mL        PHYSICAL EXAM:  GENERAL: NAD, well-groomed, well-developed  HEAD:  Atraumatic, Normocephalic  EYES: EOMI, PERRLA, conjunctiva and sclera clear  ENMT: No tonsillar erythema, exudates, or enlargement; Moist mucous membranes  NECK: Supple, No JVD, Normal thyroid  NERVOUS SYSTEM:  Alert & Oriented X3, Good concentration; Motor Strength 5/5 B/L upper and lower extremities  CHEST/LUNG: Clear to auscultation bilaterally; No rales, rhonchi, wheezing, or rubs  HEART: Regular rate and rhythm; No murmurs, rubs, or gallops  ABDOMEN: Soft, Nontender, Nondistended; Bowel sounds present  EXTREMITIES:  2+ Peripheral Pulses, No clubbing, cyanosis, or edema  LYMPH: No lymphadenopathy noted  SKIN: No rashes or lesions    LABS:                        9.4    5.9   )-----------( 61       ( 04 May 2019 07:06 )             29.2     05-04    133<L>  |  101  |  52<H>  ----------------------------<  211<H>  4.9   |  19<L>  |  1.76<H>    Ca    9.2      04 May 2019 07:06  Phos  3.3     05-04  Mg     1.9     05-04              RADIOLOGY & ADDITIONAL TESTS: James Trevino, PGY1  Pager: [phone]    CHIEF COMPLAINT: Patient is a 69y old  Male who presents with a chief complaint of fevers, weakness and shoulder pain. (04 May 2019 15:14)      INTERVAL HPI/OVERNIGHT EVENTS:  No acute events overnight. Patient seen at bedside. Reports improvement in shoulder pain bilaterally. No fevers/chills, chest pain, shortness of breath or abdominal pain.     MEDICATIONS (STANDING):  aspirin enteric coated 81 milliGRAM(s) Oral daily  atorvastatin 40 milliGRAM(s) Oral at bedtime  bromocriptine Capsule 5 milliGRAM(s) Oral daily  carvedilol 12.5 milliGRAM(s) Oral every 12 hours  cefTRIAXone   IVPB 2 Gram(s) IV Intermittent every 24 hours  dextrose 5%. 1000 milliLiter(s) IV Continuous <Continuous>  dextrose 50% Injectable 12.5 Gram(s) IV Push once  dextrose 50% Injectable 25 Gram(s) IV Push once  dextrose 50% Injectable 25 Gram(s) IV Push once  insulin glargine Injectable (LANTUS) 24 Unit(s) SubCutaneous at bedtime  insulin lispro (HumaLOG) corrective regimen sliding scale   SubCutaneous three times a day before meals  insulin lispro (HumaLOG) corrective regimen sliding scale   SubCutaneous at bedtime  insulin lispro Injectable (HumaLOG) 3 Unit(s) SubCutaneous three times a day before meals  levothyroxine 125 MICROGram(s) Oral daily  lidocaine   Patch 2 Patch Transdermal daily  predniSONE   Tablet 2.5 milliGRAM(s) Oral daily  saccharomyces boulardii 250 milliGRAM(s) Oral two times a day    MEDICATIONS  (PRN):  acetaminophen   Tablet .. 650 milliGRAM(s) Oral every 6 hours PRN  dextrose 40% Gel 15 Gram(s) Oral once PRN  glucagon  Injectable 1 milliGRAM(s) IntraMuscular once PRN  oxyCODONE    IR 5 milliGRAM(s) Oral every 6 hours PRN      T(F): 98 (05-05-19 @ 04:44), Max: 98.2 (05-04-19 @ 13:47)  HR: 63 (05-05-19 @ 04:44) (63 - 76)  BP: 110/65 (05-05-19 @ 05:06) (110/65 - 126/78)  RR: 18 (05-05-19 @ 04:44) (16 - 18)  SpO2: 96% (05-05-19 @ 04:44) (95% - 96%)  Wt(kg): --  CAPILLARY BLOOD GLUCOSE      POCT Blood Glucose.: 198 mg/dL (04 May 2019 22:40)  POCT Blood Glucose.: 252 mg/dL (04 May 2019 18:18)  POCT Blood Glucose.: 252 mg/dL (04 May 2019 13:29)  POCT Blood Glucose.: 220 mg/dL (04 May 2019 08:41)    I&O's Summary    04 May 2019 07:01  -  05 May 2019 07:00  --------------------------------------------------------  IN: 0 mL / OUT: 2150 mL / NET: -2150 mL        PHYSICAL EXAM:  GENERAL: no acute distress  HEENT: PERRLA, EOMI, moist oropharynx   RESPIRATORY: CTAB, no wheezes or crackles   CARDIOVASCULAR: RRR, no murmurs  ABDOMINAL: soft, non-tender, non-distended, positive bowel sounds   EXTREMITIES: no clubbing, cyanosis, or edema  NEUROLOGICAL: alert and oriented x 3, non-focal  SKIN: venous stasis changes in LE bilaterally   MUSCULOSKELETAL: good ROM with b/l shoulder but still with tenderness to palpation    LABS:                        9.4    5.9   )-----------( 61       ( 04 May 2019 07:06 )             29.2     05-04    133<L>  |  101  |  52<H>  ----------------------------<  211<H>  4.9   |  19<L>  |  1.76<H>    Ca    9.2      04 May 2019 07:06  Phos  3.3     05-04  Mg     1.9     05-04              RADIOLOGY & ADDITIONAL TESTS: James Trevino, PGY1  Pager: [phone]    CHIEF COMPLAINT: Patient is a 69y old  Male who presents with a chief complaint of fevers, weakness and shoulder pain. (04 May 2019 15:14)      INTERVAL HPI/OVERNIGHT EVENTS:  No acute events overnight. Patient seen at bedside. Reports improvement in shoulder pain bilaterally. No fevers/chills, chest pain, shortness of breath or abdominal pain.     MEDICATIONS (STANDING):  aspirin enteric coated 81 milliGRAM(s) Oral daily  atorvastatin 40 milliGRAM(s) Oral at bedtime  bromocriptine Capsule 5 milliGRAM(s) Oral daily  carvedilol 12.5 milliGRAM(s) Oral every 12 hours  cefTRIAXone   IVPB 2 Gram(s) IV Intermittent every 24 hours  dextrose 5%. 1000 milliLiter(s) IV Continuous <Continuous>  dextrose 50% Injectable 12.5 Gram(s) IV Push once  dextrose 50% Injectable 25 Gram(s) IV Push once  dextrose 50% Injectable 25 Gram(s) IV Push once  insulin glargine Injectable (LANTUS) 24 Unit(s) SubCutaneous at bedtime  insulin lispro (HumaLOG) corrective regimen sliding scale   SubCutaneous three times a day before meals  insulin lispro (HumaLOG) corrective regimen sliding scale   SubCutaneous at bedtime  insulin lispro Injectable (HumaLOG) 3 Unit(s) SubCutaneous three times a day before meals  levothyroxine 125 MICROGram(s) Oral daily  lidocaine   Patch 2 Patch Transdermal daily  predniSONE   Tablet 2.5 milliGRAM(s) Oral daily  saccharomyces boulardii 250 milliGRAM(s) Oral two times a day    MEDICATIONS  (PRN):  acetaminophen   Tablet .. 650 milliGRAM(s) Oral every 6 hours PRN  dextrose 40% Gel 15 Gram(s) Oral once PRN  glucagon  Injectable 1 milliGRAM(s) IntraMuscular once PRN  oxyCODONE    IR 5 milliGRAM(s) Oral every 6 hours PRN      T(F): 98 (05-05-19 @ 04:44), Max: 98.2 (05-04-19 @ 13:47)  HR: 63 (05-05-19 @ 04:44) (63 - 76)  BP: 110/65 (05-05-19 @ 05:06) (110/65 - 126/78)  RR: 18 (05-05-19 @ 04:44) (16 - 18)  SpO2: 96% (05-05-19 @ 04:44) (95% - 96%)  Wt(kg): --  CAPILLARY BLOOD GLUCOSE      POCT Blood Glucose.: 198 mg/dL (04 May 2019 22:40)  POCT Blood Glucose.: 252 mg/dL (04 May 2019 18:18)  POCT Blood Glucose.: 252 mg/dL (04 May 2019 13:29)  POCT Blood Glucose.: 220 mg/dL (04 May 2019 08:41)    I&O's Summary    04 May 2019 07:01  -  05 May 2019 07:00  --------------------------------------------------------  IN: 0 mL / OUT: 2150 mL / NET: -2150 mL        PHYSICAL EXAM:  GENERAL: no acute distress  HEENT: PERRLA, EOMI, moist oropharynx   RESPIRATORY: CTAB, no wheezes or crackles   CARDIOVASCULAR: RRR, no murmurs  ABDOMINAL: soft, non-tender, non-distended, positive bowel sounds   EXTREMITIES: no clubbing, cyanosis, or edema  NEUROLOGICAL: alert and oriented x 3, non-focal  SKIN: venous stasis changes in LE bilaterally   MUSCULOSKELETAL: good ROM with b/l shoulder but still with tenderness to palpation, improved pain.     LABS:                        9.4    5.9   )-----------( 61       ( 04 May 2019 07:06 )             29.2     05-04    133<L>  |  101  |  52<H>  ----------------------------<  211<H>  4.9   |  19<L>  |  1.76<H>    Ca    9.2      04 May 2019 07:06  Phos  3.3     05-04  Mg     1.9     05-04              RADIOLOGY & ADDITIONAL TESTS:

## 2019-05-05 NOTE — PROGRESS NOTE ADULT - PROBLEM SELECTOR PLAN 4
patient with long standing CAD s/p CABG and ischemic cardiomyopathy s/p ICD/PPM; PPM generator changed 2 weeks ago, interrogation on 4/19 revealed sinus rhythm; last echo outpatient 3/2018 EF 43% and segmental wall dysfunction  - Restarted on home coreg and will slowly introduce diuretics

## 2019-05-05 NOTE — PROGRESS NOTE ADULT - PROBLEM SELECTOR PLAN 3
- initially presented with JULIANNE likely secondary to sepsis, now improving   - Ramipril and Aldactone held but can consider re-starting now that hemodynamics are improved initially presented with JULIANNE likely secondary to sepsis, now resolved  - Will restart Rampiril and Aldactone today initially presented with JULIANNE likely secondary to sepsis, now resolved  - Rampiril and Aldactone back.

## 2019-05-06 LAB
ANION GAP SERPL CALC-SCNC: 10 MMOL/L — SIGNIFICANT CHANGE UP (ref 5–17)
APTT BLD: 26.6 SEC — LOW (ref 27.5–36.3)
BUN SERPL-MCNC: 36 MG/DL — HIGH (ref 7–23)
CALCIUM SERPL-MCNC: 8.7 MG/DL — SIGNIFICANT CHANGE UP (ref 8.4–10.5)
CHLORIDE SERPL-SCNC: 102 MMOL/L — SIGNIFICANT CHANGE UP (ref 96–108)
CO2 SERPL-SCNC: 22 MMOL/L — SIGNIFICANT CHANGE UP (ref 22–31)
CREAT SERPL-MCNC: 1.23 MG/DL — SIGNIFICANT CHANGE UP (ref 0.5–1.3)
GLUCOSE SERPL-MCNC: 189 MG/DL — HIGH (ref 70–99)
HCT VFR BLD CALC: 31 % — LOW (ref 39–50)
HGB BLD-MCNC: 10 G/DL — LOW (ref 13–17)
INR BLD: 1.24 RATIO — HIGH (ref 0.88–1.16)
MCHC RBC-ENTMCNC: 28.5 PG — SIGNIFICANT CHANGE UP (ref 27–34)
MCHC RBC-ENTMCNC: 32.2 GM/DL — SIGNIFICANT CHANGE UP (ref 32–36)
MCV RBC AUTO: 88.6 FL — SIGNIFICANT CHANGE UP (ref 80–100)
PLATELET # BLD AUTO: 65 K/UL — LOW (ref 150–400)
POTASSIUM SERPL-MCNC: 4.9 MMOL/L — SIGNIFICANT CHANGE UP (ref 3.5–5.3)
POTASSIUM SERPL-SCNC: 4.9 MMOL/L — SIGNIFICANT CHANGE UP (ref 3.5–5.3)
PROTHROM AB SERPL-ACNC: 14.2 SEC — HIGH (ref 10–12.9)
RBC # BLD: 3.5 M/UL — LOW (ref 4.2–5.8)
RBC # FLD: 14.3 % — SIGNIFICANT CHANGE UP (ref 10.3–14.5)
SODIUM SERPL-SCNC: 134 MMOL/L — LOW (ref 135–145)
WBC # BLD: 6.4 K/UL — SIGNIFICANT CHANGE UP (ref 3.8–10.5)
WBC # FLD AUTO: 6.4 K/UL — SIGNIFICANT CHANGE UP (ref 3.8–10.5)

## 2019-05-06 PROCEDURE — 99232 SBSQ HOSP IP/OBS MODERATE 35: CPT

## 2019-05-06 PROCEDURE — 71045 X-RAY EXAM CHEST 1 VIEW: CPT | Mod: 26

## 2019-05-06 PROCEDURE — 93306 TTE W/DOPPLER COMPLETE: CPT | Mod: 26

## 2019-05-06 PROCEDURE — 99232 SBSQ HOSP IP/OBS MODERATE 35: CPT | Mod: GC

## 2019-05-06 RX ORDER — INSULIN GLARGINE 100 [IU]/ML
26 INJECTION, SOLUTION SUBCUTANEOUS AT BEDTIME
Qty: 0 | Refills: 0 | Status: DISCONTINUED | OUTPATIENT
Start: 2019-05-06 | End: 2019-05-07

## 2019-05-06 RX ORDER — CEFTRIAXONE 500 MG/1
2 INJECTION, POWDER, FOR SOLUTION INTRAMUSCULAR; INTRAVENOUS
Qty: 1 | Refills: 0 | OUTPATIENT
Start: 2019-05-06

## 2019-05-06 RX ORDER — SPIRONOLACTONE 25 MG/1
25 TABLET, FILM COATED ORAL
Qty: 0 | Refills: 0 | Status: DISCONTINUED | OUTPATIENT
Start: 2019-05-06 | End: 2019-05-07

## 2019-05-06 RX ORDER — INSULIN LISPRO 100/ML
6 VIAL (ML) SUBCUTANEOUS
Qty: 0 | Refills: 0 | Status: DISCONTINUED | OUTPATIENT
Start: 2019-05-06 | End: 2019-05-06

## 2019-05-06 RX ORDER — CEFTRIAXONE 500 MG/1
2 INJECTION, POWDER, FOR SOLUTION INTRAMUSCULAR; INTRAVENOUS
Qty: 1 | Refills: 0
Start: 2019-05-06

## 2019-05-06 RX ORDER — INSULIN LISPRO 100/ML
6 VIAL (ML) SUBCUTANEOUS
Qty: 0 | Refills: 0 | Status: DISCONTINUED | OUTPATIENT
Start: 2019-05-06 | End: 2019-05-07

## 2019-05-06 RX ADMIN — BROMOCRIPTINE MESYLATE 5 MILLIGRAM(S): 5 CAPSULE ORAL at 11:30

## 2019-05-06 RX ADMIN — Medication 4: at 13:30

## 2019-05-06 RX ADMIN — OXYCODONE HYDROCHLORIDE 5 MILLIGRAM(S): 5 TABLET ORAL at 22:21

## 2019-05-06 RX ADMIN — OXYCODONE HYDROCHLORIDE 5 MILLIGRAM(S): 5 TABLET ORAL at 22:58

## 2019-05-06 RX ADMIN — Medication 2: at 22:20

## 2019-05-06 RX ADMIN — Medication 250 MILLIGRAM(S): at 17:41

## 2019-05-06 RX ADMIN — INSULIN GLARGINE 26 UNIT(S): 100 INJECTION, SOLUTION SUBCUTANEOUS at 22:20

## 2019-05-06 RX ADMIN — LISINOPRIL 10 MILLIGRAM(S): 2.5 TABLET ORAL at 06:04

## 2019-05-06 RX ADMIN — ATORVASTATIN CALCIUM 40 MILLIGRAM(S): 80 TABLET, FILM COATED ORAL at 21:21

## 2019-05-06 RX ADMIN — CARVEDILOL PHOSPHATE 12.5 MILLIGRAM(S): 80 CAPSULE, EXTENDED RELEASE ORAL at 06:04

## 2019-05-06 RX ADMIN — Medication 6 UNIT(S): at 18:00

## 2019-05-06 RX ADMIN — Medication 2.5 MILLIGRAM(S): at 06:04

## 2019-05-06 RX ADMIN — Medication 81 MILLIGRAM(S): at 11:30

## 2019-05-06 RX ADMIN — Medication 2: at 18:00

## 2019-05-06 RX ADMIN — Medication 125 MICROGRAM(S): at 06:04

## 2019-05-06 RX ADMIN — Medication 3 UNIT(S): at 08:48

## 2019-05-06 RX ADMIN — LIDOCAINE 2 PATCH: 4 CREAM TOPICAL at 17:58

## 2019-05-06 RX ADMIN — Medication 250 MILLIGRAM(S): at 06:04

## 2019-05-06 RX ADMIN — Medication 650 MILLIGRAM(S): at 19:01

## 2019-05-06 RX ADMIN — Medication 2: at 08:48

## 2019-05-06 RX ADMIN — Medication 650 MILLIGRAM(S): at 19:45

## 2019-05-06 RX ADMIN — LIDOCAINE 2 PATCH: 4 CREAM TOPICAL at 11:30

## 2019-05-06 RX ADMIN — LIDOCAINE 2 PATCH: 4 CREAM TOPICAL at 23:58

## 2019-05-06 RX ADMIN — CEFTRIAXONE 100 GRAM(S): 500 INJECTION, POWDER, FOR SOLUTION INTRAMUSCULAR; INTRAVENOUS at 17:42

## 2019-05-06 RX ADMIN — SPIRONOLACTONE 25 MILLIGRAM(S): 25 TABLET, FILM COATED ORAL at 17:41

## 2019-05-06 RX ADMIN — Medication 6 UNIT(S): at 13:28

## 2019-05-06 NOTE — PHYSICAL THERAPY INITIAL EVALUATION ADULT - GAIT PATTERN USED, PT EVAL
3-point gait/w/ walker 2 pt gait w/ short stride wide base / safer and better gait pattern w/ walker

## 2019-05-06 NOTE — PROGRESS NOTE ADULT - PROBLEM SELECTOR PLAN 1
patient with blood cultures positive for streptococcus  - appreciate ID recs, treated with ceftriaxone 2gm q24 hours, repeat BCx collected will f/u, will treat for 2 weeks starting last clear BCx will need a PICC line   - likely a GI source but unclear --> CT A/P negative for abscess, possibly 2/2 to recent IR embolization? TTE pending patient with blood cultures positive for streptococcus  - appreciate ID recs, treated with ceftriaxone 2gm q24 hours, repeat BCx NGTD from 5/4, will treat for 10 days  - Will need PICC line   - TTE negative for vegetations

## 2019-05-06 NOTE — PROGRESS NOTE ADULT - PROBLEM SELECTOR PLAN 8
- patient on metformin , januvia and lantus 20units at home  - FS elevated in hospital and will likely be more elevated than baseline given steroids (at home FS>400)  - c/w lantus 20 units and moderate sliding scale - patient on metformin , januvia and lantus 20 units at home  - FS elevated in hospital and will likely be more elevated than baseline given steroids (at home FS>400)  - Increased Lantus 26U QHS and Humalog 6U TID (goal 120 - 180 fingersticks)

## 2019-05-06 NOTE — PHYSICAL THERAPY INITIAL EVALUATION ADULT - GENERAL OBSERVATIONS, REHAB EVAL
Rec'd in Rec'd sitting on EOB, L side IV lock, B/L pain patches on sh / c/o R sh pain 6/10 and L sh pain 7/10

## 2019-05-06 NOTE — PROGRESS NOTE ADULT - PROBLEM SELECTOR PLAN 3
initially presented with JULIANNE likely secondary to sepsis, now resolved  - Rampiril and Aldactone back. initially presented with JULIANNE likely secondary to sepsis, now resolved  - Rampiril started yesterday will restart aldactone today (25mg BID) initially presented with JULIANNE likely secondary to sepsis, now resolved  - Lisinopril 10mg (Ramipril 2.5mg therapeutic exchange) started yesterday will restart aldactone today (25mg BID)

## 2019-05-06 NOTE — PHYSICAL THERAPY INITIAL EVALUATION ADULT - ACTIVE RANGE OF MOTION EXAMINATION, REHAB EVAL
bilateral  lower extremity Active ROM was WFL (within functional limits)/except R sh fl  90 and L sh fl  50/bilateral upper extremity Active ROM was WFL (within functional limits)

## 2019-05-06 NOTE — PHYSICAL THERAPY INITIAL EVALUATION ADULT - PLANNED THERAPY INTERVENTIONS, PT EVAL
Goal: To negotiate one flight stairs w/ one handrail step over independently./gait training/transfer training/ROM/strengthening

## 2019-05-06 NOTE — PROGRESS NOTE ADULT - SUBJECTIVE AND OBJECTIVE BOX
James Trevino, PGY1  Pager: [phone]    CHIEF COMPLAINT: Patient is a 69y old  Male who presents with a chief complaint of fevers, weakness and shoulder pain. (05 May 2019 07:42)      INTERVAL HPI/OVERNIGHT EVENTS:    MEDICATIONS (STANDING):  aspirin enteric coated 81 milliGRAM(s) Oral daily  atorvastatin 40 milliGRAM(s) Oral at bedtime  bromocriptine Capsule 5 milliGRAM(s) Oral daily  carvedilol 12.5 milliGRAM(s) Oral every 12 hours  cefTRIAXone   IVPB 2 Gram(s) IV Intermittent every 24 hours  dextrose 5%. 1000 milliLiter(s) IV Continuous <Continuous>  dextrose 50% Injectable 12.5 Gram(s) IV Push once  dextrose 50% Injectable 25 Gram(s) IV Push once  dextrose 50% Injectable 25 Gram(s) IV Push once  insulin glargine Injectable (LANTUS) 24 Unit(s) SubCutaneous at bedtime  insulin lispro (HumaLOG) corrective regimen sliding scale   SubCutaneous three times a day before meals  insulin lispro (HumaLOG) corrective regimen sliding scale   SubCutaneous at bedtime  insulin lispro Injectable (HumaLOG) 3 Unit(s) SubCutaneous three times a day before meals  levothyroxine 125 MICROGram(s) Oral daily  lidocaine   Patch 2 Patch Transdermal daily  lisinopril 10 milliGRAM(s) Oral daily  predniSONE   Tablet 2.5 milliGRAM(s) Oral daily  saccharomyces boulardii 250 milliGRAM(s) Oral two times a day    MEDICATIONS  (PRN):  acetaminophen   Tablet .. 650 milliGRAM(s) Oral every 6 hours PRN  dextrose 40% Gel 15 Gram(s) Oral once PRN  glucagon  Injectable 1 milliGRAM(s) IntraMuscular once PRN  oxyCODONE    IR 5 milliGRAM(s) Oral every 6 hours PRN      REVIEW OF SYSTEMS:  CONSTITUTIONAL: No fever, weight loss, or fatigue  EYES: No eye pain, visual disturbances, or discharge  ENMT:  No difficulty hearing, tinnitus, vertigo; No sinus or throat pain  NECK: No pain or stiffness  RESPIRATORY: No cough, wheezing, chills or hemoptysis; No shortness of breath  CARDIOVASCULAR: No chest pain, palpitations, dizziness, or leg swelling  GASTROINTESTINAL: No abdominal or epigastric pain. No nausea, vomiting, or hematemesis; No diarrhea or constipation. No melena or hematochezia.  GENITOURINARY: No dysuria, frequency, hematuria, or incontinence  NEUROLOGICAL: No headaches, memory loss, loss of strength, numbness, or tremors  SKIN: No itching, burning, rashes, or lesions   MUSCULOSKELETAL: No joint pain or swelling; No muscle, back, or extremity pain    T(F): 97.8 (05-06-19 @ 05:20), Max: 98 (05-05-19 @ 22:08)  HR: 69 (05-06-19 @ 05:20) (58 - 73)  BP: 110/67 (05-06-19 @ 05:20) (103/64 - 122/76)  RR: 18 (05-06-19 @ 05:20) (16 - 18)  SpO2: 97% (05-06-19 @ 05:20) (94% - 98%)  Wt(kg): --  CAPILLARY BLOOD GLUCOSE      POCT Blood Glucose.: 310 mg/dL (05 May 2019 21:45)  POCT Blood Glucose.: 281 mg/dL (05 May 2019 17:54)  POCT Blood Glucose.: 182 mg/dL (05 May 2019 12:58)  POCT Blood Glucose.: 174 mg/dL (05 May 2019 09:06)    I&O's Summary    05 May 2019 07:01  -  06 May 2019 07:00  --------------------------------------------------------  IN: 0 mL / OUT: 1200 mL / NET: -1200 mL        PHYSICAL EXAM:  GENERAL: NAD, well-groomed, well-developed  HEAD:  Atraumatic, Normocephalic  EYES: EOMI, PERRLA, conjunctiva and sclera clear  ENMT: No tonsillar erythema, exudates, or enlargement; Moist mucous membranes  NECK: Supple, No JVD, Normal thyroid  NERVOUS SYSTEM:  Alert & Oriented X3, Good concentration; Motor Strength 5/5 B/L upper and lower extremities  CHEST/LUNG: Clear to auscultation bilaterally; No rales, rhonchi, wheezing, or rubs  HEART: Regular rate and rhythm; No murmurs, rubs, or gallops  ABDOMEN: Soft, Nontender, Nondistended; Bowel sounds present  EXTREMITIES:  2+ Peripheral Pulses, No clubbing, cyanosis, or edema  LYMPH: No lymphadenopathy noted  SKIN: No rashes or lesions    LABS:    05-05    136  |  103  |  41<H>  ----------------------------<  167<H>  5.0   |  20<L>  |  1.28    Ca    9.1      05 May 2019 07:20              RADIOLOGY & ADDITIONAL TESTS: James Trevino, PGY1  Pager: [phone]    CHIEF COMPLAINT: Patient is a 69y old  Male who presents with a chief complaint of fevers, weakness and shoulder pain. (05 May 2019 07:42)      INTERVAL HPI/OVERNIGHT EVENTS:  No acute events overnight. Patient seen at bedside. No concerns this AM. Patient has 6/10 right shoulder pain and 7/10 left shoulder pain but pain and ROM improved. No fevers/chills, chest pain, shortness of breath or abdominal pain.    MEDICATIONS (STANDING):  aspirin enteric coated 81 milliGRAM(s) Oral daily  atorvastatin 40 milliGRAM(s) Oral at bedtime  bromocriptine Capsule 5 milliGRAM(s) Oral daily  carvedilol 12.5 milliGRAM(s) Oral every 12 hours  cefTRIAXone   IVPB 2 Gram(s) IV Intermittent every 24 hours  dextrose 5%. 1000 milliLiter(s) IV Continuous <Continuous>  dextrose 50% Injectable 12.5 Gram(s) IV Push once  dextrose 50% Injectable 25 Gram(s) IV Push once  dextrose 50% Injectable 25 Gram(s) IV Push once  insulin glargine Injectable (LANTUS) 24 Unit(s) SubCutaneous at bedtime  insulin lispro (HumaLOG) corrective regimen sliding scale   SubCutaneous three times a day before meals  insulin lispro (HumaLOG) corrective regimen sliding scale   SubCutaneous at bedtime  insulin lispro Injectable (HumaLOG) 3 Unit(s) SubCutaneous three times a day before meals  levothyroxine 125 MICROGram(s) Oral daily  lidocaine   Patch 2 Patch Transdermal daily  lisinopril 10 milliGRAM(s) Oral daily  predniSONE   Tablet 2.5 milliGRAM(s) Oral daily  saccharomyces boulardii 250 milliGRAM(s) Oral two times a day    MEDICATIONS  (PRN):  acetaminophen   Tablet .. 650 milliGRAM(s) Oral every 6 hours PRN  dextrose 40% Gel 15 Gram(s) Oral once PRN  glucagon  Injectable 1 milliGRAM(s) IntraMuscular once PRN  oxyCODONE    IR 5 milliGRAM(s) Oral every 6 hours PRN    T(F): 97.8 (05-06-19 @ 05:20), Max: 98 (05-05-19 @ 22:08)  HR: 69 (05-06-19 @ 05:20) (58 - 73)  BP: 110/67 (05-06-19 @ 05:20) (103/64 - 122/76)  RR: 18 (05-06-19 @ 05:20) (16 - 18)  SpO2: 97% (05-06-19 @ 05:20) (94% - 98%)  Wt(kg): --  CAPILLARY BLOOD GLUCOSE      POCT Blood Glucose.: 310 mg/dL (05 May 2019 21:45)  POCT Blood Glucose.: 281 mg/dL (05 May 2019 17:54)  POCT Blood Glucose.: 182 mg/dL (05 May 2019 12:58)  POCT Blood Glucose.: 174 mg/dL (05 May 2019 09:06)    I&O's Summary    05 May 2019 07:01  -  06 May 2019 07:00  --------------------------------------------------------  IN: 0 mL / OUT: 1200 mL / NET: -1200 mL        PHYSICAL EXAM:  GENERAL: no acute distress  HEENT:  EOMI, conjunctiva and sclera clear  RESPIRATORY: CTAB, no wheezes or crackles   CARDIOVASCULAR: RRR, no murmurs  ABDOMINAL: soft, non-tender, non-distended, positive bowel sounds   EXTREMITIES: no clubbing, cyanosis, or edema  NEUROLOGICAL: alert and oriented x 3, non-focal  SKIN: venous stasis changes in LE bilaterally   MUSCULOSKELETAL: improved ROM with b/l shoulder but still with tenderness to palpation, improved pain.     LABS:    05-05    136  |  103  |  41<H>  ----------------------------<  167<H>  5.0   |  20<L>  |  1.28    Ca    9.1      05 May 2019 07:20              RADIOLOGY & ADDITIONAL TESTS:  PROCEDURE: Transthoracic echocardiogram with 2-D, M-Mode  and complete spectral and color flow Doppler.  INDICATION: Cardiomyopathy, unspecified (I42.9)  ------------------------------------------------------------------------  Dimensions:    Normal Values:  LA:     4.8    2.0 - 4.0 cm  Ao:     3.9    2.0 - 3.8 cm  SEPTUM: 1.0    0.6 - 1.2 cm  PWT:    0.9    0.6 - 1.1 cm  LVIDd:  6.7    3.0 - 5.6 cm  LVIDs:  5.6    1.8 - 4.0 cm  Derived variables:  LVMI: 126 g/m2  RWT: 0.26  Fractional short: 16 %  EF (Visual Estimate): 30-35 %  Doppler Peak Velocity (m/sec): AoV=1.3  ------------------------------------------------------------------------  Observations:  Mitral Valve: Mitral annular calcification. Tethered mitral  valve leaflets. Mild mitral regurgitation.  Aortic Valve/Aorta: Calcified trileaflet aortic valve with  normal opening. Peak transaortic valve gradient equals 7 mm  Hg. No aortic valve regurgitation seen. Peak left  ventricular outflow tract gradient equals 4 mm Hg, mean  gradient is equal to 2 mm Hg, LVOT velocity time integral  equals 21 cm.  Aortic Root: 3.9 cm.  Left Atrium: Mildly dilated left atrium.  LA volume index =  38 cc/m2.  Left Ventricle: Moderate to severe segmental left  ventricular systolic dysfunction. The inferior,  inferoseptal, and inferolateral walls are akinetic.  Paradoxical septal motion consistent with paced  rhythm/conduction defect. Eccentric left ventricular  hypertrophy (dilated left ventricle with normal relative  wall thickness). Moderate left ventricular enlargement.  Moderate diastolic dysfunction (Stage II).  Right Heart: Normal right atrium. Right ventricle not well  visualized; normal right ventricular size with decreased  right ventricular systolic function. A device wire is noted  in the right heart. Normal tricuspid valve. Mild-moderate  tricuspid regurgitation. Normal pulmonic valve. Mild  pulmonic regurgitation.  Pericardium/Pleura: Normal pericardium with no pericardial  effusion.  Hemodynamic: Estimated right atrial pressure is 8 mm Hg.  Estimated right ventricular systolic pressure equals 46 mm  Hg, assuming right atrial pressure equals 8 mm Hg,  consistent with mild pulmonary hypertension.  ------------------------------------------------------------------------  Conclusions:  1. Mitral annular calcification. Tethered mitral valve  leaflets. Mild mitral regurgitation.  2. Calcified trileaflet aortic valve with normal opening.  No aortic valve regurgitation seen.  3. Eccentric left ventricular hypertrophy (dilated left  ventricle with normal relative wall thickness). Moderate  left ventricular enlargement.  4. Moderate to severe segmental left ventricular systolic  dysfunction. The inferior, inferoseptal, and inferolateral  walls are akinetic. Paradoxical septal motion consistent  with paced rhythm/conduction defect.  5. Moderate diastolic dysfunction (Stage II).  6. Right ventricle not well visualized; normal right  ventricular size with decreased right ventricular systolic  function. A device wire is noted in the right heart.  7. Estimated pulmonary artery systolic pressure equals 46  mm Hg, assuming right atrial pressure equals 8 mm Hg,  consistent with mild pulmonary pressures.  *** Compared with echocardiogram of 12/23/2012, results are  similar on today's study.

## 2019-05-06 NOTE — DISCHARGE NOTE ADULT - DISCHARGE DATE
MC:  She said Vinine Chao had been started on Allegra BID, in intranasal steroid, and an ICS by her allergist a few days ago. Mom said since then she is very hyper, acting out, aggressive, and at school she threw her lunchbox. Mom said she typically doesn't have behavior issues at school. Suggested she continue Allegra and the intranasal steroid, and hold the ICS for now. Phone f/u in 2-3 days advised.
13-Aug-2018

## 2019-05-06 NOTE — PROGRESS NOTE ADULT - PROBLEM SELECTOR PLAN 4
patient with long standing CAD s/p CABG and ischemic cardiomyopathy s/p ICD/PPM; PPM generator changed 2 weeks ago, interrogation on 4/19 revealed sinus rhythm; last echo outpatient 3/2018 EF 43% and segmental wall dysfunction  - Restarted on home coreg and will slowly introduce diuretics patient with long standing CAD s/p CABG and ischemic cardiomyopathy s/p ICD/PPM; PPM generator changed 2 weeks ago, interrogation on 4/19 revealed sinus rhythm; last echo outpatient 3/2018 EF 43% and segmental wall dysfunction  - Restarted on home coreg and holding home lasix due to being clinically euvolemic

## 2019-05-06 NOTE — PHYSICAL THERAPY INITIAL EVALUATION ADULT - ADDITIONAL COMMENTS
lives w/ wife in private home,  no steps to enter but flight to bedroom / can stay on first level/ glasses for reading and distance, hearing good, R handed,  has rolling walker and cane at home but did not use prior to admission.

## 2019-05-06 NOTE — PHYSICAL THERAPY INITIAL EVALUATION ADULT - PERTINENT HX OF CURRENT PROBLEM, REHAB EVAL
69M admitted on 5//19  with PMHx of ischemic cardiomyopathy s/p ICD/PPM in 2006, generator change 2 weeks prior,, and CABG in 1986, T2DM on lantus, hypothyroidism, Afib (spontaneously cardioverted and not on AC), HTN, HLD, BEHZAD (not on CPAP), FERNANDES and cirrhosis with newly diagnosed HCC s/p IR embolization x 2 (last on 4/18), presents to the ED with shoulder pain and fevers/weakness, admitted for SIRS with course complicated by JULIANNE on CKD and shoulder pain.

## 2019-05-07 ENCOUNTER — TRANSCRIPTION ENCOUNTER (OUTPATIENT)
Age: 69
End: 2019-05-07

## 2019-05-07 VITALS
TEMPERATURE: 98 F | HEART RATE: 72 BPM | DIASTOLIC BLOOD PRESSURE: 70 MMHG | RESPIRATION RATE: 18 BRPM | SYSTOLIC BLOOD PRESSURE: 110 MMHG | OXYGEN SATURATION: 98 %

## 2019-05-07 PROCEDURE — 93306 TTE W/DOPPLER COMPLETE: CPT

## 2019-05-07 PROCEDURE — 99239 HOSP IP/OBS DSCHRG MGMT >30: CPT | Mod: GC

## 2019-05-07 PROCEDURE — 99285 EMERGENCY DEPT VISIT HI MDM: CPT | Mod: 25

## 2019-05-07 PROCEDURE — 87581 M.PNEUMON DNA AMP PROBE: CPT

## 2019-05-07 PROCEDURE — 85027 COMPLETE CBC AUTOMATED: CPT

## 2019-05-07 PROCEDURE — 87486 CHLMYD PNEUM DNA AMP PROBE: CPT

## 2019-05-07 PROCEDURE — 87798 DETECT AGENT NOS DNA AMP: CPT

## 2019-05-07 PROCEDURE — 87184 SC STD DISK METHOD PER PLATE: CPT

## 2019-05-07 PROCEDURE — 71045 X-RAY EXAM CHEST 1 VIEW: CPT

## 2019-05-07 PROCEDURE — 97161 PT EVAL LOW COMPLEX 20 MIN: CPT

## 2019-05-07 PROCEDURE — 83735 ASSAY OF MAGNESIUM: CPT

## 2019-05-07 PROCEDURE — 82962 GLUCOSE BLOOD TEST: CPT

## 2019-05-07 PROCEDURE — 93005 ELECTROCARDIOGRAM TRACING: CPT

## 2019-05-07 PROCEDURE — 76770 US EXAM ABDO BACK WALL COMP: CPT

## 2019-05-07 PROCEDURE — 85652 RBC SED RATE AUTOMATED: CPT

## 2019-05-07 PROCEDURE — 96360 HYDRATION IV INFUSION INIT: CPT

## 2019-05-07 PROCEDURE — 87150 DNA/RNA AMPLIFIED PROBE: CPT

## 2019-05-07 PROCEDURE — 76377 3D RENDER W/INTRP POSTPROCES: CPT

## 2019-05-07 PROCEDURE — 74176 CT ABD & PELVIS W/O CONTRAST: CPT

## 2019-05-07 PROCEDURE — 86901 BLOOD TYPING SEROLOGIC RH(D): CPT

## 2019-05-07 PROCEDURE — 85610 PROTHROMBIN TIME: CPT

## 2019-05-07 PROCEDURE — 84100 ASSAY OF PHOSPHORUS: CPT

## 2019-05-07 PROCEDURE — 76604 US EXAM CHEST: CPT

## 2019-05-07 PROCEDURE — 86900 BLOOD TYPING SEROLOGIC ABO: CPT

## 2019-05-07 PROCEDURE — 81001 URINALYSIS AUTO W/SCOPE: CPT

## 2019-05-07 PROCEDURE — 83605 ASSAY OF LACTIC ACID: CPT

## 2019-05-07 PROCEDURE — 71250 CT THORAX DX C-: CPT

## 2019-05-07 PROCEDURE — 80053 COMPREHEN METABOLIC PANEL: CPT

## 2019-05-07 PROCEDURE — 73030 X-RAY EXAM OF SHOULDER: CPT

## 2019-05-07 PROCEDURE — 87186 SC STD MICRODIL/AGAR DIL: CPT

## 2019-05-07 PROCEDURE — 96361 HYDRATE IV INFUSION ADD-ON: CPT

## 2019-05-07 PROCEDURE — 86850 RBC ANTIBODY SCREEN: CPT

## 2019-05-07 PROCEDURE — 99231 SBSQ HOSP IP/OBS SF/LOW 25: CPT

## 2019-05-07 PROCEDURE — C1751: CPT

## 2019-05-07 PROCEDURE — 86140 C-REACTIVE PROTEIN: CPT

## 2019-05-07 PROCEDURE — 87040 BLOOD CULTURE FOR BACTERIA: CPT

## 2019-05-07 PROCEDURE — 73200 CT UPPER EXTREMITY W/O DYE: CPT

## 2019-05-07 PROCEDURE — 36569 INSJ PICC 5 YR+ W/O IMAGING: CPT

## 2019-05-07 PROCEDURE — 85730 THROMBOPLASTIN TIME PARTIAL: CPT

## 2019-05-07 PROCEDURE — 87633 RESP VIRUS 12-25 TARGETS: CPT

## 2019-05-07 PROCEDURE — 82803 BLOOD GASES ANY COMBINATION: CPT

## 2019-05-07 PROCEDURE — 80048 BASIC METABOLIC PNL TOTAL CA: CPT

## 2019-05-07 PROCEDURE — 87086 URINE CULTURE/COLONY COUNT: CPT

## 2019-05-07 RX ORDER — INSULIN GLARGINE 100 [IU]/ML
26 INJECTION, SOLUTION SUBCUTANEOUS
Qty: 0 | Refills: 0 | COMMUNITY

## 2019-05-07 RX ORDER — INSULIN LISPRO 100/ML
8 VIAL (ML) SUBCUTANEOUS
Qty: 0 | Refills: 0 | Status: DISCONTINUED | OUTPATIENT
Start: 2019-05-07 | End: 2019-05-07

## 2019-05-07 RX ORDER — FUROSEMIDE 40 MG
1 TABLET ORAL
Qty: 0 | Refills: 0 | COMMUNITY

## 2019-05-07 RX ORDER — INSULIN GLARGINE 100 [IU]/ML
20 INJECTION, SOLUTION SUBCUTANEOUS
Qty: 0 | Refills: 0 | COMMUNITY

## 2019-05-07 RX ORDER — POTASSIUM CHLORIDE 20 MEQ
1 PACKET (EA) ORAL
Qty: 0 | Refills: 0 | COMMUNITY

## 2019-05-07 RX ORDER — LIDOCAINE 4 G/100G
2 CREAM TOPICAL
Qty: 15 | Refills: 0
Start: 2019-05-07 | End: 2019-05-21

## 2019-05-07 RX ORDER — INSULIN GLARGINE 100 [IU]/ML
28 INJECTION, SOLUTION SUBCUTANEOUS AT BEDTIME
Qty: 0 | Refills: 0 | Status: DISCONTINUED | OUTPATIENT
Start: 2019-05-07 | End: 2019-05-07

## 2019-05-07 RX ADMIN — SPIRONOLACTONE 25 MILLIGRAM(S): 25 TABLET, FILM COATED ORAL at 06:26

## 2019-05-07 RX ADMIN — Medication 2.5 MILLIGRAM(S): at 06:26

## 2019-05-07 RX ADMIN — BROMOCRIPTINE MESYLATE 5 MILLIGRAM(S): 5 CAPSULE ORAL at 12:16

## 2019-05-07 RX ADMIN — Medication 250 MILLIGRAM(S): at 17:14

## 2019-05-07 RX ADMIN — LIDOCAINE 2 PATCH: 4 CREAM TOPICAL at 12:17

## 2019-05-07 RX ADMIN — LISINOPRIL 10 MILLIGRAM(S): 2.5 TABLET ORAL at 06:26

## 2019-05-07 RX ADMIN — SPIRONOLACTONE 25 MILLIGRAM(S): 25 TABLET, FILM COATED ORAL at 17:14

## 2019-05-07 RX ADMIN — Medication 81 MILLIGRAM(S): at 12:16

## 2019-05-07 RX ADMIN — Medication 8 UNIT(S): at 13:20

## 2019-05-07 RX ADMIN — Medication 6 UNIT(S): at 08:42

## 2019-05-07 RX ADMIN — Medication 250 MILLIGRAM(S): at 06:35

## 2019-05-07 RX ADMIN — CARVEDILOL PHOSPHATE 12.5 MILLIGRAM(S): 80 CAPSULE, EXTENDED RELEASE ORAL at 17:14

## 2019-05-07 RX ADMIN — Medication 125 MICROGRAM(S): at 06:25

## 2019-05-07 RX ADMIN — Medication 4: at 08:43

## 2019-05-07 RX ADMIN — Medication 8: at 12:31

## 2019-05-07 RX ADMIN — CEFTRIAXONE 100 GRAM(S): 500 INJECTION, POWDER, FOR SOLUTION INTRAMUSCULAR; INTRAVENOUS at 16:02

## 2019-05-07 NOTE — CDI QUERY NOTE - NSCDIOTHERTXTBX_GEN_ALL_CORE_HH
QUERY:  69M admitted with sepsis  unclear source, likely related to one of recent procedures and with shoulder pain, group B strep bacteremia.  Hx AICD, HCC 2/2 cirrhosis 2/2 FERNANDES  s/p IR embolization on 4/18, cirrhosis, DM  4/19/19 Na-137  Cr.-1.21  eGFR 62/71 (NonAA/AA)--PRE ADMISSION  5/2/19   Na-126  Cr-2.37  eGFR-27/32 (NonAA/AA)                Glucose-322  Lactate-2.7   5/3/19   Na-130  Cr-2.02  eGFR-34/39 (NonAA/AA)   CO2-17  Glucose-288   K-5.4    5/4/19   Na-133  Cr-1.76  eGFR-39/45 (NonAA/AA)   CO2-19  Glucose-217  5/6/19   Na-136  Cr-1.28  eGFR-58/67 (NonAA/AA)   CO2-20    ED:   JULIANNE -patient with baseline Cr ~1.2-1.6, currently 2.37 and hyponatremia to 126  - ddx includes prerenal JULIANNE in setting of decreased PO intake and sepsis  - s/p 2L NS; will hold diuretics and ACE inhibitor overnight and evaluate renal function in AM  5/7/19 note: initially presented with JULIANNE likely secondary to sepsis, now resolved  Pt is on lisinopril, aldactone  ***Please clarify the documentation of JULIANNE  e.g. JULIANNE with ATN        JULIANNE without ATN        Other  ***Please clarify a diagnosis for the glucose levels  e.g. Diabetic hyperglycemia         Hyperglycemia due to other etiology         Clinically insignificant/ Other                                                        THANK YOU

## 2019-05-07 NOTE — PROGRESS NOTE ADULT - PROBLEM SELECTOR PLAN 1
patient with blood cultures positive for streptococcus  - appreciate ID recs, treated with ceftriaxone 2gm q24 hours, repeat BCx NGTD from 5/4, will treat for 10 days  - Will need PICC line   - TTE negative for vegetations

## 2019-05-07 NOTE — PROGRESS NOTE ADULT - SUBJECTIVE AND OBJECTIVE BOX
James Trevino, PGY1  Pager: [phone]    CHIEF COMPLAINT: Patient is a 69y old  Male who presents with a chief complaint of fevers, weakness and shoulder pain. (06 May 2019 07:21)      INTERVAL HPI/OVERNIGHT EVENTS:    MEDICATIONS (STANDING):  aspirin enteric coated 81 milliGRAM(s) Oral daily  atorvastatin 40 milliGRAM(s) Oral at bedtime  bromocriptine Capsule 5 milliGRAM(s) Oral daily  carvedilol 12.5 milliGRAM(s) Oral every 12 hours  cefTRIAXone   IVPB 2 Gram(s) IV Intermittent every 24 hours  dextrose 5%. 1000 milliLiter(s) IV Continuous <Continuous>  dextrose 50% Injectable 12.5 Gram(s) IV Push once  dextrose 50% Injectable 25 Gram(s) IV Push once  dextrose 50% Injectable 25 Gram(s) IV Push once  insulin glargine Injectable (LANTUS) 26 Unit(s) SubCutaneous at bedtime  insulin lispro (HumaLOG) corrective regimen sliding scale   SubCutaneous three times a day before meals  insulin lispro (HumaLOG) corrective regimen sliding scale   SubCutaneous at bedtime  insulin lispro Injectable (HumaLOG) 6 Unit(s) SubCutaneous three times a day before meals  levothyroxine 125 MICROGram(s) Oral daily  lidocaine   Patch 2 Patch Transdermal daily  lisinopril 10 milliGRAM(s) Oral daily  predniSONE   Tablet 2.5 milliGRAM(s) Oral daily  saccharomyces boulardii 250 milliGRAM(s) Oral two times a day  spironolactone 25 milliGRAM(s) Oral two times a day    MEDICATIONS  (PRN):  acetaminophen   Tablet .. 650 milliGRAM(s) Oral every 6 hours PRN  dextrose 40% Gel 15 Gram(s) Oral once PRN  glucagon  Injectable 1 milliGRAM(s) IntraMuscular once PRN  oxyCODONE    IR 5 milliGRAM(s) Oral every 6 hours PRN      REVIEW OF SYSTEMS:  CONSTITUTIONAL: No fever, weight loss, or fatigue  EYES: No eye pain, visual disturbances, or discharge  ENMT:  No difficulty hearing, tinnitus, vertigo; No sinus or throat pain  NECK: No pain or stiffness  RESPIRATORY: No cough, wheezing, chills or hemoptysis; No shortness of breath  CARDIOVASCULAR: No chest pain, palpitations, dizziness, or leg swelling  GASTROINTESTINAL: No abdominal or epigastric pain. No nausea, vomiting, or hematemesis; No diarrhea or constipation. No melena or hematochezia.  GENITOURINARY: No dysuria, frequency, hematuria, or incontinence  NEUROLOGICAL: No headaches, memory loss, loss of strength, numbness, or tremors  SKIN: No itching, burning, rashes, or lesions   MUSCULOSKELETAL: No joint pain or swelling; No muscle, back, or extremity pain    T(F): 98 (05-07-19 @ 06:00), Max: 98.2 (05-06-19 @ 22:13)  HR: 73 (05-07-19 @ 06:00) (61 - 80)  BP: 104/68 (05-07-19 @ 06:00) (96/60 - 119/74)  RR: 18 (05-07-19 @ 06:00) (18 - 18)  SpO2: 98% (05-07-19 @ 06:00) (97% - 98%)  Wt(kg): --  CAPILLARY BLOOD GLUCOSE      POCT Blood Glucose.: 283 mg/dL (06 May 2019 22:12)  POCT Blood Glucose.: 179 mg/dL (06 May 2019 17:40)  POCT Blood Glucose.: 223 mg/dL (06 May 2019 13:10)  POCT Blood Glucose.: 173 mg/dL (06 May 2019 08:35)    I&O's Summary    06 May 2019 07:01  -  07 May 2019 07:00  --------------------------------------------------------  IN: 0 mL / OUT: 1800 mL / NET: -1800 mL        PHYSICAL EXAM:  GENERAL: NAD, well-groomed, well-developed  HEAD:  Atraumatic, Normocephalic  EYES: EOMI, PERRLA, conjunctiva and sclera clear  ENMT: No tonsillar erythema, exudates, or enlargement; Moist mucous membranes  NECK: Supple, No JVD, Normal thyroid  NERVOUS SYSTEM:  Alert & Oriented X3, Good concentration; Motor Strength 5/5 B/L upper and lower extremities  CHEST/LUNG: Clear to auscultation bilaterally; No rales, rhonchi, wheezing, or rubs  HEART: Regular rate and rhythm; No murmurs, rubs, or gallops  ABDOMEN: Soft, Nontender, Nondistended; Bowel sounds present  EXTREMITIES:  2+ Peripheral Pulses, No clubbing, cyanosis, or edema  LYMPH: No lymphadenopathy noted  SKIN: No rashes or lesions    LABS:                        10.0   6.4   )-----------( 65       ( 06 May 2019 07:10 )             31.0     05-06    134<L>  |  102  |  36<H>  ----------------------------<  189<H>  4.9   |  22  |  1.23    Ca    8.7      06 May 2019 07:10      PT/INR - ( 06 May 2019 07:10 )   PT: 14.2 sec;   INR: 1.24 ratio         PTT - ( 06 May 2019 07:10 )  PTT:26.6 sec        RADIOLOGY & ADDITIONAL TESTS: James Trevino, PGY1  Pager: [phone]    CHIEF COMPLAINT: Patient is a 69y old  Male who presents with a chief complaint of fevers, weakness and shoulder pain. (06 May 2019 07:21)      INTERVAL HPI/OVERNIGHT EVENTS:  No acute events overnight. Patient seen at bedside. Reports feeling well. Shoulder pain has improved bilaterally. Increased ROM. No fevers/chills, chest pain, shortness of breath or abdominal pain. Had a BM yesterday.     MEDICATIONS (STANDING):  aspirin enteric coated 81 milliGRAM(s) Oral daily  atorvastatin 40 milliGRAM(s) Oral at bedtime  bromocriptine Capsule 5 milliGRAM(s) Oral daily  carvedilol 12.5 milliGRAM(s) Oral every 12 hours  cefTRIAXone   IVPB 2 Gram(s) IV Intermittent every 24 hours  dextrose 5%. 1000 milliLiter(s) IV Continuous <Continuous>  dextrose 50% Injectable 12.5 Gram(s) IV Push once  dextrose 50% Injectable 25 Gram(s) IV Push once  dextrose 50% Injectable 25 Gram(s) IV Push once  insulin glargine Injectable (LANTUS) 26 Unit(s) SubCutaneous at bedtime  insulin lispro (HumaLOG) corrective regimen sliding scale   SubCutaneous three times a day before meals  insulin lispro (HumaLOG) corrective regimen sliding scale   SubCutaneous at bedtime  insulin lispro Injectable (HumaLOG) 6 Unit(s) SubCutaneous three times a day before meals  levothyroxine 125 MICROGram(s) Oral daily  lidocaine   Patch 2 Patch Transdermal daily  lisinopril 10 milliGRAM(s) Oral daily  predniSONE   Tablet 2.5 milliGRAM(s) Oral daily  saccharomyces boulardii 250 milliGRAM(s) Oral two times a day  spironolactone 25 milliGRAM(s) Oral two times a day    MEDICATIONS  (PRN):  acetaminophen   Tablet .. 650 milliGRAM(s) Oral every 6 hours PRN  dextrose 40% Gel 15 Gram(s) Oral once PRN  glucagon  Injectable 1 milliGRAM(s) IntraMuscular once PRN  oxyCODONE    IR 5 milliGRAM(s) Oral every 6 hours PRN    T(F): 98 (05-07-19 @ 06:00), Max: 98.2 (05-06-19 @ 22:13)  HR: 73 (05-07-19 @ 06:00) (61 - 80)  BP: 104/68 (05-07-19 @ 06:00) (96/60 - 119/74)  RR: 18 (05-07-19 @ 06:00) (18 - 18)  SpO2: 98% (05-07-19 @ 06:00) (97% - 98%)  Wt(kg): --  CAPILLARY BLOOD GLUCOSE      POCT Blood Glucose.: 283 mg/dL (06 May 2019 22:12)  POCT Blood Glucose.: 179 mg/dL (06 May 2019 17:40)  POCT Blood Glucose.: 223 mg/dL (06 May 2019 13:10)  POCT Blood Glucose.: 173 mg/dL (06 May 2019 08:35)    I&O's Summary    06 May 2019 07:01  -  07 May 2019 07:00  --------------------------------------------------------  IN: 0 mL / OUT: 1800 mL / NET: -1800 mL        PHYSICAL EXAM:  GENERAL: no acute distress  HEENT:  EOMI, conjunctiva and sclera clear  RESPIRATORY: CTAB, no wheezes or crackles   CARDIOVASCULAR: RRR, no murmurs  ABDOMINAL: soft, non-tender, non-distended, positive bowel sounds   EXTREMITIES: no clubbing, cyanosis, or edema  NEUROLOGICAL: alert and oriented x 3, non-focal  SKIN: venous stasis changes in LE bilaterally   MUSCULOSKELETAL: improved ROM with b/l shoulder but still with tenderness to palpation, improved pain.     LABS:                        10.0   6.4   )-----------( 65       ( 06 May 2019 07:10 )             31.0     05-06    134<L>  |  102  |  36<H>  ----------------------------<  189<H>  4.9   |  22  |  1.23    Ca    8.7      06 May 2019 07:10      PT/INR - ( 06 May 2019 07:10 )   PT: 14.2 sec;   INR: 1.24 ratio         PTT - ( 06 May 2019 07:10 )  PTT:26.6 sec

## 2019-05-07 NOTE — PROGRESS NOTE ADULT - PROBLEM SELECTOR PLAN 9
- SCDS for DVT ppx given thrombocytopenia  - DASH/CC diet  - PT pending - SCDS for DVT ppx given thrombocytopenia  - DASH/CC diet  - PT home with home PT

## 2019-05-07 NOTE — PROGRESS NOTE ADULT - ATTENDING COMMENTS
Pt seen and examined.  Exam and plan as above
agree.  seen and examined, clinically improving. HD stable afebrile no leukocytosis. Cr improving. Need tighter sugar control, please escalate lispro to 5 before meals.   CT ab/p neg for acute pathology, TTE pending.   cont ctx for gb strep bacteremia.  discussed w/ patient and wife by bedside.
pt doing well and stable for discharge today.  picc line in place.  pt to go home with IV Abx.    Discharge time spent: 36 min    Please,  note that patient had JULIANNE with ATN presumed to be from Sepsis. The patient also had medication induced hyperglycemia in the setting of steroid use for Acute gout flare.
agree.  seen and examined, as above. improving.   blood cultures cleared, doubt endocarditis though will r/o w/ TTE.   Improved sugar control.   PT.  PICC line, anticipate dc next 48 hrs.
agree, seen and examined.   as above.   CTX as per ID discussions, CT ab/p w/ out IV contrast to investigate intra-abdominal source of group b strep. no iv contrast. repeat blood cultures.  will need to be cautious with fluid status in setting of known hf.  if he decompensates please give stress dose steroids too w/ chronic steroid.  JULIANNE improving.  Re-introduce CHF meds once septic physiology resolves.

## 2019-05-07 NOTE — PROGRESS NOTE ADULT - PROBLEM SELECTOR PROBLEM 4
Ischemic cardiomyopathy

## 2019-05-07 NOTE — PROGRESS NOTE ADULT - PROBLEM SELECTOR PLAN 8
- patient on metformin , januvia and lantus 20 units at home  - FS elevated in hospital and will likely be more elevated than baseline given steroids (at home FS>400)  - Increased Lantus 26U QHS and Humalog 6U TID (goal 120 - 180 fingersticks)

## 2019-05-07 NOTE — PROGRESS NOTE ADULT - ASSESSMENT
69 M with PMHx of ischemic cardiomyopathy s/p ICD/PPM in 2006, generator change last week, and CABG in 1986, T2DM on lantus, hypothyroidism, Afib (spontaneously cardioverted and not on AC), HTN, HLD, BEHZAD (not on CPAP), gout, FERNANDES and cirrhosis with newly diagnosed HCC s/p IR embolization x 2 (last on 4/18), admitted 5/2/19 with:    Group B Strep agalactiae bacteremia primary, cleared  Fever resolved  JULIANNE resolved  Shoulder pain: improved  s/p PICC line    Hemoglobin A1C, Whole Blood (04.11.19 @ 15:27)    Hemoglobin A1C, Whole Blood: 8.5 %      Antibiotics  Zosyn 5/2-->5/3  Vanco 5/2  Ceftriaxone 5/3 -->    Discharged today to complete 14 days of antibiotics by PICC line  discussed with primary team this am

## 2019-05-07 NOTE — PROGRESS NOTE ADULT - ASSESSMENT
69M with PMHx of ischemic cardiomyopathy s/p ICD/PPM in 2006, generator change 2 weeks prior, and CABG in 1986, T2DM on lantus, hypothyroidism, Afib (spontaneously cardioverted and not on AC), HTN, HLD, BEHZAD (not on CPAP), FERNANDES and cirrhosis with newly diagnosed HCC s/p IR embolization x 2 (last on 4/18), presents with acute on chronic right shoulder pain for 5 days found to have Strep agalactiae bacteremia and septic shock. 69M with PMHx of ischemic cardiomyopathy s/p ICD/PPM in 2006, generator change 2 weeks prior, and CABG in 1986, T2DM on lantus, hypothyroidism, Afib (spontaneously cardioverted and not on AC), HTN, HLD, BEHZAD (not on CPAP), FERNANDES and cirrhosis with newly diagnosed HCC s/p IR embolization x 2 (last on 4/18), presents with acute on chronic right shoulder pain for 5 days found to have Strep agalactiae bacteremia and septic shock. Now septic shock resolved s/p PICC line with plan for 10 days of IV Ceftriaxone.

## 2019-05-07 NOTE — DISCHARGE NOTE NURSING/CASE MANAGEMENT/SOCIAL WORK - NSDCDPATPORTLINK_GEN_ALL_CORE
You can access the [facility] Patient Portal, offered by [facility], by registering with the following website: [email or web address]

## 2019-05-07 NOTE — PROGRESS NOTE ADULT - PROVIDER SPECIALTY LIST ADULT
Infectious Disease
Internal Medicine
Orthopedics
Podiatry
Podiatry
Internal Medicine

## 2019-05-07 NOTE — PROGRESS NOTE ADULT - PROBLEM SELECTOR PROBLEM 5
Hepatocellular carcinoma

## 2019-05-07 NOTE — PROGRESS NOTE ADULT - PROBLEM SELECTOR PLAN 3
initially presented with JULIANNE likely secondary to sepsis, now resolved  - Lisinopril 10mg (Ramipril 2.5mg therapeutic exchange) started yesterday will restart aldactone today (25mg BID) initially presented with JULIANNE likely secondary to sepsis, now resolved  - Lisinopril 10mg (Ramipril 2.5mg therapeutic exchange) and Aldactone 25mg BID

## 2019-05-07 NOTE — PROGRESS NOTE ADULT - SUBJECTIVE AND OBJECTIVE BOX
Follow Up:  Grp B strep bacteremia    Interval History/ROS: cheerful when seen earlier today prior to discharge, shoulder pain improved, able to lift arms above shoulders, looking forward to discharge    Allergies  No Known Allergies    ANTIMICROBIALS:  cefTRIAXone   IVPB 2 every 24 hours    OTHER MEDS:  MEDICATIONS  (STANDING):  acetaminophen   Tablet .. 650 every 6 hours PRN  aspirin enteric coated 81 daily  atorvastatin 40 at bedtime  bromocriptine Capsule 5 daily  carvedilol 12.5 every 12 hours  dextrose 40% Gel 15 once PRN  dextrose 50% Injectable 12.5 once  dextrose 50% Injectable 25 once  dextrose 50% Injectable 25 once  glucagon  Injectable 1 once PRN  insulin glargine Injectable (LANTUS) 28 at bedtime  insulin lispro (HumaLOG) corrective regimen sliding scale  three times a day before meals  insulin lispro (HumaLOG) corrective regimen sliding scale  at bedtime  insulin lispro Injectable (HumaLOG) 8 three times a day before meals  levothyroxine 125 daily  lisinopril 10 daily  oxyCODONE    IR 5 every 6 hours PRN  predniSONE   Tablet 2.5 daily  spironolactone 25 two times a day    Vital Signs Last 24 Hrs  T(C): 36.7 (07 May 2019 13:18), Max: 36.8 (06 May 2019 22:13)  T(F): 98 (07 May 2019 13:18), Max: 98.2 (06 May 2019 22:13)  HR: 72 (07 May 2019 13:18) (70 - 73)  BP: 110/70 (07 May 2019 13:18) (96/60 - 119/74)  BP(mean): --  RR: 18 (07 May 2019 13:18) (18 - 18)  SpO2: 98% (07 May 2019 13:18) (97% - 98%)    PHYSICAL EXAM:  General: WN/WD NAD, Non-toxic  Neurology: A&Ox3, nonfocal  Respiratory: Clear to auscultation bilaterally  CV: RRR, S1S2, no murmurs, rubs or gallops  Abdominal: Soft, Non-tender, non-distended,  Extremities: No edema,   Skin: No rash                        10.0   6.4   )-----------( 65       ( 06 May 2019 07:10 )             31.0   WBC Count: 6.4 (05-06 @ 07:10)  WBC Count: 5.9 (05-04 @ 07:06)  WBC Count: 7.1 (05-03 @ 07:10)  05-06    134<L>  |  102  |  36<H>  ----------------------------<  189<H>  4.9   |  22  |  1.23  Creatinine: 1.23 (05-06 @ 07:10)    Creatinine: 1.28 (05-05 @ 07:20)    Creatinine: 1.76 (05-04 @ 07:06)    Creatinine: 1.99 (05-03 @ 10:59)    Creatinine: 2.02 (05-03 @ 07:10)      Ca    8.7      06 May 2019 07:10          MICROBIOLOGY:  .Blood  05-04-19   No growth to date.  --  --      .Urine  05-03-19   10,000 - 49,000 CFU/mL Enterococcus faecalis  --  Enterococcus faecalis    .Blood  05-02-19   Growth in aerobic and anaerobic bottles: Streptococcus agalactiae (Group  B)    Rapid RVP Result: NotDetec (05-02 @ 17:15)    < from: Transthoracic Echocardiogram (05.06.19 @ 06:16) >  Conclusions:  1. Mitral annular calcification. Tethered mitral valve  leaflets. Mild mitral regurgitation.  2. Calcified trileaflet aortic valve with normal opening.  No aortic valve regurgitation seen.  3. Eccentric left ventricular hypertrophy (dilated left  ventricle with normal relative wall thickness). Moderate  left ventricular enlargement.  4. Moderate to severe segmental left ventricular systolic  dysfunction. The inferior, inferoseptal, and inferolateral  walls are akinetic. Paradoxical septal motion consistent  with paced rhythm/conduction defect.  5. Moderate diastolic dysfunction (Stage II).  6. Right ventricle not well visualized; normal right  ventricular size with decreased right ventricular systolic  function. A device wire is noted in the right heart.  7. Estimated pulmonary artery systolic pressure equals 46  mm Hg, assuming right atrial pressure equals 8 mm Hg,  consistent with mild pulmonary pressures.  *** Compared with echocardiogram of 12/23/2012, results are  similar on today's study.    < end of copied text >      RADIOLOGY:    Gopi Grace MD; Division of Infectious Disease; Pager: [phone]; nights and weekends: [phone]

## 2019-05-07 NOTE — DISCHARGE NOTE NURSING/CASE MANAGEMENT/SOCIAL WORK - NSDCPEPTSTRK_GEN_ALL_CORE
Need for follow up after discharge/Risk factors for stroke/Stroke warning signs and symptoms/Signs and symptoms of stroke/Prescribed medications/Stroke education booklet/Stroke support groups for patients, families, and friends/Call 911 for stroke

## 2019-05-07 NOTE — PROGRESS NOTE ADULT - REASON FOR ADMISSION
fevers, weakness and shoulder pain.

## 2019-05-07 NOTE — PROGRESS NOTE ADULT - PROBLEM SELECTOR PLAN 4
patient with long standing CAD s/p CABG and ischemic cardiomyopathy s/p ICD/PPM; PPM generator changed 2 weeks ago, interrogation on 4/19 revealed sinus rhythm; last echo outpatient 3/2018 EF 43% and segmental wall dysfunction  - Restarted on home coreg and holding home lasix due to being clinically euvolemic

## 2019-05-08 ENCOUNTER — OTHER (OUTPATIENT)
Age: 69
End: 2019-05-08

## 2019-05-08 ENCOUNTER — INBOUND DOCUMENT (OUTPATIENT)
Age: 69
End: 2019-05-08

## 2019-05-09 LAB
CULTURE RESULTS: SIGNIFICANT CHANGE UP
CULTURE RESULTS: SIGNIFICANT CHANGE UP
SPECIMEN SOURCE: SIGNIFICANT CHANGE UP
SPECIMEN SOURCE: SIGNIFICANT CHANGE UP

## 2019-05-12 ENCOUNTER — EMERGENCY (EMERGENCY)
Facility: HOSPITAL | Age: 69
LOS: 1 days | Discharge: HOME | End: 2019-05-12
Attending: EMERGENCY MEDICINE
Payer: COMMERCIAL

## 2019-05-12 VITALS
HEIGHT: 73 IN | DIASTOLIC BLOOD PRESSURE: 76 MMHG | OXYGEN SATURATION: 99 % | RESPIRATION RATE: 18 BRPM | WEIGHT: 212.08 LBS | SYSTOLIC BLOOD PRESSURE: 134 MMHG | HEART RATE: 90 BPM | TEMPERATURE: 98 F

## 2019-05-12 DIAGNOSIS — Z01.89 ENCOUNTER FOR OTHER SPECIFIED SPECIAL EXAMINATIONS: Chronic | ICD-10-CM

## 2019-05-12 DIAGNOSIS — Z89.9 ACQUIRED ABSENCE OF LIMB, UNSPECIFIED: Chronic | ICD-10-CM

## 2019-05-12 LAB
ALBUMIN SERPL ELPH-MCNC: 3.2 G/DL — LOW (ref 3.3–5)
ALP SERPL-CCNC: 159 U/L — HIGH (ref 40–120)
ALT FLD-CCNC: 13 U/L — SIGNIFICANT CHANGE UP (ref 10–45)
ANION GAP SERPL CALC-SCNC: 11 MMOL/L — SIGNIFICANT CHANGE UP (ref 5–17)
AST SERPL-CCNC: 21 U/L — SIGNIFICANT CHANGE UP (ref 10–40)
BASOPHILS # BLD AUTO: 0 K/UL — SIGNIFICANT CHANGE UP (ref 0–0.2)
BASOPHILS NFR BLD AUTO: 0.2 % — SIGNIFICANT CHANGE UP (ref 0–2)
BILIRUB SERPL-MCNC: 0.6 MG/DL — SIGNIFICANT CHANGE UP (ref 0.2–1.2)
BUN SERPL-MCNC: 26 MG/DL — HIGH (ref 7–23)
CALCIUM SERPL-MCNC: 8.8 MG/DL — SIGNIFICANT CHANGE UP (ref 8.4–10.5)
CHLORIDE SERPL-SCNC: 101 MMOL/L — SIGNIFICANT CHANGE UP (ref 96–108)
CO2 SERPL-SCNC: 22 MMOL/L — SIGNIFICANT CHANGE UP (ref 22–31)
CREAT SERPL-MCNC: 1.22 MG/DL — SIGNIFICANT CHANGE UP (ref 0.5–1.3)
EOSINOPHIL # BLD AUTO: 0.2 K/UL — SIGNIFICANT CHANGE UP (ref 0–0.5)
EOSINOPHIL NFR BLD AUTO: 2.2 % — SIGNIFICANT CHANGE UP (ref 0–6)
GLUCOSE SERPL-MCNC: 98 MG/DL — SIGNIFICANT CHANGE UP (ref 70–99)
HCT VFR BLD CALC: 29.1 % — LOW (ref 39–50)
HGB BLD-MCNC: 9.5 G/DL — LOW (ref 13–17)
LYMPHOCYTES # BLD AUTO: 0.8 K/UL — LOW (ref 1–3.3)
LYMPHOCYTES # BLD AUTO: 10.2 % — LOW (ref 13–44)
MCHC RBC-ENTMCNC: 28.7 PG — SIGNIFICANT CHANGE UP (ref 27–34)
MCHC RBC-ENTMCNC: 32.6 GM/DL — SIGNIFICANT CHANGE UP (ref 32–36)
MCV RBC AUTO: 88.1 FL — SIGNIFICANT CHANGE UP (ref 80–100)
MONOCYTES # BLD AUTO: 0.6 K/UL — SIGNIFICANT CHANGE UP (ref 0–0.9)
MONOCYTES NFR BLD AUTO: 8.4 % — SIGNIFICANT CHANGE UP (ref 2–14)
NEUTROPHILS # BLD AUTO: 5.9 K/UL — SIGNIFICANT CHANGE UP (ref 1.8–7.4)
NEUTROPHILS NFR BLD AUTO: 78.9 % — HIGH (ref 43–77)
PLATELET # BLD AUTO: 58 K/UL — LOW (ref 150–400)
POTASSIUM SERPL-MCNC: 4.9 MMOL/L — SIGNIFICANT CHANGE UP (ref 3.5–5.3)
POTASSIUM SERPL-SCNC: 4.9 MMOL/L — SIGNIFICANT CHANGE UP (ref 3.5–5.3)
PROT SERPL-MCNC: 7.3 G/DL — SIGNIFICANT CHANGE UP (ref 6–8.3)
RBC # BLD: 3.31 M/UL — LOW (ref 4.2–5.8)
RBC # FLD: 14.3 % — SIGNIFICANT CHANGE UP (ref 10.3–14.5)
SODIUM SERPL-SCNC: 134 MMOL/L — LOW (ref 135–145)
WBC # BLD: 7.5 K/UL — SIGNIFICANT CHANGE UP (ref 3.8–10.5)
WBC # FLD AUTO: 7.5 K/UL — SIGNIFICANT CHANGE UP (ref 3.8–10.5)

## 2019-05-12 PROCEDURE — 99284 EMERGENCY DEPT VISIT MOD MDM: CPT

## 2019-05-12 PROCEDURE — 99283 EMERGENCY DEPT VISIT LOW MDM: CPT

## 2019-05-12 PROCEDURE — 85027 COMPLETE CBC AUTOMATED: CPT

## 2019-05-12 PROCEDURE — 80053 COMPREHEN METABOLIC PANEL: CPT

## 2019-05-12 NOTE — ED PROVIDER NOTE - PMH
Afib  pt reports ~9 mos, [id number], resolved - monitored by Dr. Rivers  AICD lead malfunction  history of - fixed follow-up by Dr Rivers  Anemia    Coronary artery disease    DM (diabetes mellitus)  type 2, Hg A1C 9.9% 2/2019  Dec 2019  due to prednisone started on Lantus Jan 2019  Elevated prolactin level  dx: ' 70's  medically managed Bromocriptine  Elevated triglycerides with high cholesterol  on meds  Gout  medically managed  Heart failure with reduced left ventricular function  last EF 43% (2/2018)  Hepatic cirrhosis, unspecified hepatic cirrhosis type    History of hyperprolactinemia    History of osteomyelitis  2015, right foot 2nd toe   follow-up by podiatrist every 2 weeks  HTN (hypertension)    Hypothyroidism    ICD (implantable cardioverter-defibrillator) in place  Medtronic, Old Model [id number] , last interrogation 3/27/19, generator change 4/3/19 New Model # [id number]  Ischemic cardiomyopathy    Liver mass  dx: 10/2018   incidental finding. Currently awaitng furthur workup  Pituitary adenoma  as per patient chronic, no changes , recently restarted follow up with endocrinologist ( note in allscripts )  Presence of stent in coronary artery  2 stents  PVD (peripheral vascular disease)    Sleep apnea  not using CPAP  Thrombocytopenia  Chronic  Ulcer of right ankle  has surgery done Dec 2018  Vitamin D deficiency

## 2019-05-12 NOTE — ED PROVIDER NOTE - PROGRESS NOTE DETAILS
Elizabeth Goldberger PGY-2: labs with nl wbc and no electrolyte dyscrasias. Pt feeling well w/o any episodes diarrhea while here, VS stable, NT abd. No indic fur further emergent w/u, will recommend outpt fu

## 2019-05-12 NOTE — ED PROVIDER NOTE - OBJECTIVE STATEMENT
70 y/o male, just discharged from the hospital a few days ago, who presents c/ a cc of diarrhea of a few days duration and out of concern he could have C. diff due to the ABX (ceftriaxone) he has been receiving for his GBS bacteremia. His diarrhea has been watery, non-bloody, approx 3 episodes each day, without clear relieving or exacerbating factor and a/w crampy abdominal discomfort just prior to his BMs. His wife also mentioned a temp of 100F at some point.

## 2019-05-12 NOTE — ED ADULT NURSE NOTE - NSIMPLEMENTINTERV_GEN_ALL_ED
Implemented All Universal Safety Interventions:  [place] to call system. Call bell, personal items and telephone within reach. Instruct patient to call for assistance. Room bathroom lighting operational. Non-slip footwear when patient is off stretcher. Physically safe environment: no spills, clutter or unnecessary equipment. Stretcher in lowest position, wheels locked, appropriate side rails in place.

## 2019-05-12 NOTE — ED ADULT NURSE NOTE - PMH
Afib  pt reports ~9 mos, [id number], resolved - monitored by Dr. Rivers  AICD lead malfunction  history of - fixed follow-up by Dr Rivers  Anemia    Coronary artery disease    DM (diabetes mellitus)  type 2, Hg A1C 9.9% 2/2019  Dec 2019  due to prednisone started on Lantus Jan 2019  Elevated prolactin level  dx: ' 70's  medically managed Bromocriptine  Elevated triglycerides with high cholesterol  on meds  Gout  medically managed  Heart failure with reduced left ventricular function  last EF 43% (2/2018)  Hepatic cirrhosis, unspecified hepatic cirrhosis type    History of hyperprolactinemia    History of osteomyelitis  2015, right foot 2nd toe   follow-up by podiatrist every 2 weeks  HTN (hypertension)    Hypothyroidism    ICD (implantable cardioverter-defibrillator) in place  Medtronic, Old Model [id number] , last interrogation 3/27/19, generator change 4/3/19 New Model # [id number]  Ischemic cardiomyopathy    Liver mass  dx: 10/2018   incidental finding. Currently awaitng furthur workup  Pituitary adenoma  as per patient chronic, no changes , recently restarted follow up with endocrinologist ( note in allscripts )  Presence of stent in coronary artery  2 stents  PVD (peripheral vascular disease)    Sleep apnea  not using CPAP  Thrombocytopenia  Chronic  Ulcer of right ankle  has surgery done Dec 2018  Vitamin D deficiency

## 2019-05-12 NOTE — ED PROVIDER NOTE - CLINICAL SUMMARY MEDICAL DECISION MAKING FREE TEXT BOX
Extensive PMH. Just discharged c/ PICC for ceftriaxone secondary to GBS bacteremia. Returns OOC he might have C diff. He appears very well and comfortable. His exam is benign. Basic screening labs ordered. If he has a BM in the ED we will send a sample. Does not need imaging at this time. If his labs are s/ emergent abnormality and his clinical condition remains unchanged, he should be suitable for DC. Will follow his studies, reassess and treat/dispo accordingly.

## 2019-05-12 NOTE — ED PROVIDER NOTE - SKIN, MLM
Skin normal color for race, warm, dry and intact. No evidence of rash. Chronic venous stasis changes c/ edema of his B/L LEs.

## 2019-05-12 NOTE — ED ADULT NURSE NOTE - OBJECTIVE STATEMENT
69 year old male presents to ED from home complaining of lower abdominal pain & diarrhea x 3-4 days. History of afib, liver mass, gout, hypothyroid, PVD, cirrhosis, anemia, ICD, osteomyelitis, cdiff in Aug 2018, HTN, DM, sepsis (PICC in RUE on 2grams ceftriaxone 1x/day), HF. States pain comes on prior to having bowel movements. Has diarrhea aprox 4 times a day, pain resolves after BM. 69 year old male presents to ED from home complaining of lower abdominal pain & diarrhea x 3-4 days. History of afib, liver mass, gout, hypothyroid, PVD, cirrhosis, anemia, ICD, osteomyelitis, cdiff in Aug 2018, HTN, DM, sepsis (PICC in RUE on 2grams ceftriaxone 1x/day), HF. States pain comes on prior to having bowel movements. Has diarrhea aprox 4 times a day, pain resolves after BM. Denies headache, chest pain, shortness of breath, nausea, vomiting, fevers, chills, dysuria, hematuria. Patient undressed and placed into gown, call bell in hand and side rails up with bed in lowest position for safety. blanket provided. Comfort and safety provided.

## 2019-05-12 NOTE — ED PROVIDER NOTE - NSFOLLOWUPINSTRUCTIONS_ED_ALL_ED_FT
You were seen in the emergency department for diarrhea. Please follow up with your primary doctor in the next 2-3 days. Return to the emergency department immediately if you experience fever 100.4 or greater, increased diarrhea frequency, bloody stools, vomiting or any other concerning symptoms.

## 2019-05-13 ENCOUNTER — APPOINTMENT (OUTPATIENT)
Dept: RHEUMATOLOGY | Facility: CLINIC | Age: 69
End: 2019-05-13

## 2019-05-13 ENCOUNTER — APPOINTMENT (OUTPATIENT)
Dept: RHEUMATOLOGY | Facility: CLINIC | Age: 69
End: 2019-05-13
Payer: COMMERCIAL

## 2019-05-13 VITALS
HEIGHT: 72.5 IN | HEART RATE: 63 BPM | BODY MASS INDEX: 26.66 KG/M2 | DIASTOLIC BLOOD PRESSURE: 59 MMHG | TEMPERATURE: 97.5 F | WEIGHT: 199 LBS | OXYGEN SATURATION: 99 % | SYSTOLIC BLOOD PRESSURE: 100 MMHG

## 2019-05-13 PROCEDURE — 99214 OFFICE O/P EST MOD 30 MIN: CPT

## 2019-05-14 ENCOUNTER — RX RENEWAL (OUTPATIENT)
Age: 69
End: 2019-05-14

## 2019-05-16 ENCOUNTER — APPOINTMENT (OUTPATIENT)
Dept: RHEUMATOLOGY | Facility: CLINIC | Age: 69
End: 2019-05-16

## 2019-05-17 ENCOUNTER — FORM ENCOUNTER (OUTPATIENT)
Age: 69
End: 2019-05-17

## 2019-05-17 LAB
CRP SERPL-MCNC: 3.52 MG/DL
ERYTHROCYTE [SEDIMENTATION RATE] IN BLOOD BY WESTERGREN METHOD: 80 MM/HR
URATE SERPL-MCNC: 9.7 MG/DL

## 2019-05-18 ENCOUNTER — APPOINTMENT (OUTPATIENT)
Dept: CT IMAGING | Facility: IMAGING CENTER | Age: 69
End: 2019-05-18
Payer: COMMERCIAL

## 2019-05-18 ENCOUNTER — OUTPATIENT (OUTPATIENT)
Dept: OUTPATIENT SERVICES | Facility: HOSPITAL | Age: 69
LOS: 1 days | End: 2019-05-18
Payer: COMMERCIAL

## 2019-05-18 DIAGNOSIS — R16.0 HEPATOMEGALY, NOT ELSEWHERE CLASSIFIED: ICD-10-CM

## 2019-05-18 DIAGNOSIS — Z89.9 ACQUIRED ABSENCE OF LIMB, UNSPECIFIED: Chronic | ICD-10-CM

## 2019-05-18 DIAGNOSIS — Z01.89 ENCOUNTER FOR OTHER SPECIFIED SPECIAL EXAMINATIONS: Chronic | ICD-10-CM

## 2019-05-18 PROCEDURE — 74170 CT ABD WO CNTRST FLWD CNTRST: CPT | Mod: 26

## 2019-05-18 PROCEDURE — 74170 CT ABD WO CNTRST FLWD CNTRST: CPT

## 2019-05-19 NOTE — PHYSICAL EXAM
[General Appearance - Alert] : alert [General Appearance - In No Acute Distress] : in no acute distress [Heart Rate And Rhythm] : heart rate was normal and rhythm regular [Heart Sounds] : normal S1 and S2 [Auscultation Breath Sounds / Voice Sounds] : lungs were clear to auscultation bilaterally [Murmurs] : no murmurs [Heart Sounds Gallop] : no gallops [Full Pulse] : the pedal pulses are present [Heart Sounds Pericardial Friction Rub] : no pericardial rub [Edema] : there was no peripheral edema [Bowel Sounds] : normal bowel sounds [Abdomen Soft] : soft [Abdomen Tenderness] : non-tender [Cervical Lymph Nodes Enlarged Posterior Bilaterally] : posterior cervical [Abdomen Mass (___ Cm)] : no abdominal mass palpated [Cervical Lymph Nodes Enlarged Anterior Bilaterally] : anterior cervical [Supraclavicular Lymph Nodes Enlarged Bilaterally] : supraclavicular [No Spinal Tenderness] : no spinal tenderness [No CVA Tenderness] : no ~M costovertebral angle tenderness [Nail Clubbing] : no clubbing  or cyanosis of the fingernails [Abnormal Walk] : normal gait [Musculoskeletal - Swelling] : no joint swelling seen [Motor Tone] : muscle strength and tone were normal [FreeTextEntry1] : bilateral shoulder pain at the AC joint [Skin Color & Pigmentation] : normal skin color and pigmentation [Oriented To Time, Place, And Person] : oriented to person, place, and time [] : no rash [Skin Turgor] : normal skin turgor [Affect] : the affect was normal [Impaired Insight] : insight and judgment were intact

## 2019-05-19 NOTE — REVIEW OF SYSTEMS
[Fever] : fever [As Noted in HPI] : as noted in HPI [Joint Pain] : joint pain [Joint Swelling] : joint swelling [Negative] : Heme/Lymph

## 2019-05-19 NOTE — HISTORY OF PRESENT ILLNESS
[___ Week(s) Ago] : [unfilled] week(s) ago [FreeTextEntry1] : patient was hospitalized for 1 week secondary to sepsis - now on IV antibiotics - for another 3 days\par Right shoulder pain is ongoing - now also with left shoulder pain - no signs of infection on imaging - no redness or swelling. \par decreased ROM secondary to pain

## 2019-05-19 NOTE — ASSESSMENT
[FreeTextEntry1] : 69 year-old male with \par \par 1. Gout - had to stop Krystexxa due to elevated uric acid levels - \par 2. Bilateral shoulder pain - unknown if related to ongoing raising uric acid - may start allopurinol when symptoms resolve will need to stay on prednisone as cannot take colchicine due to CKD - start prednisone 20 mg X 2 days then stay on 10 mg daily until symptoms resolve\par 3. Sepsis - unknown sources - about to finish IV antibiotics\par \par I reviewed previous labs results with patients.\par Laboratory tests ordered today\par Diagnosis and Prognosis discussed\par Continue with current medications\par education provided on\par F/u 1 month \par \par

## 2019-05-23 ENCOUNTER — APPOINTMENT (OUTPATIENT)
Dept: INTERVENTIONAL RADIOLOGY/VASCULAR | Facility: CLINIC | Age: 69
End: 2019-05-23
Payer: COMMERCIAL

## 2019-05-23 VITALS
DIASTOLIC BLOOD PRESSURE: 54 MMHG | RESPIRATION RATE: 16 BRPM | OXYGEN SATURATION: 99 % | SYSTOLIC BLOOD PRESSURE: 90 MMHG | TEMPERATURE: 98.2 F | HEART RATE: 72 BPM

## 2019-05-23 PROCEDURE — 99242 OFF/OP CONSLTJ NEW/EST SF 20: CPT

## 2019-05-23 RX ORDER — ALLOPURINOL 100 MG/1
100 TABLET ORAL
Qty: 30 | Refills: 3 | Status: DISCONTINUED | COMMUNITY
Start: 2019-04-26 | End: 2019-05-23

## 2019-05-23 RX ORDER — PEGLOTICASE 8 MG/ML
8 INJECTION, SOLUTION INTRAVENOUS
Qty: 8 | Refills: 6 | Status: DISCONTINUED | OUTPATIENT
Start: 2018-08-18 | End: 2019-05-23

## 2019-05-23 RX ORDER — PREDNISONE 5 MG/1
5 TABLET ORAL DAILY
Qty: 90 | Refills: 3 | Status: DISCONTINUED | COMMUNITY
Start: 2019-01-18 | End: 2019-05-23

## 2019-05-23 RX ORDER — SACCHAROMYCES BOULARDII 50 MG
250 CAPSULE ORAL TWICE DAILY
Refills: 0 | Status: ACTIVE | COMMUNITY
Start: 2019-05-23

## 2019-05-23 NOTE — CONSULT LETTER
[Dear  ___] : Dear  [unfilled], [Courtesy Letter:] : I had the pleasure of seeing your patient, [unfilled], in my office today. [Please see my note below.] : Please see my note below. [Sincerely,] : Sincerely, [FreeTextEntry3] : Shady Hoyt MD\par

## 2019-05-23 NOTE — PHYSICAL EXAM
[Alert] : alert [No Acute Distress] : no acute distress [Well Developed] : well developed [Healthy Appearance] : healthy appearance [Normal Voice/Communication] : normal voice communication [Normal Sclera/Conjunctiva] : normal sclera/conjunctiva [No Respiratory Distress] : no respiratory distress [Normal Rate and Effort] : normal respiratory rhythm and effort [No Accessory Muscle Use] : no accessory muscle use [Normal Gait] : normal gait [Oriented x3] : oriented to person, place, and time [Normal Insight/Judgement] : insight and judgment were intact [Normal Affect] : the affect was normal [Normal Mood] : the mood was normal [Recent Memory Normal] : recent memory was not impaired [Remote Memory Normal] : remote memory was not impaired [Restricted in physically strenuous activity but ambulatory and able to carry out work of a light or sedentary nature] : Restricted in physically strenuous activity but ambulatory and able to carry out work of a light or sedentary nature, e.g., light house work, office work [de-identified] : visible weight loss

## 2019-05-23 NOTE — ASSESSMENT
[Other: _____] : [unfilled] [FreeTextEntry1] : Mr. Harvey is a 69 y/o male with pmhx of ischemic cardiomyopathy, CHF, V-tach s/p AICD/PPM, HLD, HTN, BEHZAD, gouty arthritis, T2DM, FERNANDES, and cirrhosis who presents today for follow up for liver directed therapy.  His post procedure course was uncomplicated.  He was recently hospitalized for sepsis, but is now doing well after discharge.  His follow up CT scan from 5/18/19 demonstrates a cirrhotic liver with evidence of portal hypertension. There is no evidence of viable tumor associated with lesions in segment 7 at the junction of segments 4A and 8 post intervention. \par \par I have reviewed his imaging and discussed the findings with  and Mrs. Harvey.  I am pleased to see that his lesions do not demonstrate any enhancement on follow up imaging.  I am recommending follow up CT scan in 3 months and I have given him a rx for the imaging.  He is to follow up with our office afterwards to review the imaging.  \par \par It is unclear if his sepsis was secondary to gouty arthritis, but he has been doing well since completing his antibiotic therapy.  He will continue to follow with Dr. Gallagher, his rheumatologist, for management of his gout. \par \par I have provided the patient the opportunity to ask questions and have answered them to their satisfaction.  They are encouraged to contact our office with any further questions, concerns, or issues.\par

## 2019-05-23 NOTE — DATA REVIEWED
[FreeTextEntry1] : EXAM: CT ABDOMEN ONLY OC WAW IC \par \par \par PROCEDURE DATE: 05/18/2019 \par \par \par \par INTERPRETATION: CLINICAL INFORMATION: Hepatocellular cancer status post \par ablation of a 4 cm right hepatic tumor in February 2019 and subsequent \par embolization of 2 right hepatic lobe lesions 4/18/2019. \par \par COMPARISON: CT 3/12/2019, 11/19/2018 \par \par PROCEDURE: \par CT of the Abdomen was performed with intravenous contrast. \par Noncontrast, Arterial, Portal Venous and Delayed phases were acquired. \par Intravenous contrast: 90 ml Omnipaque 350. 10 ml discarded. \par Oral contrast: None. \par Sagittal and coronal reformats were performed. \par \par \par FINDINGS: \par \par LOWER CHEST: Sternotomy. Cardiac device leads partially imaged. \par \par LIVER: Cirrhosis. \par \par Lesion #: 1 \par Location: Segment 7 \par Size: 2.9 cm, previously 3.5 cm. (8:14) \par AP Hyperenhancement: NO. No evidence of associated enhancement post \par intervention. \par LI-RADS v 2018 Category: LR-TR Nonviable \par \par Lesion #: 2 \par Location: Junction of segments 4A and 8 \par Size: 1.6 cm, previously 1.8 cm. \par AP Hyperenhancement: NO. No evidence of associated enhancement post \par intervention. \par LI-RADS v 2018 Category: LR-TR Nonviable \par \par An additional hypervascular focus in the right hepatic lobe which was \par recently embolized is not identified on the current study. \par \par BILE DUCTS: Normal caliber. \par GALLBLADDER: Cholelithiasis. \par SPLEEN: Splenomegaly. \par PANCREAS: Within normal limits. \par ADRENALS: Within normal limits. \par KIDNEYS/URETERS: Within normal limits. \par \par VISUALIZED PORTIONS: \par \par BOWEL: Within normal limits. \par PERITONEUM: No ascites. \par VESSELS: Hepatic and portal veins are patent. Upper abdominal varices \par including a large patent paraumbilical vein consistent with portal \par hypertension. \par RETROPERITONEUM: No lymphadenopathy. \par ABDOMINAL WALL: Within normal limits. \par BONES: Degenerative changes. \par \par IMPRESSION: \par \par Cirrhosis with evidence of portal hypertension. \par \par No evidence of viable tumor (LR-TR Nonviable) associated with lesions in \par segment 7 at the junction of segments 4A and 8 post intervention. \par \par \par

## 2019-05-23 NOTE — HISTORY OF PRESENT ILLNESS
[FreeTextEntry1] : Mr. Harvey is a 69 y/o male with pmhx of ischemic cardiomyopathy, CHF, V-tach s/p AICD/PPM, HLD, HTN, BEHZAD, gouty arthritis, T2DM, FERNANDES, and cirrhosis who presents today for follow up for liver directed therapy. Pt was originally diagnosed with cirrhosis of the liver in 2017 after workup for elevated liver enzymes. He has been followed with serial imaging by Dr. Chapo Alaniz. He was referred to IR by Dr. Alaniz after imaging revealed liver lesion. He is now s/p hepatic angiogram and embolization of 4 cm right hepatic tumor on 2/7/19 and hepatic angiogram and bland embolization of segment 8 and 5 on 4/18/19. He presents today for follow up.  Denies abdominal pain, change in skin color, hemoptysis, melena, or hematochezia. \par \par He was recently hospitalized for sepsis from 5/2-5/7 and is now s/p IV antibiotics.  The source of the infection was unclear, but he has been febrile since his d/c. Since his hospitalization, he has had an approximate 10lb weight loss due to decreased intake during his stay.  \par \par He reports having issues with fluid retention and CHF exacerbations over the last year. He has worked with Dr. Rivers to adjust his diuretic regimen and had a significant amount of fluid weight loss in March. He has currently been stable over the last few months, with no CHF exacerbations. \par \par He has been following with Dr. Tristan and was hospitalized in August and September for recurrent cellulitis and gouty arthritis. On 12/7/18, he underwent another I+D of the right ankle for a non-healing ankle wound.  The wound is now healed and he continues to follow with Dr. Tristan regularly.\par \par 5/12/19 Labs in [facility]\par Na 134\par K4.9\par BUN/Cr 26/1.22

## 2019-05-29 ENCOUNTER — RESULT REVIEW (OUTPATIENT)
Age: 69
End: 2019-05-29

## 2019-05-29 ENCOUNTER — APPOINTMENT (OUTPATIENT)
Dept: NEPHROLOGY | Facility: CLINIC | Age: 69
End: 2019-05-29

## 2019-05-29 ENCOUNTER — INPATIENT (INPATIENT)
Facility: HOSPITAL | Age: 69
LOS: 4 days | Discharge: HOME | DRG: 617 | End: 2019-06-03
Attending: INTERNAL MEDICINE | Admitting: INTERNAL MEDICINE
Payer: COMMERCIAL

## 2019-05-29 VITALS
RESPIRATION RATE: 18 BRPM | DIASTOLIC BLOOD PRESSURE: 65 MMHG | OXYGEN SATURATION: 99 % | WEIGHT: 192.9 LBS | TEMPERATURE: 98 F | SYSTOLIC BLOOD PRESSURE: 106 MMHG | HEIGHT: 72 IN | HEART RATE: 75 BPM

## 2019-05-29 DIAGNOSIS — E11.628 TYPE 2 DIABETES MELLITUS WITH OTHER SKIN COMPLICATIONS: ICD-10-CM

## 2019-05-29 DIAGNOSIS — Z01.89 ENCOUNTER FOR OTHER SPECIFIED SPECIAL EXAMINATIONS: Chronic | ICD-10-CM

## 2019-05-29 DIAGNOSIS — Z89.9 ACQUIRED ABSENCE OF LIMB, UNSPECIFIED: Chronic | ICD-10-CM

## 2019-05-29 LAB
ALBUMIN SERPL ELPH-MCNC: 4 G/DL — SIGNIFICANT CHANGE UP (ref 3.3–5)
ALP SERPL-CCNC: 158 U/L — HIGH (ref 40–120)
ALT FLD-CCNC: 29 U/L — SIGNIFICANT CHANGE UP (ref 10–45)
ANION GAP SERPL CALC-SCNC: 13 MMOL/L — SIGNIFICANT CHANGE UP (ref 5–17)
ANION GAP SERPL CALC-SCNC: 16 MMOL/L — SIGNIFICANT CHANGE UP (ref 5–17)
AST SERPL-CCNC: 25 U/L — SIGNIFICANT CHANGE UP (ref 10–40)
BASOPHILS # BLD AUTO: 0 K/UL — SIGNIFICANT CHANGE UP (ref 0–0.2)
BASOPHILS NFR BLD AUTO: 0.2 % — SIGNIFICANT CHANGE UP (ref 0–2)
BILIRUB SERPL-MCNC: 0.6 MG/DL — SIGNIFICANT CHANGE UP (ref 0.2–1.2)
BLD GP AB SCN SERPL QL: NEGATIVE — SIGNIFICANT CHANGE UP
BUN SERPL-MCNC: 52 MG/DL — HIGH (ref 7–23)
BUN SERPL-MCNC: 57 MG/DL — HIGH (ref 7–23)
CALCIUM SERPL-MCNC: 9.2 MG/DL — SIGNIFICANT CHANGE UP (ref 8.4–10.5)
CALCIUM SERPL-MCNC: 9.6 MG/DL — SIGNIFICANT CHANGE UP (ref 8.4–10.5)
CHLORIDE SERPL-SCNC: 95 MMOL/L — LOW (ref 96–108)
CHLORIDE SERPL-SCNC: 96 MMOL/L — SIGNIFICANT CHANGE UP (ref 96–108)
CO2 SERPL-SCNC: 20 MMOL/L — LOW (ref 22–31)
CO2 SERPL-SCNC: 21 MMOL/L — LOW (ref 22–31)
CREAT SERPL-MCNC: 1.45 MG/DL — HIGH (ref 0.5–1.3)
CREAT SERPL-MCNC: 1.65 MG/DL — HIGH (ref 0.5–1.3)
CRP SERPL-MCNC: 0.2 MG/DL — SIGNIFICANT CHANGE UP (ref 0–0.4)
EOSINOPHIL # BLD AUTO: 0 K/UL — SIGNIFICANT CHANGE UP (ref 0–0.5)
EOSINOPHIL NFR BLD AUTO: 0.7 % — SIGNIFICANT CHANGE UP (ref 0–6)
ERYTHROCYTE [SEDIMENTATION RATE] IN BLOOD: 43 MM/HR — HIGH (ref 0–20)
GAS PNL BLDV: SIGNIFICANT CHANGE UP
GLUCOSE BLDC GLUCOMTR-MCNC: 348 MG/DL — HIGH (ref 70–99)
GLUCOSE BLDC GLUCOMTR-MCNC: 366 MG/DL — HIGH (ref 70–99)
GLUCOSE BLDC GLUCOMTR-MCNC: 435 MG/DL — HIGH (ref 70–99)
GLUCOSE BLDC GLUCOMTR-MCNC: 442 MG/DL — HIGH (ref 70–99)
GLUCOSE SERPL-MCNC: 332 MG/DL — HIGH (ref 70–99)
GLUCOSE SERPL-MCNC: 405 MG/DL — HIGH (ref 70–99)
HCT VFR BLD CALC: 33.8 % — LOW (ref 39–50)
HGB BLD-MCNC: 11.2 G/DL — LOW (ref 13–17)
LYMPHOCYTES # BLD AUTO: 0.5 K/UL — LOW (ref 1–3.3)
LYMPHOCYTES # BLD AUTO: 10.8 % — LOW (ref 13–44)
MCHC RBC-ENTMCNC: 29.3 PG — SIGNIFICANT CHANGE UP (ref 27–34)
MCHC RBC-ENTMCNC: 33 GM/DL — SIGNIFICANT CHANGE UP (ref 32–36)
MCV RBC AUTO: 88.7 FL — SIGNIFICANT CHANGE UP (ref 80–100)
MONOCYTES # BLD AUTO: 0.2 K/UL — SIGNIFICANT CHANGE UP (ref 0–0.9)
MONOCYTES NFR BLD AUTO: 4 % — SIGNIFICANT CHANGE UP (ref 2–14)
NEUTROPHILS # BLD AUTO: 4.2 K/UL — SIGNIFICANT CHANGE UP (ref 1.8–7.4)
NEUTROPHILS NFR BLD AUTO: 84.3 % — HIGH (ref 43–77)
PLATELET # BLD AUTO: 44 K/UL — LOW (ref 150–400)
POTASSIUM SERPL-MCNC: 5.3 MMOL/L — SIGNIFICANT CHANGE UP (ref 3.5–5.3)
POTASSIUM SERPL-MCNC: 5.5 MMOL/L — HIGH (ref 3.5–5.3)
POTASSIUM SERPL-SCNC: 5.3 MMOL/L — SIGNIFICANT CHANGE UP (ref 3.5–5.3)
POTASSIUM SERPL-SCNC: 5.5 MMOL/L — HIGH (ref 3.5–5.3)
PROT SERPL-MCNC: 8.1 G/DL — SIGNIFICANT CHANGE UP (ref 6–8.3)
RBC # BLD: 3.81 M/UL — LOW (ref 4.2–5.8)
RBC # FLD: 16 % — HIGH (ref 10.3–14.5)
RH IG SCN BLD-IMP: POSITIVE — SIGNIFICANT CHANGE UP
SODIUM SERPL-SCNC: 129 MMOL/L — LOW (ref 135–145)
SODIUM SERPL-SCNC: 132 MMOL/L — LOW (ref 135–145)
WBC # BLD: 4.9 K/UL — SIGNIFICANT CHANGE UP (ref 3.8–10.5)
WBC # FLD AUTO: 4.9 K/UL — SIGNIFICANT CHANGE UP (ref 3.8–10.5)

## 2019-05-29 PROCEDURE — 93010 ELECTROCARDIOGRAM REPORT: CPT

## 2019-05-29 PROCEDURE — 99223 1ST HOSP IP/OBS HIGH 75: CPT

## 2019-05-29 PROCEDURE — 99285 EMERGENCY DEPT VISIT HI MDM: CPT | Mod: 25

## 2019-05-29 PROCEDURE — 73630 X-RAY EXAM OF FOOT: CPT | Mod: 26,RT

## 2019-05-29 RX ORDER — CARVEDILOL PHOSPHATE 80 MG/1
12.5 CAPSULE, EXTENDED RELEASE ORAL EVERY 12 HOURS
Refills: 0 | Status: DISCONTINUED | OUTPATIENT
Start: 2019-05-29 | End: 2019-05-29

## 2019-05-29 RX ORDER — INSULIN LISPRO 100/ML
VIAL (ML) SUBCUTANEOUS
Refills: 0 | Status: DISCONTINUED | OUTPATIENT
Start: 2019-05-29 | End: 2019-05-31

## 2019-05-29 RX ORDER — CEFEPIME 1 G/1
2000 INJECTION, POWDER, FOR SOLUTION INTRAMUSCULAR; INTRAVENOUS ONCE
Refills: 0 | Status: COMPLETED | OUTPATIENT
Start: 2019-05-29 | End: 2019-05-29

## 2019-05-29 RX ORDER — DEXTROSE 50 % IN WATER 50 %
15 SYRINGE (ML) INTRAVENOUS ONCE
Refills: 0 | Status: DISCONTINUED | OUTPATIENT
Start: 2019-05-29 | End: 2019-05-31

## 2019-05-29 RX ORDER — SODIUM CHLORIDE 9 MG/ML
1000 INJECTION, SOLUTION INTRAVENOUS
Refills: 0 | Status: DISCONTINUED | OUTPATIENT
Start: 2019-05-29 | End: 2019-05-31

## 2019-05-29 RX ORDER — VANCOMYCIN HCL 1 G
750 VIAL (EA) INTRAVENOUS EVERY 12 HOURS
Refills: 0 | Status: DISCONTINUED | OUTPATIENT
Start: 2019-05-29 | End: 2019-05-31

## 2019-05-29 RX ORDER — DEXTROSE 50 % IN WATER 50 %
25 SYRINGE (ML) INTRAVENOUS ONCE
Refills: 0 | Status: DISCONTINUED | OUTPATIENT
Start: 2019-05-29 | End: 2019-05-31

## 2019-05-29 RX ORDER — PIPERACILLIN AND TAZOBACTAM 4; .5 G/20ML; G/20ML
3.38 INJECTION, POWDER, LYOPHILIZED, FOR SOLUTION INTRAVENOUS EVERY 8 HOURS
Refills: 0 | Status: DISCONTINUED | OUTPATIENT
Start: 2019-05-29 | End: 2019-05-31

## 2019-05-29 RX ORDER — ALLOPURINOL 300 MG
1 TABLET ORAL
Qty: 0 | Refills: 0 | DISCHARGE

## 2019-05-29 RX ORDER — INSULIN LISPRO 100/ML
VIAL (ML) SUBCUTANEOUS AT BEDTIME
Refills: 0 | Status: DISCONTINUED | OUTPATIENT
Start: 2019-05-29 | End: 2019-05-31

## 2019-05-29 RX ORDER — LEVOTHYROXINE SODIUM 125 MCG
125 TABLET ORAL DAILY
Refills: 0 | Status: DISCONTINUED | OUTPATIENT
Start: 2019-05-29 | End: 2019-05-31

## 2019-05-29 RX ORDER — GLUCAGON INJECTION, SOLUTION 0.5 MG/.1ML
1 INJECTION, SOLUTION SUBCUTANEOUS ONCE
Refills: 0 | Status: DISCONTINUED | OUTPATIENT
Start: 2019-05-29 | End: 2019-05-31

## 2019-05-29 RX ORDER — OXYCODONE HYDROCHLORIDE 5 MG/1
5 TABLET ORAL ONCE
Refills: 0 | Status: DISCONTINUED | OUTPATIENT
Start: 2019-05-29 | End: 2019-05-29

## 2019-05-29 RX ORDER — DEXTROSE 50 % IN WATER 50 %
12.5 SYRINGE (ML) INTRAVENOUS ONCE
Refills: 0 | Status: DISCONTINUED | OUTPATIENT
Start: 2019-05-29 | End: 2019-05-31

## 2019-05-29 RX ORDER — ATORVASTATIN CALCIUM 80 MG/1
40 TABLET, FILM COATED ORAL AT BEDTIME
Refills: 0 | Status: DISCONTINUED | OUTPATIENT
Start: 2019-05-29 | End: 2019-05-31

## 2019-05-29 RX ORDER — POTASSIUM CHLORIDE 20 MEQ
20 PACKET (EA) ORAL DAILY
Refills: 0 | Status: DISCONTINUED | OUTPATIENT
Start: 2019-05-29 | End: 2019-05-31

## 2019-05-29 RX ORDER — LISINOPRIL 2.5 MG/1
5 TABLET ORAL DAILY
Refills: 0 | Status: DISCONTINUED | OUTPATIENT
Start: 2019-05-29 | End: 2019-05-31

## 2019-05-29 RX ORDER — CARVEDILOL PHOSPHATE 80 MG/1
6.25 CAPSULE, EXTENDED RELEASE ORAL EVERY 12 HOURS
Refills: 0 | Status: DISCONTINUED | OUTPATIENT
Start: 2019-05-29 | End: 2019-05-31

## 2019-05-29 RX ORDER — LISINOPRIL 2.5 MG/1
10 TABLET ORAL DAILY
Refills: 0 | Status: DISCONTINUED | OUTPATIENT
Start: 2019-05-29 | End: 2019-05-29

## 2019-05-29 RX ORDER — VANCOMYCIN HCL 1 G
1000 VIAL (EA) INTRAVENOUS ONCE
Refills: 0 | Status: COMPLETED | OUTPATIENT
Start: 2019-05-29 | End: 2019-05-29

## 2019-05-29 RX ORDER — INSULIN GLARGINE 100 [IU]/ML
24 INJECTION, SOLUTION SUBCUTANEOUS AT BEDTIME
Refills: 0 | Status: DISCONTINUED | OUTPATIENT
Start: 2019-05-29 | End: 2019-05-31

## 2019-05-29 RX ORDER — BROMOCRIPTINE MESYLATE 5 MG/1
5 CAPSULE ORAL DAILY
Refills: 0 | Status: DISCONTINUED | OUTPATIENT
Start: 2019-05-29 | End: 2019-05-31

## 2019-05-29 RX ORDER — FUROSEMIDE 40 MG
40 TABLET ORAL DAILY
Refills: 0 | Status: DISCONTINUED | OUTPATIENT
Start: 2019-05-29 | End: 2019-05-30

## 2019-05-29 RX ORDER — SPIRONOLACTONE 25 MG/1
25 TABLET, FILM COATED ORAL
Refills: 0 | Status: DISCONTINUED | OUTPATIENT
Start: 2019-05-29 | End: 2019-05-31

## 2019-05-29 RX ORDER — HYDROCORTISONE 20 MG
50 TABLET ORAL THREE TIMES A DAY
Refills: 0 | Status: DISCONTINUED | OUTPATIENT
Start: 2019-05-29 | End: 2019-05-31

## 2019-05-29 RX ORDER — ASPIRIN/CALCIUM CARB/MAGNESIUM 324 MG
81 TABLET ORAL DAILY
Refills: 0 | Status: DISCONTINUED | OUTPATIENT
Start: 2019-05-29 | End: 2019-05-31

## 2019-05-29 RX ADMIN — Medication 250 MILLIGRAM(S): at 12:21

## 2019-05-29 RX ADMIN — SPIRONOLACTONE 25 MILLIGRAM(S): 25 TABLET, FILM COATED ORAL at 22:52

## 2019-05-29 RX ADMIN — Medication 4: at 22:49

## 2019-05-29 RX ADMIN — PIPERACILLIN AND TAZOBACTAM 25 GRAM(S): 4; .5 INJECTION, POWDER, LYOPHILIZED, FOR SOLUTION INTRAVENOUS at 22:53

## 2019-05-29 RX ADMIN — ATORVASTATIN CALCIUM 40 MILLIGRAM(S): 80 TABLET, FILM COATED ORAL at 22:51

## 2019-05-29 RX ADMIN — Medication 81 MILLIGRAM(S): at 22:51

## 2019-05-29 RX ADMIN — CEFEPIME 100 MILLIGRAM(S): 1 INJECTION, POWDER, FOR SOLUTION INTRAMUSCULAR; INTRAVENOUS at 11:36

## 2019-05-29 RX ADMIN — CARVEDILOL PHOSPHATE 6.25 MILLIGRAM(S): 80 CAPSULE, EXTENDED RELEASE ORAL at 18:30

## 2019-05-29 RX ADMIN — Medication 5: at 16:18

## 2019-05-29 RX ADMIN — CEFEPIME 2000 MILLIGRAM(S): 1 INJECTION, POWDER, FOR SOLUTION INTRAMUSCULAR; INTRAVENOUS at 12:10

## 2019-05-29 RX ADMIN — OXYCODONE HYDROCHLORIDE 5 MILLIGRAM(S): 5 TABLET ORAL at 13:25

## 2019-05-29 RX ADMIN — INSULIN GLARGINE 24 UNIT(S): 100 INJECTION, SOLUTION SUBCUTANEOUS at 22:56

## 2019-05-29 RX ADMIN — OXYCODONE HYDROCHLORIDE 5 MILLIGRAM(S): 5 TABLET ORAL at 12:39

## 2019-05-29 NOTE — H&P ADULT - NSICDXPASTMEDICALHX_GEN_ALL_CORE_FT
PAST MEDICAL HISTORY:  Afib pt reports ~9 mos, [id number], resolved - monitored by Dr. Rivers    AICD lead malfunction history of - fixed follow-up by Dr Rivers    Anemia     Coronary artery disease     DM (diabetes mellitus) type 2, Hg A1C 9.9% 2/2019  Dec 2019  due to prednisone started on Lantus Jan 2019    Elevated prolactin level dx: ' 70's  medically managed Bromocriptine    Elevated triglycerides with high cholesterol on meds    Gout medically managed    Heart failure with reduced left ventricular function last EF 43% (2/2018)    Hepatic cirrhosis, unspecified hepatic cirrhosis type     History of hyperprolactinemia     History of osteomyelitis 2015, right foot 2nd toe   follow-up by podiatrist every 2 weeks    HTN (hypertension)     Hypothyroidism     ICD (implantable cardioverter-defibrillator) in place Medtronic, Old Model [id number] , last interrogation 3/27/19, generator change 4/3/19 New Model # [id number]    Ischemic cardiomyopathy     Liver mass dx: 10/2018   incidental finding. Currently awaitng furthur workup    Pituitary adenoma as per patient chronic, no changes , recently restarted follow up with endocrinologist ( note in allscripts )    Presence of stent in coronary artery 2 stents    PVD (peripheral vascular disease)     Sleep apnea not using CPAP    Thrombocytopenia Chronic    Ulcer of right ankle has surgery done Dec 2018    Vitamin D deficiency

## 2019-05-29 NOTE — ED ADULT NURSE NOTE - OBJECTIVE STATEMENT
70 y/o M, A&Ox4, enters ED w/ c/o R. toe wound. Hx. of type II DM, PVD. Pt. reports he first noted the wound on 1st digit on R. foot about 1 month ago and then noticed a blister on same digit w/ bleeding.  Pt. denies any trauma to the area. Pt. was seen at his podiatrist, MD Tristan this AM who wrapped his toe and sent pt. here for admission for IV abx.     . Denies fever/chills, numbness/tingling, difficulty ambulating, pus drainage, calf pain/swelling, cp, sob, bite wounds. 70 y/o M, A&Ox4, enters ED w/ c/o R. toe wound. Hx. of type II DM, PVD. Pt. reports he first noted the wound on 1st digit on R. foot about 1 month ago and then noticed a blister on same digit w/ bleeding.  Pt. denies any trauma to the area/animal bites. Pt. was seen at his podiatrist, MD Tristan this AM who wrapped his toe and sent pt. here for admission for IV abx. Pt. has partial amputations to 2nd and 3rd digits on R. foot. No fever/chills. No numbness/tingling. Pt. able to ambulate w/o difficulty. As per MD, toe should not be unwrapped at this time, but pt. denies any drainage from wound. No chest pain/SOB. Noted venous insufficiency bilaterally to the lower extremities. Skin otherwise warm and dry. Safety and comfort provided. Wife at bedside. Call bell within reach.

## 2019-05-29 NOTE — ED PROVIDER NOTE - GASTROINTESTINAL, MLM
Abdomen soft, non-tender, no guarding. Abdomen soft, non-tender, no guarding, no rigidity no rebound.

## 2019-05-29 NOTE — ED PROVIDER NOTE - PMH
Afib  pt reports ~9 mos, [id number], resolved - monitored by Dr. Rivers  AICD lead malfunction  history of - fixed follow-up by Dr Rivers  Anemia    Coronary artery disease    DM (diabetes mellitus)  type 2, Hg A1C 9.9% 2/2019  Dec 2019  due to prednisone started on Lantus Jan 2019  Elevated prolactin level  dx: ' 70's  medically managed Bromocriptine  Elevated triglycerides with high cholesterol  on meds  Gout  medically managed  Heart failure with reduced left ventricular function  last EF 43% (2/2018)  Hepatic cirrhosis, unspecified hepatic cirrhosis type    History of hyperprolactinemia    History of osteomyelitis  2015, right foot 2nd toe   follow-up by podiatrist every 2 weeks  HTN (hypertension)    Hypothyroidism    ICD (implantable cardioverter-defibrillator) in place  Medtronic, Old Model [id number] , last interrogation 3/27/19, generator change 4/3/19 New Model # [id number]  Ischemic cardiomyopathy    Liver mass  dx: 10/2018   incidental finding. Currently awaitng furthur workup  Pituitary adenoma  as per patient chronic, no changes , recently restarted follow up with endocrinologist ( note in allscripts )  Presence of stent in coronary artery  2 stents  PVD (peripheral vascular disease)    Sleep apnea  not using CPAP  Thrombocytopenia  Chronic  Ulcer of right ankle  has surgery done Dec 2018  Vitamin D deficiency

## 2019-05-29 NOTE — CONSULT NOTE ADULT - SUBJECTIVE AND OBJECTIVE BOX
HPI:    PAST MEDICAL & SURGICAL HISTORY:  Afib: pt reports ~9 mos, [id number], resolved - monitored by Dr. Rivers  Ulcer of right ankle: has surgery done Dec 2018  Liver mass: dx: 10/2018   incidental finding. Currently awaitng furthur workup  Gout: medically managed  Elevated prolactin level: dx: &#x27; 70&#x27;s  medically managed Bromocriptine  Vitamin D deficiency  Elevated triglycerides with high cholesterol: on meds  Hypothyroidism  PVD (peripheral vascular disease)  History of hyperprolactinemia  Hepatic cirrhosis, unspecified hepatic cirrhosis type  Anemia  ICD (implantable cardioverter-defibrillator) in place: Medtronic, Old Model [id number] , last interrogation 3/27/19, generator change 4/3/19 New Model # [id number]  Sleep apnea: not using CPAP  History of osteomyelitis: 2015, right foot 2nd toe   follow-up by podiatrist every 2 weeks  Presence of stent in coronary artery: 2 stents  Heart failure with reduced left ventricular function: last EF 43% (2/2018)  Ischemic cardiomyopathy  Pituitary adenoma: as per patient chronic, no changes , recently restarted follow up with endocrinologist ( note in allscripts )  Coronary artery disease  Thrombocytopenia: Chronic  HTN (hypertension)  DM (diabetes mellitus): type 2, Hg A1C 9.9% 2/2019  Dec 2019  due to prednisone started on Lantus Jan 2019  AICD lead malfunction: history of - fixed follow-up by Dr Rivers  Encounter for incision and drainage procedure: Dec 2018 right foot/ankle  History of amputation: right foot, 2nd toe, osteo 2015  AICD (automatic cardioverter/defibrillator) present: placement in (2006), generator change 4/3/19  Stented coronary artery: RCA (1999)  Coronary atherosclerosis of artery bypass graft: CABG X 4 (1986)    Meds:  cefepime   IVPB 2000 milliGRAM(s) IV Intermittent once  vancomycin  IVPB 1000 milliGRAM(s) IV Intermittent once    Labs:            Radiology:             ROS:  No pain, no fever  No lumps in neck or pain  No Sob or chest pain  No palpitations   No abdominal pain. No change in bowel habit. No blood in stools  No weakness in extremities  No leg swelling      Vital Signs Last 24 Hrs  T(C): 36.4 (29 May 2019 10:17), Max: 36.4 (29 May 2019 10:17)  T(F): 97.5 (29 May 2019 10:17), Max: 97.5 (29 May 2019 10:17)  HR: 75 (29 May 2019 10:17) (75 - 75)  BP: 106/65 (29 May 2019 10:17) (106/65 - 106/65)  BP(mean): --  RR: 18 (29 May 2019 10:17) (18 - 18)  SpO2: 99% (29 May 2019 10:17) (99% - 99%)    Physical Exam:  Patient comfortable  AXOX3  Neck supple, no LN's  Chest: bilateral breath sounds, no wheeze or rales  CVS: regular heart rate without murmur  Abdomen: soft, BS+, no massess or organomegaly  CNS: no gross deficit  Ext: no edema HPI:  69M with PMHx of ischemic cardiomyopathy s/p ICD/PPM in 2006, and CABG in 1986, T2DM on lantus, hypothyroidism, Afib (spontaneously cardioverted and not on AC), HTN, HLD, BEHZAD (not on CPAP), gout, FERNANDES and cirrhosis with  diagnosed HCC s/p IR embolization x 2 (last on 4/18), with embozene came in with toe infection      PAST MEDICAL & SURGICAL HISTORY:  Afib: pt reports ~9 mos, [phone], resolved - monitored by Dr. Rivers  Ulcer of right ankle: has surgery done Dec 2018  Liver mass: dx: 10/2018   incidental finding. Currently awaitng furthur workup  PAST MEDICAL & SURGICAL HISTORY:  Afib: pt reports ~9 mos, [id number], resolved - monitored by Dr. Rivers  Ulcer of right ankle: has surgery done Dec 2018  Liver mass: dx: 10/2018   incidental finding. Currently awaitng furthur workup  Gout: medically managed  Elevated prolactin level: dx: &#x27; 70&#x27;s  medically managed Bromocriptine  Vitamin D deficiency  Elevated triglycerides with high cholesterol: on meds  Hypothyroidism  PVD (peripheral vascular disease)  History of hyperprolactinemia  Hepatic cirrhosis, unspecified hepatic cirrhosis type  Anemia  ICD (implantable cardioverter-defibrillator) in place: Medtronic, Old Model [id number] , last interrogation 3/27/19, generator change 4/3/19 New Model # [id number]  Sleep apnea: not using CPAP  History of osteomyelitis: 2015, right foot 2nd toe   follow-up by podiatrist every 2 weeks  Presence of stent in coronary artery: 2 stents  Heart failure with reduced left ventricular function: last EF 43% (2/2018)  Ischemic cardiomyopathy  Pituitary adenoma: as per patient chronic, no changes , recently restarted follow up with endocrinologist ( note in allscripts )  Coronary artery disease  Thrombocytopenia: Chronic  HTN (hypertension)  DM (diabetes mellitus): type 2, Hg A1C 9.9% 2/2019  Dec 2019  due to prednisone started on Lantus Jan 2019  AICD lead malfunction: history of - fixed follow-up by Dr Rivers  Encounter for incision and drainage procedure: Dec 2018 right foot/ankle  History of amputation: right foot, 2nd toe, osteo 2015  AICD (automatic cardioverter/defibrillator) present: placement in (2006), generator change 4/3/19  Stented coronary artery: RCA (1999)  Coronary atherosclerosis of artery bypass graft: CABG X 4 (1986)    Meds:  cefepime   IVPB 2000 milliGRAM(s) IV Intermittent once  vancomycin  IVPB 1000 milliGRAM(s) IV Intermittent once    Labs:                Radiology:     < from: CT Abdomen w/ Oral Cont and w/wo IV Cont (05.18.19 @ 12:28) >  Hepatocellular cancer status post   ablation of a 4 cm right hepatic tumor in February 2019 and subsequent   embolization of 2 right hepatic lobe lesions 4/18/2019.    COMPARISON: CT 3/12/2019, 11/19/2018    PROCEDURE:   CT of the Abdomen was performed with intravenous contrast.   Noncontrast, Arterial, Portal Venous and Delayed phases were acquired.  Intravenous contrast: 90 ml Omnipaque 350. 10 ml discarded.  Oral contrast: None.  Sagittal and coronal reformats were performed.      FINDINGS:    LOWER CHEST: Sternotomy. Cardiac device leads partially imaged.    LIVER: Cirrhosis.    Lesion #: 1  Location: Segment 7  Size: 2.9cm, previously 3.5 cm. (8:14)  AP Hyperenhancement: NO. No evidence of associated enhancement post   intervention.  LI-RADS v 2018 Category: LR-TR Nonviable    Lesion #: 2  Location: Junction of segments 4A and 8  Size: 1.6 cm, previously 1.8 cm.  APHyperenhancement: NO. No evidence of associated enhancement post   intervention.  LI-RADS v 2018 Category: LR-TR Nonviable    An additional hypervascular focus in the right hepatic lobe which was   recently embolized is not identified on the current study.    BILE DUCTS: Normal caliber.  GALLBLADDER: Cholelithiasis.  SPLEEN: Splenomegaly.  PANCREAS: Within normal limits.  ADRENALS: Within normal limits.  KIDNEYS/URETERS: Within normal limits.        IMPRESSION:     Cirrhosis with evidence of portal hypertension.     No evidence of viable tumor (LR-TR Nonviable) associated with lesions in   segment 7 at the junction of segments 4A and 8 post intervention.            < end of copied text >          ROS:  No pain, no fever  No lumps in neck or pain  No Sob or chest pain  No palpitations   No abdominal pain. No change in bowel habit. No blood in stools  No weakness in extremities  No leg swelling      Vital Signs Last 24 Hrs  T(C): 36.4 (29 May 2019 10:17), Max: 36.4 (29 May 2019 10:17)  T(F): 97.5 (29 May 2019 10:17), Max: 97.5 (29 May 2019 10:17)  HR: 75 (29 May 2019 10:17) (75 - 75)  BP: 106/65 (29 May 2019 10:17) (106/65 - 106/65)  BP(mean): --  RR: 18 (29 May 2019 10:17) (18 - 18)  SpO2: 99% (29 May 2019 10:17) (99% - 99%)    Physical Exam:  Patient comfortable  AXOX3  Neck supple, no LN's  Chest: bilateral breath sounds, no wheeze or rales  CVS: regular heart rate without murmur  Abdomen: soft, BS+, no massess or organomegaly  CNS: no gross deficit  Ext: no edema HPI:  69M with PMHx of ischemic cardiomyopathy s/p ICD/PPM in 2006, and CABG in 1986, T2DM on lantus, hypothyroidism, Afib (spontaneously cardioverted and not on AC), HTN, HLD, BEHZAD (not on CPAP), gout, FERNANDES and cirrhosis with  diagnosed HCC s/p IR embolization x 2 (last on 4/18), with embozene came in with right big toe infection, was seen by podiatry as outpatient and sent to Hospital for and management of infection and likely become to amputation.  patient had amputations of other toes previously  without any need of transfusion support.  He does not give history of increase in bleeding  PAST MEDICAL & SURGICAL HISTORY:  Afib: pt reports ~9 mos, [id number], resolved - monitored by Dr. Rivers  Ulcer of right ankle: has surgery done Dec 2018  Liver mass: dx: 10/2018   incidental finding. Currently awaitng furthur workup  PAST MEDICAL & SURGICAL HISTORY:  Afib: pt reports ~9 mos, [phone], resolved - monitored by Dr. Rivers  Ulcer of right ankle: has surgery done Dec 2018  Liver mass: dx: 10/2018   incidental finding. Currently awaitng furthur workup  Gout: medically managed  Elevated prolactin level: dx: &#x27; 70&#x27;s  medically managed Bromocriptine  Vitamin D deficiency  Elevated triglycerides with high cholesterol: on meds  Hypothyroidism  PVD (peripheral vascular disease)  History of hyperprolactinemia  Hepatic cirrhosis, unspecified hepatic cirrhosis type  Anemia  ICD (implantable cardioverter-defibrillator) in place: Medtronic, Old Model [id number] , last interrogation 3/27/19, generator change 4/3/19 New Model # [id number]  Sleep apnea: not using CPAP  History of osteomyelitis: 2015, right foot 2nd toe   follow-up by podiatrist every 2 weeks  Presence of stent in coronary artery: 2 stents  Heart failure with reduced left ventricular function: last EF 43% (2/2018)  Ischemic cardiomyopathy  Pituitary adenoma: as per patient chronic, no changes , recently restarted follow up with endocrinologist ( note in allscripts )  Coronary artery disease  Thrombocytopenia: Chronic  HTN (hypertension)  DM (diabetes mellitus): type 2, Hg A1C 9.9% 2/2019  Dec 2019  due to prednisone started on Lantus Jan 2019  AICD lead malfunction: history of - fixed follow-up by Dr Stechel  Encounter for incision and drainage procedure: Dec 2018 right foot/ankle  History of amputation: right foot, 2nd toe, osteo 2015  AICD (automatic cardioverter/defibrillator) present: placement in (2006), generator change 4/3/19  Stented coronary artery: RCA (1999)  Coronary atherosclerosis of artery bypass graft: CABG X 4 (1986)    Meds:  cefepime   IVPB 2000 milliGRAM(s) IV Intermittent once  vancomycin  IVPB 1000 milliGRAM(s) IV Intermittent once    Labs:  Complete Blood Count + Automated Diff (05.29.19 @ 11:37)    WBC Count: 4.9 K/uL    RBC Count: 3.81 M/uL    Hemoglobin: 11.2 g/dL    Hematocrit: 33.8 %    Mean Cell Volume: 88.7 fl    Mean Cell Hemoglobin: 29.3 pg    Mean Cell Hemoglobin Conc: 33.0 gm/dL    Red Cell Distrib Width: 16.0 %    Platelet Count - Automated: 44 K/uL    Auto Neutrophil #: 4.2 K/uL    Auto Lymphocyte #: 0.5 K/uL    Auto Monocyte #: 0.2 K/uL    Auto Eosinophil #: 0.0 K/uL    Auto Basophil #: 0.0 K/uL    Auto Neutrophil %: 84.3: Differential percentages must be correlated with absolute numbers for  clinical significance. %    Auto Lymphocyte %: 10.8 %    Auto Monocyte %: 4.0 %    Auto Eosinophil %: 0.7 %    Auto Basophil %: 0.2 %      Comprehensive Metabolic Panel (05.29.19 @ 11:37)    Sodium, Serum: 132 mmol/L    Potassium, Serum: 5.5 mmol/L    Chloride, Serum: 96 mmol/L    Carbon Dioxide, Serum: 20 mmol/L    Anion Gap, Serum: 16 mmol/L    Blood Urea Nitrogen, Serum: 52 mg/dL    Creatinine, Serum: 1.45 mg/dL    Glucose, Serum: 332 mg/dL    Calcium, Total Serum: 9.6 mg/dL    Protein Total, Serum: 8.1 g/dL    Albumin, Serum: 4.0 g/dL    Bilirubin Total, Serum: 0.6 mg/dL    Alkaline Phosphatase, Serum: 158 U/L    Aspartate Aminotransferase (AST/SGOT): 25 U/L    Alanine Aminotransferase (ALT/SGPT): 29 U/L    eGFR if Non : 49: Interpretative comment  The units for eGFR are mL/min/1.73M2 (normalized body surface area). The  eGFR is calculated from a serum creatinine using the CKD-EPI equation.  Other variables required for calculation are race, age and sex. Among  patients with chronic kidney disease (CKD), the eGFR is useful in  determining the stage of disease according to KDOQI CKD classification.  All eGFR results are reported numerically with the following  interpretation.          GFR                    With                 Without     (ml/min/1.73 m2)    Kidney Damage       Kidney Damage        >= 90                    Stage 1                     Normal        60-89                    Stage 2                     Decreased GFR        30-59     Stage 3                     Stage 3        15-29                    Stage 4                     Stage 4        < 15                      Stage 5                     Stage 5  Each stage of CKD assumes that the associated GFR level has been in  effect for at least 3 months. Determination of stages one and two (with  eGFR > 59 ml/min/m2) requires estimation of kidney damage for at least 3  months as defined by structural or functional abnormalities.  Limitations: All estimates of GFR will be less accurate for patients at  extremes of muscle mass (including but not limited to frail elderly,  critically ill, or cancer patients), those with unusual diets, and those  with conditions associated with reduced secretion or extrarenal  elimination of creatinine. The eGFR equation is not recommended for use  in patients with unstable creatinine levels. mL/min/1.73M2    eGFR if African American: 57 mL/min/1.73M2      Prothrombin Time and INR, Plasma in AM (05.06.19 @ 07:10)    Prothrombin Time, Plasma: 14.2: Effective October 30th, 2018 the reference range for PT has changed. sec    INR: 1.24: RECOMMENDED RANGES FOR THERAPEUTIC INR:    2.0-3.0 for most medical and surgical thromboembolic states    2.0-3.0 for atrial fibrillation    2.0-3.0 for bileaflet mechanical valve in aortic position    2.5-3.5 for mechanical heart valves   Chest 2004;126:H015-404  The presence of direct thrombin inhibitors (argatroban, refludan)  may falsely increase results. ratio    Activated Partial Thromboplastin Time: 26.6: The recommended therapeutic heparin range (full dose) is 58-99 seconds.  Recommended therapeutic Argatroban range is 1.5 to 3.0 times the baseline  APTT value, not to exceed 100 seconds. Recommended therapeutic Refludan  range is 1.5 to 2.5 times thebaseline APTT.  Effective October 30, 2018 the reference range has changed. sec (05.06.19 @ 07:10)              Radiology:     < from: CT Abdomen w/ Oral Cont and w/wo IV Cont (05.18.19 @ 12:28) >  Hepatocellular cancer status post   ablation of a 4 cm right hepatic tumor in February 2019 and subsequent   embolization of 2 right hepatic lobe lesions 4/18/2019.    COMPARISON: CT 3/12/2019, 11/19/2018    PROCEDURE:   CT of the Abdomen was performed with intravenous contrast.   Noncontrast, Arterial, Portal Venous and Delayed phases were acquired.  Intravenous contrast: 90 ml Omnipaque 350. 10 ml discarded.  Oral contrast: None.  Sagittal and coronal reformats were performed.      FINDINGS:    LOWER CHEST: Sternotomy. Cardiac device leads partially imaged.    LIVER: Cirrhosis.    Lesion #: 1  Location: Segment 7  Size: 2.9cm, previously 3.5 cm. (8:14)  AP Hyperenhancement: NO. No evidence of associated enhancement post   intervention.  LI-RADS v 2018 Category: LR-TR Nonviable    Lesion #: 2  Location: Junction of segments 4A and 8  Size: 1.6 cm, previously 1.8 cm.  APHyperenhancement: NO. No evidence of associated enhancement post   intervention.  LI-RADS v 2018 Category: LR-TR Nonviable    An additional hypervascular focus in the right hepatic lobe which was   recently embolized is not identified on the current study.    BILE DUCTS: Normal caliber.  GALLBLADDER: Cholelithiasis.  SPLEEN: Splenomegaly.  PANCREAS: Within normal limits.  ADRENALS: Within normal limits.  KIDNEYS/URETERS: Within normal limits.        IMPRESSION:     Cirrhosis with evidence of portal hypertension.     No evidence of viable tumor (LR-TR Nonviable) associated with lesions in   segment 7 at the junction of segments 4A and 8 post intervention.            < end of copied text >          ROS:  No Sob or chest pain  No palpitations   No abdominal pain. No change in bowel habit. No blood in stools  No weakness in extremities  Lower leg pigmentation changes are chronic. right big toe is in bandage and is infected      Vital Signs Last 24 Hrs  T(C): 36.4 (29 May 2019 10:17), Max: 36.4 (29 May 2019 10:17)  T(F): 97.5 (29 May 2019 10:17), Max: 97.5 (29 May 2019 10:17)  HR: 75 (29 May 2019 10:17) (75 - 75)  BP: 106/65 (29 May 2019 10:17) (106/65 - 106/65)  BP(mean): --  RR: 18 (29 May 2019 10:17) (18 - 18)  SpO2: 99% (29 May 2019 10:17) (99% - 99%)    Physical Exam:  Patient comfortable  AXOX3  Neck supple, no LN's  Chest: bilateral breath sounds, no wheeze or rales  CVS: regular heart rate without murmur  Abdomen: soft, BS+, no massess or organomegaly  CNS: no gross deficit  Ext: Both lower extremities have hyperpigmentation.  right big toe is in bandage, he has amputation of toes adjacent to it

## 2019-05-29 NOTE — H&P ADULT - NSHPPHYSICALEXAM_GEN_ALL_CORE
PHYSICAL EXAMINATION:  Vital Signs Last 24 Hrs  T(C): 36.4 (29 May 2019 10:17), Max: 36.4 (29 May 2019 10:17)  T(F): 97.5 (29 May 2019 10:17), Max: 97.5 (29 May 2019 10:17)  HR: 75 (29 May 2019 10:17) (75 - 75)  BP: 106/65 (29 May 2019 10:17) (106/65 - 106/65)  BP(mean): --  RR: 18 (29 May 2019 10:17) (18 - 18)  SpO2: 99% (29 May 2019 10:17) (99% - 99%)  CAPILLARY BLOOD GLUCOSE            GENERAL: NAD, well-groomed,  HEAD:  atraumatic, normocephalic  EYES: sclera anicteric  ENMT: mucous membranes moist  NECK: supple, No JVD  CHEST/LUNG: clear to auscultation bilaterally;    no      rales   ,   no rhonchi,   HEART: normal S1, S2  ABDOMEN: BS+, soft, ND, NT   EXTREMITIES:    no    edema    b/l LEs  c/c  venous stasis/ hyperpigmentation  right foot, ulcer/  dressing  NEURO: awake, ,     moves all extremities  SKIN: no     rash

## 2019-05-29 NOTE — CONSULT NOTE ADULT - SUBJECTIVE AND OBJECTIVE BOX
Patient is a 69y old  Male who presents with a chief complaint of osteo (29 May 2019 11:18)    HPI:  69M with   PMHx of ischemic cardiomyopathy s/p ICD/PPM in 2006,  generator change,   and CABG in 1986, T2DM on lantus,  hypothyroidism, Afib (spontaneously cardioverted and not on AC), HTN, HLD, BEHZAD (not on CPAP), gout, FERNANDES and cirrhosis with    HCC s/p IR embolization x 2 (last on 4/18), with embozene    presents to the ED with  right toe ulcer extending down to underlying bone  Mr Harvey check his feet daily. He has diminished sensation. He quickly developed blister - without pain - which progressed to ulcer. His DM has been difficult to control. He has gout. He has been on prednisone for shoulder pain.  He was recently hospitalized at [facility] Strep bacteremia - 5/3/19. He completed treatment with Ceftriaxone 2 mgs daily on 5/13/19.  He has had amputation of 2n and 3rd toes of right foot about 2 years ago. He has had multiple LE wounds grow MSSA. On one occasion on 9/6/18 MRSA was recovered.    He denies systemic complaints. He is determined to go on vacation to visit family in 2 weeks.      PAST MEDICAL & SURGICAL HISTORY:  Afib: pt reports ~9 mos, [id number], resolved - monitored by Dr. Rivers  Ulcer of right ankle: has surgery done Dec 2018  Liver mass: dx: 10/2018    Gout: medically managed  Elevated prolactin level: dx: &#x27; 70&#x27;s  medically managed Bromocriptine  Vitamin D deficiency  Elevated triglycerides with high cholesterol: on meds  Hypothyroidism  PVD (peripheral vascular disease)  History of hyperprolactinemia  Hepatic cirrhosis, unspecified hepatic cirrhosis type  Anemia  ICD (implantable cardioverter-defibrillator) in place: [employer], Old Model [id number] , last interrogation 3/27/19, generator change 4/3/19 New Model # [id number]  Sleep apnea: not using CPAP  History of osteomyelitis: 2015, right foot 2nd toe   follow-up by podiatrist every 2 weeks  Presence of stent in coronary artery: 2 stents  Heart failure with reduced left ventricular function: last EF 43% (2/2018)  Ischemic cardiomyopathy  Pituitary adenoma: as per patient chronic, no changes , recently restarted follow up with endocrinologist ( note in allscripts )  Coronary artery disease  Thrombocytopenia: Chronic  HTN (hypertension)  DM (diabetes mellitus): type 2, Hg A1C 9.9% 2/2019  Dec 2019  due to prednisone started on Lantus Jan 2019  AICD lead malfunction: history of - fixed follow-up by Dr Rivers  Encounter for incision and drainage procedure: Dec 2018 right foot/ankle  History of amputation: right foot, 2nd toe, osteo 2015  AICD (automatic cardioverter/defibrillator) present: placement in (2006), generator change 4/3/19  Stented coronary artery: RCA (1999)  Coronary atherosclerosis of artery bypass graft: CABG X 4 (1986)      Social history:  to RN,     FAMILY HISTORY:  Family history of MI (myocardial infarction)      REVIEW OF SYSTEMS:  CONSTITUTIONAL: No weakness, fevers or chills  EYES/ENT: No visual changes;  No vertigo or throat pain   NECK: No pain or stiffness  RESPIRATORY: No cough, wheezing, hemoptysis; No shortness of breath  CARDIOVASCULAR: No chest pain or palpitations  GASTROINTESTINAL: No abdominal or epigastric pain. No nausea, vomiting, or hematemesis; No diarrhea or constipation. No melena or hematochezia.  GENITOURINARY: No dysuria, frequency or hematuria  NEUROLOGICAL: Diminished sensation in feet. No numbness or weakness  SKIN: No itching, burning, rashes, or lesions   All other review of systems is negative unless indicated above    Allergies  No Known Allergies    Antimicrobials:      Vital Signs Last 24 Hrs  T(C): 36.4 (29 May 2019 18:44), Max: 36.4 (29 May 2019 10:17)  T(F): 97.6 (29 May 2019 18:44), Max: 97.6 (29 May 2019 18:44)  HR: 64 (29 May 2019 18:44) (63 - 88)  BP: 130/73 (29 May 2019 18:44) (95/61 - 130/73)  BP(mean): --  RR: 18 (29 May 2019 18:44) (18 - 20)  SpO2: 100% (29 May 2019 18:44) (99% - 100%)    PHYSICAL EXAM:  General: WN/WD NAD, Non-toxic  Neurology: A&Ox3, nonfocal  Respiratory: Clear to auscultation bilaterally  CV: RRR, S1S2, no murmurs, rubs or gallops  Abdominal: Soft, Non-tender, non-distended,   Extremities: No edema - dressing over right hallux not removed  Line Sites: Clear  Skin: No rash                        11.2   4.9   )-----------( 44       ( 29 May 2019 11:37 )             33.8   Sedimentation Rate, Erythrocyte (05.29.19 @ 11:37)    Sedimentation Rate, Erythrocyte: 43 mm/hr  C-Reactive Protein, Serum (05.29.19 @ 14:04)    C-Reactive Protein, Serum: 0.20 mg/dL    05-29    132<L>  |  96  |  52<H>  ----------------------------<  332<H>  5.5<H>   |  20<L>  |  1.45<H>    Ca    9.6      29 May 2019 11:37    TPro  8.1  /  Alb  4.0  /  TBili  0.6  /  DBili  x   /  AST  25  /  ALT  29  /  AlkPhos  158<H>  05-29    Hemoglobin A1C, Whole Blood (04.11.19 @ 15:27)    Hemoglobin A1C, Whole Blood: 8.5%    MICROBIOLOGY:  .Blood  05-04-19   No growth at 5 days.  --  --    .Urine  05-03-19   10,000 - 49,000 CFU/mL Enterococcus faecalis  --  Enterococcus faecalis      .Blood  05-02-19   Growth in aerobic and anaerobic bottles: Streptococcus agalactiae (Group  B)    Radiology:  < from: Xray Foot AP + Lateral + Oblique, Right (05.29.19 @ 11:52) >  Impression: The patient is status post partial amputation of the left   second and third toes, unchanged. The surgical margins are sharp. There   are hammertoe deformities of the right fourth and fifth toes. There is   marked joint space narrowing/arthrosis at the interphalangeal joint space   of the right first toe, not significantly changed in appearance. There is   no radiographic evidence for osteomyelitis. If clinically warranted,   confirmation may be obtained with MRI.    < end of copied text >      Gopi Grace MD; Division of Infectious Disease; Pager: [phone]; nights and weekends: [phone]

## 2019-05-29 NOTE — ED ADULT NURSE NOTE - INTERVENTIONS DEFINITIONS
Welcome to call system/Non-slip footwear when patient is off stretcher/Provide visual clues: red socks

## 2019-05-29 NOTE — H&P ADULT - HISTORY OF PRESENT ILLNESS
69M with   PMHx of ischemic cardiomyopathy s/p ICD/PPM in 2006,  generator change,   and CABG in 1986, T2DM on lantus,  hypothyroidism, Afib (spontaneously cardioverted and not on AC), HTN, HLD, BEHZAD (not on CPAP), gout, FERNANDES and cirrhosis with    HCC s/p IR embolization x 2 (last on 4/18), with embozene    presents to the ED with  non healing ulcer    afebrile  saw  dr lombardo this  am in office  and was  sent to  er  per  dr cunningham  note, ID maria e small called

## 2019-05-29 NOTE — ED PROVIDER NOTE - OBJECTIVE STATEMENT
68 yo male Pmhx CAD s/p quadruple bypass, type 2 diabetes, FERNANDES c/b cirrhosis and liver mass s/p embolization. 70 yo male Pmhx CAD s/p quadruple bypass, HFrEF s/p PPM and AICD, type 2 diabetes, FERNANDES c/b cirrhosis and liver mass s/p embolization, afib, HTN, hypothyroidism, anemia, pituitary adenoma, PVD, presents to the ED c/o right toe wound. Pt states first noticed wound about 1 mo ago, denies any preceding trauma that he can recall. This past week noticed blood coming from blister on his R toe, went to podistrist Dr. Tristan this AM and was sent to the hospital for admission. Denies fever/chills, numbness/tingling, difficulty ambulating, pus drainage, calf pain/swelling, cp, sob, bite wounds.

## 2019-05-29 NOTE — CHART NOTE - NSCHARTNOTEFT_GEN_A_CORE
Notified by RN , [phone]. Patient seen and examined at bedside. Resting in bed comfortably. NAD. Denies abdominal pain, N/V, CP, SOB, dizziness, HA. States he ate his dinner and had some cookies afterwards.     Vital Signs Last 24 Hrs  T(C): 36.6 (29 May 2019 20:54), Max: 36.6 (29 May 2019 20:54)  T(F): 97.8 (29 May 2019 20:54), Max: 97.8 (29 May 2019 20:54)  HR: 62 (29 May 2019 20:54) (62 - 88)  BP: 120/72 (29 May 2019 20:54) (95/61 - 130/73)  BP(mean): --  RR: 18 (29 May 2019 20:54) (18 - 20)  SpO2: 100% (29 May 2019 20:54) (99% - 100%)    Hyperglycemia - likely 2/2 cookies after dinner   D/w Dr. Perez - give bedtime lantus w/ ISS and recheck FS w/in 3 hours   consider endo consult if persisting hyperglycemic     will continue to monitor   will endorse to day team     Mona Westfall PA-C   [phone]

## 2019-05-29 NOTE — PROVIDER CONTACT NOTE (OTHER) - ACTION/TREATMENT ORDERED:
Pa notified and made aware, as per PA sliding scale to be ordered and possible blood work. Pa notified and made aware, as per PA sliding scale to be ordered and possible blood work. no further interventions at this time. will continue to monitor.

## 2019-05-29 NOTE — ED PROVIDER NOTE - ATTENDING CONTRIBUTION TO CARE
attending Aida: 69yM h/o CAD with prior CABG, CHF, s/p AICD, DMII, FERNANDES c/b cirrhosis and liver mass s/p embolization, afib, HTN, hypothyroidism, anemia, pituitary adenoma, PVD, sent to ER by podiatrist for concern for R great toe osteomyelitis. Wound to R great toe x 1 month, No improvement with keflex course finished several days ago. No fever or chills. Will obtain labs including ESR/CRP, blood cultures, empiric IV abx, xray foot, admit

## 2019-05-29 NOTE — ED PROVIDER NOTE - SKIN, MLM
+RLE wound as noted above in MSK. +hyperpigmentation noted to b/l distal 2/3 LEs. Otherwise, skin normal color for race, warm, dry and intact. No evidence of rash.

## 2019-05-29 NOTE — ED PROVIDER NOTE - MUSCULOSKELETAL MINIMAL EXAM
atraumatic/RLE: +wound noted to distal phalanx of R hallux covered with blood soaked dressing, no bone exposure evident, no ttp bony foot or ankle. No calf asymmetry or swelling. No calf ttp. Full AROM RLE at all joints w/ 5/5 strength. 2+ DP pulses b/l. atraumatic/RLE: s/p amputation 2nd and 3rd digits of right foot. +wound noted to distal phalanx of R hallux covered with blood soaked dressing, no bone exposure evident, no ttp bony foot or ankle. No calf asymmetry or swelling. No calf ttp. Full AROM RLE at all joints w/ 5/5 strength. 2+ DP pulses b/l.

## 2019-05-29 NOTE — PATIENT PROFILE ADULT - PATIENT REPRESENTATIVE: ( YOU CAN CHOOSE ANY PERSON THAT CAN ASSIST YOU WITH YOUR HEALTH CARE PREFERENCES, DOES NOT HAVE TO BE A SPOUSE, IMMEDIATE FAMILY OR SIGNIFICANT OTHER/PARTNER)
Discharge Planning Assessment  [facility]     Patient Name: Vikki Durham  MRN: [id number]  Today's Date: 9/17/2018    Admit Date: 9/17/2018          Discharge Needs Assessment     Row Name 09/17/18 1320       Living Environment    Lives With parent(s)    Name(s) of Who Lives With Patient Lizzeth & Joby  parents     Primary Care Provided by self    Family Caregiver if Needed parent(s)    Quality of Family Relationships helpful;supportive    Able to Return to Prior Arrangements yes       Resource/Environmental Concerns    Transportation Concerns car, none       Transition Planning    Patient/Family Anticipates Transition to home with family    Patient/Family Anticipated Services at Transition none    Transportation Anticipated family or friend will provide       Discharge Needs Assessment    Readmission Within the Last 30 Days planned readmission    Concerns to be Addressed financial/insurance    Equipment Currently Used at Home none    Equipment Needed After Discharge none    Offered/Gave Vendor List no            Discharge Plan     Row Name 09/17/18 6893       Plan    Plan Return home with parents     Plan Comments Spoke with patient at bedside, patient states no changes since last admission. Patient requested to see a  regarding financial assistance. [facility] spoke with the patient regarding financial assistance and is following the patient also  [facility] social worker will see tomorrow. Discharge plan is home denies any discharge needs. Casi Moody, RN        Destination     No service coordination in this encounter.      Durable Medical Equipment     No service coordination in this encounter.      Dialysis/Infusion     No service coordination in this encounter.      Home Medical Care     No service coordination in this encounter.      Social Care     No service coordination in this encounter.                Demographic Summary     Row Name 09/17/18 8783       General Information     Admission Type inpatient    Arrived From emergency department    Referral Source admission list    Preferred Language English       Contact Information    Contact Information Obtained for             Functional Status     Row Name 09/17/18 3997       Functional Status    Usual Activity Tolerance good    Current Activity Tolerance good       Functional Status, IADL    Medications independent    Meal Preparation independent    Housekeeping independent    Laundry independent    Shopping independent            Psychosocial    No documentation.           Abuse/Neglect    No documentation.           Legal    No documentation.           Substance Abuse    No documentation.           Patient Forms    No documentation.         Casi Moody RN     yes

## 2019-05-29 NOTE — H&P ADULT - ASSESSMENT
69M   with      PMHx   of   ischemic cardiomyopathy , ef of  30 to 35,  /  diastolic   dysfunction,      s/p   ICD /PPM in 2006,   generator change .        CABG in 1986,   T2DM on lantus,   hypothyroidism,    Afib (spontaneously cardioverted and not on AC),  HTN,  HLD,   BEHZAD (not on CPAP),     FERNANDES   and cirrhosis   with portal HTN  c,/ .c  thrombocytopenia  from cirrhosis,  h/o  pituitary  adenoma,         h/o HCC s/p IR embolization x 2 (last on 4/18), strep  agalactiae  bacteremia  5/ 19     sent to  er  by podiatrist  fro  right toe  amputation  pod/ dr lombardo   ID/ ab  per  ID/  dr segura  called by podiatry  on lasix/ Ramipril ,   coreg/   lipitor./    asa/  aldactone  daily prednisone  for past month for   gout  dm, on lantus/  follow fs  c/c  anemia  and  c/c  thrombocytopenia. from cirrhosis  pt cleared  for  podiatry  surgery/ ampuation  of  right distal phalanx,  on friday  will need  stress dose  steroid , on , day of surgery       < from: Transthoracic Echocardiogram (05.06.19 @ 06:16) >   Mitral annular calcification. Tethered mitral valve  leaflets. Mild mitral regurgitation.  2. Calcified trileaflet aortic valve with normal opening.  No aortic valve regurgitation seen.  3. Eccentric left ventricular hypertrophy (dilated left  ventricle with normal relative wall thickness). Moderate  left ventricular enlargement.  4. Moderate to severe segmental left ventricular systolic  dysfunction. The inferior, inferoseptal, and inferolateral  walls are akinetic. Paradoxical septal motion consistent  with paced rhythm/conduction defect.  5. Moderate diastolic dysfunction (Stage II).  6. Right ventricle not well visualized; normal right  ventricular size with decreased right ventricular systolic  function. A device wire is noted in the right heart.  7. Estimated pulmonary artery systolic pressure equals 46  mm Hg, assuming right atrial pressure equals 8 mm Hg,  consistent with mild pulmonary pressures.  < end of copied text >       < from: CT Abdomen w/ Oral Cont and w/wo IV Cont (05.18.19 @ 12:28) >  MPRESSION:   Cirrhosis with evidence of portal hypertension.   No evidence of viable tumor (LR-TR Nonviable) associated with lesions in   segment 7 at the junction of segments 4A and 8 post intervention  < end of copied text >

## 2019-05-30 ENCOUNTER — APPOINTMENT (OUTPATIENT)
Dept: RHEUMATOLOGY | Facility: CLINIC | Age: 69
End: 2019-05-30

## 2019-05-30 ENCOUNTER — TRANSCRIPTION ENCOUNTER (OUTPATIENT)
Age: 69
End: 2019-05-30

## 2019-05-30 LAB
GLUCOSE BLDC GLUCOMTR-MCNC: 192 MG/DL — HIGH (ref 70–99)
GLUCOSE BLDC GLUCOMTR-MCNC: 250 MG/DL — HIGH (ref 70–99)
GLUCOSE BLDC GLUCOMTR-MCNC: 271 MG/DL — HIGH (ref 70–99)
GLUCOSE BLDC GLUCOMTR-MCNC: 283 MG/DL — HIGH (ref 70–99)
GLUCOSE BLDC GLUCOMTR-MCNC: 308 MG/DL — HIGH (ref 70–99)

## 2019-05-30 PROCEDURE — 99232 SBSQ HOSP IP/OBS MODERATE 35: CPT

## 2019-05-30 RX ORDER — OXYCODONE HYDROCHLORIDE 5 MG/1
5 TABLET ORAL ONCE
Refills: 0 | Status: DISCONTINUED | OUTPATIENT
Start: 2019-05-30 | End: 2019-05-31

## 2019-05-30 RX ORDER — FUROSEMIDE 40 MG
40 TABLET ORAL DAILY
Refills: 0 | Status: DISCONTINUED | OUTPATIENT
Start: 2019-05-30 | End: 2019-05-31

## 2019-05-30 RX ORDER — SACCHAROMYCES BOULARDII 250 MG
250 POWDER IN PACKET (EA) ORAL
Refills: 0 | Status: DISCONTINUED | OUTPATIENT
Start: 2019-05-30 | End: 2019-05-31

## 2019-05-30 RX ORDER — CHOLECALCIFEROL (VITAMIN D3) 125 MCG
2000 CAPSULE ORAL DAILY
Refills: 0 | Status: DISCONTINUED | OUTPATIENT
Start: 2019-05-30 | End: 2019-05-31

## 2019-05-30 RX ORDER — SODIUM CHLORIDE 9 MG/ML
1000 INJECTION INTRAMUSCULAR; INTRAVENOUS; SUBCUTANEOUS
Refills: 0 | Status: DISCONTINUED | OUTPATIENT
Start: 2019-05-30 | End: 2019-05-31

## 2019-05-30 RX ORDER — INSULIN LISPRO 100/ML
3 VIAL (ML) SUBCUTANEOUS
Refills: 0 | Status: DISCONTINUED | OUTPATIENT
Start: 2019-05-30 | End: 2019-05-31

## 2019-05-30 RX ADMIN — Medication 250 MILLIGRAM(S): at 05:18

## 2019-05-30 RX ADMIN — PIPERACILLIN AND TAZOBACTAM 25 GRAM(S): 4; .5 INJECTION, POWDER, LYOPHILIZED, FOR SOLUTION INTRAVENOUS at 08:43

## 2019-05-30 RX ADMIN — BROMOCRIPTINE MESYLATE 5 MILLIGRAM(S): 5 CAPSULE ORAL at 12:12

## 2019-05-30 RX ADMIN — INSULIN GLARGINE 24 UNIT(S): 100 INJECTION, SOLUTION SUBCUTANEOUS at 23:26

## 2019-05-30 RX ADMIN — Medication 40 MILLIGRAM(S): at 05:24

## 2019-05-30 RX ADMIN — CARVEDILOL PHOSPHATE 6.25 MILLIGRAM(S): 80 CAPSULE, EXTENDED RELEASE ORAL at 22:06

## 2019-05-30 RX ADMIN — Medication 1: at 09:00

## 2019-05-30 RX ADMIN — PIPERACILLIN AND TAZOBACTAM 25 GRAM(S): 4; .5 INJECTION, POWDER, LYOPHILIZED, FOR SOLUTION INTRAVENOUS at 22:12

## 2019-05-30 RX ADMIN — Medication 3: at 13:10

## 2019-05-30 RX ADMIN — PIPERACILLIN AND TAZOBACTAM 25 GRAM(S): 4; .5 INJECTION, POWDER, LYOPHILIZED, FOR SOLUTION INTRAVENOUS at 15:13

## 2019-05-30 RX ADMIN — Medication 2000 UNIT(S): at 18:17

## 2019-05-30 RX ADMIN — Medication 2: at 18:13

## 2019-05-30 RX ADMIN — Medication 20 MILLIEQUIVALENT(S): at 12:13

## 2019-05-30 RX ADMIN — Medication 250 MILLIGRAM(S): at 18:10

## 2019-05-30 RX ADMIN — Medication 125 MICROGRAM(S): at 05:17

## 2019-05-30 RX ADMIN — Medication 3 UNIT(S): at 13:10

## 2019-05-30 RX ADMIN — Medication 2: at 23:02

## 2019-05-30 RX ADMIN — Medication 3 UNIT(S): at 18:11

## 2019-05-30 RX ADMIN — Medication 81 MILLIGRAM(S): at 12:13

## 2019-05-30 RX ADMIN — SPIRONOLACTONE 25 MILLIGRAM(S): 25 TABLET, FILM COATED ORAL at 08:56

## 2019-05-30 RX ADMIN — SPIRONOLACTONE 25 MILLIGRAM(S): 25 TABLET, FILM COATED ORAL at 18:10

## 2019-05-30 RX ADMIN — ATORVASTATIN CALCIUM 40 MILLIGRAM(S): 80 TABLET, FILM COATED ORAL at 22:06

## 2019-05-30 RX ADMIN — CARVEDILOL PHOSPHATE 6.25 MILLIGRAM(S): 80 CAPSULE, EXTENDED RELEASE ORAL at 08:56

## 2019-05-30 NOTE — PROGRESS NOTE ADULT - SUBJECTIVE AND OBJECTIVE BOX
high fs    REVIEW OF SYSTEMS:  GEN: no fever,    no chills  RESP: no SOB,   no cough  CVS: no chest pain,   no palpitations  GI: no abdominal pain,   no nausea,   no vomiting,   no constipation,   no diarrhea  : no dysuria,   no frequency  NEURO: no headache,   no dizziness  PSYCH: no depression,   not anxious  Derm : no rash    MEDICATIONS  (STANDING):  aspirin enteric coated 81 milliGRAM(s) Oral daily  atorvastatin 40 milliGRAM(s) Oral at bedtime  bromocriptine Capsule 5 milliGRAM(s) Oral daily  carvedilol 6.25 milliGRAM(s) Oral every 12 hours  dextrose 5%. 1000 milliLiter(s) (50 mL/Hr) IV Continuous <Continuous>  dextrose 50% Injectable 12.5 Gram(s) IV Push once  dextrose 50% Injectable 25 Gram(s) IV Push once  dextrose 50% Injectable 25 Gram(s) IV Push once  furosemide    Tablet 40 milliGRAM(s) Oral daily  hydrocortisone sodium succinate Injectable 50 milliGRAM(s) IV Push three times a day  insulin glargine SubCutaneous Injection (LANTUS) - Peds 24 Unit(s) SubCutaneous at bedtime  insulin lispro (HumaLOG) corrective regimen sliding scale   SubCutaneous three times a day before meals  insulin lispro (HumaLOG) corrective regimen sliding scale   SubCutaneous at bedtime  insulin lispro Injectable (HumaLOG) 3 Unit(s) SubCutaneous three times a day before meals  levothyroxine 125 MICROGram(s) Oral daily  lisinopril 5 milliGRAM(s) Oral daily  piperacillin/tazobactam IVPB. 3.375 Gram(s) IV Intermittent every 8 hours  potassium chloride    Tablet ER 20 milliEquivalent(s) Oral daily  spironolactone 25 milliGRAM(s) Oral two times a day  vancomycin  IVPB 750 milliGRAM(s) IV Intermittent every 12 hours    MEDICATIONS  (PRN):  dextrose 40% Gel 15 Gram(s) Oral once PRN Blood Glucose LESS THAN 70 milliGRAM(s)/deciliter  glucagon  Injectable 1 milliGRAM(s) IntraMuscular once PRN Glucose LESS THAN 70 milligrams/deciliter      Vital Signs Last 24 Hrs  T(C): 36.4 (30 May 2019 05:15), Max: 36.6 (29 May 2019 20:54)  T(F): 97.5 (30 May 2019 05:15), Max: 97.8 (29 May 2019 20:54)  HR: 58 (30 May 2019 05:15) (58 - 88)  BP: 100/64 (30 May 2019 05:15) (95/61 - 130/73)  BP(mean): --  RR: 17 (30 May 2019 05:15) (17 - 20)  SpO2: 98% (30 May 2019 05:15) (98% - 100%)  CAPILLARY BLOOD GLUCOSE      POCT Blood Glucose.: 271 mg/dL (30 May 2019 02:08)  POCT Blood Glucose.: 442 mg/dL (29 May 2019 22:10)  POCT Blood Glucose.: 435 mg/dL (29 May 2019 22:09)  POCT Blood Glucose.: 366 mg/dL (29 May 2019 16:13)  POCT Blood Glucose.: 348 mg/dL (29 May 2019 14:40)    I&O's Summary    29 May 2019 07:01  -  30 May 2019 07:00  --------------------------------------------------------  IN: 550 mL / OUT: 0 mL / NET: 550 mL        PHYSICAL EXAM:  HEAD:  Atraumatic, Normocephalic  NECK: Supple, No   JVD  CHEST/LUNG:   no     rales,     no,    rhonchi  HEART: Regular rate and rhythm;         murmur  ABDOMEN: Soft, Nontender, ;   EXTREMITIES:    ulcer, right foot      NEUROLOGY:  alert    LABS:                        11.2   4.9   )-----------( 44       ( 29 May 2019 11:37 )             33.8     05-29    129<L>  |  95<L>  |  57<H>  ----------------------------<  405<H>  5.3   |  21<L>  |  1.65<H>    Ca    9.2      29 May 2019 23:22    TPro  8.1  /  Alb  4.0  /  TBili  0.6  /  DBili  x   /  AST  25  /  ALT  29  /  AlkPhos  158<H>  05-29 05-29 @ 11:37  5.4  <20    Hemoglobin A1C, Whole Blood: 8.5 % (04-11 @ 15:27)            Consultant(s) Notes Reviewed:      Care Discussed with Consultants/Other Providers:

## 2019-05-30 NOTE — PROGRESS NOTE ADULT - ASSESSMENT
69 M with  suboptimal control of  T2DM on lantus, previous toe amputations, gout on prednisone, history of ischemic cardiomyopathy s/p ICD/PPM in 2006, generator change, and CABG in 1986,  hypothyroidism, Afib (spontaneously cardioverted and not on AC), HTN, HLD, BEHZAD (not on CPAP), gout, FERNANDES and cirrhosis with   HCC s/p IR embolization x 2 (last on 4/18) admitted 5.29/19 with rapidly developing right toe ulcer extending down to underlying bone.  Recent admission with episode of GBS bacteremia treated with IV abx  Now with worsening toe ulcer  No fever, no leukocytosis  Amputation of hallux planned for 5/31  Overall, wound infection, ? osteomyelitis  - Zosyn 3.375g q 8  - Vanco 750mg q 12 (monitor trough)  - OR per podiatry  - F/U BCXs    Andrea Sparks MD  Pager [phone]  After 5pm and on weekends call [phone]

## 2019-05-30 NOTE — PROGRESS NOTE ADULT - SUBJECTIVE AND OBJECTIVE BOX
CC: F/U for wound infection    Saw/spoke to patient. No fevers, no chills. No new complaints. Unchanged.    Allergies  No Known Allergies    ANTIMICROBIALS:  piperacillin/tazobactam IVPB. 3.375 every 8 hours  vancomycin  IVPB 750 every 12 hours    PE:    Vital Signs Last 24 Hrs  T(C): 36.3 (30 May 2019 08:52), Max: 36.6 (29 May 2019 20:54)  T(F): 97.4 (30 May 2019 08:52), Max: 97.8 (29 May 2019 20:54)  HR: 71 (30 May 2019 08:52) (58 - 88)  BP: 111/72 (30 May 2019 08:52) (100/64 - 130/73)  RR: 18 (30 May 2019 08:52) (17 - 19)  SpO2: 100% (30 May 2019 08:52) (98% - 100%)    Gen: AOx3, NAD, non-toxic, pleasant  CV: S1+S2 normal, nontachycardic  Resp: Clear bilat, no resp distress, no crackles/wheezes  Abd: Soft, nontender, +BS  Ext: RLE R toe ulcer    LABS:                        11.2   4.9   )-----------( 44       ( 29 May 2019 11:37 )             33.8     05-29    129<L>  |  95<L>  |  57<H>  ----------------------------<  405<H>  5.3   |  21<L>  |  1.65<H>    Ca    9.2      29 May 2019 23:22    TPro  8.1  /  Alb  4.0  /  TBili  0.6  /  DBili  x   /  AST  25  /  ALT  29  /  AlkPhos  158<H>  05-29    MICROBIOLOGY:    .Blood  05-04-19   No growth at 5 days.    .Urine  05-03-19   10,000 - 49,000 CFU/mL Enterococcus faecalis  --  Enterococcus faecalis    .Blood  05-02-19   Growth in aerobic and anaerobic bottles: Streptococcus agalactiae (Group  B)    RADIOLOGY:    5/29 XR:    Impression: The patient is status post partial amputation of the left   second and third toes, unchanged. The surgical margins are sharp. There   are hammertoe deformities of the right fourth and fifth toes. There is   marked joint space narrowing/arthrosis at the interphalangeal joint space   of the right first toe, not significantly changed in appearance. There is   no radiographic evidence for osteomyelitis. If clinically warranted,   confirmation may be obtained with MRI.

## 2019-05-30 NOTE — PROGRESS NOTE ADULT - SUBJECTIVE AND OBJECTIVE BOX
CARLIN [id number]  69y  Male  Type 2 diabetes mellitus with other skin complication  H/o or current diagnosis of HF- ACEI/ARB contraindication unknown  H/o or current diagnosis of HF- no contraindication to ACEI/ARBs  H/o or current diagnosis of HF- ACEI/ARB contraindication unknown  H/o or current diagnosis of HF- Contraindication to ACEI/ARBs  H/o or current diagnosis of HF- ACEI/ARB contraindication unknown  Family history of MI (myocardial infarction)  Handoff  MEWS Score  Afib  PAF (paroxysmal atrial fibrillation)  Ulcer of right ankle  Liver mass  Gout  Elevated prolactin level  Vitamin D deficiency  Elevated triglycerides with high cholesterol  Hypothyroidism  PVD (peripheral vascular disease)  History of hyperprolactinemia  Hepatic cirrhosis, unspecified hepatic cirrhosis type  Anemia  ICD (implantable cardioverter-defibrillator) in place  Sleep apnea  History of osteomyelitis  Presence of stent in coronary artery  Heart failure with reduced left ventricular function  Ischemic cardiomyopathy  Pituitary adenoma  Coronary artery disease  Thrombocytopenia  HLD (hyperlipidemia)  HTN (hypertension)  DM (diabetes mellitus)  AICD lead malfunction  Shock  Diabetic foot infection  Encounter for incision and drainage procedure  History of amputation  AICD (automatic cardioverter/defibrillator) present  Stented coronary artery  Coronary atherosclerosis of artery bypass graft  R FOT WOUND  17    aspirin enteric coated 81 milliGRAM(s) Oral daily  atorvastatin 40 milliGRAM(s) Oral at bedtime  bromocriptine Capsule 5 milliGRAM(s) Oral daily  carvedilol 6.25 milliGRAM(s) Oral every 12 hours  cholecalciferol 2000 Unit(s) Oral daily  dextrose 40% Gel 15 Gram(s) Oral once PRN  dextrose 5%. 1000 milliLiter(s) IV Continuous <Continuous>  dextrose 50% Injectable 12.5 Gram(s) IV Push once  dextrose 50% Injectable 25 Gram(s) IV Push once  dextrose 50% Injectable 25 Gram(s) IV Push once  furosemide    Tablet 40 milliGRAM(s) Oral daily  glucagon  Injectable 1 milliGRAM(s) IntraMuscular once PRN  hydrocortisone sodium succinate Injectable 50 milliGRAM(s) IV Push three times a day  insulin glargine SubCutaneous Injection (LANTUS) - Peds 24 Unit(s) SubCutaneous at bedtime  insulin lispro (HumaLOG) corrective regimen sliding scale   SubCutaneous three times a day before meals  insulin lispro (HumaLOG) corrective regimen sliding scale   SubCutaneous at bedtime  insulin lispro Injectable (HumaLOG) 3 Unit(s) SubCutaneous three times a day before meals  levothyroxine 125 MICROGram(s) Oral daily  lisinopril 5 milliGRAM(s) Oral daily  piperacillin/tazobactam IVPB. 3.375 Gram(s) IV Intermittent every 8 hours  potassium chloride    Tablet ER 20 milliEquivalent(s) Oral daily  saccharomyces boulardii 250 milliGRAM(s) Oral two times a day  spironolactone 25 milliGRAM(s) Oral two times a day  vancomycin  IVPB 750 milliGRAM(s) IV Intermittent every 12 hours  No Known Allergies    CBC Full  -  ( 29 May 2019 11:37 )  WBC Count : 4.9 K/uL  RBC Count : 3.81 M/uL  Hemoglobin : 11.2 g/dL  Hematocrit : 33.8 %  Platelet Count - Automated : 44 K/uL  Mean Cell Volume : 88.7 fl  Mean Cell Hemoglobin : 29.3 pg  Mean Cell Hemoglobin Concentration : 33.0 gm/dL  Auto Neutrophil # : 4.2 K/uL  Auto Lymphocyte # : 0.5 K/uL  Auto Monocyte # : 0.2 K/uL  Auto Eosinophil # : 0.0 K/uL  Auto Basophil # : 0.0 K/uL  Auto Neutrophil % : 84.3 %  Auto Lymphocyte % : 10.8 %  Auto Monocyte % : 4.0 %  Auto Eosinophil % : 0.7 %  Auto Basophil % : 0.2 %    Patient seen at bedside INAD well known to me and seen at my office this week with exposed bone distal phalanx sticking through small ulceration of right hallux. Exposed bone culture sent for culture at my office. Obvious clinical signs of osteomyelitis. DP and PT pedal pulses are palpable. Previous achilles tendon ulcer has healed with superficial blister noted plantar 5th met head right foot. no signs of acute infection and no signs of cellulitis. Plan is to remove distal tip of right hallux tomorrow to reduce risk of progression of osteomyelitis. Previous amputation of 2,3 toes right foot. Patient demonstrated understanding of procedure along with risks, benefits, potential compliations and alternatives. patient to be NPO after midnight for surgery Friday afternoon. Podiatry will follow CARLIN [id number]  69y  Male  Type 2 diabetes mellitus with other skin complication  H/o or current diagnosis of HF- ACEI/ARB contraindication unknown  H/o or current diagnosis of HF- no contraindication to ACEI/ARBs  H/o or current diagnosis of HF- ACEI/ARB contraindication unknown  H/o or current diagnosis of HF- Contraindication to ACEI/ARBs  H/o or current diagnosis of HF- ACEI/ARB contraindication unknown  Family history of MI (myocardial infarction)  Handoff  MEWS Score  Afib  PAF (paroxysmal atrial fibrillation)  Ulcer of right ankle  Liver mass  Gout  Elevated prolactin level  Vitamin D deficiency  Elevated triglycerides with high cholesterol  Hypothyroidism  PVD (peripheral vascular disease)  History of hyperprolactinemia  Hepatic cirrhosis, unspecified hepatic cirrhosis type  Anemia  ICD (implantable cardioverter-defibrillator) in place  Sleep apnea  History of osteomyelitis  Presence of stent in coronary artery  Heart failure with reduced left ventricular function  Ischemic cardiomyopathy  Pituitary adenoma  Coronary artery disease  Thrombocytopenia  HLD (hyperlipidemia)  HTN (hypertension)  DM (diabetes mellitus)  AICD lead malfunction  Shock  Diabetic foot infection  Encounter for incision and drainage procedure  History of amputation  AICD (automatic cardioverter/defibrillator) present  Stented coronary artery  Coronary atherosclerosis of artery bypass graft  R FOT WOUND  17    aspirin enteric coated 81 milliGRAM(s) Oral daily  atorvastatin 40 milliGRAM(s) Oral at bedtime  bromocriptine Capsule 5 milliGRAM(s) Oral daily  carvedilol 6.25 milliGRAM(s) Oral every 12 hours  cholecalciferol 2000 Unit(s) Oral daily  dextrose 40% Gel 15 Gram(s) Oral once PRN  dextrose 5%. 1000 milliLiter(s) IV Continuous <Continuous>  dextrose 50% Injectable 12.5 Gram(s) IV Push once  dextrose 50% Injectable 25 Gram(s) IV Push once  dextrose 50% Injectable 25 Gram(s) IV Push once  furosemide    Tablet 40 milliGRAM(s) Oral daily  glucagon  Injectable 1 milliGRAM(s) IntraMuscular once PRN  hydrocortisone sodium succinate Injectable 50 milliGRAM(s) IV Push three times a day  insulin glargine SubCutaneous Injection (LANTUS) - Peds 24 Unit(s) SubCutaneous at bedtime  insulin lispro (HumaLOG) corrective regimen sliding scale   SubCutaneous three times a day before meals  insulin lispro (HumaLOG) corrective regimen sliding scale   SubCutaneous at bedtime  insulin lispro Injectable (HumaLOG) 3 Unit(s) SubCutaneous three times a day before meals  levothyroxine 125 MICROGram(s) Oral daily  lisinopril 5 milliGRAM(s) Oral daily  piperacillin/tazobactam IVPB. 3.375 Gram(s) IV Intermittent every 8 hours  potassium chloride    Tablet ER 20 milliEquivalent(s) Oral daily  saccharomyces boulardii 250 milliGRAM(s) Oral two times a day  spironolactone 25 milliGRAM(s) Oral two times a day  vancomycin  IVPB 750 milliGRAM(s) IV Intermittent every 12 hours  No Known Allergies    CBC Full  -  ( 29 May 2019 11:37 )  WBC Count : 4.9 K/uL  RBC Count : 3.81 M/uL  Hemoglobin : 11.2 g/dL  Hematocrit : 33.8 %  Platelet Count - Automated : 44 K/uL  Mean Cell Volume : 88.7 fl  Mean Cell Hemoglobin : 29.3 pg  Mean Cell Hemoglobin Concentration : 33.0 gm/dL  Auto Neutrophil # : 4.2 K/uL  Auto Lymphocyte # : 0.5 K/uL  Auto Monocyte # : 0.2 K/uL  Auto Eosinophil # : 0.0 K/uL  Auto Basophil # : 0.0 K/uL  Auto Neutrophil % : 84.3 %  Auto Lymphocyte % : 10.8 %  Auto Monocyte % : 4.0 %  Auto Eosinophil % : 0.7 %  Auto Basophil % : 0.2 %    Patient seen at bedside INAD well known to me and seen at my office this week with exposed bone distal phalanx sticking through small ulceration of right hallux. Exposed bone culture sent for culture at my office. Obvious clinical signs of osteomyelitis. DP and PT pedal pulses are palpable. Previous achilles tendon ulcer has healed with superficial blister noted plantar 5th met head right foot. no signs of acute infection and no signs of cellulitis. Plan is to remove distal tip of right hallux tomorrow to reduce risk of progression of osteomyelitis. Previous amputation of 2,3 toes right foot. radiographs reveal erosive changes at right distal phalanx of the hallux consistent with osteomyelitis. A piece of bone was cut and resected in my office which is able to be seen on x-ray.  Patient demonstrated understanding of procedure along with risks, benefits, potential compliations and alternatives. patient to be NPO after midnight for surgery Friday afternoon. Podiatry will follow

## 2019-05-30 NOTE — PROVIDER CONTACT NOTE (OTHER) - ACTION/TREATMENT ORDERED:
PA notified and made aware, as per PA give Lasix and reschedule coreg and lisinopril for a later time. no further interventions at this time. will continue to monitor.

## 2019-05-30 NOTE — PROGRESS NOTE ADULT - ASSESSMENT
69M   with      PMHx   of   ischemic cardiomyopathy , ef of  30 to 35,  /  diastolic   dysfunction,      s/p   ICD /PPM in 2006,   generator change .        CABG in 1986,   T2DM on lantus,   hypothyroidism,    Afib (spontaneously cardioverted and not on AC),  HTN,  HLD,   BEHZAD (not on CPAP),     FERNANDES   and cirrhosis   with portal HTN  c,/ .c  thrombocytopenia  from cirrhosis,  h/o  pituitary  adenoma,         h/o HCC s/p IR embolization x 2 (last on 4/18), strep  agalactiae  bacteremia  5/ 19     sent to  er  by podiatrist  fro  right toe  amputation  pod/ dr lombardo   ID/ ab  per  ID/  dr segura  called by podiatry  on lasix/ Ramipril ,   coreg/   lipitor./    asa/  aldactone  daily prednisone  for past month for   gout  dm, on lantus/  follow fs  c/c  anemia  and  c/c  thrombocytopenia. from cirrhosis  pt cleared  for  podiatry  surgery/ amputation   of  right distal phalanx,  on Friday  will need  stress dose  steroid , on , day of surgery  dm/  humalog  started  / pt eating cookies  hyponatremia from hyperglycemia   rise  in creatinine  lasix  lowered       < from: Transthoracic Echocardiogram (05.06.19 @ 06:16) >   Mitral annular calcification. Tethered mitral valve  leaflets. Mild mitral regurgitation.  2. Calcified trileaflet aortic valve with normal opening.  No aortic valve regurgitation seen.  3. Eccentric left ventricular hypertrophy (dilated left  ventricle with normal relative wall thickness). Moderate  left ventricular enlargement.  4. Moderate to severe segmental left ventricular systolic  dysfunction. The inferior, inferoseptal, and inferolateral  walls are akinetic. Paradoxical septal motion consistent  with paced rhythm/conduction defect.  5. Moderate diastolic dysfunction (Stage II).  6. Right ventricle not well visualized; normal right  ventricular size with decreased right ventricular systolic  function. A device wire is noted in the right heart.  7. Estimated pulmonary artery systolic pressure equals 46  mm Hg, assuming right atrial pressure equals 8 mm Hg,  consistent with mild pulmonary pressures.  < end of copied text >       < from: CT Abdomen w/ Oral Cont and w/wo IV Cont (05.18.19 @ 12:28) >  MPRESSION:   Cirrhosis with evidence of portal hypertension.   No evidence of viable tumor (LR-TR Nonviable) associated with lesions in   segment 7 at the junction of segments 4A and 8 post intervention  < end of copied text >

## 2019-05-30 NOTE — DIETITIAN INITIAL EVALUATION ADULT. - NS AS NUTRI INTERV ED CONTENT
reinforce diabetic diet adherence also/Purpose of the nutrition education/Nutrition relationship to health/disease

## 2019-05-30 NOTE — DIETITIAN INITIAL EVALUATION ADULT. - OTHER INFO
patient visiting in room earing dinner well. Patient was not interested in renal diet except for brief explanation. Patient stating" it doesn't matter". Reinforced diet adherence to promote wound healing. Patient checks  FS at home usually 115- 165. patient takes januvia and lantus.

## 2019-05-30 NOTE — DIETITIAN INITIAL EVALUATION ADULT. - ENERGY NEEDS
69 year old Male with PMH of ischemic cardiomyopathy s/p ICD/PPM in 2006,  generator change, and CABG in 1986, T2DM on lantus,  hypothyroidism, Afib (spontaneously cardioverted and not on AC), HTN, HLD, BEHZAD (not on CPAP), gout, FERNANDES and cirrhosis with HCC s/p IR embolization x 2 (last on 4/18), with embozene, presents to the ED with  non healing wound to R great toe x 1 month. pt saw Dr lombardo this am in office and was sent to ED

## 2019-05-30 NOTE — CONSULT NOTE ADULT - SUBJECTIVE AND OBJECTIVE BOX
Patient is a 69y old  Male who presents with a chief complaint of osteo (30 May 2019 19:42)      HPI:  69M with   PMHx of ischemic cardiomyopathy s/p ICD/PPM in 2006,  generator change,   and CABG in 1986, T2DM on lantus,  hypothyroidism, Afib (spontaneously cardioverted and not on AC), HTN, HLD, BEHZAD (not on CPAP), gout, FERNANDES and cirrhosis with    HCC s/p IR embolization x 2 (last on 4/18), with embozene    presents to the ED with  non healing ulcer sent in by podiatrist Dr. Tristan. Pt denies any f/n/v/c/sob.    afebrile  saw  dr tristan this  am in office  and was  sent to  er  per  dr cunningham  note, ID d r imelda small called (29 May 2019 11:18)      PAST MEDICAL & SURGICAL HISTORY:  Afib: pt reports ~9 mos, [id number], resolved - monitored by Dr. Rivers  Ulcer of right ankle: has surgery done Dec 2018  Liver mass: dx: 10/2018   incidental finding. Currently awaitng furthur workup  Gout: medically managed  Elevated prolactin level: dx: &#x27; 70&#x27;s  medically managed Bromocriptine  Vitamin D deficiency  Elevated triglycerides with high cholesterol: on meds  Hypothyroidism  PVD (peripheral vascular disease)  History of hyperprolactinemia  Hepatic cirrhosis, unspecified hepatic cirrhosis type  Anemia  ICD (implantable cardioverter-defibrillator) in place: [employer], Old Model [id number] , last interrogation 3/27/19, generator change 4/3/19 New Model # [id number]  Sleep apnea: not using CPAP  History of osteomyelitis: 2015, right foot 2nd toe   follow-up by podiatrist every 2 weeks  Presence of stent in coronary artery: 2 stents  Heart failure with reduced left ventricular function: last EF 43% (2/2018)  Ischemic cardiomyopathy  Pituitary adenoma: as per patient chronic, no changes , recently restarted follow up with endocrinologist ( note in allscripts )  Coronary artery disease  Thrombocytopenia: Chronic  HTN (hypertension)  DM (diabetes mellitus): type 2, Hg A1C 9.9% 2/2019  Dec 2019  due to prednisone started on Lantus Jan 2019  AICD lead malfunction: history of - fixed follow-up by Dr Rivers  Encounter for incision and drainage procedure: Dec 2018 right foot/ankle  History of amputation: right foot, 2nd toe, osteo 2015  AICD (automatic cardioverter/defibrillator) present: placement in (2006), generator change 4/3/19  Stented coronary artery: RCA (1999)  Coronary atherosclerosis of artery bypass graft: CABG X 4 (1986)      MEDICATIONS  (STANDING):  aspirin enteric coated 81 milliGRAM(s) Oral daily  atorvastatin 40 milliGRAM(s) Oral at bedtime  bromocriptine Capsule 5 milliGRAM(s) Oral daily  carvedilol 6.25 milliGRAM(s) Oral every 12 hours  cholecalciferol 2000 Unit(s) Oral daily  dextrose 5%. 1000 milliLiter(s) (50 mL/Hr) IV Continuous <Continuous>  dextrose 50% Injectable 12.5 Gram(s) IV Push once  dextrose 50% Injectable 25 Gram(s) IV Push once  dextrose 50% Injectable 25 Gram(s) IV Push once  furosemide    Tablet 40 milliGRAM(s) Oral daily  hydrocortisone sodium succinate Injectable 50 milliGRAM(s) IV Push three times a day  insulin glargine SubCutaneous Injection (LANTUS) - Peds 24 Unit(s) SubCutaneous at bedtime  insulin lispro (HumaLOG) corrective regimen sliding scale   SubCutaneous three times a day before meals  insulin lispro (HumaLOG) corrective regimen sliding scale   SubCutaneous at bedtime  insulin lispro Injectable (HumaLOG) 3 Unit(s) SubCutaneous three times a day before meals  levothyroxine 125 MICROGram(s) Oral daily  lisinopril 5 milliGRAM(s) Oral daily  piperacillin/tazobactam IVPB. 3.375 Gram(s) IV Intermittent every 8 hours  potassium chloride    Tablet ER 20 milliEquivalent(s) Oral daily  saccharomyces boulardii 250 milliGRAM(s) Oral two times a day  spironolactone 25 milliGRAM(s) Oral two times a day  vancomycin  IVPB 750 milliGRAM(s) IV Intermittent every 12 hours    MEDICATIONS  (PRN):  dextrose 40% Gel 15 Gram(s) Oral once PRN Blood Glucose LESS THAN 70 milliGRAM(s)/deciliter  glucagon  Injectable 1 milliGRAM(s) IntraMuscular once PRN Glucose LESS THAN 70 milligrams/deciliter      Allergies    No Known Allergies    Intolerances        VITALS:    Vital Signs Last 24 Hrs  T(C): 36.5 (30 May 2019 19:37), Max: 36.6 (29 May 2019 20:54)  T(F): 97.7 (30 May 2019 19:37), Max: 97.8 (29 May 2019 20:54)  HR: 72 (30 May 2019 19:37) (58 - 72)  BP: 110/71 (30 May 2019 19:37) (100/64 - 120/72)  BP(mean): --  RR: 18 (30 May 2019 19:37) (17 - 18)  SpO2: 97% (30 May 2019 19:37) (97% - 100%)    LABS:                          11.2   4.9   )-----------( 44       ( 29 May 2019 11:37 )             33.8       05-29    129<L>  |  95<L>  |  57<H>  ----------------------------<  405<H>  5.3   |  21<L>  |  1.65<H>    Ca    9.2      29 May 2019 23:22    TPro  8.1  /  Alb  4.0  /  TBili  0.6  /  DBili  x   /  AST  25  /  ALT  29  /  AlkPhos  158<H>  05-29      CAPILLARY BLOOD GLUCOSE      POCT Blood Glucose.: 250 mg/dL (30 May 2019 17:17)  POCT Blood Glucose.: 283 mg/dL (30 May 2019 12:48)  POCT Blood Glucose.: 192 mg/dL (30 May 2019 08:22)  POCT Blood Glucose.: 271 mg/dL (30 May 2019 02:08)  POCT Blood Glucose.: 442 mg/dL (29 May 2019 22:10)  POCT Blood Glucose.: 435 mg/dL (29 May 2019 22:09)          LOWER EXTREMITY PHYSICAL EXAM:    Vascular: DP/PT 2/4, B/L, CFT <3 seconds B/L, Temperature gradient warm, B/L.   Neuro: Epicritic sensation absent to b/l feet  Skin: right hallux distal plantar ulcer to bone, no purulence, no erythema, hallux edematous distally. R foot submet 5 deep tissue injury with overlying callus formation, no open wound, no signs of infection     RADIOLOGY & ADDITIONAL STUDIES:  < from: Xray Foot AP + Lateral + Oblique, Right (05.29.19 @ 11:52) >    EXAM:  FOOT COMPLETE RIGHT (MIN 3 VIEW)                            PROCEDURE DATE:  05/29/2019            INTERPRETATION:  Indication: Right first digit infection. Rule out   osteomyelitis.    Technique: 4 views of the right foot were obtained.    Comparison: 9/8/2018    Impression: The patient is status post partial amputation of the left   second and third toes, unchanged. The surgical margins are sharp. There   are hammertoe deformities of the right fourth and fifth toes. There is   marked joint space narrowing/arthrosis at the interphalangeal joint space   of the right first toe, not significantly changed in appearance. There is   no radiographic evidence for osteomyelitis. If clinically warranted,   confirmation may be obtained with MRI.                    KE SHI M.D., ATTENDING RADIOLOGIST  This document has been electronically signed. May 29 2019 12:36PM        < end of copied text >

## 2019-05-31 ENCOUNTER — RX RENEWAL (OUTPATIENT)
Age: 69
End: 2019-05-31

## 2019-05-31 ENCOUNTER — TRANSCRIPTION ENCOUNTER (OUTPATIENT)
Age: 69
End: 2019-05-31

## 2019-05-31 ENCOUNTER — RESULT REVIEW (OUTPATIENT)
Age: 69
End: 2019-05-31

## 2019-05-31 DIAGNOSIS — E03.9 HYPOTHYROIDISM, UNSPECIFIED: ICD-10-CM

## 2019-05-31 DIAGNOSIS — E11.9 TYPE 2 DIABETES MELLITUS WITHOUT COMPLICATIONS: ICD-10-CM

## 2019-05-31 DIAGNOSIS — Z86.39 PERSONAL HISTORY OF OTHER ENDOCRINE, NUTRITIONAL AND METABOLIC DISEASE: ICD-10-CM

## 2019-05-31 LAB
ANION GAP SERPL CALC-SCNC: 13 MMOL/L — SIGNIFICANT CHANGE UP (ref 5–17)
APTT BLD: 26.9 SEC — LOW (ref 27.5–36.3)
BLD GP AB SCN SERPL QL: NEGATIVE — SIGNIFICANT CHANGE UP
BUN SERPL-MCNC: 46 MG/DL — HIGH (ref 7–23)
CALCIUM SERPL-MCNC: 9.3 MG/DL — SIGNIFICANT CHANGE UP (ref 8.4–10.5)
CHLORIDE SERPL-SCNC: 97 MMOL/L — SIGNIFICANT CHANGE UP (ref 96–108)
CO2 SERPL-SCNC: 22 MMOL/L — SIGNIFICANT CHANGE UP (ref 22–31)
CREAT SERPL-MCNC: 1.42 MG/DL — HIGH (ref 0.5–1.3)
GLUCOSE BLDC GLUCOMTR-MCNC: 213 MG/DL — HIGH (ref 70–99)
GLUCOSE BLDC GLUCOMTR-MCNC: 215 MG/DL — HIGH (ref 70–99)
GLUCOSE BLDC GLUCOMTR-MCNC: 238 MG/DL — HIGH (ref 70–99)
GLUCOSE BLDC GLUCOMTR-MCNC: 275 MG/DL — HIGH (ref 70–99)
GLUCOSE BLDC GLUCOMTR-MCNC: 288 MG/DL — HIGH (ref 70–99)
GLUCOSE BLDC GLUCOMTR-MCNC: 427 MG/DL — HIGH (ref 70–99)
GLUCOSE BLDC GLUCOMTR-MCNC: 442 MG/DL — HIGH (ref 70–99)
GLUCOSE BLDC GLUCOMTR-MCNC: 466 MG/DL — CRITICAL HIGH (ref 70–99)
GLUCOSE SERPL-MCNC: 205 MG/DL — HIGH (ref 70–99)
HCT VFR BLD CALC: 34.3 % — LOW (ref 39–50)
HGB BLD-MCNC: 10.8 G/DL — LOW (ref 13–17)
INR BLD: 1.14 RATIO — SIGNIFICANT CHANGE UP (ref 0.88–1.16)
MCHC RBC-ENTMCNC: 27.3 PG — SIGNIFICANT CHANGE UP (ref 27–34)
MCHC RBC-ENTMCNC: 31.5 GM/DL — LOW (ref 32–36)
MCV RBC AUTO: 86.6 FL — SIGNIFICANT CHANGE UP (ref 80–100)
PLATELET # BLD AUTO: 41 K/UL — LOW (ref 150–400)
POTASSIUM SERPL-MCNC: 4.5 MMOL/L — SIGNIFICANT CHANGE UP (ref 3.5–5.3)
POTASSIUM SERPL-SCNC: 4.5 MMOL/L — SIGNIFICANT CHANGE UP (ref 3.5–5.3)
PROTHROM AB SERPL-ACNC: 13.2 SEC — HIGH (ref 10–13.1)
RBC # BLD: 3.96 M/UL — LOW (ref 4.2–5.8)
RBC # FLD: 16.5 % — HIGH (ref 10.3–14.5)
RH IG SCN BLD-IMP: POSITIVE — SIGNIFICANT CHANGE UP
SODIUM SERPL-SCNC: 132 MMOL/L — LOW (ref 135–145)
VANCOMYCIN TROUGH SERPL-MCNC: 13.6 UG/ML — SIGNIFICANT CHANGE UP (ref 10–20)
WBC # BLD: 4.83 K/UL — SIGNIFICANT CHANGE UP (ref 3.8–10.5)
WBC # FLD AUTO: 4.83 K/UL — SIGNIFICANT CHANGE UP (ref 3.8–10.5)

## 2019-05-31 PROCEDURE — 99231 SBSQ HOSP IP/OBS SF/LOW 25: CPT

## 2019-05-31 PROCEDURE — 88311 DECALCIFY TISSUE: CPT | Mod: 26

## 2019-05-31 PROCEDURE — 88305 TISSUE EXAM BY PATHOLOGIST: CPT | Mod: 26

## 2019-05-31 PROCEDURE — 73630 X-RAY EXAM OF FOOT: CPT | Mod: 26,RT

## 2019-05-31 PROCEDURE — 99223 1ST HOSP IP/OBS HIGH 75: CPT

## 2019-05-31 RX ORDER — DEXTROSE 50 % IN WATER 50 %
25 SYRINGE (ML) INTRAVENOUS ONCE
Refills: 0 | Status: DISCONTINUED | OUTPATIENT
Start: 2019-05-31 | End: 2019-06-03

## 2019-05-31 RX ORDER — DEXTROSE 50 % IN WATER 50 %
15 SYRINGE (ML) INTRAVENOUS ONCE
Refills: 0 | Status: DISCONTINUED | OUTPATIENT
Start: 2019-05-31 | End: 2019-06-03

## 2019-05-31 RX ORDER — OXYCODONE AND ACETAMINOPHEN 5; 325 MG/1; MG/1
1 TABLET ORAL EVERY 6 HOURS
Refills: 0 | Status: DISCONTINUED | OUTPATIENT
Start: 2019-05-31 | End: 2019-06-03

## 2019-05-31 RX ORDER — ACETAMINOPHEN 500 MG
650 TABLET ORAL EVERY 6 HOURS
Refills: 0 | Status: DISCONTINUED | OUTPATIENT
Start: 2019-05-31 | End: 2019-06-03

## 2019-05-31 RX ORDER — INSULIN LISPRO 100/ML
5 VIAL (ML) SUBCUTANEOUS
Refills: 0 | Status: DISCONTINUED | OUTPATIENT
Start: 2019-05-31 | End: 2019-06-02

## 2019-05-31 RX ORDER — SODIUM CHLORIDE 9 MG/ML
250 INJECTION INTRAMUSCULAR; INTRAVENOUS; SUBCUTANEOUS ONCE
Refills: 0 | Status: COMPLETED | OUTPATIENT
Start: 2019-05-31 | End: 2019-05-31

## 2019-05-31 RX ORDER — BROMOCRIPTINE MESYLATE 5 MG/1
5 CAPSULE ORAL DAILY
Refills: 0 | Status: DISCONTINUED | OUTPATIENT
Start: 2019-05-31 | End: 2019-06-03

## 2019-05-31 RX ORDER — POTASSIUM CHLORIDE 20 MEQ
20 PACKET (EA) ORAL DAILY
Refills: 0 | Status: DISCONTINUED | OUTPATIENT
Start: 2019-05-31 | End: 2019-06-03

## 2019-05-31 RX ORDER — ASPIRIN/CALCIUM CARB/MAGNESIUM 324 MG
81 TABLET ORAL DAILY
Refills: 0 | Status: DISCONTINUED | OUTPATIENT
Start: 2019-05-31 | End: 2019-06-03

## 2019-05-31 RX ORDER — VANCOMYCIN HCL 1 G
750 VIAL (EA) INTRAVENOUS EVERY 12 HOURS
Refills: 0 | Status: DISCONTINUED | OUTPATIENT
Start: 2019-05-31 | End: 2019-06-01

## 2019-05-31 RX ORDER — SODIUM CHLORIDE 9 MG/ML
1000 INJECTION, SOLUTION INTRAVENOUS
Refills: 0 | Status: DISCONTINUED | OUTPATIENT
Start: 2019-05-31 | End: 2019-06-03

## 2019-05-31 RX ORDER — ONDANSETRON 8 MG/1
4 TABLET, FILM COATED ORAL ONCE
Refills: 0 | Status: DISCONTINUED | OUTPATIENT
Start: 2019-05-31 | End: 2019-05-31

## 2019-05-31 RX ORDER — HYDROCORTISONE 20 MG
50 TABLET ORAL THREE TIMES A DAY
Refills: 0 | Status: COMPLETED | OUTPATIENT
Start: 2019-05-31 | End: 2019-06-01

## 2019-05-31 RX ORDER — LISINOPRIL 2.5 MG/1
5 TABLET ORAL DAILY
Refills: 0 | Status: DISCONTINUED | OUTPATIENT
Start: 2019-05-31 | End: 2019-06-01

## 2019-05-31 RX ORDER — PIPERACILLIN AND TAZOBACTAM 4; .5 G/20ML; G/20ML
3.38 INJECTION, POWDER, LYOPHILIZED, FOR SOLUTION INTRAVENOUS EVERY 8 HOURS
Refills: 0 | Status: DISCONTINUED | OUTPATIENT
Start: 2019-05-31 | End: 2019-06-01

## 2019-05-31 RX ORDER — CARVEDILOL PHOSPHATE 80 MG/1
6.25 CAPSULE, EXTENDED RELEASE ORAL EVERY 12 HOURS
Refills: 0 | Status: DISCONTINUED | OUTPATIENT
Start: 2019-05-31 | End: 2019-06-01

## 2019-05-31 RX ORDER — HYDROMORPHONE HYDROCHLORIDE 2 MG/ML
0.5 INJECTION INTRAMUSCULAR; INTRAVENOUS; SUBCUTANEOUS
Refills: 0 | Status: DISCONTINUED | OUTPATIENT
Start: 2019-05-31 | End: 2019-05-31

## 2019-05-31 RX ORDER — SPIRONOLACTONE 25 MG/1
25 TABLET, FILM COATED ORAL
Refills: 0 | Status: DISCONTINUED | OUTPATIENT
Start: 2019-05-31 | End: 2019-06-03

## 2019-05-31 RX ORDER — ATORVASTATIN CALCIUM 80 MG/1
40 TABLET, FILM COATED ORAL AT BEDTIME
Refills: 0 | Status: DISCONTINUED | OUTPATIENT
Start: 2019-05-31 | End: 2019-06-03

## 2019-05-31 RX ORDER — FUROSEMIDE 40 MG
40 TABLET ORAL DAILY
Refills: 0 | Status: DISCONTINUED | OUTPATIENT
Start: 2019-05-31 | End: 2019-06-03

## 2019-05-31 RX ORDER — INSULIN LISPRO 100/ML
8 VIAL (ML) SUBCUTANEOUS ONCE
Refills: 0 | Status: COMPLETED | OUTPATIENT
Start: 2019-05-31 | End: 2019-05-31

## 2019-05-31 RX ORDER — DEXTROSE 50 % IN WATER 50 %
12.5 SYRINGE (ML) INTRAVENOUS ONCE
Refills: 0 | Status: DISCONTINUED | OUTPATIENT
Start: 2019-05-31 | End: 2019-06-03

## 2019-05-31 RX ORDER — INSULIN GLARGINE 100 [IU]/ML
24 INJECTION, SOLUTION SUBCUTANEOUS AT BEDTIME
Refills: 0 | Status: DISCONTINUED | OUTPATIENT
Start: 2019-05-31 | End: 2019-06-03

## 2019-05-31 RX ORDER — INSULIN LISPRO 100/ML
VIAL (ML) SUBCUTANEOUS
Refills: 0 | Status: DISCONTINUED | OUTPATIENT
Start: 2019-05-31 | End: 2019-06-03

## 2019-05-31 RX ORDER — INSULIN LISPRO 100/ML
3 VIAL (ML) SUBCUTANEOUS
Refills: 0 | Status: DISCONTINUED | OUTPATIENT
Start: 2019-05-31 | End: 2019-05-31

## 2019-05-31 RX ORDER — GLUCAGON INJECTION, SOLUTION 0.5 MG/.1ML
1 INJECTION, SOLUTION SUBCUTANEOUS ONCE
Refills: 0 | Status: DISCONTINUED | OUTPATIENT
Start: 2019-05-31 | End: 2019-06-03

## 2019-05-31 RX ORDER — LEVOTHYROXINE SODIUM 125 MCG
125 TABLET ORAL DAILY
Refills: 0 | Status: DISCONTINUED | OUTPATIENT
Start: 2019-05-31 | End: 2019-06-03

## 2019-05-31 RX ORDER — SODIUM CHLORIDE 9 MG/ML
1000 INJECTION INTRAMUSCULAR; INTRAVENOUS; SUBCUTANEOUS
Refills: 0 | Status: DISCONTINUED | OUTPATIENT
Start: 2019-05-31 | End: 2019-06-01

## 2019-05-31 RX ORDER — SACCHAROMYCES BOULARDII 250 MG
250 POWDER IN PACKET (EA) ORAL
Refills: 0 | Status: DISCONTINUED | OUTPATIENT
Start: 2019-05-31 | End: 2019-06-03

## 2019-05-31 RX ORDER — INSULIN LISPRO 100/ML
VIAL (ML) SUBCUTANEOUS AT BEDTIME
Refills: 0 | Status: DISCONTINUED | OUTPATIENT
Start: 2019-05-31 | End: 2019-06-03

## 2019-05-31 RX ORDER — INSULIN LISPRO 100/ML
VIAL (ML) SUBCUTANEOUS
Refills: 0 | Status: DISCONTINUED | OUTPATIENT
Start: 2019-05-31 | End: 2019-05-31

## 2019-05-31 RX ADMIN — SPIRONOLACTONE 25 MILLIGRAM(S): 25 TABLET, FILM COATED ORAL at 05:25

## 2019-05-31 RX ADMIN — PIPERACILLIN AND TAZOBACTAM 25 GRAM(S): 4; .5 INJECTION, POWDER, LYOPHILIZED, FOR SOLUTION INTRAVENOUS at 10:30

## 2019-05-31 RX ADMIN — SODIUM CHLORIDE 500 MILLILITER(S): 9 INJECTION INTRAMUSCULAR; INTRAVENOUS; SUBCUTANEOUS at 21:50

## 2019-05-31 RX ADMIN — Medication 50 MILLIGRAM(S): at 23:20

## 2019-05-31 RX ADMIN — PIPERACILLIN AND TAZOBACTAM 25 GRAM(S): 4; .5 INJECTION, POWDER, LYOPHILIZED, FOR SOLUTION INTRAVENOUS at 21:44

## 2019-05-31 RX ADMIN — Medication 250 MILLIGRAM(S): at 18:42

## 2019-05-31 RX ADMIN — Medication 8 UNIT(S): at 23:16

## 2019-05-31 RX ADMIN — Medication 4: at 22:38

## 2019-05-31 RX ADMIN — LISINOPRIL 5 MILLIGRAM(S): 2.5 TABLET ORAL at 05:25

## 2019-05-31 RX ADMIN — Medication 3 UNIT(S): at 14:44

## 2019-05-31 RX ADMIN — Medication 125 MICROGRAM(S): at 05:25

## 2019-05-31 RX ADMIN — OXYCODONE HYDROCHLORIDE 5 MILLIGRAM(S): 5 TABLET ORAL at 01:04

## 2019-05-31 RX ADMIN — Medication 20 MILLIEQUIVALENT(S): at 18:42

## 2019-05-31 RX ADMIN — Medication 50 MILLIGRAM(S): at 10:34

## 2019-05-31 RX ADMIN — OXYCODONE HYDROCHLORIDE 5 MILLIGRAM(S): 5 TABLET ORAL at 00:04

## 2019-05-31 RX ADMIN — INSULIN GLARGINE 24 UNIT(S): 100 INJECTION, SOLUTION SUBCUTANEOUS at 22:38

## 2019-05-31 RX ADMIN — Medication 3: at 19:07

## 2019-05-31 RX ADMIN — Medication 40 MILLIGRAM(S): at 05:25

## 2019-05-31 RX ADMIN — Medication 50 MILLIGRAM(S): at 14:26

## 2019-05-31 RX ADMIN — SODIUM CHLORIDE 45 MILLILITER(S): 9 INJECTION INTRAMUSCULAR; INTRAVENOUS; SUBCUTANEOUS at 00:12

## 2019-05-31 RX ADMIN — SODIUM CHLORIDE 45 MILLILITER(S): 9 INJECTION INTRAMUSCULAR; INTRAVENOUS; SUBCUTANEOUS at 14:37

## 2019-05-31 RX ADMIN — PIPERACILLIN AND TAZOBACTAM 25 GRAM(S): 4; .5 INJECTION, POWDER, LYOPHILIZED, FOR SOLUTION INTRAVENOUS at 14:27

## 2019-05-31 RX ADMIN — Medication 250 MILLIGRAM(S): at 05:25

## 2019-05-31 RX ADMIN — Medication 250 MILLIGRAM(S): at 19:16

## 2019-05-31 RX ADMIN — Medication 2: at 14:44

## 2019-05-31 RX ADMIN — Medication 250 MILLIGRAM(S): at 06:59

## 2019-05-31 RX ADMIN — Medication 5 UNIT(S): at 19:07

## 2019-05-31 NOTE — CHART NOTE - NSCHARTNOTEFT_GEN_A_CORE
CARLIN LEE    Notified by RN patient with       Interventions taken   Addition Insulin 8 units SQ x1  cont assess and monitor.                        Tao Pittman ANP-BC  Spectralink #[id number]

## 2019-05-31 NOTE — PROGRESS NOTE ADULT - SUBJECTIVE AND OBJECTIVE BOX
Patient is a 69y old  Male who presents with a chief complaint of osteo (31 May 2019 07:20)      INTERVAL HPI/OVERNIGHT EVENTS:   Pt is scheduled for right foot partial first ray resection with Dr. lombardo at 3 pm. Patient is aware of procedure and is NPO since midnight.    MEDICATIONS  (STANDING):  aspirin enteric coated 81 milliGRAM(s) Oral daily  atorvastatin 40 milliGRAM(s) Oral at bedtime  bromocriptine Capsule 5 milliGRAM(s) Oral daily  carvedilol 6.25 milliGRAM(s) Oral every 12 hours  dextrose 5%. 1000 milliLiter(s) (50 mL/Hr) IV Continuous <Continuous>  dextrose 50% Injectable 12.5 Gram(s) IV Push once  dextrose 50% Injectable 25 Gram(s) IV Push once  dextrose 50% Injectable 25 Gram(s) IV Push once  furosemide    Tablet 40 milliGRAM(s) Oral daily  hydrocortisone sodium succinate Injectable 50 milliGRAM(s) IV Push three times a day  insulin glargine SubCutaneous Injection (LANTUS) - Peds 24 Unit(s) SubCutaneous at bedtime  insulin lispro (HumaLOG) corrective regimen sliding scale   SubCutaneous three times a day before meals  insulin lispro (HumaLOG) corrective regimen sliding scale   SubCutaneous at bedtime  insulin lispro Injectable (HumaLOG) 3 Unit(s) SubCutaneous three times a day before meals  levothyroxine 125 MICROGram(s) Oral daily  lisinopril 5 milliGRAM(s) Oral daily  piperacillin/tazobactam IVPB. 3.375 Gram(s) IV Intermittent every 8 hours  potassium chloride    Tablet ER 20 milliEquivalent(s) Oral daily  saccharomyces boulardii 250 milliGRAM(s) Oral two times a day  sodium chloride 0.9%. 1000 milliLiter(s) (45 mL/Hr) IV Continuous <Continuous>  spironolactone 25 milliGRAM(s) Oral two times a day  vancomycin  IVPB 750 milliGRAM(s) IV Intermittent every 12 hours    MEDICATIONS  (PRN):  dextrose 40% Gel 15 Gram(s) Oral once PRN Blood Glucose LESS THAN 70 milliGRAM(s)/deciliter  glucagon  Injectable 1 milliGRAM(s) IntraMuscular once PRN Glucose LESS THAN 70 milligrams/deciliter      Allergies    No Known Allergies    Intolerances        Vital Signs Last 24 Hrs  T(C): 36.6 (31 May 2019 05:14), Max: 36.6 (31 May 2019 05:14)  T(F): 97.9 (31 May 2019 05:14), Max: 97.9 (31 May 2019 05:14)  HR: 68 (31 May 2019 09:35) (65 - 72)  BP: 94/50 (31 May 2019 09:35) (94/50 - 119/79)  BP(mean): --  RR: 18 (31 May 2019 05:14) (18 - 18)  SpO2: 98% (31 May 2019 05:14) (97% - 98%)    LABS:                        10.8   4.83  )-----------( 41       ( 31 May 2019 10:10 )             34.3     05-31    132<L>  |  97  |  46<H>  ----------------------------<  205<H>  4.5   |  22  |  1.42<H>    Ca    9.3      31 May 2019 06:11      PT/INR - ( 31 May 2019 09:09 )   PT: 13.2 sec;   INR: 1.14 ratio         PTT - ( 31 May 2019 09:09 )  PTT:26.9 sec    CAPILLARY BLOOD GLUCOSE      POCT Blood Glucose.: 238 mg/dL (31 May 2019 09:51)  POCT Blood Glucose.: 308 mg/dL (30 May 2019 22:29)  POCT Blood Glucose.: 250 mg/dL (30 May 2019 17:17)  POCT Blood Glucose.: 283 mg/dL (30 May 2019 12:48)      RADIOLOGY & ADDITIONAL TESTS:    Plan:   To OR today at 3 pm with Dr. lombardo  for right foot partial 1st ray resection.   CXR on sunrise.  EKG on sunrise.  Medical clearance since 5/30 and documented in chart.  Consent signed and in chart.  Procedure was explained to patient in detail. All alternatives, risks and complications were discussed. All questions answered.

## 2019-05-31 NOTE — PROGRESS NOTE ADULT - ASSESSMENT
69M with  suboptimal control of  T2DM on lantus, previous toe amputations, gout on prednisone, history of ischemic cardiomyopathy s/p ICD/PPM in 2006,  generator change,   and CABG in 1986,  hypothyroidism, Afib (spontaneously cardioverted and not on AC), HTN, HLD, BEHZAD (not on CPAP), gout, FERNANDES and cirrhosis with   HCC s/p IR embolization x 2 (last on 4/18) admitted 5.29/19 with rapidly developing right toe ulcer extending down to underlying bone.    s/p Amputation of hallux earlier today 5/31 - Podiatry notes hard good quality bone at margin - low risk of residual infection    suggest  Zosyn /Vanco 5/29 -->   discontinue in am  6/1  Augmentin 875 mg po twice daily 6/1 --> 6/6  wound care as per Podiatry    Please call ID this weekend if needed

## 2019-05-31 NOTE — BRIEF OPERATIVE NOTE - NSICDXBRIEFPROCEDURE_GEN_ALL_CORE_FT
PROCEDURES:  Partial amputation of toe of right foot 31-May-2019 14:01:26 right foot partial hallux amp Fani Carroll

## 2019-05-31 NOTE — CHART NOTE - NSCHARTNOTEFT_GEN_A_CORE
CARLIN LEE    Notified by RN patient with SBP 84 post amputation. no c/o cp or sob.      Interventions taken   NS 250cc iv bolus x1  cont assess and monitor.  f/u with am team.                        Tao Pittman ANP-BC  Spectralink #[id number]

## 2019-05-31 NOTE — DISCHARGE NOTE PROVIDER - CARE PROVIDER_API CALL
Andrea Tristan (DPM)  Podiatric Medicine and Surgery  [address] [place] [address]  Phone: [phone]  Fax: [phone]  Follow Up Time:

## 2019-05-31 NOTE — PRE-ANESTHESIA EVALUATION ADULT - NSANTHPMHFT_GEN_ALL_CORE
chart/im/heme/id notes reviewed. cad remote cabg and stenting. et> 2 flight no cp/sob. pt at baseline cv status, no signs active cad/chf. icd w recent gen change. htn, cirrhosis w/ chronic thrombocytopenia, at baseline per med note. non compliant [place]. dm a1c ~10, fs 200-300's

## 2019-05-31 NOTE — DISCHARGE NOTE PROVIDER - HOSPITAL COURSE
69M with   PMHx of ischemic cardiomyopathy s/p ICD/PPM in 2006,  generator change,   and CABG in 1986, T2DM on lantus,  hypothyroidism, Afib (spontaneously cardioverted and not on AC), HTN, HLD, BEHZAD (not on CPAP), gout, FERNANDES and cirrhosis with    HCC s/p IR embolization x 2 (last on 4/18), with embozene      presents to the ED with  non healing ulcer         sent to  er  by podiatrist  fro  right toe  amputation    pod/ dr lombardo     s/p I/V ABX per ID    on lasix/ Ramipril ,   coreg/   lipitor./    asa/  aldactone    daily prednisone  for past month for   gout    dm, on lantus/  follow fs.  non complaint with diet    c/c  anemia  and  c/c  thrombocytopenia. from cirrhosis      s.p   amputation   of  right distal phalanx,  on  5/31/19    hyponatremia from hyperglycemia       dm  / insulin pe r house   endo  /   fs high this am     on augmentin per  ID     d/c  when cleared  by podiatry

## 2019-05-31 NOTE — PROVIDER CONTACT NOTE (OTHER) - ACTION/TREATMENT ORDERED:
NP aware. NP ordered 8 additional units of humalog. clarified order with NP. will reassess BGL 30 min post insulin.

## 2019-05-31 NOTE — DISCHARGE NOTE PROVIDER - NSDCCAREPROVSEEN_GEN_ALL_CORE_FT
Bri Perez, Gopi Goncalves  [facility], Advance PracticeTeam  [facility], Team  [facility], Vascular

## 2019-05-31 NOTE — PROGRESS NOTE ADULT - SUBJECTIVE AND OBJECTIVE BOX
no compalints    REVIEW OF SYSTEMS:  GEN: no fever,    no chills  RESP: no SOB,   no cough  CVS: no chest pain,   no palpitations  GI: no abdominal pain,   no nausea,   no vomiting,   no constipation,   no diarrhea  : no dysuria,   no frequency  NEURO: no headache,   no dizziness  PSYCH: no depression,   not anxious  Derm : no rash    MEDICATIONS  (STANDING):  aspirin enteric coated 81 milliGRAM(s) Oral daily  atorvastatin 40 milliGRAM(s) Oral at bedtime  bromocriptine Capsule 5 milliGRAM(s) Oral daily  carvedilol 6.25 milliGRAM(s) Oral every 12 hours  cholecalciferol 2000 Unit(s) Oral daily  dextrose 5%. 1000 milliLiter(s) (50 mL/Hr) IV Continuous <Continuous>  dextrose 50% Injectable 12.5 Gram(s) IV Push once  dextrose 50% Injectable 25 Gram(s) IV Push once  dextrose 50% Injectable 25 Gram(s) IV Push once  furosemide    Tablet 40 milliGRAM(s) Oral daily  hydrocortisone sodium succinate Injectable 50 milliGRAM(s) IV Push three times a day  insulin glargine SubCutaneous Injection (LANTUS) - Peds 24 Unit(s) SubCutaneous at bedtime  insulin lispro (HumaLOG) corrective regimen sliding scale   SubCutaneous three times a day before meals  insulin lispro (HumaLOG) corrective regimen sliding scale   SubCutaneous at bedtime  insulin lispro Injectable (HumaLOG) 3 Unit(s) SubCutaneous three times a day before meals  levothyroxine 125 MICROGram(s) Oral daily  lisinopril 5 milliGRAM(s) Oral daily  piperacillin/tazobactam IVPB. 3.375 Gram(s) IV Intermittent every 8 hours  potassium chloride    Tablet ER 20 milliEquivalent(s) Oral daily  saccharomyces boulardii 250 milliGRAM(s) Oral two times a day  sodium chloride 0.9%. 1000 milliLiter(s) (45 mL/Hr) IV Continuous <Continuous>  spironolactone 25 milliGRAM(s) Oral two times a day  vancomycin  IVPB 750 milliGRAM(s) IV Intermittent every 12 hours    MEDICATIONS  (PRN):  dextrose 40% Gel 15 Gram(s) Oral once PRN Blood Glucose LESS THAN 70 milliGRAM(s)/deciliter  glucagon  Injectable 1 milliGRAM(s) IntraMuscular once PRN Glucose LESS THAN 70 milligrams/deciliter      Vital Signs Last 24 Hrs  T(C): 36.6 (31 May 2019 05:14), Max: 36.6 (31 May 2019 05:14)  T(F): 97.9 (31 May 2019 05:14), Max: 97.9 (31 May 2019 05:14)  HR: 65 (31 May 2019 05:14) (65 - 72)  BP: 114/72 (31 May 2019 05:14) (110/71 - 119/79)  BP(mean): --  RR: 18 (31 May 2019 05:14) (18 - 18)  SpO2: 98% (31 May 2019 05:14) (97% - 100%)  CAPILLARY BLOOD GLUCOSE      POCT Blood Glucose.: 308 mg/dL (30 May 2019 22:29)  POCT Blood Glucose.: 250 mg/dL (30 May 2019 17:17)  POCT Blood Glucose.: 283 mg/dL (30 May 2019 12:48)  POCT Blood Glucose.: 192 mg/dL (30 May 2019 08:22)    I&O's Summary    30 May 2019 07:01  -  31 May 2019 07:00  --------------------------------------------------------  IN: 1040 mL / OUT: 0 mL / NET: 1040 mL        PHYSICAL EXAM:  HEAD:  Atraumatic, Normocephalic  NECK: Supple, No   JVD  CHEST/LUNG:   no     rales,     no,    rhonchi  HEART: Regular rate and rhythm;         murmur  ABDOMEN: Soft, Nontender, ;   EXTREMITIES: right foot  ulcer  NEUROLOGY:  alert    LABS:                        11.2   4.9   )-----------( 44       ( 29 May 2019 11:37 )             33.8     05-31    132<L>  |  97  |  46<H>  ----------------------------<  205<H>  4.5   |  22  |  1.42<H>    Ca    9.3      31 May 2019 06:11    TPro  8.1  /  Alb  4.0  /  TBili  0.6  /  DBili  x   /  AST  25  /  ALT  29  /  AlkPhos  158<H>  05-29                05-29 @ 11:37  5.4  <20    Hemoglobin A1C, Whole Blood: 8.5 % (04-11 @ 15:27)            Consultant(s) Notes Reviewed:      Care Discussed with Consultants/Other Providers:

## 2019-05-31 NOTE — DISCHARGE NOTE PROVIDER - NSDCFUADDINST_GEN_ALL_CORE_FT
Podiatry Discharge Instructions:  Follow up: Please follow up with Dr. Tristan within 1 week of discharge from the hospital, please call [phone] for appointment and discuss that you recently were seen in the hospital.  Wound Care: Please leave your dressing clean dry intact until your follow up appointment.  Weight bearing: Please weight bear as tolerated in a surgical shoe.  Antibiotics: Please continue as instructed.

## 2019-05-31 NOTE — CONSULT NOTE ADULT - SUBJECTIVE AND OBJECTIVE BOX
Reason For Consult:     HPI:  69M with   PMHx of ischemic cardiomyopathy s/p ICD/PPM in 2006,  generator change,   and CABG in 1986, T2DM on lantus,  hypothyroidism, Afib (spontaneously cardioverted and not on AC), HTN, HLD, BEHZAD (not on CPAP), gout, FERNANDES and cirrhosis with    HCC s/p IR embolization x 2 (last on 4/18), with embozene presents to the ED with  non healing ulcer    afebrile  saw  dr lombardo this  am in office  and was  sent to  er  per  dr cunningham  note, ID d r imelda small called (29 May 2019 11:18)    Endocrine hx:  pt has a long standing hx of DM. follows with endocrine at [facility] with Dr. Hennessy, currently on lantus 24, Januvia and metformin.   He follows with optho and podiatry.   A1C has improved to 8.5  wife is nurse as well who is at bedside.   While inpt, pt recieved Lantus 24 last night and plan is to resume diet.   pt does not endorse any nausea or vomiting     Pt also with hx of hyperprolactinemia.pt is currently on bromocriptine.   Endocrine hx also includes hypoThyroidism on levoThyroxine.     Pt has also been on long term prednione over the last few months, plan in may was to taper off to 2.5 mg which pt did achieve, however due to gout flares pt would need increase prednsione doses.   While inpt, pt did receive stress dose steroids today on day of OR.      PAST MEDICAL & SURGICAL HISTORY:  Afib: pt reports ~9 mos, [id number], resolved - monitored by Dr. Rivers  Ulcer of right ankle: has surgery done Dec 2018  Liver mass: dx: 10/2018   incidental finding. Currently awaitng furthur workup  Gout: medically managed  Elevated prolactin level: dx: &#x27; 70&#x27;s  medically managed Bromocriptine  Vitamin D deficiency  Elevated triglycerides with high cholesterol: on meds  Hypothyroidism  PVD (peripheral vascular disease)  History of hyperprolactinemia  Hepatic cirrhosis, unspecified hepatic cirrhosis type  Anemia  ICD (implantable cardioverter-defibrillator) in place: [employer], Old Model [id number] , last interrogation 3/27/19, generator change 4/3/19 New Model # [id number]  Sleep apnea: not using CPAP  History of osteomyelitis: 2015, right foot 2nd toe   follow-up by podiatrist every 2 weeks  Presence of stent in coronary artery: 2 stents  Heart failure with reduced left ventricular function: last EF 43% (2/2018)  Ischemic cardiomyopathy  Pituitary adenoma: as per patient chronic, no changes , recently restarted follow up with endocrinologist ( note in allscripts )  Coronary artery disease  Thrombocytopenia: Chronic  HTN (hypertension)  DM (diabetes mellitus): type 2, Hg A1C 9.9% 2/2019  Dec 2019  due to prednisone started on Lantus Jan 2019  AICD lead malfunction: history of - fixed follow-up by Dr Rivers  Encounter for incision and drainage procedure: Dec 2018 right foot/ankle  History of amputation: right foot, 2nd toe, osteo 2015  AICD (automatic cardioverter/defibrillator) present: placement in (2006), generator change 4/3/19  Stented coronary artery: RCA (1999)  Coronary atherosclerosis of artery bypass graft: CABG X 4 (1986)      FAMILY HISTORY:  Family history of MI (myocardial infarction)      Social History:  lives with wife       Outpatient Medications:  Home Medications:   * Patient Currently Takes Medications as of 07-May-2019 17:27 documented in Structured Notes  · 	Aspercreme with Lidocaine 4% topical film: Apply topically to affected areas once a day   · 	Florastor 250 mg oral capsule: 1 cap(s) orally 2 times a day  · 	predniSONE 2.5 mg oral tablet: 1 tab(s) orally once a day  · 	Ceftriaxone: Ceftriaxone 2 gram(s) in dextrose 5% 50 ml, IV intermittent, every 24 hours, infuse over 30 minute(s)  	End Date: May 13th, 2019  	Dx: Streptococcus agalactiae bacteremia  	ICD 10: B95.1  · 	metFORMIN 1000 mg oral tablet: 1 tab(s) orally 2 times a day  · 	carvedilol 12.5 mg oral tablet: 1 tab(s) orally every 12 hours  · 	bromocriptine 5 mg oral capsule: 1 cap(s) orally once a day  · 	atorvastatin 40 mg oral tablet: 1 tab(s) orally once a day (at bedtime)  · 	Niaspan ER 1000 mg oral tablet, extended release: 1 tab(s) orally once a day (at bedtime)  · 	Synthroid 125 mcg (0.125 mg) oral tablet: 1 tab(s) orally once a day, 2 tablets on Sat and Sun  · 	ramipril 2.5 mg oral capsule: 1 cap(s) orally once a day (in the morning). Please resume 4/20  · 	Ecotrin Adult Low Strength 81 mg oral delayed release tablet: 1 tab(s) orally once a day. Please resume on 4/20  · 	Januvia 100 mg oral tablet: 1 tab(s) orally once a day  · 	allopurinol 100 mg oral tablet: 1 tab(s) orally once a day  · 	Aldactone 25 mg oral tablet: 1 tab(s) orally 2 times a day  · 	Lantus 100 units/mL subcutaneous solution: 24 unit(s) subcutaneous once a day (at bedtime)    .      MEDICATIONS  (STANDING):  aspirin enteric coated 81 milliGRAM(s) Oral daily  atorvastatin 40 milliGRAM(s) Oral at bedtime  bromocriptine Capsule 5 milliGRAM(s) Oral daily  carvedilol 6.25 milliGRAM(s) Oral every 12 hours  dextrose 5%. 1000 milliLiter(s) (50 mL/Hr) IV Continuous <Continuous>  dextrose 50% Injectable 12.5 Gram(s) IV Push once  dextrose 50% Injectable 25 Gram(s) IV Push once  dextrose 50% Injectable 25 Gram(s) IV Push once  furosemide    Tablet 40 milliGRAM(s) Oral daily  hydrocortisone sodium succinate Injectable 50 milliGRAM(s) IV Push three times a day  insulin glargine SubCutaneous Injection (LANTUS) - Peds 24 Unit(s) SubCutaneous at bedtime  insulin lispro (HumaLOG) corrective regimen sliding scale   SubCutaneous three times a day before meals  insulin lispro (HumaLOG) corrective regimen sliding scale   SubCutaneous at bedtime  insulin lispro Injectable (HumaLOG) 3 Unit(s) SubCutaneous three times a day before meals  levothyroxine 125 MICROGram(s) Oral daily  lisinopril 5 milliGRAM(s) Oral daily  piperacillin/tazobactam IVPB. 3.375 Gram(s) IV Intermittent every 8 hours  potassium chloride    Tablet ER 20 milliEquivalent(s) Oral daily  saccharomyces boulardii 250 milliGRAM(s) Oral two times a day  sodium chloride 0.9%. 1000 milliLiter(s) (45 mL/Hr) IV Continuous <Continuous>  spironolactone 25 milliGRAM(s) Oral two times a day  vancomycin  IVPB 750 milliGRAM(s) IV Intermittent every 12 hours    MEDICATIONS  (PRN):  acetaminophen   Tablet .. 650 milliGRAM(s) Oral every 6 hours PRN Mild Pain (1 - 3)  dextrose 40% Gel 15 Gram(s) Oral once PRN Blood Glucose LESS THAN 70 milliGRAM(s)/deciliter  glucagon  Injectable 1 milliGRAM(s) IntraMuscular once PRN Glucose LESS THAN 70 milligrams/deciliter  HYDROmorphone  Injectable 0.5 milliGRAM(s) IV Push every 10 minutes PRN Moderate Pain (4 - 6)  ondansetron Injectable 4 milliGRAM(s) IV Push once PRN Nausea and/or Vomiting  oxyCODONE    5 mG/acetaminophen 325 mG 1 Tablet(s) Oral every 6 hours PRN Moderate Pain (4 - 6)      Allergies    No Known Allergies    Intolerances      Review of Systems:  Constitutional: No fever  Eyes: No blurry vision  Neuro: No tremors  HEENT: No pain  Cardiovascular: No chest pain, palpitations  Respiratory: No SOB, no cough  GI: No nausea, vomiting, abdominal pain  : No dysuria  Skin: no rash  Psych: no depression  Endocrine: no polyuria, polydipsia  Hem/lymph: no swelling      PHYSICAL EXAM:  VITALS: T(C): 36.2 (05-31-19 @ 14:00)  T(F): 97.2 (05-31-19 @ 14:00), Max: 97.9 (05-31-19 @ 05:14)  HR: 60 (05-31-19 @ 16:00) (60 - 74)  BP: 89/51 (05-31-19 @ 16:00) (77/49 - 119/79)  RR:  (14 - 18)  SpO2:  (96% - 100%)  Wt(kg): --  GENERAL: NAD, well-groomed, well-developed  EYES: No proptosis, no lid lag, anicteric  HEENT:  Atraumatic, Normocephalic, moist mucous membranes  RESPIRATORY: Clear to auscultation bilaterally; No rales, rhonchi, wheezing, or rubs  CARDIOVASCULAR: Regular rate and rhythm; No murmurs; no peripheral edema  GI: Soft, nontender, non distended, normal bowel sounds  SKIN: Dry, intact, +right lower extremity wrapped   MUSCULOSKELETAL: unable to assess post op, moving UE without difficulty  NEURO: sensation intact, extraocular movements intact, no tremor, normal reflexes  PSYCH: Alert and oriented x 3, normal affect, normal mood      POCT Blood Glucose.: 215 mg/dL (05-31-19 @ 14:28)  POCT Blood Glucose.: 213 mg/dL (05-31-19 @ 11:53)  POCT Blood Glucose.: 238 mg/dL (05-31-19 @ 09:51)  POCT Blood Glucose.: 308 mg/dL (05-30-19 @ 22:29)  POCT Blood Glucose.: 250 mg/dL (05-30-19 @ 17:17)  POCT Blood Glucose.: 283 mg/dL (05-30-19 @ 12:48)  POCT Blood Glucose.: 192 mg/dL (05-30-19 @ 08:22)  POCT Blood Glucose.: 271 mg/dL (05-30-19 @ 02:08)  POCT Blood Glucose.: 442 mg/dL (05-29-19 @ 22:10)  POCT Blood Glucose.: 435 mg/dL (05-29-19 @ 22:09)  POCT Blood Glucose.: 366 mg/dL (05-29-19 @ 16:13)  POCT Blood Glucose.: 348 mg/dL (05-29-19 @ 14:40)                            10.8   4.83  )-----------( 41       ( 31 May 2019 10:10 )             34.3       05-31    132<L>  |  97  |  46<H>  ----------------------------<  205<H>  4.5   |  22  |  1.42<H>    EGFR if : 59<L>  EGFR if non : 51<L>    Ca    9.3      05-31    TPro  8.1  /  Alb  4.0  /  TBili  0.6  /  DBili  x   /  AST  25  /  ALT  29  /  AlkPhos  158<H>  05-29      Thyroid Function Tests:      Hemoglobin A1C, Whole Blood: 8.5 % <H> [4.0 - 5.6] (04-11-19 @ 15:27)          Radiology:

## 2019-05-31 NOTE — PROGRESS NOTE ADULT - SUBJECTIVE AND OBJECTIVE BOX
Follow Up:  s/p hallux amp    Interval History/ROS: denies pain, in good spirits    Allergies  No Known Allergies    ANTIMICROBIALS:  piperacillin/tazobactam IVPB. 3.375 every 8 hours  vancomycin  IVPB 750 every 12 hours    OTHER MEDS:  MEDICATIONS  (STANDING):  acetaminophen   Tablet .. 650 every 6 hours PRN  aspirin enteric coated 81 daily  atorvastatin 40 at bedtime  bromocriptine Capsule 5 daily  carvedilol 6.25 every 12 hours  dextrose 40% Gel 15 once PRN  dextrose 50% Injectable 12.5 once  dextrose 50% Injectable 25 once  dextrose 50% Injectable 25 once  furosemide    Tablet 40 daily  glucagon  Injectable 1 once PRN  hydrocortisone sodium succinate Injectable 50 three times a day  HYDROmorphone  Injectable 0.5 every 10 minutes PRN  insulin glargine SubCutaneous Injection (LANTUS) - Peds 24 at bedtime  insulin lispro (HumaLOG) corrective regimen sliding scale  at bedtime  insulin lispro (HumaLOG) corrective regimen sliding scale  three times a day before meals  insulin lispro Injectable (HumaLOG) 5 three times a day before meals  levothyroxine 125 daily  lisinopril 5 daily  ondansetron Injectable 4 once PRN  oxyCODONE    5 mG/acetaminophen 325 mG 1 every 6 hours PRN  spironolactone 25 two times a day      Vital Signs Last 24 Hrs  T(C): 36.8 (31 May 2019 17:16), Max: 36.8 (31 May 2019 17:16)  T(F): 98.3 (31 May 2019 17:16), Max: 98.3 (31 May 2019 17:16)  HR: 69 (31 May 2019 17:16) (60 - 74)  BP: 86/50 (31 May 2019 17:16) (77/49 - 119/79)  BP(mean): 63 (31 May 2019 15:30) (59 - 82)  RR: 18 (31 May 2019 17:16) (14 - 18)  SpO2: 98% (31 May 2019 17:16) (96% - 100%)    PHYSICAL EXAM:  General: WN/WD NAD, Non-toxic  Neurology: A&Ox3, nonfocal  Respiratory: no distress  Extremities: ace wrap dressing right foot clean and dry  Line Sites: Clear  Skin: No rash                        10.8   4.83  )-----------( 41       ( 31 May 2019 10:10 )             34.3   WBC Count: 4.83 (05-31 @ 10:10)  WBC Count: 4.9 (05-29 @ 11:37)    05-31    132<L>  |  97  |  46<H>  ----------------------------<  205<H>  4.5   |  22  |  1.42<H>  Creatinine: 1.42 (05-31 @ 06:11)    Creatinine: 1.65 (05-29 @ 23:22)    Creatinine: 1.45 (05-29 @ 11:37)     Ca    9.3      31 May 2019 06:11      MICROBIOLOGY:  .Blood  05-29-19   No growth to date.  --  --      .Blood  05-04-19   No growth at 5 days.  --  --      .Urine  05-03-19   10,000 - 49,000 CFU/mL Enterococcus faecalis  --  Enterococcus faecalis    .Blood  05-02-19   Growth in aerobic and anaerobic bottles: Streptococcus agalactiae (Group  B)    Vancomycin Level, Trough: 13.6 ug/mL (05-31-19 @ 06:12)      RADIOLOGY:    Gopi Grace MD; Division of Infectious Disease; Pager: [phone]; nights and weekends: [phone]

## 2019-05-31 NOTE — DISCHARGE NOTE PROVIDER - NSDCCPCAREPLAN_GEN_ALL_CORE_FT
PRINCIPAL DISCHARGE DIAGNOSIS  Diagnosis: Diabetic foot infection  Assessment and Plan of Treatment: right toe ulcer extending down to underlying bone.  s/p Amputation of hallux earlier today 5/31 - Podiatry notes hard good quality bone at margin - low risk of residual infection  S/P Zosyn /Vanco 5/29 -->    6/1  Augmentin 875 mg po twice daily 6/1 --> 6/6  wound care as per Podiatry        SECONDARY DISCHARGE DIAGNOSES  Diagnosis: Ischemic cardiomyopathy  Assessment and Plan of Treatment:     Diagnosis: Diabetes  Assessment and Plan of Treatment:     Diagnosis: Hypothyroid  Assessment and Plan of Treatment:     Diagnosis: Hypertension  Assessment and Plan of Treatment: PRINCIPAL DISCHARGE DIAGNOSIS  Diagnosis: Diabetic foot infection  Assessment and Plan of Treatment: The right toe ulcer extended down to the underlying bone.  You've had an amputation of your right great toe on 5/31 by Podiatry.  Continue with antibiotic for the next 7 days until completed.  Follow up with Podiatry in 1 week. Call for appointment.        SECONDARY DISCHARGE DIAGNOSES  Diagnosis: Ischemic cardiomyopathy  Assessment and Plan of Treatment: Continue with medications as prescribed and follow up with your primary healthcare provider.    Diagnosis: Diabetes  Assessment and Plan of Treatment: HgA1C 8.5 on 4/11/2019  Make sure you get your HgA1c checked every three months.  If you take oral diabetes medications, check your blood glucose two times a day.  If you take insulin, check your blood glucose before meals and at bedtime.  It's important not to skip any meals.  Keep a log of your blood glucose results and always take it with you to your doctor appointments.  Keep a list of your current medications including injectables and over the counter medications and bring this medication list with you to all your doctor appointments.  If you have not seen your ophthalmologist this year call for appointment.  Check your feet daily for redness, sores, or openings. Do not self treat. If no improvement in two days call your primary care physician for an appointment.    Diagnosis: Hypothyroid  Assessment and Plan of Treatment: Continue with medication as prescribed.    Diagnosis: Hypertension  Assessment and Plan of Treatment: Low salt diet  Activity as tolerated.  Take all medication as prescribed.  Follow up with your medical doctor for routine blood pressure monitoring at your next visit.  Notify your doctor if you have any of the following symptoms:   Dizziness, Lightheadedness, Blurry vision, Headache, Chest pain, Shortness of breath

## 2019-05-31 NOTE — PROGRESS NOTE ADULT - ASSESSMENT
69M   with      PMHx   of   ischemic cardiomyopathy , ef of  30 to 35,  /  diastolic   dysfunction,      s/p   ICD /PPM in 2006,   generator change .        CABG in 1986,   T2DM on lantus,   hypothyroidism,    Afib (spontaneously cardioverted and not on AC),  HTN,  HLD,   BEHZAD (not on CPAP),     FERNANDES   and cirrhosis   with portal HTN  c,/ .c  thrombocytopenia  from cirrhosis,  h/o  pituitary  adenoma,         h/o HCC s/p IR embolization x 2 (last on 4/18), strep  agalactiae  bacteremia  5/ 19     sent to  er  by podiatrist  fro  right toe  amputation  pod/ dr lombardo   ID/ ab  per  ID/  dr segura  called by podiatry  on lasix/ Ramipril ,   coreg/   lipitor./    asa/  aldactone  daily prednisone  for past month for   gout  dm, on lantus/  follow fs  c/c  anemia  and  c/c  thrombocytopenia. from cirrhosis  pt cleared  for  podiatry  surgery/ amputation   of  right distal phalanx,  on Friday  will need  stress dose  steroid , on , day of surgery  dm/  humalog  started  / pt eating cookies  hyponatremia from hyperglycemia     [facility] called by brenton doherty       < from: Transthoracic Echocardiogram (05.06.19 @ 06:16) >   Mitral annular calcification. Tethered mitral valve  leaflets. Mild mitral regurgitation.  2. Calcified trileaflet aortic valve with normal opening.  No aortic valve regurgitation seen.  3. Eccentric left ventricular hypertrophy (dilated left  ventricle with normal relative wall thickness). Moderate  left ventricular enlargement.  4. Moderate to severe segmental left ventricular systolic  dysfunction. The inferior, inferoseptal, and inferolateral  walls are akinetic. Paradoxical septal motion consistent  with paced rhythm/conduction defect.  5. Moderate diastolic dysfunction (Stage II).  6. Right ventricle not well visualized; normal right  ventricular size with decreased right ventricular systolic  function. A device wire is noted in the right heart.  7. Estimated pulmonary artery systolic pressure equals 46  mm Hg, assuming right atrial pressure equals 8 mm Hg,  consistent with mild pulmonary pressures.  < end of copied text >       < from: CT Abdomen w/ Oral Cont and w/wo IV Cont (05.18.19 @ 12:28) >  MPRESSION:   Cirrhosis with evidence of portal hypertension.   No evidence of viable tumor (LR-TR Nonviable) associated with lesions in   segment 7 at the junction of segments 4A and 8 post intervention  < end of copied text >

## 2019-06-01 ENCOUNTER — TRANSCRIPTION ENCOUNTER (OUTPATIENT)
Age: 69
End: 2019-06-01

## 2019-06-01 LAB
ANION GAP SERPL CALC-SCNC: 13 MMOL/L — SIGNIFICANT CHANGE UP (ref 5–17)
BUN SERPL-MCNC: 57 MG/DL — HIGH (ref 7–23)
CALCIUM SERPL-MCNC: 8.5 MG/DL — SIGNIFICANT CHANGE UP (ref 8.4–10.5)
CHLORIDE SERPL-SCNC: 95 MMOL/L — LOW (ref 96–108)
CO2 SERPL-SCNC: 20 MMOL/L — LOW (ref 22–31)
CREAT SERPL-MCNC: 1.79 MG/DL — HIGH (ref 0.5–1.3)
GLUCOSE BLDC GLUCOMTR-MCNC: 238 MG/DL — HIGH (ref 70–99)
GLUCOSE BLDC GLUCOMTR-MCNC: 280 MG/DL — HIGH (ref 70–99)
GLUCOSE BLDC GLUCOMTR-MCNC: 291 MG/DL — HIGH (ref 70–99)
GLUCOSE BLDC GLUCOMTR-MCNC: 299 MG/DL — HIGH (ref 70–99)
GLUCOSE BLDC GLUCOMTR-MCNC: 320 MG/DL — HIGH (ref 70–99)
GLUCOSE BLDC GLUCOMTR-MCNC: 331 MG/DL — HIGH (ref 70–99)
GLUCOSE BLDC GLUCOMTR-MCNC: 380 MG/DL — HIGH (ref 70–99)
GLUCOSE SERPL-MCNC: 194 MG/DL — HIGH (ref 70–99)
GRAM STN FLD: SIGNIFICANT CHANGE UP
GRAM STN FLD: SIGNIFICANT CHANGE UP
HCT VFR BLD CALC: 30.1 % — LOW (ref 39–50)
HGB BLD-MCNC: 9.7 G/DL — LOW (ref 13–17)
MCHC RBC-ENTMCNC: 27.7 PG — SIGNIFICANT CHANGE UP (ref 27–34)
MCHC RBC-ENTMCNC: 32.2 GM/DL — SIGNIFICANT CHANGE UP (ref 32–36)
MCV RBC AUTO: 86 FL — SIGNIFICANT CHANGE UP (ref 80–100)
PLATELET # BLD AUTO: 43 K/UL — LOW (ref 150–400)
POTASSIUM SERPL-MCNC: 4.5 MMOL/L — SIGNIFICANT CHANGE UP (ref 3.5–5.3)
POTASSIUM SERPL-SCNC: 4.5 MMOL/L — SIGNIFICANT CHANGE UP (ref 3.5–5.3)
RBC # BLD: 3.5 M/UL — LOW (ref 4.2–5.8)
RBC # FLD: 16.8 % — HIGH (ref 10.3–14.5)
SODIUM SERPL-SCNC: 128 MMOL/L — LOW (ref 135–145)
SPECIMEN SOURCE: SIGNIFICANT CHANGE UP
SPECIMEN SOURCE: SIGNIFICANT CHANGE UP
WBC # BLD: 6.07 K/UL — SIGNIFICANT CHANGE UP (ref 3.8–10.5)
WBC # FLD AUTO: 6.07 K/UL — SIGNIFICANT CHANGE UP (ref 3.8–10.5)

## 2019-06-01 RX ORDER — HYDROCORTISONE 20 MG
25 TABLET ORAL EVERY 12 HOURS
Refills: 0 | Status: COMPLETED | OUTPATIENT
Start: 2019-06-01 | End: 2019-06-03

## 2019-06-01 RX ORDER — CARVEDILOL PHOSPHATE 80 MG/1
6.25 CAPSULE, EXTENDED RELEASE ORAL EVERY 12 HOURS
Refills: 0 | Status: DISCONTINUED | OUTPATIENT
Start: 2019-06-01 | End: 2019-06-03

## 2019-06-01 RX ORDER — HYDROCORTISONE 20 MG
25 TABLET ORAL EVERY 12 HOURS
Refills: 0 | Status: DISCONTINUED | OUTPATIENT
Start: 2019-06-01 | End: 2019-06-01

## 2019-06-01 RX ORDER — LISINOPRIL 2.5 MG/1
5 TABLET ORAL DAILY
Refills: 0 | Status: DISCONTINUED | OUTPATIENT
Start: 2019-06-01 | End: 2019-06-03

## 2019-06-01 RX ADMIN — Medication 81 MILLIGRAM(S): at 11:28

## 2019-06-01 RX ADMIN — Medication 5 UNIT(S): at 18:10

## 2019-06-01 RX ADMIN — BROMOCRIPTINE MESYLATE 5 MILLIGRAM(S): 5 CAPSULE ORAL at 11:28

## 2019-06-01 RX ADMIN — SPIRONOLACTONE 25 MILLIGRAM(S): 25 TABLET, FILM COATED ORAL at 21:58

## 2019-06-01 RX ADMIN — Medication 2: at 21:58

## 2019-06-01 RX ADMIN — Medication 5 UNIT(S): at 08:56

## 2019-06-01 RX ADMIN — ATORVASTATIN CALCIUM 40 MILLIGRAM(S): 80 TABLET, FILM COATED ORAL at 21:58

## 2019-06-01 RX ADMIN — Medication 250 MILLIGRAM(S): at 05:07

## 2019-06-01 RX ADMIN — Medication 250 MILLIGRAM(S): at 18:12

## 2019-06-01 RX ADMIN — Medication 40 MILLIGRAM(S): at 05:07

## 2019-06-01 RX ADMIN — CARVEDILOL PHOSPHATE 6.25 MILLIGRAM(S): 80 CAPSULE, EXTENDED RELEASE ORAL at 05:07

## 2019-06-01 RX ADMIN — PIPERACILLIN AND TAZOBACTAM 25 GRAM(S): 4; .5 INJECTION, POWDER, LYOPHILIZED, FOR SOLUTION INTRAVENOUS at 05:07

## 2019-06-01 RX ADMIN — SPIRONOLACTONE 25 MILLIGRAM(S): 25 TABLET, FILM COATED ORAL at 05:13

## 2019-06-01 RX ADMIN — Medication 1 TABLET(S): at 18:13

## 2019-06-01 RX ADMIN — Medication 125 MICROGRAM(S): at 05:08

## 2019-06-01 RX ADMIN — INSULIN GLARGINE 24 UNIT(S): 100 INJECTION, SOLUTION SUBCUTANEOUS at 23:11

## 2019-06-01 RX ADMIN — Medication 250 MILLIGRAM(S): at 05:08

## 2019-06-01 RX ADMIN — Medication 2: at 08:56

## 2019-06-01 RX ADMIN — Medication 5 UNIT(S): at 13:30

## 2019-06-01 RX ADMIN — Medication 50 MILLIGRAM(S): at 05:08

## 2019-06-01 RX ADMIN — Medication 25 MILLIGRAM(S): at 18:11

## 2019-06-01 RX ADMIN — Medication 4: at 13:33

## 2019-06-01 RX ADMIN — Medication 3: at 18:13

## 2019-06-01 RX ADMIN — Medication 20 MILLIEQUIVALENT(S): at 11:28

## 2019-06-01 NOTE — PROVIDER CONTACT NOTE (OTHER) - RECOMMENDATIONS
hold coreg
administer 4 units humalog, 24 units lantus and notify NP
hold or rescheduled some BP meds
holding lisinopril.
sliding scale order set
notify NP. hold PM coreg

## 2019-06-01 NOTE — PROGRESS NOTE ADULT - SUBJECTIVE AND OBJECTIVE BOX
CARLIN [id number]  69y  Male  Type 2 diabetes mellitus with other skin complication  H/o or current diagnosis of HF- ACEI/ARB contraindication unknown  H/o or current diagnosis of HF- no contraindication to ACEI/ARBs  H/o or current diagnosis of HF- ACEI/ARB contraindication unknown  H/o or current diagnosis of HF- Contraindication to ACEI/ARBs  H/o or current diagnosis of HF- ACEI/ARB contraindication unknown  Family history of MI (myocardial infarction)  Handoff  MEWS Score  Afib  PAF (paroxysmal atrial fibrillation)  Ulcer of right ankle  Liver mass  Gout  Elevated prolactin level  Vitamin D deficiency  Elevated triglycerides with high cholesterol  Hypothyroidism  PVD (peripheral vascular disease)  History of hyperprolactinemia  Hepatic cirrhosis, unspecified hepatic cirrhosis type  Anemia  ICD (implantable cardioverter-defibrillator) in place  Sleep apnea  History of osteomyelitis  Presence of stent in coronary artery  Heart failure with reduced left ventricular function  Ischemic cardiomyopathy  Pituitary adenoma  Coronary artery disease  Thrombocytopenia  HLD (hyperlipidemia)  HTN (hypertension)  DM (diabetes mellitus)  AICD lead malfunction  Shock  Osteomyelitis of forefoot  Osteomyelitis of forefoot  Diabetic foot infection  History of hyperprolactinemia  Hypothyroidism, unspecified type  DM (diabetes mellitus)  Partial amputation of toe of right foot  Encounter for incision and drainage procedure  History of amputation  AICD (automatic cardioverter/defibrillator) present  Stented coronary artery  Coronary atherosclerosis of artery bypass graft  R FOT WOUND  17  Ischemic cardiomyopathy  Diabetes  Hypothyroid  Hypertension    acetaminophen   Tablet .. 650 milliGRAM(s) Oral every 6 hours PRN  amoxicillin  875 milliGRAM(s)/clavulanate 1 Tablet(s) Oral two times a day  aspirin enteric coated 81 milliGRAM(s) Oral daily  atorvastatin 40 milliGRAM(s) Oral at bedtime  bromocriptine Capsule 5 milliGRAM(s) Oral daily  carvedilol 6.25 milliGRAM(s) Oral every 12 hours  dextrose 40% Gel 15 Gram(s) Oral once PRN  dextrose 5%. 1000 milliLiter(s) IV Continuous <Continuous>  dextrose 50% Injectable 12.5 Gram(s) IV Push once  dextrose 50% Injectable 25 Gram(s) IV Push once  dextrose 50% Injectable 25 Gram(s) IV Push once  furosemide    Tablet 40 milliGRAM(s) Oral daily  glucagon  Injectable 1 milliGRAM(s) IntraMuscular once PRN  insulin glargine SubCutaneous Injection (LANTUS) - Peds 24 Unit(s) SubCutaneous at bedtime  insulin lispro (HumaLOG) corrective regimen sliding scale   SubCutaneous three times a day before meals  insulin lispro (HumaLOG) corrective regimen sliding scale   SubCutaneous at bedtime  insulin lispro Injectable (HumaLOG) 5 Unit(s) SubCutaneous three times a day before meals  levothyroxine 125 MICROGram(s) Oral daily  lisinopril 5 milliGRAM(s) Oral daily  oxyCODONE    5 mG/acetaminophen 325 mG 1 Tablet(s) Oral every 6 hours PRN  potassium chloride    Tablet ER 20 milliEquivalent(s) Oral daily  saccharomyces boulardii 250 milliGRAM(s) Oral two times a day  spironolactone 25 milliGRAM(s) Oral two times a day  No Known Allergies    CBC Full  -  ( 01 Jun 2019 10:52 )  WBC Count : 6.07 K/uL  RBC Count : 3.50 M/uL  Hemoglobin : 9.7 g/dL  Hematocrit : 30.1 %  Platelet Count - Automated : 43 K/uL  Mean Cell Volume : 86.0 fl  Mean Cell Hemoglobin : 27.7 pg  Mean Cell Hemoglobin Concentration : 32.2 gm/dL  Auto Neutrophil # : x  Auto Lymphocyte # : x  Auto Monocyte # : x  Auto Eosinophil # : x  Auto Basophil # : x  Auto Neutrophil % : x  Auto Lymphocyte % : x  Auto Monocyte % : x  Auto Eosinophil % : x  Auto Basophil % : x    Patient visited post op day one hallux amputation right foot. Right foot bled well with no complications seen during surgery. Patient anxious to go home. Dressings reduced in usual sterile fashion with no signs of dehiscence. Ecchymotic appearance to distal aspect fo right hallux possible bruising. Two sutures popped and removed at distal tip of right hallux to see if any signs of hematoma, but no significant drainage noted and no signs of cellulitis. Foot appears cool in spite of palpable pulses.  Reapply dsd right foot without coflex. Patient told that he should not be discharged today since he has some ecchymosis and I want to make sure that his foot has an improved appearance before allowing him to go home. Right foot to be reevaluated tomorrow and possible discharge with follow up at my office next week Podiatry to follow

## 2019-06-01 NOTE — DISCHARGE NOTE NURSING/CASE MANAGEMENT/SOCIAL WORK - NSDCFUADDAPPT_GEN_ALL_CORE_FT
Podiatry Discharge Instructions:  Follow up: Please follow up with Dr. Tristan within 1 week of discharge from the hospital, please call [phone] for appointment and discuss that you recently were seen in the hospital.  Wound Care: Please leave your dressing clean dry intact until your follow up appointment.  Weight bearing: Please weight bear as tolerated in a surgical shoe.  Antibiotics: Please continue as instructed.

## 2019-06-01 NOTE — PROGRESS NOTE ADULT - SUBJECTIVE AND OBJECTIVE BOX
Chief Complaint:     History:    MEDICATIONS  (STANDING):  amoxicillin  875 milliGRAM(s)/clavulanate 1 Tablet(s) Oral two times a day  aspirin enteric coated 81 milliGRAM(s) Oral daily  atorvastatin 40 milliGRAM(s) Oral at bedtime  bromocriptine Capsule 5 milliGRAM(s) Oral daily  carvedilol 6.25 milliGRAM(s) Oral every 12 hours  dextrose 5%. 1000 milliLiter(s) (50 mL/Hr) IV Continuous <Continuous>  dextrose 50% Injectable 12.5 Gram(s) IV Push once  dextrose 50% Injectable 25 Gram(s) IV Push once  dextrose 50% Injectable 25 Gram(s) IV Push once  furosemide    Tablet 40 milliGRAM(s) Oral daily  insulin glargine SubCutaneous Injection (LANTUS) - Peds 24 Unit(s) SubCutaneous at bedtime  insulin lispro (HumaLOG) corrective regimen sliding scale   SubCutaneous three times a day before meals  insulin lispro (HumaLOG) corrective regimen sliding scale   SubCutaneous at bedtime  insulin lispro Injectable (HumaLOG) 5 Unit(s) SubCutaneous three times a day before meals  levothyroxine 125 MICROGram(s) Oral daily  lisinopril 5 milliGRAM(s) Oral daily  potassium chloride    Tablet ER 20 milliEquivalent(s) Oral daily  saccharomyces boulardii 250 milliGRAM(s) Oral two times a day  spironolactone 25 milliGRAM(s) Oral two times a day    MEDICATIONS  (PRN):  acetaminophen   Tablet .. 650 milliGRAM(s) Oral every 6 hours PRN Mild Pain (1 - 3)  dextrose 40% Gel 15 Gram(s) Oral once PRN Blood Glucose LESS THAN 70 milliGRAM(s)/deciliter  glucagon  Injectable 1 milliGRAM(s) IntraMuscular once PRN Glucose LESS THAN 70 milligrams/deciliter  oxyCODONE    5 mG/acetaminophen 325 mG 1 Tablet(s) Oral every 6 hours PRN Moderate Pain (4 - 6)      Allergies    No Known Allergies    Intolerances      Review of Systems:  Constitutional: No fever  Eyes: No blurry vision  Neuro: No tremors  HEENT: No pain  Cardiovascular: No chest pain, palpitations  Respiratory: No SOB, no cough  GI: No nausea, vomiting, abdominal pain  : No dysuria  Skin: no rash  Psych: no depression  Endocrine: no polyuria, polydipsia  Hem/lymph: no swelling  Osteoporosis: no fractures    ALL OTHER SYSTEMS REVIEWED AND NEGATIVE    UNABLE TO OBTAIN    PHYSICAL EXAM:  VITALS: T(C): 36.3 (06-01-19 @ 08:51)  T(F): 97.3 (06-01-19 @ 08:51), Max: 98.7 (05-31-19 @ 21:40)  HR: 62 (06-01-19 @ 11:24) (60 - 80)  BP: 86/48 (06-01-19 @ 11:24) (84/60 - 113/73)  RR:  (18 - 18)  SpO2:  (96% - 100%)  Wt(kg): --  GENERAL: NAD, well-groomed, well-developed  EYES: No proptosis, no lid lag, anicteric  HEENT:  Atraumatic, Normocephalic, moist mucous membranes  THYROID: Normal size, no palpable nodules  RESPIRATORY: Clear to auscultation bilaterally; No rales, rhonchi, wheezing, or rubs  CARDIOVASCULAR: Regular rate and rhythm; No murmurs; no peripheral edema  GI: Soft, nontender, non distended, normal bowel sounds  SKIN: Dry, intact, No rashes or lesions  MUSCULOSKELETAL: Full range of motion, normal strength  NEURO: sensation intact, extraocular movements intact, no tremor, normal reflexes  PSYCH: Alert and oriented x 3, normal affect, normal mood  CUSHING'S SIGNS: no striae    POCT Blood Glucose.: 331 mg/dL (06-01-19 @ 12:54)  POCT Blood Glucose.: 238 mg/dL (06-01-19 @ 08:46)  POCT Blood Glucose.: 380 mg/dL (06-01-19 @ 00:24)  POCT Blood Glucose.: 427 mg/dL (05-31-19 @ 23:44)  POCT Blood Glucose.: 466 mg/dL (05-31-19 @ 22:18)  POCT Blood Glucose.: 442 mg/dL (05-31-19 @ 22:17)  POCT Blood Glucose.: 275 mg/dL (05-31-19 @ 19:04)  POCT Blood Glucose.: 288 mg/dL (05-31-19 @ 17:26)  POCT Blood Glucose.: 215 mg/dL (05-31-19 @ 14:28)  POCT Blood Glucose.: 213 mg/dL (05-31-19 @ 11:53)  POCT Blood Glucose.: 238 mg/dL (05-31-19 @ 09:51)  POCT Blood Glucose.: 308 mg/dL (05-30-19 @ 22:29)  POCT Blood Glucose.: 250 mg/dL (05-30-19 @ 17:17)  POCT Blood Glucose.: 283 mg/dL (05-30-19 @ 12:48)  POCT Blood Glucose.: 192 mg/dL (05-30-19 @ 08:22)  POCT Blood Glucose.: 271 mg/dL (05-30-19 @ 02:08)  POCT Blood Glucose.: 442 mg/dL (05-29-19 @ 22:10)  POCT Blood Glucose.: 435 mg/dL (05-29-19 @ 22:09)  POCT Blood Glucose.: 366 mg/dL (05-29-19 @ 16:13)      06-01    128<L>  |  95<L>  |  57<H>  ----------------------------<  194<H>  4.5   |  20<L>  |  1.79<H>    EGFR if : 44<L>  EGFR if non : 38<L>    Ca    8.5      06-01            Thyroid Function Tests:      Hemoglobin A1C, Whole Blood: 8.5 % <H> [4.0 - 5.6] (04-11-19 @ 15:27) Chief Complaint:     History:    MEDICATIONS  (STANDING):  amoxicillin  875 milliGRAM(s)/clavulanate 1 Tablet(s) Oral two times a day  aspirin enteric coated 81 milliGRAM(s) Oral daily  atorvastatin 40 milliGRAM(s) Oral at bedtime  bromocriptine Capsule 5 milliGRAM(s) Oral daily  carvedilol 6.25 milliGRAM(s) Oral every 12 hours  dextrose 5%. 1000 milliLiter(s) (50 mL/Hr) IV Continuous <Continuous>  dextrose 50% Injectable 12.5 Gram(s) IV Push once  dextrose 50% Injectable 25 Gram(s) IV Push once  dextrose 50% Injectable 25 Gram(s) IV Push once  furosemide    Tablet 40 milliGRAM(s) Oral daily  insulin glargine SubCutaneous Injection (LANTUS) - Peds 24 Unit(s) SubCutaneous at bedtime  insulin lispro (HumaLOG) corrective regimen sliding scale   SubCutaneous three times a day before meals  insulin lispro (HumaLOG) corrective regimen sliding scale   SubCutaneous at bedtime  insulin lispro Injectable (HumaLOG) 5 Unit(s) SubCutaneous three times a day before meals  levothyroxine 125 MICROGram(s) Oral daily  lisinopril 5 milliGRAM(s) Oral daily  potassium chloride    Tablet ER 20 milliEquivalent(s) Oral daily  saccharomyces boulardii 250 milliGRAM(s) Oral two times a day  spironolactone 25 milliGRAM(s) Oral two times a day    MEDICATIONS  (PRN):  acetaminophen   Tablet .. 650 milliGRAM(s) Oral every 6 hours PRN Mild Pain (1 - 3)  dextrose 40% Gel 15 Gram(s) Oral once PRN Blood Glucose LESS THAN 70 milliGRAM(s)/deciliter  glucagon  Injectable 1 milliGRAM(s) IntraMuscular once PRN Glucose LESS THAN 70 milligrams/deciliter  oxyCODONE    5 mG/acetaminophen 325 mG 1 Tablet(s) Oral every 6 hours PRN Moderate Pain (4 - 6)      Allergies    No Known Allergies    Intolerances      Review of Systems:  Constitutional: No fever  Eyes: No blurry vision  Neuro: No tremors  HEENT: No pain  Cardiovascular: No chest pain, palpitations  Respiratory: No SOB, no cough  GI: No nausea, vomiting, abdominal pain  : No dysuria  Skin: no rash  Psych: no depression  Endocrine: no polyuria, polydipsia  Hem/lymph: no swelling  Osteoporosis: no fractures    ALL OTHER SYSTEMS REVIEWED AND NEGATIVE        PHYSICAL EXAM:  VITALS: T(C): 36.3 (06-01-19 @ 08:51)  T(F): 97.3 (06-01-19 @ 08:51), Max: 98.7 (05-31-19 @ 21:40)  HR: 62 (06-01-19 @ 11:24) (60 - 80)  BP: 86/48 (06-01-19 @ 11:24) (84/60 - 113/73)  RR:  (18 - 18)  SpO2:  (96% - 100%)  Wt(kg): --  GENERAL: NAD, well-groomed, well-developed  EYES: No proptosis, no lid lag, anicteric  HEENT:  Atraumatic, Normocephalic, moist mucous membranes  THYROID: Normal size, no palpable nodules  RESPIRATORY: Clear to auscultation bilaterally; No rales, rhonchi, wheezing, or rubs  CARDIOVASCULAR: Regular rate and rhythm; No murmurs; no peripheral edema  GI: Soft, nontender, non distended, normal bowel sounds  SKIN: Dry, intact, No rashes or lesions  MUSCULOSKELETAL: Full range of motion, normal strength  NEURO: sensation intact, extraocular movements intact, no tremor, normal reflexes  PSYCH: Alert and oriented x 3, normal affect, normal mood  CUSHING'S SIGNS: no striae    POCT Blood Glucose.: 331 mg/dL (06-01-19 @ 12:54)  POCT Blood Glucose.: 238 mg/dL (06-01-19 @ 08:46)  POCT Blood Glucose.: 380 mg/dL (06-01-19 @ 00:24)  POCT Blood Glucose.: 427 mg/dL (05-31-19 @ 23:44)  POCT Blood Glucose.: 466 mg/dL (05-31-19 @ 22:18)  POCT Blood Glucose.: 442 mg/dL (05-31-19 @ 22:17)  POCT Blood Glucose.: 275 mg/dL (05-31-19 @ 19:04)  POCT Blood Glucose.: 288 mg/dL (05-31-19 @ 17:26)  POCT Blood Glucose.: 215 mg/dL (05-31-19 @ 14:28)  POCT Blood Glucose.: 213 mg/dL (05-31-19 @ 11:53)  POCT Blood Glucose.: 238 mg/dL (05-31-19 @ 09:51)  POCT Blood Glucose.: 308 mg/dL (05-30-19 @ 22:29)  POCT Blood Glucose.: 250 mg/dL (05-30-19 @ 17:17)  POCT Blood Glucose.: 283 mg/dL (05-30-19 @ 12:48)  POCT Blood Glucose.: 192 mg/dL (05-30-19 @ 08:22)  POCT Blood Glucose.: 271 mg/dL (05-30-19 @ 02:08)  POCT Blood Glucose.: 442 mg/dL (05-29-19 @ 22:10)  POCT Blood Glucose.: 435 mg/dL (05-29-19 @ 22:09)  POCT Blood Glucose.: 366 mg/dL (05-29-19 @ 16:13)      06-01    128<L>  |  95<L>  |  57<H>  ----------------------------<  194<H>  4.5   |  20<L>  |  1.79<H>    EGFR if : 44<L>  EGFR if non : 38<L>    Ca    8.5      06-01              Hemoglobin A1C, Whole Blood: 8.5 % <H> [4.0 - 5.6] (04-11-19 @ 15:27) Chief Complaint: DM2    History: Patient is hyperglycemic in 300s    MEDICATIONS  (STANDING):  amoxicillin  875 milliGRAM(s)/clavulanate 1 Tablet(s) Oral two times a day  aspirin enteric coated 81 milliGRAM(s) Oral daily  atorvastatin 40 milliGRAM(s) Oral at bedtime  bromocriptine Capsule 5 milliGRAM(s) Oral daily  carvedilol 6.25 milliGRAM(s) Oral every 12 hours  dextrose 5%. 1000 milliLiter(s) (50 mL/Hr) IV Continuous <Continuous>  dextrose 50% Injectable 12.5 Gram(s) IV Push once  dextrose 50% Injectable 25 Gram(s) IV Push once  dextrose 50% Injectable 25 Gram(s) IV Push once  furosemide    Tablet 40 milliGRAM(s) Oral daily  insulin glargine SubCutaneous Injection (LANTUS) - Peds 24 Unit(s) SubCutaneous at bedtime  insulin lispro (HumaLOG) corrective regimen sliding scale   SubCutaneous three times a day before meals  insulin lispro (HumaLOG) corrective regimen sliding scale   SubCutaneous at bedtime  insulin lispro Injectable (HumaLOG) 5 Unit(s) SubCutaneous three times a day before meals  levothyroxine 125 MICROGram(s) Oral daily  lisinopril 5 milliGRAM(s) Oral daily  potassium chloride    Tablet ER 20 milliEquivalent(s) Oral daily  saccharomyces boulardii 250 milliGRAM(s) Oral two times a day  spironolactone 25 milliGRAM(s) Oral two times a day    MEDICATIONS  (PRN):  acetaminophen   Tablet .. 650 milliGRAM(s) Oral every 6 hours PRN Mild Pain (1 - 3)  dextrose 40% Gel 15 Gram(s) Oral once PRN Blood Glucose LESS THAN 70 milliGRAM(s)/deciliter  glucagon  Injectable 1 milliGRAM(s) IntraMuscular once PRN Glucose LESS THAN 70 milligrams/deciliter  oxyCODONE    5 mG/acetaminophen 325 mG 1 Tablet(s) Oral every 6 hours PRN Moderate Pain (4 - 6)      Allergies    No Known Allergies    Intolerances      Review of Systems:  Constitutional: No fever  Eyes: No blurry vision  Neuro: No tremors  HEENT: No pain  Cardiovascular: No chest pain, palpitations  Respiratory: No SOB, no cough  GI: No nausea, vomiting, abdominal pain  : No dysuria  Skin: no rash  Psych: no depression  Endocrine: no polyuria, polydipsia  Hem/lymph: no swelling  Osteoporosis: no fractures    ALL OTHER SYSTEMS REVIEWED AND NEGATIVE        PHYSICAL EXAM:  VITALS: T(C): 36.3 (06-01-19 @ 08:51)  T(F): 97.3 (06-01-19 @ 08:51), Max: 98.7 (05-31-19 @ 21:40)  HR: 62 (06-01-19 @ 11:24) (60 - 80)  BP: 86/48 (06-01-19 @ 11:24) (84/60 - 113/73)  RR:  (18 - 18)  SpO2:  (96% - 100%)  Wt(kg): --  GENERAL: NAD, well-groomed, well-developed  EYES: No proptosis, no lid lag, anicteric  HEENT:  Atraumatic, Normocephalic, moist mucous membranes  THYROID: Normal size, no palpable nodules  RESPIRATORY: Clear to auscultation bilaterally; No rales, rhonchi, wheezing, or rubs  CARDIOVASCULAR: Regular rate and rhythm; No murmurs; no peripheral edema  GI: Soft, nontender, non distended, normal bowel sounds  SKIN: Dry, intact, No rashes or lesions  MUSCULOSKELETAL: Full range of motion, normal strength  NEURO: sensation intact, extraocular movements intact, no tremor, normal reflexes  PSYCH: Alert and oriented x 3, normal affect, normal mood  CUSHING'S SIGNS: no striae    POCT Blood Glucose.: 331 mg/dL (06-01-19 @ 12:54)  POCT Blood Glucose.: 238 mg/dL (06-01-19 @ 08:46)  POCT Blood Glucose.: 380 mg/dL (06-01-19 @ 00:24)  POCT Blood Glucose.: 427 mg/dL (05-31-19 @ 23:44)  POCT Blood Glucose.: 466 mg/dL (05-31-19 @ 22:18)  POCT Blood Glucose.: 442 mg/dL (05-31-19 @ 22:17)  POCT Blood Glucose.: 275 mg/dL (05-31-19 @ 19:04)  POCT Blood Glucose.: 288 mg/dL (05-31-19 @ 17:26)  POCT Blood Glucose.: 215 mg/dL (05-31-19 @ 14:28)  POCT Blood Glucose.: 213 mg/dL (05-31-19 @ 11:53)  POCT Blood Glucose.: 238 mg/dL (05-31-19 @ 09:51)  POCT Blood Glucose.: 308 mg/dL (05-30-19 @ 22:29)  POCT Blood Glucose.: 250 mg/dL (05-30-19 @ 17:17)  POCT Blood Glucose.: 283 mg/dL (05-30-19 @ 12:48)  POCT Blood Glucose.: 192 mg/dL (05-30-19 @ 08:22)  POCT Blood Glucose.: 271 mg/dL (05-30-19 @ 02:08)  POCT Blood Glucose.: 442 mg/dL (05-29-19 @ 22:10)  POCT Blood Glucose.: 435 mg/dL (05-29-19 @ 22:09)  POCT Blood Glucose.: 366 mg/dL (05-29-19 @ 16:13)      06-01    128<L>  |  95<L>  |  57<H>  ----------------------------<  194<H>  4.5   |  20<L>  |  1.79<H>    EGFR if : 44<L>  EGFR if non : 38<L>    Ca    8.5      06-01              Hemoglobin A1C, Whole Blood: 8.5 % <H> [4.0 - 5.6] (04-11-19 @ 15:27)

## 2019-06-01 NOTE — PROVIDER CONTACT NOTE (OTHER) - DATE AND TIME:
31-May-2019 09:50
01-Jun-2019 11:52
29-May-2019 22:19
30-May-2019 05:14
31-May-2019 17:19
31-May-2019 22:45
31-May-2019 21:44

## 2019-06-01 NOTE — PROVIDER CONTACT NOTE (OTHER) - ASSESSMENT
a&o x4, vss, pt is asymptomatic with BGL of 466.
asymptomatic, remains A&O x4. no complaints. denies feeling light headed or having dizziness
pt AOX4 V/S WNL NO SOB or distress noted
pt AOX4 no SOB or distress noted
a&o x4, BP 84/60 but all other vss. pt asymptomatic. denies weakness and dizziness. pt was on fluids while NPO but is currently off because he is eating again

## 2019-06-01 NOTE — PROVIDER CONTACT NOTE (OTHER) - BACKGROUND
admitted with type 2 DM with other Skin complication. pmh afib, type 2 DM, gout, PVD
pt admitted for DM ulcers
pt admitted for DM ulcers
received 250cc bolus last night r/t hypotension. this morning pt got aldactone, lasix and coreg
admitted with T2DM skin related complications. pmh HF, T2DM, AICD, gout, hypothyroid

## 2019-06-01 NOTE — DISCHARGE NOTE NURSING/CASE MANAGEMENT/SOCIAL WORK - NSDCDPATPORTLINK_GEN_ALL_CORE
You can access the [facility], offered by [facility], by registering with the following website: [email or web address]

## 2019-06-01 NOTE — PROVIDER CONTACT NOTE (OTHER) - SITUATION
pt has manual BP of 94/50. HR 68. asymptomatic
Pt returned from PACU with bp of 86/50. other vss. asymptomatic, A&Ox4. Denies light headed-ness or dizziness
Pt with manual BP of 86/48
f/s 435 and 442 no sliding scale ordered
pt /64 HR 58 and 3 BP meds schedule for this AM
BP 84/60 manual, pt asymptomatic

## 2019-06-01 NOTE — PROGRESS NOTE ADULT - SUBJECTIVE AND OBJECTIVE BOX
69M with PMHx of ischemic cardiomyopathy s/p ICD/PPM in 2006, and CABG in 1986, T2DM on lantus, hypothyroidism, Afib (spontaneously cardioverted and not on AC), HTN, HLD, BEHZAD (not on CPAP), gout, FERNANDES and cirrhosis with  diagnosed HCC s/p IR embolization x 2 (last on 4/18), with embozene came in with right big toe infection, was seen by podiatry as outpatient and sent to Hospital for and management of infection and likely become to amputation.  patient had amputations of other toes previously  without any need of transfusion support.  He does not give history of increase in bleeding  He is s/p right toe amputation  PAST MEDICAL & SURGICAL HISTORY:  Afib: pt reports ~9 mos, [id number], resolved - monitored by Dr. Rivers  Ulcer of right ankle: has surgery done Dec 2018  Liver mass: dx: 10/2018   incidental finding. Currently awaitng furthur workup  Gout: medically managed  Elevated prolactin level: dx: &#x27; 70&#x27;s  medically managed Bromocriptine  Vitamin D deficiency  Elevated triglycerides with high cholesterol: on meds  Hypothyroidism  PVD (peripheral vascular disease)  History of hyperprolactinemia  Hepatic cirrhosis, unspecified hepatic cirrhosis type  Anemia  ICD (implantable cardioverter-defibrillator) in place: [employer], Old Model [id number] , last interrogation 3/27/19, generator change 4/3/19 New Model # [id number]  Sleep apnea: not using CPAP  History of osteomyelitis: 2015, right foot 2nd toe   follow-up by podiatrist every 2 weeks  Presence of stent in coronary artery: 2 stents  Heart failure with reduced left ventricular function: last EF 43% (2/2018)  Ischemic cardiomyopathy  Pituitary adenoma: as per patient chronic, no changes , recently restarted follow up with endocrinologist ( note in allscripts )  Coronary artery disease  Thrombocytopenia: Chronic  HTN (hypertension)  DM (diabetes mellitus): type 2, Hg A1C 9.9% 2/2019  Dec 2019  due to prednisone started on Lantus Jan 2019  AICD lead malfunction: history of - fixed follow-up by Dr Rivers  Encounter for incision and drainage procedure: Dec 2018 right foot/ankle  History of amputation: right foot, 2nd toe, osteo 2015  AICD (automatic cardioverter/defibrillator) present: placement in (2006), generator change 4/3/19  Stented coronary artery: RCA (1999)  Coronary atherosclerosis of artery bypass graft: CABG X 4 (1986)    Medications:  acetaminophen   Tablet .. 650 milliGRAM(s) Oral every 6 hours PRN Mild Pain (1 - 3)  amoxicillin  875 milliGRAM(s)/clavulanate 1 Tablet(s) Oral two times a day  aspirin enteric coated 81 milliGRAM(s) Oral daily  atorvastatin 40 milliGRAM(s) Oral at bedtime  bromocriptine Capsule 5 milliGRAM(s) Oral daily  carvedilol 6.25 milliGRAM(s) Oral every 12 hours  dextrose 40% Gel 15 Gram(s) Oral once PRN Blood Glucose LESS THAN 70 milliGRAM(s)/deciliter  dextrose 5%. 1000 milliLiter(s) IV Continuous <Continuous>  dextrose 50% Injectable 12.5 Gram(s) IV Push once  dextrose 50% Injectable 25 Gram(s) IV Push once  dextrose 50% Injectable 25 Gram(s) IV Push once  furosemide    Tablet 40 milliGRAM(s) Oral daily  glucagon  Injectable 1 milliGRAM(s) IntraMuscular once PRN Glucose LESS THAN 70 milligrams/deciliter  insulin glargine SubCutaneous Injection (LANTUS) - Peds 24 Unit(s) SubCutaneous at bedtime  insulin lispro (HumaLOG) corrective regimen sliding scale   SubCutaneous three times a day before meals  insulin lispro (HumaLOG) corrective regimen sliding scale   SubCutaneous at bedtime  insulin lispro Injectable (HumaLOG) 5 Unit(s) SubCutaneous three times a day before meals  levothyroxine 125 MICROGram(s) Oral daily  lisinopril 5 milliGRAM(s) Oral daily  oxyCODONE    5 mG/acetaminophen 325 mG 1 Tablet(s) Oral every 6 hours PRN Moderate Pain (4 - 6)  potassium chloride    Tablet ER 20 milliEquivalent(s) Oral daily  saccharomyces boulardii 250 milliGRAM(s) Oral two times a day  spironolactone 25 milliGRAM(s) Oral two times a day    Labs:  CBC Full  -  ( 01 Jun 2019 10:52 )  WBC Count : 6.07 K/uL  Hemoglobin : 9.7 g/dL  Hematocrit : 30.1 %  Platelet Count - Automated : 43 K/uL  Mean Cell Volume : 86.0 fl  Mean Cell Hemoglobin : 27.7 pg  Mean Cell Hemoglobin Concentration : 32.2 gm/dL  Auto Neutrophil # : x  Auto Lymphocyte # : x  Auto Monocyte # : x  Auto Eosinophil # : x  Auto Basophil # : x  Auto Neutrophil % : x  Auto Lymphocyte % : x  Auto Monocyte % : x  Auto Eosinophil % : x  Auto Basophil % : x    06-01    128<L>  |  95<L>  |  57<H>  ----------------------------<  194<H>  4.5   |  20<L>  |  1.79<H>    Ca    8.5      01 Jun 2019 07:13        Radiology:             ROS:  Patient comfortable without distress  No SOB or chest pain  No palpitation  No abdominal pain, diarrhaea or constipation  No weakness of extremities  Right foot was evaluated by podiatry    Vital Signs Last 24 Hrs  T(C): 36.3 (01 Jun 2019 08:51), Max: 37.1 (31 May 2019 21:40)  T(F): 97.3 (01 Jun 2019 08:51), Max: 98.7 (31 May 2019 21:40)  HR: 62 (01 Jun 2019 11:24) (60 - 80)  BP: 86/48 (01 Jun 2019 11:24) (77/49 - 113/73)  BP(mean): 63 (31 May 2019 15:30) (59 - 69)  RR: 18 (01 Jun 2019 08:51) (14 - 18)  SpO2: 100% (01 Jun 2019 08:51) (96% - 100%)    Physical exam:  Patient alert and oriented  No distress  CVS: S1, S2 regular or murmur  Chest: bilateral breath sound without rales  Abdomen: soft, not tender, no organomegaly or masses  No focal neuro deficit  Right foot in dressing, there was no active bleeding as per podiatry but likely bruise      Assessment and Plan:

## 2019-06-01 NOTE — PROGRESS NOTE ADULT - ASSESSMENT
69M with PMHx of ischemic cardiomyopathy s/p ICD/PPM in 2006, and CABG in 1986, T2DM on lantus, hypothyroidism, Afib (spontaneously cardioverted and not on AC), HTN, HLD, BEHZAD (not on CPAP), gout, FERNANDES and cirrhosis came in with right big toe infection, was seen by podiatry as outpatient and sent to Hospital for  management of infection and  toe amputation.  Patient's hepatocellular carcinoma  was dx in 2018. He is followed by Dr. Alaniz and is s/p IR embolization x 2 (last on 4/18), with embozene    His thrombocytopenia is related to cirrhosis and splenomegaly and is of same magnitude for last few years. He apparently had amputation of his other toes previously without platelet transfusion  and did not bleed excessively.  Right toe was amputated this time too without problem.  Podiatry note noted, no active bleeding. If active bleeding occurs without local factors, platelets can be considered.     Antibiotics as per infectious disease.  He will continue  follow-up with Dr. Alaniz regarding  his hepatocellular carcinoma. As per imaging studies, it has shown response to embolization

## 2019-06-01 NOTE — PROGRESS NOTE ADULT - SUBJECTIVE AND OBJECTIVE BOX
post op.  no cp/sob   high fs    REVIEW OF SYSTEMS:  GEN: no fever,    no chills  RESP: no SOB,   no cough  CVS: no chest pain,   no palpitations  GI: no abdominal pain,   no nausea,   no vomiting,   no constipation,   no diarrhea  : no dysuria,   no frequency  NEURO: no headache,   no dizziness  PSYCH: no depression,   not anxious  Derm : no rash    MEDICATIONS  (STANDING):  amoxicillin  875 milliGRAM(s)/clavulanate 1 Tablet(s) Oral two times a day  aspirin enteric coated 81 milliGRAM(s) Oral daily  atorvastatin 40 milliGRAM(s) Oral at bedtime  bromocriptine Capsule 5 milliGRAM(s) Oral daily  carvedilol 6.25 milliGRAM(s) Oral every 12 hours  dextrose 5%. 1000 milliLiter(s) (50 mL/Hr) IV Continuous <Continuous>  dextrose 50% Injectable 12.5 Gram(s) IV Push once  dextrose 50% Injectable 25 Gram(s) IV Push once  dextrose 50% Injectable 25 Gram(s) IV Push once  furosemide    Tablet 40 milliGRAM(s) Oral daily  insulin glargine SubCutaneous Injection (LANTUS) - Peds 24 Unit(s) SubCutaneous at bedtime  insulin lispro (HumaLOG) corrective regimen sliding scale   SubCutaneous three times a day before meals  insulin lispro (HumaLOG) corrective regimen sliding scale   SubCutaneous at bedtime  insulin lispro Injectable (HumaLOG) 5 Unit(s) SubCutaneous three times a day before meals  levothyroxine 125 MICROGram(s) Oral daily  lisinopril 5 milliGRAM(s) Oral daily  potassium chloride    Tablet ER 20 milliEquivalent(s) Oral daily  saccharomyces boulardii 250 milliGRAM(s) Oral two times a day  sodium chloride 0.9%. 1000 milliLiter(s) (45 mL/Hr) IV Continuous <Continuous>  spironolactone 25 milliGRAM(s) Oral two times a day    MEDICATIONS  (PRN):  acetaminophen   Tablet .. 650 milliGRAM(s) Oral every 6 hours PRN Mild Pain (1 - 3)  dextrose 40% Gel 15 Gram(s) Oral once PRN Blood Glucose LESS THAN 70 milliGRAM(s)/deciliter  glucagon  Injectable 1 milliGRAM(s) IntraMuscular once PRN Glucose LESS THAN 70 milligrams/deciliter  oxyCODONE    5 mG/acetaminophen 325 mG 1 Tablet(s) Oral every 6 hours PRN Moderate Pain (4 - 6)      Vital Signs Last 24 Hrs  T(C): 36.4 (01 Jun 2019 04:44), Max: 37.1 (31 May 2019 21:40)  T(F): 97.5 (01 Jun 2019 04:44), Max: 98.7 (31 May 2019 21:40)  HR: 73 (01 Jun 2019 04:44) (60 - 80)  BP: 113/73 (01 Jun 2019 04:44) (77/49 - 113/73)  BP(mean): 63 (31 May 2019 15:30) (59 - 82)  RR: 18 (01 Jun 2019 04:44) (14 - 18)  SpO2: 100% (01 Jun 2019 04:44) (96% - 100%)  CAPILLARY BLOOD GLUCOSE      POCT Blood Glucose.: 380 mg/dL (01 Jun 2019 00:24)  POCT Blood Glucose.: 427 mg/dL (31 May 2019 23:44)  POCT Blood Glucose.: 466 mg/dL (31 May 2019 22:18)  POCT Blood Glucose.: 442 mg/dL (31 May 2019 22:17)  POCT Blood Glucose.: 275 mg/dL (31 May 2019 19:04)  POCT Blood Glucose.: 288 mg/dL (31 May 2019 17:26)  POCT Blood Glucose.: 215 mg/dL (31 May 2019 14:28)  POCT Blood Glucose.: 213 mg/dL (31 May 2019 11:53)  POCT Blood Glucose.: 238 mg/dL (31 May 2019 09:51)    I&O's Summary    31 May 2019 07:01  -  01 Jun 2019 07:00  --------------------------------------------------------  IN: 2715 mL / OUT: 0 mL / NET: 2715 mL        PHYSICAL EXAM:  HEAD:  Atraumatic, Normocephalic  NECK: Supple, No   JVD  CHEST/LUNG:   no     rales,     no,    rhonchi  HEART: Regular rate and rhythm;         murmur  ABDOMEN: Soft, Nontender, ;   EXTREMITIES:   dressing right  foot  NEUROLOGY:  alert    LABS:                        10.8   4.83  )-----------( 41       ( 31 May 2019 10:10 )             34.3     05-31    132<L>  |  97  |  46<H>  ----------------------------<  205<H>  4.5   |  22  |  1.42<H>    Ca    9.3      31 May 2019 06:11      PT/INR - ( 31 May 2019 09:09 )   PT: 13.2 sec;   INR: 1.14 ratio         PTT - ( 31 May 2019 09:09 )  PTT:26.9 sec              Hemoglobin A1C, Whole Blood: 8.5 % (04-11 @ 15:27)          Culture - Tissue with Gram Stain (collected 06-01-19 @ 00:30)  Source: .Tissue  Gram Stain (06-01-19 @ 02:51):    No polymorphonuclear cells seen per low power field    No organisms seen per oil power field    Culture - Tissue with Gram Stain (collected 06-01-19 @ 00:30)  Source: .Tissue  Gram Stain (06-01-19 @ 02:53):    No polymorphonuclear cells seen per low power field    Few Gram positive cocci in pairs seen per oil power field        Consultant(s) Notes Reviewed:      Care Discussed with Consultants/Other Providers:

## 2019-06-01 NOTE — PROGRESS NOTE ADULT - ASSESSMENT
69 M with T2DM 8.5, previous toe amputations, gout on prednisone, pituitary adenoma and hyperprolactinemia on bromocriptine, hypothyroidism  history of ischemic cardiomyopathy s/p ICD/PPM in 2006, generator change, and CABG in 1986, Afib (spontaneously cardioverted and not on AC), HTN, HLD, BEHZAD (not on CPAP), gout, FERNANDES and cirrhosis with   HCC s/p IR embolization x 2 (last on 4/18), Recent admission with episode of GBS bacteremia treated with IV abx,  admitted 5.29/19 with rapidly developing right toe ulcer extending down to underlying bone s/p OR today (POD 0)  endocrine called for DM assistance     #diabetes type 2   home regimen: lantus 24, januvia, and metformin  Blood glucose gloals for CLINICALLY STABLE pts 100-140. if CLINICALLY UNSTABLE, premeal goals 140-180.  check FSBG TID qac and hs  carb consistent diet   - Lantus 24 u qhs  -Humalog 5-5-5 with meals. HOLD if NPO  -low dose SS TIDAC/qhs    inform endocrine of hypoglycemia or persistent hyperglycemia episodes as changes in pts insulin regimen will need to be made.   notify endocrine if any plans to be NPO/diet changes as this will also affect insulin regimen.    DC: Likely to resume home regimen if GFR and LFTS remains stable and followup with Dr. Hennessy.  can adjust lantus dose as needed       #HypoThyroid  continue levothyroxine tx as is   TFTS outpt with Dr. Hennessy   clinically euthyroid      #Long term use of steroids  pt has been on long term steroids with prednisone for about the last two months atleast  started on hydrocort 50q8 today  pt appears clinically well post op  continue this dose today and tomorrow if clinically improving: decrease to hydrocort 25 q12 X2 days  please contact pts rheumatolgist for prednisone plan.  if to resume prednisone, start prednisone on Monday with plan to taper off as per rheum outpt     **stress dose steroids at hydrocortisone 50mg q8** if HD unstable     #Hyperprolactinemia   pt has been on long term bromocriptine  assess prolactin levels outpt 69 M with T2DM 8.5, previous toe amputations, gout on prednisone, pituitary adenoma and hyperprolactinemia on bromocriptine, hypothyroidism  history of ischemic cardiomyopathy s/p ICD/PPM in 2006, generator change, and CABG in 1986, Afib (spontaneously cardioverted and not on AC), HTN, HLD, BEHZAD (not on CPAP), gout, FERNANDES and cirrhosis with   HCC s/p IR embolization x 2 (last on 4/18), Recent admission with episode of GBS bacteremia treated with IV abx,  admitted 5.29/19 with rapidly developing right toe ulcer extending down to underlying bone s/p OR   endocrine called for DM assistance     #diabetes type 2   home regimen: lantus 24, januvia, and metformin  Blood glucose gloals for CLINICALLY STABLE pts 100-140. if CLINICALLY UNSTABLE, premeal goals 140-180.  check FSBG TID qac and hs  carb consistent diet   - Continue Lantus 24 u qhs  - Increase Humalog to 9-9-9 with meals. HOLD if NPO  -low dose SS TIDAC/qhs    inform endocrine of hypoglycemia or persistent hyperglycemia episodes as changes in pts insulin regimen will need to be made.   notify endocrine if any plans to be NPO/diet changes as this will also affect insulin regimen.    DC: Likely to resume home regimen if GFR and LFTS remains stable and followup with Dr. Hennessy.  can adjust lantus dose as needed       #HypoThyroid  continue levothyroxine tx as is   TFTS outpt with Dr. Hennessy   clinically euthyroid      #Long term use of steroids  Patient will have steroid dose decreased to hydrocortisone 25mg IV q 12 hours.   -Will need to determine outpt prednisone plan form rheumatologist    #Hyperprolactinemia   pt has been on long term bromocriptine  assess prolactin levels outpt

## 2019-06-01 NOTE — PROGRESS NOTE ADULT - ASSESSMENT
69M   with      PMHx   of   ischemic cardiomyopathy , ef of  30 to 35,  /  diastolic   dysfunction,      s/p   ICD /PPM in 2006,   generator change .        CABG in 1986,   T2DM on lantus,   hypothyroidism,    Afib (spontaneously cardioverted and not on AC),  HTN,  HLD,   BEHZAD (not on CPAP),     FERNANDES   and cirrhosis   with portal HTN  c,/ .c  thrombocytopenia  from cirrhosis,  h/o  pituitary  adenoma,         h/o HCC s/p IR embolization x 2 (last on 4/18), strep  agalactiae  bacteremia  5/ 19     sent to  er  by podiatrist  fro  right toe  amputation  pod/ dr lombardo   ID/ ab  per  ID/  dr segura  called by podiatry  on lasix/ Ramipril ,   coreg/   lipitor./    asa/  aldactone  daily prednisone  for past month for   gout  dm, on lantus/  follow fs.  non complaint with diet  c/c  anemia  and  c/c  thrombocytopenia. from cirrhosis    s.p   amputation   of  right distal phalanx,  on  5/31/19  hyponatremia from hyperglycemia     dm  / insulin pe r house   endo  /   fs high this am   on augmentin per  ID   d/c  when cleared  by podiatry       < from: Transthoracic Echocardiogram (05.06.19 @ 06:16) >   Mitral annular calcification. Tethered mitral valve  leaflets. Mild mitral regurgitation.  2. Calcified trileaflet aortic valve with normal opening.  No aortic valve regurgitation seen.  3. Eccentric left ventricular hypertrophy (dilated left  ventricle with normal relative wall thickness). Moderate  left ventricular enlargement.  4. Moderate to severe segmental left ventricular systolic  dysfunction. The inferior, inferoseptal, and inferolateral  walls are akinetic. Paradoxical septal motion consistent  with paced rhythm/conduction defect.  5. Moderate diastolic dysfunction (Stage II).  6. Right ventricle not well visualized; normal right  ventricular size with decreased right ventricular systolic  function. A device wire is noted in the right heart.  7. Estimated pulmonary artery systolic pressure equals 46  mm Hg, assuming right atrial pressure equals 8 mm Hg,  consistent with mild pulmonary pressures.  < end of copied text >       < from: CT Abdomen w/ Oral Cont and w/wo IV Cont (05.18.19 @ 12:28) >  MPRESSION:   Cirrhosis with evidence of portal hypertension.   No evidence of viable tumor (LR-TR Nonviable) associated with lesions in   segment 7 at the junction of segments 4A and 8 post intervention  < end of copied text >

## 2019-06-02 LAB
-  AMPICILLIN: SIGNIFICANT CHANGE UP
-  TETRACYCLINE: SIGNIFICANT CHANGE UP
-  VANCOMYCIN: SIGNIFICANT CHANGE UP
ANION GAP SERPL CALC-SCNC: 12 MMOL/L — SIGNIFICANT CHANGE UP (ref 5–17)
BUN SERPL-MCNC: 57 MG/DL — HIGH (ref 7–23)
CALCIUM SERPL-MCNC: 9.1 MG/DL — SIGNIFICANT CHANGE UP (ref 8.4–10.5)
CHLORIDE SERPL-SCNC: 99 MMOL/L — SIGNIFICANT CHANGE UP (ref 96–108)
CO2 SERPL-SCNC: 20 MMOL/L — LOW (ref 22–31)
CREAT SERPL-MCNC: 1.45 MG/DL — HIGH (ref 0.5–1.3)
GLUCOSE BLDC GLUCOMTR-MCNC: 179 MG/DL — HIGH (ref 70–99)
GLUCOSE BLDC GLUCOMTR-MCNC: 225 MG/DL — HIGH (ref 70–99)
GLUCOSE BLDC GLUCOMTR-MCNC: 247 MG/DL — HIGH (ref 70–99)
GLUCOSE BLDC GLUCOMTR-MCNC: 275 MG/DL — HIGH (ref 70–99)
GLUCOSE SERPL-MCNC: 178 MG/DL — HIGH (ref 70–99)
HCT VFR BLD CALC: 31.8 % — LOW (ref 39–50)
HGB BLD-MCNC: 10.4 G/DL — LOW (ref 13–17)
MCHC RBC-ENTMCNC: 28 PG — SIGNIFICANT CHANGE UP (ref 27–34)
MCHC RBC-ENTMCNC: 32.7 GM/DL — SIGNIFICANT CHANGE UP (ref 32–36)
MCV RBC AUTO: 85.5 FL — SIGNIFICANT CHANGE UP (ref 80–100)
METHOD TYPE: SIGNIFICANT CHANGE UP
PLATELET # BLD AUTO: 45 K/UL — LOW (ref 150–400)
POTASSIUM SERPL-MCNC: 4.6 MMOL/L — SIGNIFICANT CHANGE UP (ref 3.5–5.3)
POTASSIUM SERPL-SCNC: 4.6 MMOL/L — SIGNIFICANT CHANGE UP (ref 3.5–5.3)
RBC # BLD: 3.72 M/UL — LOW (ref 4.2–5.8)
RBC # FLD: 16.6 % — HIGH (ref 10.3–14.5)
SODIUM SERPL-SCNC: 131 MMOL/L — LOW (ref 135–145)
WBC # BLD: 5.54 K/UL — SIGNIFICANT CHANGE UP (ref 3.8–10.5)
WBC # FLD AUTO: 5.54 K/UL — SIGNIFICANT CHANGE UP (ref 3.8–10.5)

## 2019-06-02 RX ORDER — INSULIN LISPRO 100/ML
8 VIAL (ML) SUBCUTANEOUS
Refills: 0 | Status: DISCONTINUED | OUTPATIENT
Start: 2019-06-02 | End: 2019-06-03

## 2019-06-02 RX ADMIN — Medication 1 TABLET(S): at 05:56

## 2019-06-02 RX ADMIN — Medication 8 UNIT(S): at 12:48

## 2019-06-02 RX ADMIN — Medication 2: at 17:33

## 2019-06-02 RX ADMIN — SPIRONOLACTONE 25 MILLIGRAM(S): 25 TABLET, FILM COATED ORAL at 17:32

## 2019-06-02 RX ADMIN — Medication 20 MILLIEQUIVALENT(S): at 12:47

## 2019-06-02 RX ADMIN — Medication 1: at 21:25

## 2019-06-02 RX ADMIN — ATORVASTATIN CALCIUM 40 MILLIGRAM(S): 80 TABLET, FILM COATED ORAL at 21:25

## 2019-06-02 RX ADMIN — Medication 25 MILLIGRAM(S): at 05:56

## 2019-06-02 RX ADMIN — Medication 8 UNIT(S): at 17:33

## 2019-06-02 RX ADMIN — Medication 2: at 12:48

## 2019-06-02 RX ADMIN — CARVEDILOL PHOSPHATE 6.25 MILLIGRAM(S): 80 CAPSULE, EXTENDED RELEASE ORAL at 05:56

## 2019-06-02 RX ADMIN — Medication 125 MICROGRAM(S): at 05:56

## 2019-06-02 RX ADMIN — Medication 250 MILLIGRAM(S): at 17:32

## 2019-06-02 RX ADMIN — Medication 250 MILLIGRAM(S): at 05:56

## 2019-06-02 RX ADMIN — SPIRONOLACTONE 25 MILLIGRAM(S): 25 TABLET, FILM COATED ORAL at 05:56

## 2019-06-02 RX ADMIN — Medication 5 UNIT(S): at 08:44

## 2019-06-02 RX ADMIN — INSULIN GLARGINE 24 UNIT(S): 100 INJECTION, SOLUTION SUBCUTANEOUS at 22:23

## 2019-06-02 RX ADMIN — BROMOCRIPTINE MESYLATE 5 MILLIGRAM(S): 5 CAPSULE ORAL at 12:47

## 2019-06-02 RX ADMIN — Medication 1 TABLET(S): at 17:33

## 2019-06-02 RX ADMIN — Medication 81 MILLIGRAM(S): at 12:47

## 2019-06-02 RX ADMIN — Medication 25 MILLIGRAM(S): at 17:33

## 2019-06-02 RX ADMIN — Medication 40 MILLIGRAM(S): at 05:56

## 2019-06-02 RX ADMIN — CARVEDILOL PHOSPHATE 6.25 MILLIGRAM(S): 80 CAPSULE, EXTENDED RELEASE ORAL at 18:06

## 2019-06-02 RX ADMIN — Medication 1: at 08:45

## 2019-06-02 NOTE — PROGRESS NOTE ADULT - ASSESSMENT
68 y/o male pt s/p hallux amputation right foot, closed  - pt seen and evaluated   - no signs of dehiscence. Ecchymotic appearance to distal aspect fo right hallux possible ischemic changes.   - Two more sutures popped and removed at distal tip of right hallux to see if any signs of hematoma, but no significant drainage noted and no signs of cellulitis   - Surgical site cool to touch and ischemic harmony vs ecchymosis now extending proximally and to 2nd toe.   - vascular consult placed  - hold off on d/c   - will cont to monitor   - d/w attending

## 2019-06-02 NOTE — PROGRESS NOTE ADULT - SUBJECTIVE AND OBJECTIVE BOX
Patient is a 69y old  Male who presents with a chief complaint of osteo (02 Jun 2019 08:05)       INTERVAL HPI/OVERNIGHT EVENTS:  Patient seen and evaluated at bedside.  Pt is resting comfortable in NAD. Denies N/V/F/C.  Pain rated at 0/10    Allergies    No Known Allergies    Intolerances        Vital Signs Last 24 Hrs  T(C): 36.4 (02 Jun 2019 08:48), Max: 36.7 (02 Jun 2019 05:52)  T(F): 97.5 (02 Jun 2019 08:48), Max: 98.1 (02 Jun 2019 05:52)  HR: 60 (02 Jun 2019 08:48) (58 - 61)  BP: 99/66 (02 Jun 2019 08:48) (90/55 - 113/77)  BP(mean): --  RR: 18 (02 Jun 2019 08:48) (17 - 18)  SpO2: 99% (02 Jun 2019 08:48) (97% - 100%)    LABS:                        10.4   5.54  )-----------( 45       ( 02 Jun 2019 10:05 )             31.8     06-02    131<L>  |  99  |  57<H>  ----------------------------<  178<H>  4.6   |  20<L>  |  1.45<H>    Ca    9.1      02 Jun 2019 07:39          CAPILLARY BLOOD GLUCOSE      POCT Blood Glucose.: 247 mg/dL (02 Jun 2019 12:38)  POCT Blood Glucose.: 179 mg/dL (02 Jun 2019 08:37)  POCT Blood Glucose.: 299 mg/dL (01 Jun 2019 23:09)  POCT Blood Glucose.: 320 mg/dL (01 Jun 2019 21:39)  POCT Blood Glucose.: 280 mg/dL (01 Jun 2019 18:08)  POCT Blood Glucose.: 291 mg/dL (01 Jun 2019 17:01)      Lower Extremity Physical Exam:  s/p hallux amputation right foot, closed, no signs of dehiscence. Ecchymotic appearance to distal aspect fo right hallux possible ischemic changes. Two more sutures popped and removed at distal tip of right hallux to see if any signs of hematoma, but no significant drainage noted and no signs of cellulitis. Surgical site cool to touch and ischemic harmony vs ecchymosis now extending proximally and to 2nd toe.

## 2019-06-02 NOTE — PROGRESS NOTE ADULT - SUBJECTIVE AND OBJECTIVE BOX
post  op.     REVIEW OF SYSTEMS:  GEN: no fever,    no chills  RESP: no SOB,   no cough  CVS: no chest pain,   no palpitations  GI: no abdominal pain,   no nausea,   no vomiting,   no constipation,   no diarrhea  : no dysuria,   no frequency  NEURO: no headache,   no dizziness  PSYCH: no depression,   not anxious  Derm : no rash    MEDICATIONS  (STANDING):  amoxicillin  875 milliGRAM(s)/clavulanate 1 Tablet(s) Oral two times a day  aspirin enteric coated 81 milliGRAM(s) Oral daily  atorvastatin 40 milliGRAM(s) Oral at bedtime  bromocriptine Capsule 5 milliGRAM(s) Oral daily  carvedilol 6.25 milliGRAM(s) Oral every 12 hours  dextrose 5%. 1000 milliLiter(s) (50 mL/Hr) IV Continuous <Continuous>  dextrose 50% Injectable 12.5 Gram(s) IV Push once  dextrose 50% Injectable 25 Gram(s) IV Push once  dextrose 50% Injectable 25 Gram(s) IV Push once  furosemide    Tablet 40 milliGRAM(s) Oral daily  hydrocortisone sodium succinate Injectable 25 milliGRAM(s) IV Push every 12 hours  insulin glargine SubCutaneous Injection (LANTUS) - Peds 24 Unit(s) SubCutaneous at bedtime  insulin lispro (HumaLOG) corrective regimen sliding scale   SubCutaneous three times a day before meals  insulin lispro (HumaLOG) corrective regimen sliding scale   SubCutaneous at bedtime  insulin lispro Injectable (HumaLOG) 5 Unit(s) SubCutaneous three times a day before meals  levothyroxine 125 MICROGram(s) Oral daily  lisinopril 5 milliGRAM(s) Oral daily  potassium chloride    Tablet ER 20 milliEquivalent(s) Oral daily  saccharomyces boulardii 250 milliGRAM(s) Oral two times a day  spironolactone 25 milliGRAM(s) Oral two times a day    MEDICATIONS  (PRN):  acetaminophen   Tablet .. 650 milliGRAM(s) Oral every 6 hours PRN Mild Pain (1 - 3)  dextrose 40% Gel 15 Gram(s) Oral once PRN Blood Glucose LESS THAN 70 milliGRAM(s)/deciliter  glucagon  Injectable 1 milliGRAM(s) IntraMuscular once PRN Glucose LESS THAN 70 milligrams/deciliter  oxyCODONE    5 mG/acetaminophen 325 mG 1 Tablet(s) Oral every 6 hours PRN Moderate Pain (4 - 6)      Vital Signs Last 24 Hrs  T(C): 36.7 (02 Jun 2019 05:52), Max: 36.7 (02 Jun 2019 05:52)  T(F): 98.1 (02 Jun 2019 05:52), Max: 98.1 (02 Jun 2019 05:52)  HR: 61 (02 Jun 2019 05:52) (58 - 62)  BP: 113/77 (02 Jun 2019 05:52) (86/48 - 113/77)  BP(mean): --  RR: 18 (02 Jun 2019 05:52) (17 - 18)  SpO2: 100% (02 Jun 2019 05:52) (97% - 100%)  CAPILLARY BLOOD GLUCOSE      POCT Blood Glucose.: 299 mg/dL (01 Jun 2019 23:09)  POCT Blood Glucose.: 320 mg/dL (01 Jun 2019 21:39)  POCT Blood Glucose.: 280 mg/dL (01 Jun 2019 18:08)  POCT Blood Glucose.: 291 mg/dL (01 Jun 2019 17:01)  POCT Blood Glucose.: 331 mg/dL (01 Jun 2019 12:54)  POCT Blood Glucose.: 238 mg/dL (01 Jun 2019 08:46)    I&O's Summary    01 Jun 2019 07:01  -  02 Jun 2019 07:00  --------------------------------------------------------  IN: 1140 mL / OUT: 0 mL / NET: 1140 mL        PHYSICAL EXAM:  HEAD:  Atraumatic, Normocephalic  NECK: Supple, No   JVD  CHEST/LUNG:   no     rales,     no,    rhonchi  HEART: Regular rate and rhythm;         murmur  ABDOMEN: Soft, Nontender, ;   EXTREMITIES: dressing/  exam a s per podiatry  NEUROLOGY:  alert    LABS:                        9.7    6.07  )-----------( 43       ( 01 Jun 2019 10:52 )             30.1     06-01    128<L>  |  95<L>  |  57<H>  ----------------------------<  194<H>  4.5   |  20<L>  |  1.79<H>    Ca    8.5      01 Jun 2019 07:13      PT/INR - ( 31 May 2019 09:09 )   PT: 13.2 sec;   INR: 1.14 ratio         PTT - ( 31 May 2019 09:09 )  PTT:26.9 sec              Hemoglobin A1C, Whole Blood: 8.5 % (04-11 @ 15:27)            Consultant(s) Notes Reviewed:      Care Discussed with Consultants/Other Providers:

## 2019-06-02 NOTE — PROGRESS NOTE ADULT - ASSESSMENT
69M   with      PMHx   of   ischemic cardiomyopathy , ef of  30 to 35,  /  diastolic   dysfunction,      s/p   ICD /PPM in 2006,   generator change .        CABG in 1986,   T2DM on lantus,   hypothyroidism,    Afib (spontaneously cardioverted and not on AC),  HTN,  HLD,   BEHZAD (not on CPAP),     FERNANDES   and cirrhosis   with portal HTN  c,/ .c  thrombocytopenia  from cirrhosis,  h/o  pituitary  adenoma,         h/o HCC s/p IR embolization x 2 (last on 4/18), strep  agalactiae  bacteremia  5/ 19     sent to  er  by podiatrist  fro  right toe  amputation  pod/ dr lombardo   ID/ ab  per  ID/  dr segura  called by podiatry  on lasix/ Ramipril ,   coreg/   lipitor./    asa/  aldactone  daily prednisone  for past month for   gout  dm, on lantus/  follow fs.  non complaint with diet  c/c  anemia  and  c/c  thrombocytopenia. from cirrhosis    s.p   amputation   of  right distal phalanx,  on  5/31/19  hyponatremia from hyperglycemia     dm  / insulin per  house   endo    on augmentin per  ID   podiatry  saw pt today, concern is wound healing to be observed       < from: Transthoracic Echocardiogram (05.06.19 @ 06:16) >   Mitral annular calcification. Tethered mitral valve  leaflets. Mild mitral regurgitation.  2. Calcified trileaflet aortic valve with normal opening.  No aortic valve regurgitation seen.  3. Eccentric left ventricular hypertrophy (dilated left  ventricle with normal relative wall thickness). Moderate  left ventricular enlargement.  4. Moderate to severe segmental left ventricular systolic  dysfunction. The inferior, inferoseptal, and inferolateral  walls are akinetic. Paradoxical septal motion consistent  with paced rhythm/conduction defect.  5. Moderate diastolic dysfunction (Stage II).  6. Right ventricle not well visualized; normal right  ventricular size with decreased right ventricular systolic  function. A device wire is noted in the right heart.  7. Estimated pulmonary artery systolic pressure equals 46  mm Hg, assuming right atrial pressure equals 8 mm Hg,  consistent with mild pulmonary pressures.  < end of copied text >       < from: CT Abdomen w/ Oral Cont and w/wo IV Cont (05.18.19 @ 12:28) >  MPRESSION:   Cirrhosis with evidence of portal hypertension.   No evidence of viable tumor (LR-TR Nonviable) associated with lesions in   segment 7 at the junction of segments 4A and 8 post intervention  < end of copied text > 69M   with      PMHx   of   ischemic cardiomyopathy , ef of  30 to 35,  /  diastolic   dysfunction,      s/p   ICD /PPM in 2006,   generator change .        CABG in 1986,   T2DM on lantus,   hypothyroidism,    Afib (spontaneously cardioverted and not on AC),  HTN,  HLD,   BEHZAD (not on CPAP),     FERNANDES   and cirrhosis   with portal HTN  c,/ .c  thrombocytopenia  from cirrhosis,  h/o  pituitary  adenoma,         h/o HCC s/p IR embolization x 2 (last on 4/18), strep  agalactiae  bacteremia  5/ 19     sent to  er  by podiatrist  fro  right toe  amputation  pod/ dr lombardo   ID/ ab  per  ID/  dr segura  called by podiatry  on lasix/ Ramipril ,   coreg/   lipitor./    asa/  aldactone  daily prednisone  for past month for   gout  dm, on lantus/  follow fs.  non complaint with diet  c/c  anemia  and  c/c  thrombocytopenia. from cirrhosis    s.p   amputation   of  right distal phalanx,  on  5/31/19  hyponatremia from hyperglycemia     dm  / insulin per  house   endo    on augmentin per  ID/  awaiting ID  f/p, now  with staph  in wound c/s   podiatry  saw pt today, concern is wound healing to be observed       < from: Transthoracic Echocardiogram (05.06.19 @ 06:16) >   Mitral annular calcification. Tethered mitral valve  leaflets. Mild mitral regurgitation.  2. Calcified trileaflet aortic valve with normal opening.  No aortic valve regurgitation seen.  3. Eccentric left ventricular hypertrophy (dilated left  ventricle with normal relative wall thickness). Moderate  left ventricular enlargement.  4. Moderate to severe segmental left ventricular systolic  dysfunction. The inferior, inferoseptal, and inferolateral  walls are akinetic. Paradoxical septal motion consistent  with paced rhythm/conduction defect.  5. Moderate diastolic dysfunction (Stage II).  6. Right ventricle not well visualized; normal right  ventricular size with decreased right ventricular systolic  function. A device wire is noted in the right heart.  7. Estimated pulmonary artery systolic pressure equals 46  mm Hg, assuming right atrial pressure equals 8 mm Hg,  consistent with mild pulmonary pressures.  < end of copied text >       < from: CT Abdomen w/ Oral Cont and w/wo IV Cont (05.18.19 @ 12:28) >  MPRESSION:   Cirrhosis with evidence of portal hypertension.   No evidence of viable tumor (LR-TR Nonviable) associated with lesions in   segment 7 at the junction of segments 4A and 8 post intervention  < end of copied text >

## 2019-06-03 VITALS
HEART RATE: 58 BPM | TEMPERATURE: 97 F | SYSTOLIC BLOOD PRESSURE: 91 MMHG | DIASTOLIC BLOOD PRESSURE: 56 MMHG | RESPIRATION RATE: 18 BRPM | OXYGEN SATURATION: 100 %

## 2019-06-03 LAB
-  AMPICILLIN/SULBACTAM: SIGNIFICANT CHANGE UP
-  AMPICILLIN/SULBACTAM: SIGNIFICANT CHANGE UP
-  CEFAZOLIN: SIGNIFICANT CHANGE UP
-  CEFAZOLIN: SIGNIFICANT CHANGE UP
-  CLINDAMYCIN: SIGNIFICANT CHANGE UP
-  CLINDAMYCIN: SIGNIFICANT CHANGE UP
-  DAPTOMYCIN: SIGNIFICANT CHANGE UP
-  ERYTHROMYCIN: SIGNIFICANT CHANGE UP
-  ERYTHROMYCIN: SIGNIFICANT CHANGE UP
-  GENTAMICIN: SIGNIFICANT CHANGE UP
-  GENTAMICIN: SIGNIFICANT CHANGE UP
-  LINEZOLID: SIGNIFICANT CHANGE UP
-  OXACILLIN: SIGNIFICANT CHANGE UP
-  OXACILLIN: SIGNIFICANT CHANGE UP
-  PENICILLIN: SIGNIFICANT CHANGE UP
-  PENICILLIN: SIGNIFICANT CHANGE UP
-  RIFAMPIN: SIGNIFICANT CHANGE UP
-  RIFAMPIN: SIGNIFICANT CHANGE UP
-  TETRACYCLINE: SIGNIFICANT CHANGE UP
-  TETRACYCLINE: SIGNIFICANT CHANGE UP
-  TRIMETHOPRIM/SULFAMETHOXAZOLE: SIGNIFICANT CHANGE UP
-  TRIMETHOPRIM/SULFAMETHOXAZOLE: SIGNIFICANT CHANGE UP
-  VANCOMYCIN: SIGNIFICANT CHANGE UP
-  VANCOMYCIN: SIGNIFICANT CHANGE UP
ANION GAP SERPL CALC-SCNC: 14 MMOL/L — SIGNIFICANT CHANGE UP (ref 5–17)
BUN SERPL-MCNC: 54 MG/DL — HIGH (ref 7–23)
CALCIUM SERPL-MCNC: 8.7 MG/DL — SIGNIFICANT CHANGE UP (ref 8.4–10.5)
CHLORIDE SERPL-SCNC: 99 MMOL/L — SIGNIFICANT CHANGE UP (ref 96–108)
CO2 SERPL-SCNC: 16 MMOL/L — LOW (ref 22–31)
CREAT SERPL-MCNC: 1.23 MG/DL — SIGNIFICANT CHANGE UP (ref 0.5–1.3)
CULTURE RESULTS: SIGNIFICANT CHANGE UP
GLUCOSE BLDC GLUCOMTR-MCNC: 196 MG/DL — HIGH (ref 70–99)
GLUCOSE BLDC GLUCOMTR-MCNC: 204 MG/DL — HIGH (ref 70–99)
GLUCOSE SERPL-MCNC: 228 MG/DL — HIGH (ref 70–99)
HCT VFR BLD CALC: 31.1 % — LOW (ref 39–50)
HGB BLD-MCNC: 9.9 G/DL — LOW (ref 13–17)
MCHC RBC-ENTMCNC: 27.5 PG — SIGNIFICANT CHANGE UP (ref 27–34)
MCHC RBC-ENTMCNC: 31.8 GM/DL — LOW (ref 32–36)
MCV RBC AUTO: 86.4 FL — SIGNIFICANT CHANGE UP (ref 80–100)
METHOD TYPE: SIGNIFICANT CHANGE UP
METHOD TYPE: SIGNIFICANT CHANGE UP
ORGANISM # SPEC MICROSCOPIC CNT: SIGNIFICANT CHANGE UP
PLATELET # BLD AUTO: 40 K/UL — LOW (ref 150–400)
POTASSIUM SERPL-MCNC: 4.6 MMOL/L — SIGNIFICANT CHANGE UP (ref 3.5–5.3)
POTASSIUM SERPL-SCNC: 4.6 MMOL/L — SIGNIFICANT CHANGE UP (ref 3.5–5.3)
RBC # BLD: 3.6 M/UL — LOW (ref 4.2–5.8)
RBC # FLD: 16.9 % — HIGH (ref 10.3–14.5)
SODIUM SERPL-SCNC: 129 MMOL/L — LOW (ref 135–145)
SPECIMEN SOURCE: SIGNIFICANT CHANGE UP
WBC # BLD: 4.37 K/UL — SIGNIFICANT CHANGE UP (ref 3.8–10.5)
WBC # FLD AUTO: 4.37 K/UL — SIGNIFICANT CHANGE UP (ref 3.8–10.5)

## 2019-06-03 PROCEDURE — 88305 TISSUE EXAM BY PATHOLOGIST: CPT

## 2019-06-03 PROCEDURE — 85014 HEMATOCRIT: CPT

## 2019-06-03 PROCEDURE — 82330 ASSAY OF CALCIUM: CPT

## 2019-06-03 PROCEDURE — 87186 SC STD MICRODIL/AGAR DIL: CPT

## 2019-06-03 PROCEDURE — 86850 RBC ANTIBODY SCREEN: CPT

## 2019-06-03 PROCEDURE — 80053 COMPREHEN METABOLIC PANEL: CPT

## 2019-06-03 PROCEDURE — 84295 ASSAY OF SERUM SODIUM: CPT

## 2019-06-03 PROCEDURE — 86900 BLOOD TYPING SEROLOGIC ABO: CPT

## 2019-06-03 PROCEDURE — 80048 BASIC METABOLIC PNL TOTAL CA: CPT

## 2019-06-03 PROCEDURE — 82962 GLUCOSE BLOOD TEST: CPT

## 2019-06-03 PROCEDURE — 82947 ASSAY GLUCOSE BLOOD QUANT: CPT

## 2019-06-03 PROCEDURE — 87070 CULTURE OTHR SPECIMN AEROBIC: CPT

## 2019-06-03 PROCEDURE — 99232 SBSQ HOSP IP/OBS MODERATE 35: CPT

## 2019-06-03 PROCEDURE — 73630 X-RAY EXAM OF FOOT: CPT

## 2019-06-03 PROCEDURE — 83605 ASSAY OF LACTIC ACID: CPT

## 2019-06-03 PROCEDURE — 85730 THROMBOPLASTIN TIME PARTIAL: CPT

## 2019-06-03 PROCEDURE — 99285 EMERGENCY DEPT VISIT HI MDM: CPT | Mod: 25

## 2019-06-03 PROCEDURE — 87040 BLOOD CULTURE FOR BACTERIA: CPT

## 2019-06-03 PROCEDURE — 86901 BLOOD TYPING SEROLOGIC RH(D): CPT

## 2019-06-03 PROCEDURE — 82435 ASSAY OF BLOOD CHLORIDE: CPT

## 2019-06-03 PROCEDURE — 86140 C-REACTIVE PROTEIN: CPT

## 2019-06-03 PROCEDURE — 80202 ASSAY OF VANCOMYCIN: CPT

## 2019-06-03 PROCEDURE — 85652 RBC SED RATE AUTOMATED: CPT

## 2019-06-03 PROCEDURE — 85027 COMPLETE CBC AUTOMATED: CPT

## 2019-06-03 PROCEDURE — 96374 THER/PROPH/DIAG INJ IV PUSH: CPT

## 2019-06-03 PROCEDURE — 84132 ASSAY OF SERUM POTASSIUM: CPT

## 2019-06-03 PROCEDURE — 93005 ELECTROCARDIOGRAM TRACING: CPT

## 2019-06-03 PROCEDURE — 88311 DECALCIFY TISSUE: CPT

## 2019-06-03 PROCEDURE — 82803 BLOOD GASES ANY COMBINATION: CPT

## 2019-06-03 PROCEDURE — 85610 PROTHROMBIN TIME: CPT

## 2019-06-03 PROCEDURE — 99221 1ST HOSP IP/OBS SF/LOW 40: CPT

## 2019-06-03 RX ORDER — ACETAMINOPHEN 500 MG
2 TABLET ORAL
Qty: 0 | Refills: 0 | DISCHARGE
Start: 2019-06-03

## 2019-06-03 RX ORDER — LINEZOLID 600 MG/300ML
1 INJECTION, SOLUTION INTRAVENOUS
Qty: 14 | Refills: 0
Start: 2019-06-03 | End: 2019-06-09

## 2019-06-03 RX ORDER — CARVEDILOL PHOSPHATE 80 MG/1
1 CAPSULE, EXTENDED RELEASE ORAL
Qty: 0 | Refills: 0 | DISCHARGE
Start: 2019-06-03

## 2019-06-03 RX ORDER — LINEZOLID 600 MG/300ML
600 INJECTION, SOLUTION INTRAVENOUS EVERY 12 HOURS
Refills: 0 | Status: DISCONTINUED | OUTPATIENT
Start: 2019-06-03 | End: 2019-06-03

## 2019-06-03 RX ORDER — CARVEDILOL PHOSPHATE 80 MG/1
1 CAPSULE, EXTENDED RELEASE ORAL
Qty: 60 | Refills: 0
Start: 2019-06-03 | End: 2019-07-02

## 2019-06-03 RX ORDER — CARVEDILOL PHOSPHATE 80 MG/1
1 CAPSULE, EXTENDED RELEASE ORAL
Qty: 0 | Refills: 0 | DISCHARGE

## 2019-06-03 RX ADMIN — BROMOCRIPTINE MESYLATE 5 MILLIGRAM(S): 5 CAPSULE ORAL at 12:37

## 2019-06-03 RX ADMIN — Medication 20 MILLIEQUIVALENT(S): at 12:37

## 2019-06-03 RX ADMIN — Medication 8 UNIT(S): at 13:11

## 2019-06-03 RX ADMIN — Medication 25 MILLIGRAM(S): at 05:20

## 2019-06-03 RX ADMIN — Medication 8 UNIT(S): at 08:56

## 2019-06-03 RX ADMIN — Medication 40 MILLIGRAM(S): at 05:20

## 2019-06-03 RX ADMIN — Medication 1: at 13:10

## 2019-06-03 RX ADMIN — Medication 81 MILLIGRAM(S): at 12:37

## 2019-06-03 RX ADMIN — Medication 125 MICROGRAM(S): at 05:22

## 2019-06-03 RX ADMIN — LISINOPRIL 5 MILLIGRAM(S): 2.5 TABLET ORAL at 05:22

## 2019-06-03 RX ADMIN — Medication 2: at 08:56

## 2019-06-03 RX ADMIN — Medication 250 MILLIGRAM(S): at 05:20

## 2019-06-03 RX ADMIN — CARVEDILOL PHOSPHATE 6.25 MILLIGRAM(S): 80 CAPSULE, EXTENDED RELEASE ORAL at 05:20

## 2019-06-03 RX ADMIN — Medication 1 TABLET(S): at 05:20

## 2019-06-03 RX ADMIN — SPIRONOLACTONE 25 MILLIGRAM(S): 25 TABLET, FILM COATED ORAL at 05:20

## 2019-06-03 NOTE — CONSULT NOTE ADULT - SUBJECTIVE AND OBJECTIVE BOX
Vascular Surgery Consult  Consulting surgical team: Vascular  Consulting attending: Ezra    HPI:  69M with   PMHx of ischemic cardiomyopathy s/p ICD/PPM in 2006,  generator change,   and CABG in 1986, T2DM on lantus,  hypothyroidism, Afib (spontaneously cardioverted and not on AC), HTN, HLD, BEHZAD (not on CPAP), gout, FERNANDES and cirrhosis with    HCC s/p IR embolization x 2 (last on 4/18), with embozene.    presents to the ED with  non healing ulcer due to DM. Patient is now sp right partial hallux. It was reported to the vascular service that the patient's hallux base and adjacent toe became discolored over about 12 hours after the amputation.       PAST MEDICAL HISTORY:  Afib  PAF (paroxysmal atrial fibrillation)  Ulcer of right ankle  Liver mass  Gout  Elevated prolactin level  Vitamin D deficiency  Elevated triglycerides with high cholesterol  Hypothyroidism  PVD (peripheral vascular disease)  History of hyperprolactinemia  Hepatic cirrhosis, unspecified hepatic cirrhosis type  Anemia  ICD (implantable cardioverter-defibrillator) in place  Sleep apnea  History of osteomyelitis  Presence of stent in coronary artery  Heart failure with reduced left ventricular function  Ischemic cardiomyopathy  Pituitary adenoma  Coronary artery disease  Thrombocytopenia  HLD (hyperlipidemia)  HTN (hypertension)  DM (diabetes mellitus)  AICD lead malfunction  Shock      PAST SURGICAL HISTORY:  Encounter for incision and drainage procedure  History of amputation  AICD (automatic cardioverter/defibrillator) present  Stented coronary artery  Coronary atherosclerosis of artery bypass graft      MEDICATIONS:  acetaminophen   Tablet .. 650 milliGRAM(s) Oral every 6 hours PRN  amoxicillin  875 milliGRAM(s)/clavulanate 1 Tablet(s) Oral two times a day  aspirin enteric coated 81 milliGRAM(s) Oral daily  atorvastatin 40 milliGRAM(s) Oral at bedtime  bromocriptine Capsule 5 milliGRAM(s) Oral daily  carvedilol 6.25 milliGRAM(s) Oral every 12 hours  dextrose 40% Gel 15 Gram(s) Oral once PRN  dextrose 5%. 1000 milliLiter(s) IV Continuous <Continuous>  dextrose 50% Injectable 12.5 Gram(s) IV Push once  dextrose 50% Injectable 25 Gram(s) IV Push once  dextrose 50% Injectable 25 Gram(s) IV Push once  furosemide    Tablet 40 milliGRAM(s) Oral daily  glucagon  Injectable 1 milliGRAM(s) IntraMuscular once PRN  hydrocortisone sodium succinate Injectable 25 milliGRAM(s) IV Push every 12 hours  insulin glargine SubCutaneous Injection (LANTUS) - Peds 24 Unit(s) SubCutaneous at bedtime  insulin lispro (HumaLOG) corrective regimen sliding scale   SubCutaneous three times a day before meals  insulin lispro (HumaLOG) corrective regimen sliding scale   SubCutaneous at bedtime  insulin lispro Injectable (HumaLOG) 8 Unit(s) SubCutaneous three times a day before meals  levothyroxine 125 MICROGram(s) Oral daily  lisinopril 5 milliGRAM(s) Oral daily  oxyCODONE    5 mG/acetaminophen 325 mG 1 Tablet(s) Oral every 6 hours PRN  potassium chloride    Tablet ER 20 milliEquivalent(s) Oral daily  saccharomyces boulardii 250 milliGRAM(s) Oral two times a day  spironolactone 25 milliGRAM(s) Oral two times a day      ALLERGIES:  No Known Allergies      VITALS & I/Os:  Vital Signs Last 24 Hrs  T(C): 36.6 (02 Jun 2019 23:40), Max: 36.7 (02 Jun 2019 05:52)  T(F): 97.9 (02 Jun 2019 23:40), Max: 98.1 (02 Jun 2019 05:52)  HR: 63 (02 Jun 2019 23:40) (60 - 72)  BP: 98/62 (02 Jun 2019 23:40) (98/62 - 113/77)  BP(mean): --  RR: 18 (02 Jun 2019 23:40) (18 - 18)  SpO2: 97% (02 Jun 2019 23:40) (97% - 100%)    I&O's Summary    01 Jun 2019 07:01  -  02 Jun 2019 07:00  --------------------------------------------------------  IN: 1140 mL / OUT: 0 mL / NET: 1140 mL    02 Jun 2019 07:01  -  03 Jun 2019 02:57  --------------------------------------------------------  IN: 240 mL / OUT: 0 mL / NET: 240 mL        PHYSICAL EXAM:  General: No acute distress, appears nontoxic  Respiratory: Breathing unlabored  Cardiovascular: RRR  Abdominal: Soft, nondistended, nontender. No rebound.  Extremities: Warm, well perfused    LABS:                        10.4   5.54  )-----------( 45       ( 02 Jun 2019 10:05 )             31.8     06-02    131<L>  |  99  |  57<H>  ----------------------------<  178<H>  4.6   |  20<L>  |  1.45<H>    Ca    9.1      02 Jun 2019 07:39      Lactate:

## 2019-06-03 NOTE — PROGRESS NOTE ADULT - SUBJECTIVE AND OBJECTIVE BOX
Follow Up:  osteomyelitis - s/p amputation    Interval History/ROS: feels well, no local pain, no nausea    Allergies  No Known Allergies    ANTIMICROBIALS:  amoxicillin  875 milliGRAM(s)/clavulanate 1 two times a day    OTHER MEDS:  MEDICATIONS  (STANDING):  acetaminophen   Tablet .. 650 every 6 hours PRN  aspirin enteric coated 81 daily  atorvastatin 40 at bedtime  bromocriptine Capsule 5 daily  carvedilol 6.25 every 12 hours  dextrose 40% Gel 15 once PRN  dextrose 50% Injectable 12.5 once  dextrose 50% Injectable 25 once  dextrose 50% Injectable 25 once  furosemide    Tablet 40 daily  glucagon  Injectable 1 once PRN  insulin glargine SubCutaneous Injection (LANTUS) - Peds 24 at bedtime  insulin lispro (HumaLOG) corrective regimen sliding scale  three times a day before meals  insulin lispro (HumaLOG) corrective regimen sliding scale  at bedtime  insulin lispro Injectable (HumaLOG) 8 three times a day before meals  levothyroxine 125 daily  lisinopril 5 daily  oxyCODONE    5 mG/acetaminophen 325 mG 1 every 6 hours PRN  spironolactone 25 two times a day      Vital Signs Last 24 Hrs  T(C): 36.7 (03 Jun 2019 04:25), Max: 36.7 (03 Jun 2019 04:25)  T(F): 98 (03 Jun 2019 04:25), Max: 98 (03 Jun 2019 04:25)  HR: 68 (03 Jun 2019 04:25) (62 - 72)  BP: 109/66 (03 Jun 2019 04:25) (98/62 - 111/76)  BP(mean): --  RR: 18 (03 Jun 2019 04:25) (18 - 18)  SpO2: 98% (03 Jun 2019 04:25) (97% - 100%)    PHYSICAL EXAM:  General: WN/WD NAD, Non-toxic  Neurology: A&Ox3, nonfocal  Respiratory: no resp distress  Abdominal: non-distended  Extremities: No edema, dressing clean and dry  Line Sites: Clear  Skin: No rash                          10.4   5.54  )-----------( 45       ( 02 Jun 2019 10:05 )             31.8   WBC Count: 5.54 (06-02 @ 10:05)  WBC Count: 6.07 (06-01 @ 10:52)  WBC Count: 4.83 (05-31 @ 10:10)  WBC Count: 4.9 (05-29 @ 11:37)        06-03    129<L>  |  99  |  54<H>  ----------------------------<  228<H>  4.6   |  16<L>  |  1.23  Creatinine: 1.23 (06-03 @ 06:59)    Creatinine: 1.45 (06-02 @ 07:39)    Creatinine: 1.79 (06-01 @ 07:13)    Creatinine: 1.42 (05-31 @ 06:11)    Creatinine: 1.65 (05-29 @ 23:22)    Creatinine: 1.45 (05-29 @ 11:37)      Ca    8.7      03 Jun 2019 06:59            MICROBIOLOGY:  .Tissue  06-01-19   Few Methicillin resistant Staphylococcus aureus  Rare Enterococcus faecalis  --  Methicillin resistant Staphylococcus aureus  Enterococcus faecalis  Culture - Tissue with Gram Stain (06.01.19 @ 00:30)    -  Ampicillin: S <=2 Predicts results to ampicillin/sulbactam, amoxacillin-clavulanate and  piperacillin-tazobactam.    -  Ampicillin/Sulbactam: R >16/8    Gram Stain:   No polymorphonuclear cells seen per low power field  Few Gram positive cocci in pairs seen per oil power field    -  Tetra/Doxy: R >8    -  Tetra/Doxy: R >8    -  Vancomycin: S 1    -  Vancomycin: S 2    -  Trimethoprim/Sulfamethoxazole: R >2/38    -  Gentamicin: R >8 Should not be used as monotherapy    -  Linezolid: S 2    -  Penicillin: R >8    -  Oxacillin: R >2    -  RIF- Rifampin: I 2 Should not be used as monotherapy    -  Clindamycin: R >4    -  Daptomycin: S 1    -  Erythromycin: R >4    -  Cefazolin: R >16    Specimen Source: .Tissue    Culture Results:   Few Methicillin resistant Staphylococcus aureus  Rare Enterococcus faecalis    Organism Identification: Methicillin resistant Staphylococcus aureus  Enterococcus faecalis    Organism: Methicillin resistant Staphylococcus aureus    Organism: Enterococcus faecalis    Method Type: ANASTASIIA    Method Type: ANASTASIIA          .Blood  05-29-19   No growth to date.  --  --        Methicillin resistant Staphylococcus aureus  Enterococcus faecalis    v          RADIOLOGY:    Gopi Grace MD; Division of Infectious Disease; Pager: [phone]; nights and weekends: [phone]

## 2019-06-03 NOTE — PROGRESS NOTE ADULT - SUBJECTIVE AND OBJECTIVE BOX
no cp/sob    REVIEW OF SYSTEMS:  GEN: no fever,    no chills  RESP: no SOB,   no cough  CVS: no chest pain,   no palpitations  GI: no abdominal pain,   no nausea,   no vomiting,   no constipation,   no diarrhea  : no dysuria,   no frequency  NEURO: no headache,   no dizziness  PSYCH: no depression,   not anxious  Derm : no rash    MEDICATIONS  (STANDING):  amoxicillin  875 milliGRAM(s)/clavulanate 1 Tablet(s) Oral two times a day  aspirin enteric coated 81 milliGRAM(s) Oral daily  atorvastatin 40 milliGRAM(s) Oral at bedtime  bromocriptine Capsule 5 milliGRAM(s) Oral daily  carvedilol 6.25 milliGRAM(s) Oral every 12 hours  dextrose 5%. 1000 milliLiter(s) (50 mL/Hr) IV Continuous <Continuous>  dextrose 50% Injectable 12.5 Gram(s) IV Push once  dextrose 50% Injectable 25 Gram(s) IV Push once  dextrose 50% Injectable 25 Gram(s) IV Push once  furosemide    Tablet 40 milliGRAM(s) Oral daily  insulin glargine SubCutaneous Injection (LANTUS) - Peds 24 Unit(s) SubCutaneous at bedtime  insulin lispro (HumaLOG) corrective regimen sliding scale   SubCutaneous three times a day before meals  insulin lispro (HumaLOG) corrective regimen sliding scale   SubCutaneous at bedtime  insulin lispro Injectable (HumaLOG) 8 Unit(s) SubCutaneous three times a day before meals  levothyroxine 125 MICROGram(s) Oral daily  lisinopril 5 milliGRAM(s) Oral daily  potassium chloride    Tablet ER 20 milliEquivalent(s) Oral daily  saccharomyces boulardii 250 milliGRAM(s) Oral two times a day  spironolactone 25 milliGRAM(s) Oral two times a day    MEDICATIONS  (PRN):  acetaminophen   Tablet .. 650 milliGRAM(s) Oral every 6 hours PRN Mild Pain (1 - 3)  dextrose 40% Gel 15 Gram(s) Oral once PRN Blood Glucose LESS THAN 70 milliGRAM(s)/deciliter  glucagon  Injectable 1 milliGRAM(s) IntraMuscular once PRN Glucose LESS THAN 70 milligrams/deciliter  oxyCODONE    5 mG/acetaminophen 325 mG 1 Tablet(s) Oral every 6 hours PRN Moderate Pain (4 - 6)      Vital Signs Last 24 Hrs  T(C): 36.7 (03 Jun 2019 04:25), Max: 36.7 (03 Jun 2019 04:25)  T(F): 98 (03 Jun 2019 04:25), Max: 98 (03 Jun 2019 04:25)  HR: 68 (03 Jun 2019 04:25) (60 - 72)  BP: 109/66 (03 Jun 2019 04:25) (98/62 - 111/76)  BP(mean): --  RR: 18 (03 Jun 2019 04:25) (18 - 18)  SpO2: 98% (03 Jun 2019 04:25) (97% - 100%)  CAPILLARY BLOOD GLUCOSE      POCT Blood Glucose.: 275 mg/dL (02 Jun 2019 21:01)  POCT Blood Glucose.: 225 mg/dL (02 Jun 2019 17:15)  POCT Blood Glucose.: 247 mg/dL (02 Jun 2019 12:38)  POCT Blood Glucose.: 179 mg/dL (02 Jun 2019 08:37)    I&O's Summary    02 Jun 2019 07:01  -  03 Jun 2019 07:00  --------------------------------------------------------  IN: 480 mL / OUT: 0 mL / NET: 480 mL        PHYSICAL EXAM:  HEAD:  Atraumatic, Normocephalic  NECK: Supple, No   JVD  CHEST/LUNG:   no     rales,     no,    rhonchi  HEART: Regular rate and rhythm;         murmur  ABDOMEN: Soft, Nontender, ;   EXTREMITIES: dressing, foot  NEUROLOGY:  alert    LABS:                        10.4   5.54  )-----------( 45       ( 02 Jun 2019 10:05 )             31.8     06-02    131<L>  |  99  |  57<H>  ----------------------------<  178<H>  4.6   |  20<L>  |  1.45<H>    Ca    9.1      02 Jun 2019 07:39                    Hemoglobin A1C, Whole Blood: 8.5 % (04-11 @ 15:27)            Consultant(s) Notes Reviewed:      Care Discussed with Consultants/Other Providers:

## 2019-06-03 NOTE — CONSULT NOTE ADULT - ASSESSMENT
70 y/o male pt with right hallux ulcer  - pt seen and evaluated   - pt sent in by Dr. Tristan for right hallux amputation  - dressing changed  - booked for R hallux amputation tomorrow at 3pm with Dr. Tristan   - NPO after midnight  - d/w attending
69 M Hx of nonhealing wounds with multiple amputations pw with DM foot ulcer sp Right hallux amputation  - Patient has palpable pulses   - This discoloration could be a hematoma under the skin postop or from excessively tight closure of hallux stump in context of distal vessel disease  - No vascular intervention indicated    KANE Felix MD  PGY 3 Vascular Surgery
69 M with T2DM 8.5, previous toe amputations, gout on prednisone, pituitary adenoma and hyperprolactinemia on bromocriptine, hypothyroidism  history of ischemic cardiomyopathy s/p ICD/PPM in 2006, generator change, and CABG in 1986, Afib (spontaneously cardioverted and not on AC), HTN, HLD, BEHZAD (not on CPAP), gout, FERNANDES and cirrhosis with   HCC s/p IR embolization x 2 (last on 4/18), Recent admission with episode of GBS bacteremia treated with IV abx,  admitted 5.29/19 with rapidly developing right toe ulcer extending down to underlying bone s/p OR today (POD 0)  endocrine called for DM assistance     #diabetes type 2   home regimen: lantus 24, januvia, and metformin  Blood glucose gloals for CLINICALLY STABLE pts 100-140. if CLINICALLY UNSTABLE, premeal goals 140-180.  check FSBG TID qac and hs  carb consistent diet   - Lantus 24 u qhs  -Humalog 5-5-5 with meals. HOLD if NPO  -low dose SS TIDAC/qhs    inform endocrine of hypoglycemia or persistent hyperglycemia episodes as changes in pts insulin regimen will need to be made.   notify endocrine if any plans to be NPO/diet changes as this will also affect insulin regimen.    DC: Likely to resume home regimen if GFR and LFTS remains stable and followup with Dr. Hennessy.  can adjust lantus dose as needed       #HypoThyroid  continue levothyroxine tx as is   TFTS outpt with Dr. Hennessy   clinically euthyroid      #Long term use of steroids  pt has been on long term steroids with prednisone for about the last two months atleast  started on hydrocort 50q8 today  pt appears clinically well post op  continue this dose today and tomorrow if clinically improving: decrease to hydrocort 25 q12 X2 days  please contact pts rheumatolgist for prednisone plan.  if to resume prednisone, start prednisone on Monday with plan to taper off as per rheum outpt     **stress dose steroids at hydrocortisone 50mg q8** if HD unstable     #Hyperprolactinemia   pt has been on long term bromocriptine  assess prolactin levels outpt
69M with  suboptimal control of  T2DM on lantus, previous toe amputations, gout on prednisone, history of ischemic cardiomyopathy s/p ICD/PPM in 2006,  generator change,   and CABG in 1986,  hypothyroidism, Afib (spontaneously cardioverted and not on AC), HTN, HLD, BEHZAD (not on CPAP), gout, FERNANDES and cirrhosis with   HCC s/p IR embolization x 2 (last on 4/18) admitted 5.29/19 with rapidly developing right toe ulcer extending down to underlying bone.  Amputation of hallux planned for 5/31    suggest  Arterial doppler RLE  Calvin  I ordered  Vanco    I anticipate short course of antibiotics through perioperative period only    I will be out 5/30 -ID service will see
69M with PMHx of ischemic cardiomyopathy s/p ICD/PPM in 2006, and CABG in 1986, T2DM on lantus, hypothyroidism, Afib (spontaneously cardioverted and not on AC), HTN, HLD, BEHZAD (not on CPAP), gout, FERNANDES and cirrhosis came in with right big toe infection, was seen by podiatry as outpatient and sent to Hospital for  management of infection and likely  toe amputation.  Patient's hepatocellular carcinoma  was dx in 2018. He is followed by Dr. Alaniz and is s/p IR embolization x 2 (last on 4/18), with embozene    His thrombocytopenia is related to cirrhosis and splenomegaly and is of same magnitude for last few years. He apparently had amputation of his other toes previously without platelet transfusion  and did not bleed excessively.  I feel given the ability to do local homeostasis, previous surgery of toe amputation  with similar platelet count, without platelet support or excessive bleeding, he should be able to go through toe amputation without transfusion support as long as platelet count is around 50k/ul. His prothrombin time and PTT are ok    Antibiotics as per infectious disease.  He will continue  follow-up with Dr. Alaniz regarding  his hepatocellular carcinoma. As per imaging studies, it has shown response to embolization

## 2019-06-03 NOTE — PROGRESS NOTE ADULT - SUBJECTIVE AND OBJECTIVE BOX
69M with PMHx of ischemic cardiomyopathy s/p ICD/PPM in 2006, and CABG in 1986, T2DM on lantus, hypothyroidism, Afib (spontaneously cardioverted and not on AC), HTN, HLD, BEHZAD (not on CPAP), gout, FERNANDES and cirrhosis with  diagnosed HCC s/p IR embolization x 2 (last on 4/18), with embozene came in with right big toe infection, was seen by podiatry as outpatient and sent to Hospital for and management of infection and toe amputation.  patient had amputations of other toes previously  without any need of transfusion support.  He does not give history of increase in bleeding  He had his toe amputation without problem or platelets and has been followed by podiatry and seen by vascular    PAST MEDICAL & SURGICAL HISTORY:  Afib: pt reports ~9 mos, [id number], resolved - monitored by Dr. Rivers  Ulcer of right ankle: has surgery done Dec 2018  Liver mass: dx: 10/2018   incidental finding. Currently awaitng furthur workup  Gout: medically managed  Elevated prolactin level: dx: &#x27; 70&#x27;s  medically managed Bromocriptine  Vitamin D deficiency  Elevated triglycerides with high cholesterol: on meds  Hypothyroidism  PVD (peripheral vascular disease)  History of hyperprolactinemia  Hepatic cirrhosis, unspecified hepatic cirrhosis type  Anemia  ICD (implantable cardioverter-defibrillator) in place: Medtronic, Old Model [id number] , last interrogation 3/27/19, generator change 4/3/19 New Model # [id number]  Sleep apnea: not using CPAP  History of osteomyelitis: 2015, right foot 2nd toe   follow-up by podiatrist every 2 weeks  Presence of stent in coronary artery: 2 stents  Heart failure with reduced left ventricular function: last EF 43% (2/2018)  Ischemic cardiomyopathy  Pituitary adenoma: as per patient chronic, no changes , recently restarted follow up with endocrinologist ( note in allscripts )  Coronary artery disease  Thrombocytopenia: Chronic  HTN (hypertension)  DM (diabetes mellitus): type 2, Hg A1C 9.9% 2/2019  Dec 2019  due to prednisone started on Lantus Jan 2019  AICD lead malfunction: history of - fixed follow-up by Dr Rivers  Encounter for incision and drainage procedure: Dec 2018 right foot/ankle  History of amputation: right foot, 2nd toe, osteo 2015  AICD (automatic cardioverter/defibrillator) present: placement in (2006), generator change 4/3/19  Stented coronary artery: RCA (1999)  Coronary atherosclerosis of artery bypass graft: CABG X 4 (1986)    Medications:  acetaminophen   Tablet .. 650 milliGRAM(s) Oral every 6 hours PRN Mild Pain (1 - 3)  amoxicillin  875 milliGRAM(s)/clavulanate 1 Tablet(s) Oral two times a day  aspirin enteric coated 81 milliGRAM(s) Oral daily  atorvastatin 40 milliGRAM(s) Oral at bedtime  bromocriptine Capsule 5 milliGRAM(s) Oral daily  carvedilol 6.25 milliGRAM(s) Oral every 12 hours  dextrose 40% Gel 15 Gram(s) Oral once PRN Blood Glucose LESS THAN 70 milliGRAM(s)/deciliter  dextrose 5%. 1000 milliLiter(s) IV Continuous <Continuous>  dextrose 50% Injectable 12.5 Gram(s) IV Push once  dextrose 50% Injectable 25 Gram(s) IV Push once  dextrose 50% Injectable 25 Gram(s) IV Push once  furosemide    Tablet 40 milliGRAM(s) Oral daily  glucagon  Injectable 1 milliGRAM(s) IntraMuscular once PRN Glucose LESS THAN 70 milligrams/deciliter  insulin glargine SubCutaneous Injection (LANTUS) - Peds 24 Unit(s) SubCutaneous at bedtime  insulin lispro (HumaLOG) corrective regimen sliding scale   SubCutaneous three times a day before meals  insulin lispro (HumaLOG) corrective regimen sliding scale   SubCutaneous at bedtime  insulin lispro Injectable (HumaLOG) 8 Unit(s) SubCutaneous three times a day before meals  levothyroxine 125 MICROGram(s) Oral daily  lisinopril 5 milliGRAM(s) Oral daily  oxyCODONE    5 mG/acetaminophen 325 mG 1 Tablet(s) Oral every 6 hours PRN Moderate Pain (4 - 6)  potassium chloride    Tablet ER 20 milliEquivalent(s) Oral daily  saccharomyces boulardii 250 milliGRAM(s) Oral two times a day  spironolactone 25 milliGRAM(s) Oral two times a day    Labs:  CBC Full  -  ( 02 Jun 2019 10:05 )  WBC Count : 5.54 K/uL  Hemoglobin : 10.4 g/dL  Hematocrit : 31.8 %  Platelet Count - Automated : 45 K/uL  Mean Cell Volume : 85.5 fl  Mean Cell Hemoglobin : 28.0 pg  Mean Cell Hemoglobin Concentration : 32.7 gm/dL  Auto Neutrophil # : x  Auto Lymphocyte # : x  Auto Monocyte # : x  Auto Eosinophil # : x  Auto Basophil # : x  Auto Neutrophil % : x  Auto Lymphocyte % : x  Auto Monocyte % : x  Auto Eosinophil % : x  Auto Basophil % : x    06-03    129<L>  |  99  |  54<H>  ----------------------------<  228<H>  4.6   |  16<L>  |  1.23    Ca    8.7      03 Jun 2019 06:59        Radiology:             ROS:  Patient comfortable without distress  No SOB or chest pain  No palpitation  No abdominal pain, diarrhaea or constipation  No weakness of extremities  Toe with dressing    Vital Signs Last 24 Hrs  T(C): 36.7 (03 Jun 2019 04:25), Max: 36.7 (03 Jun 2019 04:25)  T(F): 98 (03 Jun 2019 04:25), Max: 98 (03 Jun 2019 04:25)  HR: 68 (03 Jun 2019 04:25) (62 - 72)  BP: 109/66 (03 Jun 2019 04:25) (98/62 - 111/76)  BP(mean): --  RR: 18 (03 Jun 2019 04:25) (18 - 18)  SpO2: 98% (03 Jun 2019 04:25) (97% - 100%)    Physical exam:  Patient alert and oriented  No distress  CVS: S1, S2 regular or murmur  Chest: bilateral breath sound without rales  Abdomen: soft, not tender, no organomegaly or masses  No focal neuro deficit  Dressing on foot with toe amputation      Assessment and Plan:

## 2019-06-03 NOTE — PROGRESS NOTE ADULT - SUBJECTIVE AND OBJECTIVE BOX
Patient is a 69y old  Male who presents with a chief complaint of osteo (03 Jun 2019 07:29)       INTERVAL HPI/OVERNIGHT EVENTS:  Patient seen and evaluated at bedside.  Pt is resting comfortable in NAD. Denies N/V/F/C.     Allergies    No Known Allergies    Intolerances        Vital Signs Last 24 Hrs  T(C): 36.7 (03 Jun 2019 04:25), Max: 36.7 (03 Jun 2019 04:25)  T(F): 98 (03 Jun 2019 04:25), Max: 98 (03 Jun 2019 04:25)  HR: 68 (03 Jun 2019 04:25) (62 - 72)  BP: 109/66 (03 Jun 2019 04:25) (98/62 - 111/76)  BP(mean): --  RR: 18 (03 Jun 2019 04:25) (18 - 18)  SpO2: 98% (03 Jun 2019 04:25) (97% - 100%)    LABS:                        10.4   5.54  )-----------( 45       ( 02 Jun 2019 10:05 )             31.8     06-03    129<L>  |  99  |  54<H>  ----------------------------<  228<H>  4.6   |  16<L>  |  1.23    Ca    8.7      03 Jun 2019 06:59          CAPILLARY BLOOD GLUCOSE      POCT Blood Glucose.: 204 mg/dL (03 Jun 2019 08:43)  POCT Blood Glucose.: 275 mg/dL (02 Jun 2019 21:01)  POCT Blood Glucose.: 225 mg/dL (02 Jun 2019 17:15)  POCT Blood Glucose.: 247 mg/dL (02 Jun 2019 12:38)      Lower Extremity Physical Exam:  s/p hallux amputation right foot, closed, no signs of dehiscence. Ecchymotic appearance to distal aspect fo right hallux possible ischemic changes. Two more sutures popped and removed at distal tip of right hallux to see if any signs of hematoma, but no significant drainage noted and no signs of cellulitis. Surgical site warm to touch slight area distally cool but otherwise appears viable    RADIOLOGY & ADDITIONAL TESTS:

## 2019-06-03 NOTE — PROGRESS NOTE ADULT - ASSESSMENT
69M with  suboptimal control of  T2DM on lantus, previous toe amputations, gout on prednisone, history of ischemic cardiomyopathy s/p ICD/PPM in 2006,  generator change,   and CABG in 1986,  hypothyroidism, Afib (spontaneously cardioverted and not on AC), HTN, HLD, BEHZAD (not on CPAP), gout, FERNANDES and cirrhosis with   HCC s/p IR embolization x 2 (last on 4/18) admitted 5.29/19 with rapidly developing right toe ulcer extending down to underlying bone.    s/p Amputation of hallux  5/31 - Podiatry notes hard good quality bone at margin -  Podiatry follow up appreciated:  low risk of residual infection  hyponatremia noted    Zosyn /Vanco 5/29 --> 6/1  Augmentin 875 mg po twice daily 6/1 --> 6/3    suggest  Discontinue Augmentin  Linezolid 600 mg po twice daily x 7 days    Patient will be followed closely by Podiatry: has appointment for 6/6  short course of Linezolid unlikely to further exacerbate thrombocytopenia  discussed with patient's wife and NP  No ID objection to discharge

## 2019-06-03 NOTE — PROGRESS NOTE ADULT - ASSESSMENT
69M   with      PMHx   of   ischemic cardiomyopathy , ef of  30 to 35,  /  diastolic   dysfunction,      s/p   ICD /PPM in 2006,   generator change .        CABG in 1986,   T2DM on lantus,   hypothyroidism,    Afib (spontaneously cardioverted and not on AC),  HTN,  HLD,   BEHZAD (not on CPAP),     FERNANDES   and cirrhosis   with portal HTN  c,/ .c  thrombocytopenia  from cirrhosis,  h/o  pituitary  adenoma,         h/o HCC s/p IR embolization x 2 (last on 4/18), strep  agalactiae  bacteremia  5/ 19     sent to  er  by podiatrist  fro  right toe  amputation  pod/ dr lombardo   ID/ ab  per  ID/  dr segura  called by podiatry  on lasix/ Ramipril ,   coreg/   lipitor./    asa/  aldactone  daily prednisone  for past month for   gout  dm, on lantus/  follow fs.  non complaint with diet  c/c  anemia  and  c/c  thrombocytopenia. from cirrhosis    s.p   amputation   of  right distal phalanx,  on  5/31/19  hyponatremia from hyperglycemia     dm  / insulin per  house   endo    on augmentin per  ID/  awaiting ID  f/p, now  with staph  in wound c/s   skin discoloration.  after surg. seen by vascular, ? hematoma,  no vacular intervention  plan,  d/c  only when cleared by podiatry       < from: Transthoracic Echocardiogram (05.06.19 @ 06:16) >   Mitral annular calcification. Tethered mitral valve  leaflets. Mild mitral regurgitation.  2. Calcified trileaflet aortic valve with normal opening.  No aortic valve regurgitation seen.  3. Eccentric left ventricular hypertrophy (dilated left  ventricle with normal relative wall thickness). Moderate  left ventricular enlargement.  4. Moderate to severe segmental left ventricular systolic  dysfunction. The inferior, inferoseptal, and inferolateral  walls are akinetic. Paradoxical septal motion consistent  with paced rhythm/conduction defect.  5. Moderate diastolic dysfunction (Stage II).  6. Right ventricle not well visualized; normal right  ventricular size with decreased right ventricular systolic  function. A device wire is noted in the right heart.  7. Estimated pulmonary artery systolic pressure equals 46  mm Hg, assuming right atrial pressure equals 8 mm Hg,  consistent with mild pulmonary pressures.  < end of copied text >       < from: CT Abdomen w/ Oral Cont and w/wo IV Cont (05.18.19 @ 12:28) >  MPRESSION:   Cirrhosis with evidence of portal hypertension.   No evidence of viable tumor (LR-TR Nonviable) associated with lesions in   segment 7 at the junction of segments 4A and 8 post intervention  < end of copied text > 69M   with      PMHx   of   ischemic cardiomyopathy , ef of  30 to 35,  /  diastolic   dysfunction,      s/p   ICD /PPM in 2006,   generator change .        CABG in 1986,   T2DM on lantus,   hypothyroidism,    Afib (spontaneously cardioverted and not on AC),  HTN,  HLD,   BEHZAD (not on CPAP),     FERNANDES   and cirrhosis   with portal HTN  c,/ .c  thrombocytopenia  from cirrhosis,  h/o  pituitary  adenoma,         h/o HCC s/p IR embolization x 2 (last on 4/18), strep  agalactiae  bacteremia  5/ 19     sent to  er  by podiatrist  fro  right toe  amputation  pod/ dr lombardo   ID/ ab  per  ID/  dr segura  called by podiatry  on lasix/ Ramipril ,   coreg/   lipitor./    asa/  aldactone  daily prednisone  for past month for   gout  dm, on lantus/  follow fs.  non complaint with diet  c/c  anemia  and  c/c  thrombocytopenia. from cirrhosis    s.p   amputation   of  right distal phalanx,  on  5/31/19  hyponatremia from hyperglycemia     dm  / insulin per  house   endo    on augmentin per  ID/  awaiting ID  f/p, now  with  MRSA// E.COCCUS FECLAIS,  in wound c/s   skin discoloration.  after surg. seen by vascular, ? hematoma,  no vacular intervention  plan,  d/c  only when cleared by podiatry       < from: Transthoracic Echocardiogram (05.06.19 @ 06:16) >   Mitral annular calcification. Tethered mitral valve  leaflets. Mild mitral regurgitation.  2. Calcified trileaflet aortic valve with normal opening.  No aortic valve regurgitation seen.  3. Eccentric left ventricular hypertrophy (dilated left  ventricle with normal relative wall thickness). Moderate  left ventricular enlargement.  4. Moderate to severe segmental left ventricular systolic  dysfunction. The inferior, inferoseptal, and inferolateral  walls are akinetic. Paradoxical septal motion consistent  with paced rhythm/conduction defect.  5. Moderate diastolic dysfunction (Stage II).  6. Right ventricle not well visualized; normal right  ventricular size with decreased right ventricular systolic  function. A device wire is noted in the right heart.  7. Estimated pulmonary artery systolic pressure equals 46  mm Hg, assuming right atrial pressure equals 8 mm Hg,  consistent with mild pulmonary pressures.  < end of copied text >       < from: CT Abdomen w/ Oral Cont and w/wo IV Cont (05.18.19 @ 12:28) >  MPRESSION:   Cirrhosis with evidence of portal hypertension.   No evidence of viable tumor (LR-TR Nonviable) associated with lesions in   segment 7 at the junction of segments 4A and 8 post intervention  < end of copied text > 69M   with      PMHx   of   ischemic cardiomyopathy , ef of  30 to 35,  /  diastolic   dysfunction,      s/p   ICD /PPM in 2006,   generator change .        CABG in 1986,   T2DM on lantus,   hypothyroidism,    Afib (spontaneously cardioverted and not on AC),  HTN,  HLD,   BEHZAD (not on CPAP),     FERNANDES   and cirrhosis   with portal HTN  c,/ .c  thrombocytopenia  from cirrhosis,  h/o  pituitary  adenoma,         h/o HCC s/p IR embolization x 2 (last on 4/18), strep  agalactiae  bacteremia  5/ 19     sent to  er  by podiatrist  fro  right toe  amputation  pod/ dr lombardo   ID/ ab  per  ID/  dr segura  called by podiatry  on lasix/ Ramipril ,   coreg/   lipitor./    asa/  aldactone  daily prednisone  for past month for   gout  dm, on lantus/  follow fs.  non complaint with diet  c/c  anemia  and  c/c  thrombocytopenia. from cirrhosis    s.p   amputation   of  right distal phalanx,  on  5/31/19  hyponatremia from hyperglycemia     dm  / insulin per  house   endo    on augmentin per  ID/  awaiting ID  f/p, now  with  MRSA// E.COCCUS FECLAIS,  in wound c/s   skin discoloration.  after surg. seen by vascular,  hematoma,  no vacular intervention/  cleared  by podiatry  for  d/c  plan,  d/c  only when cleared by  ID  discussed with brenton portillo       < from: Transthoracic Echocardiogram (05.06.19 @ 06:16) >   Mitral annular calcification. Tethered mitral valve  leaflets. Mild mitral regurgitation.  2. Calcified trileaflet aortic valve with normal opening.  No aortic valve regurgitation seen.  3. Eccentric left ventricular hypertrophy (dilated left  ventricle with normal relative wall thickness). Moderate  left ventricular enlargement.  4. Moderate to severe segmental left ventricular systolic  dysfunction. The inferior, inferoseptal, and inferolateral  walls are akinetic. Paradoxical septal motion consistent  with paced rhythm/conduction defect.  5. Moderate diastolic dysfunction (Stage II).  6. Right ventricle not well visualized; normal right  ventricular size with decreased right ventricular systolic  function. A device wire is noted in the right heart.  7. Estimated pulmonary artery systolic pressure equals 46  mm Hg, assuming right atrial pressure equals 8 mm Hg,  consistent with mild pulmonary pressures.  < end of copied text >       < from: CT Abdomen w/ Oral Cont and w/wo IV Cont (05.18.19 @ 12:28) >  MPRESSION:   Cirrhosis with evidence of portal hypertension.   No evidence of viable tumor (LR-TR Nonviable) associated with lesions in   segment 7 at the junction of segments 4A and 8 post intervention  < end of copied text > 69M   with      PMHx   of   ischemic cardiomyopathy , ef of  30 to 35,  /  diastolic   dysfunction,      s/p   ICD /PPM in 2006,   generator change .        CABG in 1986,   T2DM on lantus,   hypothyroidism,    Afib (spontaneously cardioverted and not on AC),  HTN,  HLD,   BEHZAD (not on CPAP),     FERNANDES   and cirrhosis   with portal HTN  c,/ .c  thrombocytopenia  from cirrhosis,  h/o  pituitary  adenoma,         h/o HCC s/p IR embolization x 2 (last on 4/18), strep  agalactiae  bacteremia  5/ 19     sent to  er  by podiatrist  fro  right toe  amputation  pod/ dr lombardo   ID/ ab  per  ID/  dr segura  called by podiatry  on lasix/ Ramipril ,   coreg/   lipitor./    asa/  aldactone  daily prednisone  for past month for   gout  dm, on lantus/  follow fs.  non complaint with diet  c/c  anemia  and  c/c  thrombocytopenia. from cirrhosis    s.p   amputation   of  right distal phalanx,  on  5/31/19  hyponatremia from hyperglycemia     dm  / insulin per  house   endo    on augmentin per  ID/  awaiting ID  f/p, now  with  MRSA// E.COCCUS FECLAIS,  in wound c/s   skin discoloration.  after surg. seen by vascular,  hematoma,  no vacular intervention/  cleared  by podiatry  for  d/c  plan,  d/c  only when cleared by  ID  discussed with brenton malikendum, pe silva alaniz d.c on po  zyvox  for 7  days       < from: Transthoracic Echocardiogram (05.06.19 @ 06:16) >   Mitral annular calcification. Tethered mitral valve  leaflets. Mild mitral regurgitation.  2. Calcified trileaflet aortic valve with normal opening.  No aortic valve regurgitation seen.  3. Eccentric left ventricular hypertrophy (dilated left  ventricle with normal relative wall thickness). Moderate  left ventricular enlargement.  4. Moderate to severe segmental left ventricular systolic  dysfunction. The inferior, inferoseptal, and inferolateral  walls are akinetic. Paradoxical septal motion consistent  with paced rhythm/conduction defect.  5. Moderate diastolic dysfunction (Stage II).  6. Right ventricle not well visualized; normal right  ventricular size with decreased right ventricular systolic  function. A device wire is noted in the right heart.  7. Estimated pulmonary artery systolic pressure equals 46  mm Hg, assuming right atrial pressure equals 8 mm Hg,  consistent with mild pulmonary pressures.  < end of copied text >       < from: CT Abdomen w/ Oral Cont and w/wo IV Cont (05.18.19 @ 12:28) >  MPRESSION:   Cirrhosis with evidence of portal hypertension.   No evidence of viable tumor (LR-TR Nonviable) associated with lesions in   segment 7 at the junction of segments 4A and 8 post intervention  < end of copied text >

## 2019-06-03 NOTE — PROGRESS NOTE ADULT - ASSESSMENT
69M with PMHx of ischemic cardiomyopathy s/p ICD/PPM in 2006, and CABG in 1986, T2DM on lantus, hypothyroidism, Afib (spontaneously cardioverted and not on AC), HTN, HLD, BEHZAD (not on CPAP), gout, FERNANDES and cirrhosis came in with right big toe infection, was seen by podiatry as outpatient and sent to Hospital for  management of infection and  toe amputation.  Patient's hepatocellular carcinoma  was dx in 2018. He is followed by Dr. Alaniz and is s/p IR embolization x 2 (last on 4/18), with embozene    His thrombocytopenia is related to cirrhosis and splenomegaly and is of same magnitude for last few years. He apparently had amputation of his other toes previously without platelet transfusion  and did not bleed excessively.  Right toe was amputated this time too without problem.  Podiatry note noted, no active bleeding, likely discoloration from bruise, had palpable pulses. He is on oral abx  Seen by vascular too and no intervention planned  He will continue  follow-up with Dr. Alaniz regarding  his hepatocellular carcinoma. As per imaging studies, it has shown response to embolization

## 2019-06-03 NOTE — CONSULT NOTE ADULT - ATTENDING COMMENTS
diabetic foot ulcer with palpable pedal pulse  no indication for further vascular testing or vascular interventions
Sumaya Pinon MD  Pager [phone] ([facility])/ [phone] ([facility]) [please provide 10 digit call back number]  Nights and weekends: [phone]  Please note that this patient may be followed by a different provider tomorrow. If no answer or after hours, please contact [phone].  For final dc reccomendations, please call [phone] or page the endocrine fellow on call.

## 2019-06-03 NOTE — PROGRESS NOTE ADULT - PROVIDER SPECIALTY LIST ADULT
Endocrinology
Heme/Onc
Infectious Disease
Infectious Disease
Internal Medicine
Podiatry
Infectious Disease
Internal Medicine
Podiatry
Heme/Onc

## 2019-06-03 NOTE — PROGRESS NOTE ADULT - ASSESSMENT
8 y/o male pt s/p hallux amputation right foot, closed  - pt seen and evaluated   - no signs of dehiscence. Ecchymotic appearance to distal aspect fo right hallux likely from hematoma/ecchymosis no coagulated blood expressable but remains viable at this time  - Two sutures previously removed distal tip of right hallux to see if any signs of hematoma, but no significant drainage noted and no signs of cellulitis   - Vascular recs appreciated no intervention warranted  - stable for discharge at this time on oral abx, no need to wait for pathology as STCN from culture is rare likely contaminate very low concern of any residual osseous infection  - seen w/ attending 8 y/o male pt s/p hallux amputation right foot, closed  - pt seen and evaluated   - no signs of dehiscence. Ecchymotic appearance to distal aspect fo right hallux likely from hematoma/ecchymosis no coagulated blood expressable but remains viable at this time  - Two sutures previously removed distal tip of right hallux to see if any signs of hematoma, but no significant drainage noted and no signs of cellulitis   - Vascular recs appreciated no intervention warranted  - stable for discharge at this time on oral abx Augmentin to 6/6 per ID, no need to wait for pathology as STCN from culture is rare likely contaminate very low concern of any residual osseous infection  - seen w/ attending

## 2019-06-04 LAB
-  AMPICILLIN: SIGNIFICANT CHANGE UP
-  TETRACYCLINE: SIGNIFICANT CHANGE UP
-  VANCOMYCIN: SIGNIFICANT CHANGE UP
METHOD TYPE: SIGNIFICANT CHANGE UP

## 2019-06-05 LAB
CULTURE RESULTS: SIGNIFICANT CHANGE UP
ORGANISM # SPEC MICROSCOPIC CNT: SIGNIFICANT CHANGE UP
SPECIMEN SOURCE: SIGNIFICANT CHANGE UP

## 2019-06-11 LAB — SURGICAL PATHOLOGY STUDY: SIGNIFICANT CHANGE UP

## 2019-06-13 ENCOUNTER — APPOINTMENT (OUTPATIENT)
Dept: RHEUMATOLOGY | Facility: CLINIC | Age: 69
End: 2019-06-13

## 2019-06-27 ENCOUNTER — APPOINTMENT (OUTPATIENT)
Dept: RHEUMATOLOGY | Facility: CLINIC | Age: 69
End: 2019-06-27

## 2019-07-01 PROBLEM — Z86.19 HISTORY OF CLOSTRIDIUM DIFFICILE INFECTION: Status: RESOLVED | Noted: 2018-08-15 | Resolved: 2019-07-01

## 2019-07-10 ENCOUNTER — RX RENEWAL (OUTPATIENT)
Age: 69
End: 2019-07-10

## 2019-07-10 RX ORDER — BLOOD SUGAR DIAGNOSTIC
STRIP MISCELLANEOUS 3 TIMES DAILY
Qty: 3 | Refills: 3 | Status: ACTIVE | COMMUNITY
Start: 2019-01-28 | End: 1900-01-01

## 2019-07-11 ENCOUNTER — APPOINTMENT (OUTPATIENT)
Dept: RHEUMATOLOGY | Facility: CLINIC | Age: 69
End: 2019-07-11

## 2019-07-16 ENCOUNTER — APPOINTMENT (OUTPATIENT)
Dept: CARDIOLOGY | Facility: CLINIC | Age: 69
End: 2019-07-16
Payer: COMMERCIAL

## 2019-07-16 PROCEDURE — 93283 PRGRMG EVAL IMPLANTABLE DFB: CPT

## 2019-07-16 PROCEDURE — 99213 OFFICE O/P EST LOW 20 MIN: CPT

## 2019-07-16 PROCEDURE — 99214 OFFICE O/P EST MOD 30 MIN: CPT

## 2019-07-16 NOTE — DISCUSSION/SUMMARY
[Pacemaker Function Normal] : normal pacemaker function [AICD Function Normal] : normal AICD function [Patient] : the patient [Routine Follow-up in 3-4 months] : routine follow-up in 3-4 months [FreeTextEntry1] : Recommend two days of double dosing furosemide observing Optivol above threshold.

## 2019-07-16 NOTE — HISTORY OF PRESENT ILLNESS
[None] : The patient complains of no symptoms [de-identified] : Has had no symptoms and no pacemaker shocks or issues. Device replaced for NÉSTOR and this is first follow up.

## 2019-07-16 NOTE — PROCEDURE
[No] : not [NSR] : normal sinus rhythm [ICD] : Implantable cardioverter-defibrillator [DDD] : DDD [Atrial] : Atrial [Ventricular] : Ventricular [___ ms] : [unfilled] ms [Counters Reset] : the counters were reset [Apace-Vpace ___ %] : Apace-Vpace [unfilled]% [Longevity: ___ months] : The estimated remaining battery life is [unfilled] months [Lead Imp:  ___ohms] : lead impedance was [unfilled] ohms [Sensing Amplitude ___mv] : sensing amplitude was [unfilled] mv [___V @] : [unfilled] V [Asense-Vsense ___ %] : Asense-Vsense [unfilled]% [Asense-Vpace ___ %] : Asense-Vpace [unfilled]% [Apace-Vsense ___ %] : Apace-Vsense [unfilled]% [de-identified] : Medtronic  [de-identified] : Marvin MRI XT  [id number] [de-identified] : [id number] [de-identified] : 4/3/2019 [de-identified] : 62 [de-identified] : Normal lead impedances with Fidellis lead capped, a new defibrillator coil implanted after presenting with defibrillator storm due to Fidellis lead fracture. No recurrence of atrial tachycardia since spontaneous reversion to sinus. Two episodes of ventricular tachycardia in April, one lasting a minute, but rates below threshold and terminated spontaneously. No mode switch. Optivol recording above threshold 6/18/2019 until current.

## 2019-07-22 ENCOUNTER — APPOINTMENT (OUTPATIENT)
Dept: ENDOCRINOLOGY | Facility: CLINIC | Age: 69
End: 2019-07-22
Payer: COMMERCIAL

## 2019-07-22 ENCOUNTER — RX RENEWAL (OUTPATIENT)
Age: 69
End: 2019-07-22

## 2019-07-22 VITALS
DIASTOLIC BLOOD PRESSURE: 64 MMHG | OXYGEN SATURATION: 98 % | SYSTOLIC BLOOD PRESSURE: 110 MMHG | HEART RATE: 76 BPM | BODY MASS INDEX: 26.26 KG/M2 | WEIGHT: 196 LBS | HEIGHT: 72.5 IN

## 2019-07-22 LAB
GLUCOSE BLDC GLUCOMTR-MCNC: 81
HBA1C MFR BLD HPLC: 8.3

## 2019-07-22 PROCEDURE — 99214 OFFICE O/P EST MOD 30 MIN: CPT | Mod: 25

## 2019-07-22 PROCEDURE — 82962 GLUCOSE BLOOD TEST: CPT

## 2019-07-22 PROCEDURE — 83036 HEMOGLOBIN GLYCOSYLATED A1C: CPT | Mod: QW

## 2019-07-22 RX ORDER — PREDNISONE 2.5 MG/1
2.5 TABLET ORAL 3 TIMES DAILY
Qty: 270 | Refills: 0 | Status: DISCONTINUED | COMMUNITY
Start: 2019-02-21 | End: 2019-07-22

## 2019-07-22 NOTE — ASSESSMENT
[Carbohydrate Consistent Diet] : carbohydrate consistent diet [FreeTextEntry1] : Patient is a 70 yo man with type 2 diabetes, hypothyroidism, HLD and hyperkalemia\par \par 1. Uncontrolled T2DM\par -patient's A1c is slightly above goal at 8.3% on POCT, confirm with serum today. Goal A1c of 7.5%\par -for now continue current regimen. The patient states that his diet has been liberal at best. Recommend tighter diet control. When he adheres to a consistent carbohydrate, sugar limited diet, A1c generally gets to goal\par -up to date with podiatry and dilated eye exam\par -neuropathy on microfilament testing today\par -has CKD\par -for now, stop bromocriptine. He has been on this medication for 40+ years and PRL levels have been low. It is likely not contributing to his health in a significant way and with cardiac history, safe to stop medicine if not needed.  Repeat PRL levels in three months, while he is off medicine\par \par 2. Hyperkalemia\par -elevated K noted on labs from March with no repeat\par -repeat CMP today\par -if still elevated, will reach out to Dr. Bowers as he takes both potassium and spironolactone\par \par 3. HLD\par -statin\par \par 4. Hypothyroid\par -clinically euthyroid\par -repeat TFTs\par \par Follow up in 4 months [Diabetes Foot Care] : diabetes foot care [Long Term Vascular Complications] : long term vascular complications of diabetes [Importance of Diet and Exercise] : importance of diet and exercise to improve glycemic control, achieve weight loss and improve cardiovascular health [Self Monitoring of Blood Glucose] : self monitoring of blood glucose [Action and use of Insulin] : action and use of short and long-acting insulin [Insulin Self-Administration] : insulin self-administration [Injection Technique, Storage, Sharps Disposal] : injection technique, storage, and sharps disposal [Retinopathy Screening] : Patient was referred to ophthalmology for retinopathy screening

## 2019-07-22 NOTE — HISTORY OF PRESENT ILLNESS
[FreeTextEntry1] : 69 year old man with T2DM, hypothyroid, cirrhosis/HCV, ventricular tachycardia, s/p implantation of cardiac device here for follow up visit.\par \par 1. Type DM - Dx in 2008 on routine exam. Was on metformin 1000 mg twice daily, Januvia 50 mg daily, Amaryl 4 mg once a day at bedtime. He is adherent to medications. At the last visit, the patient had worse A1c in the setting of being on steroids so Lantus 24 unit was started. Medicines at present include, Lantus 20, januvia 100 mg daily, metformin 100 mg BID was continued. GFR 47. Patient had IR embolization. Was on prednisone 1.25 for gout but stopped it about 6 weeks ago\par Checks sugars twice a day, lowest value was 98 but otherwise 100-190s\par Diet: liberal has been loose recently\par Breakfast - fruit, cereal sometimes sweetened cereals), milk\par Lunch- soup, sandwich, diet soda\par Dinner - meat, potato, rice \par Macrovascular cx - No known MI, ? CAD, Hx Vtach and cardiac device, No known TIA,CVA. PAD\par Microvascular cx - +DM nephropathy, + DM neuropathy, + recent foot ulcers\par Podiatry visit- follows regularly with Dr. Tristan, hospitalized for amputation\par Dilated eye exam: has an appointment in August\par \par 2. Hyperprolactinemia\par 07/2017 - Prolactin level - 1.1 \par Takes bromocriptine x 30 years, attempted trial off but patient prefers to be on it. \par \par 3. Hypothyroidism\par Synthroid 125 mcg daily on empty stomach, adherent to it, no side effects\par Pt has no thyroid Sx like fatigue, palpitations, cold or heat intolerance\par

## 2019-07-22 NOTE — REVIEW OF SYSTEMS
[As Noted in HPI] : as noted in HPI [All other systems negative] : All other systems negative [Negative] : ENT [Fatigue] : no fatigue [Decreased Appetite] : appetite not decreased [Chest Pain] : no chest pain [Palpitations] : no palpitations [Heart Rate Is Slow] : the heart rate was not slow [Heart Rate Is Fast] : the heart rate was not fast [Nausea] : no nausea [Vomiting] : no vomiting was observed [Depression] : no depression [Anxiety] : no anxiety

## 2019-07-22 NOTE — PHYSICAL EXAM
[Alert] : alert [No Acute Distress] : no acute distress [Well Developed] : well developed [Well Nourished] : well nourished [Normal Hearing] : hearing was normal [Normal Rate and Effort] : normal respiratory rhythm and effort [No Accessory Muscle Use] : no accessory muscle use [No Respiratory Distress] : no respiratory distress [Clear to Auscultation] : lungs were clear to auscultation bilaterally [Normal Rate] : heart rate was normal  [Normal Bowel Sounds] : normal bowel sounds [Soft] : abdomen soft [Not Tender] : non-tender [Left Foot Was Examined] : left foot ~C was examined [Right Foot Was Examined] : right foot ~C was examined [Normal Gait] : normal gait [#9 Diminished] : number 9 was diminished [Normal Insight/Judgement] : insight and judgment were intact [Normal Affect] : the affect was normal [Normal Mood] : the mood was normal [#1 Diminished] : number 1 was normal [#3 Diminished] : number 3 was normal [#2 Diminished] : number 2 was normal [#4 Diminished] : number 4 was normal [#5 Diminished] : number 5 was normal [#6 Diminished] : number 6 was normal [#7 Diminished] : number 7 was normal [#10 Diminished] : number 10 was normal [#8 Diminished] : number 8 was normal [de-identified] : small cut to left foot, no acute infection, callouses  [de-identified] : Charcot foot deformities

## 2019-07-23 LAB
25(OH)D3 SERPL-MCNC: 38.6 NG/ML
ALBUMIN SERPL ELPH-MCNC: 3.6 G/DL
ALP BLD-CCNC: 161 U/L
ALT SERPL-CCNC: 15 U/L
ANION GAP SERPL CALC-SCNC: 11 MMOL/L
AST SERPL-CCNC: 27 U/L
BILIRUB SERPL-MCNC: 0.5 MG/DL
BUN SERPL-MCNC: 34 MG/DL
CALCIUM SERPL-MCNC: 8.6 MG/DL
CHLORIDE SERPL-SCNC: 110 MMOL/L
CHOLEST SERPL-MCNC: 91 MG/DL
CHOLEST/HDLC SERPL: 2.4 RATIO
CO2 SERPL-SCNC: 21 MMOL/L
CREAT SERPL-MCNC: 1.43 MG/DL
CREAT SPEC-SCNC: 153 MG/DL
ESTIMATED AVERAGE GLUCOSE: 183 MG/DL
GLUCOSE SERPL-MCNC: 86 MG/DL
HBA1C MFR BLD HPLC: 8 %
HDLC SERPL-MCNC: 38 MG/DL
LDLC SERPL CALC-MCNC: 40 MG/DL
MICROALBUMIN 24H UR DL<=1MG/L-MCNC: 2.5 MG/DL
MICROALBUMIN/CREAT 24H UR-RTO: 16 MG/G
POTASSIUM SERPL-SCNC: 5 MMOL/L
PROLACTIN SERPL-MCNC: 0.8 NG/ML
PROT SERPL-MCNC: 6.7 G/DL
SODIUM SERPL-SCNC: 142 MMOL/L
TRIGL SERPL-MCNC: 67 MG/DL

## 2019-07-24 LAB
T4 FREE SERPL-MCNC: 1.7 NG/DL
TSH SERPL-ACNC: 1.43 UIU/ML

## 2019-08-08 ENCOUNTER — MEDICATION RENEWAL (OUTPATIENT)
Age: 69
End: 2019-08-08

## 2019-08-12 ENCOUNTER — APPOINTMENT (OUTPATIENT)
Dept: ELECTROPHYSIOLOGY | Facility: CLINIC | Age: 69
End: 2019-08-12

## 2019-08-14 ENCOUNTER — FORM ENCOUNTER (OUTPATIENT)
Age: 69
End: 2019-08-14

## 2019-08-15 ENCOUNTER — OUTPATIENT (OUTPATIENT)
Dept: OUTPATIENT SERVICES | Facility: HOSPITAL | Age: 69
LOS: 1 days | End: 2019-08-15
Payer: COMMERCIAL

## 2019-08-15 ENCOUNTER — APPOINTMENT (OUTPATIENT)
Dept: CT IMAGING | Facility: CLINIC | Age: 69
End: 2019-08-15
Payer: COMMERCIAL

## 2019-08-15 DIAGNOSIS — Z01.89 ENCOUNTER FOR OTHER SPECIFIED SPECIAL EXAMINATIONS: Chronic | ICD-10-CM

## 2019-08-15 DIAGNOSIS — Z89.9 ACQUIRED ABSENCE OF LIMB, UNSPECIFIED: Chronic | ICD-10-CM

## 2019-08-15 DIAGNOSIS — R16.0 HEPATOMEGALY, NOT ELSEWHERE CLASSIFIED: ICD-10-CM

## 2019-08-15 PROCEDURE — 74170 CT ABD WO CNTRST FLWD CNTRST: CPT

## 2019-08-15 PROCEDURE — 74170 CT ABD WO CNTRST FLWD CNTRST: CPT | Mod: 26

## 2019-08-16 ENCOUNTER — APPOINTMENT (OUTPATIENT)
Dept: INTERVENTIONAL RADIOLOGY/VASCULAR | Facility: CLINIC | Age: 69
End: 2019-08-16
Payer: COMMERCIAL

## 2019-08-16 VITALS
RESPIRATION RATE: 16 BRPM | OXYGEN SATURATION: 98 % | TEMPERATURE: 98.3 F | SYSTOLIC BLOOD PRESSURE: 108 MMHG | HEART RATE: 88 BPM | DIASTOLIC BLOOD PRESSURE: 66 MMHG

## 2019-08-16 PROCEDURE — 99213 OFFICE O/P EST LOW 20 MIN: CPT

## 2019-08-16 RX ORDER — BROMOCRIPTINE MESYLATE 5 MG/1
5 CAPSULE ORAL DAILY
Refills: 0 | Status: DISCONTINUED | COMMUNITY
Start: 2018-12-20 | End: 2019-08-16

## 2019-08-16 RX ORDER — OXYCODONE AND ACETAMINOPHEN 5; 325 MG/1; MG/1
5-325 TABLET ORAL
Qty: 28 | Refills: 0 | Status: DISCONTINUED | COMMUNITY
Start: 2019-05-01 | End: 2019-08-16

## 2019-08-16 NOTE — HISTORY OF PRESENT ILLNESS
[FreeTextEntry1] : Mr. Harvey is a 67 y/o male with pmhx of ischemic cardiomyopathy, CHF, V-tach s/p AICD/PPM, HLD, HTN, BEHZAD, gouty arthritis, T2DM, FERNANDES, and cirrhosis who presents today for follow up for liver directed therapy. Pt was originally diagnosed with cirrhosis of the liver in 2017 after workup for elevated liver enzymes. He has been followed with serial imaging by Dr. Chapo Alaniz. He was referred to IR by Dr. Alaniz after imaging revealed liver lesion. He is now s/p hepatic angiogram and embolization of 4 cm right hepatic tumor on 2/7/19 and hepatic angiogram and bland embolization of segment 8 and 5 on 4/18/19. He presents today for follow up and to review recent imaging. Denies abdominal pain, change in skin color, hemoptysis, melena, or hematochezia. \par \par He reports having issues with fluid retention and CHF exacerbations over the last year. He has worked with Dr. Rivers to adjust his diuretic regimen and had a significant amount of fluid weight loss in March. He has currently been stable over the last few months, with no CHF exacerbations. \par \par He has been following with Dr. Tristan and was hospitalized in August and September for recurrent cellulitis and gouty arthritis. On 12/7/18, he underwent another I+D of the right ankle for a non-healing ankle wound. The wound is now healed and he continues to follow with Dr. Tristan regularly.\par \par \par

## 2019-08-16 NOTE — ASSESSMENT
[Other: _____] : [unfilled] [FreeTextEntry1] : Mr. Harvey is a 69 y/o male with pmhx of ischemic cardiomyopathy, CHF, V-tach s/p AICD/PPM, HLD, HTN, BEHZAD, gouty arthritis, T2DM, FERNANDES, and cirrhosis who presents today for follow up for liver directed therapy.  His CT scan from 8/15/19 did not demonstrate any evidence of viable tumor (LR-TR Nonviable) associated with lesions in \par segment 7 and at the junction of segments 4A and 8 post intervention. \par \par I have reviewed their imaging and discussed the findings with Mr. HARVEY.  The imaging does not show any evidence of viable tumor so I am recommending repeat imaging in 3 months.  I have given them a prescription for the MRI and they are to follow up with our office afterwards to review the findings.  While there is no indication for intervention at this time, I reinforced the importance of continued surveillance with serial imaging.\par \par I have provided the patient the opportunity to ask questions and have answered them to their satisfaction.  They are encouraged to contact our office with any further questions, concerns, or issues.\par

## 2019-08-16 NOTE — PHYSICAL EXAM
[Restricted in physically strenuous activity but ambulatory and able to carry out work of a light or sedentary nature] : Restricted in physically strenuous activity but ambulatory and able to carry out work of a light or sedentary nature, e.g., light house work, office work

## 2019-08-16 NOTE — DATA REVIEWED
[FreeTextEntry1] : \par EXAM: CT ABDOMEN ONLY [facility] IC \par \par \par PROCEDURE DATE: 08/15/2019 \par \par \par \par INTERPRETATION: CLINICAL INFORMATION: Hepatocellular carcinoma status post \par ablation February 2019 and subsequent embolization in 04/18/2019. Follow-up \par study. \par \par COMPARISON: Abdominal CT dated 05/18/2019 and 05/03/2019. \par \par PROCEDURE: \par CT of the Abdomen was performed with and without intravenous contrast. \par Precontrast, Arterial, Portal Venous and Delayed phases were acquired. \par Intravenous contrast: 90 ml Omnipaque 350. 10 ml discarded. \par Oral contrast: None. \par Sagittal and coronal reformats were performed. \par \par \par FINDINGS: \par \par LOWER CHEST: Pacemaker wire leads in the right atrium and right ventricle. 2 \par mm right lower lobe lung nodule, unchanged. \par \par LIVER: Cirrhosis. \par \par Lesion #: 1 \par Location: Segment 7 \par Size: 2.6 cm, previously measuring 2.9 cm.. Series 6 image 23. \par AP Hyperenhancement: NO no evidence of associated enhancement post \par intervention. \par LI-RADS v 2018 Category: LR-TR Nonviable \par \par Lesion #: 2 \par Location: Junction of segment 4A and 8. \par Size: 1.3 cm, previously measuring 1.6 cm. Series 6 image 20. \par AP Hyperenhancement: NO no evidence of associated enhancement post \par intervention. \par LI-RADS v 2018 Category: LR-TR Nonviable \par \par BILE DUCTS: Normal caliber. \par GALLBLADDER: Cholelithiasis. \par SPLEEN: Mild splenomegaly, unchanged. \par PANCREAS: Within normal limits. \par ADRENALS: Within normal limits. \par KIDNEYS/URETERS: Within normal limits. \par \par VISUALIZED PORTIONS: \par \par BOWEL: Within normal limits. \par PERITONEUM: No ascites. \par VESSELS: Atherosclerotic changes. Recanalized periumbilical vein. \par RETROPERITONEUM/LYMPH NODES: No lymphadenopathy. \par ABDOMINAL WALL: Within normal limits. \par BONES: Within normal limits. \par \par IMPRESSION: \par \par Cirrhosis with evidence of portal hypertension. \par \par No evidence of viable tumor (LR-TR Nonviable) associated with lesions in \par segment 7 and at the junction of segments 4A and 8 post intervention. \par \par \par \par \par \par \par \par

## 2019-08-28 ENCOUNTER — APPOINTMENT (OUTPATIENT)
Dept: ELECTROPHYSIOLOGY | Facility: CLINIC | Age: 69
End: 2019-08-28
Payer: COMMERCIAL

## 2019-08-28 PROCEDURE — 93296 REM INTERROG EVL PM/IDS: CPT

## 2019-08-28 PROCEDURE — 93295 DEV INTERROG REMOTE 1/2/MLT: CPT

## 2019-09-20 ENCOUNTER — RX RENEWAL (OUTPATIENT)
Age: 69
End: 2019-09-20

## 2019-09-23 ENCOUNTER — MEDICATION RENEWAL (OUTPATIENT)
Age: 69
End: 2019-09-23

## 2019-09-30 NOTE — ED PROVIDER NOTE - NSSUBSTANCEUSE_GEN_ALL_CORE_SD
never used Detail Level: Zone Note Text (......Xxx Chief Complaint.): This diagnosis correlates with the Other (Free Text): Discussed treatment options including Mohs excision, radiation or E and C patient consented to ED and C

## 2019-10-16 ENCOUNTER — NON-APPOINTMENT (OUTPATIENT)
Age: 69
End: 2019-10-16

## 2019-10-16 ENCOUNTER — APPOINTMENT (OUTPATIENT)
Dept: CARDIOLOGY | Facility: CLINIC | Age: 69
End: 2019-10-16
Payer: COMMERCIAL

## 2019-10-16 VITALS
OXYGEN SATURATION: 100 % | DIASTOLIC BLOOD PRESSURE: 58 MMHG | BODY MASS INDEX: 26.62 KG/M2 | WEIGHT: 199 LBS | HEART RATE: 60 BPM | SYSTOLIC BLOOD PRESSURE: 102 MMHG

## 2019-10-16 PROCEDURE — 99215 OFFICE O/P EST HI 40 MIN: CPT

## 2019-10-16 PROCEDURE — 93000 ELECTROCARDIOGRAM COMPLETE: CPT

## 2019-10-16 NOTE — REVIEW OF SYSTEMS
[Lower Ext Edema] : lower extremity edema [Joint Pain] : joint pain [Negative] : Heme/Lymph [Shortness Of Breath] : no shortness of breath [Dyspnea on exertion] : not dyspnea during exertion [Leg Claudication] : no intermittent leg claudication [Chest Pain] : no chest pain

## 2019-10-16 NOTE — HISTORY OF PRESENT ILLNESS
[FreeTextEntry1] : Mr. Jamil Harvey is a 69-year-old man with ischemic cardiomyopathy, ventricular tachycardia and appropriate ICD device firing. Uvaldo lead fractured and was capped and a new generator and lead implanted. After device adjustments to alleviate potential for pause dependent ventricular tachycardia has had no events. Otherwise, despite his long history of severe multivessel coronary artery disease and very remote coronary bypass surgery with severe ventricular dysfunction, has always been active and never experiences chest pain or dyspnea. Recurrent foot infection managed by podiatrist and ID and healed, but recurrent cellulitis with hospital stay for IV antibiotics and complication of JULIANNE, ultimately resolved. He always sleeps in recliner to manage obstructive sleep apnea as has been unable to tolerate any mask or device. Regular exercise includes walking, riding stationary bike 6 miles a day and golf. Now over 30 years coming to this practice since coronary bypass surgery.\par \par Last year onset of anasarca, diuretic dose increased with minimal response. Then added metolazone with dramatic response, 65 pound weight reduction and now weighs much less than at anytime in our record. Was feeling much better and denies dizziness, lightheadedness. Started on anticoagulation with plan for cardioversion, but instead admitted with septic bursitis of knee requiring orthopedic procedure and remarkably spontaneously reverted to sinus rhythm which continues to be maintained. Breathing improved, not retaining excess volume. Had ICD generator change for NÉSTOR and has also IR embolization of HCC. Currently finds volume status optimal and all leg wounds have healed. Had no issues with dizziness or lightheadedness this year during hot summer months.

## 2019-10-16 NOTE — DISCUSSION/SUMMARY
[Cardiomyopathy] : cardiomyopathy [ACC/AHA Stage C] : ACC/AHA Stage C [NYHA Class II] : NYHA Class II [Coronary Artery Disease] : coronary artery disease [Ischemic Cardiomyopathy] : ischemic cardiomyopathy [Paroxysmal Ventricular Tachycardia] : paroxysmal [Compensated] : compensated [Chronic Systolic Heart Failure] : chronic systolic congestive heart failure [None] : There are no changes in medication management [Stable] : stable [de-identified] : has ICD [Patient] : the patient [de-identified] : prolonged and refractory atrial tachycardia spontaneously reverted to sinus and maintained for prolonged period

## 2019-10-16 NOTE — PHYSICAL EXAM
[General Appearance - Well Developed] : well developed [General Appearance - Well Nourished] : well nourished [Well Groomed] : well groomed [Normal Appearance] : normal appearance [General Appearance - In No Acute Distress] : no acute distress [Normal Conjunctiva] : the conjunctiva exhibited no abnormalities [No Deformities] : no deformities [Normal Oral Mucosa] : normal oral mucosa [No Oral Pallor] : no oral pallor [Eyelids - No Xanthelasma] : the eyelids demonstrated no xanthelasmas [JVD Elevated _____cm] : JVD elevated [unfilled] ~U cm above clavicle [No Oral Cyanosis] : no oral cyanosis [Normal Jugular Venous V Waves Present] : normal jugular venous V waves present [No Jugular Venous Storm A Waves] : no jugular venous storm A waves [Respiration, Rhythm And Depth] : normal respiratory rhythm and effort [Exaggerated Use Of Accessory Muscles For Inspiration] : no accessory muscle use [Not Palpable] : not palpable [Auscultation Breath Sounds / Voice Sounds] : lungs were clear to auscultation bilaterally [Normal Rate] : normal [Rhythm Regular] : regular [Normal S1] : normal S1 [No Gallop] : no gallop heard [Normal S2] : normal S2 [No Murmur] : no murmurs heard [1+] : right 1+ [2+] : left 2+ [No Abnormalities] : the abdominal aorta was not enlarged and no bruit was heard [Abdomen Tenderness] : non-tender [Abdomen Soft] : soft [Abdomen Mass (___ Cm)] : no abdominal mass palpated [Abnormal Walk] : normal gait [Nail Clubbing] : no clubbing of the fingernails [Petechial Hemorrhages (___cm)] : no petechial hemorrhages [Cyanosis, Localized] : no localized cyanosis [] : no rash [Skin Lesions] : no skin lesions [Skin Color & Pigmentation] : normal skin color and pigmentation [No Xanthoma] : no  xanthoma was observed [No Skin Ulcers] : no skin ulcer [Affect] : the affect was normal [Oriented To Time, Place, And Person] : oriented to person, place, and time [Mood] : the mood was normal [No Anxiety] : not feeling anxious [___ +] : bilateral [unfilled]U+ pitting edema to the ankles [Right Carotid Bruit] : no bruit heard over the right carotid [Rt] : no varicose veins of the right leg [Left Carotid Bruit] : no bruit heard over the left carotid [FreeTextEntry1] : venous stasis discoloration of distal lower extremities

## 2019-11-19 ENCOUNTER — FORM ENCOUNTER (OUTPATIENT)
Age: 69
End: 2019-11-19

## 2019-11-20 ENCOUNTER — APPOINTMENT (OUTPATIENT)
Dept: CT IMAGING | Facility: CLINIC | Age: 69
End: 2019-11-20
Payer: COMMERCIAL

## 2019-11-20 ENCOUNTER — OUTPATIENT (OUTPATIENT)
Dept: OUTPATIENT SERVICES | Facility: HOSPITAL | Age: 69
LOS: 1 days | End: 2019-11-20
Payer: COMMERCIAL

## 2019-11-20 DIAGNOSIS — R16.0 HEPATOMEGALY, NOT ELSEWHERE CLASSIFIED: ICD-10-CM

## 2019-11-20 DIAGNOSIS — Z01.89 ENCOUNTER FOR OTHER SPECIFIED SPECIAL EXAMINATIONS: Chronic | ICD-10-CM

## 2019-11-20 DIAGNOSIS — Z89.9 ACQUIRED ABSENCE OF LIMB, UNSPECIFIED: Chronic | ICD-10-CM

## 2019-11-20 PROCEDURE — 74170 CT ABD WO CNTRST FLWD CNTRST: CPT | Mod: 26

## 2019-11-20 PROCEDURE — 74170 CT ABD WO CNTRST FLWD CNTRST: CPT

## 2019-11-22 ENCOUNTER — APPOINTMENT (OUTPATIENT)
Dept: INTERVENTIONAL RADIOLOGY/VASCULAR | Facility: CLINIC | Age: 69
End: 2019-11-22
Payer: COMMERCIAL

## 2019-11-22 VITALS
DIASTOLIC BLOOD PRESSURE: 71 MMHG | RESPIRATION RATE: 18 BRPM | TEMPERATURE: 97.7 F | SYSTOLIC BLOOD PRESSURE: 119 MMHG | HEART RATE: 80 BPM | OXYGEN SATURATION: 100 %

## 2019-11-22 DIAGNOSIS — M71.169: ICD-10-CM

## 2019-11-22 DIAGNOSIS — M79.89 OTHER SPECIFIED SOFT TISSUE DISORDERS: ICD-10-CM

## 2019-11-22 DIAGNOSIS — L97.519 NON-PRESSURE CHRONIC ULCER OF OTHER PART OF RIGHT FOOT WITH UNSPECIFIED SEVERITY: ICD-10-CM

## 2019-11-22 DIAGNOSIS — M86.179 OTHER ACUTE OSTEOMYELITIS, UNSPECIFIED ANKLE AND FOOT: ICD-10-CM

## 2019-11-22 DIAGNOSIS — Z86.79 PERSONAL HISTORY OF OTHER DISEASES OF THE CIRCULATORY SYSTEM: ICD-10-CM

## 2019-11-22 DIAGNOSIS — L03.119 CELLULITIS OF UNSPECIFIED PART OF LIMB: ICD-10-CM

## 2019-11-22 DIAGNOSIS — S91.309A UNSPECIFIED OPEN WOUND, UNSPECIFIED FOOT, INITIAL ENCOUNTER: ICD-10-CM

## 2019-11-22 DIAGNOSIS — Z86.39 PERSONAL HISTORY OF OTHER ENDOCRINE, NUTRITIONAL AND METABOLIC DISEASE: ICD-10-CM

## 2019-11-22 PROCEDURE — 99212 OFFICE O/P EST SF 10 MIN: CPT

## 2019-11-23 NOTE — PROCEDURE NOTE - NSINFORMCONSENT_GEN_A_CORE
Benefits, risks, and possible complications of procedure explained to patient/caregiver who verbalized understanding and gave written consent.
modified independence

## 2019-11-27 ENCOUNTER — APPOINTMENT (OUTPATIENT)
Dept: ELECTROPHYSIOLOGY | Facility: CLINIC | Age: 69
End: 2019-11-27
Payer: COMMERCIAL

## 2019-11-27 PROCEDURE — 93295 DEV INTERROG REMOTE 1/2/MLT: CPT

## 2019-11-27 PROCEDURE — 93296 REM INTERROG EVL PM/IDS: CPT

## 2019-12-06 ENCOUNTER — APPOINTMENT (OUTPATIENT)
Dept: HEPATOLOGY | Facility: CLINIC | Age: 69
End: 2019-12-06
Payer: COMMERCIAL

## 2019-12-06 ENCOUNTER — APPOINTMENT (OUTPATIENT)
Dept: ENDOCRINOLOGY | Facility: CLINIC | Age: 69
End: 2019-12-06
Payer: COMMERCIAL

## 2019-12-06 VITALS
HEIGHT: 72.5 IN | WEIGHT: 209 LBS | DIASTOLIC BLOOD PRESSURE: 61 MMHG | TEMPERATURE: 97.6 F | HEART RATE: 81 BPM | BODY MASS INDEX: 28 KG/M2 | SYSTOLIC BLOOD PRESSURE: 110 MMHG | RESPIRATION RATE: 18 BRPM

## 2019-12-06 VITALS
WEIGHT: 210 LBS | SYSTOLIC BLOOD PRESSURE: 115 MMHG | HEART RATE: 85 BPM | OXYGEN SATURATION: 99 % | BODY MASS INDEX: 28.13 KG/M2 | HEIGHT: 72.5 IN | DIASTOLIC BLOOD PRESSURE: 80 MMHG

## 2019-12-06 LAB — HBA1C MFR BLD HPLC: 7.3

## 2019-12-06 PROCEDURE — 83036 HEMOGLOBIN GLYCOSYLATED A1C: CPT | Mod: QW

## 2019-12-06 PROCEDURE — 99214 OFFICE O/P EST MOD 30 MIN: CPT | Mod: 25

## 2019-12-06 PROCEDURE — 99214 OFFICE O/P EST MOD 30 MIN: CPT

## 2019-12-06 NOTE — REVIEW OF SYSTEMS
[Fatigue] : no fatigue [Decreased Appetite] : appetite not decreased [Chest Pain] : no chest pain [Palpitations] : no palpitations [Heart Rate Is Slow] : the heart rate was not slow [Heart Rate Is Fast] : the heart rate was not fast [Shortness Of Breath] : no shortness of breath [Wheezing] : no wheezing was heard [Cough] : no cough [SOB on Exertion] : no shortness of breath during exertion [Depression] : no depression [Anxiety] : no anxiety [Negative] : Heme/Lymph

## 2019-12-06 NOTE — HISTORY OF PRESENT ILLNESS
[FreeTextEntry1] : 69 year old man with T2DM, hypothyroid, cirrhosis/HCV, ventricular tachycardia, s/p implantation of cardiac device here for follow up visit.\par \par 1. Type DM - Dx in 2008 on routine exam. Was on metformin 1000 mg twice daily, Januvia 50 mg daily, Amaryl 4 mg once a day at bedtime. He is adherent to medications. At the last visit, the patient had worse A1c in the setting of being on steroids so Lantus 24 unit was started. Medicines at present include, Lantus 18, januvia 100 mg daily, metformin 100 mg BID \par Checks sugars twice a day, lowest value was 8 but otherwise 100-160, occasional 200\par Last A1c is 7.4%\par Appetite is good\par Has a sweet tooth-will have desserts, likes chocolate\par Macrovascular cx - No known MI, ? CAD, Hx Vtach and cardiac device, No known TIA,CVA. PAD\par Microvascular cx - +DM nephropathy, + DM neuropathy, + recent foot ulcers\par Podiatry visit- follows regularly with Dr. Tristan\par Dilated eye exam: has an appointment in August\par \par 2. Hyperprolactinemia\par Takes bromocriptine x 30 years, a but with no significant clinical difference\par \par 3. Hypothyroidism\par Synthroid 125 mcg daily on empty stomach with two pills on sat/sun, adherent to it, no side effects\par Pt has no thyroid Sx like fatigue, palpitations, cold or heat intolerance\par Free T4 1.5\par

## 2019-12-06 NOTE — PHYSICAL EXAM
[Alert] : alert [No Acute Distress] : no acute distress [Well Nourished] : well nourished [Well Developed] : well developed [No Respiratory Distress] : no respiratory distress [Normal Hearing] : hearing was normal [No Accessory Muscle Use] : no accessory muscle use [Normal Rate and Effort] : normal respiratory rhythm and effort [Clear to Auscultation] : lungs were clear to auscultation bilaterally [Normal Bowel Sounds] : normal bowel sounds [Normal Rate] : heart rate was normal  [Not Tender] : non-tender [Soft] : abdomen soft [Right Foot Was Examined] : right foot ~C was examined [Normal Gait] : normal gait [Left Foot Was Examined] : left foot ~C was examined [2+] : 2+ in the posterior tibialis [#9 Diminished] : number 9 was diminished [No Motor Deficits] : the motor exam was normal [Normal Insight/Judgement] : insight and judgment were intact [Normal Affect] : the affect was normal [Normal Mood] : the mood was normal [Foot Ulcers] : no foot ulcers [#1 Diminished] : number 1 was normal [#2 Diminished] : number 2 was normal [#3 Diminished] : number 3 was normal [#4 Diminished] : number 4 was normal [#5 Diminished] : number 5 was normal [#6 Diminished] : number 6 was normal [#7 Diminished] : number 7 was normal [#8 Diminished] : number 8 was normal [#10 Diminished] : number 10 was normal [de-identified] : Left chest wall cardiac device [de-identified] : Charcot foot deformities [de-identified] : right toe amputations 1-3

## 2019-12-06 NOTE — ASSESSMENT
[FreeTextEntry1] : Patient is a 68 yo man with type 2 diabetes, hypothyroidism, hx of hyperprolactinemia\par \par 1. T2DM\par -patient's POCT A1c is 7.3% this is his goal. No hypoglycemia\par -continue with lantus 18, januvia and metformin \par -UTD with dilated eye exam\par -UTD with podiatry for neuropathy\par -continue with behavioral modifications, he does enjoy sweet foods but would not aim for tighter control because on glucometer download there are values of 80.  No values lower than 70\par \par 2. Hyperprolactinemia\par -hx remote \par -off of bromocriptine\par -repeat PRL with next lab draw, referral slips provided\par -asymptoamtic\par \par 3. Hypothyroid\par -Free T4 and TSH in October were at goal\par -continue current doses of levothyroxine\par \par Follow up in 4-5 months, sooner as needed [Carbohydrate Consistent Diet] : carbohydrate consistent diet [Diabetes Foot Care] : diabetes foot care [Hypoglycemia Management] : hypoglycemia management [Long Term Vascular Complications] : long term vascular complications of diabetes [Insulin Self-Administration] : insulin self-administration [Self Monitoring of Blood Glucose] : self monitoring of blood glucose [Injection Technique, Storage, Sharps Disposal] : injection technique, storage, and sharps disposal [Retinopathy Screening] : Patient was referred to ophthalmology for retinopathy screening [Levothyroxine] : The patient was instructed to take Levothyroxine on an empty stomach, separate from vitamins, and wait at least 30 minutes before eating

## 2019-12-13 ENCOUNTER — APPOINTMENT (OUTPATIENT)
Dept: NEPHROLOGY | Facility: CLINIC | Age: 69
End: 2019-12-13
Payer: COMMERCIAL

## 2019-12-13 VITALS
SYSTOLIC BLOOD PRESSURE: 104 MMHG | DIASTOLIC BLOOD PRESSURE: 62 MMHG | HEART RATE: 66 BPM | WEIGHT: 211.64 LBS | OXYGEN SATURATION: 100 % | BODY MASS INDEX: 28.31 KG/M2

## 2019-12-13 DIAGNOSIS — E87.5 HYPERKALEMIA: ICD-10-CM

## 2019-12-13 PROCEDURE — 99214 OFFICE O/P EST MOD 30 MIN: CPT

## 2019-12-13 NOTE — HISTORY OF PRESENT ILLNESS
[FreeTextEntry1] : 69 year old  male, retired (used to work as  for a firm in [place]), with DM x 15 years, with diabetic neuropathy and ?retinopathy, CHF, HLD, CAD s/p CABG (1986) and stent placement (1999), fatty liver with recent diagnosis of HCC, gouty arthritis, AFib, s/p pacemaker and defibrillator, sleep apnea, hypothyroidism, ?prolactinoma, and multiple episodes of JULIANNE in the past that had resolved, presents for follow up. \par \par Pt. was last seen in March 2019. At that time, he was noted to have stable Scr and advised to RTC for follow up in 2 months.\par \par Currently, pt. overall feels well and denies any new complaints. His chronic LE swelling is resolved and denies any cp, sob, nausea, vomiting or diarrhea. He also denies use of NSAIDs or nephrotoxins.No procedures with IV contrast planned at this time.

## 2019-12-13 NOTE — ASSESSMENT
[FreeTextEntry1] : JULIANNE/ Stage 3 CKD : Pt. noted to have prior episodes of JULIANNE that had resolved.\par -Pt. likely with hemodynamically mediated JULIANNE in the setting of lasix, ramipril, and aldactone use, with use of IV contrast at multiple occasions, and hypotension in the past.\par -Last Scr stable\par -UA has been bland and spot urine TP/CR WNL, and abdominal US done on 9/24/18 showed normal sized kidneys without any hydronephrosis or evidence of CKD. \par - Monitor renal panel, and check UA, spot urine TP/CR.\par -Pt. has multiple risk factors to develop contrast induced nephropathy after use of IV contrast, including elevated Scr, anemia, CHF and diabetes. Explained the risk for worsening of renal function with use of IV contrast to the patient. \par -If another contrast study is absolutely required, pt. advised to hold ramipril, lasix and aldactone a day prior and on the day of the contrast study. Can consider hydration with IV NS @1ml/kg/hr for 6 hours pre, during and post contrast study (depending on pt's volume status at the time of procedure). \par -Avoid nephrotoxins, NSAIDs and IV contrast (if possible). \par \par Edema: in the setting of JULIANNE and HCC/cirrhosis. Stable at present. Monitor weight. COntinue aldactone and lasix.\par \par Hyperkalemia: in the setting of elevated Scr and ramipril and aldactone use, with use of potassium supplements. Last potassium level WNL. Monitor serum potassium.\par \par Follow up in 6 months.

## 2019-12-13 NOTE — CONSULT LETTER
[Dear  ___] : Dear  [unfilled], [Courtesy Letter:] : I had the pleasure of seeing your patient, [unfilled], in my office today. [( Thank you for referring [unfilled] for consultation for _____ )] : Thank you for referring [unfilled] for consultation for [unfilled] [Please see my note below.] : Please see my note below. [Consult Closing:] : Thank you very much for allowing me to participate in the care of this patient.  If you have any questions, please do not hesitate to contact me. [Sincerely,] : Sincerely, [FreeTextEntry3] : Kaylee Clement MD

## 2019-12-13 NOTE — PHYSICAL EXAM
[General Appearance - Well Nourished] : well nourished [General Appearance - In No Acute Distress] : in no acute distress [General Appearance - Alert] : alert [General Appearance - Well Developed] : well developed [General Appearance - Well-Appearing] : healthy appearing [Sclera] : the sclera and conjunctiva were normal [Outer Ear] : the ears and nose were normal in appearance [Hearing Threshold Finger Rub Not [place]] : hearing was normal [Neck Appearance] : the appearance of the neck was normal [] : no respiratory distress [Jugular Venous Distention Increased] : there was no jugular-venous distention [Exaggerated Use Of Accessory Muscles For Inspiration] : no accessory muscle use [Auscultation Breath Sounds / Voice Sounds] : lungs were clear to auscultation bilaterally [Heart Sounds] : normal S1 and S2 [Apical Impulse] : the apical impulse was normal [Abdomen Soft] : soft [Heart Sounds Pericardial Friction Rub] : no pericardial rub [No CVA Tenderness] : no ~M costovertebral angle tenderness [Impaired Insight] : insight and judgment were intact [Oriented To Time, Place, And Person] : oriented to person, place, and time [Affect] : the affect was normal [Mood] : the mood was normal [FreeTextEntry1] : improved bilateral LE edema

## 2019-12-16 LAB
CREAT SPEC-SCNC: 110 MG/DL
CREAT/PROT UR: 0.1 RATIO
PROT UR-MCNC: 10 MG/DL

## 2019-12-21 NOTE — PROGRESS NOTE ADULT - ASSESSMENT
68 m with DM, H LD, CKD, CAD s/p CABG & stenting, systolic CHF ( Ef 37%) s/p ICD, Afib, Gout, idiopathic cirrhosis, thrombocytopenia,  pituitary adenoma and recent right knee MSSA prepatellar bursitis p/w diarrhea, weakness, and right foot/heel ulcer and purulent drainage, was found to have C-diff and started on PO vanco, the achilles tendon also aspirated and had purulence and tophaceous  material that grew MSSA, the diarrhea is improving but the renal function has been worsening    #Clostridium difficile colitis  * on PO vanco and clinically improving, leukocytosis resolved but renal function worse, will c/w PO vanco until finishing the course of antibiotics    #achilles gouty tendinopathy and MSSA abscess and skin infection with bursitis  * c/w cefazolin 1 g q 12 and adjust as per the renal function, started 8/6 now day 2  * will do a 2 week course, when ready for DC will switch to PO keflex    #JULIANNE on CKD worsening  * C-diff is improving,  * f/u the renal u/s and monitor BMP [id number]

## 2020-01-01 ENCOUNTER — APPOINTMENT (OUTPATIENT)
Dept: CARDIOLOGY | Facility: CLINIC | Age: 70
End: 2020-01-01
Payer: COMMERCIAL

## 2020-01-01 ENCOUNTER — RESULT REVIEW (OUTPATIENT)
Age: 70
End: 2020-01-01

## 2020-01-01 ENCOUNTER — TRANSCRIPTION ENCOUNTER (OUTPATIENT)
Age: 70
End: 2020-01-01

## 2020-01-01 ENCOUNTER — NON-APPOINTMENT (OUTPATIENT)
Age: 70
End: 2020-01-01

## 2020-01-01 ENCOUNTER — OUTPATIENT (OUTPATIENT)
Dept: OUTPATIENT SERVICES | Facility: HOSPITAL | Age: 70
LOS: 1 days | Discharge: HOME | End: 2020-01-01

## 2020-01-01 ENCOUNTER — APPOINTMENT (OUTPATIENT)
Dept: INFUSION THERAPY | Facility: HOSPITAL | Age: 70
End: 2020-01-01

## 2020-01-01 ENCOUNTER — APPOINTMENT (OUTPATIENT)
Dept: HEPATOLOGY | Facility: CLINIC | Age: 70
End: 2020-01-01

## 2020-01-01 ENCOUNTER — APPOINTMENT (OUTPATIENT)
Dept: ENDOCRINOLOGY | Facility: CLINIC | Age: 70
End: 2020-01-01
Payer: COMMERCIAL

## 2020-01-01 ENCOUNTER — APPOINTMENT (OUTPATIENT)
Dept: HEMATOLOGY ONCOLOGY | Facility: CLINIC | Age: 70
End: 2020-01-01

## 2020-01-01 ENCOUNTER — APPOINTMENT (OUTPATIENT)
Dept: ELECTROPHYSIOLOGY | Facility: CLINIC | Age: 70
End: 2020-01-01
Payer: COMMERCIAL

## 2020-01-01 ENCOUNTER — APPOINTMENT (OUTPATIENT)
Age: 70
End: 2020-01-01

## 2020-01-01 ENCOUNTER — OUTPATIENT (OUTPATIENT)
Dept: OUTPATIENT SERVICES | Facility: HOSPITAL | Age: 70
LOS: 1 days | End: 2020-01-01
Payer: COMMERCIAL

## 2020-01-01 ENCOUNTER — LABORATORY RESULT (OUTPATIENT)
Age: 70
End: 2020-01-01

## 2020-01-01 ENCOUNTER — APPOINTMENT (OUTPATIENT)
Dept: HEMATOLOGY ONCOLOGY | Facility: CLINIC | Age: 70
End: 2020-01-01
Payer: COMMERCIAL

## 2020-01-01 ENCOUNTER — APPOINTMENT (OUTPATIENT)
Dept: CT IMAGING | Facility: CLINIC | Age: 70
End: 2020-01-01
Payer: COMMERCIAL

## 2020-01-01 ENCOUNTER — RX RENEWAL (OUTPATIENT)
Age: 70
End: 2020-01-01

## 2020-01-01 ENCOUNTER — OUTPATIENT (OUTPATIENT)
Dept: OUTPATIENT SERVICES | Facility: HOSPITAL | Age: 70
LOS: 1 days | End: 2020-01-01

## 2020-01-01 ENCOUNTER — INPATIENT (INPATIENT)
Facility: HOSPITAL | Age: 70
LOS: 7 days | Discharge: HOME | DRG: 617 | End: 2020-09-03
Attending: INTERNAL MEDICINE | Admitting: INTERNAL MEDICINE
Payer: COMMERCIAL

## 2020-01-01 ENCOUNTER — APPOINTMENT (OUTPATIENT)
Age: 70
End: 2020-01-01
Payer: COMMERCIAL

## 2020-01-01 ENCOUNTER — APPOINTMENT (OUTPATIENT)
Dept: INTERVENTIONAL RADIOLOGY/VASCULAR | Facility: CLINIC | Age: 70
End: 2020-01-01
Payer: COMMERCIAL

## 2020-01-01 ENCOUNTER — APPOINTMENT (OUTPATIENT)
Dept: NEPHROLOGY | Facility: CLINIC | Age: 70
End: 2020-01-01
Payer: COMMERCIAL

## 2020-01-01 ENCOUNTER — APPOINTMENT (OUTPATIENT)
Dept: CT IMAGING | Facility: CLINIC | Age: 70
End: 2020-01-01

## 2020-01-01 VITALS
OXYGEN SATURATION: 99 % | SYSTOLIC BLOOD PRESSURE: 100 MMHG | RESPIRATION RATE: 18 BRPM | HEART RATE: 78 BPM | WEIGHT: 190.92 LBS | DIASTOLIC BLOOD PRESSURE: 65 MMHG | TEMPERATURE: 98 F | HEIGHT: 71 IN

## 2020-01-01 VITALS
RESPIRATION RATE: 17 BRPM | HEART RATE: 72 BPM | OXYGEN SATURATION: 100 % | HEIGHT: 70 IN | BODY MASS INDEX: 29.78 KG/M2 | SYSTOLIC BLOOD PRESSURE: 107 MMHG | TEMPERATURE: 97.3 F | WEIGHT: 208 LBS | DIASTOLIC BLOOD PRESSURE: 64 MMHG

## 2020-01-01 VITALS
WEIGHT: 207 LBS | DIASTOLIC BLOOD PRESSURE: 50 MMHG | BODY MASS INDEX: 29.7 KG/M2 | SYSTOLIC BLOOD PRESSURE: 100 MMHG | OXYGEN SATURATION: 98 % | HEART RATE: 83 BPM | TEMPERATURE: 97.9 F

## 2020-01-01 VITALS
DIASTOLIC BLOOD PRESSURE: 64 MMHG | BODY MASS INDEX: 30.11 KG/M2 | OXYGEN SATURATION: 99 % | TEMPERATURE: 98.2 F | WEIGHT: 209.88 LBS | RESPIRATION RATE: 16 BRPM | SYSTOLIC BLOOD PRESSURE: 102 MMHG | HEART RATE: 70 BPM

## 2020-01-01 VITALS
HEART RATE: 80 BPM | OXYGEN SATURATION: 100 % | WEIGHT: 209.88 LBS | HEART RATE: 71 BPM | OXYGEN SATURATION: 98 % | TEMPERATURE: 97.7 F | WEIGHT: 202.82 LBS | HEIGHT: 70.87 IN | DIASTOLIC BLOOD PRESSURE: 64 MMHG | TEMPERATURE: 98.8 F | RESPIRATION RATE: 16 BRPM | SYSTOLIC BLOOD PRESSURE: 103 MMHG | BODY MASS INDEX: 30.11 KG/M2 | BODY MASS INDEX: 28.4 KG/M2 | RESPIRATION RATE: 16 BRPM | SYSTOLIC BLOOD PRESSURE: 105 MMHG | DIASTOLIC BLOOD PRESSURE: 64 MMHG

## 2020-01-01 VITALS — SYSTOLIC BLOOD PRESSURE: 100 MMHG | HEART RATE: 60 BPM | DIASTOLIC BLOOD PRESSURE: 66 MMHG | OXYGEN SATURATION: 100 %

## 2020-01-01 VITALS
SYSTOLIC BLOOD PRESSURE: 100 MMHG | OXYGEN SATURATION: 99 % | DIASTOLIC BLOOD PRESSURE: 58 MMHG | BODY MASS INDEX: 29.82 KG/M2 | WEIGHT: 213 LBS | HEART RATE: 79 BPM | HEIGHT: 70.87 IN

## 2020-01-01 VITALS
SYSTOLIC BLOOD PRESSURE: 106 MMHG | OXYGEN SATURATION: 100 % | DIASTOLIC BLOOD PRESSURE: 66 MMHG | HEIGHT: 70 IN | BODY MASS INDEX: 27.35 KG/M2 | WEIGHT: 191 LBS | HEART RATE: 62 BPM | TEMPERATURE: 96.9 F

## 2020-01-01 VITALS
TEMPERATURE: 98.1 F | HEART RATE: 73 BPM | WEIGHT: 216.05 LBS | SYSTOLIC BLOOD PRESSURE: 110 MMHG | RESPIRATION RATE: 16 BRPM | DIASTOLIC BLOOD PRESSURE: 70 MMHG | BODY MASS INDEX: 31 KG/M2 | OXYGEN SATURATION: 97 %

## 2020-01-01 VITALS
HEART RATE: 71 BPM | WEIGHT: 204 LBS | BODY MASS INDEX: 29.2 KG/M2 | SYSTOLIC BLOOD PRESSURE: 122 MMHG | HEIGHT: 70 IN | TEMPERATURE: 97.8 F | DIASTOLIC BLOOD PRESSURE: 72 MMHG | OXYGEN SATURATION: 98 %

## 2020-01-01 VITALS
BODY MASS INDEX: 29.87 KG/M2 | WEIGHT: 213.41 LBS | OXYGEN SATURATION: 99 % | HEART RATE: 57 BPM | SYSTOLIC BLOOD PRESSURE: 101 MMHG | RESPIRATION RATE: 16 BRPM | TEMPERATURE: 99 F | DIASTOLIC BLOOD PRESSURE: 65 MMHG

## 2020-01-01 DIAGNOSIS — C44.92 SQUAMOUS CELL CARCINOMA OF SKIN, UNSPECIFIED: Chronic | ICD-10-CM

## 2020-01-01 DIAGNOSIS — I10 ESSENTIAL (PRIMARY) HYPERTENSION: ICD-10-CM

## 2020-01-01 DIAGNOSIS — Z51.11 ENCOUNTER FOR ANTINEOPLASTIC CHEMOTHERAPY: ICD-10-CM

## 2020-01-01 DIAGNOSIS — Z01.89 ENCOUNTER FOR OTHER SPECIFIED SPECIAL EXAMINATIONS: Chronic | ICD-10-CM

## 2020-01-01 DIAGNOSIS — C22.0 LIVER CELL CARCINOMA: ICD-10-CM

## 2020-01-01 DIAGNOSIS — D35.2 BENIGN NEOPLASM OF PITUITARY GLAND: ICD-10-CM

## 2020-01-01 DIAGNOSIS — E87.1 HYPO-OSMOLALITY AND HYPONATREMIA: ICD-10-CM

## 2020-01-01 DIAGNOSIS — E78.5 HYPERLIPIDEMIA, UNSPECIFIED: ICD-10-CM

## 2020-01-01 DIAGNOSIS — Z89.9 ACQUIRED ABSENCE OF LIMB, UNSPECIFIED: Chronic | ICD-10-CM

## 2020-01-01 DIAGNOSIS — Z29.8 ENCOUNTER FOR OTHER SPECIFIED PROPHYLACTIC MEASURES: ICD-10-CM

## 2020-01-01 DIAGNOSIS — Z00.8 ENCOUNTER FOR OTHER GENERAL EXAMINATION: ICD-10-CM

## 2020-01-01 DIAGNOSIS — E22.1 HYPERPROLACTINEMIA: ICD-10-CM

## 2020-01-01 DIAGNOSIS — E87.5 HYPERKALEMIA: ICD-10-CM

## 2020-01-01 DIAGNOSIS — N18.9 CHRONIC KIDNEY DISEASE, UNSPECIFIED: ICD-10-CM

## 2020-01-01 DIAGNOSIS — C22.1 INTRAHEPATIC BILE DUCT CARCINOMA: ICD-10-CM

## 2020-01-01 DIAGNOSIS — E11.9 TYPE 2 DIABETES MELLITUS W/OUT COMPLICATIONS: ICD-10-CM

## 2020-01-01 DIAGNOSIS — E87.2 ACIDOSIS: ICD-10-CM

## 2020-01-01 DIAGNOSIS — R11.2 NAUSEA WITH VOMITING, UNSPECIFIED: ICD-10-CM

## 2020-01-01 DIAGNOSIS — E11.22 TYPE 2 DIABETES MELLITUS WITH DIABETIC CHRONIC KIDNEY DISEASE: ICD-10-CM

## 2020-01-01 DIAGNOSIS — E03.9 HYPOTHYROIDISM, UNSPECIFIED: ICD-10-CM

## 2020-01-01 DIAGNOSIS — Z51.89 ENCOUNTER FOR OTHER SPECIFIED AFTERCARE: ICD-10-CM

## 2020-01-01 DIAGNOSIS — E11.621 TYPE 2 DIABETES MELLITUS WITH FOOT ULCER: ICD-10-CM

## 2020-01-01 DIAGNOSIS — C22.9 MALIGNANT NEOPLASM OF LIVER, NOT SPECIFIED AS PRIMARY OR SECONDARY: ICD-10-CM

## 2020-01-01 DIAGNOSIS — Z45.018 ENCOUNTER FOR ADJUSTMENT AND MANAGEMENT OF OTHER PART OF CARDIAC PACEMAKER: ICD-10-CM

## 2020-01-01 LAB
-  AMPICILLIN/SULBACTAM: SIGNIFICANT CHANGE UP
-  AMPICILLIN/SULBACTAM: SIGNIFICANT CHANGE UP
-  CEFAZOLIN: SIGNIFICANT CHANGE UP
-  CEFAZOLIN: SIGNIFICANT CHANGE UP
-  CLINDAMYCIN: SIGNIFICANT CHANGE UP
-  CLINDAMYCIN: SIGNIFICANT CHANGE UP
-  ERYTHROMYCIN: SIGNIFICANT CHANGE UP
-  ERYTHROMYCIN: SIGNIFICANT CHANGE UP
-  GENTAMICIN: SIGNIFICANT CHANGE UP
-  GENTAMICIN: SIGNIFICANT CHANGE UP
-  OXACILLIN: SIGNIFICANT CHANGE UP
-  OXACILLIN: SIGNIFICANT CHANGE UP
-  PENICILLIN: SIGNIFICANT CHANGE UP
-  PENICILLIN: SIGNIFICANT CHANGE UP
-  RIFAMPIN: SIGNIFICANT CHANGE UP
-  RIFAMPIN: SIGNIFICANT CHANGE UP
-  TETRACYCLINE: SIGNIFICANT CHANGE UP
-  TETRACYCLINE: SIGNIFICANT CHANGE UP
-  TRIMETHOPRIM/SULFAMETHOXAZOLE: SIGNIFICANT CHANGE UP
-  VANCOMYCIN: SIGNIFICANT CHANGE UP
-  VANCOMYCIN: SIGNIFICANT CHANGE UP
25(OH)D3 SERPL-MCNC: 39.4 NG/ML
A1C WITH ESTIMATED AVERAGE GLUCOSE RESULT: 9.2 % — HIGH (ref 4–5.6)
AFP-TM SERPL-MCNC: 2 NG/ML
AFP-TM SERPL-MCNC: 2.2 NG/ML
AFP-TM SERPL-MCNC: <1.8 NG/ML
ALBUMIN SERPL ELPH-MCNC: 3.1 G/DL
ALBUMIN SERPL ELPH-MCNC: 3.6 G/DL — SIGNIFICANT CHANGE UP (ref 3.3–5)
ALBUMIN SERPL ELPH-MCNC: 3.7 G/DL
ALBUMIN SERPL ELPH-MCNC: 3.8 G/DL
ALBUMIN SERPL ELPH-MCNC: 4.1 G/DL
ALBUMIN SERPL ELPH-MCNC: 4.1 G/DL
ALP BLD-CCNC: 133 U/L
ALP BLD-CCNC: 162 U/L
ALP BLD-CCNC: 174 U/L
ALP BLD-CCNC: 214 U/L
ALP SERPL-CCNC: 129 U/L — HIGH (ref 40–120)
ALT FLD-CCNC: 40 U/L — SIGNIFICANT CHANGE UP (ref 10–45)
ALT SERPL-CCNC: 21 U/L
ALT SERPL-CCNC: 24 U/L
ALT SERPL-CCNC: 24 U/L
ALT SERPL-CCNC: 69 U/L
ANION GAP SERPL CALC-SCNC: 10 MMOL/L — SIGNIFICANT CHANGE UP (ref 5–17)
ANION GAP SERPL CALC-SCNC: 11 MMOL/L
ANION GAP SERPL CALC-SCNC: 11 MMOL/L — SIGNIFICANT CHANGE UP (ref 5–17)
ANION GAP SERPL CALC-SCNC: 12 MMOL/L
ANION GAP SERPL CALC-SCNC: 12 MMOL/L — SIGNIFICANT CHANGE UP (ref 5–17)
ANION GAP SERPL CALC-SCNC: 13 MMOL/L — SIGNIFICANT CHANGE UP (ref 5–17)
ANION GAP SERPL CALC-SCNC: 14 MMOL/L
ANION GAP SERPL CALC-SCNC: 14 MMOL/L
ANION GAP SERPL CALC-SCNC: 16 MMOL/L — SIGNIFICANT CHANGE UP (ref 5–17)
ANION GAP SERPL CALC-SCNC: 9 MMOL/L — SIGNIFICANT CHANGE UP (ref 5–17)
APTT BLD: 25.9 SEC — LOW (ref 27.5–35.5)
APTT BLD: 26.5 SEC — LOW (ref 27.5–35.5)
APTT BLD: 27.7 SEC — SIGNIFICANT CHANGE UP (ref 27.5–35.5)
AST SERPL-CCNC: 34 U/L — SIGNIFICANT CHANGE UP (ref 10–40)
AST SERPL-CCNC: 36 U/L
AST SERPL-CCNC: 38 U/L
AST SERPL-CCNC: 41 U/L
AST SERPL-CCNC: 64 U/L
BASE EXCESS BLDV CALC-SCNC: -0.5 MMOL/L — SIGNIFICANT CHANGE UP (ref -2–2)
BASE EXCESS BLDV CALC-SCNC: -4.8 MMOL/L — LOW (ref -2–2)
BASOPHILS # BLD AUTO: 0 K/UL — SIGNIFICANT CHANGE UP (ref 0–0.2)
BASOPHILS # BLD AUTO: 0.01 K/UL
BASOPHILS # BLD AUTO: 0.01 K/UL — SIGNIFICANT CHANGE UP (ref 0–0.2)
BASOPHILS # BLD AUTO: 0.02 K/UL — SIGNIFICANT CHANGE UP (ref 0–0.2)
BASOPHILS # BLD AUTO: 0.03 K/UL
BASOPHILS # BLD AUTO: 0.03 K/UL — SIGNIFICANT CHANGE UP (ref 0–0.2)
BASOPHILS # BLD AUTO: 0.03 K/UL — SIGNIFICANT CHANGE UP (ref 0–0.2)
BASOPHILS # BLD AUTO: 0.04 K/UL — SIGNIFICANT CHANGE UP (ref 0–0.2)
BASOPHILS # BLD AUTO: 0.05 K/UL — SIGNIFICANT CHANGE UP (ref 0–0.2)
BASOPHILS NFR BLD AUTO: 0 % — SIGNIFICANT CHANGE UP (ref 0–2)
BASOPHILS NFR BLD AUTO: 0.2 % — SIGNIFICANT CHANGE UP (ref 0–2)
BASOPHILS NFR BLD AUTO: 0.3 % — SIGNIFICANT CHANGE UP (ref 0–2)
BASOPHILS NFR BLD AUTO: 0.4 % — SIGNIFICANT CHANGE UP (ref 0–2)
BASOPHILS NFR BLD AUTO: 0.5 %
BASOPHILS NFR BLD AUTO: 0.5 % — SIGNIFICANT CHANGE UP (ref 0–2)
BASOPHILS NFR BLD AUTO: 0.5 % — SIGNIFICANT CHANGE UP (ref 0–2)
BASOPHILS NFR BLD AUTO: 0.6 % — SIGNIFICANT CHANGE UP (ref 0–2)
BASOPHILS NFR BLD AUTO: 0.7 % — SIGNIFICANT CHANGE UP (ref 0–2)
BASOPHILS NFR BLD AUTO: 0.7 % — SIGNIFICANT CHANGE UP (ref 0–2)
BASOPHILS NFR BLD AUTO: 0.8 % — SIGNIFICANT CHANGE UP (ref 0–2)
BASOPHILS NFR BLD AUTO: 0.8 % — SIGNIFICANT CHANGE UP (ref 0–2)
BASOPHILS NFR BLD AUTO: 0.9 % — SIGNIFICANT CHANGE UP (ref 0–2)
BASOPHILS NFR BLD AUTO: 0.9 % — SIGNIFICANT CHANGE UP (ref 0–2)
BASOPHILS NFR BLD AUTO: 1 %
BASOPHILS NFR BLD AUTO: 1 % — SIGNIFICANT CHANGE UP (ref 0–2)
BASOPHILS NFR BLD AUTO: 1 % — SIGNIFICANT CHANGE UP (ref 0–2)
BILIRUB SERPL-MCNC: 0.7 MG/DL
BILIRUB SERPL-MCNC: 0.8 MG/DL
BILIRUB SERPL-MCNC: 0.9 MG/DL
BILIRUB SERPL-MCNC: 1 MG/DL
BILIRUB SERPL-MCNC: 1 MG/DL — SIGNIFICANT CHANGE UP (ref 0.2–1.2)
BLD GP AB SCN SERPL QL: NEGATIVE — SIGNIFICANT CHANGE UP
BUN SERPL-MCNC: 34 MG/DL
BUN SERPL-MCNC: 35 MG/DL
BUN SERPL-MCNC: 42 MG/DL
BUN SERPL-MCNC: 44 MG/DL
BUN SERPL-MCNC: 44 MG/DL — HIGH (ref 7–23)
BUN SERPL-MCNC: 48 MG/DL
BUN SERPL-MCNC: 49 MG/DL — HIGH (ref 7–23)
BUN SERPL-MCNC: 54 MG/DL — HIGH (ref 7–23)
BUN SERPL-MCNC: 55 MG/DL — HIGH (ref 7–23)
BUN SERPL-MCNC: 58 MG/DL
BUN SERPL-MCNC: 58 MG/DL — HIGH (ref 7–23)
BUN SERPL-MCNC: 60 MG/DL
BUN SERPL-MCNC: 61 MG/DL
BUN SERPL-MCNC: 63 MG/DL — HIGH (ref 7–23)
BUN SERPL-MCNC: 67 MG/DL — HIGH (ref 7–23)
BUN SERPL-MCNC: 73 MG/DL — HIGH (ref 7–23)
BUN SERPL-MCNC: 75 MG/DL — HIGH (ref 7–23)
BUN SERPL-MCNC: 76 MG/DL — HIGH (ref 7–23)
BURR CELLS BLD QL SMEAR: PRESENT — SIGNIFICANT CHANGE UP
CA-I SERPL-SCNC: 1.18 MMOL/L — SIGNIFICANT CHANGE UP (ref 1.12–1.3)
CA-I SERPL-SCNC: 1.2 MMOL/L — SIGNIFICANT CHANGE UP (ref 1.12–1.3)
CALCIUM SERPL-MCNC: 8 MG/DL — LOW (ref 8.4–10.5)
CALCIUM SERPL-MCNC: 8 MG/DL — LOW (ref 8.4–10.5)
CALCIUM SERPL-MCNC: 8.1 MG/DL
CALCIUM SERPL-MCNC: 8.3 MG/DL
CALCIUM SERPL-MCNC: 8.4 MG/DL — SIGNIFICANT CHANGE UP (ref 8.4–10.5)
CALCIUM SERPL-MCNC: 8.5 MG/DL — SIGNIFICANT CHANGE UP (ref 8.4–10.5)
CALCIUM SERPL-MCNC: 8.5 MG/DL — SIGNIFICANT CHANGE UP (ref 8.4–10.5)
CALCIUM SERPL-MCNC: 8.7 MG/DL
CALCIUM SERPL-MCNC: 8.7 MG/DL — SIGNIFICANT CHANGE UP (ref 8.4–10.5)
CALCIUM SERPL-MCNC: 9 MG/DL
CALCIUM SERPL-MCNC: 9 MG/DL — SIGNIFICANT CHANGE UP (ref 8.4–10.5)
CALCIUM SERPL-MCNC: 9.1 MG/DL — SIGNIFICANT CHANGE UP (ref 8.4–10.5)
CALCIUM SERPL-MCNC: 9.2 MG/DL
CALCIUM SERPL-MCNC: 9.2 MG/DL — SIGNIFICANT CHANGE UP (ref 8.4–10.5)
CALCIUM SERPL-MCNC: 9.3 MG/DL
CALCIUM SERPL-MCNC: 9.3 MG/DL
CALCIUM SERPL-MCNC: 9.4 MG/DL
CALCIUM SERPL-MCNC: 9.6 MG/DL — SIGNIFICANT CHANGE UP (ref 8.4–10.5)
CHLORIDE BLDV-SCNC: 101 MMOL/L — SIGNIFICANT CHANGE UP (ref 96–108)
CHLORIDE BLDV-SCNC: 102 MMOL/L — SIGNIFICANT CHANGE UP (ref 96–108)
CHLORIDE SERPL-SCNC: 100 MMOL/L
CHLORIDE SERPL-SCNC: 100 MMOL/L — SIGNIFICANT CHANGE UP (ref 96–108)
CHLORIDE SERPL-SCNC: 100 MMOL/L — SIGNIFICANT CHANGE UP (ref 96–108)
CHLORIDE SERPL-SCNC: 101 MMOL/L
CHLORIDE SERPL-SCNC: 101 MMOL/L — SIGNIFICANT CHANGE UP (ref 96–108)
CHLORIDE SERPL-SCNC: 102 MMOL/L
CHLORIDE SERPL-SCNC: 102 MMOL/L — SIGNIFICANT CHANGE UP (ref 96–108)
CHLORIDE SERPL-SCNC: 103 MMOL/L
CHLORIDE SERPL-SCNC: 103 MMOL/L
CHLORIDE SERPL-SCNC: 103 MMOL/L — SIGNIFICANT CHANGE UP (ref 96–108)
CHLORIDE SERPL-SCNC: 104 MMOL/L
CHLORIDE SERPL-SCNC: 104 MMOL/L — SIGNIFICANT CHANGE UP (ref 96–108)
CHLORIDE SERPL-SCNC: 105 MMOL/L — SIGNIFICANT CHANGE UP (ref 96–108)
CHLORIDE SERPL-SCNC: 107 MMOL/L
CHLORIDE SERPL-SCNC: 108 MMOL/L
CHLORIDE SERPL-SCNC: 97 MMOL/L — SIGNIFICANT CHANGE UP (ref 96–108)
CHLORIDE SERPL-SCNC: 98 MMOL/L — SIGNIFICANT CHANGE UP (ref 96–108)
CHLORIDE SERPL-SCNC: 99 MMOL/L — SIGNIFICANT CHANGE UP (ref 96–108)
CHOLEST SERPL-MCNC: 109 MG/DL
CHOLEST/HDLC SERPL: 2.3 RATIO
CHROM ANALY OVERALL INTERP SPEC-IMP: SIGNIFICANT CHANGE UP
CO2 BLDV-SCNC: 21 MMOL/L — LOW (ref 22–30)
CO2 BLDV-SCNC: 24 MMOL/L — SIGNIFICANT CHANGE UP (ref 22–30)
CO2 SERPL-SCNC: 18 MMOL/L — LOW (ref 22–31)
CO2 SERPL-SCNC: 19 MMOL/L — LOW (ref 22–31)
CO2 SERPL-SCNC: 19 MMOL/L — LOW (ref 22–31)
CO2 SERPL-SCNC: 20 MMOL/L
CO2 SERPL-SCNC: 20 MMOL/L
CO2 SERPL-SCNC: 20 MMOL/L — LOW (ref 22–31)
CO2 SERPL-SCNC: 21 MMOL/L — LOW (ref 22–31)
CO2 SERPL-SCNC: 21 MMOL/L — LOW (ref 22–31)
CO2 SERPL-SCNC: 22 MMOL/L
CO2 SERPL-SCNC: 22 MMOL/L — SIGNIFICANT CHANGE UP (ref 22–31)
CO2 SERPL-SCNC: 23 MMOL/L — SIGNIFICANT CHANGE UP (ref 22–31)
CO2 SERPL-SCNC: 24 MMOL/L
CO2 SERPL-SCNC: 25 MMOL/L
CREAT SERPL-MCNC: 1.57 MG/DL — HIGH (ref 0.5–1.3)
CREAT SERPL-MCNC: 1.73 MG/DL
CREAT SERPL-MCNC: 1.73 MG/DL — HIGH (ref 0.5–1.3)
CREAT SERPL-MCNC: 1.89 MG/DL
CREAT SERPL-MCNC: 1.93 MG/DL
CREAT SERPL-MCNC: 1.96 MG/DL — HIGH (ref 0.5–1.3)
CREAT SERPL-MCNC: 1.96 MG/DL — HIGH (ref 0.5–1.3)
CREAT SERPL-MCNC: 2.03 MG/DL
CREAT SERPL-MCNC: 2.03 MG/DL — HIGH (ref 0.5–1.3)
CREAT SERPL-MCNC: 2.05 MG/DL — HIGH (ref 0.5–1.3)
CREAT SERPL-MCNC: 2.06 MG/DL — HIGH (ref 0.5–1.3)
CREAT SERPL-MCNC: 2.18 MG/DL — HIGH (ref 0.5–1.3)
CREAT SERPL-MCNC: 2.23 MG/DL
CREAT SERPL-MCNC: 2.32 MG/DL
CREAT SERPL-MCNC: 2.7 MG/DL
CREAT SERPL-MCNC: 3.19 MG/DL
CREAT SPEC-SCNC: 110 MG/DL
CREAT SPEC-SCNC: 114 MG/DL
CREAT/PROT UR: 0.2 RATIO
CULTURE RESULTS: NO GROWTH — SIGNIFICANT CHANGE UP
CULTURE RESULTS: SIGNIFICANT CHANGE UP
ELLIPTOCYTES BLD QL SMEAR: SLIGHT — SIGNIFICANT CHANGE UP
EOSINOPHIL # BLD AUTO: 0 K/UL — SIGNIFICANT CHANGE UP (ref 0–0.5)
EOSINOPHIL # BLD AUTO: 0 K/UL — SIGNIFICANT CHANGE UP (ref 0–0.5)
EOSINOPHIL # BLD AUTO: 0.02 K/UL — SIGNIFICANT CHANGE UP (ref 0–0.5)
EOSINOPHIL # BLD AUTO: 0.03 K/UL — SIGNIFICANT CHANGE UP (ref 0–0.5)
EOSINOPHIL # BLD AUTO: 0.03 K/UL — SIGNIFICANT CHANGE UP (ref 0–0.5)
EOSINOPHIL # BLD AUTO: 0.04 K/UL — SIGNIFICANT CHANGE UP (ref 0–0.5)
EOSINOPHIL # BLD AUTO: 0.04 K/UL — SIGNIFICANT CHANGE UP (ref 0–0.5)
EOSINOPHIL # BLD AUTO: 0.05 K/UL — SIGNIFICANT CHANGE UP (ref 0–0.5)
EOSINOPHIL # BLD AUTO: 0.05 K/UL — SIGNIFICANT CHANGE UP (ref 0–0.5)
EOSINOPHIL # BLD AUTO: 0.16 K/UL — SIGNIFICANT CHANGE UP (ref 0–0.5)
EOSINOPHIL # BLD AUTO: 0.17 K/UL — SIGNIFICANT CHANGE UP (ref 0–0.5)
EOSINOPHIL # BLD AUTO: 0.2 K/UL — SIGNIFICANT CHANGE UP (ref 0–0.5)
EOSINOPHIL # BLD AUTO: 0.21 K/UL — SIGNIFICANT CHANGE UP (ref 0–0.5)
EOSINOPHIL # BLD AUTO: 0.22 K/UL
EOSINOPHIL # BLD AUTO: 0.23 K/UL — SIGNIFICANT CHANGE UP (ref 0–0.5)
EOSINOPHIL # BLD AUTO: 0.25 K/UL — SIGNIFICANT CHANGE UP (ref 0–0.5)
EOSINOPHIL # BLD AUTO: 0.26 K/UL — SIGNIFICANT CHANGE UP (ref 0–0.5)
EOSINOPHIL # BLD AUTO: 0.27 K/UL — SIGNIFICANT CHANGE UP (ref 0–0.5)
EOSINOPHIL # BLD AUTO: 0.3 K/UL — SIGNIFICANT CHANGE UP (ref 0–0.5)
EOSINOPHIL # BLD AUTO: 0.31 K/UL
EOSINOPHIL # BLD AUTO: 0.31 K/UL — SIGNIFICANT CHANGE UP (ref 0–0.5)
EOSINOPHIL # BLD AUTO: 0.33 K/UL — SIGNIFICANT CHANGE UP (ref 0–0.5)
EOSINOPHIL # BLD AUTO: 0.33 K/UL — SIGNIFICANT CHANGE UP (ref 0–0.5)
EOSINOPHIL # BLD AUTO: 0.37 K/UL — SIGNIFICANT CHANGE UP (ref 0–0.5)
EOSINOPHIL # BLD AUTO: 0.44 K/UL — SIGNIFICANT CHANGE UP (ref 0–0.5)
EOSINOPHIL # BLD AUTO: 0.54 K/UL — HIGH (ref 0–0.5)
EOSINOPHIL # BLD AUTO: 0.62 K/UL — HIGH (ref 0–0.5)
EOSINOPHIL NFR BLD AUTO: 0 % — SIGNIFICANT CHANGE UP (ref 0–6)
EOSINOPHIL NFR BLD AUTO: 0 % — SIGNIFICANT CHANGE UP (ref 0–6)
EOSINOPHIL NFR BLD AUTO: 0.5 % — SIGNIFICANT CHANGE UP (ref 0–6)
EOSINOPHIL NFR BLD AUTO: 0.7 % — SIGNIFICANT CHANGE UP (ref 0–6)
EOSINOPHIL NFR BLD AUTO: 1 % — SIGNIFICANT CHANGE UP (ref 0–6)
EOSINOPHIL NFR BLD AUTO: 1.4 % — SIGNIFICANT CHANGE UP (ref 0–6)
EOSINOPHIL NFR BLD AUTO: 1.5 % — SIGNIFICANT CHANGE UP (ref 0–6)
EOSINOPHIL NFR BLD AUTO: 10.1 %
EOSINOPHIL NFR BLD AUTO: 10.1 %
EOSINOPHIL NFR BLD AUTO: 10.9 % — HIGH (ref 0–6)
EOSINOPHIL NFR BLD AUTO: 12.9 % — HIGH (ref 0–6)
EOSINOPHIL NFR BLD AUTO: 13.1 % — HIGH (ref 0–6)
EOSINOPHIL NFR BLD AUTO: 13.6 % — HIGH (ref 0–6)
EOSINOPHIL NFR BLD AUTO: 17.8 % — HIGH (ref 0–6)
EOSINOPHIL NFR BLD AUTO: 18.6 % — HIGH (ref 0–6)
EOSINOPHIL NFR BLD AUTO: 20 % — HIGH (ref 0–6)
EOSINOPHIL NFR BLD AUTO: 3.5 % — SIGNIFICANT CHANGE UP (ref 0–6)
EOSINOPHIL NFR BLD AUTO: 5.1 % — SIGNIFICANT CHANGE UP (ref 0–6)
EOSINOPHIL NFR BLD AUTO: 5.7 % — SIGNIFICANT CHANGE UP (ref 0–6)
EOSINOPHIL NFR BLD AUTO: 6 % — SIGNIFICANT CHANGE UP (ref 0–6)
EOSINOPHIL NFR BLD AUTO: 7.4 % — HIGH (ref 0–6)
EOSINOPHIL NFR BLD AUTO: 7.6 % — HIGH (ref 0–6)
EOSINOPHIL NFR BLD AUTO: 8.2 % — HIGH (ref 0–6)
EOSINOPHIL NFR BLD AUTO: 8.4 % — HIGH (ref 0–6)
EOSINOPHIL NFR BLD AUTO: 9 % — HIGH (ref 0–6)
EOSINOPHIL NFR BLD AUTO: 9.2 % — HIGH (ref 0–6)
EOSINOPHIL NFR BLD AUTO: 9.9 % — HIGH (ref 0–6)
ERYTHROCYTE [SEDIMENTATION RATE] IN BLOOD: 35 MM/HR — HIGH (ref 0–20)
ESTIMATED AVERAGE GLUCOSE: 151 MG/DL
ESTIMATED AVERAGE GLUCOSE: 217 MG/DL — HIGH (ref 68–114)
GAS PNL BLDV: 127 MMOL/L — LOW (ref 135–145)
GAS PNL BLDV: 129 MMOL/L — LOW (ref 135–145)
GAS PNL BLDV: SIGNIFICANT CHANGE UP
GLUCOSE BLDC GLUCOMTR-MCNC: 100 MG/DL — HIGH (ref 70–99)
GLUCOSE BLDC GLUCOMTR-MCNC: 103 MG/DL — HIGH (ref 70–99)
GLUCOSE BLDC GLUCOMTR-MCNC: 117 MG/DL — HIGH (ref 70–99)
GLUCOSE BLDC GLUCOMTR-MCNC: 127
GLUCOSE BLDC GLUCOMTR-MCNC: 131 MG/DL — HIGH (ref 70–99)
GLUCOSE BLDC GLUCOMTR-MCNC: 132 MG/DL — HIGH (ref 70–99)
GLUCOSE BLDC GLUCOMTR-MCNC: 139 MG/DL — HIGH (ref 70–99)
GLUCOSE BLDC GLUCOMTR-MCNC: 141 MG/DL — HIGH (ref 70–99)
GLUCOSE BLDC GLUCOMTR-MCNC: 145 MG/DL — HIGH (ref 70–99)
GLUCOSE BLDC GLUCOMTR-MCNC: 147 MG/DL — HIGH (ref 70–99)
GLUCOSE BLDC GLUCOMTR-MCNC: 172 MG/DL — HIGH (ref 70–99)
GLUCOSE BLDC GLUCOMTR-MCNC: 173 MG/DL — HIGH (ref 70–99)
GLUCOSE BLDC GLUCOMTR-MCNC: 223 MG/DL — HIGH (ref 70–99)
GLUCOSE BLDC GLUCOMTR-MCNC: 226 MG/DL — HIGH (ref 70–99)
GLUCOSE BLDC GLUCOMTR-MCNC: 243 MG/DL — HIGH (ref 70–99)
GLUCOSE BLDC GLUCOMTR-MCNC: 246 MG/DL — HIGH (ref 70–99)
GLUCOSE BLDC GLUCOMTR-MCNC: 250 MG/DL — HIGH (ref 70–99)
GLUCOSE BLDC GLUCOMTR-MCNC: 258 MG/DL — HIGH (ref 70–99)
GLUCOSE BLDC GLUCOMTR-MCNC: 261 MG/DL — HIGH (ref 70–99)
GLUCOSE BLDC GLUCOMTR-MCNC: 262 MG/DL — HIGH (ref 70–99)
GLUCOSE BLDC GLUCOMTR-MCNC: 269 MG/DL — HIGH (ref 70–99)
GLUCOSE BLDC GLUCOMTR-MCNC: 279 MG/DL — HIGH (ref 70–99)
GLUCOSE BLDC GLUCOMTR-MCNC: 280 MG/DL — HIGH (ref 70–99)
GLUCOSE BLDC GLUCOMTR-MCNC: 282 MG/DL — HIGH (ref 70–99)
GLUCOSE BLDC GLUCOMTR-MCNC: 282 MG/DL — HIGH (ref 70–99)
GLUCOSE BLDC GLUCOMTR-MCNC: 305 MG/DL — HIGH (ref 70–99)
GLUCOSE BLDC GLUCOMTR-MCNC: 311 MG/DL — HIGH (ref 70–99)
GLUCOSE BLDC GLUCOMTR-MCNC: 313 MG/DL — HIGH (ref 70–99)
GLUCOSE BLDC GLUCOMTR-MCNC: 320 MG/DL — HIGH (ref 70–99)
GLUCOSE BLDC GLUCOMTR-MCNC: 326 MG/DL — HIGH (ref 70–99)
GLUCOSE BLDC GLUCOMTR-MCNC: 367 MG/DL — HIGH (ref 70–99)
GLUCOSE BLDC GLUCOMTR-MCNC: 374 MG/DL — HIGH (ref 70–99)
GLUCOSE BLDC GLUCOMTR-MCNC: 391 MG/DL — HIGH (ref 70–99)
GLUCOSE BLDC GLUCOMTR-MCNC: 393 MG/DL — HIGH (ref 70–99)
GLUCOSE BLDC GLUCOMTR-MCNC: 405 MG/DL — HIGH (ref 70–99)
GLUCOSE BLDC GLUCOMTR-MCNC: 422 MG/DL — HIGH (ref 70–99)
GLUCOSE BLDC GLUCOMTR-MCNC: 428 MG/DL — HIGH (ref 70–99)
GLUCOSE BLDC GLUCOMTR-MCNC: 444 MG/DL — HIGH (ref 70–99)
GLUCOSE BLDC GLUCOMTR-MCNC: 63 MG/DL — LOW (ref 70–99)
GLUCOSE BLDC GLUCOMTR-MCNC: 67 MG/DL — LOW (ref 70–99)
GLUCOSE BLDC GLUCOMTR-MCNC: 72 MG/DL — SIGNIFICANT CHANGE UP (ref 70–99)
GLUCOSE BLDC GLUCOMTR-MCNC: 73 MG/DL — SIGNIFICANT CHANGE UP (ref 70–99)
GLUCOSE BLDC GLUCOMTR-MCNC: 87 MG/DL — SIGNIFICANT CHANGE UP (ref 70–99)
GLUCOSE BLDC GLUCOMTR-MCNC: 97
GLUCOSE BLDV-MCNC: 270 MG/DL — HIGH (ref 70–99)
GLUCOSE BLDV-MCNC: 320 MG/DL — HIGH (ref 70–99)
GLUCOSE SERPL-MCNC: 110 MG/DL
GLUCOSE SERPL-MCNC: 111 MG/DL — HIGH (ref 70–99)
GLUCOSE SERPL-MCNC: 115 MG/DL
GLUCOSE SERPL-MCNC: 115 MG/DL
GLUCOSE SERPL-MCNC: 116 MG/DL
GLUCOSE SERPL-MCNC: 121 MG/DL — HIGH (ref 70–99)
GLUCOSE SERPL-MCNC: 197 MG/DL — HIGH (ref 70–99)
GLUCOSE SERPL-MCNC: 208 MG/DL
GLUCOSE SERPL-MCNC: 258 MG/DL
GLUCOSE SERPL-MCNC: 262 MG/DL
GLUCOSE SERPL-MCNC: 271 MG/DL
GLUCOSE SERPL-MCNC: 274 MG/DL — HIGH (ref 70–99)
GLUCOSE SERPL-MCNC: 275 MG/DL — HIGH (ref 70–99)
GLUCOSE SERPL-MCNC: 336 MG/DL — HIGH (ref 70–99)
GLUCOSE SERPL-MCNC: 54 MG/DL — LOW (ref 70–99)
GLUCOSE SERPL-MCNC: 66 MG/DL — LOW (ref 70–99)
GLUCOSE SERPL-MCNC: 83 MG/DL — SIGNIFICANT CHANGE UP (ref 70–99)
GLUCOSE SERPL-MCNC: 97 MG/DL — SIGNIFICANT CHANGE UP (ref 70–99)
GRAM STN FLD: SIGNIFICANT CHANGE UP
HBA1C MFR BLD HPLC: 6.9 %
HBA1C MFR BLD HPLC: 7
HBA1C MFR BLD HPLC: 7.4
HCO3 BLDV-SCNC: 20 MMOL/L — LOW (ref 21–29)
HCO3 BLDV-SCNC: 23 MMOL/L — SIGNIFICANT CHANGE UP (ref 21–29)
HCT VFR BLD CALC: 25.3 % — LOW (ref 39–50)
HCT VFR BLD CALC: 25.7 % — LOW (ref 39–50)
HCT VFR BLD CALC: 25.9 % — LOW (ref 39–50)
HCT VFR BLD CALC: 26 % — LOW (ref 39–50)
HCT VFR BLD CALC: 26 % — LOW (ref 39–50)
HCT VFR BLD CALC: 26.1 % — LOW (ref 39–50)
HCT VFR BLD CALC: 26.3 % — LOW (ref 39–50)
HCT VFR BLD CALC: 26.6 % — LOW (ref 39–50)
HCT VFR BLD CALC: 26.7 % — LOW (ref 39–50)
HCT VFR BLD CALC: 26.8 % — LOW (ref 39–50)
HCT VFR BLD CALC: 27 % — LOW (ref 39–50)
HCT VFR BLD CALC: 27.1 % — LOW (ref 39–50)
HCT VFR BLD CALC: 27.6 % — LOW (ref 39–50)
HCT VFR BLD CALC: 27.7 % — LOW (ref 39–50)
HCT VFR BLD CALC: 27.8 %
HCT VFR BLD CALC: 27.8 % — LOW (ref 39–50)
HCT VFR BLD CALC: 27.9 % — LOW (ref 39–50)
HCT VFR BLD CALC: 27.9 % — LOW (ref 39–50)
HCT VFR BLD CALC: 28 % — LOW (ref 39–50)
HCT VFR BLD CALC: 28.2 % — LOW (ref 39–50)
HCT VFR BLD CALC: 28.4 % — LOW (ref 39–50)
HCT VFR BLD CALC: 28.5 % — LOW (ref 39–50)
HCT VFR BLD CALC: 28.5 % — LOW (ref 39–50)
HCT VFR BLD CALC: 28.8 % — LOW (ref 39–50)
HCT VFR BLD CALC: 28.8 % — LOW (ref 39–50)
HCT VFR BLD CALC: 28.9 % — LOW (ref 39–50)
HCT VFR BLD CALC: 28.9 % — LOW (ref 39–50)
HCT VFR BLD CALC: 29.4 %
HCT VFR BLD CALC: 30.3 % — LOW (ref 39–50)
HCT VFR BLD CALC: 30.4 % — LOW (ref 39–50)
HCT VFR BLD CALC: 31.8 % — LOW (ref 39–50)
HCT VFR BLD CALC: 31.9 % — LOW (ref 39–50)
HCT VFR BLD CALC: 32.8 % — LOW (ref 39–50)
HCT VFR BLDA CALC: 31 % — LOW (ref 39–50)
HCT VFR BLDA CALC: 33 % — LOW (ref 39–50)
HDLC SERPL-MCNC: 48 MG/DL
HGB BLD CALC-MCNC: 10.7 G/DL — LOW (ref 13–17)
HGB BLD CALC-MCNC: 9.9 G/DL — LOW (ref 13–17)
HGB BLD-MCNC: 10.3 G/DL — LOW (ref 13–17)
HGB BLD-MCNC: 10.3 G/DL — LOW (ref 13–17)
HGB BLD-MCNC: 10.4 G/DL — LOW (ref 13–17)
HGB BLD-MCNC: 10.7 G/DL — LOW (ref 13–17)
HGB BLD-MCNC: 8.2 G/DL — LOW (ref 13–17)
HGB BLD-MCNC: 8.2 G/DL — LOW (ref 13–17)
HGB BLD-MCNC: 8.3 G/DL — LOW (ref 13–17)
HGB BLD-MCNC: 8.4 G/DL — LOW (ref 13–17)
HGB BLD-MCNC: 8.4 G/DL — LOW (ref 13–17)
HGB BLD-MCNC: 8.5 G/DL — LOW (ref 13–17)
HGB BLD-MCNC: 8.6 G/DL
HGB BLD-MCNC: 8.6 G/DL — LOW (ref 13–17)
HGB BLD-MCNC: 8.7 G/DL
HGB BLD-MCNC: 8.7 G/DL — LOW (ref 13–17)
HGB BLD-MCNC: 8.8 G/DL — LOW (ref 13–17)
HGB BLD-MCNC: 8.8 G/DL — LOW (ref 13–17)
HGB BLD-MCNC: 8.9 G/DL — LOW (ref 13–17)
HGB BLD-MCNC: 9 G/DL — LOW (ref 13–17)
HGB BLD-MCNC: 9.1 G/DL — LOW (ref 13–17)
HGB BLD-MCNC: 9.1 G/DL — LOW (ref 13–17)
HGB BLD-MCNC: 9.2 G/DL — LOW (ref 13–17)
HGB BLD-MCNC: 9.3 G/DL — LOW (ref 13–17)
HGB BLD-MCNC: 9.6 G/DL — LOW (ref 13–17)
HGB BLD-MCNC: 9.6 G/DL — LOW (ref 13–17)
HGB BLD-MCNC: 9.9 G/DL — LOW (ref 13–17)
IMM GRANULOCYTES NFR BLD AUTO: 0 %
IMM GRANULOCYTES NFR BLD AUTO: 0 % — SIGNIFICANT CHANGE UP (ref 0–1.5)
IMM GRANULOCYTES NFR BLD AUTO: 0 % — SIGNIFICANT CHANGE UP (ref 0–1.5)
IMM GRANULOCYTES NFR BLD AUTO: 0.3 %
IMM GRANULOCYTES NFR BLD AUTO: 0.4 % — SIGNIFICANT CHANGE UP (ref 0–1.5)
IMM GRANULOCYTES NFR BLD AUTO: 0.6 % — SIGNIFICANT CHANGE UP (ref 0–1.5)
IMM GRANULOCYTES NFR BLD AUTO: 0.9 % — SIGNIFICANT CHANGE UP (ref 0–1.5)
IMM GRANULOCYTES NFR BLD AUTO: 1 % — SIGNIFICANT CHANGE UP (ref 0–1.5)
IMM GRANULOCYTES NFR BLD AUTO: 1.2 % — SIGNIFICANT CHANGE UP (ref 0–1.5)
IMM GRANULOCYTES NFR BLD AUTO: 1.3 % — SIGNIFICANT CHANGE UP (ref 0–1.5)
IMM GRANULOCYTES NFR BLD AUTO: 1.4 % — SIGNIFICANT CHANGE UP (ref 0–1.5)
IMM GRANULOCYTES NFR BLD AUTO: 1.6 % — HIGH (ref 0–1.5)
IMM GRANULOCYTES NFR BLD AUTO: 1.6 % — HIGH (ref 0–1.5)
IMM GRANULOCYTES NFR BLD AUTO: 1.8 % — HIGH (ref 0–1.5)
IMM GRANULOCYTES NFR BLD AUTO: 1.8 % — HIGH (ref 0–1.5)
IMM GRANULOCYTES NFR BLD AUTO: 2.1 % — HIGH (ref 0–1.5)
IMM GRANULOCYTES NFR BLD AUTO: 3.4 % — HIGH (ref 0–1.5)
INR BLD: 1.13 RATIO — SIGNIFICANT CHANGE UP (ref 0.88–1.16)
INR BLD: 1.25 RATIO — HIGH (ref 0.88–1.16)
INR BLD: 1.29 RATIO — HIGH (ref 0.88–1.16)
LACTATE BLDV-MCNC: 2.5 MMOL/L — HIGH (ref 0.7–2)
LACTATE BLDV-MCNC: 2.6 MMOL/L — HIGH (ref 0.7–2)
LDLC SERPL CALC-MCNC: 44 MG/DL
LYMPHOCYTES # BLD AUTO: 0.1 K/UL — LOW (ref 1–3.3)
LYMPHOCYTES # BLD AUTO: 0.22 K/UL — LOW (ref 1–3.3)
LYMPHOCYTES # BLD AUTO: 0.27 K/UL — LOW (ref 1–3.3)
LYMPHOCYTES # BLD AUTO: 0.28 K/UL — LOW (ref 1–3.3)
LYMPHOCYTES # BLD AUTO: 0.38 K/UL — LOW (ref 1–3.3)
LYMPHOCYTES # BLD AUTO: 0.42 K/UL
LYMPHOCYTES # BLD AUTO: 0.43 K/UL — LOW (ref 1–3.3)
LYMPHOCYTES # BLD AUTO: 0.44 K/UL — LOW (ref 1–3.3)
LYMPHOCYTES # BLD AUTO: 0.45 K/UL — LOW (ref 1–3.3)
LYMPHOCYTES # BLD AUTO: 0.46 K/UL — LOW (ref 1–3.3)
LYMPHOCYTES # BLD AUTO: 0.47 K/UL — LOW (ref 1–3.3)
LYMPHOCYTES # BLD AUTO: 0.49 K/UL — LOW (ref 1–3.3)
LYMPHOCYTES # BLD AUTO: 0.51 K/UL — LOW (ref 1–3.3)
LYMPHOCYTES # BLD AUTO: 0.51 K/UL — LOW (ref 1–3.3)
LYMPHOCYTES # BLD AUTO: 0.53 K/UL — LOW (ref 1–3.3)
LYMPHOCYTES # BLD AUTO: 0.57 K/UL — LOW (ref 1–3.3)
LYMPHOCYTES # BLD AUTO: 0.59 K/UL — LOW (ref 1–3.3)
LYMPHOCYTES # BLD AUTO: 0.59 K/UL — LOW (ref 1–3.3)
LYMPHOCYTES # BLD AUTO: 0.6 K/UL — LOW (ref 1–3.3)
LYMPHOCYTES # BLD AUTO: 0.61 K/UL — LOW (ref 1–3.3)
LYMPHOCYTES # BLD AUTO: 0.63 K/UL — LOW (ref 1–3.3)
LYMPHOCYTES # BLD AUTO: 0.63 K/UL — LOW (ref 1–3.3)
LYMPHOCYTES # BLD AUTO: 0.65 K/UL — LOW (ref 1–3.3)
LYMPHOCYTES # BLD AUTO: 0.66 K/UL — LOW (ref 1–3.3)
LYMPHOCYTES # BLD AUTO: 0.67 K/UL
LYMPHOCYTES # BLD AUTO: 0.72 K/UL — LOW (ref 1–3.3)
LYMPHOCYTES # BLD AUTO: 0.83 K/UL — LOW (ref 1–3.3)
LYMPHOCYTES # BLD AUTO: 1.7 % — LOW (ref 13–44)
LYMPHOCYTES # BLD AUTO: 10 % — LOW (ref 13–44)
LYMPHOCYTES # BLD AUTO: 10.7 % — LOW (ref 13–44)
LYMPHOCYTES # BLD AUTO: 10.9 % — LOW (ref 13–44)
LYMPHOCYTES # BLD AUTO: 13.6 % — SIGNIFICANT CHANGE UP (ref 13–44)
LYMPHOCYTES # BLD AUTO: 14.5 % — SIGNIFICANT CHANGE UP (ref 13–44)
LYMPHOCYTES # BLD AUTO: 14.9 % — SIGNIFICANT CHANGE UP (ref 13–44)
LYMPHOCYTES # BLD AUTO: 15 % — SIGNIFICANT CHANGE UP (ref 13–44)
LYMPHOCYTES # BLD AUTO: 15.4 % — SIGNIFICANT CHANGE UP (ref 13–44)
LYMPHOCYTES # BLD AUTO: 16 % — SIGNIFICANT CHANGE UP (ref 13–44)
LYMPHOCYTES # BLD AUTO: 16.2 % — SIGNIFICANT CHANGE UP (ref 13–44)
LYMPHOCYTES # BLD AUTO: 16.3 % — SIGNIFICANT CHANGE UP (ref 13–44)
LYMPHOCYTES # BLD AUTO: 16.8 % — SIGNIFICANT CHANGE UP (ref 13–44)
LYMPHOCYTES # BLD AUTO: 16.9 % — SIGNIFICANT CHANGE UP (ref 13–44)
LYMPHOCYTES # BLD AUTO: 17 % — SIGNIFICANT CHANGE UP (ref 13–44)
LYMPHOCYTES # BLD AUTO: 17 % — SIGNIFICANT CHANGE UP (ref 13–44)
LYMPHOCYTES # BLD AUTO: 17.4 % — SIGNIFICANT CHANGE UP (ref 13–44)
LYMPHOCYTES # BLD AUTO: 18.2 % — SIGNIFICANT CHANGE UP (ref 13–44)
LYMPHOCYTES # BLD AUTO: 19 % — SIGNIFICANT CHANGE UP (ref 13–44)
LYMPHOCYTES # BLD AUTO: 19.3 % — SIGNIFICANT CHANGE UP (ref 13–44)
LYMPHOCYTES # BLD AUTO: 19.7 % — SIGNIFICANT CHANGE UP (ref 13–44)
LYMPHOCYTES # BLD AUTO: 19.8 % — SIGNIFICANT CHANGE UP (ref 13–44)
LYMPHOCYTES # BLD AUTO: 21.5 % — SIGNIFICANT CHANGE UP (ref 13–44)
LYMPHOCYTES # BLD AUTO: 22.3 % — SIGNIFICANT CHANGE UP (ref 13–44)
LYMPHOCYTES # BLD AUTO: 31 % — SIGNIFICANT CHANGE UP (ref 13–44)
LYMPHOCYTES # BLD AUTO: 4 % — LOW (ref 13–44)
LYMPHOCYTES # BLD AUTO: 5 % — LOW (ref 13–44)
LYMPHOCYTES NFR BLD AUTO: 19.4 %
LYMPHOCYTES NFR BLD AUTO: 21.8 %
MAN DIFF?: NORMAL
MAN DIFF?: NORMAL
MANUAL SMEAR VERIFICATION: SIGNIFICANT CHANGE UP
MCHC RBC-ENTMCNC: 28 PG
MCHC RBC-ENTMCNC: 28.1 PG — SIGNIFICANT CHANGE UP (ref 27–34)
MCHC RBC-ENTMCNC: 28.6 PG — SIGNIFICANT CHANGE UP (ref 27–34)
MCHC RBC-ENTMCNC: 29 PG — SIGNIFICANT CHANGE UP (ref 27–34)
MCHC RBC-ENTMCNC: 29.2 PG — SIGNIFICANT CHANGE UP (ref 27–34)
MCHC RBC-ENTMCNC: 29.2 PG — SIGNIFICANT CHANGE UP (ref 27–34)
MCHC RBC-ENTMCNC: 29.6 GM/DL
MCHC RBC-ENTMCNC: 29.6 PG — SIGNIFICANT CHANGE UP (ref 27–34)
MCHC RBC-ENTMCNC: 30.1 PG — SIGNIFICANT CHANGE UP (ref 27–34)
MCHC RBC-ENTMCNC: 30.4 G/DL — LOW (ref 32–36)
MCHC RBC-ENTMCNC: 30.4 PG — SIGNIFICANT CHANGE UP (ref 27–34)
MCHC RBC-ENTMCNC: 30.4 PG — SIGNIFICANT CHANGE UP (ref 27–34)
MCHC RBC-ENTMCNC: 30.6 PG — SIGNIFICANT CHANGE UP (ref 27–34)
MCHC RBC-ENTMCNC: 30.6 PG — SIGNIFICANT CHANGE UP (ref 27–34)
MCHC RBC-ENTMCNC: 30.7 G/DL — LOW (ref 32–36)
MCHC RBC-ENTMCNC: 30.7 PG — SIGNIFICANT CHANGE UP (ref 27–34)
MCHC RBC-ENTMCNC: 30.8 PG — SIGNIFICANT CHANGE UP (ref 27–34)
MCHC RBC-ENTMCNC: 30.9 GM/DL
MCHC RBC-ENTMCNC: 31 PG — SIGNIFICANT CHANGE UP (ref 27–34)
MCHC RBC-ENTMCNC: 31.2 PG
MCHC RBC-ENTMCNC: 31.2 PG — SIGNIFICANT CHANGE UP (ref 27–34)
MCHC RBC-ENTMCNC: 31.2 PG — SIGNIFICANT CHANGE UP (ref 27–34)
MCHC RBC-ENTMCNC: 31.3 G/DL — LOW (ref 32–36)
MCHC RBC-ENTMCNC: 31.3 PG — SIGNIFICANT CHANGE UP (ref 27–34)
MCHC RBC-ENTMCNC: 31.3 PG — SIGNIFICANT CHANGE UP (ref 27–34)
MCHC RBC-ENTMCNC: 31.4 PG — SIGNIFICANT CHANGE UP (ref 27–34)
MCHC RBC-ENTMCNC: 31.4 PG — SIGNIFICANT CHANGE UP (ref 27–34)
MCHC RBC-ENTMCNC: 31.5 G/DL — LOW (ref 32–36)
MCHC RBC-ENTMCNC: 31.5 G/DL — LOW (ref 32–36)
MCHC RBC-ENTMCNC: 31.6 G/DL — LOW (ref 32–36)
MCHC RBC-ENTMCNC: 31.8 G/DL — LOW (ref 32–36)
MCHC RBC-ENTMCNC: 31.8 PG — SIGNIFICANT CHANGE UP (ref 27–34)
MCHC RBC-ENTMCNC: 31.9 GM/DL — LOW (ref 32–36)
MCHC RBC-ENTMCNC: 31.9 PG — SIGNIFICANT CHANGE UP (ref 27–34)
MCHC RBC-ENTMCNC: 32 G/DL — SIGNIFICANT CHANGE UP (ref 32–36)
MCHC RBC-ENTMCNC: 32.1 GM/DL — SIGNIFICANT CHANGE UP (ref 32–36)
MCHC RBC-ENTMCNC: 32.1 PG — SIGNIFICANT CHANGE UP (ref 27–34)
MCHC RBC-ENTMCNC: 32.2 PG — SIGNIFICANT CHANGE UP (ref 27–34)
MCHC RBC-ENTMCNC: 32.3 G/DL — SIGNIFICANT CHANGE UP (ref 32–36)
MCHC RBC-ENTMCNC: 32.3 GM/DL — SIGNIFICANT CHANGE UP (ref 32–36)
MCHC RBC-ENTMCNC: 32.3 GM/DL — SIGNIFICANT CHANGE UP (ref 32–36)
MCHC RBC-ENTMCNC: 32.3 PG — SIGNIFICANT CHANGE UP (ref 27–34)
MCHC RBC-ENTMCNC: 32.3 PG — SIGNIFICANT CHANGE UP (ref 27–34)
MCHC RBC-ENTMCNC: 32.4 GM/DL — SIGNIFICANT CHANGE UP (ref 32–36)
MCHC RBC-ENTMCNC: 32.4 GM/DL — SIGNIFICANT CHANGE UP (ref 32–36)
MCHC RBC-ENTMCNC: 32.5 GM/DL — SIGNIFICANT CHANGE UP (ref 32–36)
MCHC RBC-ENTMCNC: 32.5 PG — SIGNIFICANT CHANGE UP (ref 27–34)
MCHC RBC-ENTMCNC: 32.6 G/DL — SIGNIFICANT CHANGE UP (ref 32–36)
MCHC RBC-ENTMCNC: 32.6 GM/DL — SIGNIFICANT CHANGE UP (ref 32–36)
MCHC RBC-ENTMCNC: 32.7 GM/DL — SIGNIFICANT CHANGE UP (ref 32–36)
MCHC RBC-ENTMCNC: 32.7 GM/DL — SIGNIFICANT CHANGE UP (ref 32–36)
MCHC RBC-ENTMCNC: 32.8 G/DL — SIGNIFICANT CHANGE UP (ref 32–36)
MCHC RBC-ENTMCNC: 33.1 G/DL — SIGNIFICANT CHANGE UP (ref 32–36)
MCHC RBC-ENTMCNC: 33.1 GM/DL — SIGNIFICANT CHANGE UP (ref 32–36)
MCHC RBC-ENTMCNC: 33.2 G/DL — SIGNIFICANT CHANGE UP (ref 32–36)
MCHC RBC-ENTMCNC: 33.3 GM/DL — SIGNIFICANT CHANGE UP (ref 32–36)
MCHC RBC-ENTMCNC: 33.6 GM/DL — SIGNIFICANT CHANGE UP (ref 32–36)
MCHC RBC-ENTMCNC: 34.3 GM/DL — SIGNIFICANT CHANGE UP (ref 32–36)
MCV RBC AUTO: 100.7 FL
MCV RBC AUTO: 101.1 FL — HIGH (ref 80–100)
MCV RBC AUTO: 89.9 FL — SIGNIFICANT CHANGE UP (ref 80–100)
MCV RBC AUTO: 91.5 FL — SIGNIFICANT CHANGE UP (ref 80–100)
MCV RBC AUTO: 91.9 FL — SIGNIFICANT CHANGE UP (ref 80–100)
MCV RBC AUTO: 92.7 FL — SIGNIFICANT CHANGE UP (ref 80–100)
MCV RBC AUTO: 93 FL — SIGNIFICANT CHANGE UP (ref 80–100)
MCV RBC AUTO: 93.1 FL — SIGNIFICANT CHANGE UP (ref 80–100)
MCV RBC AUTO: 93.8 FL — SIGNIFICANT CHANGE UP (ref 80–100)
MCV RBC AUTO: 93.9 FL — SIGNIFICANT CHANGE UP (ref 80–100)
MCV RBC AUTO: 94 FL — SIGNIFICANT CHANGE UP (ref 80–100)
MCV RBC AUTO: 94.1 FL — SIGNIFICANT CHANGE UP (ref 80–100)
MCV RBC AUTO: 94.1 FL — SIGNIFICANT CHANGE UP (ref 80–100)
MCV RBC AUTO: 94.4 FL — SIGNIFICANT CHANGE UP (ref 80–100)
MCV RBC AUTO: 94.4 FL — SIGNIFICANT CHANGE UP (ref 80–100)
MCV RBC AUTO: 94.5 FL
MCV RBC AUTO: 94.6 FL — SIGNIFICANT CHANGE UP (ref 80–100)
MCV RBC AUTO: 94.6 FL — SIGNIFICANT CHANGE UP (ref 80–100)
MCV RBC AUTO: 94.7 FL — SIGNIFICANT CHANGE UP (ref 80–100)
MCV RBC AUTO: 94.8 FL — SIGNIFICANT CHANGE UP (ref 80–100)
MCV RBC AUTO: 95.1 FL — SIGNIFICANT CHANGE UP (ref 80–100)
MCV RBC AUTO: 95.2 FL — SIGNIFICANT CHANGE UP (ref 80–100)
MCV RBC AUTO: 95.2 FL — SIGNIFICANT CHANGE UP (ref 80–100)
MCV RBC AUTO: 95.3 FL — SIGNIFICANT CHANGE UP (ref 80–100)
MCV RBC AUTO: 95.8 FL — SIGNIFICANT CHANGE UP (ref 80–100)
MCV RBC AUTO: 96 FL — SIGNIFICANT CHANGE UP (ref 80–100)
MCV RBC AUTO: 96 FL — SIGNIFICANT CHANGE UP (ref 80–100)
MCV RBC AUTO: 96.2 FL — SIGNIFICANT CHANGE UP (ref 80–100)
MCV RBC AUTO: 96.2 FL — SIGNIFICANT CHANGE UP (ref 80–100)
MCV RBC AUTO: 96.5 FL — SIGNIFICANT CHANGE UP (ref 80–100)
MCV RBC AUTO: 97.7 FL — SIGNIFICANT CHANGE UP (ref 80–100)
MCV RBC AUTO: 98 FL — SIGNIFICANT CHANGE UP (ref 80–100)
MCV RBC AUTO: 98.1 FL — SIGNIFICANT CHANGE UP (ref 80–100)
MCV RBC AUTO: 98.9 FL — SIGNIFICANT CHANGE UP (ref 80–100)
MCV RBC AUTO: 99 FL — SIGNIFICANT CHANGE UP (ref 80–100)
MCV RBC AUTO: 99.6 FL — SIGNIFICANT CHANGE UP (ref 80–100)
MCV RBC AUTO: 99.6 FL — SIGNIFICANT CHANGE UP (ref 80–100)
METHOD TYPE: SIGNIFICANT CHANGE UP
METHOD TYPE: SIGNIFICANT CHANGE UP
MICROALBUMIN 24H UR DL<=1MG/L-MCNC: <1.2 MG/DL
MICROALBUMIN/CREAT 24H UR-RTO: NORMAL MG/G
MONOCYTES # BLD AUTO: 0.11 K/UL — SIGNIFICANT CHANGE UP (ref 0–0.9)
MONOCYTES # BLD AUTO: 0.15 K/UL — SIGNIFICANT CHANGE UP (ref 0–0.9)
MONOCYTES # BLD AUTO: 0.21 K/UL — SIGNIFICANT CHANGE UP (ref 0–0.9)
MONOCYTES # BLD AUTO: 0.25 K/UL — SIGNIFICANT CHANGE UP (ref 0–0.9)
MONOCYTES # BLD AUTO: 0.26 K/UL
MONOCYTES # BLD AUTO: 0.28 K/UL — SIGNIFICANT CHANGE UP (ref 0–0.9)
MONOCYTES # BLD AUTO: 0.28 K/UL — SIGNIFICANT CHANGE UP (ref 0–0.9)
MONOCYTES # BLD AUTO: 0.29 K/UL — SIGNIFICANT CHANGE UP (ref 0–0.9)
MONOCYTES # BLD AUTO: 0.3 K/UL — SIGNIFICANT CHANGE UP (ref 0–0.9)
MONOCYTES # BLD AUTO: 0.31 K/UL — SIGNIFICANT CHANGE UP (ref 0–0.9)
MONOCYTES # BLD AUTO: 0.32 K/UL — SIGNIFICANT CHANGE UP (ref 0–0.9)
MONOCYTES # BLD AUTO: 0.32 K/UL — SIGNIFICANT CHANGE UP (ref 0–0.9)
MONOCYTES # BLD AUTO: 0.34 K/UL — SIGNIFICANT CHANGE UP (ref 0–0.9)
MONOCYTES # BLD AUTO: 0.35 K/UL — SIGNIFICANT CHANGE UP (ref 0–0.9)
MONOCYTES # BLD AUTO: 0.36 K/UL — SIGNIFICANT CHANGE UP (ref 0–0.9)
MONOCYTES # BLD AUTO: 0.36 K/UL — SIGNIFICANT CHANGE UP (ref 0–0.9)
MONOCYTES # BLD AUTO: 0.37 K/UL — SIGNIFICANT CHANGE UP (ref 0–0.9)
MONOCYTES # BLD AUTO: 0.42 K/UL
MONOCYTES # BLD AUTO: 0.42 K/UL — SIGNIFICANT CHANGE UP (ref 0–0.9)
MONOCYTES # BLD AUTO: 0.44 K/UL — SIGNIFICANT CHANGE UP (ref 0–0.9)
MONOCYTES # BLD AUTO: 0.48 K/UL — SIGNIFICANT CHANGE UP (ref 0–0.9)
MONOCYTES # BLD AUTO: 0.49 K/UL — SIGNIFICANT CHANGE UP (ref 0–0.9)
MONOCYTES # BLD AUTO: 0.5 K/UL — SIGNIFICANT CHANGE UP (ref 0–0.9)
MONOCYTES # BLD AUTO: 0.51 K/UL — SIGNIFICANT CHANGE UP (ref 0–0.9)
MONOCYTES # BLD AUTO: 0.52 K/UL — SIGNIFICANT CHANGE UP (ref 0–0.9)
MONOCYTES # BLD AUTO: 0.52 K/UL — SIGNIFICANT CHANGE UP (ref 0–0.9)
MONOCYTES # BLD AUTO: 0.6 K/UL — SIGNIFICANT CHANGE UP (ref 0–0.9)
MONOCYTES # BLD AUTO: 0.61 K/UL — SIGNIFICANT CHANGE UP (ref 0–0.9)
MONOCYTES # BLD AUTO: 0.66 K/UL — SIGNIFICANT CHANGE UP (ref 0–0.9)
MONOCYTES NFR BLD AUTO: 10 % — SIGNIFICANT CHANGE UP (ref 2–14)
MONOCYTES NFR BLD AUTO: 11 % — SIGNIFICANT CHANGE UP (ref 2–14)
MONOCYTES NFR BLD AUTO: 12 %
MONOCYTES NFR BLD AUTO: 12 % — SIGNIFICANT CHANGE UP (ref 2–14)
MONOCYTES NFR BLD AUTO: 12.1 % — SIGNIFICANT CHANGE UP (ref 2–14)
MONOCYTES NFR BLD AUTO: 13 % — SIGNIFICANT CHANGE UP (ref 2–14)
MONOCYTES NFR BLD AUTO: 13.2 % — SIGNIFICANT CHANGE UP (ref 2–14)
MONOCYTES NFR BLD AUTO: 13.2 % — SIGNIFICANT CHANGE UP (ref 2–14)
MONOCYTES NFR BLD AUTO: 13.4 % — SIGNIFICANT CHANGE UP (ref 2–14)
MONOCYTES NFR BLD AUTO: 13.7 %
MONOCYTES NFR BLD AUTO: 13.7 % — SIGNIFICANT CHANGE UP (ref 2–14)
MONOCYTES NFR BLD AUTO: 14 % — SIGNIFICANT CHANGE UP (ref 2–14)
MONOCYTES NFR BLD AUTO: 14.2 % — HIGH (ref 2–14)
MONOCYTES NFR BLD AUTO: 14.5 % — HIGH (ref 2–14)
MONOCYTES NFR BLD AUTO: 14.9 % — HIGH (ref 2–14)
MONOCYTES NFR BLD AUTO: 15.1 % — HIGH (ref 2–14)
MONOCYTES NFR BLD AUTO: 15.6 % — HIGH (ref 2–14)
MONOCYTES NFR BLD AUTO: 16.6 % — HIGH (ref 2–14)
MONOCYTES NFR BLD AUTO: 16.8 % — HIGH (ref 2–14)
MONOCYTES NFR BLD AUTO: 17.6 % — HIGH (ref 2–14)
MONOCYTES NFR BLD AUTO: 4 % — SIGNIFICANT CHANGE UP (ref 2–14)
MONOCYTES NFR BLD AUTO: 4 % — SIGNIFICANT CHANGE UP (ref 2–14)
MONOCYTES NFR BLD AUTO: 4.4 % — SIGNIFICANT CHANGE UP (ref 2–14)
MONOCYTES NFR BLD AUTO: 6.9 % — SIGNIFICANT CHANGE UP (ref 2–14)
MONOCYTES NFR BLD AUTO: 7 % — SIGNIFICANT CHANGE UP (ref 2–14)
MONOCYTES NFR BLD AUTO: 8 % — SIGNIFICANT CHANGE UP (ref 2–14)
MONOCYTES NFR BLD AUTO: 8.2 % — SIGNIFICANT CHANGE UP (ref 2–14)
MONOCYTES NFR BLD AUTO: 8.5 % — SIGNIFICANT CHANGE UP (ref 2–14)
MONOCYTES NFR BLD AUTO: 9.9 % — SIGNIFICANT CHANGE UP (ref 2–14)
MONOMERIC PROLACTIN (ICMA)*: 21 NG/ML
NEUTROPHILS # BLD AUTO: 1.07 K/UL — LOW (ref 1.8–7.4)
NEUTROPHILS # BLD AUTO: 1.12 K/UL — LOW (ref 1.8–7.4)
NEUTROPHILS # BLD AUTO: 1.26 K/UL
NEUTROPHILS # BLD AUTO: 1.36 K/UL — LOW (ref 1.8–7.4)
NEUTROPHILS # BLD AUTO: 1.38 K/UL — LOW (ref 1.8–7.4)
NEUTROPHILS # BLD AUTO: 1.45 K/UL — LOW (ref 1.8–7.4)
NEUTROPHILS # BLD AUTO: 1.46 K/UL — LOW (ref 1.8–7.4)
NEUTROPHILS # BLD AUTO: 1.52 K/UL — LOW (ref 1.8–7.4)
NEUTROPHILS # BLD AUTO: 1.54 K/UL — LOW (ref 1.8–7.4)
NEUTROPHILS # BLD AUTO: 1.59 K/UL — LOW (ref 1.8–7.4)
NEUTROPHILS # BLD AUTO: 1.63 K/UL
NEUTROPHILS # BLD AUTO: 1.68 K/UL — LOW (ref 1.8–7.4)
NEUTROPHILS # BLD AUTO: 1.76 K/UL — LOW (ref 1.8–7.4)
NEUTROPHILS # BLD AUTO: 1.85 K/UL — SIGNIFICANT CHANGE UP (ref 1.8–7.4)
NEUTROPHILS # BLD AUTO: 1.89 K/UL — SIGNIFICANT CHANGE UP (ref 1.8–7.4)
NEUTROPHILS # BLD AUTO: 1.89 K/UL — SIGNIFICANT CHANGE UP (ref 1.8–7.4)
NEUTROPHILS # BLD AUTO: 2.06 K/UL — SIGNIFICANT CHANGE UP (ref 1.8–7.4)
NEUTROPHILS # BLD AUTO: 2.09 K/UL — SIGNIFICANT CHANGE UP (ref 1.8–7.4)
NEUTROPHILS # BLD AUTO: 2.17 K/UL — SIGNIFICANT CHANGE UP (ref 1.8–7.4)
NEUTROPHILS # BLD AUTO: 2.3 K/UL — SIGNIFICANT CHANGE UP (ref 1.8–7.4)
NEUTROPHILS # BLD AUTO: 2.35 K/UL — SIGNIFICANT CHANGE UP (ref 1.8–7.4)
NEUTROPHILS # BLD AUTO: 2.41 K/UL — SIGNIFICANT CHANGE UP (ref 1.8–7.4)
NEUTROPHILS # BLD AUTO: 2.57 K/UL — SIGNIFICANT CHANGE UP (ref 1.8–7.4)
NEUTROPHILS # BLD AUTO: 3.04 K/UL — SIGNIFICANT CHANGE UP (ref 1.8–7.4)
NEUTROPHILS # BLD AUTO: 3.11 K/UL — SIGNIFICANT CHANGE UP (ref 1.8–7.4)
NEUTROPHILS # BLD AUTO: 3.39 K/UL — SIGNIFICANT CHANGE UP (ref 1.8–7.4)
NEUTROPHILS # BLD AUTO: 3.85 K/UL — SIGNIFICANT CHANGE UP (ref 1.8–7.4)
NEUTROPHILS # BLD AUTO: 5.37 K/UL — SIGNIFICANT CHANGE UP (ref 1.8–7.4)
NEUTROPHILS # BLD AUTO: 7.94 K/UL — HIGH (ref 1.8–7.4)
NEUTROPHILS NFR BLD AUTO: 40 % — LOW (ref 43–77)
NEUTROPHILS NFR BLD AUTO: 47.5 % — SIGNIFICANT CHANGE UP (ref 43–77)
NEUTROPHILS NFR BLD AUTO: 49 % — SIGNIFICANT CHANGE UP (ref 43–77)
NEUTROPHILS NFR BLD AUTO: 53.1 %
NEUTROPHILS NFR BLD AUTO: 53.6 % — SIGNIFICANT CHANGE UP (ref 43–77)
NEUTROPHILS NFR BLD AUTO: 54.5 % — SIGNIFICANT CHANGE UP (ref 43–77)
NEUTROPHILS NFR BLD AUTO: 55.9 % — SIGNIFICANT CHANGE UP (ref 43–77)
NEUTROPHILS NFR BLD AUTO: 56.9 % — SIGNIFICANT CHANGE UP (ref 43–77)
NEUTROPHILS NFR BLD AUTO: 57.2 % — SIGNIFICANT CHANGE UP (ref 43–77)
NEUTROPHILS NFR BLD AUTO: 57.8 % — SIGNIFICANT CHANGE UP (ref 43–77)
NEUTROPHILS NFR BLD AUTO: 58 %
NEUTROPHILS NFR BLD AUTO: 58.2 % — SIGNIFICANT CHANGE UP (ref 43–77)
NEUTROPHILS NFR BLD AUTO: 58.8 % — SIGNIFICANT CHANGE UP (ref 43–77)
NEUTROPHILS NFR BLD AUTO: 59.1 % — SIGNIFICANT CHANGE UP (ref 43–77)
NEUTROPHILS NFR BLD AUTO: 61.2 % — SIGNIFICANT CHANGE UP (ref 43–77)
NEUTROPHILS NFR BLD AUTO: 61.4 % — SIGNIFICANT CHANGE UP (ref 43–77)
NEUTROPHILS NFR BLD AUTO: 62.2 % — SIGNIFICANT CHANGE UP (ref 43–77)
NEUTROPHILS NFR BLD AUTO: 62.9 % — SIGNIFICANT CHANGE UP (ref 43–77)
NEUTROPHILS NFR BLD AUTO: 64 % — SIGNIFICANT CHANGE UP (ref 43–77)
NEUTROPHILS NFR BLD AUTO: 66 % — SIGNIFICANT CHANGE UP (ref 43–77)
NEUTROPHILS NFR BLD AUTO: 68 % — SIGNIFICANT CHANGE UP (ref 43–77)
NEUTROPHILS NFR BLD AUTO: 69.4 % — SIGNIFICANT CHANGE UP (ref 43–77)
NEUTROPHILS NFR BLD AUTO: 71.7 % — SIGNIFICANT CHANGE UP (ref 43–77)
NEUTROPHILS NFR BLD AUTO: 76.7 % — SIGNIFICANT CHANGE UP (ref 43–77)
NEUTROPHILS NFR BLD AUTO: 79 % — HIGH (ref 43–77)
NEUTROPHILS NFR BLD AUTO: 83 % — HIGH (ref 43–77)
NEUTROPHILS NFR BLD AUTO: 85 % — HIGH (ref 43–77)
NEUTROPHILS NFR BLD AUTO: 87 % — HIGH (ref 43–77)
NEUTROPHILS NFR BLD AUTO: 93.9 % — HIGH (ref 43–77)
NRBC # BLD: 0 /100 WBCS — SIGNIFICANT CHANGE UP (ref 0–0)
NRBC # BLD: 0 /100 — SIGNIFICANT CHANGE UP (ref 0–0)
NRBC # BLD: SIGNIFICANT CHANGE UP /100 WBCS (ref 0–0)
ORGANISM # SPEC MICROSCOPIC CNT: SIGNIFICANT CHANGE UP
OTHER CELLS CSF MANUAL: 10 ML/DL — LOW (ref 18–23)
OVALOCYTES BLD QL SMEAR: SIGNIFICANT CHANGE UP
OVALOCYTES BLD QL SMEAR: SLIGHT — SIGNIFICANT CHANGE UP
PCO2 BLDV: 36 MMHG — SIGNIFICANT CHANGE UP (ref 35–50)
PCO2 BLDV: 36 MMHG — SIGNIFICANT CHANGE UP (ref 35–50)
PERCENT MACROPROLACTIN: 22 %
PH BLDV: 7.36 — SIGNIFICANT CHANGE UP (ref 7.35–7.45)
PH BLDV: 7.42 — SIGNIFICANT CHANGE UP (ref 7.35–7.45)
PHOSPHATE SERPL-MCNC: 3.4 MG/DL
PLAT MORPH BLD: NORMAL — SIGNIFICANT CHANGE UP
PLATELET # BLD AUTO: 13 K/UL — CRITICAL LOW (ref 150–400)
PLATELET # BLD AUTO: 14 K/UL — CRITICAL LOW (ref 150–400)
PLATELET # BLD AUTO: 14 K/UL — CRITICAL LOW (ref 150–400)
PLATELET # BLD AUTO: 15 K/UL — CRITICAL LOW (ref 150–400)
PLATELET # BLD AUTO: 15 K/UL — CRITICAL LOW (ref 150–400)
PLATELET # BLD AUTO: 16 K/UL — CRITICAL LOW (ref 150–400)
PLATELET # BLD AUTO: 16 K/UL — CRITICAL LOW (ref 150–400)
PLATELET # BLD AUTO: 17 K/UL — CRITICAL LOW (ref 150–400)
PLATELET # BLD AUTO: 17 K/UL — CRITICAL LOW (ref 150–400)
PLATELET # BLD AUTO: 18 K/UL — CRITICAL LOW (ref 150–400)
PLATELET # BLD AUTO: 18 K/UL — CRITICAL LOW (ref 150–400)
PLATELET # BLD AUTO: 19 K/UL
PLATELET # BLD AUTO: 19 K/UL — CRITICAL LOW (ref 150–400)
PLATELET # BLD AUTO: 20 K/UL — CRITICAL LOW (ref 150–400)
PLATELET # BLD AUTO: 21 K/UL — LOW (ref 150–400)
PLATELET # BLD AUTO: 21 K/UL — LOW (ref 150–400)
PLATELET # BLD AUTO: 22 K/UL — LOW (ref 150–400)
PLATELET # BLD AUTO: 23 K/UL — LOW (ref 150–400)
PLATELET # BLD AUTO: 24 K/UL — LOW (ref 150–400)
PLATELET # BLD AUTO: 26 K/UL — LOW (ref 150–400)
PLATELET # BLD AUTO: 26 K/UL — LOW (ref 150–400)
PLATELET # BLD AUTO: 27 K/UL — LOW (ref 150–400)
PLATELET # BLD AUTO: 29 K/UL — LOW (ref 150–400)
PLATELET # BLD AUTO: 30 K/UL — LOW (ref 150–400)
PLATELET # BLD AUTO: 34 K/UL — LOW (ref 150–400)
PLATELET # BLD AUTO: 35 K/UL — LOW (ref 150–400)
PLATELET # BLD AUTO: 35 K/UL — LOW (ref 150–400)
PLATELET # BLD AUTO: 36 K/UL — LOW (ref 150–400)
PLATELET # BLD AUTO: 38 K/UL — LOW (ref 150–400)
PLATELET # BLD AUTO: 40 K/UL — LOW (ref 150–400)
PLATELET # BLD AUTO: 49 K/UL
PLATELET # BLD AUTO: 54 K/UL — LOW (ref 150–400)
PLATELET # BLD AUTO: 65 K/UL — LOW (ref 150–400)
PLATELET # BLD AUTO: 68 K/UL — LOW (ref 150–400)
PLATELET # BLD AUTO: 97 K/UL — LOW (ref 150–400)
PLATELET # BLD MANUAL: 13 K/UL — CRITICAL LOW (ref 150–400)
PLATELET # BLD MANUAL: 14 K/UL — CRITICAL LOW (ref 150–400)
PLATELET # BLD MANUAL: 14 K/UL — CRITICAL LOW (ref 150–400)
PO2 BLDV: 40 MMHG — SIGNIFICANT CHANGE UP (ref 25–45)
PO2 BLDV: 41 MMHG — SIGNIFICANT CHANGE UP (ref 25–45)
POIKILOCYTOSIS BLD QL AUTO: SIGNIFICANT CHANGE UP
POIKILOCYTOSIS BLD QL AUTO: SLIGHT — SIGNIFICANT CHANGE UP
POLYCHROMASIA BLD QL SMEAR: SLIGHT — SIGNIFICANT CHANGE UP
POTASSIUM BLDV-SCNC: 4.5 MMOL/L — SIGNIFICANT CHANGE UP (ref 3.5–5.3)
POTASSIUM BLDV-SCNC: 5.1 MMOL/L — SIGNIFICANT CHANGE UP (ref 3.5–5.3)
POTASSIUM SERPL-MCNC: 3.6 MMOL/L — SIGNIFICANT CHANGE UP (ref 3.5–5.3)
POTASSIUM SERPL-MCNC: 4 MMOL/L — SIGNIFICANT CHANGE UP (ref 3.5–5.3)
POTASSIUM SERPL-MCNC: 4 MMOL/L — SIGNIFICANT CHANGE UP (ref 3.5–5.3)
POTASSIUM SERPL-MCNC: 4.1 MMOL/L — SIGNIFICANT CHANGE UP (ref 3.5–5.3)
POTASSIUM SERPL-MCNC: 4.2 MMOL/L — SIGNIFICANT CHANGE UP (ref 3.5–5.3)
POTASSIUM SERPL-MCNC: 4.3 MMOL/L — SIGNIFICANT CHANGE UP (ref 3.5–5.3)
POTASSIUM SERPL-MCNC: 4.4 MMOL/L — SIGNIFICANT CHANGE UP (ref 3.5–5.3)
POTASSIUM SERPL-MCNC: 4.7 MMOL/L — SIGNIFICANT CHANGE UP (ref 3.5–5.3)
POTASSIUM SERPL-MCNC: 4.8 MMOL/L — SIGNIFICANT CHANGE UP (ref 3.5–5.3)
POTASSIUM SERPL-MCNC: 5.6 MMOL/L — HIGH (ref 3.5–5.3)
POTASSIUM SERPL-SCNC: 3.6 MMOL/L — SIGNIFICANT CHANGE UP (ref 3.5–5.3)
POTASSIUM SERPL-SCNC: 3.9 MMOL/L
POTASSIUM SERPL-SCNC: 4 MMOL/L
POTASSIUM SERPL-SCNC: 4 MMOL/L
POTASSIUM SERPL-SCNC: 4 MMOL/L — SIGNIFICANT CHANGE UP (ref 3.5–5.3)
POTASSIUM SERPL-SCNC: 4 MMOL/L — SIGNIFICANT CHANGE UP (ref 3.5–5.3)
POTASSIUM SERPL-SCNC: 4.1 MMOL/L
POTASSIUM SERPL-SCNC: 4.1 MMOL/L — SIGNIFICANT CHANGE UP (ref 3.5–5.3)
POTASSIUM SERPL-SCNC: 4.2 MMOL/L — SIGNIFICANT CHANGE UP (ref 3.5–5.3)
POTASSIUM SERPL-SCNC: 4.3 MMOL/L — SIGNIFICANT CHANGE UP (ref 3.5–5.3)
POTASSIUM SERPL-SCNC: 4.4 MMOL/L — SIGNIFICANT CHANGE UP (ref 3.5–5.3)
POTASSIUM SERPL-SCNC: 4.7 MMOL/L — SIGNIFICANT CHANGE UP (ref 3.5–5.3)
POTASSIUM SERPL-SCNC: 4.8 MMOL/L — SIGNIFICANT CHANGE UP (ref 3.5–5.3)
POTASSIUM SERPL-SCNC: 4.9 MMOL/L
POTASSIUM SERPL-SCNC: 4.9 MMOL/L
POTASSIUM SERPL-SCNC: 5.2 MMOL/L
POTASSIUM SERPL-SCNC: 5.2 MMOL/L
POTASSIUM SERPL-SCNC: 5.6 MMOL/L — HIGH (ref 3.5–5.3)
PROLACTIN SERPL-MCNC: 0.5 NG/ML — LOW (ref 4.1–18.4)
PROLACTIN SERPL-MCNC: 29.9 NG/ML
PROLACTIN, SERUM (ICMA)*: 27 NG/ML
PROT SERPL-MCNC: 5.9 G/DL
PROT SERPL-MCNC: 6.3 G/DL
PROT SERPL-MCNC: 6.6 G/DL — SIGNIFICANT CHANGE UP (ref 6–8.3)
PROT SERPL-MCNC: 6.7 G/DL
PROT SERPL-MCNC: 6.7 G/DL
PROT UR-MCNC: 25 MG/DL
PROTHROM AB SERPL-ACNC: 13.4 SEC — SIGNIFICANT CHANGE UP (ref 10.6–13.6)
PROTHROM AB SERPL-ACNC: 14.7 SEC — HIGH (ref 10.6–13.6)
PROTHROM AB SERPL-ACNC: 15.2 SEC — HIGH (ref 10.6–13.6)
RBC # BLD: 2.6 M/UL — LOW (ref 4.2–5.8)
RBC # BLD: 2.63 M/UL — LOW (ref 4.2–5.8)
RBC # BLD: 2.66 M/UL — LOW (ref 4.2–5.8)
RBC # BLD: 2.68 M/UL — LOW (ref 4.2–5.8)
RBC # BLD: 2.7 M/UL — LOW (ref 4.2–5.8)
RBC # BLD: 2.73 M/UL — LOW (ref 4.2–5.8)
RBC # BLD: 2.74 M/UL — LOW (ref 4.2–5.8)
RBC # BLD: 2.76 M/UL
RBC # BLD: 2.76 M/UL — LOW (ref 4.2–5.8)
RBC # BLD: 2.77 M/UL — LOW (ref 4.2–5.8)
RBC # BLD: 2.77 M/UL — LOW (ref 4.2–5.8)
RBC # BLD: 2.8 M/UL — LOW (ref 4.2–5.8)
RBC # BLD: 2.83 M/UL — LOW (ref 4.2–5.8)
RBC # BLD: 2.84 M/UL — LOW (ref 4.2–5.8)
RBC # BLD: 2.86 M/UL — LOW (ref 4.2–5.8)
RBC # BLD: 2.86 M/UL — LOW (ref 4.2–5.8)
RBC # BLD: 2.88 M/UL — LOW (ref 4.2–5.8)
RBC # BLD: 2.91 M/UL — LOW (ref 4.2–5.8)
RBC # BLD: 2.94 M/UL — LOW (ref 4.2–5.8)
RBC # BLD: 2.94 M/UL — LOW (ref 4.2–5.8)
RBC # BLD: 2.95 M/UL — LOW (ref 4.2–5.8)
RBC # BLD: 2.97 M/UL — LOW (ref 4.2–5.8)
RBC # BLD: 2.97 M/UL — LOW (ref 4.2–5.8)
RBC # BLD: 3.02 M/UL — LOW (ref 4.2–5.8)
RBC # BLD: 3.05 M/UL — LOW (ref 4.2–5.8)
RBC # BLD: 3.06 M/UL — LOW (ref 4.2–5.8)
RBC # BLD: 3.07 M/UL — LOW (ref 4.2–5.8)
RBC # BLD: 3.07 M/UL — LOW (ref 4.2–5.8)
RBC # BLD: 3.08 M/UL — LOW (ref 4.2–5.8)
RBC # BLD: 3.11 M/UL
RBC # BLD: 3.15 M/UL — LOW (ref 4.2–5.8)
RBC # BLD: 3.27 M/UL — LOW (ref 4.2–5.8)
RBC # BLD: 3.32 M/UL — LOW (ref 4.2–5.8)
RBC # BLD: 3.37 M/UL — LOW (ref 4.2–5.8)
RBC # BLD: 3.45 M/UL — LOW (ref 4.2–5.8)
RBC # FLD: 13.9 % — SIGNIFICANT CHANGE UP (ref 10.3–14.5)
RBC # FLD: 13.9 % — SIGNIFICANT CHANGE UP (ref 10.3–14.5)
RBC # FLD: 14.1 %
RBC # FLD: 14.1 % — SIGNIFICANT CHANGE UP (ref 10.3–14.5)
RBC # FLD: 14.2 % — SIGNIFICANT CHANGE UP (ref 10.3–14.5)
RBC # FLD: 14.3 % — SIGNIFICANT CHANGE UP (ref 10.3–14.5)
RBC # FLD: 14.4 % — SIGNIFICANT CHANGE UP (ref 10.3–14.5)
RBC # FLD: 14.4 % — SIGNIFICANT CHANGE UP (ref 10.3–14.5)
RBC # FLD: 14.5 % — SIGNIFICANT CHANGE UP (ref 10.3–14.5)
RBC # FLD: 14.6 % — HIGH (ref 10.3–14.5)
RBC # FLD: 14.9 % — HIGH (ref 10.3–14.5)
RBC # FLD: 14.9 % — HIGH (ref 10.3–14.5)
RBC # FLD: 15 % — HIGH (ref 10.3–14.5)
RBC # FLD: 15.1 % — HIGH (ref 10.3–14.5)
RBC # FLD: 15.1 % — HIGH (ref 10.3–14.5)
RBC # FLD: 15.2 % — HIGH (ref 10.3–14.5)
RBC # FLD: 15.2 % — HIGH (ref 10.3–14.5)
RBC # FLD: 15.3 % — HIGH (ref 10.3–14.5)
RBC # FLD: 15.4 %
RBC # FLD: 15.4 % — HIGH (ref 10.3–14.5)
RBC # FLD: 15.5 % — HIGH (ref 10.3–14.5)
RBC # FLD: 15.6 % — HIGH (ref 10.3–14.5)
RBC # FLD: 15.7 % — HIGH (ref 10.3–14.5)
RBC # FLD: 15.9 % — HIGH (ref 10.3–14.5)
RBC # FLD: 16.2 % — HIGH (ref 10.3–14.5)
RBC # FLD: 16.9 % — HIGH (ref 10.3–14.5)
RBC # FLD: 17.2 % — HIGH (ref 10.3–14.5)
RBC # FLD: 17.2 % — HIGH (ref 10.3–14.5)
RBC # FLD: 17.3 % — HIGH (ref 10.3–14.5)
RBC # FLD: 17.4 % — HIGH (ref 10.3–14.5)
RBC BLD AUTO: ABNORMAL
RBC BLD AUTO: SIGNIFICANT CHANGE UP
RH IG SCN BLD-IMP: POSITIVE — SIGNIFICANT CHANGE UP
SAO2 % BLDV: 71 % — SIGNIFICANT CHANGE UP (ref 67–88)
SAO2 % BLDV: 74 % — SIGNIFICANT CHANGE UP (ref 67–88)
SARS-COV-2 IGG SERPL QL IA: NEGATIVE — SIGNIFICANT CHANGE UP
SARS-COV-2 IGM SERPL IA-ACNC: 0.1 INDEX — SIGNIFICANT CHANGE UP
SARS-COV-2 N GENE NPH QL NAA+PROBE: NOT DETECTED
SARS-COV-2 RNA SPEC QL NAA+PROBE: SIGNIFICANT CHANGE UP
SODIUM SERPL-SCNC: 129 MMOL/L — LOW (ref 135–145)
SODIUM SERPL-SCNC: 129 MMOL/L — LOW (ref 135–145)
SODIUM SERPL-SCNC: 130 MMOL/L — LOW (ref 135–145)
SODIUM SERPL-SCNC: 132 MMOL/L — LOW (ref 135–145)
SODIUM SERPL-SCNC: 132 MMOL/L — LOW (ref 135–145)
SODIUM SERPL-SCNC: 134 MMOL/L — LOW (ref 135–145)
SODIUM SERPL-SCNC: 135 MMOL/L — SIGNIFICANT CHANGE UP (ref 135–145)
SODIUM SERPL-SCNC: 135 MMOL/L — SIGNIFICANT CHANGE UP (ref 135–145)
SODIUM SERPL-SCNC: 136 MMOL/L — SIGNIFICANT CHANGE UP (ref 135–145)
SODIUM SERPL-SCNC: 137 MMOL/L
SODIUM SERPL-SCNC: 137 MMOL/L
SODIUM SERPL-SCNC: 137 MMOL/L — SIGNIFICANT CHANGE UP (ref 135–145)
SODIUM SERPL-SCNC: 138 MMOL/L
SODIUM SERPL-SCNC: 139 MMOL/L
SODIUM SERPL-SCNC: 140 MMOL/L
SODIUM SERPL-SCNC: 142 MMOL/L
SPECIMEN SOURCE: SIGNIFICANT CHANGE UP
T4 FREE SERPL-MCNC: 1.7 NG/DL
T4 FREE SERPL-MCNC: 1.9 NG/DL
TM INTERPRETATION: SIGNIFICANT CHANGE UP
TRIGL SERPL-MCNC: 85 MG/DL
TSH SERPL-ACNC: 0.42 UIU/ML
TSH SERPL-ACNC: 2.23 UIU/ML
TSH SERPL-MCNC: 3.28 UIU/ML — SIGNIFICANT CHANGE UP (ref 0.27–4.2)
VANCOMYCIN TROUGH SERPL-MCNC: 12.7 UG/ML — SIGNIFICANT CHANGE UP (ref 10–20)
WBC # BLD: 2.28 K/UL — LOW (ref 3.8–10.5)
WBC # BLD: 2.38 K/UL — LOW (ref 3.8–10.5)
WBC # BLD: 2.39 K/UL — LOW (ref 3.8–10.5)
WBC # BLD: 2.47 K/UL — LOW (ref 3.8–10.5)
WBC # BLD: 2.47 K/UL — LOW (ref 3.8–10.5)
WBC # BLD: 2.55 K/UL — LOW (ref 3.8–10.5)
WBC # BLD: 2.62 K/UL — LOW (ref 3.8–10.5)
WBC # BLD: 2.68 K/UL — LOW (ref 3.8–10.5)
WBC # BLD: 2.8 K/UL — LOW (ref 3.8–10.5)
WBC # BLD: 2.83 K/UL — LOW (ref 3.8–10.5)
WBC # BLD: 2.83 K/UL — LOW (ref 3.8–10.5)
WBC # BLD: 2.86 K/UL — LOW (ref 3.8–10.5)
WBC # BLD: 2.9 K/UL — LOW (ref 3.8–10.5)
WBC # BLD: 3.09 K/UL — LOW (ref 3.8–10.5)
WBC # BLD: 3.1 K/UL — LOW (ref 3.8–10.5)
WBC # BLD: 3.18 K/UL — LOW (ref 3.8–10.5)
WBC # BLD: 3.22 K/UL — LOW (ref 3.8–10.5)
WBC # BLD: 3.27 K/UL — LOW (ref 3.8–10.5)
WBC # BLD: 3.31 K/UL — LOW (ref 3.8–10.5)
WBC # BLD: 3.34 K/UL — LOW (ref 3.8–10.5)
WBC # BLD: 3.36 K/UL — LOW (ref 3.8–10.5)
WBC # BLD: 3.36 K/UL — LOW (ref 3.8–10.5)
WBC # BLD: 3.4 K/UL — LOW (ref 3.8–10.5)
WBC # BLD: 3.49 K/UL — LOW (ref 3.8–10.5)
WBC # BLD: 3.67 K/UL — LOW (ref 3.8–10.5)
WBC # BLD: 3.78 K/UL — LOW (ref 3.8–10.5)
WBC # BLD: 3.81 K/UL — SIGNIFICANT CHANGE UP (ref 3.8–10.5)
WBC # BLD: 3.85 K/UL — SIGNIFICANT CHANGE UP (ref 3.8–10.5)
WBC # BLD: 4.34 K/UL — SIGNIFICANT CHANGE UP (ref 3.8–10.5)
WBC # BLD: 4.41 K/UL — SIGNIFICANT CHANGE UP (ref 3.8–10.5)
WBC # BLD: 4.42 K/UL — SIGNIFICANT CHANGE UP (ref 3.8–10.5)
WBC # BLD: 4.62 K/UL — SIGNIFICANT CHANGE UP (ref 3.8–10.5)
WBC # BLD: 5.72 K/UL — SIGNIFICANT CHANGE UP (ref 3.8–10.5)
WBC # BLD: 6.93 K/UL — SIGNIFICANT CHANGE UP (ref 3.8–10.5)
WBC # BLD: 9.56 K/UL — SIGNIFICANT CHANGE UP (ref 3.8–10.5)
WBC # FLD AUTO: 2.17 K/UL
WBC # FLD AUTO: 2.28 K/UL — LOW (ref 3.8–10.5)
WBC # FLD AUTO: 2.38 K/UL — LOW (ref 3.8–10.5)
WBC # FLD AUTO: 2.39 K/UL — LOW (ref 3.8–10.5)
WBC # FLD AUTO: 2.47 K/UL — LOW (ref 3.8–10.5)
WBC # FLD AUTO: 2.47 K/UL — LOW (ref 3.8–10.5)
WBC # FLD AUTO: 2.55 K/UL — LOW (ref 3.8–10.5)
WBC # FLD AUTO: 2.62 K/UL — LOW (ref 3.8–10.5)
WBC # FLD AUTO: 2.68 K/UL — LOW (ref 3.8–10.5)
WBC # FLD AUTO: 2.8 K/UL — LOW (ref 3.8–10.5)
WBC # FLD AUTO: 2.83 K/UL — LOW (ref 3.8–10.5)
WBC # FLD AUTO: 2.83 K/UL — LOW (ref 3.8–10.5)
WBC # FLD AUTO: 2.86 K/UL — LOW (ref 3.8–10.5)
WBC # FLD AUTO: 2.9 K/UL — LOW (ref 3.8–10.5)
WBC # FLD AUTO: 3.07 K/UL
WBC # FLD AUTO: 3.09 K/UL — LOW (ref 3.8–10.5)
WBC # FLD AUTO: 3.1 K/UL — LOW (ref 3.8–10.5)
WBC # FLD AUTO: 3.18 K/UL — LOW (ref 3.8–10.5)
WBC # FLD AUTO: 3.22 K/UL — LOW (ref 3.8–10.5)
WBC # FLD AUTO: 3.27 K/UL — LOW (ref 3.8–10.5)
WBC # FLD AUTO: 3.31 K/UL — LOW (ref 3.8–10.5)
WBC # FLD AUTO: 3.34 K/UL — LOW (ref 3.8–10.5)
WBC # FLD AUTO: 3.36 K/UL — LOW (ref 3.8–10.5)
WBC # FLD AUTO: 3.36 K/UL — LOW (ref 3.8–10.5)
WBC # FLD AUTO: 3.4 K/UL — LOW (ref 3.8–10.5)
WBC # FLD AUTO: 3.49 K/UL — LOW (ref 3.8–10.5)
WBC # FLD AUTO: 3.67 K/UL — LOW (ref 3.8–10.5)
WBC # FLD AUTO: 3.78 K/UL — LOW (ref 3.8–10.5)
WBC # FLD AUTO: 3.81 K/UL — SIGNIFICANT CHANGE UP (ref 3.8–10.5)
WBC # FLD AUTO: 3.85 K/UL — SIGNIFICANT CHANGE UP (ref 3.8–10.5)
WBC # FLD AUTO: 4.34 K/UL — SIGNIFICANT CHANGE UP (ref 3.8–10.5)
WBC # FLD AUTO: 4.41 K/UL — SIGNIFICANT CHANGE UP (ref 3.8–10.5)
WBC # FLD AUTO: 4.42 K/UL — SIGNIFICANT CHANGE UP (ref 3.8–10.5)
WBC # FLD AUTO: 4.62 K/UL — SIGNIFICANT CHANGE UP (ref 3.8–10.5)
WBC # FLD AUTO: 5.72 K/UL — SIGNIFICANT CHANGE UP (ref 3.8–10.5)
WBC # FLD AUTO: 6.93 K/UL — SIGNIFICANT CHANGE UP (ref 3.8–10.5)
WBC # FLD AUTO: 9.56 K/UL — SIGNIFICANT CHANGE UP (ref 3.8–10.5)

## 2020-01-01 PROCEDURE — 93283 PRGRMG EVAL IMPLANTABLE DFB: CPT

## 2020-01-01 PROCEDURE — 93295 DEV INTERROG REMOTE 1/2/MLT: CPT

## 2020-01-01 PROCEDURE — 93000 ELECTROCARDIOGRAM COMPLETE: CPT

## 2020-01-01 PROCEDURE — 88184 FLOWCYTOMETRY/ TC 1 MARKER: CPT

## 2020-01-01 PROCEDURE — 88189 FLOWCYTOMETRY/READ 16 & >: CPT

## 2020-01-01 PROCEDURE — 36415 COLL VENOUS BLD VENIPUNCTURE: CPT

## 2020-01-01 PROCEDURE — 87205 SMEAR GRAM STAIN: CPT

## 2020-01-01 PROCEDURE — 99232 SBSQ HOSP IP/OBS MODERATE 35: CPT

## 2020-01-01 PROCEDURE — 78800 RP LOCLZJ TUM 1 AREA 1 D IMG: CPT | Mod: 26

## 2020-01-01 PROCEDURE — 99232 SBSQ HOSP IP/OBS MODERATE 35: CPT | Mod: GC

## 2020-01-01 PROCEDURE — 82962 GLUCOSE BLOOD TEST: CPT

## 2020-01-01 PROCEDURE — 88280 CHROMOSOME KARYOTYPE STUDY: CPT

## 2020-01-01 PROCEDURE — 71046 X-RAY EXAM CHEST 2 VIEWS: CPT | Mod: 26

## 2020-01-01 PROCEDURE — 99214 OFFICE O/P EST MOD 30 MIN: CPT | Mod: 25

## 2020-01-01 PROCEDURE — 99215 OFFICE O/P EST HI 40 MIN: CPT

## 2020-01-01 PROCEDURE — 80048 BASIC METABOLIC PNL TOTAL CA: CPT

## 2020-01-01 PROCEDURE — 82330 ASSAY OF CALCIUM: CPT

## 2020-01-01 PROCEDURE — 74170 CT ABD WO CNTRST FLWD CNTRST: CPT

## 2020-01-01 PROCEDURE — 99214 OFFICE O/P EST MOD 30 MIN: CPT

## 2020-01-01 PROCEDURE — 96375 TX/PRO/DX INJ NEW DRUG ADDON: CPT

## 2020-01-01 PROCEDURE — 87040 BLOOD CULTURE FOR BACTERIA: CPT

## 2020-01-01 PROCEDURE — 99223 1ST HOSP IP/OBS HIGH 75: CPT

## 2020-01-01 PROCEDURE — 99213 OFFICE O/P EST LOW 20 MIN: CPT

## 2020-01-01 PROCEDURE — 83036 HEMOGLOBIN GLYCOSYLATED A1C: CPT | Mod: GC,QW

## 2020-01-01 PROCEDURE — 73630 X-RAY EXAM OF FOOT: CPT | Mod: 26,LT

## 2020-01-01 PROCEDURE — 87075 CULTR BACTERIA EXCEPT BLOOD: CPT

## 2020-01-01 PROCEDURE — 96365 THER/PROPH/DIAG IV INF INIT: CPT

## 2020-01-01 PROCEDURE — 88185 FLOWCYTOMETRY/TC ADD-ON: CPT

## 2020-01-01 PROCEDURE — 99072 ADDL SUPL MATRL&STAF TM PHE: CPT

## 2020-01-01 PROCEDURE — 88311 DECALCIFY TISSUE: CPT

## 2020-01-01 PROCEDURE — 74170 CT ABD WO CNTRST FLWD CNTRST: CPT | Mod: 26

## 2020-01-01 PROCEDURE — 82435 ASSAY OF BLOOD CHLORIDE: CPT

## 2020-01-01 PROCEDURE — 85014 HEMATOCRIT: CPT

## 2020-01-01 PROCEDURE — 87070 CULTURE OTHR SPECIMN AEROBIC: CPT

## 2020-01-01 PROCEDURE — C1889: CPT

## 2020-01-01 PROCEDURE — 82803 BLOOD GASES ANY COMBINATION: CPT

## 2020-01-01 PROCEDURE — 82565 ASSAY OF CREATININE: CPT

## 2020-01-01 PROCEDURE — 99285 EMERGENCY DEPT VISIT HI MDM: CPT

## 2020-01-01 PROCEDURE — 73660 X-RAY EXAM OF TOE(S): CPT | Mod: 26,LT

## 2020-01-01 PROCEDURE — 93975 VASCULAR STUDY: CPT | Mod: 26

## 2020-01-01 PROCEDURE — 85027 COMPLETE CBC AUTOMATED: CPT

## 2020-01-01 PROCEDURE — U0003: CPT

## 2020-01-01 PROCEDURE — 99285 EMERGENCY DEPT VISIT HI MDM: CPT | Mod: 25

## 2020-01-01 PROCEDURE — 88285 CHROMOSOME COUNT ADDITIONAL: CPT

## 2020-01-01 PROCEDURE — 84443 ASSAY THYROID STIM HORMONE: CPT

## 2020-01-01 PROCEDURE — 88264 CHROMOSOME ANALYSIS 20-25: CPT

## 2020-01-01 PROCEDURE — 99223 1ST HOSP IP/OBS HIGH 75: CPT | Mod: GC

## 2020-01-01 PROCEDURE — 80202 ASSAY OF VANCOMYCIN: CPT

## 2020-01-01 PROCEDURE — 93975 VASCULAR STUDY: CPT

## 2020-01-01 PROCEDURE — 88311 DECALCIFY TISSUE: CPT | Mod: 26

## 2020-01-01 PROCEDURE — 82947 ASSAY GLUCOSE BLOOD QUANT: CPT

## 2020-01-01 PROCEDURE — 97161 PT EVAL LOW COMPLEX 20 MIN: CPT

## 2020-01-01 PROCEDURE — 99214 OFFICE O/P EST MOD 30 MIN: CPT | Mod: 95

## 2020-01-01 PROCEDURE — 99233 SBSQ HOSP IP/OBS HIGH 50: CPT | Mod: GC

## 2020-01-01 PROCEDURE — P9037: CPT

## 2020-01-01 PROCEDURE — 84132 ASSAY OF SERUM POTASSIUM: CPT

## 2020-01-01 PROCEDURE — 85610 PROTHROMBIN TIME: CPT

## 2020-01-01 PROCEDURE — 86900 BLOOD TYPING SEROLOGIC ABO: CPT

## 2020-01-01 PROCEDURE — 88304 TISSUE EXAM BY PATHOLOGIST: CPT

## 2020-01-01 PROCEDURE — 36430 TRANSFUSION BLD/BLD COMPNT: CPT

## 2020-01-01 PROCEDURE — 93010 ELECTROCARDIOGRAM REPORT: CPT

## 2020-01-01 PROCEDURE — 73660 X-RAY EXAM OF TOE(S): CPT

## 2020-01-01 PROCEDURE — 80053 COMPREHEN METABOLIC PANEL: CPT

## 2020-01-01 PROCEDURE — 93296 REM INTERROG EVL PM/IDS: CPT

## 2020-01-01 PROCEDURE — 85730 THROMBOPLASTIN TIME PARTIAL: CPT

## 2020-01-01 PROCEDURE — 88302 TISSUE EXAM BY PATHOLOGIST: CPT | Mod: 26

## 2020-01-01 PROCEDURE — 86140 C-REACTIVE PROTEIN: CPT

## 2020-01-01 PROCEDURE — 84146 ASSAY OF PROLACTIN: CPT

## 2020-01-01 PROCEDURE — 87186 SC STD MICRODIL/AGAR DIL: CPT

## 2020-01-01 PROCEDURE — 82962 GLUCOSE BLOOD TEST: CPT | Mod: GC

## 2020-01-01 PROCEDURE — 85652 RBC SED RATE AUTOMATED: CPT

## 2020-01-01 PROCEDURE — 99233 SBSQ HOSP IP/OBS HIGH 50: CPT

## 2020-01-01 PROCEDURE — 88304 TISSUE EXAM BY PATHOLOGIST: CPT | Mod: 26

## 2020-01-01 PROCEDURE — 78800 RP LOCLZJ TUM 1 AREA 1 D IMG: CPT

## 2020-01-01 PROCEDURE — 99215 OFFICE O/P EST HI 40 MIN: CPT | Mod: 25,GC

## 2020-01-01 PROCEDURE — 99072 ADDL SUPL MATRL&STAF TM PHE: CPT | Mod: GC

## 2020-01-01 PROCEDURE — 71046 X-RAY EXAM CHEST 2 VIEWS: CPT

## 2020-01-01 PROCEDURE — 73630 X-RAY EXAM OF FOOT: CPT

## 2020-01-01 PROCEDURE — 88237 TISSUE CULTURE BONE MARROW: CPT

## 2020-01-01 PROCEDURE — 83036 HEMOGLOBIN GLYCOSYLATED A1C: CPT

## 2020-01-01 PROCEDURE — 99213 OFFICE O/P EST LOW 20 MIN: CPT | Mod: 95

## 2020-01-01 PROCEDURE — 86769 SARS-COV-2 COVID-19 ANTIBODY: CPT

## 2020-01-01 PROCEDURE — 93290 INTERROG DEV EVAL ICPMS IP: CPT | Mod: 26

## 2020-01-01 PROCEDURE — 99442: CPT

## 2020-01-01 PROCEDURE — 83605 ASSAY OF LACTIC ACID: CPT

## 2020-01-01 PROCEDURE — 88302 TISSUE EXAM BY PATHOLOGIST: CPT

## 2020-01-01 PROCEDURE — 86901 BLOOD TYPING SEROLOGIC RH(D): CPT

## 2020-01-01 PROCEDURE — 84295 ASSAY OF SERUM SODIUM: CPT

## 2020-01-01 PROCEDURE — 85018 HEMOGLOBIN: CPT

## 2020-01-01 PROCEDURE — 38222 DX BONE MARROW BX & ASPIR: CPT | Mod: LT

## 2020-01-01 PROCEDURE — A9569: CPT

## 2020-01-01 PROCEDURE — 86850 RBC ANTIBODY SCREEN: CPT

## 2020-01-01 PROCEDURE — 83036 HEMOGLOBIN GLYCOSYLATED A1C: CPT | Mod: QW

## 2020-01-01 RX ORDER — PEN NEEDLE, DIABETIC 29 G X1/2"
32G X 4 MM NEEDLE, DISPOSABLE MISCELLANEOUS
Qty: 4 | Refills: 1 | Status: ACTIVE | COMMUNITY
Start: 2019-01-02

## 2020-01-01 RX ORDER — CARVEDILOL 12.5 MG/1
12.5 TABLET, FILM COATED ORAL TWICE DAILY
Qty: 60 | Refills: 0 | Status: ACTIVE | COMMUNITY
Start: 2020-01-01

## 2020-01-01 RX ORDER — INSULIN LISPRO 100/ML
9 VIAL (ML) SUBCUTANEOUS
Refills: 0 | Status: DISCONTINUED | OUTPATIENT
Start: 2020-01-01 | End: 2020-01-01

## 2020-01-01 RX ORDER — LEVOTHYROXINE SODIUM 125 MCG
1 TABLET ORAL
Qty: 0 | Refills: 0 | DISCHARGE
Start: 2020-01-01

## 2020-01-01 RX ORDER — DEXTROSE 50 % IN WATER 50 %
25 SYRINGE (ML) INTRAVENOUS ONCE
Refills: 0 | Status: DISCONTINUED | OUTPATIENT
Start: 2020-01-01 | End: 2020-01-01

## 2020-01-01 RX ORDER — PREDNISONE 20 MG/1
20 TABLET ORAL
Qty: 40 | Refills: 0 | Status: DISCONTINUED | COMMUNITY
Start: 2020-01-01 | End: 2020-01-01

## 2020-01-01 RX ORDER — DEXTROSE 50 % IN WATER 50 %
15 SYRINGE (ML) INTRAVENOUS ONCE
Refills: 0 | Status: DISCONTINUED | OUTPATIENT
Start: 2020-01-01 | End: 2020-01-01

## 2020-01-01 RX ORDER — VANCOMYCIN HCL 1 G
500 VIAL (EA) INTRAVENOUS EVERY 12 HOURS
Refills: 0 | Status: DISCONTINUED | OUTPATIENT
Start: 2020-01-01 | End: 2020-01-01

## 2020-01-01 RX ORDER — AMPICILLIN SODIUM AND SULBACTAM SODIUM 250; 125 MG/ML; MG/ML
3 INJECTION, POWDER, FOR SUSPENSION INTRAMUSCULAR; INTRAVENOUS EVERY 8 HOURS
Refills: 0 | Status: DISCONTINUED | OUTPATIENT
Start: 2020-01-01 | End: 2020-01-01

## 2020-01-01 RX ORDER — INSULIN LISPRO 100/ML
VIAL (ML) SUBCUTANEOUS
Refills: 0 | Status: DISCONTINUED | OUTPATIENT
Start: 2020-01-01 | End: 2020-01-01

## 2020-01-01 RX ORDER — PANTOPRAZOLE 40 MG/1
40 TABLET, DELAYED RELEASE ORAL
Qty: 35 | Refills: 0 | Status: DISCONTINUED | COMMUNITY
Start: 2020-03-30 | End: 2020-01-01

## 2020-01-01 RX ORDER — INSULIN LISPRO 100/ML
VIAL (ML) SUBCUTANEOUS EVERY 6 HOURS
Refills: 0 | Status: DISCONTINUED | OUTPATIENT
Start: 2020-01-01 | End: 2020-01-01

## 2020-01-01 RX ORDER — ACETAMINOPHEN 500 MG
650 TABLET ORAL ONCE
Refills: 0 | Status: COMPLETED | OUTPATIENT
Start: 2020-01-01 | End: 2020-01-01

## 2020-01-01 RX ORDER — VANCOMYCIN HCL 1 G
1000 VIAL (EA) INTRAVENOUS ONCE
Refills: 0 | Status: COMPLETED | OUTPATIENT
Start: 2020-01-01 | End: 2020-01-01

## 2020-01-01 RX ORDER — SPIRONOLACTONE 25 MG/1
25 TABLET, FILM COATED ORAL DAILY
Refills: 0 | Status: DISCONTINUED | OUTPATIENT
Start: 2020-01-01 | End: 2020-01-01

## 2020-01-01 RX ORDER — INSULIN LISPRO 100/ML
18 VIAL (ML) SUBCUTANEOUS
Refills: 0 | Status: DISCONTINUED | OUTPATIENT
Start: 2020-01-01 | End: 2020-01-01

## 2020-01-01 RX ORDER — IMMUNE GLOBULIN (HUMAN) 10 G/100ML
75 INJECTION INTRAVENOUS; SUBCUTANEOUS DAILY
Refills: 0 | Status: DISCONTINUED | OUTPATIENT
Start: 2020-01-01 | End: 2020-01-01

## 2020-01-01 RX ORDER — FUROSEMIDE 40 MG
40 TABLET ORAL DAILY
Refills: 0 | Status: DISCONTINUED | OUTPATIENT
Start: 2020-01-01 | End: 2020-01-01

## 2020-01-01 RX ORDER — INSULIN LISPRO 100/ML
VIAL (ML) SUBCUTANEOUS AT BEDTIME
Refills: 0 | Status: DISCONTINUED | OUTPATIENT
Start: 2020-01-01 | End: 2020-01-01

## 2020-01-01 RX ORDER — INSULIN LISPRO 100/ML
0 VIAL (ML) SUBCUTANEOUS
Qty: 0 | Refills: 0 | DISCHARGE

## 2020-01-01 RX ORDER — SODIUM CHLORIDE 9 MG/ML
1000 INJECTION, SOLUTION INTRAVENOUS
Refills: 0 | Status: DISCONTINUED | OUTPATIENT
Start: 2020-01-01 | End: 2020-01-01

## 2020-01-01 RX ORDER — DEXTROSE 50 % IN WATER 50 %
12.5 SYRINGE (ML) INTRAVENOUS ONCE
Refills: 0 | Status: DISCONTINUED | OUTPATIENT
Start: 2020-01-01 | End: 2020-01-01

## 2020-01-01 RX ORDER — ATORVASTATIN CALCIUM 80 MG/1
40 TABLET, FILM COATED ORAL AT BEDTIME
Refills: 0 | Status: DISCONTINUED | OUTPATIENT
Start: 2020-01-01 | End: 2020-01-01

## 2020-01-01 RX ORDER — INSULIN LISPRO 100/ML
15 VIAL (ML) SUBCUTANEOUS
Refills: 0 | Status: DISCONTINUED | OUTPATIENT
Start: 2020-01-01 | End: 2020-01-01

## 2020-01-01 RX ORDER — HYDROMORPHONE HYDROCHLORIDE 2 MG/ML
0.5 INJECTION INTRAMUSCULAR; INTRAVENOUS; SUBCUTANEOUS EVERY 4 HOURS
Refills: 0 | Status: DISCONTINUED | OUTPATIENT
Start: 2020-01-01 | End: 2020-01-01

## 2020-01-01 RX ORDER — CEFEPIME 1 G/1
1000 INJECTION, POWDER, FOR SOLUTION INTRAMUSCULAR; INTRAVENOUS EVERY 24 HOURS
Refills: 0 | Status: DISCONTINUED | OUTPATIENT
Start: 2020-01-01 | End: 2020-01-01

## 2020-01-01 RX ORDER — ATOVAQUONE 750 MG/5ML
750 SUSPENSION ORAL TWICE DAILY
Qty: 1 | Refills: 1 | Status: DISCONTINUED | COMMUNITY
Start: 2020-01-01 | End: 2020-01-01

## 2020-01-01 RX ORDER — SPIRONOLACTONE 25 MG/1
1 TABLET, FILM COATED ORAL
Qty: 0 | Refills: 0 | DISCHARGE
Start: 2020-01-01

## 2020-01-01 RX ORDER — CARVEDILOL 25 MG/1
25 TABLET, FILM COATED ORAL TWICE DAILY
Qty: 60 | Refills: 0 | Status: DISCONTINUED | COMMUNITY
Start: 2020-01-01 | End: 2020-01-01

## 2020-01-01 RX ORDER — SODIUM BICARBONATE 1 MEQ/ML
650 SYRINGE (ML) INTRAVENOUS
Refills: 0 | Status: DISCONTINUED | OUTPATIENT
Start: 2020-01-01 | End: 2020-01-01

## 2020-01-01 RX ORDER — ACETAMINOPHEN 500 MG
650 TABLET ORAL EVERY 6 HOURS
Refills: 0 | Status: DISCONTINUED | OUTPATIENT
Start: 2020-01-01 | End: 2020-01-01

## 2020-01-01 RX ORDER — CARVEDILOL PHOSPHATE 80 MG/1
12.5 CAPSULE, EXTENDED RELEASE ORAL EVERY 12 HOURS
Refills: 0 | Status: DISCONTINUED | OUTPATIENT
Start: 2020-01-01 | End: 2020-01-01

## 2020-01-01 RX ORDER — SODIUM CHLORIDE 9 MG/ML
1000 INJECTION INTRAMUSCULAR; INTRAVENOUS; SUBCUTANEOUS
Refills: 0 | Status: DISCONTINUED | OUTPATIENT
Start: 2020-01-01 | End: 2020-01-01

## 2020-01-01 RX ORDER — METFORMIN HYDROCHLORIDE 850 MG/1
1 TABLET ORAL
Qty: 0 | Refills: 0 | DISCHARGE

## 2020-01-01 RX ORDER — GLUCAGON INJECTION, SOLUTION 0.5 MG/.1ML
1 INJECTION, SOLUTION SUBCUTANEOUS ONCE
Refills: 0 | Status: DISCONTINUED | OUTPATIENT
Start: 2020-01-01 | End: 2020-01-01

## 2020-01-01 RX ORDER — ROMIPLOSTIM 250 UG/.5ML
75 INJECTION, POWDER, LYOPHILIZED, FOR SOLUTION SUBCUTANEOUS ONCE
Refills: 0 | Status: COMPLETED | OUTPATIENT
Start: 2020-01-01 | End: 2020-01-01

## 2020-01-01 RX ORDER — INSULIN LISPRO 100/ML
3 VIAL (ML) SUBCUTANEOUS
Refills: 0 | Status: DISCONTINUED | OUTPATIENT
Start: 2020-01-01 | End: 2020-01-01

## 2020-01-01 RX ORDER — SITAGLIPTIN 50 MG/1
1 TABLET, FILM COATED ORAL
Qty: 0 | Refills: 0 | DISCHARGE

## 2020-01-01 RX ORDER — INSULIN GLARGINE 100 [IU]/ML
20 INJECTION, SOLUTION SUBCUTANEOUS AT BEDTIME
Refills: 0 | Status: DISCONTINUED | OUTPATIENT
Start: 2020-01-01 | End: 2020-01-01

## 2020-01-01 RX ORDER — INSULIN GLARGINE 100 [IU]/ML
38 INJECTION, SOLUTION SUBCUTANEOUS AT BEDTIME
Refills: 0 | Status: DISCONTINUED | OUTPATIENT
Start: 2020-01-01 | End: 2020-01-01

## 2020-01-01 RX ORDER — LISINOPRIL 2.5 MG/1
10 TABLET ORAL DAILY
Refills: 0 | Status: DISCONTINUED | OUTPATIENT
Start: 2020-01-01 | End: 2020-01-01

## 2020-01-01 RX ORDER — FAMOTIDINE 10 MG/ML
20 INJECTION INTRAVENOUS DAILY
Refills: 0 | Status: DISCONTINUED | OUTPATIENT
Start: 2020-01-01 | End: 2020-01-01

## 2020-01-01 RX ORDER — INSULIN LISPRO 100/ML
10 VIAL (ML) SUBCUTANEOUS
Refills: 0 | Status: DISCONTINUED | OUTPATIENT
Start: 2020-01-01 | End: 2020-01-01

## 2020-01-01 RX ORDER — SPIRONOLACTONE 25 MG/1
1 TABLET, FILM COATED ORAL
Qty: 0 | Refills: 0 | DISCHARGE

## 2020-01-01 RX ORDER — INSULIN LISPRO 100/ML
28 VIAL (ML) SUBCUTANEOUS
Refills: 0 | Status: DISCONTINUED | OUTPATIENT
Start: 2020-01-01 | End: 2020-01-01

## 2020-01-01 RX ORDER — METFORMIN HYDROCHLORIDE 1000 MG/1
1000 TABLET, COATED ORAL
Qty: 180 | Refills: 2 | Status: DISCONTINUED | COMMUNITY
Start: 2019-03-26 | End: 2020-01-01

## 2020-01-01 RX ORDER — LEVOTHYROXINE SODIUM 125 MCG
125 TABLET ORAL DAILY
Refills: 0 | Status: DISCONTINUED | OUTPATIENT
Start: 2020-01-01 | End: 2020-01-01

## 2020-01-01 RX ORDER — MUPIROCIN 20 MG/G
1 OINTMENT TOPICAL
Refills: 0 | Status: DISCONTINUED | OUTPATIENT
Start: 2020-01-01 | End: 2020-01-01

## 2020-01-01 RX ORDER — ASPIRIN/CALCIUM CARB/MAGNESIUM 324 MG
1 TABLET ORAL
Qty: 0 | Refills: 0 | DISCHARGE

## 2020-01-01 RX ORDER — ACETAMINOPHEN 500 MG
1000 TABLET ORAL ONCE
Refills: 0 | Status: COMPLETED | OUTPATIENT
Start: 2020-01-01 | End: 2020-01-01

## 2020-01-01 RX ORDER — ACETAMINOPHEN 500 MG
1000 TABLET ORAL ONCE
Refills: 0 | Status: DISCONTINUED | OUTPATIENT
Start: 2020-01-01 | End: 2020-01-01

## 2020-01-01 RX ORDER — SODIUM CHLORIDE 9 MG/ML
500 INJECTION INTRAMUSCULAR; INTRAVENOUS; SUBCUTANEOUS ONCE
Refills: 0 | Status: COMPLETED | OUTPATIENT
Start: 2020-01-01 | End: 2020-01-01

## 2020-01-01 RX ORDER — NIACIN 50 MG
500 TABLET ORAL AT BEDTIME
Refills: 0 | Status: DISCONTINUED | OUTPATIENT
Start: 2020-01-01 | End: 2020-01-01

## 2020-01-01 RX ORDER — CEFEPIME 1 G/1
1000 INJECTION, POWDER, FOR SOLUTION INTRAMUSCULAR; INTRAVENOUS ONCE
Refills: 0 | Status: COMPLETED | OUTPATIENT
Start: 2020-01-01 | End: 2020-01-01

## 2020-01-01 RX ORDER — DEXTROSE 50 % IN WATER 50 %
12.5 SYRINGE (ML) INTRAVENOUS ONCE
Refills: 0 | Status: COMPLETED | OUTPATIENT
Start: 2020-01-01 | End: 2020-01-01

## 2020-01-01 RX ORDER — INSULIN GLARGINE 100 [IU]/ML
6 INJECTION, SOLUTION SUBCUTANEOUS ONCE
Refills: 0 | Status: COMPLETED | OUTPATIENT
Start: 2020-01-01 | End: 2020-01-01

## 2020-01-01 RX ORDER — INSULIN LISPRO 100/ML
11 VIAL (ML) SUBCUTANEOUS
Refills: 0 | Status: DISCONTINUED | OUTPATIENT
Start: 2020-01-01 | End: 2020-01-01

## 2020-01-01 RX ORDER — INSULIN LISPRO 100/ML
25 VIAL (ML) SUBCUTANEOUS
Refills: 0 | Status: DISCONTINUED | OUTPATIENT
Start: 2020-01-01 | End: 2020-01-01

## 2020-01-01 RX ORDER — INSULIN GLARGINE 100 [IU]/ML
40 INJECTION, SOLUTION SUBCUTANEOUS AT BEDTIME
Refills: 0 | Status: DISCONTINUED | OUTPATIENT
Start: 2020-01-01 | End: 2020-01-01

## 2020-01-01 RX ORDER — INSULIN GLARGINE 100 [IU]/ML
28 INJECTION, SOLUTION SUBCUTANEOUS AT BEDTIME
Refills: 0 | Status: DISCONTINUED | OUTPATIENT
Start: 2020-01-01 | End: 2020-01-01

## 2020-01-01 RX ORDER — INSULIN GLARGINE 100 [IU]/ML
18 INJECTION, SOLUTION SUBCUTANEOUS AT BEDTIME
Refills: 0 | Status: DISCONTINUED | OUTPATIENT
Start: 2020-01-01 | End: 2020-01-01

## 2020-01-01 RX ORDER — POTASSIUM CHLORIDE 20 MEQ
1 PACKET (EA) ORAL
Qty: 0 | Refills: 0 | DISCHARGE

## 2020-01-01 RX ORDER — ROMIPLOSTIM 250 UG/.5ML
170 INJECTION, POWDER, LYOPHILIZED, FOR SOLUTION SUBCUTANEOUS ONCE
Refills: 0 | Status: COMPLETED | OUTPATIENT
Start: 2020-01-01 | End: 2020-01-01

## 2020-01-01 RX ORDER — INSULIN LISPRO 100/ML
24 VIAL (ML) SUBCUTANEOUS
Refills: 0 | Status: DISCONTINUED | OUTPATIENT
Start: 2020-01-01 | End: 2020-01-01

## 2020-01-01 RX ORDER — ASPIRIN 81 MG/1
81 TABLET ORAL DAILY
Qty: 100 | Refills: 0 | Status: DISCONTINUED | COMMUNITY
Start: 2017-08-15 | End: 2020-01-01

## 2020-01-01 RX ORDER — INSULIN GLARGINE 100 [IU]/ML
24 INJECTION, SOLUTION SUBCUTANEOUS AT BEDTIME
Refills: 0 | Status: DISCONTINUED | OUTPATIENT
Start: 2020-01-01 | End: 2020-01-01

## 2020-01-01 RX ORDER — FENTANYL CITRATE 50 UG/ML
25 INJECTION INTRAVENOUS
Refills: 0 | Status: DISCONTINUED | OUTPATIENT
Start: 2020-01-01 | End: 2020-01-01

## 2020-01-01 RX ORDER — INSULIN LISPRO 100/ML
40 VIAL (ML) SUBCUTANEOUS
Refills: 0 | Status: DISCONTINUED | OUTPATIENT
Start: 2020-01-01 | End: 2020-01-01

## 2020-01-01 RX ORDER — OXYCODONE AND ACETAMINOPHEN 5; 325 MG/1; MG/1
1 TABLET ORAL EVERY 4 HOURS
Refills: 0 | Status: DISCONTINUED | OUTPATIENT
Start: 2020-01-01 | End: 2020-01-01

## 2020-01-01 RX ADMIN — SODIUM CHLORIDE 75 MILLILITER(S): 9 INJECTION INTRAMUSCULAR; INTRAVENOUS; SUBCUTANEOUS at 13:48

## 2020-01-01 RX ADMIN — AMPICILLIN SODIUM AND SULBACTAM SODIUM 200 GRAM(S): 250; 125 INJECTION, POWDER, FOR SUSPENSION INTRAMUSCULAR; INTRAVENOUS at 05:39

## 2020-01-01 RX ADMIN — MUPIROCIN 1 APPLICATION(S): 20 OINTMENT TOPICAL at 18:04

## 2020-01-01 RX ADMIN — CARVEDILOL PHOSPHATE 12.5 MILLIGRAM(S): 80 CAPSULE, EXTENDED RELEASE ORAL at 11:18

## 2020-01-01 RX ADMIN — Medication 125 MICROGRAM(S): at 05:25

## 2020-01-01 RX ADMIN — Medication 650 MILLIGRAM(S): at 22:23

## 2020-01-01 RX ADMIN — ATORVASTATIN CALCIUM 40 MILLIGRAM(S): 80 TABLET, FILM COATED ORAL at 21:44

## 2020-01-01 RX ADMIN — Medication 125 MICROGRAM(S): at 05:37

## 2020-01-01 RX ADMIN — Medication 6: at 22:03

## 2020-01-01 RX ADMIN — CARVEDILOL PHOSPHATE 12.5 MILLIGRAM(S): 80 CAPSULE, EXTENDED RELEASE ORAL at 21:59

## 2020-01-01 RX ADMIN — Medication 650 MILLIGRAM(S): at 17:22

## 2020-01-01 RX ADMIN — Medication 40 MILLIGRAM(S): at 11:18

## 2020-01-01 RX ADMIN — INSULIN GLARGINE 18 UNIT(S): 100 INJECTION, SOLUTION SUBCUTANEOUS at 22:54

## 2020-01-01 RX ADMIN — Medication 40 MILLIGRAM(S): at 05:25

## 2020-01-01 RX ADMIN — Medication 40 MILLIGRAM(S): at 05:38

## 2020-01-01 RX ADMIN — Medication 12.5 GRAM(S): at 06:21

## 2020-01-01 RX ADMIN — Medication 650 MILLIGRAM(S): at 06:49

## 2020-01-01 RX ADMIN — AMPICILLIN SODIUM AND SULBACTAM SODIUM 200 GRAM(S): 250; 125 INJECTION, POWDER, FOR SUSPENSION INTRAMUSCULAR; INTRAVENOUS at 21:44

## 2020-01-01 RX ADMIN — FAMOTIDINE 20 MILLIGRAM(S): 10 INJECTION INTRAVENOUS at 12:37

## 2020-01-01 RX ADMIN — AMPICILLIN SODIUM AND SULBACTAM SODIUM 200 GRAM(S): 250; 125 INJECTION, POWDER, FOR SUSPENSION INTRAMUSCULAR; INTRAVENOUS at 06:12

## 2020-01-01 RX ADMIN — MUPIROCIN 1 APPLICATION(S): 20 OINTMENT TOPICAL at 05:16

## 2020-01-01 RX ADMIN — Medication 500 MILLIGRAM(S): at 21:53

## 2020-01-01 RX ADMIN — Medication 8: at 13:06

## 2020-01-01 RX ADMIN — Medication 2: at 21:52

## 2020-01-01 RX ADMIN — Medication 28 UNIT(S): at 08:46

## 2020-01-01 RX ADMIN — ATORVASTATIN CALCIUM 40 MILLIGRAM(S): 80 TABLET, FILM COATED ORAL at 21:42

## 2020-01-01 RX ADMIN — INSULIN GLARGINE 40 UNIT(S): 100 INJECTION, SOLUTION SUBCUTANEOUS at 21:45

## 2020-01-01 RX ADMIN — Medication 40 MILLIGRAM(S): at 08:26

## 2020-01-01 RX ADMIN — CARVEDILOL PHOSPHATE 12.5 MILLIGRAM(S): 80 CAPSULE, EXTENDED RELEASE ORAL at 10:43

## 2020-01-01 RX ADMIN — Medication 6: at 17:59

## 2020-01-01 RX ADMIN — Medication 650 MILLIGRAM(S): at 05:39

## 2020-01-01 RX ADMIN — Medication 650 MILLIGRAM(S): at 18:28

## 2020-01-01 RX ADMIN — INSULIN GLARGINE 18 UNIT(S): 100 INJECTION, SOLUTION SUBCUTANEOUS at 21:51

## 2020-01-01 RX ADMIN — INSULIN GLARGINE 38 UNIT(S): 100 INJECTION, SOLUTION SUBCUTANEOUS at 21:42

## 2020-01-01 RX ADMIN — SPIRONOLACTONE 25 MILLIGRAM(S): 25 TABLET, FILM COATED ORAL at 05:15

## 2020-01-01 RX ADMIN — Medication 40 MILLIGRAM(S): at 05:14

## 2020-01-01 RX ADMIN — Medication 25 UNIT(S): at 17:59

## 2020-01-01 RX ADMIN — Medication 28 UNIT(S): at 13:00

## 2020-01-01 RX ADMIN — Medication 40 MILLIGRAM(S): at 05:04

## 2020-01-01 RX ADMIN — Medication 100 MILLIGRAM(S): at 11:18

## 2020-01-01 RX ADMIN — Medication 9 UNIT(S): at 17:50

## 2020-01-01 RX ADMIN — Medication 650 MILLIGRAM(S): at 05:37

## 2020-01-01 RX ADMIN — LISINOPRIL 10 MILLIGRAM(S): 2.5 TABLET ORAL at 05:04

## 2020-01-01 RX ADMIN — Medication 8: at 13:00

## 2020-01-01 RX ADMIN — MUPIROCIN 1 APPLICATION(S): 20 OINTMENT TOPICAL at 14:46

## 2020-01-01 RX ADMIN — SPIRONOLACTONE 25 MILLIGRAM(S): 25 TABLET, FILM COATED ORAL at 10:43

## 2020-01-01 RX ADMIN — Medication 2: at 21:45

## 2020-01-01 RX ADMIN — Medication 2: at 00:33

## 2020-01-01 RX ADMIN — Medication 18 UNIT(S): at 13:07

## 2020-01-01 RX ADMIN — Medication 650 MILLIGRAM(S): at 05:14

## 2020-01-01 RX ADMIN — Medication 6: at 06:48

## 2020-01-01 RX ADMIN — AMPICILLIN SODIUM AND SULBACTAM SODIUM 200 GRAM(S): 250; 125 INJECTION, POWDER, FOR SUSPENSION INTRAMUSCULAR; INTRAVENOUS at 13:20

## 2020-01-01 RX ADMIN — Medication 6: at 09:42

## 2020-01-01 RX ADMIN — CEFEPIME 1000 MILLIGRAM(S): 1 INJECTION, POWDER, FOR SOLUTION INTRAMUSCULAR; INTRAVENOUS at 11:52

## 2020-01-01 RX ADMIN — CARVEDILOL PHOSPHATE 12.5 MILLIGRAM(S): 80 CAPSULE, EXTENDED RELEASE ORAL at 22:10

## 2020-01-01 RX ADMIN — FAMOTIDINE 20 MILLIGRAM(S): 10 INJECTION INTRAVENOUS at 11:17

## 2020-01-01 RX ADMIN — Medication 650 MILLIGRAM(S): at 06:14

## 2020-01-01 RX ADMIN — Medication 500 MILLIGRAM(S): at 21:42

## 2020-01-01 RX ADMIN — Medication 18 UNIT(S): at 09:41

## 2020-01-01 RX ADMIN — AMPICILLIN SODIUM AND SULBACTAM SODIUM 200 GRAM(S): 250; 125 INJECTION, POWDER, FOR SUSPENSION INTRAMUSCULAR; INTRAVENOUS at 05:25

## 2020-01-01 RX ADMIN — INSULIN GLARGINE 28 UNIT(S): 100 INJECTION, SOLUTION SUBCUTANEOUS at 21:40

## 2020-01-01 RX ADMIN — CARVEDILOL PHOSPHATE 12.5 MILLIGRAM(S): 80 CAPSULE, EXTENDED RELEASE ORAL at 17:51

## 2020-01-01 RX ADMIN — Medication 25 UNIT(S): at 13:20

## 2020-01-01 RX ADMIN — ATORVASTATIN CALCIUM 40 MILLIGRAM(S): 80 TABLET, FILM COATED ORAL at 22:54

## 2020-01-01 RX ADMIN — LISINOPRIL 10 MILLIGRAM(S): 2.5 TABLET ORAL at 05:39

## 2020-01-01 RX ADMIN — LISINOPRIL 10 MILLIGRAM(S): 2.5 TABLET ORAL at 05:25

## 2020-01-01 RX ADMIN — FAMOTIDINE 20 MILLIGRAM(S): 10 INJECTION INTRAVENOUS at 11:32

## 2020-01-01 RX ADMIN — MUPIROCIN 1 APPLICATION(S): 20 OINTMENT TOPICAL at 05:42

## 2020-01-01 RX ADMIN — CARVEDILOL PHOSPHATE 12.5 MILLIGRAM(S): 80 CAPSULE, EXTENDED RELEASE ORAL at 06:15

## 2020-01-01 RX ADMIN — SODIUM CHLORIDE 75 MILLILITER(S): 9 INJECTION INTRAMUSCULAR; INTRAVENOUS; SUBCUTANEOUS at 00:01

## 2020-01-01 RX ADMIN — FAMOTIDINE 20 MILLIGRAM(S): 10 INJECTION INTRAVENOUS at 12:08

## 2020-01-01 RX ADMIN — MUPIROCIN 1 APPLICATION(S): 20 OINTMENT TOPICAL at 17:41

## 2020-01-01 RX ADMIN — Medication 125 MICROGRAM(S): at 06:14

## 2020-01-01 RX ADMIN — Medication 24 UNIT(S): at 18:25

## 2020-01-01 RX ADMIN — Medication 24 UNIT(S): at 08:55

## 2020-01-01 RX ADMIN — AMPICILLIN SODIUM AND SULBACTAM SODIUM 200 GRAM(S): 250; 125 INJECTION, POWDER, FOR SUSPENSION INTRAMUSCULAR; INTRAVENOUS at 13:57

## 2020-01-01 RX ADMIN — Medication 18 UNIT(S): at 18:29

## 2020-01-01 RX ADMIN — Medication 100 MILLIGRAM(S): at 00:08

## 2020-01-01 RX ADMIN — INSULIN GLARGINE 20 UNIT(S): 100 INJECTION, SOLUTION SUBCUTANEOUS at 21:59

## 2020-01-01 RX ADMIN — Medication 650 MILLIGRAM(S): at 05:03

## 2020-01-01 RX ADMIN — Medication 650 MILLIGRAM(S): at 17:40

## 2020-01-01 RX ADMIN — MUPIROCIN 1 APPLICATION(S): 20 OINTMENT TOPICAL at 06:14

## 2020-01-01 RX ADMIN — Medication 125 MICROGRAM(S): at 06:12

## 2020-01-01 RX ADMIN — IMMUNE GLOBULIN (HUMAN) 125 GRAM(S): 10 INJECTION INTRAVENOUS; SUBCUTANEOUS at 01:03

## 2020-01-01 RX ADMIN — Medication 4: at 18:00

## 2020-01-01 RX ADMIN — Medication 40 UNIT(S): at 18:00

## 2020-01-01 RX ADMIN — Medication 40 MILLIGRAM(S): at 05:15

## 2020-01-01 RX ADMIN — AMPICILLIN SODIUM AND SULBACTAM SODIUM 200 GRAM(S): 250; 125 INJECTION, POWDER, FOR SUSPENSION INTRAMUSCULAR; INTRAVENOUS at 22:18

## 2020-01-01 RX ADMIN — INSULIN GLARGINE 6 UNIT(S): 100 INJECTION, SOLUTION SUBCUTANEOUS at 00:48

## 2020-01-01 RX ADMIN — SPIRONOLACTONE 25 MILLIGRAM(S): 25 TABLET, FILM COATED ORAL at 05:39

## 2020-01-01 RX ADMIN — INSULIN GLARGINE 38 UNIT(S): 100 INJECTION, SOLUTION SUBCUTANEOUS at 22:03

## 2020-01-01 RX ADMIN — CEFEPIME 100 MILLIGRAM(S): 1 INJECTION, POWDER, FOR SOLUTION INTRAMUSCULAR; INTRAVENOUS at 11:32

## 2020-01-01 RX ADMIN — Medication 100 MILLIGRAM(S): at 23:46

## 2020-01-01 RX ADMIN — Medication 3 UNIT(S): at 18:28

## 2020-01-01 RX ADMIN — Medication 650 MILLIGRAM(S): at 18:29

## 2020-01-01 RX ADMIN — Medication 650 MILLIGRAM(S): at 06:12

## 2020-01-01 RX ADMIN — Medication 650 MILLIGRAM(S): at 17:50

## 2020-01-01 RX ADMIN — Medication 10: at 12:08

## 2020-01-01 RX ADMIN — Medication 4: at 12:38

## 2020-01-01 RX ADMIN — Medication 3 UNIT(S): at 10:38

## 2020-01-01 RX ADMIN — MUPIROCIN 1 APPLICATION(S): 20 OINTMENT TOPICAL at 17:50

## 2020-01-01 RX ADMIN — CARVEDILOL PHOSPHATE 12.5 MILLIGRAM(S): 80 CAPSULE, EXTENDED RELEASE ORAL at 08:57

## 2020-01-01 RX ADMIN — Medication 40 MILLIGRAM(S): at 06:14

## 2020-01-01 RX ADMIN — Medication 650 MILLIGRAM(S): at 17:59

## 2020-01-01 RX ADMIN — Medication 125 MICROGRAM(S): at 05:15

## 2020-01-01 RX ADMIN — FAMOTIDINE 20 MILLIGRAM(S): 10 INJECTION INTRAVENOUS at 21:58

## 2020-01-01 RX ADMIN — Medication 500 MILLIGRAM(S): at 22:54

## 2020-01-01 RX ADMIN — MUPIROCIN 1 APPLICATION(S): 20 OINTMENT TOPICAL at 05:04

## 2020-01-01 RX ADMIN — Medication 40 MILLIGRAM(S): at 06:49

## 2020-01-01 RX ADMIN — ATORVASTATIN CALCIUM 40 MILLIGRAM(S): 80 TABLET, FILM COATED ORAL at 21:53

## 2020-01-01 RX ADMIN — ATORVASTATIN CALCIUM 40 MILLIGRAM(S): 80 TABLET, FILM COATED ORAL at 22:10

## 2020-01-01 RX ADMIN — Medication 125 MICROGRAM(S): at 05:39

## 2020-01-01 RX ADMIN — Medication 4: at 21:40

## 2020-01-01 RX ADMIN — Medication 250 MILLIGRAM(S): at 11:52

## 2020-01-01 RX ADMIN — AMPICILLIN SODIUM AND SULBACTAM SODIUM 200 GRAM(S): 250; 125 INJECTION, POWDER, FOR SUSPENSION INTRAMUSCULAR; INTRAVENOUS at 22:03

## 2020-01-01 RX ADMIN — Medication 40 MILLIGRAM(S): at 06:12

## 2020-01-01 RX ADMIN — Medication 650 MILLIGRAM(S): at 01:44

## 2020-01-01 RX ADMIN — SPIRONOLACTONE 25 MILLIGRAM(S): 25 TABLET, FILM COATED ORAL at 12:37

## 2020-01-01 RX ADMIN — Medication 400 MILLIGRAM(S): at 01:49

## 2020-01-01 RX ADMIN — Medication 125 MICROGRAM(S): at 05:03

## 2020-01-01 RX ADMIN — Medication 650 MILLIGRAM(S): at 22:53

## 2020-01-01 RX ADMIN — Medication 40 MILLIGRAM(S): at 05:39

## 2020-01-01 RX ADMIN — Medication 4: at 10:39

## 2020-01-01 RX ADMIN — FAMOTIDINE 20 MILLIGRAM(S): 10 INJECTION INTRAVENOUS at 11:18

## 2020-01-01 RX ADMIN — AMPICILLIN SODIUM AND SULBACTAM SODIUM 200 GRAM(S): 250; 125 INJECTION, POWDER, FOR SUSPENSION INTRAMUSCULAR; INTRAVENOUS at 21:41

## 2020-01-01 RX ADMIN — Medication 650 MILLIGRAM(S): at 02:14

## 2020-01-01 RX ADMIN — SPIRONOLACTONE 25 MILLIGRAM(S): 25 TABLET, FILM COATED ORAL at 06:14

## 2020-01-01 RX ADMIN — Medication 6: at 11:32

## 2020-01-01 RX ADMIN — FAMOTIDINE 20 MILLIGRAM(S): 10 INJECTION INTRAVENOUS at 13:00

## 2020-01-01 RX ADMIN — Medication 24 UNIT(S): at 12:08

## 2020-01-01 RX ADMIN — AMPICILLIN SODIUM AND SULBACTAM SODIUM 200 GRAM(S): 250; 125 INJECTION, POWDER, FOR SUSPENSION INTRAMUSCULAR; INTRAVENOUS at 21:45

## 2020-01-01 RX ADMIN — Medication 8: at 17:50

## 2020-01-01 RX ADMIN — SODIUM CHLORIDE 500 MILLILITER(S): 9 INJECTION INTRAMUSCULAR; INTRAVENOUS; SUBCUTANEOUS at 12:49

## 2020-01-01 RX ADMIN — CARVEDILOL PHOSPHATE 12.5 MILLIGRAM(S): 80 CAPSULE, EXTENDED RELEASE ORAL at 12:37

## 2020-01-01 RX ADMIN — Medication 25 UNIT(S): at 08:57

## 2020-01-01 RX ADMIN — Medication 28 UNIT(S): at 18:22

## 2020-01-01 RX ADMIN — Medication 8: at 21:56

## 2020-01-01 RX ADMIN — AMPICILLIN SODIUM AND SULBACTAM SODIUM 200 GRAM(S): 250; 125 INJECTION, POWDER, FOR SUSPENSION INTRAMUSCULAR; INTRAVENOUS at 05:36

## 2020-01-01 RX ADMIN — Medication 1000 MILLIGRAM(S): at 02:19

## 2020-01-01 RX ADMIN — MUPIROCIN 1 APPLICATION(S): 20 OINTMENT TOPICAL at 17:59

## 2020-01-01 RX ADMIN — Medication 1: at 17:15

## 2020-01-01 RX ADMIN — CARVEDILOL PHOSPHATE 12.5 MILLIGRAM(S): 80 CAPSULE, EXTENDED RELEASE ORAL at 22:03

## 2020-01-01 RX ADMIN — Medication 100 MILLIGRAM(S): at 11:16

## 2020-01-01 RX ADMIN — SODIUM CHLORIDE 75 MILLILITER(S): 9 INJECTION INTRAMUSCULAR; INTRAVENOUS; SUBCUTANEOUS at 21:59

## 2020-01-01 RX ADMIN — Medication 500 MILLIGRAM(S): at 21:44

## 2020-01-01 RX ADMIN — Medication 4: at 18:29

## 2020-01-01 RX ADMIN — INSULIN GLARGINE 38 UNIT(S): 100 INJECTION, SOLUTION SUBCUTANEOUS at 22:10

## 2020-01-01 RX ADMIN — AMPICILLIN SODIUM AND SULBACTAM SODIUM 200 GRAM(S): 250; 125 INJECTION, POWDER, FOR SUSPENSION INTRAMUSCULAR; INTRAVENOUS at 13:21

## 2020-01-01 RX ADMIN — ROMIPLOSTIM 170 MICROGRAM(S): 250 INJECTION, POWDER, LYOPHILIZED, FOR SOLUTION SUBCUTANEOUS at 16:10

## 2020-01-01 RX ADMIN — Medication 500 MILLIGRAM(S): at 21:45

## 2020-01-01 RX ADMIN — Medication 12: at 18:25

## 2020-01-01 RX ADMIN — Medication 125 MICROGRAM(S): at 06:49

## 2020-01-01 RX ADMIN — Medication 24 UNIT(S): at 11:32

## 2020-01-01 RX ADMIN — MUPIROCIN 1 APPLICATION(S): 20 OINTMENT TOPICAL at 17:22

## 2020-01-01 RX ADMIN — Medication 12: at 12:54

## 2020-01-01 RX ADMIN — Medication 650 MILLIGRAM(S): at 18:20

## 2020-01-01 RX ADMIN — ATORVASTATIN CALCIUM 40 MILLIGRAM(S): 80 TABLET, FILM COATED ORAL at 21:45

## 2020-01-01 RX ADMIN — ATORVASTATIN CALCIUM 40 MILLIGRAM(S): 80 TABLET, FILM COATED ORAL at 22:03

## 2020-01-01 RX ADMIN — Medication 40 MILLIGRAM(S): at 12:37

## 2020-01-01 RX ADMIN — Medication 6: at 18:29

## 2020-01-01 RX ADMIN — Medication 9 UNIT(S): at 12:55

## 2020-01-01 RX ADMIN — AMPICILLIN SODIUM AND SULBACTAM SODIUM 200 GRAM(S): 250; 125 INJECTION, POWDER, FOR SUSPENSION INTRAMUSCULAR; INTRAVENOUS at 13:48

## 2020-01-01 RX ADMIN — LISINOPRIL 10 MILLIGRAM(S): 2.5 TABLET ORAL at 06:12

## 2020-01-01 RX ADMIN — SPIRONOLACTONE 25 MILLIGRAM(S): 25 TABLET, FILM COATED ORAL at 05:04

## 2020-01-01 RX ADMIN — MUPIROCIN 1 APPLICATION(S): 20 OINTMENT TOPICAL at 05:37

## 2020-01-01 RX ADMIN — MUPIROCIN 1 APPLICATION(S): 20 OINTMENT TOPICAL at 06:50

## 2020-01-01 RX ADMIN — CARVEDILOL PHOSPHATE 12.5 MILLIGRAM(S): 80 CAPSULE, EXTENDED RELEASE ORAL at 10:40

## 2020-01-01 RX ADMIN — Medication 1: at 22:22

## 2020-01-01 RX ADMIN — CEFEPIME 100 MILLIGRAM(S): 1 INJECTION, POWDER, FOR SOLUTION INTRAMUSCULAR; INTRAVENOUS at 11:17

## 2020-01-01 RX ADMIN — Medication 8: at 18:22

## 2020-01-01 RX ADMIN — CEFEPIME 100 MILLIGRAM(S): 1 INJECTION, POWDER, FOR SOLUTION INTRAMUSCULAR; INTRAVENOUS at 11:18

## 2020-01-01 RX ADMIN — ATORVASTATIN CALCIUM 40 MILLIGRAM(S): 80 TABLET, FILM COATED ORAL at 21:59

## 2020-01-01 RX ADMIN — AMPICILLIN SODIUM AND SULBACTAM SODIUM 200 GRAM(S): 250; 125 INJECTION, POWDER, FOR SUSPENSION INTRAMUSCULAR; INTRAVENOUS at 05:14

## 2020-01-01 RX ADMIN — Medication 25 UNIT(S): at 19:04

## 2020-01-01 RX ADMIN — IMMUNE GLOBULIN (HUMAN) 125 GRAM(S): 10 INJECTION INTRAVENOUS; SUBCUTANEOUS at 19:31

## 2020-01-01 RX ADMIN — Medication 650 MILLIGRAM(S): at 18:00

## 2020-01-01 RX ADMIN — Medication 650 MILLIGRAM(S): at 05:25

## 2020-01-01 RX ADMIN — Medication 500 MILLIGRAM(S): at 22:03

## 2020-01-01 RX ADMIN — SPIRONOLACTONE 25 MILLIGRAM(S): 25 TABLET, FILM COATED ORAL at 06:49

## 2020-01-01 RX ADMIN — AMPICILLIN SODIUM AND SULBACTAM SODIUM 200 GRAM(S): 250; 125 INJECTION, POWDER, FOR SUSPENSION INTRAMUSCULAR; INTRAVENOUS at 22:10

## 2020-01-01 RX ADMIN — Medication 25 UNIT(S): at 12:38

## 2020-01-01 RX ADMIN — Medication 500 MILLIGRAM(S): at 21:59

## 2020-01-01 RX ADMIN — Medication 500 MILLIGRAM(S): at 22:10

## 2020-01-01 RX ADMIN — ROMIPLOSTIM 75 MICROGRAM(S): 250 INJECTION, POWDER, LYOPHILIZED, FOR SOLUTION SUBCUTANEOUS at 22:00

## 2020-01-01 RX ADMIN — CARVEDILOL PHOSPHATE 12.5 MILLIGRAM(S): 80 CAPSULE, EXTENDED RELEASE ORAL at 21:45

## 2020-01-01 RX ADMIN — Medication 6: at 23:01

## 2020-01-01 RX ADMIN — AMPICILLIN SODIUM AND SULBACTAM SODIUM 200 GRAM(S): 250; 125 INJECTION, POWDER, FOR SUSPENSION INTRAMUSCULAR; INTRAVENOUS at 05:03

## 2020-01-06 ENCOUNTER — RESULT REVIEW (OUTPATIENT)
Age: 70
End: 2020-01-06

## 2020-01-27 ENCOUNTER — RX RENEWAL (OUTPATIENT)
Age: 70
End: 2020-01-27

## 2020-01-28 ENCOUNTER — APPOINTMENT (OUTPATIENT)
Dept: RHEUMATOLOGY | Facility: CLINIC | Age: 70
End: 2020-01-28
Payer: COMMERCIAL

## 2020-01-28 VITALS
OXYGEN SATURATION: 100 % | RESPIRATION RATE: 18 BRPM | HEIGHT: 72.5 IN | SYSTOLIC BLOOD PRESSURE: 132 MMHG | WEIGHT: 211 LBS | DIASTOLIC BLOOD PRESSURE: 82 MMHG | BODY MASS INDEX: 28.27 KG/M2 | TEMPERATURE: 97.9 F | HEART RATE: 70 BPM

## 2020-01-28 DIAGNOSIS — M1A.9XX1 CHRONIC GOUT, UNSPECIFIED, WITH TOPHUS (TOPHI): ICD-10-CM

## 2020-01-28 PROCEDURE — 99213 OFFICE O/P EST LOW 20 MIN: CPT

## 2020-01-29 LAB
CRP SERPL-MCNC: 0.25 MG/DL
ERYTHROCYTE [SEDIMENTATION RATE] IN BLOOD BY WESTERGREN METHOD: 28 MM/HR
URATE SERPL-MCNC: 8.3 MG/DL

## 2020-02-04 ENCOUNTER — APPOINTMENT (OUTPATIENT)
Dept: CARDIOLOGY | Facility: CLINIC | Age: 70
End: 2020-02-04
Payer: COMMERCIAL

## 2020-02-04 VITALS
BODY MASS INDEX: 27.63 KG/M2 | HEIGHT: 72 IN | OXYGEN SATURATION: 97 % | SYSTOLIC BLOOD PRESSURE: 128 MMHG | WEIGHT: 204 LBS | RESPIRATION RATE: 17 BRPM | DIASTOLIC BLOOD PRESSURE: 69 MMHG | TEMPERATURE: 98.1 F | HEART RATE: 75 BPM

## 2020-02-04 PROCEDURE — 99213 OFFICE O/P EST LOW 20 MIN: CPT

## 2020-02-04 PROCEDURE — 93284 PRGRMG EVAL IMPLANTABLE DFB: CPT

## 2020-02-04 PROCEDURE — 93290 INTERROG DEV EVAL ICPMS IP: CPT | Mod: 26

## 2020-02-04 NOTE — HISTORY OF PRESENT ILLNESS
[None] : The patient complains of no symptoms [de-identified] : Has had no symptoms and no pacemaker shocks or issues. Device replaced for NÉSTOR and this is first follow up.

## 2020-02-04 NOTE — PHYSICAL EXAM
[General Appearance - Well Developed] : well developed [Well Groomed] : well groomed [Normal Appearance] : normal appearance [General Appearance - Well Nourished] : well nourished [No Deformities] : no deformities [General Appearance - In No Acute Distress] : no acute distress [Heart Rate And Rhythm] : heart rate and rhythm were normal [Heart Sounds] : normal S1 and S2 [Murmurs] : no murmurs present [Respiration, Rhythm And Depth] : normal respiratory rhythm and effort [Exaggerated Use Of Accessory Muscles For Inspiration] : no accessory muscle use [Clean] : clean [Auscultation Breath Sounds / Voice Sounds] : lungs were clear to auscultation bilaterally [Well-Healed] : well-healed [Dry] : dry [Abdomen Soft] : soft [Abdomen Mass (___ Cm)] : no abdominal mass palpated [Abdomen Tenderness] : non-tender [Nail Clubbing] : no clubbing of the fingernails [Cyanosis, Localized] : no localized cyanosis [Petechial Hemorrhages (___cm)] : no petechial hemorrhages [] : no ischemic changes [FreeTextEntry1] : no edema

## 2020-02-04 NOTE — REASON FOR VISIT
[Follow-up Device Check] : follow-up device check visit [New Patient Device Check] : new patient device check visit

## 2020-02-04 NOTE — PROCEDURE
[No] : not [NSR] : normal sinus rhythm [DDD] : DDD [ICD] : Implantable cardioverter-defibrillator [Longevity: ___ months] : The estimated remaining battery life is [unfilled] months [Atrial] : Atrial [Ventricular] : Ventricular [___ ms] : [unfilled] ms [___V @] : [unfilled] V [Counters Reset] : the counters were reset [Asense-Vpace ___ %] : Asense-Vpace [unfilled]% [Apace-Vpace ___ %] : Apace-Vpace [unfilled]% [Lead Imp:  ___ohms] : lead impedance was [unfilled] ohms [Asense-Vsense ___ %] : Asense-Vsense [unfilled]% [Sensing Amplitude ___mv] : sensing amplitude was [unfilled] mv [None] : none [de-identified] : Medtronic  [Apace-Vsense ___ %] : Apace-Vsense [unfilled]% [de-identified] : Marvin MRI XT  [id number] [de-identified] : [id number] [de-identified] : 4/3/2019 [de-identified] : 60 [de-identified] : Normal lead impedances with Fidellis lead capped, a new defibrillator coil implanted after presenting with defibrillator storm due to Fidellis lead fracture. Single recurrence of atrial tachycardia lasting approximately 6 hours August 23, 2019. Again spontaneous reversion to sinus and n orecurrence. No episodes of ventricular tachycardia. No mode switch. Optivol recording above threshold 6/18/2019 to mid-September and briefly late November. Has been below threshold all of December 2019 and January..

## 2020-02-04 NOTE — DISCUSSION/SUMMARY
[Pacemaker Function Normal] : normal pacemaker function [AICD Function Normal] : normal AICD function [Routine Follow-up in 3-4 months] : routine follow-up in 3-4 months [Patient] : the patient

## 2020-02-10 ENCOUNTER — APPOINTMENT (OUTPATIENT)
Dept: CT IMAGING | Facility: CLINIC | Age: 70
End: 2020-02-10

## 2020-02-11 ENCOUNTER — FORM ENCOUNTER (OUTPATIENT)
Age: 70
End: 2020-02-11

## 2020-02-12 ENCOUNTER — RX RENEWAL (OUTPATIENT)
Age: 70
End: 2020-02-12

## 2020-02-12 ENCOUNTER — APPOINTMENT (OUTPATIENT)
Dept: CT IMAGING | Facility: CLINIC | Age: 70
End: 2020-02-12
Payer: COMMERCIAL

## 2020-02-12 ENCOUNTER — OUTPATIENT (OUTPATIENT)
Dept: OUTPATIENT SERVICES | Facility: HOSPITAL | Age: 70
LOS: 1 days | End: 2020-02-12
Payer: COMMERCIAL

## 2020-02-12 DIAGNOSIS — Z01.89 ENCOUNTER FOR OTHER SPECIFIED SPECIAL EXAMINATIONS: Chronic | ICD-10-CM

## 2020-02-12 DIAGNOSIS — R16.0 HEPATOMEGALY, NOT ELSEWHERE CLASSIFIED: ICD-10-CM

## 2020-02-12 DIAGNOSIS — Z89.9 ACQUIRED ABSENCE OF LIMB, UNSPECIFIED: Chronic | ICD-10-CM

## 2020-02-12 PROCEDURE — 74170 CT ABD WO CNTRST FLWD CNTRST: CPT | Mod: 26

## 2020-02-12 PROCEDURE — 74170 CT ABD WO CNTRST FLWD CNTRST: CPT

## 2020-02-14 ENCOUNTER — APPOINTMENT (OUTPATIENT)
Dept: INTERVENTIONAL RADIOLOGY/VASCULAR | Facility: CLINIC | Age: 70
End: 2020-02-14
Payer: COMMERCIAL

## 2020-02-14 LAB
ALBUMIN SERPL ELPH-MCNC: 3.9 G/DL
ALP BLD-CCNC: 142 U/L
ALT SERPL-CCNC: 13 U/L
ANION GAP SERPL CALC-SCNC: 11 MMOL/L
AST SERPL-CCNC: 25 U/L
BASOPHILS # BLD AUTO: 0.05 K/UL
BASOPHILS NFR BLD AUTO: 1.2 %
BILIRUB SERPL-MCNC: 0.7 MG/DL
BUN SERPL-MCNC: 54 MG/DL
CALCIUM SERPL-MCNC: 9.1 MG/DL
CHLORIDE SERPL-SCNC: 102 MMOL/L
CO2 SERPL-SCNC: 22 MMOL/L
CREAT SERPL-MCNC: 2.35 MG/DL
EOSINOPHIL # BLD AUTO: 0.17 K/UL
EOSINOPHIL NFR BLD AUTO: 3.9 %
GLUCOSE SERPL-MCNC: 146 MG/DL
HCT VFR BLD CALC: 31.6 %
HGB BLD-MCNC: 10.2 G/DL
IMM GRANULOCYTES NFR BLD AUTO: 0.5 %
INR PPP: 1.13 RATIO
LYMPHOCYTES # BLD AUTO: 0.63 K/UL
LYMPHOCYTES NFR BLD AUTO: 14.5 %
MAN DIFF?: NORMAL
MCHC RBC-ENTMCNC: 30.1 PG
MCHC RBC-ENTMCNC: 32.3 GM/DL
MCV RBC AUTO: 93.2 FL
MONOCYTES # BLD AUTO: 0.44 K/UL
MONOCYTES NFR BLD AUTO: 10.1 %
NEUTROPHILS # BLD AUTO: 3.03 K/UL
NEUTROPHILS NFR BLD AUTO: 69.8 %
PLATELET # BLD AUTO: 45 K/UL
POTASSIUM SERPL-SCNC: 5.5 MMOL/L
PROT SERPL-MCNC: 7.3 G/DL
PT BLD: 13.1 SEC
RBC # BLD: 3.39 M/UL
RBC # FLD: 14.9 %
SODIUM SERPL-SCNC: 135 MMOL/L
WBC # FLD AUTO: 4.34 K/UL

## 2020-02-14 PROCEDURE — 99215 OFFICE O/P EST HI 40 MIN: CPT

## 2020-02-14 RX ORDER — PREDNISONE 10 MG/1
10 TABLET ORAL
Qty: 60 | Refills: 0 | Status: DISCONTINUED | COMMUNITY
Start: 2020-01-24 | End: 2020-02-14

## 2020-02-18 ENCOUNTER — RX RENEWAL (OUTPATIENT)
Age: 70
End: 2020-02-18

## 2020-02-20 LAB
ANION GAP SERPL CALC-SCNC: 11 MMOL/L
BUN SERPL-MCNC: 40 MG/DL
CALCIUM SERPL-MCNC: 9.4 MG/DL
CHLORIDE SERPL-SCNC: 102 MMOL/L
CO2 SERPL-SCNC: 25 MMOL/L
CREAT SERPL-MCNC: 1.76 MG/DL
GLUCOSE SERPL-MCNC: 110 MG/DL
POTASSIUM SERPL-SCNC: 4.7 MMOL/L
SODIUM SERPL-SCNC: 138 MMOL/L

## 2020-02-24 ENCOUNTER — OUTPATIENT (OUTPATIENT)
Dept: OUTPATIENT SERVICES | Facility: HOSPITAL | Age: 70
LOS: 1 days | Discharge: HOME | End: 2020-02-24

## 2020-02-24 DIAGNOSIS — Z89.9 ACQUIRED ABSENCE OF LIMB, UNSPECIFIED: Chronic | ICD-10-CM

## 2020-02-24 DIAGNOSIS — C22.0 LIVER CELL CARCINOMA: ICD-10-CM

## 2020-02-24 DIAGNOSIS — Z01.89 ENCOUNTER FOR OTHER SPECIFIED SPECIAL EXAMINATIONS: Chronic | ICD-10-CM

## 2020-02-24 NOTE — H&P PST ADULT - PROBLEM SELECTOR PLAN 2
Implemented All Fall with Harm Risk Interventions:  [place] to call system. Call bell, personal items and telephone within reach. Instruct patient to call for assistance. Room bathroom lighting operational. Non-slip footwear when patient is off stretcher. Physically safe environment: no spills, clutter or unnecessary equipment. Stretcher in lowest position, wheels locked, appropriate side rails in place. Provide visual cue, wrist band, yellow gown, etc. Monitor gait and stability. Monitor for mental status changes and reorient to person, place, and time. Review medications for side effects contributing to fall risk. Reinforce activity limits and safety measures with patient and family. Provide visual clues: red socks. To continue taking Januvia / metformin regularly last dose 2/6 am dose    Lantus dose 2/6/2019 16 units    Fingerstick for glucose lever preop   Has recent result of Hgb A1C 9  Dec 2018

## 2020-02-26 ENCOUNTER — APPOINTMENT (OUTPATIENT)
Dept: ELECTROPHYSIOLOGY | Facility: CLINIC | Age: 70
End: 2020-02-26
Payer: COMMERCIAL

## 2020-02-26 PROCEDURE — 93295 DEV INTERROG REMOTE 1/2/MLT: CPT

## 2020-02-26 PROCEDURE — 93296 REM INTERROG EVL PM/IDS: CPT

## 2020-02-28 ENCOUNTER — APPOINTMENT (OUTPATIENT)
Dept: HEMATOLOGY ONCOLOGY | Facility: CLINIC | Age: 70
End: 2020-02-28
Payer: COMMERCIAL

## 2020-02-28 VITALS
TEMPERATURE: 97.4 F | WEIGHT: 200.62 LBS | OXYGEN SATURATION: 100 % | DIASTOLIC BLOOD PRESSURE: 73 MMHG | BODY MASS INDEX: 28.09 KG/M2 | HEART RATE: 69 BPM | RESPIRATION RATE: 18 BRPM | SYSTOLIC BLOOD PRESSURE: 118 MMHG | HEIGHT: 70.87 IN

## 2020-02-28 PROCEDURE — 99205 OFFICE O/P NEW HI 60 MIN: CPT

## 2020-03-02 ENCOUNTER — RX RENEWAL (OUTPATIENT)
Age: 70
End: 2020-03-02

## 2020-03-02 NOTE — HISTORY OF PRESENT ILLNESS
[FreeTextEntry1] : Mr. Harvey is a 70 y/o male with pmhx of ischemic cardiomyopathy, CHF, V-tach s/p AICD/PPM, HLD, HTN, BEHZAD, gouty arthritis, T2DM, FERNANDES, and cirrhosis who presents today for follow up for liver directed therapy. Pt was originally diagnosed with cirrhosis of the liver in 2017 after workup for elevated liver enzymes. He has been followed with serial imaging by Dr. Chapo Alaniz. He was referred to IR by Dr. Alaniz after imaging revealed liver lesion. He is now s/p hepatic angiogram and embolization of 4 cm right hepatic tumor on 2/7/19 and hepatic angiogram and bland embolization of segment 8 and 5 on 4/18/19. He presents today for follow up and to review recent imaging. He recently underwent Moh's procedure for SCC to his left check.  Denies abdominal pain, change in skin color, hemoptysis, melena, or hematochezia. \par \par He reports having issues with fluid retention and CHF exacerbations over the last year. He has worked with Dr. Rivers to adjust his diuretic regimen and had a significant amount of fluid weight loss in 3/2018. He was evaluated on 2/4/20 and is currently been stable over the last few months, with no CHF exacerbations. \par \par He has been following with Dr. Tristan and was previously hospitalized in 2018 for recurrent cellulitis and gouty arthritis. He reports that both the ankle wound and diabetic ulcer have completely healed. \par \par Hep: Chapo Alaniz\par Cards: Tita\par Nephrology: Racquel\par Endo: Nicole Hennessy\par

## 2020-03-02 NOTE — ASSESSMENT
[Other: _____] : [unfilled] [[facility] Procedure & Planning] : S[facility] procedure and planning discussed at length [FreeTextEntry1] : Mr. Harvey is a 68 y/o male with pmhx of ischemic cardiomyopathy, CHF, V-tach s/p AICD/PPM, HLD, HTN, BEHZAD, gouty arthritis, T2DM, FERNANDES, and cirrhosis who presents today for follow up for liver directed therapy.  His CT scan from 2/12/20 demonstrates a liver with cirrhotic morphology. The segment 7 lesion now demonstrates irregular nodular enhancement at the periphery of the ablation zone which in retrospect is increased since prior study and consistent with viable tumor. Overall tumor size and ablation site measures approximately 4.6 x 3.3 cm . Additional ablation site at segment 4A/8 with associated peripheral enhancement measuring approximately 2.0 cm also consistent with viable tumor . An observation in segment 7/8 measuring 1.0 cm without definite associated washout or pseudocapsule is new since prior exam. \par  \par I have reviewed their imaging and discussed the findings with Mr. HARVEY.  The imaging demonstrates some irregular enhancement at the periphery of the previously treated lesion in his right lobe.  Given that he has had 2 previous bland embolizations in less than a year, I am recommending treating the right lobe with radioembolization.   I reviewed the procedure, including mapping angiogram, the risks, benefits, alternatives, and expected post procedure course.  He will need a CT of the chest and a NM bone scan, so I have given him rx for these tests. \par \par He has not had recent lab work, so I have given him a rx for labs to be drawn at the outpatient lab. He has a history of CKD and is followed by his nephrologist, Dr. Clement.  At his last evaluation, she included her recommendations for JOHN prophylaxis, but I will be in touch with her to confirm this since [facility] will require 2 procedures.  \par \par He does have a hx of thrombocytopenia, with his last platelet count of 55. He also inquired about having platelet transfusion prior to dental work, as an outpatient.  He currently does not have a hematologist.  I would like him to have an evaluation with Dr. Daniel, as she may evaluate him for possible systemic therapy in the future, as well as possible outpatient platelet transfusions in the future.  I discussed how he may need to  receive a platelet transfusion on the day of the procedure. \par \par He has an extensive cardiac history, but is followed closely by Dr. Stechel.  His cardiac status has been stable and he had a recent evaluation earlier this month. A PPM interrogation was performed at this visit.  He may continue his baby aspirin for the procedure, given his extensive cardiac hx. \par \par I have provided the patient the opportunity to ask questions and have answered them to their satisfaction.  They are encouraged to contact our office with any further questions, concerns, or issues.\par

## 2020-03-02 NOTE — CONSULT LETTER
[Courtesy Letter:] : I had the pleasure of seeing your patient, [unfilled], in my office today. [Please see my note below.] : Please see my note below. [Sincerely,] : Sincerely, [Dear  ___] : Dear ~KESHIA, [FreeTextEntry3] : Shady Hoyt MD\par

## 2020-03-02 NOTE — DATA REVIEWED
[FreeTextEntry1] : \par \par \par \par EXAM: CT ABDOMEN ONLY WAW IC \par \par \par PROCEDURE DATE: 02/12/2020 \par \par \par \par INTERPRETATION: CLINICAL INFORMATION: Hepatocellular carcinoma status post \par embolization 4/18/2019. \par \par COMPARISON: CT abdomen and pelvis 11/20/2019 and 8/15/2019. \par \par PROCEDURE: \par CT of the Abdomen was performed with intravenous contrast. \par Noncontrast, Arterial, Portal Venous and Delayed phases were acquired. \par Intravenous contrast: 70 cc Visipaque 320. 30 ml discarded. \par Oral contrast: None. \par Sagittal and coronal reformats were performed. \par \par \par FINDINGS: \par \par LOWER CHEST: Partially imaged pacemaker leads within the heart. Partially \par imaged median sternotomy. Minimal right basilar atelectasis. \par \par LIVER: Cirrhotic morphology. Segment 7 lesion now demonstrates irregular \par nodular enhancement at the periphery of the ablation zone which in \par retrospect is increased since prior study and consistent with viable tumor. \par Overall tumor size and ablation site measures approximately 4.6 x 3.3 cm \par (4:20). Additional ablation site at segment 4A/8 with associated peripheral \par enhancement measuring approximately 2.0 cm also consistent with viable tumor \par (4:15). An observation in segment 7/8 measuring 1.0 cm (4:15) without \par definite associated washout or pseudocapsule is new since prior exam \par 11/20/2019 and represents a LI-RADS 3 observation. \par \par Patent portal and hepatic veins. \par \par BILE DUCTS: Normal caliber. \par GALLBLADDER: Cholelithiasis. \par SPLEEN: Splenomegaly measuring 16.7 cm in craniocaudal dimension. \par PANCREAS: Within normal limits. \par ADRENALS: Within normal limits. \par KIDNEYS/URETERS: No hydronephrosis. \par \par VISUALIZED PORTIONS: \par \par BOWEL: No bowel obstruction. \par PERITONEUM: No ascites. \par VESSELS: Patent portal and hepatic veins. Upper abdominal varices. \par Atherosclerotic disease of the abdominal aorta and its branches. \par RETROPERITONEUM/LYMPH NODES: No lymphadenopathy. \par ABDOMINAL WALL: Within normal limits. \par BONES: Degenerative changes of the spine. \par \par IMPRESSION: \par \par Viable tumor at the site of prior treatment cavities in segment 7 and \par segment 4A/8 as described above. \par \par Additional 1.0 cm LI-RADS 3 observation in segment 7/8. \par \par Cirrhosis with portal hypertension.

## 2020-03-02 NOTE — PHYSICAL EXAM
[Alert] : alert [No Acute Distress] : no acute distress [Well Nourished] : well nourished [Well Developed] : well developed [Healthy Appearance] : healthy appearance [Normal Voice/Communication] : normal voice communication [Normal Sclera/Conjunctiva] : normal sclera/conjunctiva [No Respiratory Distress] : no respiratory distress [No Accessory Muscle Use] : no accessory muscle use [Normal Rate and Effort] : normal respiratory rhythm and effort [Normal Gait] : normal gait [Oriented x3] : oriented to person, place, and time [Normal Mood] : the mood was normal [Normal Insight/Judgement] : insight and judgment were intact [Normal Affect] : the affect was normal [Remote Memory Normal] : remote memory was not impaired [Recent Memory Normal] : recent memory was not impaired [Restricted in physically strenuous activity but ambulatory and able to carry out work of a light or sedentary nature] : Restricted in physically strenuous activity but ambulatory and able to carry out work of a light or sedentary nature, e.g., light house work, office work [de-identified] : +petechiae/bruising left face, healing Moh's procedure, no s/s of infection

## 2020-03-02 NOTE — REVIEW OF SYSTEMS
[As noted in HPI] : as noted in HPI [As Noted in HPI] : as noted in HPI [Easy Bruising] : a tendency for easy bruising [Negative] : Respiratory [Easy Bleeding] : no tendency for easy bleeding

## 2020-03-03 NOTE — HISTORY OF PRESENT ILLNESS
[Disease: _____________________] : Disease: [unfilled] [T: ___] : T[unfilled] [N: ___] : N[unfilled] [AJCC Stage: ____] : AJCC Stage: [unfilled] [M: ___] : M[unfilled] [de-identified] : Mr. Harvey is a 69 y/o male with pmhx of ischemic cardiomyopathy, CHF, V-tach s/p AICD/PPM, CKD, FERNANDES/cirrhosis, chronic thrombocytopenia since at least 2012 (baseline plt count 40-60), presents to establish care for management of HCC and chronic thrombocytopenia. \par \par Pt was originally diagnosed with cirrhosis of the liver in 2017 after workup for elevated liver enzymes. He has been followed with serial imaging by Dr. Chapo Alaniz. He was referred to IR by Dr. Alaniz after imaging revealed liver lesion in Sept 2018. He is now s/p hepatic angiogram and embolization of 4 cm right hepatic tumor on 2/7/19 and hepatic angiogram and bland embolization of segment 8 and 5 on 4/18/19. \par \par He reports having issues with fluid retention and CHF exacerbations over the last year. He has worked with Dr. Rivers to adjust his diuretic regimen and had a significant amount of fluid weight loss in 3/2018. He was evaluated on 2/4/20 and is currently been stable over the last few months, with no CHF exacerbations. \par \par He denies any h/o significant bleeding. Has had plt transfusions with previous surgeries and did respond appropriately. \par \par Hep: Chapo Alaniz\par Cards: Tita\dayanara Nephrology: Racquel\par Endo: Nicole Hennessy\par \par  [de-identified] : n/a [de-identified] : Overall feels ok. No significant c/o. He has been following with IR and a CT chest and bone scan have been ordered. Plan is for further LDT. \par He needs 5 dental extractions prior to proposed liver directed therapy.. Needs clearance for the extractions and needs platelet transfusion prior to the procedure. \par He remains active, playing golf, walking the dog. \par Denies any change in BMs, or abdominal pain. \par \par \par

## 2020-03-03 NOTE — CONSULT LETTER
[Courtesy Letter:] : I had the pleasure of seeing your patient, [unfilled], in my office today. [Please see my note below.] : Please see my note below. [Dear  ___] : Dear  [unfilled], [Consult Closing:] : Thank you very much for allowing me to participate in the care of this patient.  If you have any questions, please do not hesitate to contact me. [Sincerely,] : Sincerely, [DrEmre  ___] : Dr. PEARCE [DrEmre ___] : Dr. PEARCE [___] : [unfilled] [FreeTextEntry3] : Tina Daniel D.O. \par Attending Physician \par [facility] of Medical Oncology and Hematology\par  \par [facility] \par Tel: [phone]\par Fax: [phone]\par

## 2020-03-03 NOTE — REVIEW OF SYSTEMS
[Negative] : Gastrointestinal [FreeTextEntry8] : nocturia once a night [de-identified] : feels cold  [FreeTextEntry9] : gout flare episodic

## 2020-03-04 ENCOUNTER — RX RENEWAL (OUTPATIENT)
Age: 70
End: 2020-03-04

## 2020-03-11 ENCOUNTER — FORM ENCOUNTER (OUTPATIENT)
Age: 70
End: 2020-03-11

## 2020-03-12 ENCOUNTER — OUTPATIENT (OUTPATIENT)
Dept: OUTPATIENT SERVICES | Facility: HOSPITAL | Age: 70
LOS: 1 days | End: 2020-03-12
Payer: COMMERCIAL

## 2020-03-12 ENCOUNTER — APPOINTMENT (OUTPATIENT)
Dept: NUCLEAR MEDICINE | Facility: IMAGING CENTER | Age: 70
End: 2020-03-12
Payer: COMMERCIAL

## 2020-03-12 ENCOUNTER — APPOINTMENT (OUTPATIENT)
Dept: CT IMAGING | Facility: IMAGING CENTER | Age: 70
End: 2020-03-12
Payer: COMMERCIAL

## 2020-03-12 DIAGNOSIS — Z89.9 ACQUIRED ABSENCE OF LIMB, UNSPECIFIED: Chronic | ICD-10-CM

## 2020-03-12 DIAGNOSIS — C22.0 LIVER CELL CARCINOMA: ICD-10-CM

## 2020-03-12 DIAGNOSIS — Z01.89 ENCOUNTER FOR OTHER SPECIFIED SPECIAL EXAMINATIONS: Chronic | ICD-10-CM

## 2020-03-12 PROCEDURE — 78306 BONE IMAGING WHOLE BODY: CPT | Mod: 26

## 2020-03-12 PROCEDURE — 78306 BONE IMAGING WHOLE BODY: CPT

## 2020-03-12 PROCEDURE — 71250 CT THORAX DX C-: CPT | Mod: 26

## 2020-03-12 PROCEDURE — 71250 CT THORAX DX C-: CPT

## 2020-03-12 PROCEDURE — A9561: CPT

## 2020-03-26 ENCOUNTER — RX RENEWAL (OUTPATIENT)
Age: 70
End: 2020-03-26

## 2020-03-31 ENCOUNTER — OUTPATIENT (OUTPATIENT)
Dept: OUTPATIENT SERVICES | Facility: HOSPITAL | Age: 70
LOS: 1 days | End: 2020-03-31
Payer: COMMERCIAL

## 2020-03-31 VITALS
OXYGEN SATURATION: 100 % | WEIGHT: 203.05 LBS | SYSTOLIC BLOOD PRESSURE: 106 MMHG | DIASTOLIC BLOOD PRESSURE: 69 MMHG | HEIGHT: 71 IN | HEART RATE: 67 BPM | TEMPERATURE: 97 F | RESPIRATION RATE: 14 BRPM

## 2020-03-31 DIAGNOSIS — Z01.89 ENCOUNTER FOR OTHER SPECIFIED SPECIAL EXAMINATIONS: Chronic | ICD-10-CM

## 2020-03-31 DIAGNOSIS — C22.0 LIVER CELL CARCINOMA: ICD-10-CM

## 2020-03-31 DIAGNOSIS — Z89.9 ACQUIRED ABSENCE OF LIMB, UNSPECIFIED: Chronic | ICD-10-CM

## 2020-03-31 DIAGNOSIS — Z95.810 PRESENCE OF AUTOMATIC (IMPLANTABLE) CARDIAC DEFIBRILLATOR: ICD-10-CM

## 2020-03-31 DIAGNOSIS — Z01.818 ENCOUNTER FOR OTHER PREPROCEDURAL EXAMINATION: ICD-10-CM

## 2020-03-31 DIAGNOSIS — G47.33 OBSTRUCTIVE SLEEP APNEA (ADULT) (PEDIATRIC): ICD-10-CM

## 2020-03-31 DIAGNOSIS — C44.92 SQUAMOUS CELL CARCINOMA OF SKIN, UNSPECIFIED: Chronic | ICD-10-CM

## 2020-03-31 DIAGNOSIS — E11.9 TYPE 2 DIABETES MELLITUS WITHOUT COMPLICATIONS: ICD-10-CM

## 2020-03-31 LAB
ALBUMIN SERPL ELPH-MCNC: 4.1 G/DL — SIGNIFICANT CHANGE UP (ref 3.3–5)
ALP SERPL-CCNC: 126 U/L — HIGH (ref 40–120)
ALT FLD-CCNC: 25 U/L — SIGNIFICANT CHANGE UP (ref 10–45)
ANION GAP SERPL CALC-SCNC: 16 MMOL/L — SIGNIFICANT CHANGE UP (ref 5–17)
APTT BLD: 32.1 SEC — SIGNIFICANT CHANGE UP (ref 27.5–36.3)
AST SERPL-CCNC: 40 U/L — SIGNIFICANT CHANGE UP (ref 10–40)
BILIRUB SERPL-MCNC: 1 MG/DL — SIGNIFICANT CHANGE UP (ref 0.2–1.2)
BLD GP AB SCN SERPL QL: NEGATIVE — SIGNIFICANT CHANGE UP
BUN SERPL-MCNC: 46 MG/DL — HIGH (ref 7–23)
CALCIUM SERPL-MCNC: 10.5 MG/DL — SIGNIFICANT CHANGE UP (ref 8.4–10.5)
CHLORIDE SERPL-SCNC: 101 MMOL/L — SIGNIFICANT CHANGE UP (ref 96–108)
CO2 SERPL-SCNC: 22 MMOL/L — SIGNIFICANT CHANGE UP (ref 22–31)
CREAT SERPL-MCNC: 1.8 MG/DL — HIGH (ref 0.5–1.3)
GLUCOSE SERPL-MCNC: 177 MG/DL — HIGH (ref 70–99)
HBA1C BLD-MCNC: 8.1 % — HIGH (ref 4–5.6)
HCT VFR BLD CALC: 31.2 % — LOW (ref 39–50)
HGB BLD-MCNC: 10.1 G/DL — LOW (ref 13–17)
INR BLD: 1.16 RATIO — SIGNIFICANT CHANGE UP (ref 0.88–1.16)
MCHC RBC-ENTMCNC: 30.9 PG — SIGNIFICANT CHANGE UP (ref 27–34)
MCHC RBC-ENTMCNC: 32.4 GM/DL — SIGNIFICANT CHANGE UP (ref 32–36)
MCV RBC AUTO: 95.4 FL — SIGNIFICANT CHANGE UP (ref 80–100)
NRBC # BLD: 0 /100 WBCS — SIGNIFICANT CHANGE UP (ref 0–0)
PLATELET # BLD AUTO: 29 K/UL — LOW (ref 150–400)
POTASSIUM SERPL-MCNC: 5.1 MMOL/L — SIGNIFICANT CHANGE UP (ref 3.5–5.3)
POTASSIUM SERPL-SCNC: 5.1 MMOL/L — SIGNIFICANT CHANGE UP (ref 3.5–5.3)
PROT SERPL-MCNC: 7.8 G/DL — SIGNIFICANT CHANGE UP (ref 6–8.3)
PROTHROM AB SERPL-ACNC: 13.4 SEC — HIGH (ref 10–13.1)
RBC # BLD: 3.27 M/UL — LOW (ref 4.2–5.8)
RBC # FLD: 16 % — HIGH (ref 10.3–14.5)
RH IG SCN BLD-IMP: POSITIVE — SIGNIFICANT CHANGE UP
SODIUM SERPL-SCNC: 139 MMOL/L — SIGNIFICANT CHANGE UP (ref 135–145)
WBC # BLD: 3.9 K/UL — SIGNIFICANT CHANGE UP (ref 3.8–10.5)
WBC # FLD AUTO: 3.9 K/UL — SIGNIFICANT CHANGE UP (ref 3.8–10.5)

## 2020-03-31 PROCEDURE — 86900 BLOOD TYPING SEROLOGIC ABO: CPT

## 2020-03-31 PROCEDURE — 85730 THROMBOPLASTIN TIME PARTIAL: CPT

## 2020-03-31 PROCEDURE — G0463: CPT

## 2020-03-31 PROCEDURE — 86901 BLOOD TYPING SEROLOGIC RH(D): CPT

## 2020-03-31 PROCEDURE — 85027 COMPLETE CBC AUTOMATED: CPT

## 2020-03-31 PROCEDURE — 80053 COMPREHEN METABOLIC PANEL: CPT

## 2020-03-31 PROCEDURE — 86850 RBC ANTIBODY SCREEN: CPT

## 2020-03-31 PROCEDURE — 83036 HEMOGLOBIN GLYCOSYLATED A1C: CPT

## 2020-03-31 PROCEDURE — 85610 PROTHROMBIN TIME: CPT

## 2020-03-31 RX ORDER — SACCHAROMYCES BOULARDII 250 MG
1 POWDER IN PACKET (EA) ORAL
Qty: 14 | Refills: 0

## 2020-03-31 RX ORDER — INSULIN GLARGINE 100 [IU]/ML
24 INJECTION, SOLUTION SUBCUTANEOUS
Qty: 0 | Refills: 0 | DISCHARGE

## 2020-03-31 RX ORDER — BROMOCRIPTINE MESYLATE 5 MG/1
1 CAPSULE ORAL
Qty: 0 | Refills: 0 | DISCHARGE

## 2020-03-31 RX ORDER — LEVOTHYROXINE SODIUM 125 MCG
1 TABLET ORAL
Qty: 0 | Refills: 0 | DISCHARGE

## 2020-03-31 NOTE — H&P PST ADULT - NSICDXPROBLEM_GEN_ALL_CORE_FT
PROBLEM DIAGNOSES  Problem: HCC (hepatocellular carcinoma)  Assessment and Plan: mapping procedure and neclear medicine scan    Problem: Type 2 diabetes mellitus  Assessment and Plan: hold metformin 24h preop  hold januvia DOS  instructed pt to take 50-75% of usual dose of lantus the night prior to procedure  check finger stick DOS    Problem: BEHZAD (obstructive sleep apnea)  Assessment and Plan: BEHZAD precautions  IR booking notified PROBLEM DIAGNOSES  Problem: History of implantable cardiac defibrillator (ICD)  Assessment and Plan: ICD interrogated in 3/2020, report on chart  IR booking notified    Problem: HCC (hepatocellular carcinoma)  Assessment and Plan: mapping procedure and neclear medicine scan    Problem: Type 2 diabetes mellitus  Assessment and Plan: hold metformin 24h preop  hold januvia DOS  instructed pt to take 50-75% of usual dose of lantus the night prior to procedure  check finger stick DOS    Problem: BEHZAD (obstructive sleep apnea)  Assessment and Plan: BEHZAD precautions  IR booking notified

## 2020-03-31 NOTE — H&P PST ADULT - NSICDXPASTMEDICALHX_GEN_ALL_CORE_FT
PAST MEDICAL HISTORY:  Afib pt reports ~9 mos, [id number], resolved - monitored by Dr. Rivers    AICD lead malfunction history of - fixed follow-up by Dr Rivers    Anemia     Coronary artery disease     DM (diabetes mellitus) type 2    Elevated prolactin level dx: ' 70's  medically managed Bromocriptine    Elevated triglycerides with high cholesterol on meds    Gout medically managed    Heart failure with reduced left ventricular function last EF 3--35% 5/2019    Hepatic cirrhosis, unspecified hepatic cirrhosis type     History of hyperprolactinemia     History of osteomyelitis 2015, right foot 2nd toe   follow-up by podiatrist every 2 weeks    HTN (hypertension)     Hypothyroidism     ICD (implantable cardioverter-defibrillator) in place Medtronic, Old Model [id number] , last interrogation 3/27/19, generator change 4/3/19 New Model # [id number]    Ischemic cardiomyopathy     Liver mass dx: 10/2018   incidental finding. Currently awaitng furthur workup    Pituitary adenoma as per patient chronic, no changes , recently restarted follow up with endocrinologist ( note in allscripts )    Presence of stent in coronary artery 2 stents 1999, 2006    PVD (peripheral vascular disease)     Sleep apnea not using CPAP    Thrombocytopenia Chronic    Ulcer of right ankle has surgery done Dec 2018    Vitamin D deficiency PAST MEDICAL HISTORY:  Afib pt reports ~9 mos, [id number], resolved - monitored by Dr. Rivers    AICD lead malfunction history of - fixed follow-up by Dr Rivers    Anemia     Coronary artery disease     DM (diabetes mellitus) type 2    Elevated prolactin level dx: ' 70's  medically managed Bromocriptine    Elevated triglycerides with high cholesterol on meds    Gout medically managed    HCC (hepatocellular carcinoma) s/p liver embolization x2 in 2019    Heart failure with reduced left ventricular function last EF 3--35% 5/2019    Hepatic cirrhosis, unspecified hepatic cirrhosis type     History of hyperprolactinemia     History of osteomyelitis 2015, right foot 2nd toe   follow-up by podiatrist every 2 weeks    HTN (hypertension)     Hypothyroidism     ICD (implantable cardioverter-defibrillator) in place [employer], Old Model [id number] , last interrogation 3/27/19, generator change 4/3/19 New Model # [id number]    Ischemic cardiomyopathy     Liver mass dx: 10/2018   incidental finding. Currently awaitng furthur workup    Pituitary adenoma as per patient chronic, no changes , recently restarted follow up with endocrinologist ( note in allscripts )    Presence of stent in coronary artery 2 stents 1999, 2006    PVD (peripheral vascular disease)     Sleep apnea not using CPAP    Thrombocytopenia Chronic    Ulcer of right ankle has surgery done Dec 2018    Vitamin D deficiency

## 2020-03-31 NOTE — H&P PST ADULT - DENTITION
normal/denies loose teeth, dentures normal/denies loose teeth, in the process of getting dental work

## 2020-03-31 NOTE — H&P PST ADULT - NSICDXPASTSURGICALHX_GEN_ALL_CORE_FT
PAST SURGICAL HISTORY:  AICD (automatic cardioverter/defibrillator) present placement in (2006), generator change 4/3/19    Coronary atherosclerosis of artery bypass graft CABG X 4 (1986)    Encounter for incision and drainage procedure Dec 2018 right foot/ankle    History of amputation right foot, 2nd toe, osteo 2015    Stented coronary artery RCA (1999), another  stent 20000 PAST SURGICAL HISTORY:  AICD (automatic cardioverter/defibrillator) present placement in (2006), generator change 4/3/19    Coronary atherosclerosis of artery bypass graft CABG X 4 (1986)    Encounter for incision and drainage procedure Dec 2018 right foot/ankle    History of amputation right foot, 2nd toe, osteo 2015    SCC (squamous cell carcinoma) facial SCC, s/p Mohs    Stented coronary artery RCA (1999), another  stent 2000

## 2020-03-31 NOTE — H&P PST ADULT - NS MD HP INPLANTS MED DEV
Automatic Implantable Cardioverter Defibrillator/2 cardiac stents/AICD/ PPM: Medtronic #[id number]/Pacemaker

## 2020-03-31 NOTE — H&P PST ADULT - OTHER CARE PROVIDERS
Douglas Rivers (cardiologist) [phone]//Dr. Hurd/Ele (EP) [phone] Douglas Rivers (cardiologist) [phone]//Dr. Hurd/Ele (EP) [phone], liver specialist Inderjit Alaniz , lora Hennessy  [phone]

## 2020-03-31 NOTE — H&P PST ADULT - HISTORY OF PRESENT ILLNESS
71 y/o male with PMHx of ischemic cardiomyopathy (s/p ICD/PPM implant 2006, generator change 4/3/19), CAD (CABG x 4 1986), T2DM, hypothyroidism, Afib (resolved on own 2018- not on A/C followed by Dr. Bowers), HTN, HLD, BEHZAD (not on CPAP), gouty arthritis, FERNANDES, and cirrhosis, was diagnosed with cirrhosis of the liver in 2017 after workup for elevated liver enzymes. Patient has been followed with serial imaging by Dr. Chapo Alaniz and was referred to IR by Dr. Alaniz after imaging revealed 2 liver lesions. s/p hepatic angiogram and embolization of 4 cm right hepatic tumor on 2/7/19, requiring platelets day of procedure for thrombocytopenia. Denies abdominal pain, change in skin color, hemoptysis, melena, or hematochezia. Presents to PST for  scheduled liver angio and embo on 4/18/2019 for 2nd lesion. 71 y/o male with PMHx of ischemic cardiomyopathy (s/p ICD/PPM implant 2006, generator change 4/3/19), CAD (CABG x 4 1986), coronary stents x2 (last insertion 2000), T2DM (A1C=7.2), hypothyroidism, Afib (resolved on own 2018- not on A/C followed by Dr. Bowers), HTN, HLD, BEHZAD (not on CPAP), gouty arthritis, FERNANDES, and cirrhosis, HCC, s/p hepatic embolization x2 in 2019, recent follow up imaging revealed liver tumor, presents to PST today scheduled for liver emob tomorrow, pt has h/o thrombocytopenia, receives platetelet transfusions pre procedure, pt is also to receive XRT.    **pt was instructed by Dr. Hoyt to continue aspirin to DOS.** 71 y/o male with PMHx of ischemic cardiomyopathy (s/p ICD/PPM implant 2006, generator change 4/3/19), CAD (CABG x 4 1986), coronary stents x2 (last insertion 2000), T2DM (A1C=7.2), hypothyroidism, Afib (resolved on own 2018- not on A/C followed by Dr. Bowers), HTN, HLD, BEHZAD (not on CPAP), gouty arthritis, FERNANDES, and cirrhosis, HCC, s/p hepatic embolization x2 in 2019, recent follow up imaging revealed liver tumor, presents to PST today scheduled for liver emob on 4/2, pt has h/o thrombocytopenia, receives platetelet transfusions pre procedure, pt is also to receive XRT.    **pt was instructed by Dr. Hoyt to continue aspirin to DOS.**

## 2020-04-02 ENCOUNTER — RESULT REVIEW (OUTPATIENT)
Age: 70
End: 2020-04-02

## 2020-04-02 ENCOUNTER — OUTPATIENT (OUTPATIENT)
Dept: OUTPATIENT SERVICES | Facility: HOSPITAL | Age: 70
LOS: 1 days | End: 2020-04-02
Payer: COMMERCIAL

## 2020-04-02 ENCOUNTER — APPOINTMENT (OUTPATIENT)
Dept: NUCLEAR MEDICINE | Facility: HOSPITAL | Age: 70
End: 2020-04-02

## 2020-04-02 DIAGNOSIS — C44.92 SQUAMOUS CELL CARCINOMA OF SKIN, UNSPECIFIED: Chronic | ICD-10-CM

## 2020-04-02 DIAGNOSIS — Z01.89 ENCOUNTER FOR OTHER SPECIFIED SPECIAL EXAMINATIONS: Chronic | ICD-10-CM

## 2020-04-02 DIAGNOSIS — C22.0 LIVER CELL CARCINOMA: ICD-10-CM

## 2020-04-02 DIAGNOSIS — Z89.9 ACQUIRED ABSENCE OF LIMB, UNSPECIFIED: Chronic | ICD-10-CM

## 2020-04-02 LAB
ANION GAP SERPL CALC-SCNC: 13 MMOL/L — SIGNIFICANT CHANGE UP (ref 5–17)
BUN SERPL-MCNC: 50 MG/DL — HIGH (ref 7–23)
CALCIUM SERPL-MCNC: 9.6 MG/DL — SIGNIFICANT CHANGE UP (ref 8.4–10.5)
CHLORIDE SERPL-SCNC: 103 MMOL/L — SIGNIFICANT CHANGE UP (ref 96–108)
CO2 SERPL-SCNC: 22 MMOL/L — SIGNIFICANT CHANGE UP (ref 22–31)
CREAT SERPL-MCNC: 2 MG/DL — HIGH (ref 0.5–1.3)
GLUCOSE BLDC GLUCOMTR-MCNC: 125 MG/DL — HIGH (ref 70–99)
GLUCOSE SERPL-MCNC: 146 MG/DL — HIGH (ref 70–99)
HCT VFR BLD CALC: 31 % — LOW (ref 39–50)
HGB BLD-MCNC: 9.9 G/DL — LOW (ref 13–17)
MCHC RBC-ENTMCNC: 30.3 PG — SIGNIFICANT CHANGE UP (ref 27–34)
MCHC RBC-ENTMCNC: 31.9 GM/DL — LOW (ref 32–36)
MCV RBC AUTO: 94.8 FL — SIGNIFICANT CHANGE UP (ref 80–100)
NRBC # BLD: 0 /100 WBCS — SIGNIFICANT CHANGE UP (ref 0–0)
PLATELET # BLD AUTO: 30 K/UL — LOW (ref 150–400)
POTASSIUM SERPL-MCNC: 5.1 MMOL/L — SIGNIFICANT CHANGE UP (ref 3.5–5.3)
POTASSIUM SERPL-SCNC: 5.1 MMOL/L — SIGNIFICANT CHANGE UP (ref 3.5–5.3)
RBC # BLD: 3.27 M/UL — LOW (ref 4.2–5.8)
RBC # FLD: 16 % — HIGH (ref 10.3–14.5)
SODIUM SERPL-SCNC: 138 MMOL/L — SIGNIFICANT CHANGE UP (ref 135–145)
WBC # BLD: 4.2 K/UL — SIGNIFICANT CHANGE UP (ref 3.8–10.5)
WBC # FLD AUTO: 4.2 K/UL — SIGNIFICANT CHANGE UP (ref 3.8–10.5)

## 2020-04-02 PROCEDURE — 75774 ARTERY X-RAY EACH VESSEL: CPT | Mod: 26

## 2020-04-02 PROCEDURE — C1887: CPT

## 2020-04-02 PROCEDURE — 36247 INS CATH ABD/L-EXT ART 3RD: CPT

## 2020-04-02 PROCEDURE — 85027 COMPLETE CBC AUTOMATED: CPT

## 2020-04-02 PROCEDURE — 36248 INS CATH ABD/L-EXT ART ADDL: CPT | Mod: 59

## 2020-04-02 PROCEDURE — 76380 CAT SCAN FOLLOW-UP STUDY: CPT | Mod: 26

## 2020-04-02 PROCEDURE — 75726 ARTERY X-RAYS ABDOMEN: CPT

## 2020-04-02 PROCEDURE — C1894: CPT

## 2020-04-02 PROCEDURE — C1769: CPT

## 2020-04-02 PROCEDURE — 76380 CAT SCAN FOLLOW-UP STUDY: CPT

## 2020-04-02 PROCEDURE — C1760: CPT

## 2020-04-02 PROCEDURE — A9540: CPT

## 2020-04-02 PROCEDURE — P9037: CPT

## 2020-04-02 PROCEDURE — 76937 US GUIDE VASCULAR ACCESS: CPT

## 2020-04-02 PROCEDURE — 82962 GLUCOSE BLOOD TEST: CPT

## 2020-04-02 PROCEDURE — 80048 BASIC METABOLIC PNL TOTAL CA: CPT

## 2020-04-02 PROCEDURE — 76937 US GUIDE VASCULAR ACCESS: CPT | Mod: 26

## 2020-04-02 PROCEDURE — 75726 ARTERY X-RAYS ABDOMEN: CPT | Mod: 26

## 2020-04-02 PROCEDURE — 78803 RP LOCLZJ TUM SPECT 1 AREA: CPT | Mod: 26

## 2020-04-02 PROCEDURE — 78803 RP LOCLZJ TUM SPECT 1 AREA: CPT

## 2020-04-02 PROCEDURE — 75774 ARTERY X-RAY EACH VESSEL: CPT | Mod: 59

## 2020-04-02 RX ORDER — SODIUM CHLORIDE 9 MG/ML
1000 INJECTION INTRAMUSCULAR; INTRAVENOUS; SUBCUTANEOUS
Refills: 0 | Status: DISCONTINUED | OUTPATIENT
Start: 2020-04-02 | End: 2020-04-17

## 2020-04-02 NOTE — PRE-ANESTHESIA EVALUATION ADULT - NS MD HP INPLANTS MED DEV
Automatic Implantable Cardioverter Defibrillator/2 cardiac stents/AICD/ PPM: Medtronic #[id number]/Pacemaker

## 2020-04-08 LAB
ALBUMIN SERPL ELPH-MCNC: 3.8 G/DL
ALP BLD-CCNC: 153 U/L
ALT SERPL-CCNC: 27 U/L
ANION GAP SERPL CALC-SCNC: 15 MMOL/L
AST SERPL-CCNC: 51 U/L
BILIRUB SERPL-MCNC: 0.7 MG/DL
BUN SERPL-MCNC: 46 MG/DL
CALCIUM SERPL-MCNC: 8.8 MG/DL
CHLORIDE SERPL-SCNC: 101 MMOL/L
CO2 SERPL-SCNC: 20 MMOL/L
CREAT SERPL-MCNC: 1.87 MG/DL
GLUCOSE SERPL-MCNC: 114 MG/DL
POTASSIUM SERPL-SCNC: 5 MMOL/L
PROT SERPL-MCNC: 7.1 G/DL
SODIUM SERPL-SCNC: 136 MMOL/L

## 2020-04-09 ENCOUNTER — LABORATORY RESULT (OUTPATIENT)
Age: 70
End: 2020-04-09

## 2020-04-09 ENCOUNTER — OUTPATIENT (OUTPATIENT)
Dept: OUTPATIENT SERVICES | Facility: HOSPITAL | Age: 70
LOS: 1 days | End: 2020-04-09
Payer: COMMERCIAL

## 2020-04-09 ENCOUNTER — RESULT REVIEW (OUTPATIENT)
Age: 70
End: 2020-04-09

## 2020-04-09 DIAGNOSIS — Z89.9 ACQUIRED ABSENCE OF LIMB, UNSPECIFIED: Chronic | ICD-10-CM

## 2020-04-09 DIAGNOSIS — C44.92 SQUAMOUS CELL CARCINOMA OF SKIN, UNSPECIFIED: Chronic | ICD-10-CM

## 2020-04-09 DIAGNOSIS — C22.0 LIVER CELL CARCINOMA: ICD-10-CM

## 2020-04-09 DIAGNOSIS — Z01.89 ENCOUNTER FOR OTHER SPECIFIED SPECIAL EXAMINATIONS: Chronic | ICD-10-CM

## 2020-04-09 LAB
ALBUMIN SERPL ELPH-MCNC: 4 G/DL
ALP BLD-CCNC: 130 U/L
ALT SERPL-CCNC: 20 U/L
ANION GAP SERPL CALC-SCNC: 13 MMOL/L
APTT BLD: 31.9 SEC
AST SERPL-CCNC: 28 U/L
BASOPHILS # BLD AUTO: 0.02 K/UL
BASOPHILS NFR BLD AUTO: 0.5 %
BILIRUB SERPL-MCNC: 1.3 MG/DL
BUN SERPL-MCNC: 52 MG/DL
CALCIUM SERPL-MCNC: 9.1 MG/DL
CHLORIDE SERPL-SCNC: 105 MMOL/L
CO2 SERPL-SCNC: 21 MMOL/L
CREAT SERPL-MCNC: 2.11 MG/DL
EOSINOPHIL # BLD AUTO: 0.96 K/UL
EOSINOPHIL NFR BLD AUTO: 22.6 %
GLUCOSE SERPL-MCNC: 112 MG/DL
HCT VFR BLD CALC: 30 %
HGB BLD-MCNC: 9.6 G/DL
IMM GRANULOCYTES NFR BLD AUTO: 0.5 %
INR PPP: 1.23 RATIO
LYMPHOCYTES # BLD AUTO: 0.75 K/UL
LYMPHOCYTES NFR BLD AUTO: 17.6 %
MAN DIFF?: YES
MCHC RBC-ENTMCNC: 30.6 PG
MCHC RBC-ENTMCNC: 32 GM/DL
MCV RBC AUTO: 95.5 FL
MONOCYTES # BLD AUTO: 0.48 K/UL
MONOCYTES NFR BLD AUTO: 11.3 %
NEUTROPHILS # BLD AUTO: 2.02 K/UL
NEUTROPHILS NFR BLD AUTO: 47.5 %
PLATELET # BLD AUTO: 31 K/UL
POTASSIUM SERPL-SCNC: 5.5 MMOL/L
PROT SERPL-MCNC: 6.8 G/DL
PT BLD: 14.2 SEC
RBC # BLD: 3.14 M/UL
RBC # FLD: 16.3 %
SODIUM SERPL-SCNC: 139 MMOL/L
WBC # FLD AUTO: 4.3 K/UL

## 2020-04-09 PROCEDURE — 36247 INS CATH ABD/L-EXT ART 3RD: CPT

## 2020-04-09 PROCEDURE — 79445 NUCLEAR RX INTRA-ARTERIAL: CPT

## 2020-04-09 PROCEDURE — 76937 US GUIDE VASCULAR ACCESS: CPT | Mod: 26

## 2020-04-09 PROCEDURE — 77336 RADIATION PHYSICS CONSULT: CPT

## 2020-04-09 PROCEDURE — 76937 US GUIDE VASCULAR ACCESS: CPT

## 2020-04-09 PROCEDURE — 37243 VASC EMBOLIZE/OCCLUDE ORGAN: CPT

## 2020-04-09 PROCEDURE — C2616: CPT

## 2020-04-09 PROCEDURE — C1894: CPT

## 2020-04-09 PROCEDURE — 36248 INS CATH ABD/L-EXT ART ADDL: CPT

## 2020-04-09 PROCEDURE — 77300 RADIATION THERAPY DOSE PLAN: CPT

## 2020-04-09 PROCEDURE — 79445 NUCLEAR RX INTRA-ARTERIAL: CPT | Mod: 26

## 2020-04-09 PROCEDURE — 76380 CAT SCAN FOLLOW-UP STUDY: CPT | Mod: XU

## 2020-04-09 PROCEDURE — C1769: CPT

## 2020-04-09 PROCEDURE — C1887: CPT

## 2020-04-09 PROCEDURE — 76380 CAT SCAN FOLLOW-UP STUDY: CPT | Mod: 26,XU

## 2020-04-09 PROCEDURE — C1760: CPT

## 2020-04-09 PROCEDURE — 77263 THER RADIOLOGY TX PLNG CPLX: CPT

## 2020-04-09 PROCEDURE — P9037: CPT

## 2020-04-09 RX ORDER — SODIUM CHLORIDE 9 MG/ML
1000 INJECTION INTRAMUSCULAR; INTRAVENOUS; SUBCUTANEOUS
Refills: 0 | Status: DISCONTINUED | OUTPATIENT
Start: 2020-04-09 | End: 2020-04-24

## 2020-04-09 NOTE — PROGRESS NOTE ADULT - SUBJECTIVE AND OBJECTIVE BOX
69 y/o male with PMHx of ischemic cardiomyopathy (s/p ICD/PPM implant 2006, generator change 4/3/19), CAD (CABG x 4 1986), coronary stents x2 (last insertion 2000), T2DM (A1C=7.2), hypothyroidism, Afib (resolved on own 2018- not on A/C followed by Dr. Bowers), HTN, HLD, BEHZAD (not on CPAP), gouty arthritis, FERNANDES, and cirrhosis, HCC, s/p hepatic embolization x2 in 2019, recent follow up imaging revealed liver tumor s/p Successful mapping hepatic arteriography and, [id number] injection into the PROPER hepatic artery for hepatopulmonary shunt study on 4/2 here for hepatic angiogram and radioembolization.     NPO status: NPO past midnight.   Anticoagulation: Aspirin 81mg taken today.     Allergies: No Known Allergies      PAST MEDICAL & SURGICAL HISTORY:  HCC (hepatocellular carcinoma): s/p liver embolization x2 in 2019  Afib: pt reports ~9 mos, [id number], resolved - monitored by Dr. Rivers  Ulcer of right ankle: has surgery done Dec 2018  Liver mass: dx: 10/2018   incidental finding. Currently awaitng furthur workup  Gout: medically managed  Elevated prolactin level: dx: &#x27; 70&#x27;s  medically managed Bromocriptine  Vitamin D deficiency  Elevated triglycerides with high cholesterol: on meds  Hypothyroidism  PVD (peripheral vascular disease)  History of hyperprolactinemia  Hepatic cirrhosis, unspecified hepatic cirrhosis type  Anemia  ICD (implantable cardioverter-defibrillator) in place: Medtronic, Old Model [id number] , last interrogation 3/27/19, generator change 4/3/19 New Model # [id number]  Sleep apnea: not using CPAP  History of osteomyelitis: 2015, right foot 2nd toe   follow-up by podiatrist every 2 weeks  Presence of stent in coronary artery: 2 stents 1999, 2006  Heart failure with reduced left ventricular function: last EF 3--35% 5/2019  Ischemic cardiomyopathy  Pituitary adenoma: as per patient chronic, no changes , recently restarted follow up with endocrinologist ( note in allscripts )  Coronary artery disease  Thrombocytopenia: Chronic  HTN (hypertension)  DM (diabetes mellitus): type 2  AICD lead malfunction: history of - fixed follow-up by Dr Rivers  SCC (squamous cell carcinoma): facial SCC, s/p Mohs  Encounter for incision and drainage procedure: Dec 2018 right foot/ankle  History of amputation: right foot, 2nd toe, osteo 2015  AICD (automatic cardioverter/defibrillator) present: placement in (2006), generator change 4/3/19  Stented coronary artery: RCA (1999), another  stent 2000  Coronary atherosclerosis of artery bypass graft: CABG X 4 (1986)        Pertinent labs:  Complete Blood Count STAT (04.02.20 @ 08:52)    Nucleated RBC: 0 /100 WBCs    WBC Count: 4.20 K/uL    RBC Count: 3.27 M/uL    Hemoglobin: 9.9 g/dL    Hematocrit: 31.0 %    Mean Cell Volume: 94.8 fl    Mean Cell Hemoglobin: 30.3 pg    Mean Cell Hemoglobin Conc: 31.9 gm/dL    Red Cell Distrib Width: 16.0 %    Platelet Count - Automated: 30 K/uL    Prothrombin Time and INR, Plasma (03.31.20 @ 14:16)    Prothrombin Time, Plasma: 13.4 sec    INR: 1.16: Recommended ranges for therapeutic INR:    2.0-3.0 for most medical and surgical thromboembolic states    2.0-3.0 for atrial fibrillation    2.0-3.0 for bileaflet mechanical valve in aortic position    2.5-3.5 for mechanical heart valves    Chest 2004;126:[id number]  The presence of direct thrombin inhibitors (argatroban, refludan)  may falsely increase results. ratio    Comprehensive Metabolic Panel (03.31.20 @ 11:09)    Sodium, Serum: 139 mmol/L    Potassium, Serum: 5.1 mmol/L    Chloride, Serum: 101 mmol/L    Carbon Dioxide, Serum: 22 mmol/L    Anion Gap, Serum: 16 mmol/L    Blood Urea Nitrogen, Serum: 46 mg/dL    Creatinine, Serum: 1.80 mg/dL    Glucose, Serum: 177 mg/dL    Calcium, Total Serum: 10.5 mg/dL    Protein Total, Serum: 7.8 g/dL    Albumin, Serum: 4.1 g/dL    Bilirubin Total, Serum: 1.0 mg/dL    Alkaline Phosphatase, Serum: 126 U/L    Aspartate Aminotransferase (AST/SGOT): 40 U/L    Alanine Aminotransferase (ALT/SGPT): 25 U/L    eGFR if Non : 38: Interpretative comment  The units for eGFR are mL/min/1.73M2 (normalized body surface area). The  eGFR is calculated from a serum creatinine using the CKD-EPI equation.  Other variables required for calculation are race, age and sex. Among  patients with chronic kidney disease (CKD), the eGFR is useful in  determining the stage of disease according to KDOQI CKD classification.  All eGFR results are reported numerically with the following  interpretation.          GFR                    With                 Without     (ml/min/1.73 m2)    Kidney Damage       Kidney Damage        >= 90                    Stage 1                     Normal        60-89                    Stage 2                     Decreased GFR        30-59     Stage 3                     Stage 3        15-29                    Stage 4                     Stage 4        < 15                      Stage 5                     Stage 5  Each stage of CKD assumes that the associated GFR level has been in  effect for at least 3 months. Determination of stages one and two (with  eGFR > 59 ml/min/m2) requires estimation of kidney damage for at least 3  months as defined by structural or functional abnormalities.  Limitations: All estimates of GFR will be less accurate for patients at  extremes of muscle mass (including but not limited to frail elderly,  critically ill, or cancer patients), those with unusual diets, and those  with conditions associated with reduced secretion or extrarenal  elimination of creatinine. The eGFR equation is not recommended for use  in patients with unstable creatinine levels. mL/min/1.73M2    eGFR if African American: 44 mL/min/1.73M2    Plan: Awaiting repeat labs. Giving IV fluid prior to procedure and 2 units of PLT ordered.   Consent: Procedure/risks/ Benefits explained. Informed consent obtained. Pt verbalizes understanding.

## 2020-04-10 DIAGNOSIS — C22.0 LIVER CELL CARCINOMA: ICD-10-CM

## 2020-04-10 PROBLEM — Z95.5 PRESENCE OF CORONARY ANGIOPLASTY IMPLANT AND GRAFT: Chronic | Status: ACTIVE | Noted: 2017-08-22

## 2020-04-14 DIAGNOSIS — K74.60 UNSPECIFIED CIRRHOSIS OF LIVER: ICD-10-CM

## 2020-04-14 DIAGNOSIS — C22.0 LIVER CELL CARCINOMA: ICD-10-CM

## 2020-04-16 ENCOUNTER — LABORATORY RESULT (OUTPATIENT)
Age: 70
End: 2020-04-16

## 2020-04-16 LAB
ALBUMIN SERPL ELPH-MCNC: 3.8 G/DL
ALP BLD-CCNC: 144 U/L
ALT SERPL-CCNC: 17 U/L
ANION GAP SERPL CALC-SCNC: 11 MMOL/L
APTT BLD: 31.4 SEC
AST SERPL-CCNC: 30 U/L
BASOPHILS # BLD AUTO: 0.01 K/UL
BASOPHILS NFR BLD AUTO: 0.3 %
BILIRUB SERPL-MCNC: 0.6 MG/DL
BUN SERPL-MCNC: 32 MG/DL
CALCIUM SERPL-MCNC: 9.4 MG/DL
CHLORIDE SERPL-SCNC: 107 MMOL/L
CO2 SERPL-SCNC: 23 MMOL/L
CREAT SERPL-MCNC: 1.53 MG/DL
EOSINOPHIL # BLD AUTO: 0.95 K/UL
EOSINOPHIL NFR BLD AUTO: 24.9 %
GLUCOSE SERPL-MCNC: 133 MG/DL
HCT VFR BLD CALC: 30.4 %
HGB BLD-MCNC: 9.5 G/DL
IMM GRANULOCYTES NFR BLD AUTO: 0.3 %
INR PPP: 1.18 RATIO
LYMPHOCYTES # BLD AUTO: 0.37 K/UL
LYMPHOCYTES NFR BLD AUTO: 9.7 %
MAN DIFF?: NORMAL
MCHC RBC-ENTMCNC: 30.7 PG
MCHC RBC-ENTMCNC: 31.3 GM/DL
MCV RBC AUTO: 98.4 FL
MONOCYTES # BLD AUTO: 0.44 K/UL
MONOCYTES NFR BLD AUTO: 11.5 %
NEUTROPHILS # BLD AUTO: 2.04 K/UL
NEUTROPHILS NFR BLD AUTO: 53.3 %
PLATELET # BLD AUTO: 32 K/UL
POTASSIUM SERPL-SCNC: 5 MMOL/L
PROT SERPL-MCNC: 6.8 G/DL
PT BLD: 13.5 SEC
RBC # BLD: 3.09 M/UL
RBC # FLD: 17 %
SODIUM SERPL-SCNC: 141 MMOL/L
WBC # FLD AUTO: 3.82 K/UL

## 2020-05-05 NOTE — PATIENT PROFILE ADULT. - TOBACCO USE
Assessment/Plan


Problems:  


(1) PNA (pneumonia)


Assessment & Plan:  superimposed with COVID 19 infection, S/P vancomycin and 

aztreonam empirically for 10 days . may remove from enhanced droplet isolation 

for now 





(2) COVID-19


Assessment & Plan:  tested positive with NP PCR test , S/P hydroxychloroquine 

with zinc and vitamin C for five days total . may remove from enhanced droplet 

isolation. 





(3) Fever


Assessment & Plan:  suspect due to the above , IMPROVING , continue Tylenol as 

needed 





(4) Sepsis


Assessment & Plan:  due to the above resolved , S/P vancomycin with azactam to 

cover for pneumonia for 10 days , blood culture x 2 is negative 





(5) ESRD (end stage renal disease) on dialysis


Assessment & Plan:  continue hemodialysis and renally  dosed antibiotics as per 

pharmacy





(6) DM II (diabetes mellitus, type II), controlled


Assessment & Plan:  recommend tight glycemic control to keep blood glucose 

between  [id number]








Subjective


Constitutional:  Reports: no symptoms


HEENT:  Reports: no symptoms


Respiratory:  Reports: no symptoms


Breasts:  Reports: no symptoms


Cardiovascular:  Reports: no symptoms


Gastrointestinal/Abdominal:  Reports: no symptoms


Genitourinary:  Reports: no symptoms


Neurologic:  Reports: no symptoms


Psychiatric:  Reports: no symptoms


Skin:  Reports: no symptoms


Endocrine:  Reports: no symptoms


Hematologic:  Reports: no symptoms


Musculoskeletal:  Reports: no symptoms


Allergies:  


Coded Allergies:  


     PENICILLINS (Verified  Allergy, Unknown, 11/30/17)


 hives


 11/30/17: ITCHING WITH ZOSYN





Objective


Vital Signs





Last 24 Hour Vital Signs








  Date Time  Temp Pulse Resp B/P (MAP) Pulse Ox O2 Delivery O2 Flow Rate FiO2


 


5/5/20 16:00 98.6 78 18 102/57 (72) 97   


 


5/5/20 12:00 98.2 74 18 105/54 (71) 99   


 


5/5/20 09:00  84  108/75    


 


5/5/20 09:00      Nasal Cannula 2.0 


 


5/5/20 08:00 97.5 84 18 108/75 (86) 99   


 


5/5/20 04:00 98.1 65 22 134/56 (82) 96   


 


5/5/20 00:00 97.9 76 20 131/56 (81) 93   








Height (Feet):  4


Height (Inches):  10.00


Weight (Pounds):  99


General Appearance:  WD/WN, no acute distress


HEENT:  normocephalic, atraumatic, anicteric, mucous membranes moist, PERRL


Respiratory/Chest:  chest wall non-tender, lungs clear, normal breath sounds, 

no respiratory distress, no accessory muscle use


Cardiovascular:  normal peripheral pulses, normal rate, regular rhythm, no 

gallop/murmur, no JVD


Abdomen:  normal bowel sounds, soft, non tender, no organomegaly, non distended

, no mass, no scars


Genitourinary:  normal external genitalia


Extremities:  no cyanosis, no clubbing


Skin:  no rash, no lesions, no ulcers


Neurologic/Psychiatric:  CNs II-XII grossly normal, no motor/sensory deficits, 

alert, oriented x 3, responsive


Lymphatic:  no neck adenopathy, no groin adenopathy


Musculoskeletal:  normal muscle bulk, no effusion





Laboratory Tests








Test


  5/5/20


04:45


 


Sodium Level


  140 MMOL/L


(136-145)


 


Potassium Level


  3.9 MMOL/L


(3.5-5.1)


 


Chloride Level


  96 MMOL/L


()  L


 


Carbon Dioxide Level


  30 MMOL/L


(21-32)


 


Anion Gap


  14 mmol/L


(5-15)


 


Blood Urea Nitrogen


  51 mg/dL


(7-18)  H


 


Creatinine


  13.7 MG/DL


(0.55-1.30)  H


 


Estimat Glomerular Filtration


Rate 3.0 mL/min


(>60)


 


Glucose Level


  81 MG/DL


()


 


Calcium Level


  9.2 MG/DL


(8.5-10.1)


 


Total Bilirubin


  0.6 MG/DL


(0.2-1.0)


 


Aspartate Amino Transf


(AST/SGOT) 29 U/L (15-37)


 


 


Alanine Aminotransferase


(ALT/SGPT) 24 U/L (12-78)


 


 


Alkaline Phosphatase


  138 U/L


()  H


 


Total Protein


  7.7 G/DL


(6.4-8.2)


 


Albumin


  3.0 G/DL


(3.4-5.0)  L


 


Globulin 4.7 g/dL  


 


Albumin/Globulin Ratio


  0.6 (1.0-2.7)


L











Current Medications








 Medications


  (Trade)  Dose


 Ordered  Sig/Weston


 Route


 PRN Reason  Start Time


 Stop Time Status Last Admin


Dose Admin


 


 Acetaminophen


  (Tylenol)  650 mg  Q4H  PRN


 ORAL


 Temp >100.5 / mild pain 1-3  4/22/20 23:00


 5/22/20 22:59  4/26/20 08:17


 


 


 Albuterol Sulfate


  (Proventil MDI)  2 puff  Q4H  PRN


 INH


 Shortness of Breath  4/22/20 23:00


 7/21/20 22:59   


 


 


 Albuterol/


 Ipratropium


  (Combivent


 Respimat)  1 puff  Q6HRT


 INH


   4/23/20 01:00


 5/23/20 00:59  5/5/20 18:02


 


 


 Amlodipine


 Besylate


  (Norvasc)  2.5 mg  DAILY


 ORAL


   4/30/20 09:00


 5/30/20 08:59  5/2/20 09:58


 


 


 Clonidine HCl


  (Catapres Tab)  0.1 mg  Q4H  PRN


 ORAL


 BP over 160 systolic  4/22/20 15:00


 7/21/20 14:59   


 


 


 Guaifenesin/


 Dextromethorphan


  (Robitussin DM


 Syrup)  10 ml  Q4H  PRN


 ORAL


 For Cough  4/22/20 23:00


 7/21/20 22:59  4/26/20 20:42


 


 


 Heparin Sodium


  (Porcine)


  (Heparin 5000


 units/ml)  5,000 units  EVERY 12  HOURS


 SUBQ


   4/22/20 15:45


 6/6/20 15:44  5/5/20 20:26


 


 


 Loperamide HCl


  (Imodium)  2 mg  TIDPRN  PRN


 ORAL


 Diarrhea  4/24/20 12:45


 5/24/20 12:44  5/4/20 18:56


 


 


 Pantoprazole


  (Protonix)  40 mg  BID


 ORAL


   4/22/20 18:00


 5/22/20 17:59  5/5/20 18:01


 


 


 Sevelamer


 Carbonate


  (Renvela)  1,600 mg  THREE TIMES A  DAY


 ORAL


   5/2/20 13:39


 7/31/20 13:38  5/5/20 18:02


 

















Leandra Laws M.D. May 5, 2020 21:39 Never smoker

## 2020-05-08 ENCOUNTER — APPOINTMENT (OUTPATIENT)
Dept: INTERVENTIONAL RADIOLOGY/VASCULAR | Facility: CLINIC | Age: 70
End: 2020-05-08
Payer: COMMERCIAL

## 2020-05-08 PROCEDURE — 99202 OFFICE O/P NEW SF 15 MIN: CPT | Mod: 95

## 2020-05-11 NOTE — PATIENT PROFILE ADULT - EQUIPMENT CURRENTLY USED AT HOME
Viktor Burnette is an 11-1/2-year-old male child who carries a diagnosis of autism.  He returns today with his mother.  I had the  on the phone as well.    Due to computer problems, I do not have a lot of time for this note.    So let me Sum it up:  1.  The Trileptal was stopped by mother.  Initially he was angry.  Now he is fine.  Mother has seen no seizures 2.  Melatonin 3 mg as worked for sleep.  Sleeps 7-8 hours a night.  This is good for him too.  3.  He never stops talking.  He is reading from his cell phone.  Mother says he does not understand Pashto but only English.  He is manic.  Mother and I have discussed how important it is that he continued to sleep so that he get some rest.  4.  He continues to bite his hands; but it is better than it has been in a while.    Weight is 43.65 kg.  Height is 4 ft 8 in.    Viktor is a well-nourished well-developed male child.  He continues to read in English from his phone.  He does not initially respond to mother when she speaks to him.  However, eventually he will.    He has no ataxia.  He has no dysmetria.  No nystagmus.    Heart reveals regular rate and rhythm.  Lungs are clear.    Mother and I have discussed Genetics in the past.  Mom is ready to see them now.    So I have referred mom for Genetics consult.  She is to continue using the melatonin.  She will call me if his anger/frustration increases.  
no

## 2020-05-12 ENCOUNTER — NON-APPOINTMENT (OUTPATIENT)
Age: 70
End: 2020-05-12

## 2020-05-12 ENCOUNTER — APPOINTMENT (OUTPATIENT)
Dept: CARDIOLOGY | Facility: CLINIC | Age: 70
End: 2020-05-12
Payer: COMMERCIAL

## 2020-05-12 VITALS
HEIGHT: 70 IN | BODY MASS INDEX: 28.92 KG/M2 | OXYGEN SATURATION: 99 % | TEMPERATURE: 97.6 F | WEIGHT: 202 LBS | DIASTOLIC BLOOD PRESSURE: 63 MMHG | SYSTOLIC BLOOD PRESSURE: 111 MMHG | RESPIRATION RATE: 17 BRPM | HEART RATE: 76 BPM

## 2020-05-12 DIAGNOSIS — I47.2 VENTRICULAR TACHYCARDIA: ICD-10-CM

## 2020-05-12 PROCEDURE — 99215 OFFICE O/P EST HI 40 MIN: CPT

## 2020-05-12 PROCEDURE — 93000 ELECTROCARDIOGRAM COMPLETE: CPT

## 2020-05-12 NOTE — REVIEW OF SYSTEMS
[Joint Pain] : joint pain [Negative] : Heme/Lymph [Shortness Of Breath] : no shortness of breath [Dyspnea on exertion] : not dyspnea during exertion [Chest Pain] : no chest pain [Lower Ext Edema] : no extremity edema [Leg Claudication] : no intermittent leg claudication

## 2020-05-12 NOTE — HISTORY OF PRESENT ILLNESS
[FreeTextEntry1] : Mr. Jamil Harvey is a 69-year-old man with ischemic cardiomyopathy, ventricular tachycardia and appropriate ICD device firing. Uvadlo lead fractured and was capped and a new generator and lead implanted. After device adjustments to alleviate potential for pause dependent ventricular tachycardia has had no events. Otherwise, despite his long history of severe multivessel coronary artery disease and very remote coronary bypass surgery with severe ventricular dysfunction, has always been active and never experiences chest pain or dyspnea. Recurrent foot infection managed by podiatrist and ID and healed, but recurrent cellulitis with hospital stay for IV antibiotics and complication of JULIANNE, ultimately resolved. He always sleeps in recliner to manage obstructive sleep apnea as has been unable to tolerate any mask or device. Regular exercise includes walking, riding stationary bike 6 miles a day and golf. Now over 30 years coming to this practice since coronary bypass surgery.\par \par Last year onset of anasarca, diuretic dose increased with minimal response. Then added metolazone with dramatic response, 65 pound weight reduction and now weighs much less than at anytime in our record. Was feeling much better and denies dizziness, lightheadedness. Started on anticoagulation with plan for cardioversion, but instead admitted with septic bursitis of knee requiring orthopedic procedure and remarkably spontaneously reverted to sinus rhythm which continues to be maintained. Breathing improved, not retaining excess volume. Had ICD generator change for NÉSTOR and has also IR embolization of HCC.\par \par In March notified of device transmission indicating atrial tachycardia rates 100 to 110. Contacted patient who found rates 100 for few days then down to 80s and since then 60s to 70. Currently finds volume status optimal and all leg wounds have healed. Had no issues with dizziness or lightheadedness last year during hot summer months.

## 2020-05-12 NOTE — DISCUSSION/SUMMARY
[Cardiomyopathy] : cardiomyopathy [NYHA Class II] : NYHA Class II [ACC/AHA Stage C] : ACC/AHA Stage C [Ischemic Cardiomyopathy] : ischemic cardiomyopathy [Coronary Artery Disease] : coronary artery disease [Chronic Systolic Heart Failure] : chronic systolic congestive heart failure [Compensated] : compensated [Paroxysmal Ventricular Tachycardia] : paroxysmal [Stable] : stable [None] : There are no changes in medication management [Patient] : the patient [de-identified] : prolonged and refractory atrial tachycardia spontaneously reverted to sinus and maintained for prolonged period, brief period in March and back to his usual rhythm [de-identified] : has ICD

## 2020-05-12 NOTE — PHYSICAL EXAM
[Normal Appearance] : normal appearance [Well Groomed] : well groomed [General Appearance - Well Developed] : well developed [General Appearance - Well Nourished] : well nourished [No Deformities] : no deformities [Normal Conjunctiva] : the conjunctiva exhibited no abnormalities [General Appearance - In No Acute Distress] : no acute distress [Eyelids - No Xanthelasma] : the eyelids demonstrated no xanthelasmas [Normal Oral Mucosa] : normal oral mucosa [No Oral Cyanosis] : no oral cyanosis [No Oral Pallor] : no oral pallor [JVD Elevated _____cm] : JVD elevated [unfilled] ~U cm above clavicle [Normal Jugular Venous V Waves Present] : normal jugular venous V waves present [No Jugular Venous Storm A Waves] : no jugular venous storm A waves [Respiration, Rhythm And Depth] : normal respiratory rhythm and effort [Exaggerated Use Of Accessory Muscles For Inspiration] : no accessory muscle use [Auscultation Breath Sounds / Voice Sounds] : lungs were clear to auscultation bilaterally [Normal Rate] : normal [Not Palpable] : not palpable [Rhythm Regular] : regular [Normal S1] : normal S1 [Normal S2] : normal S2 [No Gallop] : no gallop heard [No Murmur] : no murmurs heard [1+] : left 1+ [2+] : left 2+ [No Abnormalities] : the abdominal aorta was not enlarged and no bruit was heard [___ +] : bilateral [unfilled]U+ pitting edema to the ankles [Abdomen Soft] : soft [Abdomen Tenderness] : non-tender [Abdomen Mass (___ Cm)] : no abdominal mass palpated [Abnormal Walk] : normal gait [Nail Clubbing] : no clubbing of the fingernails [Petechial Hemorrhages (___cm)] : no petechial hemorrhages [Cyanosis, Localized] : no localized cyanosis [Skin Color & Pigmentation] : normal skin color and pigmentation [No Skin Ulcers] : no skin ulcer [Skin Lesions] : no skin lesions [] : no rash [No Xanthoma] : no  xanthoma was observed [Affect] : the affect was normal [Oriented To Time, Place, And Person] : oriented to person, place, and time [No Anxiety] : not feeling anxious [Mood] : the mood was normal [Right Carotid Bruit] : no bruit heard over the right carotid [Left Carotid Bruit] : no bruit heard over the left carotid [Rt] : no varicose veins of the right leg [FreeTextEntry1] : venous stasis discoloration of distal lower extremities

## 2020-05-12 NOTE — REASON FOR VISIT
[Follow-Up - Clinic] : a clinic follow-up of [Coronary Artery Disease] : coronary artery disease [Cardiomyopathy] : cardiomyopathy [Ventricular Tachycardia] : ventricular tachycardia

## 2020-05-24 ENCOUNTER — RX RENEWAL (OUTPATIENT)
Age: 70
End: 2020-05-24

## 2020-05-30 NOTE — ED PROVIDER NOTE - NS ED MD DISPO SPECIAL CONSIDERATION1
[facility] has been asked by /medical team to assist with transition of care for the patient.   Patient resides in [facility].  Spoke with Quin ([phone]), [facility]: per Quin patient had instances of agressive behaviour in the facility recently, was checked in a different hospital and nothing was found wrong with the patient. Per Quin, patient has Legal State Sophie Hernandez [phone]. Quin states that [facility] has sent a referral to a [facility] and the referral would be reviewed on Monday, but [facility] needs ER clearance for the patient or recommendation to admit the patient to the [facility]. Let Quin know that for the best of [facility] knowledge, patient has been cleared by EDMD and there is no reason for  acute placement for the patient at this point, per  MSW report. Suggested Quin to talk to JASON Neil and EDMD for any further medical or  related questions/concerns.    Quin agreed to accept patient back to the facility: Covid results and other medical documentation can be faxed tomorrow once results received. [facility] nursing office ph# [phone]; fax # [phone].  ED MD, ED RN and Jolynn  POONAM have updated on patient's plan of care.  Call received from Quin again around 4 pm asking to check patient'a ammonia level d/t the fact that in the past she has behaviour disturbances because of elevated ammonia level. Quin agreed that it might not be the case this time because patient does not display any agressive behavior in the hospital per medical/[facility] staff report as opposed to [facility] facility. Quin agreed they will accept patient back  wthout waiting for ammonia test results and test results would be faxed to [facility] tomorrow.  ED MD Dr. Hess agreed to order ammonia test for the patient.   ED MD, ED RN and  POONAM have been updated on the transition of care plan for the patient.  
None

## 2020-06-25 NOTE — REVIEW OF SYSTEMS
[Fatigue] : no fatigue [Visual Field Defect] : no visual field defect [Decreased Appetite] : appetite not decreased [Dysphagia] : no dysphagia [Chest Pain] : no chest pain [Slow Heart Rate] : heart rate is not slow [Dysphonia] : no dysphonia [Fast Heart Rate] : heart rate is not fast [Palpitations] : no palpitations [Nausea] : no nausea [Constipation] : no constipation [Depression] : no depression [Diarrhea] : no diarrhea [Vomiting] : no vomiting

## 2020-06-25 NOTE — HISTORY OF PRESENT ILLNESS
[FreeTextEntry1] : 70 year old man with T2DM, hypothyroid, cirrhosis/HCV, ventricular tachycardia, s/p implantation of cardiac device here for follow up visit.\par \par Type DM - Dx in 2008 on routine exam. Was on metformin 1000 mg twice daily, Januvia 50 mg daily, Amaryl 4 mg once a day at bedtime. He is adherent to medications. At the last visit, the patient had worse A1c in the setting of being on steroids so Lantus 24 unit was started. Medicines at present include, Lantus 18, januvia 100 mg daily, metformin 100 mg BID was continued. \par Checks sugars twice a day, lowest value was 98 but otherwise 's, occasional 190\par During quarantine, less physically active, had cravings for "bad stuff."\par Macrovascular cx - No known MI, ? CAD, Hx Vtach and cardiac device, No known TIA,CVA. PAD\par Microvascular cx - +DM nephropathy, + DM neuropathy, + recent foot ulcers\par Podiatry visit- follows regularly with Dr. Tristan, last visit was last Tuesday; cut toe\par Dilated eye exam: has an appointment coming up this year\par \par 2. Hyperprolactinemia\par 07/2017 - Prolactin level - 1.1 \par Takes bromocriptine x 30 years and decided to stop bromocriptine last year\par \par 3. Hypothyroidism\par Synthroid 125 mcg daily on empty stomach, adherent to it, no side effects. Takes one pill 5/7 days of the week and 250 on Sat/Sun\par Pt has no thyroid Sx like fatigue, palpitations, cold or heat intolerance\par Able to golf and ride bicycle several times a week and feels well

## 2020-06-25 NOTE — ASSESSMENT
[Diabetes Foot Care] : diabetes foot care [FreeTextEntry1] : Patient is a 71 yo man with type 2 diabetes, hypothyroidism and hyperprolactinemia\par \par 1. T2DM\par -patient's A1c is 7% which is at goal on POCT, repeat serum levels with the next blood draw\par -patient checks fingersticks twice a day and values are at goal, occasional 190's depending on food\par -no hypoglycemia\par -has regular podiatry care with Dr. Tristan defers exam today \par -screen with microablumin/creatinine as well\par -annual eye doctor visit\par -continue with Lantus 18, januvia and metformin.  Metformin is not absolutely contraindication in patients with cirrhosis however, must ensure that there is no lactic acidosis in patients with advanced cirrhosis\par -patient has regular care with hepatology. This was discussed with the patient today\par -injection site teaching provided today. Patient has ecchymoses due to low platelets and established care with hematology this year\par \par 2. Hypothyroid\par -clinically euthyroid\par -check TFTs and adjust Lt4 as needed\par \par 3. Hx of hyperprolactinemia\par -repeat prolactin and macroprolactin levels\par -has been off bromocriptine since 2019\par -no new clinical symptoms\par \par Follow up in four months sooner as needed [Long Term Vascular Complications] : long term vascular complications of diabetes [Importance of Diet and Exercise] : importance of diet and exercise to improve glycemic control, achieve weight loss and improve cardiovascular health [Hypoglycemia Management] : hypoglycemia management [Action and use of Insulin] : action and use of short and long-acting insulin [Self Monitoring of Blood Glucose] : self monitoring of blood glucose [Injection Technique, Storage, Sharps Disposal] : injection technique, storage, and sharps disposal [Retinopathy Screening] : Patient was referred to ophthalmology for retinopathy screening [Levothyroxine] : The patient was instructed to take Levothyroxine on an empty stomach, separate from vitamins, and wait at least 30 minutes before eating

## 2020-06-25 NOTE — PHYSICAL EXAM
[Alert] : alert [Well Nourished] : well nourished [Healthy Appearance] : healthy appearance [EOMI] : extra ocular movement intact [Normal Hearing] : hearing was normal [No Accessory Muscle Use] : no accessory muscle use [No Respiratory Distress] : no respiratory distress [Normal Rate] : heart rate was normal [No Stigmata of Cushings Syndrome] : no stigmata of Cushings Syndrome [Soft] : abdomen soft [Normal Gait] : normal gait [Normal Affect] : the affect was normal [Normal Insight/Judgement] : insight and judgment were intact [Normal Mood] : the mood was normal [de-identified] : ecchymoses to injection site

## 2020-06-26 NOTE — REASON FOR VISIT
[Medical Office: ([facility])___] : at the medical office located in  [Home] : at home, [unfilled] , at the time of the visit. [Spouse] : spouse [Verbal consent obtained from patient] : the patient, [unfilled] [Initial Evaluation] : an initial evaluation

## 2020-06-26 NOTE — HISTORY OF PRESENT ILLNESS
[FreeTextEntry1] : 70 year old  male, retired (used to work as  for a firm in [place]), with DM x 15 years, with diabetic neuropathy and ?retinopathy, CHF, HLD, CAD s/p CABG (1986) and stent placement (1999), fatty liver with recent diagnosis of HCC, gouty arthritis, AFib, s/p pacemaker and defibrillator, sleep apnea, hypothyroidism, ?prolactinoma, and multiple episodes of JULIANNE in the past that had resolved, doing telephone follow up. \par \par Pt. was last seen in Dec 2019. At that time, he was noted to have stable Scr and advised to RTC for follow up in 4 months. He subsequently had a procedure involving IV contrast done on his LE in March end, and recently CT with IV contrast was performed earlier this week.\par \par Currently, pt. overall feels well and denies any new complaints. His chronic LE swelling is resolved and denies any cp, sob, nausea, vomiting or diarrhea. He also denies use of NSAIDs or nephrotoxins.No procedures with IV contrast planned at this time.

## 2020-06-26 NOTE — PHYSICAL EXAM
[] : no respiratory distress [Jugular Venous Distention Increased] : there was no jugular-venous distention [Heart Sounds Pericardial Friction Rub] : no pericardial rub

## 2020-06-29 NOTE — HISTORY OF PRESENT ILLNESS
[Disease: _____________________] : Disease: [unfilled] [N: ___] : N[unfilled] [T: ___] : T[unfilled] [M: ___] : M[unfilled] [AJCC Stage: ____] : AJCC Stage: [unfilled] [de-identified] : Mr. Harvey is a 69 y/o male with pmhx of ischemic cardiomyopathy, CHF, V-tach s/p AICD/PPM, CKD, FERNANDES/cirrhosis, chronic thrombocytopenia since at least 2012 (baseline plt count 40-60), presents to establish care for management of HCC and chronic thrombocytopenia. \par \par Pt was originally diagnosed with cirrhosis of the liver in 2017 after workup for elevated liver enzymes. He has been followed with serial imaging by Dr. Chapo Alaniz. He was referred to IR by Dr. Alaniz after imaging revealed liver lesion in Sept 2018. He is now s/p hepatic angiogram and embolization of 4 cm right hepatic tumor on 2/7/19 and hepatic angiogram and bland embolization of segment 8 and 5 on 4/18/19. \par \par He reports having issues with fluid retention and CHF exacerbations over the last year. He has worked with Dr. Rivers to adjust his diuretic regimen and had a significant amount of fluid weight loss in 3/2018. He was evaluated on 2/4/20 and is currently been stable over the last few months, with no CHF exacerbations. \par \par He denies any h/o significant bleeding. Has had plt transfusions with previous surgeries and did respond appropriately. \par \par Hep: Chapo Alaniz\par Cards: Tita\dayanara Nephrology: Racquel\par Endo: Nicole Hennessy\par \par 4/9/20: SIRT  [de-identified] : n/a [FreeTextEntry1] : s/p SIRT on April 2020  [de-identified] : Underwent SIRT in April. F/u scan shows response but with some viable tumor. Pt overall feels well. Denies any c/o. Good appetite and energy level. denies any pain.

## 2020-06-29 NOTE — PHYSICAL EXAM
[Fully active, able to carry on all pre-disease performance without restriction] : Status 0 - Fully active, able to carry on all pre-disease performance without restriction [Normal] : affect appropriate [de-identified] : LE edema b/l

## 2020-07-01 PROBLEM — E87.2 ACIDOSIS: Status: ACTIVE | Noted: 2020-01-01

## 2020-07-23 NOTE — REASON FOR VISIT
[Bone Marrow Biopsy] : bone marrow biopsy [Bone Marrow Aspiration] : bone marrow aspiration [FreeTextEntry2] : 71 yo male dxed w/HCC and cirrhosis, presented w/ worsening cytopenia, BMBX to r/o MDS

## 2020-07-23 NOTE — PROCEDURE
[Bone Marrow Biopsy] : bone marrow biopsy [Bone Marrow Aspiration] : bone marrow aspiration  [Patient identification verified] : patient identification verified [Patient] : the patient [Correct positioning] : correct positioning [Procedure verified and consent obtained] : procedure verified and consent obtained [Lidocaine was injected and into the periosteum overlying the site.] : Lidocaine was injected and into the periosteum overlying the site. [Prone] : prone [Aspirate] : aspirate [Cytogenetics] : cytogenetics [Biopsy] : biopsy [FISH] : FISH [Flow Cytometry] : flow cytometry [] : The patient was instructed to remove the bandage the following AM. The patient may bathe. Acetaminophen may be taken for discomfort, as per package directions.If there are any other problems, the patient was instructed to call the office. The patient verbalized understanding, and is aware of the office contact numbers. [The left posterior iliac crest was prepped with betadine and draped, using sterile technique.] : The left posterior iliac crest was prepped with betadine and draped, using sterile technique. [FreeTextEntry1] : 69 yo male dxed w/HCC and cirrhosis, presented w/ worsening cytopenia, BMBX to r/o MDS [FreeTextEntry2] : CBC prior to procedure\par WBC 2.47\par Hgb  8.5 Hct 25.3\par Plt 14\par BM Bx and aspiration was performed by AMELIE Palacio. 2 lavender + 2 green top tubes of BM aspirate and 1 container of BM core specimen sent to lab.\par \par \par

## 2020-08-12 NOTE — PROCEDURE
Bladder scan of 17 cc   [No] : not [NSR] : normal sinus rhythm [ICD] : Implantable cardioverter-defibrillator [DDD] : DDD [Atrial] : Atrial [Ventricular] : Ventricular [Lead Imp:  ___ohms] : lead impedance was [unfilled] ohms [___ ms] : [unfilled] ms [Counters Reset] : the counters were reset [None] : none [Asense-Vpace ___ %] : Asense-Vpace [unfilled]% [Apace-Vpace ___ %] : Apace-Vpace [unfilled]% [Sensing Amplitude ___mv] : sensing amplitude was [unfilled] mv [___V @] : [unfilled] V [Asense-Vsense ___ %] : Asense-Vsense [unfilled]% [Apace-Vsense ___ %] : Apace-Vsense [unfilled]% [Longevity: ___ months] : The estimated remaining battery life is [unfilled] months [de-identified] : [employer]tronic  [de-identified] : Marvin MRI XT  [id number] [de-identified] : [id number] [de-identified] : 4/3/2019 [de-identified] : 60 [de-identified] : Normal lead impedances with Fidellis lead capped, a new defibrillator coil implanted after presenting with defibrillator storm due to Fidellis lead fracture many years ago. Atrial tachycardia lasting greater than 5 hours on 9 days with spontaneous reversion to sinus. No episodes of ventricular tachycardia. No mode switch. Optivol recording above threshold 7/17/2020 to 8/7/2020.

## 2020-08-12 NOTE — HISTORY OF PRESENT ILLNESS
[None] : The patient complains of no symptoms [de-identified] : Has had no symptoms and no pacemaker shocks or issues. Device replaced for NÉSOTR last year.

## 2020-08-12 NOTE — REASON FOR VISIT
[New Patient Device Check] : new patient device check visit [Follow-up Device Check] : follow-up device check visit

## 2020-08-12 NOTE — DISCUSSION/SUMMARY
[Pacemaker Function Normal] : normal pacemaker function [AICD Function Normal] : normal AICD function [Patient] : the patient [Routine Follow-up in 3-4 months] : routine follow-up in 3-4 months

## 2020-08-12 NOTE — PHYSICAL EXAM
[Normal Appearance] : normal appearance [Well Groomed] : well groomed [General Appearance - Well Developed] : well developed [No Deformities] : no deformities [General Appearance - Well Nourished] : well nourished [General Appearance - In No Acute Distress] : no acute distress [Heart Rate And Rhythm] : heart rate and rhythm were normal [Heart Sounds] : normal S1 and S2 [Murmurs] : no murmurs present [Auscultation Breath Sounds / Voice Sounds] : lungs were clear to auscultation bilaterally [Respiration, Rhythm And Depth] : normal respiratory rhythm and effort [Exaggerated Use Of Accessory Muscles For Inspiration] : no accessory muscle use [Clean] : clean [Dry] : dry [Well-Healed] : well-healed [Abdomen Soft] : soft [Abdomen Tenderness] : non-tender [Abdomen Mass (___ Cm)] : no abdominal mass palpated [Cyanosis, Localized] : no localized cyanosis [Nail Clubbing] : no clubbing of the fingernails [Petechial Hemorrhages (___cm)] : no petechial hemorrhages [] : no ischemic changes [FreeTextEntry1] : no edema

## 2020-08-26 NOTE — ED ADULT NURSE NOTE - OBJECTIVE STATEMENT
Pt is a 70 Y A&O x3 M with hx of DM, HTN, HLD, liver cancer not currently on chemo, multiple amputations to toes presenting to ED with c/o left 2nd toe infection. Pt states he saw his podiatrist today, sent to ED. Pt denies fevers, chills, SOB. Pt arrives to ED breathing unlabored on RA. Skin is warm and dry. No edema noted to LE b/l. + ulcer with green discharge noted to bottom of left 2nd toe. + redness noted to left 2nd toe. + peripheral pulses. New dressing applied. Red socks on. IV established. Safety and comfort maintained. Podiatry MD at pt bedside for assessment/consult at this time. Pt is a 70 Y A&O x3 M with hx of DM, HTN, HLD, liver cancer not currently on chemo, partial amputations to toes presenting to ED with c/o left 2nd toe infection. Pt states he saw his podiatrist today, sent to ED. Pt denies fevers, chills, SOB. Pt arrives to ED breathing unlabored on RA. Skin is warm and dry. No edema noted to LE b/l. + ulcer with green discharge noted to bottom of left 2nd toe. + redness noted to left 2nd toe. + peripheral pulses. New dressing applied. Partial amputation noted to right 1st-3rd toes. Red socks on. IV established. Safety and comfort maintained. Podiatry MD at pt bedside for assessment/consult at this time.

## 2020-08-26 NOTE — CONSULT NOTE ADULT - PROBLEM SELECTOR RECOMMENDATION 6
- on cabergoline 0.5 weekly  - outpatient f/u with Endocrinology - on cabergoline 0.5 weekly  - outpatient f/u with Endocrinology    D/W Team    Jacinta Suarez DO, Endocrine Fellow   Pager [phone]. from 9-5PM. After hours and on weekends please call [phone].

## 2020-08-26 NOTE — CONSULT NOTE ADULT - ASSESSMENT
71 y/o M w/ L foot distal 2nd digit wound to bone    - pt seen  and evaluated  - afebrile, no leukocytosis  - L foot XR pending   - L foot distal 2nd digit wound to bone, dactylitis, cellulitis extending to level of MPJ, scant purulence expressed  - wound cultured   - admit to medicine for IV abx, Vanco/Cefepime   - unable to obtain MR 2/2 AICD, would ideally order bone scan to assess level of OM, however will discuss w/ primary if pt able to undergo if kidney function diminished  - pod plan for L foot partial 2nd digit amputation Friday w/ Dr. Rouse  - pt thrombocytopenic, may require platelet transfusion pre-op   - please document medical clearance for local anesthesia w/ light sedation   - discussed w/ attending

## 2020-08-26 NOTE — ED PROVIDER NOTE - PSH
AICD (automatic cardioverter/defibrillator) present  placement in (2006), generator change 4/3/19  Coronary atherosclerosis of artery bypass graft  CABG X 4 (1986)  Encounter for incision and drainage procedure  Dec 2018 right foot/ankle  History of amputation  right foot, 2nd toe, osteo 2015  SCC (squamous cell carcinoma)  facial SCC, s/p Mohs  Stented coronary artery  RCA (1999), another  stent 2000

## 2020-08-26 NOTE — ED PROVIDER NOTE - CARE PLAN
Principal Discharge DX:	Diabetic toe ulcer  Assessment and plan of treatment:	70 M hx as above presenting for infection of left 2nd digit. No fever. No signs of sepsis. likely diabetic ulceration at this time. Will r/o osteomylitis.     Plan:   Labs,   abx  Culture  XR   Podiatry

## 2020-08-26 NOTE — CONSULT NOTE ADULT - SUBJECTIVE AND OBJECTIVE BOX
HPI:  69 y/o male with hx of liver ca (not on active chemotherapy), recently diagnosed ITP with thrombocytopenia, T2DM c/b multiple amputations of toes, hypothyroidism, pituitary adenoma, HF with ICD placement, HLD,  presenting for 2nd toe on left foot ulceration and infection x few days. Patient was seen by his podiatrist who recommended visiting the ED at this time for abx and admission.     Diagnosed with T2DM 15 yrs ago. States previous HbA1C ~ 2-3 months ago was 6.9. Was started on prednisone 40mg daily on 8/1 for ITP by heme/onc. Since then, BG has been persistently elevated. Pt takes Lantus 18 units at bedtime (has been taking for the past 1-2 yrs) and was recently started on Humalog by Endocrinologist (Dr. Calixto) 2-3 weeks ago. States Dr. Calixto has been increasing dose of Humalog and most recently has been taking 7 units pre-meal for the last few days. States BG values have been persistently in 300s: AM FS avg 340, afternoon FS avg 360, and bedtime FS avg 380. States diet is ok usually (no sugary drinks), but has been trying to eat much less and healthier since starting steroids given persistent hyperglycemia. States his kidney function is "up and down" due to liver, follows with nephrologist. Denies retinopathy (saw ophthal within the year). Has regular follow up with podiatry for neuropathy.   In terms of thyroid disease, per medication list, takes "Thyrozene 125 mgm once daily in AM and 250 on Saturdays. Pt states that Thyrozene is the same as synthroid.       PAST MEDICAL & SURGICAL HISTORY:  HCC (hepatocellular carcinoma): s/p liver embolization x2 in 2019  Afib: pt reports ~9 mos, [id number], resolved - monitored by Dr. Rivers  Ulcer of right ankle: has surgery done Dec 2018  Liver mass: dx: 10/2018   incidental finding. Currently awaitng furthur workup  Gout: medically managed  Elevated prolactin level: dx: &#x27; 70&#x27;s  medically managed Bromocriptine  Vitamin D deficiency  Elevated triglycerides with high cholesterol: on meds  Hypothyroidism  PVD (peripheral vascular disease)  History of hyperprolactinemia  Hepatic cirrhosis, unspecified hepatic cirrhosis type  Anemia  ICD (implantable cardioverter-defibrillator) in place: Xi3, Old Model [id number] , last interrogation 3/27/19, generator change 4/3/19 New Model # [id number]  Sleep apnea: not using CPAP  History of osteomyelitis: 2015, right foot 2nd toe   follow-up by podiatrist every 2 weeks  Presence of stent in coronary artery: 2 stents 1999, 2006  Heart failure with reduced left ventricular function: last EF 3--35% 5/2019  Ischemic cardiomyopathy  Pituitary adenoma: as per patient chronic, no changes , recently restarted follow up with endocrinologist ( note in allscripts )  Coronary artery disease  Thrombocytopenia: Chronic  HTN (hypertension)  DM (diabetes mellitus): type 2  AICD lead malfunction: history of - fixed follow-up by Dr Rivers  SCC (squamous cell carcinoma): facial SCC, s/p Mohs  Encounter for incision and drainage procedure: Dec 2018 right foot/ankle  History of amputation: right foot, 2nd toe, osteo 2015  AICD (automatic cardioverter/defibrillator) present: placement in (2006), generator change 4/3/19  Stented coronary artery: RCA (1999), another  stent 2000  Coronary atherosclerosis of artery bypass graft: CABG X 4 (1986)      FAMILY HISTORY:  Family history of MI (myocardial infarction)      Social History:  no tobacco use or alcohol abuse    Outpatient Medications: Home Medications:  Aldactone 25 mg oral tablet: 1 tab(s) orally 2 times a day (26 Aug 2020 13:57)  atorvastatin 40 mg oral tablet: 1 tab(s) orally once a day (at bedtime) (26 Aug 2020 13:57)  cabergoline 0.5 mg oral tablet: 1 tab(s) orally once a day (26 Aug 2020 13:57)  carvedilol 12.5 mg oral tablet: 1 tab(s) orally 2 times a day (26 Aug 2020 13:57)  Florastor 250 mg oral capsule: 1 cap(s) orally once a day (26 Aug 2020 13:57)  HumaLOG 100 units/mL injectable solution: 3-10 units injected 3 times a day before meals (26 Aug 2020 13:57)  Januvia 100 mg oral tablet: 1 tab(s) orally once a day (26 Aug 2020 13:57)  Lantus 100 units/mL subcutaneous solution: 18 unit(s) subcutaneous once a day (at bedtime) (26 Aug 2020 13:57)  Lasix 40 mg oral tablet: 1 tab(s) orally once a day (26 Aug 2020 13:57)  metFORMIN 1000 mg oral tablet: 1 tab(s) orally 2 times a day (26 Aug 2020 13:57)  Niaspan ER 1000 mg oral tablet, extended release: 1 tab(s) orally once a day (at bedtime) (26 Aug 2020 13:57)  Pepcid 20 mg oral tablet: 1 tab(s) orally once a day (26 Aug 2020 13:57)  potassium chloride 20 mEq oral tablet, extended release: 1 tab(s) orally once a day (26 Aug 2020 13:57)  predniSONE: 40 milligram(s) orally once a day (26 Aug 2020 13:57)  ramipril 2.5 mg oral capsule: 1 cap(s) orally once a day (in the morning) (26 Aug 2020 13:57)  sodium bicarbonate 650 mg oral tablet: 1 tab(s) orally 2 times a day (26 Aug 2020 13:57)  thyrozene: 125 microgram(s) orally once a day    250mg on saturday (26 Aug 2020 13:57)  Vitamin D3 2000 intl units oral tablet: 1 tab(s) orally once a day (26 Aug 2020 13:57)      MEDICATIONS  (STANDING):  acetaminophen    Suspension .. 650 milliGRAM(s) Oral once  atorvastatin 40 milliGRAM(s) Oral at bedtime  carvedilol 12.5 milliGRAM(s) Oral every 12 hours  cefepime   IVPB 1000 milliGRAM(s) IV Intermittent every 24 hours  dextrose 5%. 1000 milliLiter(s) (50 mL/Hr) IV Continuous <Continuous>  dextrose 50% Injectable 12.5 Gram(s) IV Push once  dextrose 50% Injectable 25 Gram(s) IV Push once  dextrose 50% Injectable 25 Gram(s) IV Push once  famotidine    Tablet 20 milliGRAM(s) Oral daily  furosemide    Tablet 40 milliGRAM(s) Oral daily  immune   globulin 10% (GAMMAGARD) IVPB 75 Gram(s) IV Intermittent daily  insulin glargine Injectable (LANTUS) 18 Unit(s) SubCutaneous at bedtime  insulin lispro (HumaLOG) corrective regimen sliding scale   SubCutaneous three times a day before meals  insulin lispro (HumaLOG) corrective regimen sliding scale   SubCutaneous at bedtime  insulin lispro Injectable (HumaLOG) 3 Unit(s) SubCutaneous before breakfast  insulin lispro Injectable (HumaLOG) 3 Unit(s) SubCutaneous before lunch  insulin lispro Injectable (HumaLOG) 3 Unit(s) SubCutaneous before dinner  levothyroxine 125 MICROGram(s) Oral daily  lisinopril 10 milliGRAM(s) Oral daily  mupirocin 2% Ointment 1 Application(s) Topical two times a day  niacin  milliGRAM(s) Oral at bedtime  predniSONE   Tablet 40 milliGRAM(s) Oral daily  sodium bicarbonate 650 milliGRAM(s) Oral two times a day  spironolactone 25 milliGRAM(s) Oral daily  vancomycin  IVPB 500 milliGRAM(s) IV Intermittent every 12 hours    MEDICATIONS  (PRN):  acetaminophen   Tablet .. 650 milliGRAM(s) Oral every 6 hours PRN Temp greater or equal to 38.5C (101.3F), Mild Pain (1 - 3)  dextrose 40% Gel 15 Gram(s) Oral once PRN Blood Glucose LESS THAN 70 milliGRAM(s)/deciliter  glucagon  Injectable 1 milliGRAM(s) IntraMuscular once PRN Glucose LESS THAN 70 milligrams/deciliter      Allergies    No Known Allergies    Intolerances      Review of Systems:  Constitutional: No fever, chills   Neuro: No tremors, headache   Cardiovascular: No chest pain, palpitations  Respiratory: No SOB, no cough  GI: No nausea, vomiting, abdominal pain  : No dysuria, polyuria   Skin: no rash, +ulcer  Psych: no depression, anxiety   Endocrine: no polyphagia, polydipsia     ALL OTHER SYSTEMS REVIEWED AND NEGATIVE        PHYSICAL EXAM:  VITALS: T(C): 36.4 (08-26-20 @ 14:09)  T(F): 97.5 (08-26-20 @ 14:09), Max: 97.8 (08-26-20 @ 10:22)  HR: 81 (08-26-20 @ 14:09) (60 - 89)  BP: 127/74 (08-26-20 @ 14:09) (100/65 - 127/95)  RR:  (16 - 18)  SpO2:  (99% - 100%)  Wt(kg): --  GENERAL: NAD, well-groomed, well-developed  EYES: No proptosis, anicteric  HEENT:  Atraumatic, Normocephalic, moist mucous membranes  THYROID: Normal size, no palpable nodules  RESPIRATORY: Clear to auscultation bilaterally; No rales, rhonchi, wheezing  CARDIOVASCULAR: Regular rate and rhythm; No murmurs  GI: Soft, nontender, non distended, normal bowel sounds  SKIN: L foot 2nd digit wound, wrapped   MSK: s/p partial digital amps 1-3 on R  NEURO: AOx3, moves all extremities spontaneously; decreased sensation to b/l toes  PSYCH: Reactive affect, euthymic mood                              10.3   5.72  )-----------( 24       ( 26 Aug 2020 11:29 )             31.8       08-26    132<L>  |  98  |  63<H>  ----------------------------<  336<H>  5.6<H>   |  18<L>  |  2.18<H>    EGFR if : 35<L>  EGFR if non : 30<L>    Ca    9.6      08-26    TPro  6.6  /  Alb  3.6  /  TBili  1.0  /  DBili  x   /  AST  34  /  ALT  40  /  AlkPhos  129<H>  08-26      Thyroid Function Tests:          Hemoglobin A1C, Whole Blood: 8.1 (03.31.20 @ 13:59)        Radiology:

## 2020-08-26 NOTE — CONSULT NOTE ADULT - ASSESSMENT
70M with DM, with increased hyperglycemia while on steroids for thrombocytopenia with breakdown of superficial ulcer. Podiatry noted that  L foot distal 2nd digit wound extends to bone, dactylitis, cellulitis extending to level of MPJ, scant purulence expressed.    osteomyelitis Left second toe 8/26 ESR/CRP = 35/2.95 Scheduled for amputation 8/28  DM2 poorly controlled  diabetic neuropathy  thrombocytopenia  CKD  CHF  cirrhosis  HCC    Suggest  check Hgb A1C, Arterial dopplers  Continue Cefepime  decrease Vanco dose to 1 every 24 hours (I ordered)  taper steroids as feasible (Heme to administer IVIG for thrombocytopenia)

## 2020-08-26 NOTE — CONSULT NOTE ADULT - PROBLEM SELECTOR RECOMMENDATION 9
hyperglycemia second to steroids. Prior to starting steroids, DM fairly well controlled (HbA1C 8.1 in March 2020)  - continue Lantus 18 units qhs  - increase Humalog 9 units TID pre-meal  - moderate correction scales ac and hs  - CC diet  - check FS ac/hs  Discharge  - plan TBD based on insulin requirements and steroid plan at discharge. hyperglycemia second to steroids. Prior to starting steroids, DM fairly well controlled (HbA1C 8.1 in March 2020)  - continue Lantus 18 units qhs  - increase Humalog 9 units TID pre-meal  - moderate correction scales ac and hs  - CC diet  - check FS ac/hs  - please notify endocrine if any changes to steroid regimen  Discharge  - plan TBD based on insulin requirements and steroid plan at discharge. hyperglycemia second to steroids. Prior to starting steroids, DM fairly well controlled (HbA1C 8.1 in March 2020)  - continue Lantus 18 units qhs  - increase Humalog to 9 units TID pre-meal  - moderate correction scales ac and hs  - CC diet  - check FS ac/hs  - please notify endocrine if any changes to steroid regimen  Discharge  - plan TBD based on insulin requirements and steroid plan at discharge.

## 2020-08-26 NOTE — H&P ADULT - NSHPPHYSICALEXAM_GEN_ALL_CORE
PHYSICAL EXAMINATION:  Vital Signs Last 24 Hrs  T(C): 36.4 (26 Aug 2020 14:09), Max: 36.6 (26 Aug 2020 10:22)  T(F): 97.5 (26 Aug 2020 14:09), Max: 97.8 (26 Aug 2020 10:22)  HR: 81 (26 Aug 2020 14:09) (60 - 89)  BP: 127/74 (26 Aug 2020 14:09) (100/65 - 127/95)  BP(mean): 84 (26 Aug 2020 11:30) (84 - 84)  RR: 16 (26 Aug 2020 14:09) (16 - 18)  SpO2: 100% (26 Aug 2020 14:09) (99% - 100%)  CAPILLARY BLOOD GLUCOSE          GENERAL: NAD, well-groomed, well-developed  HEAD:  atraumatic, normocephalic  EYES: sclera anicteric  ENMT: mucous membranes moist  NECK: supple, No JVD  CHEST/LUNG: clear to auscultation bilaterally; no rales, rhonchi, or wheezing b/l  HEART: normal S1, S2  ABDOMEN: BS+, soft, ND, NT   EXTREMITIES:  pulses palpable; no clubbing, cyanosis, or edema b/l LEs 2nd L toe with erythema   NEURO: awake, alert, interactive; moves all extremities  SKIN: no rashes or lesions

## 2020-08-26 NOTE — H&P ADULT - NSICDXPASTMEDICALHX_GEN_ALL_CORE_FT
PAST MEDICAL HISTORY:  Afib pt reports ~9 mos, [id number], resolved - monitored by Dr. Rivers    AICD lead malfunction history of - fixed follow-up by Dr Rivers    Anemia     Coronary artery disease     DM (diabetes mellitus) type 2    Elevated prolactin level dx: ' 70's  medically managed Bromocriptine    Elevated triglycerides with high cholesterol on meds    Gout medically managed    HCC (hepatocellular carcinoma) s/p liver embolization x2 in 2019    Heart failure with reduced left ventricular function last EF 3--35% 5/2019    Hepatic cirrhosis, unspecified hepatic cirrhosis type     History of hyperprolactinemia     History of osteomyelitis 2015, right foot 2nd toe   follow-up by podiatrist every 2 weeks    HTN (hypertension)     Hypothyroidism     ICD (implantable cardioverter-defibrillator) in place [employer], Old Model [id number] , last interrogation 3/27/19, generator change 4/3/19 New Model # [id number]    Ischemic cardiomyopathy     Liver mass dx: 10/2018   incidental finding. Currently awaitng furthur workup    Pituitary adenoma as per patient chronic, no changes , recently restarted follow up with endocrinologist ( note in allscripts )    Presence of stent in coronary artery 2 stents 1999, 2006    PVD (peripheral vascular disease)     Sleep apnea not using CPAP    Thrombocytopenia Chronic    Ulcer of right ankle has surgery done Dec 2018    Vitamin D deficiency

## 2020-08-26 NOTE — CONSULT NOTE ADULT - PROBLEM SELECTOR RECOMMENDATION 3
Noted to be hyponatremic, however corrected for hyperglycemia the sodium is within normal range. Would control sugars to correct sodium. Continue to monitor closely.

## 2020-08-26 NOTE — CONSULT NOTE ADULT - PROBLEM SELECTOR RECOMMENDATION 5
- continue LT4 125mcg daily on M,Tu,W,Th,F,Sun. LT4 250mcg on saturdays  - states pt takes "thyrozene" on home med list which is a thyroid herbal supplement. Recommend switching to levothyroxine or synthroid on discharge. - continue LT4 125mcg daily on M,Tu,W,Th,F,Sun. LT4 250mcg on saturdays  - states pt takes "thyrozene" on home med list which is a thyroid herbal supplement. Recommend switching to levothyroxine or synthroid on discharge  - check TFTs outpatient

## 2020-08-26 NOTE — CONSULT NOTE ADULT - ASSESSMENT
70 year old male with extensive history, significant for CKD (baseline creatinine in the last 6 months ~1.8-2.1) Ischemic Cardiomyopathy s/p AICD, HCC, Pituitary Adenoma with Hyperprolactinemia/Hypothyroidism, PVD complicated by osteomyelitis s/p multiple toe amputations, Anemia, T2DM, HTN, HLD, Gout, Sleep Apnea, Afib, recently treated for ITP with prednisone, who presented to the hospital with worsening pain, swelling, and redness of the left 2nd toe. Nephrology consulted for management of CKD.

## 2020-08-26 NOTE — CONSULT NOTE ADULT - ASSESSMENT
71 y/o male with hx of liver ca (not on active chemotherapy), recently diagnosed ITP with thrombocytopenia, T2DM c/b multiple amputations of toes, hypothyroidism, pituitary adenoma, HF with ICD placement, HLD, presenting for 2nd toe on left foot ulceration and infection x few days. Patient was seen by his podiatrist who recommended visiting the ED at this time for abx and admission. Endocrinology consulted for hyperglycemia and T2DM management

## 2020-08-26 NOTE — CONSULT NOTE ADULT - PROBLEM SELECTOR RECOMMENDATION 9
Paitent with CKD likely from DM with multiple episodes of UJLIANNE over the last few months (ACE/ARB in conjunction with diuretic use with contrast exposures). Baseline creatinine ~1.8-2.1. Creatinine currently 2.18. Obtain post void residual to assess for retention. Check UA, urine sodium, potassium, chloride, creatinine. Avoid nephrotoxic agents, HOLD metformin for now, renally dose all medications per eGFR and optimize perfusion pressure.

## 2020-08-26 NOTE — CONSULT NOTE ADULT - SUBJECTIVE AND OBJECTIVE BOX
Podiatry pager #: [phone]    Patient is a 70y old  Male who presents with a chief complaint of L foot wound    HPI:  The patient is a 69 y/o male with hx of liver ca ( not on active chemotherapy), thrombocytopenia, poorly controlled IDDM, multiple amputations of toes, ICD placement, and HLD presenting for 2nd toe on left foot ulceration and infection x few days. Symptoms started suddenly at rest, have been constant and worsening, with no worsening or alleviating factors. No medications taken. Patient denies chest pain, sob, fever, chills, headache, nausea, emesis, diarrhea, abdominal pain, hematuria/dysuria or lower extremity swelling. Patient was seen by his podiatrist who recommended visiting the ED at this time for abx and admission.    PAST MEDICAL & SURGICAL HISTORY:  HCC (hepatocellular carcinoma): s/p liver embolization x2 in 2019  Afib: pt reports ~9 mos, [id number], resolved - monitored by Dr. Rivers  Ulcer of right ankle: has surgery done Dec 2018  Liver mass: dx: 10/2018   incidental finding. Currently awaitng furthur workup  Gout: medically managed  Elevated prolactin level: dx: &#x27; 70&#x27;s  medically managed Bromocriptine  Vitamin D deficiency  Elevated triglycerides with high cholesterol: on meds  Hypothyroidism  PVD (peripheral vascular disease)  History of hyperprolactinemia  Hepatic cirrhosis, unspecified hepatic cirrhosis type  Anemia  ICD (implantable cardioverter-defibrillator) in place: [employer], Old Model [id number] , last interrogation 3/27/19, generator change 4/3/19 New Model # [id number]  Sleep apnea: not using CPAP  History of osteomyelitis: 2015, right foot 2nd toe   follow-up by podiatrist every 2 weeks  Presence of stent in coronary artery: 2 stents 1999, 2006  Heart failure with reduced left ventricular function: last EF 3--35% 5/2019  Ischemic cardiomyopathy  Pituitary adenoma: as per patient chronic, no changes , recently restarted follow up with endocrinologist ( note in allscripts )  Coronary artery disease  Thrombocytopenia: Chronic  HTN (hypertension)  DM (diabetes mellitus): type 2  AICD lead malfunction: history of - fixed follow-up by Dr Rivers  SCC (squamous cell carcinoma): facial SCC, s/p Mohs  Encounter for incision and drainage procedure: Dec 2018 right foot/ankle  History of amputation: right foot, 2nd toe, osteo 2015  AICD (automatic cardioverter/defibrillator) present: placement in (2006), generator change 4/3/19  Stented coronary artery: RCA (1999), another  stent 2000  Coronary atherosclerosis of artery bypass graft: CABG X 4 (1986)      MEDICATIONS  (STANDING):  mupirocin 2% Ointment 1 Application(s) Topical two times a day  vancomycin  IVPB. 1000 milliGRAM(s) IV Intermittent once    MEDICATIONS  (PRN):      Allergies    No Known Allergies    Intolerances        VITALS:    Vital Signs Last 24 Hrs  T(C): 36.4 (26 Aug 2020 11:30), Max: 36.6 (26 Aug 2020 10:22)  T(F): 97.6 (26 Aug 2020 11:30), Max: 97.8 (26 Aug 2020 10:22)  HR: 60 (26 Aug 2020 11:30) (60 - 78)  BP: 127/79 (26 Aug 2020 11:30) (100/65 - 127/79)  BP(mean): 84 (26 Aug 2020 11:30) (84 - 84)  RR: 16 (26 Aug 2020 11:30) (16 - 18)  SpO2: 100% (26 Aug 2020 11:30) (99% - 100%)    LABS:                CAPILLARY BLOOD GLUCOSE              LOWER EXTREMITY PHYSICAL EXAM:    Vasular: DP/PT 2/4, B/L, CFT < 3 seconds B/L, Temperature gradient  warm to cool, B/L.   Neuro: Epicritic sensation diminished to the level of ankle  B/L.  Musculoskeletal/Ortho: s/p partial digital amps 1-3 on R  Derm:  Wound #1:   Location: L foot distal 2nd digit wound   Size: 0.4 cm x 0.3 cm   Depth:to bone  Wound bed: fibrogranular   Drainage: scant purulence   Odor: none   Periwound: cellulitis to level of MPJ  Etiology: chronic pressure/ peripheral neuropathy     RADIOLOGY & ADDITIONAL STUDIES:   L foot XR pending

## 2020-08-26 NOTE — ED PROVIDER NOTE - PLAN OF CARE
70 M hx as above presenting for infection of left 2nd digit. No fever. No signs of sepsis. likely diabetic ulceration at this time. Will r/o osteomylitis.     Plan:   Labs,   abx  Culture  XR   Podiatry

## 2020-08-26 NOTE — ED PROVIDER NOTE - ATTENDING CONTRIBUTION TO CARE
Private Physician Azalea  70y male pmh afib resolved, AICD,Anemia, CAD sp ptca #2stent, 4v cabg, DMT2, Gout, HTN,Hypothyrodism, HCC, Pituitary adenoma ,PVD, BEHZAD, Thrombocytopenia  Chronic. Pt comes to ed complains of worsning infection left 2sd toe past week . Seen today by pmd and referred to ed for abx, PE WDWN male normocephalic atraumatic neck supple chest clear cv no rubs, gallops or murmurs abd soft +bs no mass neuo gcs 15 speech fluent power 5/5 all extr pain light touch intact, left second toe with plantar ulcer and erythema, dp pulse intact  Jamil Ridlye MD, Facep

## 2020-08-26 NOTE — CONSULT NOTE ADULT - SUBJECTIVE AND OBJECTIVE BOX
[facility] Division of Kidney Diseases & Hypertension  INITIAL CONSULT NOTE  [phone]--------------------------------------------------------------------------------  HPI: 70 year old male with extensive history, significant for CKD (baseline creatinine in the last 6 months ~1.8-2.1) Ischemic Cardiomyopathy s/p AICD, HCC, Pituitary Adenoma with Hyperprolactinemia/Hypothyroidism, PVD complicated by osteomyelitis s/p multiple toe amputations, Anemia, T2DM, HTN, HLD, Gout, Sleep Apnea, Afib, recently treated for ITP with prednisone, who presented to the hospital with worsening pain, swelling, and redness of the left 2nd toe. Patient noticed over the last week that his toe was becoming more red and swollen and painful and was being seen by podiatry and had been getting dressing changes however the toe continued to worsen. No medications taken. Patient denies chest pain, sob, fever, chills, headache, nausea, emesis, diarrhea, abdominal pain, hematuria/dysuria or lower extremity swelling. Patient was seen by his podiatrist who recommended visiting the ED at this time for abx and admission. Nephrology consulted for management of CKD.        PAST HISTORY  --------------------------------------------------------------------------------  PAST MEDICAL & SURGICAL HISTORY:  HCC (hepatocellular carcinoma): s/p liver embolization x2 in 2019  Afib: pt reports ~9 mos, [id number], resolved - monitored by Dr. Rivers  Ulcer of right ankle: has surgery done Dec 2018  Liver mass: dx: 10/2018   incidental finding. Currently awaitng furthur workup  Gout: medically managed  Elevated prolactin level: dx: &#x27; 70&#x27;s  medically managed Bromocriptine  Vitamin D deficiency  Elevated triglycerides with high cholesterol: on meds  Hypothyroidism  PVD (peripheral vascular disease)  History of hyperprolactinemia  Hepatic cirrhosis, unspecified hepatic cirrhosis type  Anemia  ICD (implantable cardioverter-defibrillator) in place: Medtronic, Old Model [id number] , last interrogation 3/27/19, generator change 4/3/19 New Model # [id number]  Sleep apnea: not using CPAP  History of osteomyelitis: 2015, right foot 2nd toe follow-up by podiatrist every 2 weeks  Presence of stent in coronary artery: 2 stents 1999, 2006  Heart failure with reduced left ventricular function: last EF 30-35% 5/2019  Ischemic cardiomyopathy  Pituitary adenoma: as per patient chronic, no changes , recently restarted follow up with endocrinologist ( note in allscripts )  Coronary artery disease  Thrombocytopenia: Chronic  HTN (hypertension)  DM (diabetes mellitus): type 2  AICD lead malfunction: history of - fixed follow-up by Dr Rivers  SCC (squamous cell carcinoma): facial SCC, s/p Mohs  Encounter for incision and drainage procedure: Dec 2018 right foot/ankle  History of amputation: right foot, 2nd toe, osteo 2015  AICD (automatic cardioverter/defibrillator) present: placement in (2006), generator change 4/3/19  Stented coronary artery: RCA (1999), another  stent 2000  Coronary atherosclerosis of artery bypass graft: CABG X 4 (1986)    FAMILY HISTORY:  Family history of MI (myocardial infarction)    PAST SOCIAL HISTORY:    ALLERGIES & MEDICATIONS  --------------------------------------------------------------------------------  Allergies    No Known Allergies    Intolerances      Standing Inpatient Medications  atorvastatin 40 milliGRAM(s) Oral at bedtime  carvedilol 12.5 milliGRAM(s) Oral every 12 hours  cefepime   IVPB 1000 milliGRAM(s) IV Intermittent every 24 hours  famotidine    Tablet 20 milliGRAM(s) Oral daily  furosemide    Tablet 40 milliGRAM(s) Oral daily  immune   globulin 10% (GAMMAGARD) IVPB 75 Gram(s) IV Intermittent daily  insulin glargine Injectable (LANTUS) 18 Unit(s) SubCutaneous at bedtime  insulin lispro (HumaLOG) corrective regimen sliding scale   SubCutaneous three times a day before meals  insulin lispro (HumaLOG) corrective regimen sliding scale   SubCutaneous at bedtime  insulin lispro Injectable (HumaLOG) 3 Unit(s) SubCutaneous before breakfast  insulin lispro Injectable (HumaLOG) 3 Unit(s) SubCutaneous before lunch  insulin lispro Injectable (HumaLOG) 3 Unit(s) SubCutaneous before dinner  levothyroxine 125 MICROGram(s) Oral daily  lisinopril 10 milliGRAM(s) Oral daily  mupirocin 2% Ointment 1 Application(s) Topical two times a day  niacin  milliGRAM(s) Oral at bedtime  predniSONE   Tablet 40 milliGRAM(s) Oral daily  sodium bicarbonate 650 milliGRAM(s) Oral two times a day  spironolactone 25 milliGRAM(s) Oral daily  vancomycin  IVPB 500 milliGRAM(s) IV Intermittent every 12 hours    PRN Inpatient Medications  acetaminophen   Tablet .. 650 milliGRAM(s) Oral every 6 hours PRN  dextrose 40% Gel 15 Gram(s) Oral once PRN  glucagon  Injectable 1 milliGRAM(s) IntraMuscular once PRN      REVIEW OF SYSTEMS  --------------------------------------------------------------------------------  Gen: No  fevers/chills, weakness  Skin: No rashes  Head/Eyes/Ears/Mouth: No headache; Normal hearing; Normal vision w/o blurriness;   Respiratory: No dyspnea, cough, wheezing, hemoptysis  CV: No chest pain,   GI: No abdominal pain, diarrhea, constipation, nausea, vomiting  : No increased frequency, dysuria, hematuria, nocturia  MSK: No joint pain/swelling;   Neuro: No dizziness/lightheadedness, weakness, seizures  Psych: No issues with affect    All other systems were reviewed and are negative, except as noted.    VITALS/PHYSICAL EXAM  --------------------------------------------------------------------------------  T(C): 36.6 (08-26-20 @ 18:34), Max: 36.6 (08-26-20 @ 10:22)  HR: 82 (08-26-20 @ 18:34) (60 - 89)  BP: 108/68 (08-26-20 @ 18:34) (100/65 - 127/95)  RR: 16 (08-26-20 @ 18:34) (16 - 18)  SpO2: 100% (08-26-20 @ 18:34) (99% - 100%)  Wt(kg): --  Height (cm): 180.34 (08-26-20 @ 10:22)  Weight (kg): 86.6 (08-26-20 @ 10:22)  BMI (kg/m2): 26.6 (08-26-20 @ 10:22)  BSA (m2): 2.07 (08-26-20 @ 10:22)      Physical Exam:  	Gen: NAD, well-appearing  	HEENT: PERRL, supple neck          Lymph: No palpable lymph nodes.  	Pulm: No use of accessory muscles.  	CV:  S1S2  	Abd: +BS, soft   	Ext: No B/L Lower ext edema. Darkening/skin discoloration of LE. Noted multiple toe amputations on right foot. Left 2nd toe significantly red with swelling, pain on palpation and warmth.  	Neuro: No focal deficits  	Skin: Warm, without rashes              Psych: Non-focal  	Vascular: Cool feet.    LABS/STUDIES  --------------------------------------------------------------------------------              10.3   5.72  >-----------<  24       [08-26-20 @ 11:29]              31.8     132  |  98  |  63  ----------------------------<  336      [08-26-20 @ 11:29]  5.6   |  18  |  2.18        Ca     9.6     [08-26-20 @ 11:29]    TPro  6.6  /  Alb  3.6  /  TBili  1.0  /  DBili  x   /  AST  34  /  ALT  40  /  AlkPhos  129  [08-26-20 @ 11:29]    PT/INR: PT 13.4 , INR 1.13       [08-26-20 @ 11:29]  PTT: 27.7       [08-26-20 @ 11:29]      Creatinine Trend:  SCr 2.18 [08-26 @ 11:29]

## 2020-08-26 NOTE — CONSULT NOTE ADULT - ASSESSMENT
69 y/o male with hx of HCC s/p liver directed therapy with stable disease, chronic thrombocytopenia, poorly controlled IDDM, multiple amputations of toes, heart failure s/p AICD placement, CKD and HLD presenting with worsening infection of left foot, planned for digit amputation of 2nd digit.     #Thrombocytopenia  - had baseline thrombocytopenia likely related to splenomegaly/cirrhosis with counts about 40-50k  - now seems to have superimposed ITP with lowering of counts to 10-20k  - started on prednisone 40 mg over past 3 weeks with slight improvement to 24k today   - given plan for surgery, can treat with 1 gm/kg IVIG x2 days today and tomorrow to help count  - if still low, can transfuse as needed prior to procedure to goal of surgeon's discretion   - Avatrombopag also approved for patients w/ chronic liver disease to reduce need of platelets prior to procedure; however this is given five days prior to procedure and does not appear to be on hospital formulary   - would treat ITP as above and monitor CBC daily   - recent bone marrow without inherited bone marrow process to contribute     #HCC  - s/p liver directed therapy with stable disease  - outpatient f/u with primary oncologist Dr. Fredy Mazariegos, PGY-6  Hematology-Oncology Fellow  [phone] ([place]) [phone] ([facility]) 71 y/o male with hx of HCC s/p liver directed therapy with stable disease, chronic thrombocytopenia, poorly controlled IDDM, multiple amputations of toes, heart failure s/p AICD placement, CKD and HLD presenting with worsening infection of left foot, planned for digit amputation of 2nd digit.     #Thrombocytopenia  - had baseline thrombocytopenia likely related to splenomegaly/cirrhosis with counts about 40-50k  - now seems to have superimposed ITP with lowering of counts to 10-20k  - started on prednisone 40 mg over past 3 weeks with slight improvement to 24k today   - also could be component of underlying infection   - given plan for surgery, can treat with 1 gm/kg IVIG x2 days today and tomorrow to help count  - if still low, can transfuse as needed prior to procedure to goal of surgeon's discretion   - Avatrombopag also approved for patients w/ chronic liver disease to reduce need of platelets prior to procedure; however this takes time to work  - would treat ITP as above and monitor CBC daily   - if no response tomorrow, would trial 1 u platelet transfusion and check post transfusion CBC to see response to transfusion   - recent bone marrow without inherited bone marrow process to contribute     #HCC  - s/p liver directed therapy with stable disease  - outpatient f/u with primary oncologist Dr. Fredy Mazariegos, PGY-6  Hematology-Oncology Fellow  [phone] ([place]) [phone] ([facility]) 71 y/o male with hx of HCC s/p liver directed therapy with stable disease, chronic thrombocytopenia, poorly controlled IDDM, multiple amputations of toes, heart failure s/p AICD placement, CKD and HLD presenting with worsening infection of left foot, planned for digit amputation of 2nd digit.     #Thrombocytopenia  - had baseline thrombocytopenia likely related to splenomegaly/cirrhosis with counts about 40-50k  - now seems to have superimposed ITP with lowering of counts to 10-20k  - started on prednisone 40 mg over past 3 weeks with slight improvement to 24k today   - also could be component of underlying infection   - given plan for surgery, can treat with 1 gm/kg IVIG x2 days today and tomorrow to help count  - if still low, can transfuse as needed prior to procedure to goal of surgeon's discretion   - Avatrombopag also approved for patients w/ chronic liver disease to reduce need of platelets prior to procedure; however this takes time to work so will hold off   - given hyperglycemia and active infection, can start downtitrating prednisone to 30 mg daily   - would treat ITP as above and monitor CBC daily   - if no response tomorrow, would trial 1 u platelet transfusion and check post transfusion CBC to see response to transfusion   - recent bone marrow without inherited bone marrow process to contribute     #HCC  - s/p liver directed therapy with stable disease  - outpatient f/u with primary oncologist Dr. Fredy Mazariegos, PGY-6  Hematology-Oncology Fellow  [phone] ([place]) [phone] ([facility])

## 2020-08-26 NOTE — H&P ADULT - NSICDXPASTSURGICALHX_GEN_ALL_CORE_FT
PAST SURGICAL HISTORY:  AICD (automatic cardioverter/defibrillator) present placement in (2006), generator change 4/3/19    Coronary atherosclerosis of artery bypass graft CABG X 4 (1986)    Encounter for incision and drainage procedure Dec 2018 right foot/ankle    History of amputation right foot, 2nd toe, osteo 2015    SCC (squamous cell carcinoma) facial SCC, s/p Mohs    Stented coronary artery RCA (1999), another  stent 2000

## 2020-08-26 NOTE — H&P ADULT - NSHPLABSRESULTS_GEN_ALL_CORE
10.3   5.72  )-----------( 24       ( 26 Aug 2020 11:29 )             31.8       08-26    132<L>  |  98  |  63<H>  ----------------------------<  336<H>  5.6<H>   |  18<L>  |  2.18<H>    Ca    9.6      26 Aug 2020 11:29    TPro  6.6  /  Alb  3.6  /  TBili  1.0  /  DBili  x   /  AST  34  /  ALT  40  /  AlkPhos  129<H>  08-26                  PT/INR - ( 26 Aug 2020 11:29 )   PT: 13.4 sec;   INR: 1.13 ratio         PTT - ( 26 Aug 2020 11:29 )  PTT:27.7 sec    Lactate Trend            CAPILLARY BLOOD GLUCOSE

## 2020-08-26 NOTE — CONSULT NOTE ADULT - SUBJECTIVE AND OBJECTIVE BOX
HPI:  69 y/o male with hx of HCC s/p liver directed therapy with stable disease, chronic thrombocytopenia, poorly controlled IDDM, multiple amputations of toes, heart failure s/p AICD placement, CKD and HLD presenting with worsening infection of left toe. He reports he had blister on bottom of toe that popped. He went to podiatrist who thought foot was infected and recommended going to ED for amputation of digit. Denies any fevers or chills. No other systemic symptoms.     In terms of thrombocytopenia,     14 point ROS otherwise negative    PAST MEDICAL & SURGICAL HISTORY:  HCC (hepatocellular carcinoma): s/p liver embolization x2 in 2019  Afib: pt reports ~9 mos, [id number], resolved - monitored by Dr. Rivers  Ulcer of right ankle: has surgery done Dec 2018  Liver mass: dx: 10/2018   incidental finding. Currently awaitng furthur workup  Gout: medically managed  Elevated prolactin level: dx: &#x27; 70&#x27;s  medically managed Bromocriptine  Vitamin D deficiency  Elevated triglycerides with high cholesterol: on meds  Hypothyroidism  PVD (peripheral vascular disease)  History of hyperprolactinemia  Hepatic cirrhosis, unspecified hepatic cirrhosis type  Anemia  ICD (implantable cardioverter-defibrillator) in place: Medtronic, Old Model [id number] , last interrogation 3/27/19, generator change 4/3/19 New Model # [id number]  Sleep apnea: not using CPAP  History of osteomyelitis: 2015, right foot 2nd toe   follow-up by podiatrist every 2 weeks  Presence of stent in coronary artery: 2 stents 1999, 2006  Heart failure with reduced left ventricular function: last EF 3--35% 5/2019  Ischemic cardiomyopathy  Pituitary adenoma: as per patient chronic, no changes , recently restarted follow up with endocrinologist ( note in allscripts )  Coronary artery disease  Thrombocytopenia: Chronic  HTN (hypertension)  DM (diabetes mellitus): type 2  AICD lead malfunction: history of - fixed follow-up by Dr Rivers  SCC (squamous cell carcinoma): facial SCC, s/p Mohs  Encounter for incision and drainage procedure: Dec 2018 right foot/ankle  History of amputation: right foot, 2nd toe, osteo 2015  AICD (automatic cardioverter/defibrillator) present: placement in (2006), generator change 4/3/19  Stented coronary artery: RCA (1999), another  stent 2000  Coronary atherosclerosis of artery bypass graft: CABG X 4 (1986)      Allergies    No Known Allergies    Intolerances        MEDICATIONS  (STANDING):  mupirocin 2% Ointment 1 Application(s) Topical two times a day    MEDICATIONS  (PRN):      FAMILY HISTORY:  Family history of MI (myocardial infarction)      SOCIAL HISTORY: No EtOH, no tobacco    Height (cm): 180.34 (08-26 @ 10:22)  Weight (kg): 86.6 (08-26 @ 10:22)  BMI (kg/m2): 26.6 (08-26 @ 10:22)  BSA (m2): 2.07 (08-26 @ 10:22)    VITALS:   T(F): 97.6 (08-26-20 @ 12:34), Max: 97.8 (08-26-20 @ 10:22)  HR: 89 (08-26-20 @ 12:34)  BP: 108/61 (08-26-20 @ 12:34)  RR: 16 (08-26-20 @ 12:34)  SpO2: 100% (08-26-20 @ 12:34)  Wt(kg): --    PHYSICAL EXAM    GENERAL: NAD, well-developed  HEAD:  Atraumatic, Normocephalic  EYES: EOMI, PERRLA, conjunctiva and sclera clear  NECK: Supple, No JVD  CHEST/LUNG: Clear to auscultation bilaterally; No wheeze  HEART: Regular rate and rhythm; No murmurs, rubs, or gallops  ABDOMEN: Soft, Nontender, Nondistended; Bowel sounds present  EXTREMITIES:  2+ Peripheral Pulses, No clubbing, cyanosis, or edema  NEUROLOGY: non-focal  SKIN: No rashes or lesions    LABS:                         10.3   5.72  )-----------( 24       ( 26 Aug 2020 11:29 )             31.8     08-26    132<L>  |  98  |  63<H>  ----------------------------<  336<H>  5.6<H>   |  18<L>  |  2.18<H>    Ca    9.6      26 Aug 2020 11:29    TPro  6.6  /  Alb  3.6  /  TBili  1.0  /  DBili  x   /  AST  34  /  ALT  40  /  AlkPhos  129<H>  08-26      PT/INR - ( 26 Aug 2020 11:29 )   PT: 13.4 sec;   INR: 1.13 ratio         PTT - ( 26 Aug 2020 11:29 )  PTT:27.7 sec      IMAGING: HPI:  69 y/o male with hx of HCC s/p liver directed therapy with stable disease, chronic thrombocytopenia, poorly controlled IDDM, multiple amputations of toes, heart failure s/p AICD placement, CKD and HLD presenting with worsening infection of left toe. He reports he had blister on bottom of toe that popped. He went to podiatrist who thought foot was infected and recommended going to ED for amputation of digit. Denies any fevers or chills. No other systemic symptoms.     In terms of thrombocytopenia, pt has baseline value of 40-60k but noted to be worsening in the recent weeks. Pt had bone marrow biopsy which was negative for bone marrow process such as MDS. He was started on prednisone 40 mg for presumed ITP with improvement to about 20k. Denies any bleeding or bruising.     14 point ROS otherwise negative    PAST MEDICAL & SURGICAL HISTORY:  HCC (hepatocellular carcinoma): s/p liver embolization x2 in 2019  Afib: pt reports ~9 mos, [phone], resolved - monitored by Dr. Rivers  Ulcer of right ankle: has surgery done Dec 2018  Liver mass: dx: 10/2018   incidental finding. Currently awaitng furthur workup  Gout: medically managed  Elevated prolactin level: dx: &#x27; 70&#x27;s  medically managed Bromocriptine  Vitamin D deficiency  Elevated triglycerides with high cholesterol: on meds  Hypothyroidism  PVD (peripheral vascular disease)  History of hyperprolactinemia  Hepatic cirrhosis, unspecified hepatic cirrhosis type  Anemia  ICD (implantable cardioverter-defibrillator) in place: Medtronic, Old Model [id number] , last interrogation 3/27/19, generator change 4/3/19 New Model # [id number]  Sleep apnea: not using CPAP  History of osteomyelitis: 2015, right foot 2nd toe   follow-up by podiatrist every 2 weeks  Presence of stent in coronary artery: 2 stents 1999, 2006  Heart failure with reduced left ventricular function: last EF 3--35% 5/2019  Ischemic cardiomyopathy  Pituitary adenoma: as per patient chronic, no changes , recently restarted follow up with endocrinologist ( note in allscripts )  Coronary artery disease  Thrombocytopenia: Chronic  HTN (hypertension)  DM (diabetes mellitus): type 2  AICD lead malfunction: history of - fixed follow-up by Dr Rivers  SCC (squamous cell carcinoma): facial SCC, s/p Mohs  Encounter for incision and drainage procedure: Dec 2018 right foot/ankle  History of amputation: right foot, 2nd toe, osteo 2015  AICD (automatic cardioverter/defibrillator) present: placement in (2006), generator change 4/3/19  Stented coronary artery: RCA (1999), another  stent 2000  Coronary atherosclerosis of artery bypass graft: CABG X 4 (1986)      Allergies    No Known Allergies    Intolerances        MEDICATIONS  (STANDING):  mupirocin 2% Ointment 1 Application(s) Topical two times a day    MEDICATIONS  (PRN):      FAMILY HISTORY:  Family history of MI (myocardial infarction)      SOCIAL HISTORY: No EtOH, no tobacco, Lives with wife     Height (cm): 180.34 (08-26 @ 10:22)  Weight (kg): 86.6 (08-26 @ 10:22)  BMI (kg/m2): 26.6 (08-26 @ 10:22)  BSA (m2): 2.07 (08-26 @ 10:22)    VITALS:   T(F): 97.6 (08-26-20 @ 12:34), Max: 97.8 (08-26-20 @ 10:22)  HR: 89 (08-26-20 @ 12:34)  BP: 108/61 (08-26-20 @ 12:34)  RR: 16 (08-26-20 @ 12:34)  SpO2: 100% (08-26-20 @ 12:34)  Wt(kg): --    PHYSICAL EXAM    GENERAL: NAD, lying comfortably in bed   HEAD:  Atraumatic, Normocephalic  EYES: EOMI, PERRLA, conjunctiva and sclera clear  NECK: Supple, No JVD  CHEST/LUNG: Clear to auscultation bilaterally  HEART: Regular rate and rhythm; No murmurs, rubs, or gallops  ABDOMEN: Soft, Nontender, Nondistended; Bowel sounds present  EXTREMITIES: chronic venous changes bilaterally; s/p digits amputation on rt side. Left 2nd digit with blistering on dorsal aspect   NEUROLOGY: non-focal  SKIN: No rashes or lesions    LABS:                         10.3   5.72  )-----------( 24       ( 26 Aug 2020 11:29 )             31.8     08-26    132<L>  |  98  |  63<H>  ----------------------------<  336<H>  5.6<H>   |  18<L>  |  2.18<H>    Ca    9.6      26 Aug 2020 11:29    TPro  6.6  /  Alb  3.6  /  TBili  1.0  /  DBili  x   /  AST  34  /  ALT  40  /  AlkPhos  129<H>  08-26      PT/INR - ( 26 Aug 2020 11:29 )   PT: 13.4 sec;   INR: 1.13 ratio         PTT - ( 26 Aug 2020 11:29 )  PTT:27.7 sec      IMAGING:

## 2020-08-26 NOTE — ED PROVIDER NOTE - PROGRESS NOTE DETAILS
Podiatry at bedside at this time. Patient to have operative procedure on Friday. Pre operative labs sent. Abx and cultures initiated. Plan for admission to medicine. labs reviewed. no leukocytosis present. lactate elevated. fluids to be given at 500 cc given CKD. K 5.6. EKG showing no changes of hyperkalemia. Patient admitted to Dr. Arrington at this time for infected diabetic ulceration at this time.

## 2020-08-26 NOTE — ED ADULT NURSE REASSESSMENT NOTE - NS ED NURSE REASSESS COMMENT FT1
MD made aware of elevated potassium, states fluids to be ordered. MD made aware of elevated potassium, states fluids to be ordered, no other interventions required.

## 2020-08-26 NOTE — CONSULT NOTE ADULT - ATTENDING COMMENTS
71 y/o male with hx of HCC s/p liver directed therapy with stable disease, chronic thrombocytopenia, poorly controlled IDDM, multiple amputations of toes, heart failure s/p AICD placement, CKD and HLD presenting with worsening infection of left foot, planned for digit amputation of 2nd digit. Noted recently to have drop of platelets from 40-60K (baseline) to 10-20K. He was started on prednisone 40 mg daily (dose reduced due to IDDM and podiatric infections) with increase in platelet to 24K. Unclear how much of his thrombocytopenia is related to ITP, chronic liver disease and HSM, or bone marrow suppression from multiple infections.   -will plan to start IVIG 1 g/kg today   -wean steroids to 30 mg daily x 4 days   -appreciate ID, surgery recs  -appreciate endocrinology recs
Agree with assessment and plan as above by Dr. Suarez. Reviewed all pertinent labs, glucose values, and imaging studies. Modifications made as indicated above. Adjust basal bolus as indicated above. Can monitor on Lantus 18. Can increase Humalog to 9 especially since pt is on steroids. Will adjust further as needed.    Quinn Pires D.O  [phone]

## 2020-08-26 NOTE — H&P ADULT - HISTORY OF PRESENT ILLNESS
69 y/o male with hx of liver ca ( not on active chemotherapy), thrombocytopenia, poorly controlled IDDM, multiple amputations of toes, ICD placement, and HLD presenting for 2nd toe on left foot ulceration and infection x few days. Symptoms started suddenly at rest, have been constant and worsening, with no worsening or alleviating factors. No medications taken. Patient denies chest pain, sob, fever, chills, headache, nausea, emesis, diarrhea, abdominal pain, hematuria/dysuria or lower extremity swelling. Patient was seen by his podiatrist who recommended visiting the ED at this time for abx and admission.

## 2020-08-26 NOTE — PATIENT PROFILE ADULT - BRADEN SENSORY
Laura Herbert  64 y.o. female    1959      CC: New patient to establish, leg weakness      Chief Complaint   Patient presents with    Established New Doctor     NP/ESTABLISH CARE, PT IS CURRENTLY TREATED FOR ANXIETY AND HEARTBURN. PT HAS MOVED TO [facility] FROM [facility].  Extremity Weakness     PT IS C/O BILATERAL LEG WEAKNESS X1 YEAR. HPI     Has been dealing with leg weakness - feels like legs will not hold her up. Sits all day. Mentally wiped out by end of day - not exercising. No numbness or tingling. Both legs are equally weak. Has constant level of anxiety. Ongoing issue. Is a worrier. Has had a lot in family going on. Lost 2 bro, 2 sis and 2 nephews in 2 years. Has 4 new grandkids. Has had heartburn as well. Has had a lot of phlegm in chest and throat without being sick. Had ER visit and CXR normal. Has a rattle. NO history of allergies. But has sinus and allergy issues more. Is a smoker - on and off for 26-7 years. Would quit for the pregnancy and for 4-5 years and then go back. Tried chantix with Dr Star Fischer and would have bizarre dreams. Would like to quit. Would like to loose weight. Working in her yard and looking to exercise. Has back pain all the time. Was seeing chiropractic for a while. A new mattress -helped. Has heart palpitations every day - flip flops and races - feels like comes out of chest.  On paxil for anxiety. No side effects - has been on for 15 years or something similar. Not sure how much it is helping. Still has daily anxiety. Does not get panic attacks. Did at the last mammogram.   Has been loosing hair, has had dry skin. Cholesterol has been a little high in the past.  Was on something (zetia) but could not take it. Statins also \"messed her up\". Could not sleep on the statins. Had joint pain on them. Takes prilosec every day for GERD.         Allergies   Allergen Reactions    Statins     Codeine Other (See Comments)     Bad headaches       Physical  Examination    Physical Exam   Constitutional: She is oriented to person, place, and time. She appears well-developed and well-nourished. No distress. HENT:   Head: Normocephalic. Right Ear: Tympanic membrane, external ear and ear canal normal.   Left Ear: Tympanic membrane, external ear and ear canal normal.   Nose: Nose normal. No mucosal edema. Right sinus exhibits no maxillary sinus tenderness and no frontal sinus tenderness. Left sinus exhibits no maxillary sinus tenderness and no frontal sinus tenderness. Mouth/Throat: Oropharynx is clear and moist and mucous membranes are normal. No oropharyngeal exudate, posterior oropharyngeal edema or posterior oropharyngeal erythema. Eyes: Pupils are equal, round, and reactive to light. Neck: Normal range of motion. Carotid bruit is not present. No thyroid mass and no thyromegaly present. Cardiovascular: Normal rate and regular rhythm. Exam reveals no gallop and no friction rub. No murmur heard. Pulmonary/Chest: Effort normal and breath sounds normal. No respiratory distress. She has no wheezes. She has no rales. Abdominal: Soft. Bowel sounds are normal. She exhibits no distension and no mass. There is no tenderness. There is no guarding. Musculoskeletal: She exhibits tenderness (left knee). She exhibits no edema. Lymphadenopathy:     She has no cervical adenopathy. Neurological: She is alert and oriented to person, place, and time. Weak to bilateral le   Skin: Skin is warm and dry. She is not diaphoretic. No erythema. Psychiatric: Her behavior is normal. Judgment and thought content normal.   Anxiety    Nursing note and vitals reviewed.       Vitals:    06/27/19 0919   BP: 122/72   Site: Right Upper Arm   Position: Sitting   Cuff Size: Medium Adult   Pulse: 70   Resp: 18   Temp: 98.4 °F (36.9 °C)   TempSrc: Oral   SpO2: 98%   Weight: 227 lb 9.6 oz (103.2 kg)   Height: 5' 6.5\" (1.689 m)     Body mass index is 36.19 kg/m². Wt Readings from Last 3 Encounters:   06/27/19 227 lb 9.6 oz (103.2 kg)   02/03/16 185 lb (83.9 kg)   03/10/14 219 lb (99.3 kg)     BP Readings from Last 3 Encounters:   06/27/19 122/72   02/03/16 115/65   03/10/14 130/60        No results found for this visit on 06/27/19. Assessment     Diagnosis Orders   1. Generalized anxiety disorder  PARoxetine (PAXIL) 40 MG tablet    Vitamin D 25 Hydroxy    Vitamin D 25 Hydroxy   2. Arthralgia, unspecified joint  Vitamin D 25 Hydroxy    Vitamin D 25 Hydroxy   3. Hypercholesterolemia  Lipid Panel    Lipid Panel   4. Screening for diabetes mellitus  Comprehensive Metabolic Panel    Comprehensive Metabolic Panel   5. Hair loss  TSH with Reflex    TSH with Reflex   6. Palpitations  TSH with Reflex    TSH with Reflex   7. Tobacco abuse  buPROPion (WELLBUTRIN SR) 150 MG extended release tablet   8. Weakness of both lower extremities  Vitamin B12    Vitamin B12   9. Gastroesophageal reflux disease without esophagitis     10. Anxiety           Plan    Increase paxil from 20 to 40 for better anxiety control. Test above labs. Start wellbutrin for smoking cessation and counselled on cessation. Discussed exercise for anxiety control, as well. GERD controlled on Prilosec 20 mg daily-refill given  Hyperlipidemia-cannot tolerate statins or Zetia -we will check numbers on lipid panel  She has heart palpitations and anxiety, so we may decide to treat with the beta-blocker in the future  Duration of today's visit was 45 minutes, with greater than 50% being counseling and care planning. Return in about 2 months (around 8/27/2019) for Anxiety. Patient Instructions   You may receive a survey regarding the care you received during your visit. Your input is valuable to us. We encourage you to complete and return your survey. We hope you will choose us in the future for your healthcare needs. Wellbutrin - once daily for the first 3 days.  Then twice daily. (4) no impairment

## 2020-08-26 NOTE — ED PROVIDER NOTE - NS ED ROS FT
Constitutional: No fever or chills  Eyes: No visual changes, eye pain or redness  HEENT: No throat pain, ear pain, nasal pain. No nose bleeding.  CV: No chest pain or lower extremity edema  Resp: No SOB no cough  GI: No abd pain. No nausea or vomiting. No diarrhea. No constipation.   : No dysuria, hematuria.   MSK: No musculoskeletal pain  Skin: (+) ulcer  Neuro: No headache. No numbness or tingling. No weakness.

## 2020-08-26 NOTE — H&P ADULT - NSHPREVIEWOFSYSTEMS_GEN_ALL_CORE
REVIEW OF SYSTEMS:    CONSTITUTIONAL: No weakness, fevers or chills  EYES/ENT: No visual changes;  No vertigo or throat pain   NECK: No pain or stiffness  RESPIRATORY: No cough, wheezing, hemoptysis; No shortness of breath  CARDIOVASCULAR: No chest pain or palpitations  GASTROINTESTINAL: No abdominal or epigastric pain. No nausea, vomiting, or hematemesis; No diarrhea or constipation. No melena or hematochezia.  GENITOURINARY: No dysuria, frequency or hematuria  NEUROLOGICAL: No numbness or weakness  SKIN: No itching, burning, rashes, or lesions L 2nd toe erythema   All other review of systems is negative unless indicated above.

## 2020-08-26 NOTE — ED ADULT NURSE NOTE - NSIMPLEMENTINTERV_GEN_ALL_ED
Implemented All Fall Risk Interventions:  [place] to call system. Call bell, personal items and telephone within reach. Instruct patient to call for assistance. Room bathroom lighting operational. Non-slip footwear when patient is off stretcher. Physically safe environment: no spills, clutter or unnecessary equipment. Stretcher in lowest position, wheels locked, appropriate side rails in place. Provide visual cue, wrist band, yellow gown, etc. Monitor gait and stability. Monitor for mental status changes and reorient to person, place, and time. Review medications for side effects contributing to fall risk. Reinforce activity limits and safety measures with patient and family.

## 2020-08-26 NOTE — CONSULT NOTE ADULT - SUBJECTIVE AND OBJECTIVE BOX
Patient is a 70y old  Male who presents with a chief complaint of breakdown of left second toe ulcer  HPI:  71 y/o male with hx of liver ca ( not on active chemotherapy), thrombocytopenia, poorly controlled IDDM, multiple amputations of toes, ICD placement, and HLD presenting for 2nd toe on left foot ulceration and infection x few days. Symptoms started suddenly at rest, have been constant and worsening, with no worsening or alleviating factors. No medications taken. Patient denies chest pain, sob, fever, chills, headache, nausea, emesis, diarrhea, abdominal pain, hematuria/dysuria or lower extremity swelling. Patient was seen by his podiatrist who recommended visiting the ED at this time for abx and admission. (26 Aug 2020 15:20)    Mr Harvey has thrombocytopenia with plts to jaelyn of 11K. He was prescribed prednisone for possible ITP with some increase in platelet count. On steroids he has had marked hyperglycemia. There was sudden worsening of previous bland left second toe ulceration. No fever, chills, malaise. He is in good, sarcastic humor. No local trauma. He has decreased sensation in feet.      PAST MEDICAL & SURGICAL HISTORY:  HCC (hepatocellular carcinoma): s/p liver embolization x2 in 2019  Afib: pt reports ~9 mos, [id number], resolved - monitored by Dr. Rivers  Ulcer of right ankle: has surgery done Dec 2018  Liver mass: dx: 10/2018    Gout: medically managed  Elevated prolactin level: dx: &#x27; 70&#x27;s  medically managed Bromocriptine  Vitamin D deficiency  Elevated triglycerides with high cholesterol: on meds  Hypothyroidism  PVD (peripheral vascular disease)  History of hyperprolactinemia  Hepatic cirrhosis, unspecified hepatic cirrhosis type  Anemia  ICD (implantable cardioverter-defibrillator) in place: Medtronic, Old Model [id number] , last interrogation 3/27/19, generator change 4/3/19 New Model # [id number]  Sleep apnea: not using CPAP  History of osteomyelitis: 2015, right foot 2nd toe   follow-up by podiatrist every 2 weeks  Presence of stent in coronary artery: 2 stents 1999, 2006  Heart failure with reduced left ventricular function: last EF 3--35% 5/2019  Ischemic cardiomyopathy  Pituitary adenoma: as per patient chronic, no changes , recently restarted follow up with endocrinologist ( note in allscripts )  Coronary artery disease  Thrombocytopenia: Chronic  HTN (hypertension)  DM (diabetes mellitus): type 2    AICD lead malfunction: history of - fixed follow-up by Dr Rivers  SCC (squamous cell carcinoma): facial SCC, s/p Mohs  Encounter for incision and drainage procedure: Dec 2018 right foot/ankle  History of amputation: right foot, 2nd toe, osteo 2015  AICD (automatic cardioverter/defibrillator) present: placement in (2006), generator change 4/3/19  Stented coronary artery: RCA (1999), another  stent 2000  Coronary atherosclerosis of artery bypass graft: CABG X 4 (1986)    Social history: former smoker, , disabled    FAMILY HISTORY:  Family history of MI (myocardial infarction)    REVIEW OF SYSTEMS:  CONSTITUTIONAL: No weakness, fevers or chills  EYES/ENT: No visual changes;  No vertigo or throat pain   NECK: No pain or stiffness  RESPIRATORY: No cough, wheezing, hemoptysis; No shortness of breath  CARDIOVASCULAR: No chest pain or palpitations  GASTROINTESTINAL: No abdominal or epigastric pain. No nausea, vomiting, or hematemesis; No diarrhea or constipation. No melena or hematochezia.  GENITOURINARY: No dysuria, frequency or hematuria  NEUROLOGICAL: No numbness or weakness  SKIN: No itching, burning, rashes, or lesions   All other review of systems is negative unless indicated above    Allergies    No Known Allergies    Antimicrobials:  cefepime   IVPB 1000 milliGRAM(s) IV Intermittent every 24 hours  vancomycin  IVPB 500 milliGRAM(s) IV Intermittent every 12 hours      Vital Signs Last 24 Hrs  T(C): 36.4 (26 Aug 2020 14:09), Max: 36.6 (26 Aug 2020 10:22)  T(F): 97.5 (26 Aug 2020 14:09), Max: 97.8 (26 Aug 2020 10:22)  HR: 81 (26 Aug 2020 14:09) (60 - 89)  BP: 127/74 (26 Aug 2020 14:09) (100/65 - 127/95)  BP(mean): 84 (26 Aug 2020 11:30) (84 - 84)  RR: 16 (26 Aug 2020 14:09) (16 - 18)  SpO2: 100% (26 Aug 2020 14:09) (99% - 100%)    PHYSICAL EXAM:  General: WN/WD NAD, Non-toxic  Neurology: A&Ox3, nonfocal  Respiratory: Clear to auscultation bilaterally  CV: RRR, S1S2, no murmurs, rubs or gallops  Abdominal: Soft, Non-tender, non-distended, normal bowel sounds  Extremities: No edema, hammertoe deformity, red swollen left 2nd toe, strong left DP pulse  Line Sites: Clear  Skin: No rash                          10.3   5.72  )-----------( 24       ( 26 Aug 2020 11:29 )             31.8     08-26    132<L>  |  98  |  63<H>  ----------------------------<  336<H>  5.6<H>   |  18<L>  |  2.18<H>    Ca    9.6      26 Aug 2020 11:29    TPro  6.6  /  Alb  3.6  /  TBili  1.0  /  DBili  x   /  AST  34  /  ALT  40  /  AlkPhos  129<H>  08-26    Sedimentation Rate, Erythrocyte (08.26.20 @ 14:15)    Sedimentation Rate, Erythrocyte: 35 mm/hr  C-Reactive Protein, Serum (08.26.20 @ 11:29)    C-Reactive Protein, Serum: 2.95 mg/dL    Hemoglobin A1C, Whole Blood (03.31.20 @ 13:59)    Hemoglobin A1C, Whole Blood: 8.1 %    COVID-19 PCR . (08.26.20 @ 11:41)    COVID-19 PCR: NotDetec:     Radiology:  < from: Xray Toes, Left Foot (08.26.20 @ 12:27) >  IMPRESSION: Hammertoe deformities. No radiographic evidence for osteomyelitis of the left toes with attention to the left second toe. If clinically warranted, MRI may be performed for further evaluation.    < end of copied text >      Gopi Grace MD; Division of Infectious Disease; Pager: [phone]; nights and weekends: [phone]

## 2020-08-26 NOTE — H&P ADULT - ASSESSMENT
70 m with    Toe cellulitis, possible Osteo  - antibiotics  - ID evaluation  - Podiatry evaluation   - wound care    Diabetes Mellitus  - BS control  - ADA diet   - Endocrine evaluation    Afib  - rate control  - cardiology eval  - not AC 2 to low Platelets    AICD   - need for preop deactivation?    Anemia   - follow    Coronary artery disease  - continue Rx  - cardiology evaluation    Elevated prolactin level  - endocrine follow    CKD  - Nephrology evaluation  - follow Cr, lytes  - avoid nephrotoxic Rx    Elevated triglycerides  - continue Rx    Gout  - stable    HCC (hepatocellular carcinoma)  - stable    Heart failure with reduced left ventricular function last EF 3--35% 5/2019  - cardiology evaluation    HTN (hypertension)   - control    Hypothyroidism   - follow TSH  - need to clarify Rx (patient taking Thyrozene? nonformulary)    PVD (peripheral vascular disease)   - stable    Thrombocytopenia Chronic with superimposed ITP  - follow  - Hematology evaluation   - continue Prednisone and IVIG for 2 days  - may need preop transfusion    DVT prophylaxis  - PAS    Further action as per clinical course     Steve Arrington MD pager [phone] 70 m with    Toe cellulitis, possible Osteo  - antibiotics  - ID evaluation  - Podiatry evaluation   - wound care    Diabetes Mellitus  - BS control  - ADA diet   - Endocrine evaluation    Afib  - rate control  - cardiology eval  - not AC 2 to low Platelets    AICD   - need for preop deactivation?    Anemia   - follow    Coronary artery disease  - continue Rx  - cardiology evaluation    Elevated prolactin level  - endocrine follow    CKD  - Nephrology evaluation  - follow Cr, lytes  - avoid nephrotoxic Rx    Elevated triglycerides  - continue Rx    Gout  - stable    HCC (hepatocellular carcinoma)  - stable    Heart failure with reduced left ventricular function last EF 3--35% 5/2019  - cardiology evaluation    HTN (hypertension)   - control    Hypothyroidism   - follow TSH    PVD (peripheral vascular disease)   - stable    Thrombocytopenia Chronic with superimposed ITP  - follow  - Hematology evaluation   - continue Prednisone and IVIG for 2 days  - may need preop transfusion    DVT prophylaxis  - PAS    Further action as per clinical course     Steve Arrington MD pager [phone]

## 2020-08-26 NOTE — CONSULT NOTE ADULT - PROBLEM SELECTOR RECOMMENDATION 2
Patient with hyperkalemia secondary to hyperglycemia. Will need to control sugars in order to control potassium. Consider calcium gluconate for EKG changes. Continue to monitor closely, check another level at 8 PM.

## 2020-08-26 NOTE — ED PROVIDER NOTE - PMH
Afib  pt reports ~9 mos, [id number], resolved - monitored by Dr. Rivers  AICD lead malfunction  history of - fixed follow-up by Dr Rivers  Anemia    Coronary artery disease    DM (diabetes mellitus)  type 2  Elevated prolactin level  dx: ' 70's  medically managed Bromocriptine  Elevated triglycerides with high cholesterol  on meds  Gout  medically managed  HCC (hepatocellular carcinoma)  s/p liver embolization x2 in 2019  Heart failure with reduced left ventricular function  last EF 3--35% 5/2019  Hepatic cirrhosis, unspecified hepatic cirrhosis type    History of hyperprolactinemia    History of osteomyelitis  2015, right foot 2nd toe   follow-up by podiatrist every 2 weeks  HTN (hypertension)    Hypothyroidism    ICD (implantable cardioverter-defibrillator) in place  [employer]tr[employer], Old Model [id number] , last interrogation 3/27/19, generator change 4/3/19 New Model # [id number]  Ischemic cardiomyopathy    Liver mass  dx: 10/2018   incidental finding. Currently awaitng furthur workup  Pituitary adenoma  as per patient chronic, no changes , recently restarted follow up with endocrinologist ( note in allscripts )  Presence of stent in coronary artery  2 stents 1999, 2006  PVD (peripheral vascular disease)    Sleep apnea  not using CPAP  Thrombocytopenia  Chronic  Ulcer of right ankle  has surgery done Dec 2018  Vitamin D deficiency

## 2020-08-26 NOTE — PATIENT PROFILE ADULT - ABILITY TO HEAR (WITH HEARING AID OR HEARING APPLIANCE IF NORMALLY USED):
PRIMARY PROBLEM: Low back pain and left leg pain    PROCEDURE: Left L5-S1 and left S1  Transforaminal Epidural Steroid Injections with fluoroscopic guidance and contrast.     PROCEDURE DETAILS: After written informed consent was obtained from the patient, the patient was escorted to the procedure room.  The patient was placed in the prone position.  A  time out  was conducted to verify patient identity, procedure to be performed, side, site, allergies and any special requirements.  The skin over the thoracolumbar region was prepped and draped in normal sterile fashion. Fluoroscopy was used to identify the neural foramen in AP view and the skin was anesthetized with 2 mL of 1% lidocaine with bicarbonate buffer. A 22-gauge,   5-inch Quincke spinal needle was advanced through this location and advanced under fluoroscopic guidance towards the neural foramen.  The target zone was the 6 o clock position of the pedicle.   Prior to entering the foramen, the depth of the needle was gauged with a lateral view on fluoroscopy. While still in a lateral view, the needle was slowly advanced to avoid injury to the spinal nerve.  Then, in the oblique view (approximately 28 degrees), after negative aspiration, 0.5 mL of Omnipaque contrast dye was injected revealing epidural spread without evidence of intravascular or intrathecal spread.  Then a 3cc solution of 20 mg of Triamcinolone in 2.5 mL of  Preservative-Free saline was slowly injected into the epidural space at each segment.  After injection of the medication, as the needle tip was withdrawn, it was flushed with local anesthetic. This was repeated at the left S1 foramen by passing the spinal needle through his left S1 foramen an obtaining a epidurogram at this level. Then a 3cc solution of 20 mg of Triamcinolone in 2.5 mL of  Preservative-Free saline was slowly injected into the epidural space at each segment.   The patient was monitored with blood pressure and pulse oximetry  machines with the assistance of an RN throughout the procedure.  The patient was alert and responsive to questions throughout the procedure.   The patient tolerated the procedure well and was observed in the post-procedural area.  The patient was dismissed without apparent complications.     DIAGNOSIS:  1. Lumbosacral Radiculopathy with a history of long-term pain relief from previous epidural injections.  2. Afib- off Eliquis for 6 days    PLAN:  1. Performed left L5-S1 and left S1  transforaminal epidural steroid injections.  2. The patient was instructed to follow-up in two weeks with a progress update.      Pawan Davenport MD  Diplomate of the American Board of Anesthesiology, Pain Medicine       Adequate: hears normal conversation without difficulty

## 2020-08-27 NOTE — PROGRESS NOTE ADULT - ASSESSMENT
70 m with    Toe cellulitis, possible Osteo  - antibiotics  - ID fu  - Podiatry fu  plan for toe debridement   - wound care    Diabetes Mellitus  - BS control  - ADA diet   - Endocrine evaluation    Afib  - rate control  - cardiology eval  - not AC 2 to low Platelets    AICD   - need for preop deactivation?    Anemia   - follow    Coronary artery disease  - continue Rx  - cardiology evaluation    Elevated prolactin level  - endocrine follow    CKD  - Nephrology evaluation  - follow Cr, lytes  - avoid nephrotoxic Rx    Elevated triglycerides  - continue Rx    Gout  - stable    HCC (hepatocellular carcinoma)  - stable    Heart failure with reduced left ventricular function last EF 3--35% 5/2019  - cardiology evaluation    HTN (hypertension)   - control    Hypothyroidism   - follow TSH    PVD (peripheral vascular disease)   - stable    Thrombocytopenia Chronic with superimposed ITP  - Hematology cs fu   - continue Prednisone and IVIG for 2 days  -     DVT prophylaxis  - [facility]  [phone]

## 2020-08-27 NOTE — PROGRESS NOTE ADULT - ATTENDING COMMENTS
Agree with assessment and plan as above by Dr. Suarez. Reviewed all pertinent labs, glucose values, and imaging studies. Modifications made as indicated above. Glucose remains above goal exacerbated by steroids. Monitor on Lantus 18. Based on insulin requirements would increase to Humalog 18 but since prednisone is decreasing starting tomorrow will increase Humalog instead to 15 units. Lower cabergoline to 0.25mg weekly given low prolactin.    Quinn Pires D.O  [phone] Agree with assessment and plan as above by Dr. Suarez. Reviewed all pertinent labs, glucose values, and imaging studies. Modifications made as indicated above. Glucose remains above goal exacerbated by steroids. Now increased back to prednisone 40mg daily. Increase Lantus to 24 units qhs. Increase Humalog to 18 units qac. Lower cabergoline to 0.25mg weekly given low prolactin.    Quinn Pires D.O  [phone]

## 2020-08-27 NOTE — PROGRESS NOTE ADULT - ASSESSMENT
69 y/o male with hx of HCC s/p liver directed therapy with stable disease, chronic thrombocytopenia, poorly controlled IDDM, multiple amputations of toes, heart failure s/p AICD placement, CKD and HLD presenting with worsening infection of left foot, planned for digit amputation of 2nd digit.     #Thrombocytopenia  - had baseline thrombocytopenia likely related to splenomegaly/cirrhosis with counts about 40-50k  - now seems to have superimposed ITP with lowering of counts to 10-20k  - started on prednisone 40 mg over past 3 weeks with slight improvement to about 20k   - given plan for surgery, continue 1 gm/kg IVIG day #2 today for total of 2 days  - platelets still low today, attempted transfusion without response   - given lack of response, will add nplate 1 mcg/kg today   - continue prednisone 40 mg daily   - would treat ITP as above and monitor CBC daily   - platelet goal prior to surgery up to podiatry team; if not urgent, will continue ITP management with hopeful more robust response to platelets   - recent bone marrow without inherited bone marrow process to contribute     #HCC  - s/p liver directed therapy with stable disease  - outpatient f/u with primary oncologist Dr. Fredy Mazariegos, PGY-6  Hematology-Oncology Fellow  [phone] ([place]) [phone] ([facility])

## 2020-08-27 NOTE — PROGRESS NOTE ADULT - ATTENDING COMMENTS
69 y/o male with hx of HCC s/p liver directed therapy with stable disease, chronic thrombocytopenia, poorly controlled IDDM, multiple amputations of toes, heart failure s/p AICD placement, CKD and HLD presenting with worsening infection of left foot, planned for digit amputation of 2nd digit. Noted recently to have drop of platelets from 40-60K (baseline) to 10-20K. He was started on prednisone 40 mg daily (dose reduced due to IDDM and podiatric infections) with increase in platelet to 24K. He received day 1 IVIG with no improvement in platelets. He does not respond appropriately to platelet transfusions   -continue t IVIG 1 g/kg today   -continue prednisone 30 mg daily x 4 days   -start romiplostin (1 mcg/kg weekly)  -appreciate ID, surgery recs  -appreciate endocrinology recs

## 2020-08-27 NOTE — CONSULT NOTE ADULT - NSHPATTENDINGPLANDISCUSS_GEN_ALL_CORE
Dr. Arrington. Call Cardiology Fellow or Attending as listed on [email or web address] password: [email or web address].
fellows

## 2020-08-27 NOTE — CONSULT NOTE ADULT - CONSULT REQUESTED DATE/TIME
26-Aug-2020
26-Aug-2020 11:39
26-Aug-2020 13:37
26-Aug-2020 18:07
26-Aug-2020 18:36
27-Aug-2020 08:15

## 2020-08-27 NOTE — PROGRESS NOTE ADULT - ASSESSMENT
Assessment and Recommendation:   		  71 y/o male with hx of liver ca (not on active chemotherapy), recently diagnosed ITP with thrombocytopenia, T2DM c/b multiple amputations of toes, hypothyroidism, pituitary adenoma, HF with ICD placement, HLD, presenting for 2nd toe on left foot ulceration and infection x few days. Patient was seen by his podiatrist who recommended visiting the ED at this time for abx and admission. Endocrinology consulted for hyperglycemia and T2DM management          Problem/Recommendation - 1:  Problem: Type 2 diabetes mellitus with stage 4 chronic kidney disease, with long-term current use of insulin. Recommendation: hyperglycemia second to steroids. Prior to starting steroids, DM fairly well controlled (HbA1C 8.1 in March 2020). Prednisone tapering initiated (ordered for 30mg daily tomorrow 8/28).  - continue Lantus 18 units qhs  - increase Humalog to 10 units TID pre-meal  - moderate correction scales ac and hs  - CC diet  - check FS ac/hs  - please notify endocrine if any changes to steroid regimen  Discharge  - plan TBD based on insulin requirements and steroid plan at discharge.     Problem/Recommendation - 2:  ·  Problem: Diabetic toe ulcer.  Recommendation: management per primary and podiatry team  outpatient f/u with Podiatry.      Problem/Recommendation - 3:  ·  Problem: Hyperlipidemia, unspecified hyperlipidemia type.  Recommendation: - agree with high intensity statin.      Problem/Recommendation - 4:  ·  Problem: Hypertension, unspecified type.  Recommendation: - BP controlled on lisinopril. Goal BP <130/70.      Problem/Recommendation - 5:  ·  Problem: Hypothyroidism, unspecified type.  Recommendation: - continue LT4 125mcg daily on M,Tu,W,Th,F,Sun. LT4 250mcg on saturdays  - states pt takes "thyrozene" on home med list which is a thyroid herbal supplement. Recommend switching to levothyroxine or synthroid on discharge  - check TFTs outpatient.      Problem/Recommendation - 6:  Problem: Hyperprolactinemia. Recommendation: - on cabergoline 0.5 weekly  - outpatient f/u with Endocrinology      Jacinta Suarez DO, Endocrine Fellow   Pager [phone]. from 9-5PM. After hours and on weekends please call [phone]. Assessment and Recommendation:   		  71 y/o male with hx of liver ca (not on active chemotherapy), recently diagnosed ITP with thrombocytopenia, T2DM c/b multiple amputations of toes, hypothyroidism, pituitary adenoma, HF with ICD placement, HLD, presenting for 2nd toe on left foot ulceration and infection x few days. Patient was seen by his podiatrist who recommended visiting the ED at this time for abx and admission. Endocrinology consulted for hyperglycemia and T2DM management          Problem/Recommendation - 1:  Problem: Type 2 diabetes mellitus with stage 4 chronic kidney disease, with long-term current use of insulin. Recommendation: hyperglycemia second to steroids. Prior to starting steroids, DM fairly well controlled (HbA1C 8.1 in March 2020). Prednisone tapering initiated (ordered for 30mg daily tomorrow 8/28).  - continue Lantus 18 units qhs  - increase Humalog to 11 units TID pre-meal  - moderate correction scales ac and hs  - CC diet  - check FS ac/hs  - please notify endocrine if any changes to steroid regimen  Discharge  - plan TBD based on insulin requirements and steroid plan at discharge.     Problem/Recommendation - 2:  ·  Problem: Diabetic toe ulcer.  Recommendation: management per primary and podiatry team  outpatient f/u with Podiatry.      Problem/Recommendation - 3:  ·  Problem: Hyperlipidemia, unspecified hyperlipidemia type.  Recommendation: - agree with high intensity statin.      Problem/Recommendation - 4:  ·  Problem: Hypertension, unspecified type.  Recommendation: - BP controlled on lisinopril. Goal BP <130/70.      Problem/Recommendation - 5:  ·  Problem: Hypothyroidism, unspecified type.  Recommendation: - continue LT4 125mcg daily on M,Tu,W,Th,F,Sun. LT4 250mcg on saturdays  - states pt takes "thyrozene" on home med list which is a thyroid herbal supplement. Recommend switching to levothyroxine or synthroid on discharge  - check TFTs outpatient.      Problem/Recommendation - 6:  Problem: Hyperprolactinemia. Recommendation: - on cabergoline 0.5 weekly  - outpatient f/u with Endocrinology      Jacinta Suarez DO, Endocrine Fellow   Pager [phone]. from 9-5PM. After hours and on weekends please call [phone]. Assessment and Recommendation:   		  69 y/o male with hx of liver ca (not on active chemotherapy), recently diagnosed ITP with thrombocytopenia, T2DM c/b multiple amputations of toes, hypothyroidism, pituitary adenoma, HF with ICD placement, HLD, presenting for 2nd toe on left foot ulceration and infection x few days. Patient was seen by his podiatrist who recommended visiting the ED at this time for abx and admission. Endocrinology consulted for hyperglycemia and T2DM management          Problem/Recommendation - 1:  Problem: Type 2 diabetes mellitus with stage 4 chronic kidney disease, with long-term current use of insulin. Recommendation: hyperglycemia second to steroids. Prior to starting steroids, DM fairly well controlled (HbA1C 8.1 in March 2020). Prednisone tapering initiated (ordered for 30mg daily tomorrow 8/28).  - continue Lantus 18 units qhs  - increase Humalog to 15 units TID pre-meal  - moderate correction scales ac and hs  - CC diet  - check FS ac/hs  - please notify endocrine if any changes to steroid regimen  Discharge  - plan TBD based on insulin requirements and steroid plan at discharge.     Problem/Recommendation - 2:  ·  Problem: Diabetic toe ulcer.  Recommendation: management per primary and podiatry team  outpatient f/u with Podiatry.      Problem/Recommendation - 3:  ·  Problem: Hyperlipidemia, unspecified hyperlipidemia type.  Recommendation: - agree with high intensity statin.      Problem/Recommendation - 4:  ·  Problem: Hypertension, unspecified type.  Recommendation: - BP controlled on lisinopril. Goal BP <130/70.      Problem/Recommendation - 5:  ·  Problem: Hypothyroidism, unspecified type.  Recommendation: - continue LT4 125mcg daily on M,Tu,W,Th,F,Sun. LT4 250mcg on saturdays  - states pt takes "thyrozene" on home med list which is a thyroid herbal supplement. Recommend switching to levothyroxine or synthroid on discharge  - check TFTs outpatient.      Problem/Recommendation - 6:  Problem: Hyperprolactinemia. Recommendation: - on cabergoline 0.5 weekly. Given low prolactin decrease dose to 0.25mg weekly.      Jacinta Suarez DO, Endocrine Fellow   Pager [phone]. from 9-5PM. After hours and on weekends please call [phone]. Assessment and Recommendation:   		  71 y/o male with hx of liver ca (not on active chemotherapy), recently diagnosed ITP with thrombocytopenia, T2DM c/b multiple amputations of toes, hypothyroidism, pituitary adenoma, HF with ICD placement, HLD, presenting for 2nd toe on left foot ulceration and infection x few days. Patient was seen by his podiatrist who recommended visiting the ED at this time for abx and admission. Endocrinology consulted for hyperglycemia and T2DM management          Problem/Recommendation - 1:  Problem: Type 2 diabetes mellitus with stage 4 chronic kidney disease, with long-term current use of insulin. Recommendation: hyperglycemia second to steroids. Prior to starting steroids, DM fairly well controlled (HbA1C 8.1 in March 2020). Prednisone tapering initiated (ordered for 30mg daily tomorrow 8/28, but now reordered for 40mg.  - increase Lantus to 24 units qhs  - increase Humalog to 18 units TID pre-meal  - moderate correction scales ac and hs  - CC diet  - check FS ac/hs  - please notify endocrine if any changes to steroid regimen  Discharge  - plan TBD based on insulin requirements and steroid plan at discharge.     Problem/Recommendation - 2:  ·  Problem: Diabetic toe ulcer.  Recommendation: management per primary and podiatry team  outpatient f/u with Podiatry.      Problem/Recommendation - 3:  ·  Problem: Hyperlipidemia, unspecified hyperlipidemia type.  Recommendation: - agree with high intensity statin.      Problem/Recommendation - 4:  ·  Problem: Hypertension, unspecified type.  Recommendation: - BP controlled on lisinopril. Goal BP <130/70.      Problem/Recommendation - 5:  ·  Problem: Hypothyroidism, unspecified type.  Recommendation: - continue LT4 125mcg daily on M,Tu,W,Th,F,Sun. LT4 250mcg on saturdays  - states pt takes "thyrozene" on home med list which is a thyroid herbal supplement. Recommend switching to levothyroxine or synthroid on discharge  - check TFTs outpatient.      Problem/Recommendation - 6:  Problem: Hyperprolactinemia. Recommendation: - on cabergoline 0.5 weekly. Given low prolactin decrease dose to 0.25mg weekly.      Jacinta Suarez DO, Endocrine Fellow   Pager [phone]. from 9-5PM. After hours and on weekends please call [phone].

## 2020-08-27 NOTE — CONSULT NOTE ADULT - CONSULT REASON
CKD
L foot 2nd digit wound
Left second toe infection
hyperglycemia, T2DM management
thrombocytopenia
Ischemic cardiomyopathy, ICD, osteomyelitis of toe

## 2020-08-27 NOTE — PROGRESS NOTE ADULT - SUBJECTIVE AND OBJECTIVE BOX
INTERVAL HPI/OVERNIGHT EVENTS:  No overnight events. Still feels well. Denies any bleeding and bruising.     MEDICATIONS  (STANDING):  atorvastatin 40 milliGRAM(s) Oral at bedtime  carvedilol 12.5 milliGRAM(s) Oral every 12 hours  cefepime   IVPB 1000 milliGRAM(s) IV Intermittent every 24 hours  dextrose 5%. 1000 milliLiter(s) (50 mL/Hr) IV Continuous <Continuous>  dextrose 50% Injectable 12.5 Gram(s) IV Push once  dextrose 50% Injectable 25 Gram(s) IV Push once  dextrose 50% Injectable 25 Gram(s) IV Push once  famotidine    Tablet 20 milliGRAM(s) Oral daily  furosemide    Tablet 40 milliGRAM(s) Oral daily  immune   globulin 10% (GAMMAGARD) IVPB 75 Gram(s) IV Intermittent daily  insulin glargine Injectable (LANTUS) 18 Unit(s) SubCutaneous at bedtime  insulin lispro (HumaLOG) corrective regimen sliding scale   SubCutaneous three times a day before meals  insulin lispro (HumaLOG) corrective regimen sliding scale   SubCutaneous at bedtime  insulin lispro Injectable (HumaLOG) 9 Unit(s) SubCutaneous three times a day before meals  levothyroxine 125 MICROGram(s) Oral daily  lisinopril 10 milliGRAM(s) Oral daily  mupirocin 2% Ointment 1 Application(s) Topical two times a day  niacin  milliGRAM(s) Oral at bedtime  predniSONE   Tablet 30 milliGRAM(s) Oral daily  sodium bicarbonate 650 milliGRAM(s) Oral two times a day  spironolactone 25 milliGRAM(s) Oral daily  vancomycin  IVPB 500 milliGRAM(s) IV Intermittent every 12 hours    MEDICATIONS  (PRN):  acetaminophen   Tablet .. 650 milliGRAM(s) Oral every 6 hours PRN Temp greater or equal to 38.5C (101.3F), Mild Pain (1 - 3)  dextrose 40% Gel 15 Gram(s) Oral once PRN Blood Glucose LESS THAN 70 milliGRAM(s)/deciliter  glucagon  Injectable 1 milliGRAM(s) IntraMuscular once PRN Glucose LESS THAN 70 milligrams/deciliter    Allergies    No Known Allergies    Intolerances          VITAL SIGNS:  T(F): 98 (08-27-20 @ 17:55)  HR: 94 (08-27-20 @ 17:55)  BP: 110/70 (08-27-20 @ 17:55)  RR: 18 (08-27-20 @ 17:55)  SpO2: 97% (08-27-20 @ 17:55)  Wt(kg): --    PHYSICAL EXAM:    Constitutional: NAD, lying comfortably in bed  Eyes: EOMI, PERRLA  Neck: supple, no masses, no JVD  Respiratory: CTAB; no r/r/w  Cardiovascular: RRR, no M/R/G  Gastrointestinal: +BS, soft, NTND, no hepatosplenomegaly  Extremities: lower extremities currently wrapped   Neurological: AAOx3, nonfocal    LABS:                        10.7   3.10  )-----------( 21       ( 27 Aug 2020 15:03 )             32.8     08-27    129<L>  |  99  |  54<H>  ----------------------------<  197<H>  4.8   |  19<L>  |  1.96<H>    Ca    9.2      27 Aug 2020 06:56    TPro  6.6  /  Alb  3.6  /  TBili  1.0  /  DBili  x   /  AST  34  /  ALT  40  /  AlkPhos  129<H>  08-26    PT/INR - ( 26 Aug 2020 11:29 )   PT: 13.4 sec;   INR: 1.13 ratio         PTT - ( 26 Aug 2020 11:29 )  PTT:27.7 sec      RADIOLOGY & ADDITIONAL TESTS:  Studies reviewed.

## 2020-08-27 NOTE — PROGRESS NOTE ADULT - SUBJECTIVE AND OBJECTIVE BOX
Chief Complaint: hyperglycemia exacerbated by steroids, T2DM    History: Pt feels well today, no complaints. Plan to titrate prednisone from 40 to 30mg tomorrow 8/28.    POCT Blood Glucose.: 313 mg/dL (08-27-20 @ 17:48)   POCT Blood Glucose.: 405 mg/dL (08-27-20 @ 12:42) -21 units  POCT Blood Glucose.: 250 mg/dL (08-27-20 @ 10:28) -7 units  POCT Blood Glucose.: 279 mg/dL (08-26-20 @ 21:33) -20 units  POCT Blood Glucose.: 243 mg/dL (08-26-20 @ 18:25) -7    MEDICATIONS  (STANDING):  atorvastatin 40 milliGRAM(s) Oral at bedtime  carvedilol 12.5 milliGRAM(s) Oral every 12 hours  cefepime   IVPB 1000 milliGRAM(s) IV Intermittent every 24 hours  dextrose 5%. 1000 milliLiter(s) (50 mL/Hr) IV Continuous <Continuous>  dextrose 50% Injectable 12.5 Gram(s) IV Push once  dextrose 50% Injectable 25 Gram(s) IV Push once  dextrose 50% Injectable 25 Gram(s) IV Push once  famotidine    Tablet 20 milliGRAM(s) Oral daily  furosemide    Tablet 40 milliGRAM(s) Oral daily  immune   globulin 10% (GAMMAGARD) IVPB 75 Gram(s) IV Intermittent daily  insulin glargine Injectable (LANTUS) 18 Unit(s) SubCutaneous at bedtime  insulin lispro (HumaLOG) corrective regimen sliding scale   SubCutaneous three times a day before meals  insulin lispro (HumaLOG) corrective regimen sliding scale   SubCutaneous at bedtime  insulin lispro Injectable (HumaLOG) 9 Unit(s) SubCutaneous three times a day before meals  levothyroxine 125 MICROGram(s) Oral daily  lisinopril 10 milliGRAM(s) Oral daily  mupirocin 2% Ointment 1 Application(s) Topical two times a day  niacin  milliGRAM(s) Oral at bedtime  predniSONE   Tablet 40 milliGRAM(s) Oral daily  romiPLOStim Injectable 75 MICROGram(s) SubCutaneous once  sodium bicarbonate 650 milliGRAM(s) Oral two times a day  spironolactone 25 milliGRAM(s) Oral daily  vancomycin  IVPB 500 milliGRAM(s) IV Intermittent every 12 hours    MEDICATIONS  (PRN):  acetaminophen   Tablet .. 650 milliGRAM(s) Oral every 6 hours PRN Temp greater or equal to 38.5C (101.3F), Mild Pain (1 - 3)  dextrose 40% Gel 15 Gram(s) Oral once PRN Blood Glucose LESS THAN 70 milliGRAM(s)/deciliter  glucagon  Injectable 1 milliGRAM(s) IntraMuscular once PRN Glucose LESS THAN 70 milligrams/deciliter      Allergies    No Known Allergies    Intolerances      Review of Systems:  Constitutional: No fever  Eyes: No blurry vision  Neuro: No tremors  HEENT: No pain  Cardiovascular: No chest pain, palpitations  Respiratory: No SOB, no cough  GI: No nausea, vomiting, abdominal pain  : No dysuria  Skin: no rash  Psych: no depression  Endocrine: no polyuria, polydipsia  Hem/lymph: no swelling  Osteoporosis: no fractures    ALL OTHER SYSTEMS REVIEWED AND NEGATIVE    UNABLE TO OBTAIN    PHYSICAL EXAM:  VITALS: T(C): 36.7 (08-27-20 @ 17:55)  T(F): 98 (08-27-20 @ 17:55), Max: 98.3 (08-26-20 @ 19:36)  HR: 94 (08-27-20 @ 17:55) (68 - 94)  BP: 110/70 (08-27-20 @ 17:55) (90/63 - 115/77)  RR:  (16 - 18)  SpO2:  (97% - 100%)  Wt(kg): --  GENERAL: NAD  EYES: No proptosis, no lid lag, anicteric  THYROID: Normal size, no palpable nodules  RESPIRATORY: CTA b/l, no wheezes or crackles   CARDIOVASCULAR: S1S2 RRR; no murmurs; no peripheral edema  GI: Soft, nontender, non distended, normal bowel sounds  SKIN: Dry, intact, No rashes or lesions  PSYCH: Alert and oriented x 3, normal affect, normal mood    POCT Blood Glucose.: 313 mg/dL (08-27-20 @ 17:48)  POCT Blood Glucose.: 405 mg/dL (08-27-20 @ 12:42)  POCT Blood Glucose.: 250 mg/dL (08-27-20 @ 10:28)  POCT Blood Glucose.: 279 mg/dL (08-26-20 @ 21:33)  POCT Blood Glucose.: 243 mg/dL (08-26-20 @ 18:25)      08-27    129<L>  |  99  |  54<H>  ----------------------------<  197<H>  4.8   |  19<L>  |  1.96<H>    EGFR if : 40<L>  EGFR if non : 34<L>    Ca    9.2      08-27    TPro  6.6  /  Alb  3.6  /  TBili  1.0  /  DBili  x   /  AST  34  /  ALT  40  /  AlkPhos  129<H>  08-26          Thyroid Function Tests:  08-27 @ 08:25 TSH 3.28 FreeT4 -- T3 -- Anti TPO -- Anti Thyroglobulin Ab -- TSI -- Chief Complaint: hyperglycemia exacerbated by steroids, T2DM    History: Pt feels well today, no complaints. Plan to down-titrate prednisone from 40 to 30mg tomorrow 8/28.    FS: 313  (08-27-20 @ 17:48)   FS: 405  (08-27-20 @ 12:42) -21 units  FS.: 250  (08-27-20 @ 10:28) -7 units  FS: 279  (08-26-20 @ 21:33) -20 units  FS: 243  (08-26-20 @ 18:25) -7    MEDICATIONS  (STANDING):  atorvastatin 40 milliGRAM(s) Oral at bedtime  carvedilol 12.5 milliGRAM(s) Oral every 12 hours  cefepime   IVPB 1000 milliGRAM(s) IV Intermittent every 24 hours  dextrose 5%. 1000 milliLiter(s) (50 mL/Hr) IV Continuous <Continuous>  dextrose 50% Injectable 12.5 Gram(s) IV Push once  dextrose 50% Injectable 25 Gram(s) IV Push once  dextrose 50% Injectable 25 Gram(s) IV Push once  famotidine    Tablet 20 milliGRAM(s) Oral daily  furosemide    Tablet 40 milliGRAM(s) Oral daily  immune   globulin 10% (GAMMAGARD) IVPB 75 Gram(s) IV Intermittent daily  insulin glargine Injectable (LANTUS) 18 Unit(s) SubCutaneous at bedtime  insulin lispro (HumaLOG) corrective regimen sliding scale   SubCutaneous three times a day before meals  insulin lispro (HumaLOG) corrective regimen sliding scale   SubCutaneous at bedtime  insulin lispro Injectable (HumaLOG) 9 Unit(s) SubCutaneous three times a day before meals  levothyroxine 125 MICROGram(s) Oral daily  lisinopril 10 milliGRAM(s) Oral daily  mupirocin 2% Ointment 1 Application(s) Topical two times a day  niacin  milliGRAM(s) Oral at bedtime  predniSONE   Tablet 40 milliGRAM(s) Oral daily  romiPLOStim Injectable 75 MICROGram(s) SubCutaneous once  sodium bicarbonate 650 milliGRAM(s) Oral two times a day  spironolactone 25 milliGRAM(s) Oral daily  vancomycin  IVPB 500 milliGRAM(s) IV Intermittent every 12 hours    MEDICATIONS  (PRN):  acetaminophen   Tablet .. 650 milliGRAM(s) Oral every 6 hours PRN Temp greater or equal to 38.5C (101.3F), Mild Pain (1 - 3)  dextrose 40% Gel 15 Gram(s) Oral once PRN Blood Glucose LESS THAN 70 milliGRAM(s)/deciliter  glucagon  Injectable 1 milliGRAM(s) IntraMuscular once PRN Glucose LESS THAN 70 milligrams/deciliter      Allergies    No Known Allergies    Intolerances      Review of Systems:  Constitutional: No fever  Eyes: No blurry vision  Neuro: No tremors  HEENT: No pain  Cardiovascular: No chest pain, palpitations  Respiratory: No SOB, no cough  GI: No nausea, vomiting, abdominal pain  : No dysuria  Skin: no rash  Psych: no depression  Endocrine: no polyuria, polydipsia  Hem/lymph: no swelling  Osteoporosis: no fractures    ALL OTHER SYSTEMS REVIEWED AND NEGATIVE    UNABLE TO OBTAIN    PHYSICAL EXAM:  VITALS: T(C): 36.7 (08-27-20 @ 17:55)  T(F): 98 (08-27-20 @ 17:55), Max: 98.3 (08-26-20 @ 19:36)  HR: 94 (08-27-20 @ 17:55) (68 - 94)  BP: 110/70 (08-27-20 @ 17:55) (90/63 - 115/77)  RR:  (16 - 18)  SpO2:  (97% - 100%)  Wt(kg): --  GENERAL: NAD  EYES: No proptosis, no lid lag, anicteric  THYROID: Normal size, no palpable nodules  RESPIRATORY: CTA b/l, no wheezes or crackles   CARDIOVASCULAR: S1S2 RRR; no murmurs; no peripheral edema  GI: Soft, nontender, non distended, normal bowel sounds  SKIN: Dry, intact, No rashes or lesions  PSYCH: Alert and oriented x 3, normal affect, normal mood    POCT Blood Glucose.: 313 mg/dL (08-27-20 @ 17:48)  POCT Blood Glucose.: 405 mg/dL (08-27-20 @ 12:42)  POCT Blood Glucose.: 250 mg/dL (08-27-20 @ 10:28)  POCT Blood Glucose.: 279 mg/dL (08-26-20 @ 21:33)  POCT Blood Glucose.: 243 mg/dL (08-26-20 @ 18:25)      08-27    129<L>  |  99  |  54<H>  ----------------------------<  197<H>  4.8   |  19<L>  |  1.96<H>    EGFR if : 40<L>  EGFR if non : 34<L>    Ca    9.2      08-27    TPro  6.6  /  Alb  3.6  /  TBili  1.0  /  DBili  x   /  AST  34  /  ALT  40  /  AlkPhos  129<H>  08-26          Thyroid Function Tests:  08-27 @ 08:25 TSH 3.28 FreeT4 -- T3 -- Anti TPO -- Anti Thyroglobulin Ab -- TSI --

## 2020-08-27 NOTE — CONSULT NOTE ADULT - SUBJECTIVE AND OBJECTIVE BOX
HPI:  69 y/o male with hx of liver ca ( not on active chemotherapy), thrombocytopenia, poorly controlled IDDM, multiple amputations of toes, ICD placement, and HLD presenting for 2nd toe on left foot ulceration and infection x few days. Symptoms started suddenly at rest, have been constant and worsening, with no worsening or alleviating factors. No medications taken. Patient denies chest pain, sob, fever, chills, headache, nausea, emesis, diarrhea, abdominal pain, hematuria/dysuria or lower extremity swelling. Patient was seen by his podiatrist who recommended visiting the ED at this time for abx and admission. (26 Aug 2020 15:20)    Has longstanding ischemic cardiomyopathy, paroxysmal atrial tachycardia, compensated congestive heart failure and ICD which has fired appropriately, but not in years.    PMH:   HCC (hepatocellular carcinoma)  Afib  PAF (paroxysmal atrial fibrillation)  Ulcer of right ankle  Liver mass  Gout  Elevated prolactin level  Vitamin D deficiency  Elevated triglycerides with high cholesterol  Hypothyroidism  PVD (peripheral vascular disease)  History of hyperprolactinemia  Hepatic cirrhosis, unspecified hepatic cirrhosis type  Anemia  ICD (implantable cardioverter-defibrillator) in place  Sleep apnea  History of osteomyelitis  Presence of stent in coronary artery  Heart failure with reduced left ventricular function  Ischemic cardiomyopathy  Pituitary adenoma  Coronary artery disease  Thrombocytopenia  HLD (hyperlipidemia)  HTN (hypertension)  DM (diabetes mellitus)  AICD lead malfunction  Shock    PSH:   SCC (squamous cell carcinoma)  Encounter for incision and drainage procedure  History of amputation  AICD (automatic cardioverter/defibrillator) present  Stented coronary artery  Coronary atherosclerosis of artery bypass graft    Medications:   acetaminophen   Tablet .. 650 milliGRAM(s) Oral every 6 hours PRN  atorvastatin 40 milliGRAM(s) Oral at bedtime  carvedilol 12.5 milliGRAM(s) Oral every 12 hours  cefepime   IVPB 1000 milliGRAM(s) IV Intermittent every 24 hours  dextrose 40% Gel 15 Gram(s) Oral once PRN  dextrose 5%. 1000 milliLiter(s) IV Continuous <Continuous>  dextrose 50% Injectable 12.5 Gram(s) IV Push once  dextrose 50% Injectable 25 Gram(s) IV Push once  dextrose 50% Injectable 25 Gram(s) IV Push once  famotidine    Tablet 20 milliGRAM(s) Oral daily  furosemide    Tablet 40 milliGRAM(s) Oral daily  glucagon  Injectable 1 milliGRAM(s) IntraMuscular once PRN  immune   globulin 10% (GAMMAGARD) IVPB 75 Gram(s) IV Intermittent daily  insulin glargine Injectable (LANTUS) 18 Unit(s) SubCutaneous at bedtime  insulin lispro (HumaLOG) corrective regimen sliding scale   SubCutaneous three times a day before meals  insulin lispro (HumaLOG) corrective regimen sliding scale   SubCutaneous at bedtime  insulin lispro Injectable (HumaLOG) 3 Unit(s) SubCutaneous before breakfast  insulin lispro Injectable (HumaLOG) 3 Unit(s) SubCutaneous before lunch  insulin lispro Injectable (HumaLOG) 3 Unit(s) SubCutaneous before dinner  levothyroxine 125 MICROGram(s) Oral daily  lisinopril 10 milliGRAM(s) Oral daily  mupirocin 2% Ointment 1 Application(s) Topical two times a day  niacin  milliGRAM(s) Oral at bedtime  predniSONE   Tablet 40 milliGRAM(s) Oral daily  sodium bicarbonate 650 milliGRAM(s) Oral two times a day  spironolactone 25 milliGRAM(s) Oral daily  vancomycin  IVPB 500 milliGRAM(s) IV Intermittent every 12 hours    Allergies:  No Known Allergies    FAMILY HISTORY:  Family history of MI (myocardial infarction)    Social History:  Smoking:  Alcohol:  Drugs:    REVIEW OF SYSTEMS:  General: no fatigue/malaise, weight loss/gain.  Skin: no rashes.  Ophthalmologic: no blurred vision, no loss of vision. 	  ENMT: no sore throat, rhinorrhea, sinus congestion.  Respiratory: no SOB, cough or wheeze.  Cardiovascular: no chest pain, dyspnea, palpitations, orthopnea or paroxysmal nocturnal dyspnea. No claudication.  Gastrointestinal:  no N/V/D, no melena/hematemesis/hematochezia.  Genitourinary: no dysuria/hesitancy or hematuria.  Musculoskeletal: no myalgias or arthralgias. wound breakdown left second toe.  Neurological: no changes in vision or hearing, no lightheadedness/dizziness, no syncope/near syncope	  Psychiatric: appropriate.   Hematology/Lymphatics: no unusual bleeding, bruising and no lymphadenopathy.  Endocrine: no unusual thirst.   All others negative except as stated above and in HPI.     Vital Signs Last 24 Hrs  T(C): 36.7 (27 Aug 2020 05:58), Max: 36.8 (26 Aug 2020 19:36)  T(F): 98 (27 Aug 2020 05:58), Max: 98.3 (26 Aug 2020 19:36)  HR: 80 (27 Aug 2020 05:58) (60 - 89)  BP: 114/71 (27 Aug 2020 05:58) (90/63 - 127/95)  BP(mean): 84 (26 Aug 2020 11:30) (84 - 84)  RR: 18 (27 Aug 2020 05:58) (16 - 18)  SpO2: 98% (27 Aug 2020 05:58) (97% - 100%)    Appearance: NAD  Eyes: PERRL, EOMI  HENT: Normal oral mucosa, NC/AT  Cardiovascular: normal S1 and S2, RRR, soft holsystolic murmur, no edema, normal JVP  Respiratory: Clear to auscultation bilaterally  Gastrointestinal: Soft, non-tender, non-distended, BS+  Musculoskeletal: No clubbing, no joint deformity, osteomyelitis left second toe   Neurologic: Non-focal  Lymphatic: No lymphadenopathy  Psychiatry: AAOx3, mood & affect appropriate  Skin: chronic stasis changes, ecchymoses, no cyanosis    Labs:                        10.0   3.43  )-----------( 22       ( 27 Aug 2020 07:25 )             30.6     08-27    129<L>  |  99  |  54<H>  ----------------------------<  197<H>  4.8   |  19<L>  |  1.96<H>    Ca    9.2      27 Aug 2020 06:56    TPro  6.6  /  Alb  3.6  /  TBili  1.0  /  DBili  x   /  AST  34  /  ALT  40  /  AlkPhos  129<H>  08-26    PT/INR - ( 26 Aug 2020 11:29 )   PT: 13.4 sec;   INR: 1.13 ratio         PTT - ( 26 Aug 2020 11:29 )  PTT:27.7 sec    Thyroid Stimulating Hormone, Serum: 3.28 uIU/mL (08-27 @ 08:25)    Cardiovascular Diagnostic Testing:  ECG:     Echo: < from: Transthoracic Echocardiogram (05.06.19 @ 06:16) >  LA:     4.8    2.0 - 4.0 cm  Ao:     3.9    2.0 - 3.8 cm  SEPTUM: 1.0    0.6 - 1.2 cm  PWT:    0.9    0.6 - 1.1 cm  LVIDd:  6.7    3.0 - 5.6 cm  LVIDs:  5.6    1.8 - 4.0 cm  Derived variables:  LVMI: 126 g/m2  RWT: 0.26  Fractional short: 16 %  EF (Visual Estimate): 30-35 %  Doppler Peak Velocity (m/sec): AoV=1.3  ------------------------------------------------------------------------  Observations:  Mitral Valve: Mitral annular calcification. Tethered mitral  valve leaflets. Mild mitral regurgitation.  Aortic Valve/Aorta: Calcified trileaflet aortic valve with  normal opening. Peak transaortic valve gradient equals 7 mm  Hg. No aortic valve regurgitation seen. Peak left  ventricular outflow tract gradient equals 4 mm Hg, mean  gradient is equal to 2 mm Hg, LVOT velocity time integral  equals 21 cm.  Aortic Root: 3.9 cm.  Left Atrium: Mildly dilated left atrium.  LA volume index =  38 cc/m2.  Left Ventricle: Moderate to severe segmental left  ventricular systolic dysfunction. The inferior,  inferoseptal, and inferolateral walls are akinetic.  Paradoxical septal motion consistent with paced  rhythm/conduction defect. Eccentric left ventricular  hypertrophy (dilated left ventricle with normal relative  wall thickness). Moderate left ventricular enlargement.  Moderate diastolic dysfunction (Stage II).  Right Heart: Normal right atrium. Right ventricle not well  visualized; normal right ventricular size with decreased  right ventricular systolic function. A device wire is noted  in the right heart. Normal tricuspid valve. Mild-moderate  tricuspid regurgitation. Normal pulmonic valve. Mild  pulmonic regurgitation.  Pericardium/Pleura: Normal pericardium with no pericardial  effusion.  Hemodynamic: Estimated right atrial pressure is 8 mm Hg.  Estimated right ventricular systolic pressure equals 46 mm  Hg, assuming right atrial pressure equals 8 mm Hg,  consistent with mild pulmonary hypertension.  ------------------------------------------------------------------------  Conclusions:  1. Mitral annular calcification. Tethered mitral valve  leaflets. Mild mitral regurgitation.  2. Calcified trileaflet aortic valve with normal opening.  No aortic valve regurgitation seen.  3. Eccentric left ventricular hypertrophy (dilated left  ventricle with normal relative wall thickness). Moderate  left ventricular enlargement.  4. Moderate to severe segmental left ventricular systolic  dysfunction. The inferior, inferoseptal, and inferolateral  walls are akinetic. Paradoxical septal motion consistent  with paced rhythm/conduction defect.  5. Moderate diastolic dysfunction (Stage II).  6. Right ventricle not well visualized; normal right  ventricular size with decreased right ventricular systolic  function. A device wire is noted in the right heart.  7. Estimated pulmonary artery systolic pressure equals 46  mm Hg, assuming right atrial pressure equals 8 mm Hg,  consistent with mild pulmonary pressures.  *** Compared with echocardiogram of 12/23/2012, results are  similar on today's study.    < end of copied text >

## 2020-08-27 NOTE — PROGRESS NOTE ADULT - SUBJECTIVE AND OBJECTIVE BOX
Podiatry pager #: [phone] ([place])/ [phone] ([facility])    Patient is a 70y old  Male who presents with a chief complaint of Left second toe infection (26 Aug 2020 17:20)       INTERVAL HPI/OVERNIGHT EVENTS:  Patient seen and evaluated at bedside.  Pt is resting comfortable in NAD. Denies N/V/F/C.     Allergies    No Known Allergies    Intolerances        Vital Signs Last 24 Hrs  T(C): 36.7 (27 Aug 2020 05:58), Max: 36.8 (26 Aug 2020 19:36)  T(F): 98 (27 Aug 2020 05:58), Max: 98.3 (26 Aug 2020 19:36)  HR: 80 (27 Aug 2020 05:58) (60 - 89)  BP: 114/71 (27 Aug 2020 05:58) (90/63 - 127/95)  BP(mean): 84 (26 Aug 2020 11:30) (84 - 84)  RR: 18 (27 Aug 2020 05:58) (16 - 18)  SpO2: 98% (27 Aug 2020 05:58) (97% - 100%)    LABS:                        10.0   3.43  )-----------( 22       ( 27 Aug 2020 07:25 )             30.6     08-27    129<L>  |  99  |  54<H>  ----------------------------<  197<H>  4.8   |  19<L>  |  1.96<H>    Ca    9.2      27 Aug 2020 06:56    TPro  6.6  /  Alb  3.6  /  TBili  1.0  /  DBili  x   /  AST  34  /  ALT  40  /  AlkPhos  129<H>  08-26    PT/INR - ( 26 Aug 2020 11:29 )   PT: 13.4 sec;   INR: 1.13 ratio         PTT - ( 26 Aug 2020 11:29 )  PTT:27.7 sec    CAPILLARY BLOOD GLUCOSE      POCT Blood Glucose.: 279 mg/dL (26 Aug 2020 21:33)  POCT Blood Glucose.: 243 mg/dL (26 Aug 2020 18:25)      Lower Extremity Physical Exam:    Vasular: DP/PT 2/4, B/L, CFT < 3 seconds B/L, Temperature gradient  warm to cool, B/L.   Neuro: Epicritic sensation diminished to the level of ankle  B/L.  Musculoskeletal/Ortho: s/p partial digital amps 1-3 on R  Derm:  Wound #1:   Location: L foot distal 2nd digit wound   Size: 0.4 cm x 0.3 cm   Depth:to bone  Wound bed: fibrogranular   Drainage: no purulence, no drainage  Odor: none   Periwound: cellulitis resolving  Etiology: chronic pressure/ peripheral neuropathy     < from: Xray Toes, Left Foot (08.26.20 @ 12:27) >    EXAM:  TOES(S) LEFT FOOT                            PROCEDURE DATE:  08/26/2020            INTERPRETATION:  Indication: Left second toe osteomyelitis.    TECHNIQUE: 3 views of left toes were obtained.    COMPARISON: None.    FINDINGS: There are hammertoe deformities. No significant cortical irregularity or erosion is seen to suggest osteomyelitis. There are degenerative changes in the left first toe.    IMPRESSION: Hammertoe deformities. No radiographic evidence for osteomyelitis of the left toes with attention to the left second toe. If clinically warranted, MRI may be performed for further evaluation.                  KE SHI M.D., ATTENDING RADIOLOGIST  This document has been electronically signed. Aug 26 2020 12:42PM    < end of copied text >

## 2020-08-27 NOTE — PROGRESS NOTE ADULT - ASSESSMENT
70M with DM, with increased hyperglycemia while on steroids for thrombocytopenia with breakdown of superficial ulcer. Podiatry noted that  L foot distal 2nd digit wound extends to bone, dactylitis, cellulitis extending to level of MPJ, scant purulence expressed.    osteomyelitis Left second toe 8/26: ESR/CRP = 35/2.95 Scheduled for amputation 8/28  - culture with Staph aureus - await susceptibilities (has had MRSA in past: +Cx June 2019)  DM2 poorly controlled  8/27: HgbA1C = 9.2  diabetic neuropathy  thrombocytopenia, on IVIG, prednisone tapered to 30 mg daily    CKD  CHF  cirrhosis  HCC    Suggest  Continue Cefepime/ Vanco

## 2020-08-27 NOTE — CONSULT NOTE ADULT - ASSESSMENT
70 year old male with ICM EF 40%, CABG, HTN, DM II, HLD, pituitary tumor, paroxysmal atrial tachycardia, recently mostly in sinus rhythm, ICD admitted with osteomyelitis.    Ischemic cardiomyopathy    maintain carvedilol dose should be 12.5 mg BID    Lisinopril 10 mg    Spironolactone should be 25 mg BID    Furosemide     Has not been on aspirin and no anticoagulation due to profound thrombocytopenia.    Paroxysmal atrial tachycardia    manage by best treating ischemic cardiomyopathy and avoiding volume overload    no anticoagulation    Paroxysmal ventricular tachycardia    Has ICD.    Chronic thrombocytopenia    Preferable to not be on anticoagulation

## 2020-08-27 NOTE — PROGRESS NOTE ADULT - SUBJECTIVE AND OBJECTIVE BOX
Patient is a 70y old  Male who presents with a chief complaint of toe osteo (      INTERVAL HPI/OVERNIGHT EVENTS: noted  pt seen and examined  feels well,  no new complaints      Vital Signs Last 24 Hrs  T(F): 98 (08-27-20 @ 17:55)  HR: 94 (08-27-20 @ 17:55)  BP: 110/70 (08-27-20 @ 17:55)  RR: 18 (08-27-20 @ 17:55)  SpO2: 97% (08-27-20 @ 17:55)    atorvastatin 40 milliGRAM(s) Oral at bedtime  carvedilol 12.5 milliGRAM(s) Oral every 12 hours  cefepime   IVPB 1000 milliGRAM(s) IV Intermittent every 24 hours  dextrose 5%. 1000 milliLiter(s) (50 mL/Hr) IV Continuous <Continuous>  dextrose 50% Injectable 12.5 Gram(s) IV Push once  dextrose 50% Injectable 25 Gram(s) IV Push once  dextrose 50% Injectable 25 Gram(s) IV Push once  famotidine    Tablet 20 milliGRAM(s) Oral daily  furosemide    Tablet 40 milliGRAM(s) Oral daily  immune   globulin 10% (GAMMAGARD) IVPB 75 Gram(s) IV Intermittent daily  insulin glargine Injectable (LANTUS) 18 Unit(s) SubCutaneous at bedtime  insulin lispro (HumaLOG) corrective regimen sliding scale   SubCutaneous three times a day before meals  insulin lispro (HumaLOG) corrective regimen sliding scale   SubCutaneous at bedtime  insulin lispro Injectable (HumaLOG) 9 Unit(s) SubCutaneous three times a day before meals  levothyroxine 125 MICROGram(s) Oral daily  lisinopril 10 milliGRAM(s) Oral daily  mupirocin 2% Ointment 1 Application(s) Topical two times a day  niacin  milliGRAM(s) Oral at bedtime  predniSONE   Tablet 30 milliGRAM(s) Oral daily  sodium bicarbonate 650 milliGRAM(s) Oral two times a day  spironolactone 25 milliGRAM(s) Oral daily  vancomycin  IVPB 500 milliGRAM(s) IV Intermittent every 12 hours      PHYSICAL EXAM:  GENERAL: NAD,   EYES: conjunctiva and sclera clear  ENMT: Moist mucous membranes  NECK: Supple, No JVD, Normal thyroid  CHEST/LUNG: non labored, cta b/l  HEART: Regular rate and rhythm; No murmurs, rubs, or gallops  ABDOMEN: Soft, Nontender, Nondistended; Bowel sounds present  EXTREMITIES:  2+ Peripheral Pulses, No clubbing, cyanosis, or edema  LYMPH: No lymphadenopathy noted  SKIN: No rashes or lesions    Consultant(s) Notes Reviewed:  [x ] YES  [ ] NO  Care Discussed with Consultants/Other Providers [ x] YES  [ ] NO    LABS:  [id number]   3.10  )-----------( 21       ( 27 Aug 2020 15:03 )             32.8     08-27    129<L>  |  99  |  54<H>  ----------------------------<  197<H>  4.8   |  19<L>  |  1.96<H>    Ca    9.2      27 Aug 2020 06:56    TPro  6.6  /  Alb  3.6  /  TBili  1.0  /  DBili  x   /  AST  34  /  ALT  40  /  AlkPhos  129<H>  08-26    PT/INR - ( 26 Aug 2020 11:29 )   PT: 13.4 sec;   INR: 1.13 ratio         PTT - ( 26 Aug 2020 11:29 )  PTT:27.7 sec              RADIOLOGY & ADDITIONAL TESTS:    Imaging Personally Reviewed:  [x ] YES  [ ] NO

## 2020-08-27 NOTE — PROGRESS NOTE ADULT - ASSESSMENT
71 y/o M w/ L foot distal 2nd digit wound to bone  - pt seen  and evaluated  - afebrile, no leukocytosis  - L foot XR evaluated  - Bone scan ordered, will follow up results  - L foot distal 2nd digit wound to bone, dactylitis, cellulitis resolving, no purulence expressed  - Continue IV Abx  - pod plan for L foot partial 2nd digit amputation Friday w/ Dr. Rouse  - pt thrombocytopenic, may require platelet transfusion pre-op   - please document medical clearance for local anesthesia w/ light sedation   - seen w/ attending 69 y/o M w/ L foot distal 2nd digit wound to bone  - pt seen  and evaluated  - afebrile, no leukocytosis  - L foot XR evaluated  - Bone scan ordered, will follow up results  - L foot distal 2nd digit wound to bone, dactylitis, cellulitis resolving, no purulence expressed  - Continue IV Abx  - pod plan for L foot partial 2nd digit amputation Friday w/ Dr. Rouse  - pt thrombocytopenic, may require platelet transfusion pre-op   - please document medical clearance for local anesthesia w/ light sedation   - discussed w/ attending

## 2020-08-27 NOTE — PROGRESS NOTE ADULT - SUBJECTIVE AND OBJECTIVE BOX
Follow Up:  osteo of toe    Interval History/ROS: no new complaints, no diarrhea    Allergies  No Known Allergies    ANTIMICROBIALS:  cefepime   IVPB 1000 every 24 hours  vancomycin  IVPB 500 every 12 hours    OTHER MEDS:  MEDICATIONS  (STANDING):  acetaminophen   Tablet .. 650 every 6 hours PRN  atorvastatin 40 at bedtime  carvedilol 12.5 every 12 hours  dextrose 40% Gel 15 once PRN  dextrose 50% Injectable 12.5 once  dextrose 50% Injectable 25 once  dextrose 50% Injectable 25 once  famotidine    Tablet 20 daily  furosemide    Tablet 40 daily  glucagon  Injectable 1 once PRN  immune   globulin 10% (GAMMAGARD) IVPB 75 daily  insulin glargine Injectable (LANTUS) 18 at bedtime  insulin lispro (HumaLOG) corrective regimen sliding scale  three times a day before meals  insulin lispro (HumaLOG) corrective regimen sliding scale  at bedtime  insulin lispro Injectable (HumaLOG) 9 three times a day before meals  levothyroxine 125 daily  lisinopril 10 daily  niacin  at bedtime  predniSONE   Tablet 30 daily  spironolactone 25 daily      Vital Signs Last 24 Hrs  T(C): 36.2 (27 Aug 2020 13:17), Max: 36.8 (26 Aug 2020 19:36)  T(F): 97.2 (27 Aug 2020 13:17), Max: 98.3 (26 Aug 2020 19:36)  HR: 84 (27 Aug 2020 13:17) (68 - 84)  BP: 94/60 (27 Aug 2020 13:17) (90/63 - 115/77)  BP(mean): --  RR: 18 (27 Aug 2020 13:17) (16 - 18)  SpO2: 100% (27 Aug 2020 13:17) (97% - 100%)    PHYSICAL EXAM:  General: WN/WD NAD, Non-toxic  Neurology: A&Ox3, nonfocal  Respiratory: no resp distress  standing at bedside  Extremities: No edema,   Line Sites: Clear  Skin: No rash                        10.7   3.10  )-----------( 21       ( 27 Aug 2020 15:03 )             32.8       08-27    129<L>  |  99  |  54<H>  ----------------------------<  197<H>  4.8   |  19<L>  |  1.96<H>    Ca    9.2      27 Aug 2020 06:56    TPro  6.6  /  Alb  3.6  /  TBili  1.0  /  DBili  x   /  AST  34  /  ALT  40  /  AlkPhos  129<H>  08-26    A1C with Estimated Average Glucose (08.27.20 @ 15:29)    A1C with Estimated Average Glucose Result: 9.2:  mg/dL          MICROBIOLOGY:  .Abscess left foot wound  08-26-20   Moderate Staphylococcus aureus  Moderate Alpha hemolytic strep "Susceptibilities not performed"  --  --    .Blood Blood-Peripheral  08-26-20   No growth to date.  --  --      .Blood Blood-Peripheral  08-26-20   No growth to date.  --  --        RADIOLOGY:    Gopi Grace MD; Division of Infectious Disease; Pager: [phone]; nights and weekends: [phone]

## 2020-08-28 NOTE — PROVIDER CONTACT NOTE (MEDICATION) - ASSESSMENT
Pt ordered for 75mg of Gammaglobulin 10%. Pt just started medication at 0110 on 8/28 after order placed for evening for 8/27. Medication needed clarification of max dose rate, required verification by pharmacy and modification by provider. Medication was delayed due to this as well as concurrent abx administration and IV access issue as well as barcode administration issue which required pharmacy to redistribute medication label.

## 2020-08-28 NOTE — PROVIDER CONTACT NOTE (OTHER) - BACKGROUND
Pt is diabetic, currently admitted for osteomyelitis of left foot. 6 units humalog given at bedtime for sugar of 374, Lantus ordered 6 units x1 dose tonight, sugar prior to admin 367

## 2020-08-28 NOTE — PROGRESS NOTE ADULT - SUBJECTIVE AND OBJECTIVE BOX
Follow Up:  osteo of right 2nd toe    Interval History/ROS: in good (sarcastic) humor, denies pain    Allergies  No Known Allergies    ANTIMICROBIALS:  cefepime   IVPB 1000 every 24 hours  vancomycin  IVPB 500 every 12 hours      OTHER MEDS:  MEDICATIONS  (STANDING):  acetaminophen   Tablet .. 650 every 6 hours PRN  atorvastatin 40 at bedtime  carvedilol 12.5 every 12 hours  dextrose 40% Gel 15 once PRN  dextrose 50% Injectable 12.5 once  dextrose 50% Injectable 25 once  dextrose 50% Injectable 25 once  famotidine    Tablet 20 daily  furosemide    Tablet 40 daily  glucagon  Injectable 1 once PRN  insulin glargine Injectable (LANTUS) 24 at bedtime  insulin lispro (HumaLOG) corrective regimen sliding scale  three times a day before meals  insulin lispro (HumaLOG) corrective regimen sliding scale  at bedtime  insulin lispro Injectable (HumaLOG) 18 three times a day before meals  levothyroxine 125 daily  lisinopril 10 daily  niacin  at bedtime  predniSONE   Tablet 40 daily  spironolactone 25 daily    Vital Signs Last 24 Hrs  T(C): 36.4 (28 Aug 2020 13:59), Max: 36.8 (27 Aug 2020 21:08)  T(F): 97.5 (28 Aug 2020 13:59), Max: 98.2 (27 Aug 2020 21:08)  HR: 73 (28 Aug 2020 13:59) (60 - 94)  BP: 96/59 (28 Aug 2020 13:59) (96/59 - 114/69)  BP(mean): --  RR: 17 (28 Aug 2020 13:59) (16 - 18)  SpO2: 100% (28 Aug 2020 13:59) (96% - 100%)    PHYSICAL EXAM:  General: WN/WD NAD, Non-toxic  Neurology: A&Ox3, nonfocal  Respiratory: Clear to auscultation bilaterally  CV: RRR, S1S2, no murmurs, rubs or gallops  Abdominal: Soft, Non-tender, non-distended  Extremities: No edema  Line Sites: Clear  Skin: No rash                        8.5    2.39  )-----------( 22       ( 28 Aug 2020 07:19 )             26.1   WBC Count: 2.39 (08-28 @ 07:19)  WBC Count: 3.10 (08-27 @ 15:03)  WBC Count: 3.43 (08-27 @ 07:25)  WBC Count: 5.72 (08-26 @ 11:29)    08-28    129<L>  |  100  |  49<H>  ----------------------------<  275<H>  4.4   |  19<L>  |  1.73<H>  Creatinine: 1.73 (08-28 @ 07:19)    Creatinine: 1.96 (08-27 @ 06:56)    Creatinine: 2.03 (08-26 @ 22:27)    Creatinine: 2.18 (08-26 @ 11:29)      Ca    8.5      28 Aug 2020 07:19    MICROBIOLOGY:  .Abscess left foot wound  08-26-20   Moderate Staphylococcus aureus  Moderate Alpha hemolytic strep "Susceptibilities not performed"  --  Staphylococcus aureus  Culture - Abscess with Gram Stain (08.26.20 @ 17:54)    -  Ampicillin/Sulbactam: S <=8/4    -  Cefazolin: S <=4    -  Oxacillin: S 0.5    -  Tetra/Doxy: S <=1    -  RIF- Rifampin: S <=1 Should not be used as monotherapy    -  Penicillin: R 8    -  Clindamycin: S 0.5    -  Erythromycin: R >4    -  Gentamicin: S <=1 Should not be used as monotherapy    -  Vancomycin: S 1    Specimen Source: .Abscess left foot wound    Culture Results:   Moderate Staphylococcus aureus  Moderate Alpha hemolytic strep "Susceptibilities not performed"    .Blood Blood-Peripheral  08-26-20   No growth to date.  --  --      .Blood Blood-Peripheral  08-26-20   No growth to date.  --  --    Vancomycin Level, Trough: 12.7 ug/mL (08-27-20 @ 22:57)    RADIOLOGY:    Gopi Grace MD; Division of Infectious Disease; Pager: [phone]; nights and weekends: [phone]

## 2020-08-28 NOTE — PROGRESS NOTE ADULT - SUBJECTIVE AND OBJECTIVE BOX
Chief Complaint: hyperglycemia exacerbated by steroids, T2DM    History: Pt feels well today, no complaints. Plan for pt to be continued on prednisone 40mg daily.      POCT Blood Glucose.: 320 mg/dL (28 Aug 2020 13:04) - Humalog 26  POCT Blood Glucose.: 262 mg/dL (28 Aug 2020 09:39) - Humalog 24  POCT Blood Glucose.: 282 mg/dL (28 Aug 2020 06:40) - Humalog 6  POCT Blood Glucose.: 374 mg/dL (27 Aug 2020 22:52) - Lantus 24 + Humalog 6        MEDICATIONS  (STANDING):  ampicillin/sulbactam  IVPB 3 Gram(s) IV Intermittent every 8 hours  atorvastatin 40 milliGRAM(s) Oral at bedtime  carvedilol 12.5 milliGRAM(s) Oral every 12 hours  dextrose 5%. 1000 milliLiter(s) (50 mL/Hr) IV Continuous <Continuous>  dextrose 50% Injectable 12.5 Gram(s) IV Push once  dextrose 50% Injectable 25 Gram(s) IV Push once  dextrose 50% Injectable 25 Gram(s) IV Push once  famotidine    Tablet 20 milliGRAM(s) Oral daily  furosemide    Tablet 40 milliGRAM(s) Oral daily  insulin glargine Injectable (LANTUS) 40 Unit(s) SubCutaneous at bedtime  insulin lispro (HumaLOG) corrective regimen sliding scale   SubCutaneous three times a day before meals  insulin lispro (HumaLOG) corrective regimen sliding scale   SubCutaneous at bedtime  insulin lispro Injectable (HumaLOG) 24 Unit(s) SubCutaneous three times a day before meals  levothyroxine 125 MICROGram(s) Oral daily  lisinopril 10 milliGRAM(s) Oral daily  mupirocin 2% Ointment 1 Application(s) Topical two times a day  niacin  milliGRAM(s) Oral at bedtime  predniSONE   Tablet 40 milliGRAM(s) Oral daily  sodium bicarbonate 650 milliGRAM(s) Oral two times a day  spironolactone 25 milliGRAM(s) Oral daily    MEDICATIONS  (PRN):  acetaminophen   Tablet .. 650 milliGRAM(s) Oral every 6 hours PRN Temp greater or equal to 38.5C (101.3F), Mild Pain (1 - 3)  dextrose 40% Gel 15 Gram(s) Oral once PRN Blood Glucose LESS THAN 70 milliGRAM(s)/deciliter  glucagon  Injectable 1 milliGRAM(s) IntraMuscular once PRN Glucose LESS THAN 70 milligrams/deciliter        Allergies    No Known Allergies    Intolerances      Review of Systems:  Constitutional: No fever  Eyes: No blurry vision  Neuro: No tremors  HEENT: No pain  Cardiovascular: No chest pain, palpitations  Respiratory: No SOB, no cough  GI: No nausea, vomiting, abdominal pain  : No dysuria  Skin: no rash  Psych: no depression  Endocrine: no polyuria, polydipsia  Hem/lymph: no swelling  Osteoporosis: no fractures    ALL OTHER SYSTEMS REVIEWED AND NEGATIVE      PHYSICAL EXAM:  ICU Vital Signs Last 24 Hrs  T(C): 36.4 (28 Aug 2020 17:13), Max: 36.8 (27 Aug 2020 21:08)  T(F): 97.5 (28 Aug 2020 17:13), Max: 98.2 (27 Aug 2020 21:08)  HR: 67 (28 Aug 2020 17:13) (60 - 89)  BP: 114/72 (28 Aug 2020 17:13) (96/59 - 114/72)  BP(mean): --  ABP: --  ABP(mean): --  RR: 16 (28 Aug 2020 17:13) (16 - 17)  SpO2: 100% (28 Aug 2020 17:13) (96% - 100%)    GENERAL: NAD  EYES: No proptosis, no lid lag, anicteric  THYROID: Normal size, no palpable nodules  RESPIRATORY: CTA b/l, no wheezes or crackles   CARDIOVASCULAR: S1S2 RRR; no murmurs; no peripheral edema  GI: Soft, nontender, non distended, normal bowel sounds  SKIN: Dry, intact, No rashes or lesions  PSYCH: Alert and oriented x 3, normal affect, normal mood    CAPILLARY BLOOD GLUCOSE      POCT Blood Glucose.: 282 mg/dL (28 Aug 2020 18:26)  POCT Blood Glucose.: 320 mg/dL (28 Aug 2020 13:04)  POCT Blood Glucose.: 262 mg/dL (28 Aug 2020 09:39)  POCT Blood Glucose.: 282 mg/dL (28 Aug 2020 06:40)  POCT Blood Glucose.: 367 mg/dL (28 Aug 2020 00:44)  POCT Blood Glucose.: 374 mg/dL (27 Aug 2020 22:52)        08-28    129<L>  |  100  |  49<H>  ----------------------------<  275<H>  4.4   |  19<L>  |  1.73<H>    Ca    8.5      28 Aug 2020 07:19                            8.5    2.39  )-----------( 22       ( 28 Aug 2020 07:19 )             26.1

## 2020-08-28 NOTE — PROGRESS NOTE ADULT - ATTENDING COMMENTS
Agree with assessment and plan as above by Dr. Suarez. Reviewed all pertinent labs, glucose values, and imaging studies. Modifications made as indicated above. Hyperglycemia exacerbated by prednisone requiring higher doses of insulin. Increase Lantus to 40 units qhs and Humalog to 24 units with meals. Will need to decrease insulin doses empirically if prednisone dose is lowered.    Quinn Pires D.O  [phone]

## 2020-08-28 NOTE — PROVIDER CONTACT NOTE (OTHER) - RECOMMENDATIONS
Notify covering provider, possible humalog coverage for now. Pt is currently NPO for possible OR, FS to be rechecked at 6am next.

## 2020-08-28 NOTE — PROGRESS NOTE ADULT - ASSESSMENT
70 year old male with ICM EF 40%, CABG, HTN, DM II, HLD, pituitary tumor, paroxysmal atrial tachycardia, recently mostly in sinus rhythm, ICD admitted with osteomyelitis.    Ischemic cardiomyopathy    maintain carvedilol dose should be 12.5 mg BID    Lisinopril 10 mg    Spironolactone should be 25 mg BID    Furosemide     Has not been on aspirin and no anticoagulation due to profound thrombocytopenia.    Paroxysmal atrial tachycardia    manage by best treating ischemic cardiomyopathy and avoiding volume overload    no anticoagulation    Paroxysmal ventricular tachycardia    Has ICD.    Chronic thrombocytopenia    Preferable to not be on anticoagulation 70 year old male with ICM EF 40%, CABG, HTN, DM II, HLD, pituitary tumor, paroxysmal atrial tachycardia, recently mostly in sinus rhythm, ICD admitted with osteomyelitis.     Surgical Risk Stratification    High risk patient for low risk non-emergent surgery    No active chest pain or palpitations, moderate CAD risk factors      Unlimited exercise tolerance (> 4 METS)    No further cardiac testing or optimization    Requires optimization of chronic thrombocytopenia prior to surgery    Ischemic cardiomyopathy    Continue carvedilol dose 12.5 mg BID    Lisinopril 10 mg    Increase to spironolactone 25 mg BID    Furosemide 40 mg     Has not been on aspirin and no anticoagulation due to profound thrombocytopenia.    Paroxysmal atrial tachycardia    manage by best treating ischemic cardiomyopathy and avoiding volume overload    no anticoagulation    Paroxysmal ventricular tachycardia    Has ICD.    Chronic thrombocytopenia    Preferable to not be on anticoagulation    Continue with prednisone and IVIG for ITP treatment, per Hematology recommendations, to further optimize for surgery

## 2020-08-28 NOTE — PROVIDER CONTACT NOTE (MEDICATION) - BACKGROUND
Pt ordered for IVIG 8/27 late afternoon. Pt received previous dose overnight 8/26. Orders needed clarification and modification upon start of shift at 7pm

## 2020-08-28 NOTE — PROGRESS NOTE ADULT - ASSESSMENT
Assessment and Recommendation:   		  71 y/o male with hx of liver ca (not on active chemotherapy), recently diagnosed ITP with thrombocytopenia, T2DM c/b multiple amputations of toes, hypothyroidism, pituitary adenoma, HF with ICD placement, HLD, presenting for 2nd toe on left foot ulceration and infection x few days. Patient was seen by his podiatrist who recommended visiting the ED at this time for abx and admission. Endocrinology consulted for hyperglycemia and T2DM management          Problem/Recommendation - 1:  Problem: Type 2 diabetes mellitus with stage 4 chronic kidney disease, with long-term current use of insulin. Recommendation: hyperglycemia second to steroids. Prior to starting steroids, DM fairly well controlled (HbA1C 8.1 in March 2020). Remains on prednisone 40mg daily with no plan for taper at this time. BG remains above goal.  - increase Lantus to 40 units qhs  - increase Humalog to 24 units TID pre-meal  - moderate correction scales ac and hs  - CC diet  - check FS ac/hs  - please notify endocrine if any changes to steroid regimen  Discharge  - plan TBD based on insulin requirements and steroid plan at discharge.     Problem/Recommendation - 2:  ·  Problem: Diabetic toe ulcer.  Recommendation: management per primary and podiatry team  outpatient f/u with Podiatry.      Problem/Recommendation - 3:  ·  Problem: Hyperlipidemia, unspecified hyperlipidemia type.  Recommendation: - agree with high intensity statin.      Problem/Recommendation - 4:  ·  Problem: Hypertension, unspecified type.  Recommendation: - BP controlled on lisinopril. Goal BP <130/70.      Problem/Recommendation - 5:  ·  Problem: Hypothyroidism, unspecified type.  Recommendation: - continue LT4 125mcg daily on M,Tu,W,Th,F,Sun. LT4 250mcg on saturdays  - states pt takes "thyrozene" on home med list which is a thyroid herbal supplement. Recommend switching to levothyroxine or synthroid on discharge  - check TFTs outpatient.      Problem/Recommendation - 6:  Problem: Hyperprolactinemia. Recommendation: - on cabergoline 0.5 weekly. Given low prolactin decrease dose to 0.25mg weekly.      Jacinta Suarez DO, Endocrine Fellow   Pager [phone]. from 9-5PM. After hours and on weekends please call [phone].

## 2020-08-28 NOTE — PROGRESS NOTE ADULT - ATTENDING COMMENTS
70 year old [place] with ICM EF 40%, CABG, HTN, DM II, HLD, pituitary tumor, paroxysmal atrial tachycardia, recently mostly in sinus rhythm, ICD admitted with osteomyelitis. For ischemic cardiomyopathy maintained on carvedilol 12.5 mg BID, Lisinopril 10 mg, Spironolactone 25 mg BID and furosemide.   Has not been on aspirin and no anticoagulation due to profound thrombocytopenia. Paroxysmal atrial tachycardia best managed by treating ischemic cardiomyopathy and avoiding volume overload. For paroxysmal ventricular tachycardia has ICD.    Chronic thrombocytopenia is precluding plan for toe amputation and hematology seeking to manage.

## 2020-08-28 NOTE — PROGRESS NOTE ADULT - SUBJECTIVE AND OBJECTIVE BOX
INTERVAL HPI/OVERNIGHT EVENTS:  Patient S&E at bedside. No o/n events,     VITAL SIGNS:  T(F): 97.5 (08-28-20 @ 13:59)  HR: 73 (08-28-20 @ 13:59)  BP: 96/59 (08-28-20 @ 13:59)  RR: 17 (08-28-20 @ 13:59)  SpO2: 100% (08-28-20 @ 13:59)  Wt(kg): --    PHYSICAL EXAM:    Constitutional: NAD  Eyes: EOMI, sclera non-icteric  Neck: supple  Respiratory: CTAB, no wheezes or crackles   Cardiovascular: RRR  Gastrointestinal: soft, NTND, + BS  Extremities: no cyanosis, clubbing or edema   Neurological: awake and alert      MEDICATIONS  (STANDING):  atorvastatin 40 milliGRAM(s) Oral at bedtime  carvedilol 12.5 milliGRAM(s) Oral every 12 hours  cefepime   IVPB 1000 milliGRAM(s) IV Intermittent every 24 hours  dextrose 5%. 1000 milliLiter(s) (50 mL/Hr) IV Continuous <Continuous>  dextrose 50% Injectable 12.5 Gram(s) IV Push once  dextrose 50% Injectable 25 Gram(s) IV Push once  dextrose 50% Injectable 25 Gram(s) IV Push once  famotidine    Tablet 20 milliGRAM(s) Oral daily  furosemide    Tablet 40 milliGRAM(s) Oral daily  insulin glargine Injectable (LANTUS) 24 Unit(s) SubCutaneous at bedtime  insulin lispro (HumaLOG) corrective regimen sliding scale   SubCutaneous three times a day before meals  insulin lispro (HumaLOG) corrective regimen sliding scale   SubCutaneous at bedtime  insulin lispro Injectable (HumaLOG) 18 Unit(s) SubCutaneous three times a day before meals  levothyroxine 125 MICROGram(s) Oral daily  lisinopril 10 milliGRAM(s) Oral daily  mupirocin 2% Ointment 1 Application(s) Topical two times a day  niacin  milliGRAM(s) Oral at bedtime  predniSONE   Tablet 40 milliGRAM(s) Oral daily  sodium bicarbonate 650 milliGRAM(s) Oral two times a day  spironolactone 25 milliGRAM(s) Oral daily  vancomycin  IVPB 500 milliGRAM(s) IV Intermittent every 12 hours    MEDICATIONS  (PRN):  acetaminophen   Tablet .. 650 milliGRAM(s) Oral every 6 hours PRN Temp greater or equal to 38.5C (101.3F), Mild Pain (1 - 3)  dextrose 40% Gel 15 Gram(s) Oral once PRN Blood Glucose LESS THAN 70 milliGRAM(s)/deciliter  glucagon  Injectable 1 milliGRAM(s) IntraMuscular once PRN Glucose LESS THAN 70 milligrams/deciliter      Allergies    No Known Allergies    Intolerances        LABS:                        8.5    2.39  )-----------( 22       ( 28 Aug 2020 07:19 )             26.1     08-28    129<L>  |  100  |  49<H>  ----------------------------<  275<H>  4.4   |  19<L>  |  1.73<H>    Ca    8.5      28 Aug 2020 07:19      PT/INR - ( 28 Aug 2020 07:19 )   PT: 15.2 sec;   INR: 1.29 ratio         PTT - ( 28 Aug 2020 07:19 )  PTT:25.9 sec      RADIOLOGY & ADDITIONAL TESTS:  Studies reviewed.

## 2020-08-28 NOTE — PROGRESS NOTE ADULT - ASSESSMENT
71 y/o male with hx of HCC s/p liver directed therapy with stable disease, chronic thrombocytopenia, poorly controlled IDDM, multiple amputations of toes, heart failure s/p AICD placement, CKD and HLD presenting with worsening infection of left foot, planned for digit amputation of 2nd digit.     #Thrombocytopenia  - had baseline thrombocytopenia likely related to splenomegaly/cirrhosis with counts about 40-50k  - now seems to have superimposed ITP with lowering of counts to 10-20k  - started on prednisone 40 mg over past 3 weeks with slight improvement to about 20k   - completed two days of IVIG   - attempted transfusion without response   - given lack of response, will add nplate 1 mcg/kg today (dosed on 8/27)  - continue prednisone 40 mg daily   - would treat ITP as above and monitor CBC daily   - platelet goal prior to surgery up to podiatry team; if not urgent, will continue ITP management with hopeful more robust response to platelets   - recent bone marrow without inherited bone marrow process to contribute     #HCC  - s/p liver directed therapy with stable disease  - outpatient f/u with primary oncologist Dr. Fredy Lincoln MD  Hematology/Oncology Fellow   [phone]

## 2020-08-28 NOTE — PROGRESS NOTE ADULT - ATTENDING COMMENTS
71 y/o male with hx of HCC s/p liver directed therapy with stable disease, chronic thrombocytopenia, poorly controlled IDDM, multiple amputations of toes, heart failure s/p AICD placement, CKD and HLD presenting with worsening infection of left foot, planned for digit amputation of 2nd digit. Noted recently to have drop of platelets from 40-60K (baseline) to 10-20K. He was started on prednisone 40 mg daily (dose reduced due to IDDM and podiatric infections) with increase in platelet to 24K. He has received IVIG with no improvement in platelet count.   -s/p 2 doses IVIG    -continue prednisone 30 mg daily x 4 days   -continue romiplostin (1 mcg/kg weekly)  -can consider infusing platelets during procedure should his platelet count not increase to 50K   -appreciate ID, surgery recs  -appreciate endocrinology recs

## 2020-08-28 NOTE — PROGRESS NOTE ADULT - SUBJECTIVE AND OBJECTIVE BOX
[facility] Division of Kidney Diseases & Hypertension  FOLLOW UP NOTE  [phone]--------------------------------------------------------------------------------  Chief Complaint:Type 2 diabetes mellitus with foot ulcer      24 hour events/subjective: No acute events overnight. Patient out of bed and ambulating without difficulty. Procedure canceled today due to low platelets. Patient states he feels well without any complaints. Creatinine noted to be stable at 1.73. BP noted to be low, SBP 100s.        PAST HISTORY  --------------------------------------------------------------------------------  No significant changes to PMH, PSH, FHx, SHx, unless otherwise noted    ALLERGIES & MEDICATIONS  --------------------------------------------------------------------------------  Allergies    No Known Allergies    Intolerances      Standing Inpatient Medications  atorvastatin 40 milliGRAM(s) Oral at bedtime  carvedilol 12.5 milliGRAM(s) Oral every 12 hours  cefepime   IVPB 1000 milliGRAM(s) IV Intermittent every 24 hours  dextrose 5%. 1000 milliLiter(s) IV Continuous <Continuous>  dextrose 50% Injectable 12.5 Gram(s) IV Push once  dextrose 50% Injectable 25 Gram(s) IV Push once  dextrose 50% Injectable 25 Gram(s) IV Push once  famotidine    Tablet 20 milliGRAM(s) Oral daily  furosemide    Tablet 40 milliGRAM(s) Oral daily  insulin glargine Injectable (LANTUS) 24 Unit(s) SubCutaneous at bedtime  insulin lispro (HumaLOG) corrective regimen sliding scale   SubCutaneous three times a day before meals  insulin lispro (HumaLOG) corrective regimen sliding scale   SubCutaneous at bedtime  insulin lispro Injectable (HumaLOG) 18 Unit(s) SubCutaneous three times a day before meals  levothyroxine 125 MICROGram(s) Oral daily  lisinopril 10 milliGRAM(s) Oral daily  mupirocin 2% Ointment 1 Application(s) Topical two times a day  niacin  milliGRAM(s) Oral at bedtime  predniSONE   Tablet 40 milliGRAM(s) Oral daily  sodium bicarbonate 650 milliGRAM(s) Oral two times a day  spironolactone 25 milliGRAM(s) Oral daily  vancomycin  IVPB 500 milliGRAM(s) IV Intermittent every 12 hours    PRN Inpatient Medications  acetaminophen   Tablet .. 650 milliGRAM(s) Oral every 6 hours PRN  dextrose 40% Gel 15 Gram(s) Oral once PRN  glucagon  Injectable 1 milliGRAM(s) IntraMuscular once PRN      REVIEW OF SYSTEMS  --------------------------------------------------------------------------------  Gen: No  fevers/chills  Skin: No rashes  Head/Eyes/Ears/Mouth: No headache; Normal hearing; Normal vision w/o blurriness  Respiratory: No dyspnea, cough, wheezing, hemoptysis  CV: No chest pain, PND, orthopnea  GI: No abdominal pain, diarrhea, constipation, nausea, vomiting  : No increased frequency, dysuria, hematuria, nocturia  MSK: No joint pain/swelling; no back pain; no edema  Neuro: No dizziness/lightheadedness, weakness, seizures, numbness, tingling  Psych: Non-focal      All other systems were reviewed and are negative, except as noted.    VITALS/PHYSICAL EXAM  --------------------------------------------------------------------------------  T(C): 36.4 (08-28-20 @ 13:59), Max: 36.8 (08-27-20 @ 21:08)  HR: 73 (08-28-20 @ 13:59) (60 - 94)  BP: 96/59 (08-28-20 @ 13:59) (96/59 - 114/69)  RR: 17 (08-28-20 @ 13:59) (16 - 18)  SpO2: 100% (08-28-20 @ 13:59) (96% - 100%)  Wt(kg): --        08-27-20 @ 07:01  -  08-28-20 @ 07:00  --------------------------------------------------------  IN: 2784.9 mL / OUT: 0 mL / NET: 2784.9 mL    08-28-20 @ 07:01  -  08-28-20 @ 14:11  --------------------------------------------------------  IN: 630 mL / OUT: 0 mL / NET: 630 mL      Physical Exam:  	Gen: NAD, well-appearing  	HEENT: PERRL, supple neck          Lymph: No palpable lymph nodes.  	Pulm: No use of accessory muscles.  	CV:  S1S2  	Abd: +BS, soft   	Ext: No B/L Lower ext edema. Darkening/skin discoloration of LE. Noted multiple toe amputations on right foot. Left 2nd toe significantly red with swelling, pain on palpation and warmth.  	Neuro: No focal deficits  	Skin: Warm, without rashes              Psych: Non-focal  	Vascular: Cool feet.    LABS/STUDIES  --------------------------------------------------------------------------------              8.5    2.39  >-----------<  22       [08-28-20 @ 07:19]              26.1     129  |  100  |  49  ----------------------------<  275      [08-28-20 @ 07:19]  4.4   |  19  |  1.73        Ca     8.5     [08-28-20 @ 07:19]      PT/INR: PT 15.2 , INR 1.29       [08-28-20 @ 07:19]  PTT: 25.9       [08-28-20 @ 07:19]      Creatinine Trend:  SCr 1.73 [08-28 @ 07:19]  SCr 1.96 [08-27 @ 06:56]  SCr 2.03 [08-26 @ 22:27]  SCr 2.18 [08-26 @ 11:29]        TSH 3.28      [08-27-20 @ 08:25]

## 2020-08-28 NOTE — PROGRESS NOTE ADULT - ASSESSMENT
70 year old male with extensive history, significant for CKD (baseline creatinine in the last 6 months ~1.8-2.1) Ischemic Cardiomyopathy s/p AICD, HCC, Pituitary Adenoma with Hyperprolactinemia/Hypothyroidism, PVD complicated by osteomyelitis s/p multiple toe amputations, Anemia, T2DM, HTN, HLD, Gout, Sleep Apnea, Afib, recently treated for ITP with prednisone, who presented to the hospital with worsening pain, swelling, and redness of the left 2nd toe. Nephrology consulted for management of CKD.     Problem/Recommendation - 1:  Problem: Chronic kidney disease (CKD). Recommendation: Paitent with CKD likely from DM with multiple episodes of JULIANNE over the last few months (ACE/ARB in conjunction with diuretic use with contrast exposures). Baseline creatinine ~1.8-2.1. Creatinine currently 1.73. Avoid nephrotoxic agents, HOLD metformin for now, renally dose all medications per eGFR and optimize perfusion pressure. Given eGFR, would consider maximum dose of 1g daily for metformin use.       Problem/Recommendation - 2:  ·  Problem: Hyperkalemia.  Recommendation: Potassium currently 4.4. Continue to trend and control hyperglycemia.     Problem/Recommendation - 3:  ·  Problem: Hyponatremia.  Recommendation: Noted to be hyponatremic, however corrected for hyperglycemia the sodium is ~131-132. Would control sugars to correct sodium. Continue to monitor closely. Fluid restrict to 1.2L daily of volume. Give all drips in NS if possible over D5W.

## 2020-08-28 NOTE — PROVIDER CONTACT NOTE (MEDICATION) - RECOMMENDATIONS
Notify provider of delay, continue with medication administration protocol of IVIG for duration of administration

## 2020-08-28 NOTE — PROGRESS NOTE ADULT - ASSESSMENT
69 y/o M w/ L foot distal 2nd digit wound to bone  - pt seen and evaluated  - Bone scan consistent with osteomyelitis of the 2nd toe  - L foot distal 2nd digit wound to bone, dactylitis, cellulitis, purulence expressed  - Continue IV Abx  - pod plan for L foot partial 2nd digit amputation   - pt thrombocytopenic, goal for surgery is 50k, however if foot continues to worsen and count not improving, will consider amputation at lower count   - will continue to monitor

## 2020-08-28 NOTE — CHART NOTE - NSCHARTNOTEFT_GEN_A_CORE
Pod surgery (L foot partial 2nd ray resection) cancelled due to low platelet count. Will rebook once pt is medically optimized.

## 2020-08-28 NOTE — PROGRESS NOTE ADULT - SUBJECTIVE AND OBJECTIVE BOX
Patient is a 70y old  Male who presents with a chief complaint of Toe ulcer (28 Aug 2020 20:38)      INTERVAL HPI/OVERNIGHT EVENTS: noted  pt seen and examined  feels well      Vital Signs Last 24 Hrs  T(C): 36.4 (28 Aug 2020 21:34), Max: 36.7 (28 Aug 2020 01:00)  T(F): 97.6 (28 Aug 2020 21:34), Max: 98 (28 Aug 2020 01:00)  HR: 60 (28 Aug 2020 21:34) (60 - 89)  BP: 99/60 (28 Aug 2020 21:34) (96/59 - 114/72)  BP(mean): --  RR: 16 (28 Aug 2020 21:34) (16 - 17)  SpO2: 99% (28 Aug 2020 21:34) (96% - 100%)    acetaminophen   Tablet .. 650 milliGRAM(s) Oral every 6 hours PRN  ampicillin/sulbactam  IVPB 3 Gram(s) IV Intermittent every 8 hours  atorvastatin 40 milliGRAM(s) Oral at bedtime  carvedilol 12.5 milliGRAM(s) Oral every 12 hours  dextrose 40% Gel 15 Gram(s) Oral once PRN  dextrose 5%. 1000 milliLiter(s) IV Continuous <Continuous>  dextrose 50% Injectable 12.5 Gram(s) IV Push once  dextrose 50% Injectable 25 Gram(s) IV Push once  dextrose 50% Injectable 25 Gram(s) IV Push once  famotidine    Tablet 20 milliGRAM(s) Oral daily  furosemide    Tablet 40 milliGRAM(s) Oral daily  glucagon  Injectable 1 milliGRAM(s) IntraMuscular once PRN  insulin glargine Injectable (LANTUS) 40 Unit(s) SubCutaneous at bedtime  insulin lispro (HumaLOG) corrective regimen sliding scale   SubCutaneous three times a day before meals  insulin lispro (HumaLOG) corrective regimen sliding scale   SubCutaneous at bedtime  insulin lispro Injectable (HumaLOG) 24 Unit(s) SubCutaneous three times a day before meals  levothyroxine 125 MICROGram(s) Oral daily  lisinopril 10 milliGRAM(s) Oral daily  mupirocin 2% Ointment 1 Application(s) Topical two times a day  niacin  milliGRAM(s) Oral at bedtime  predniSONE   Tablet 40 milliGRAM(s) Oral daily  sodium bicarbonate 650 milliGRAM(s) Oral two times a day  spironolactone 25 milliGRAM(s) Oral daily      PHYSICAL EXAM:  GENERAL: NAD,   EYES: conjunctiva and sclera clear  ENMT: Moist mucous membranes  NECK: Supple, No JVD, Normal thyroid  CHEST/LUNG: non labored, cta b/l  HEART: Regular rate and rhythm; No murmurs, rubs, or gallops  ABDOMEN: Soft, Nontender, Nondistended; Bowel sounds present  EXTREMITIES:  2+ Peripheral Pulses, No clubbing, cyanosis, or edema  LYMPH: No lymphadenopathy noted  SKIN: No rashes or lesions    Consultant(s) Notes Reviewed:  [x ] YES  [ ] NO  Care Discussed with Consultants/Other Providers [ x] YES  [ ] NO    LABS:                        8.5    2.39  )-----------( 22       ( 28 Aug 2020 07:19 )             26.1     08-28    129<L>  |  100  |  49<H>  ----------------------------<  275<H>  4.4   |  19<L>  |  1.73<H>    Ca    8.5      28 Aug 2020 07:19      PT/INR - ( 28 Aug 2020 07:19 )   PT: 15.2 sec;   INR: 1.29 ratio         PTT - ( 28 Aug 2020 07:19 )  PTT:25.9 sec    CAPILLARY BLOOD GLUCOSE      POCT Blood Glucose.: 261 mg/dL (28 Aug 2020 21:33)  POCT Blood Glucose.: 282 mg/dL (28 Aug 2020 18:26)  POCT Blood Glucose.: 320 mg/dL (28 Aug 2020 13:04)  POCT Blood Glucose.: 262 mg/dL (28 Aug 2020 09:39)  POCT Blood Glucose.: 282 mg/dL (28 Aug 2020 06:40)  POCT Blood Glucose.: 367 mg/dL (28 Aug 2020 00:44)            Culture - Abscess with Gram Stain (collected 26 Aug 2020 17:54)  Source: .Abscess left foot wound  Preliminary Report (27 Aug 2020 16:45):    Moderate Staphylococcus aureus    Moderate Alpha hemolytic strep "Susceptibilities not performed"  Organism: Staphylococcus aureus (28 Aug 2020 15:54)  Organism: Staphylococcus aureus (28 Aug 2020 15:54)    Culture - Blood (collected 26 Aug 2020 16:52)  Source: .Blood Blood-Peripheral  Preliminary Report (27 Aug 2020 17:01):    No growth to date.    Culture - Blood (collected 26 Aug 2020 16:49)  Source: .Blood Blood-Peripheral  Preliminary Report (27 Aug 2020 17:01):    No growth to date.        RADIOLOGY & ADDITIONAL TESTS:    Imaging Personally Reviewed:  [x ] YES  [ ] NO

## 2020-08-28 NOTE — PROGRESS NOTE ADULT - SUBJECTIVE AND OBJECTIVE BOX
Patient is a 70y old  Male who presents with a chief complaint of toe ulcer to bone (27 Aug 2020 17:41)      Subjective:  Patient seen and examined at bedside.        Review Of Systems: No chest pain, shortness of breath, or palpitations            Medications:  acetaminophen   Tablet .. 650 milliGRAM(s) Oral every 6 hours PRN  atorvastatin 40 milliGRAM(s) Oral at bedtime  carvedilol 12.5 milliGRAM(s) Oral every 12 hours  cefepime   IVPB 1000 milliGRAM(s) IV Intermittent every 24 hours  dextrose 40% Gel 15 Gram(s) Oral once PRN  dextrose 5%. 1000 milliLiter(s) IV Continuous <Continuous>  dextrose 50% Injectable 12.5 Gram(s) IV Push once  dextrose 50% Injectable 25 Gram(s) IV Push once  dextrose 50% Injectable 25 Gram(s) IV Push once  famotidine    Tablet 20 milliGRAM(s) Oral daily  furosemide    Tablet 40 milliGRAM(s) Oral daily  glucagon  Injectable 1 milliGRAM(s) IntraMuscular once PRN  immune   globulin 10% (GAMMAGARD) IVPB 75 Gram(s) IV Intermittent daily  insulin glargine Injectable (LANTUS) 24 Unit(s) SubCutaneous at bedtime  insulin lispro (HumaLOG) corrective regimen sliding scale   SubCutaneous three times a day before meals  insulin lispro (HumaLOG) corrective regimen sliding scale   SubCutaneous at bedtime  insulin lispro Injectable (HumaLOG) 18 Unit(s) SubCutaneous three times a day before meals  levothyroxine 125 MICROGram(s) Oral daily  lisinopril 10 milliGRAM(s) Oral daily  mupirocin 2% Ointment 1 Application(s) Topical two times a day  niacin  milliGRAM(s) Oral at bedtime  predniSONE   Tablet 40 milliGRAM(s) Oral daily  sodium bicarbonate 650 milliGRAM(s) Oral two times a day  sodium chloride 0.9%. 1000 milliLiter(s) IV Continuous <Continuous>  spironolactone 25 milliGRAM(s) Oral daily  vancomycin  IVPB 500 milliGRAM(s) IV Intermittent every 12 hours      PAST MEDICAL & SURGICAL HISTORY:  HCC (hepatocellular carcinoma): s/p liver embolization x2 in 2019  Afib: pt reports ~9 mos, [id number], resolved - monitored by Dr. Rivers  Ulcer of right ankle: has surgery done Dec 2018  Liver mass: dx: 10/2018   incidental finding. Currently awaitng furthur workup  Gout: medically managed  Elevated prolactin level: dx: &#x27; 70&#x27;s  medically managed Bromocriptine  Vitamin D deficiency  Elevated triglycerides with high cholesterol: on meds  Hypothyroidism  PVD (peripheral vascular disease)  History of hyperprolactinemia  Hepatic cirrhosis, unspecified hepatic cirrhosis type  Anemia  ICD (implantable cardioverter-defibrillator) in place: [employer]tr[employer], Old Model [id number] , last interrogation 3/27/19, generator change 4/3/19 New Model # [id number]  Sleep apnea: not using CPAP  History of osteomyelitis: 2015, right foot 2nd toe   follow-up by podiatrist every 2 weeks  Presence of stent in coronary artery: 2 stents 1999, 2006  Heart failure with reduced left ventricular function: last EF 3--35% 5/2019  Ischemic cardiomyopathy  Pituitary adenoma: as per patient chronic, no changes , recently restarted follow up with endocrinologist ( note in allscripts )  Coronary artery disease  Thrombocytopenia: Chronic  HTN (hypertension)  DM (diabetes mellitus): type 2  AICD lead malfunction: history of - fixed follow-up by Dr Rivers  SCC (squamous cell carcinoma): facial SCC, s/p Mohs  Encounter for incision and drainage procedure: Dec 2018 right foot/ankle  History of amputation: right foot, 2nd toe, osteo 2015  AICD (automatic cardioverter/defibrillator) present: placement in (2006), generator change 4/3/19  Stented coronary artery: RCA (1999), another  stent 2000  Coronary atherosclerosis of artery bypass graft: CABG X 4 (1986)      Objective:  Vitals:  T(F): 97.6 (08-28), Max: 98.2 (08-27)  HR: 66 (08-28) (60 - 94)  BP: 104/66 (08-28) (94/60 - 115/77)  RR: 16 (08-28)  SpO2: 100% (08-28)  I&O's Summary    27 Aug 2020 07:01  -  28 Aug 2020 07:00  --------------------------------------------------------  IN: 2784.9 mL / OUT: 0 mL / NET: 2784.9 mL        Physical Exam:  Appearance: No acute distress; well appearing  Eyes: PERRL, EOMI, pink conjunctiva  HENT: Normal oral muscosa  Cardiovascular: RRR, S1, S2, no murmurs, rubs, or gallops; no edema; no JVD  Respiratory: Clear to auscultation bilaterally  Gastrointestinal: soft, non-tender, non-distended with normal bowel sounds  Musculoskeletal: No clubbing; no joint deformity   Neurologic: Non-focal  Lymphatic: No lymphadenopathy  Psychiatry: AAOx3, mood & affect appropriate  Skin: No rashes, ecchymoses, or cyanosis                          8.5    2.39  )-----------( 22       ( 28 Aug 2020 07:19 )             26.1     08-28    129<L>  |  100  |  49<H>  ----------------------------<  275<H>  4.4   |  19<L>  |  1.73<H>    Ca    8.5      28 Aug 2020 07:19    TPro  6.6  /  Alb  3.6  /  TBili  1.0  /  DBili  x   /  AST  34  /  ALT  40  /  AlkPhos  129<H>  08-26    PT/INR - ( 28 Aug 2020 07:19 )   PT: 15.2 sec;   INR: 1.29 ratio         PTT - ( 28 Aug 2020 07:19 )  PTT:25.9 sec              New ECG(s): Personally reviewed    Echo:    Stress Testing:     Cath:    Imaging:    Interpretation of Telemetry: Patient is a 70y old  Male who presents with a chief complaint of toe ulcer to bone (27 Aug 2020 17:41)      Subjective:  Patient seen and examined at bedside. No overnight events. No complaints this AM. Amputation cancelled this AM.       Review of Systems:  Constitutional: [ ] Fever [ ] Chills [ ] Fatigue [ ] Weight change   HEENT: [ ] Blurred vision [ ] Eye pain [ ] Headache [ ] Runny nose [ ] Sore throat   Respiratory: [ ] Cough [ ] Wheezing [ ] Shortness of breath  Cardiovascular: [ ] Chest Pain [ ] Palpitations [ ] RODRIGUEZ [ ] PND [ ] Orthopnea  Gastrointestinal: [ ] Abdominal Pain [ ] Diarrhea [ ] Constipation [ ] Hemorrhoids [ ] Nausea [ ] Vomiting  Genitourinary: [ ] Nocturia [ ] Dysuria [ ] Incontinence  Extremities: [ ] Swelling [ ] Joint Pain  Neurologic: [ ] Focal deficit [ ] Paresthesias [ ] Syncope  Skin: [ ] Rash [ ] Ecchymoses [ ] Wounds [ ] Lesions  Psychiatry: [ ] Depression [ ] Suicidal/Homicidal ideation [ ] Anxiety [ ] Sleep disturbances  [X ] 10 point review of systems is otherwise negative except as mentioned above            [ ]Unable to obtain    Medications:  acetaminophen   Tablet .. 650 milliGRAM(s) Oral every 6 hours PRN  atorvastatin 40 milliGRAM(s) Oral at bedtime  carvedilol 12.5 milliGRAM(s) Oral every 12 hours  cefepime   IVPB 1000 milliGRAM(s) IV Intermittent every 24 hours  dextrose 40% Gel 15 Gram(s) Oral once PRN  dextrose 5%. 1000 milliLiter(s) IV Continuous <Continuous>  dextrose 50% Injectable 12.5 Gram(s) IV Push once  dextrose 50% Injectable 25 Gram(s) IV Push once  dextrose 50% Injectable 25 Gram(s) IV Push once  famotidine    Tablet 20 milliGRAM(s) Oral daily  furosemide    Tablet 40 milliGRAM(s) Oral daily  glucagon  Injectable 1 milliGRAM(s) IntraMuscular once PRN  immune   globulin 10% (GAMMAGARD) IVPB 75 Gram(s) IV Intermittent daily  insulin glargine Injectable (LANTUS) 24 Unit(s) SubCutaneous at bedtime  insulin lispro (HumaLOG) corrective regimen sliding scale   SubCutaneous three times a day before meals  insulin lispro (HumaLOG) corrective regimen sliding scale   SubCutaneous at bedtime  insulin lispro Injectable (HumaLOG) 18 Unit(s) SubCutaneous three times a day before meals  levothyroxine 125 MICROGram(s) Oral daily  lisinopril 10 milliGRAM(s) Oral daily  mupirocin 2% Ointment 1 Application(s) Topical two times a day  niacin  milliGRAM(s) Oral at bedtime  predniSONE   Tablet 40 milliGRAM(s) Oral daily  sodium bicarbonate 650 milliGRAM(s) Oral two times a day  sodium chloride 0.9%. 1000 milliLiter(s) IV Continuous <Continuous>  spironolactone 25 milliGRAM(s) Oral daily  vancomycin  IVPB 500 milliGRAM(s) IV Intermittent every 12 hours      PAST MEDICAL & SURGICAL HISTORY:  HCC (hepatocellular carcinoma): s/p liver embolization x2 in 2019  Afib: pt reports ~9 mos, [id number], resolved - monitored by Dr. Rivers  Ulcer of right ankle: has surgery done Dec 2018  Liver mass: dx: 10/2018   incidental finding. Currently awaitng furthur workup  Gout: medically managed  Elevated prolactin level: dx: &#x27; 70&#x27;s  medically managed Bromocriptine  Vitamin D deficiency  Elevated triglycerides with high cholesterol: on meds  Hypothyroidism  PVD (peripheral vascular disease)  History of hyperprolactinemia  Hepatic cirrhosis, unspecified hepatic cirrhosis type  Anemia  ICD (implantable cardioverter-defibrillator) in place: [employer], Old Model [id number] , last interrogation 3/27/19, generator change 4/3/19 New Model # [id number]  Sleep apnea: not using CPAP  History of osteomyelitis: 2015, right foot 2nd toe   follow-up by podiatrist every 2 weeks  Presence of stent in coronary artery: 2 stents 1999, 2006  Heart failure with reduced left ventricular function: last EF 3--35% 5/2019  Ischemic cardiomyopathy  Pituitary adenoma: as per patient chronic, no changes , recently restarted follow up with endocrinologist ( note in allscripts )  Coronary artery disease  Thrombocytopenia: Chronic  HTN (hypertension)  DM (diabetes mellitus): type 2  AICD lead malfunction: history of - fixed follow-up by Dr Rivers  SCC (squamous cell carcinoma): facial SCC, s/p Mohs  Encounter for incision and drainage procedure: Dec 2018 right foot/ankle  History of amputation: right foot, 2nd toe, osteo 2015  AICD (automatic cardioverter/defibrillator) present: placement in (2006), generator change 4/3/19  Stented coronary artery: RCA (1999), another  stent 2000  Coronary atherosclerosis of artery bypass graft: CABG X 4 (1986)      Objective:  Vitals:  T(F): 97.6 (08-28), Max: 98.2 (08-27)  HR: 66 (08-28) (60 - 94)  BP: 104/66 (08-28) (94/60 - 115/77)  RR: 16 (08-28)  SpO2: 100% (08-28)  I&O's Summary    27 Aug 2020 07:01  -  28 Aug 2020 07:00  --------------------------------------------------------  IN: 2784.9 mL / OUT: 0 mL / NET: 2784.9 mL        Physical Exam:  Appearance: No acute distress; well appearing, sitting up and eating meal  Eyes: clear sclera, pink conjunctiva  HENT: NC/AT, MMM  Cardiovascular: RRR, S1, S2, no murmurs, rubs, or gallops; no edema; no JVD  Respiratory: Clear to auscultation bilaterally  Gastrointestinal: soft, non-tender, non-distended with normal bowel sounds  Musculoskeletal: No clubbing; no joint deformity   Neurologic: Non-focal, able to move all 4 extremities spontaneously  Lymphatic: No lymphadenopathy  Psychiatry: AAOx3, mood & affect appropriate  Skin: Chronic discoloration of lower extremities b/l. No rashes, petechiae or cyanosis                          8.5    2.39  )-----------( 22       ( 28 Aug 2020 07:19 )             26.1     08-28    129<L>  |  100  |  49<H>  ----------------------------<  275<H>  4.4   |  19<L>  |  1.73<H>    Ca    8.5      28 Aug 2020 07:19    TPro  6.6  /  Alb  3.6  /  TBili  1.0  /  DBili  x   /  AST  34  /  ALT  40  /  AlkPhos  129<H>  08-26    PT/INR - ( 28 Aug 2020 07:19 )   PT: 15.2 sec;   INR: 1.29 ratio         PTT - ( 28 Aug 2020 07:19 )  PTT:25.9 sec              New ECG(s): Personally reviewed    Echo:    Stress Testing:     Cath:    Imaging:    Interpretation of Telemetry: Patient is a 70y old  Male who presents with a chief complaint of toe ulcer to bone (27 Aug 2020 17:41)      Subjective:  Patient seen and examined at bedside. No overnight events. No complaints this AM. Amputation cancelled this AM.       Review of Systems:  Constitutional: [ ] Fever [ ] Chills [ ] Fatigue [ ] Weight change   HEENT: [ ] Blurred vision [ ] Eye pain [ ] Headache [ ] Runny nose [ ] Sore throat   Respiratory: [ ] Cough [ ] Wheezing [ ] Shortness of breath  Cardiovascular: [ ] Chest Pain [ ] Palpitations [ ] RODRIGUEZ [ ] PND [ ] Orthopnea  Gastrointestinal: [ ] Abdominal Pain [ ] Diarrhea [ ] Constipation [ ] Hemorrhoids [ ] Nausea [ ] Vomiting  Genitourinary: [ ] Nocturia [ ] Dysuria [ ] Incontinence  Extremities: [ ] Swelling [ ] Joint Pain  Neurologic: [ ] Focal deficit [ ] Paresthesias [ ] Syncope  Skin: [ ] Rash [ ] Ecchymoses [ ] Wounds [ ] Lesions  Psychiatry: [ ] Depression [ ] Suicidal/Homicidal ideation [ ] Anxiety [ ] Sleep disturbances  [X ] 10 point review of systems is otherwise negative except as mentioned above            [ ]Unable to obtain    Medications:  acetaminophen   Tablet .. 650 milliGRAM(s) Oral every 6 hours PRN  atorvastatin 40 milliGRAM(s) Oral at bedtime  carvedilol 12.5 milliGRAM(s) Oral every 12 hours  cefepime   IVPB 1000 milliGRAM(s) IV Intermittent every 24 hours  dextrose 40% Gel 15 Gram(s) Oral once PRN  dextrose 5%. 1000 milliLiter(s) IV Continuous <Continuous>  dextrose 50% Injectable 12.5 Gram(s) IV Push once  dextrose 50% Injectable 25 Gram(s) IV Push once  dextrose 50% Injectable 25 Gram(s) IV Push once  famotidine    Tablet 20 milliGRAM(s) Oral daily  furosemide    Tablet 40 milliGRAM(s) Oral daily  glucagon  Injectable 1 milliGRAM(s) IntraMuscular once PRN  immune   globulin 10% (GAMMAGARD) IVPB 75 Gram(s) IV Intermittent daily  insulin glargine Injectable (LANTUS) 24 Unit(s) SubCutaneous at bedtime  insulin lispro (HumaLOG) corrective regimen sliding scale   SubCutaneous three times a day before meals  insulin lispro (HumaLOG) corrective regimen sliding scale   SubCutaneous at bedtime  insulin lispro Injectable (HumaLOG) 18 Unit(s) SubCutaneous three times a day before meals  levothyroxine 125 MICROGram(s) Oral daily  lisinopril 10 milliGRAM(s) Oral daily  mupirocin 2% Ointment 1 Application(s) Topical two times a day  niacin  milliGRAM(s) Oral at bedtime  predniSONE   Tablet 40 milliGRAM(s) Oral daily  sodium bicarbonate 650 milliGRAM(s) Oral two times a day  sodium chloride 0.9%. 1000 milliLiter(s) IV Continuous <Continuous>  spironolactone 25 milliGRAM(s) Oral daily  vancomycin  IVPB 500 milliGRAM(s) IV Intermittent every 12 hours      PAST MEDICAL & SURGICAL HISTORY:  HCC (hepatocellular carcinoma): s/p liver embolization x2 in 2019  Afib: pt reports ~9 mos, [id number], resolved - monitored by Dr. Rivers  Ulcer of right ankle: has surgery done Dec 2018  Liver mass: dx: 10/2018   incidental finding. Currently awaitng furthur workup  Gout: medically managed  Elevated prolactin level: dx: &#x27; 70&#x27;s  medically managed Bromocriptine  Vitamin D deficiency  Elevated triglycerides with high cholesterol: on meds  Hypothyroidism  PVD (peripheral vascular disease)  History of hyperprolactinemia  Hepatic cirrhosis, unspecified hepatic cirrhosis type  Anemia  ICD (implantable cardioverter-defibrillator) in place: [employer], Old Model [id number] , last interrogation 3/27/19, generator change 4/3/19 New Model # [id number]  Sleep apnea: not using CPAP  History of osteomyelitis: 2015, right foot 2nd toe   follow-up by podiatrist every 2 weeks  Presence of stent in coronary artery: 2 stents 1999, 2006  Heart failure with reduced left ventricular function: last EF 3--35% 5/2019  Ischemic cardiomyopathy  Pituitary adenoma: as per patient chronic, no changes , recently restarted follow up with endocrinologist ( note in allscripts )  Coronary artery disease  Thrombocytopenia: Chronic  HTN (hypertension)  DM (diabetes mellitus): type 2  AICD lead malfunction: history of - fixed follow-up by Dr Rivers  SCC (squamous cell carcinoma): facial SCC, s/p Mohs  Encounter for incision and drainage procedure: Dec 2018 right foot/ankle  History of amputation: right foot, 2nd toe, osteo 2015  AICD (automatic cardioverter/defibrillator) present: placement in (2006), generator change 4/3/19  Stented coronary artery: RCA (1999), another  stent 2000  Coronary atherosclerosis of artery bypass graft: CABG X 4 (1986)      Objective:  Vitals:  T(F): 97.6 (08-28), Max: 98.2 (08-27)  HR: 66 (08-28) (60 - 94)  BP: 104/66 (08-28) (94/60 - 115/77)  RR: 16 (08-28)  SpO2: 100% (08-28)  I&O's Summary    27 Aug 2020 07:01  -  28 Aug 2020 07:00  --------------------------------------------------------  IN: 2784.9 mL / OUT: 0 mL / NET: 2784.9 mL        Physical Exam:  Appearance: No acute distress; well appearing, sitting up and eating meal  Eyes: clear sclera, pink conjunctiva  HENT: NC/AT, MMM  Cardiovascular: RRR, S1, S2, no murmurs, rubs, or gallops; no edema; no JVD  Respiratory: Clear to auscultation bilaterally  Gastrointestinal: soft, non-tender, non-distended with normal bowel sounds  Musculoskeletal: No clubbing, no joint deformity, osteomyelitis left second toe.   Neurologic: Non-focal, able to move all 4 extremities spontaneously  Lymphatic: No lymphadenopathy  Psychiatry: AAOx3, mood & affect appropriate  Skin: Chronic discoloration of lower extremities b/l. No rashes, petechiae or cyanosis                          8.5    2.39  )-----------( 22       ( 28 Aug 2020 07:19 )             26.1     08-28    129<L>  |  100  |  49<H>  ----------------------------<  275<H>  4.4   |  19<L>  |  1.73<H>    Ca    8.5      28 Aug 2020 07:19    TPro  6.6  /  Alb  3.6  /  TBili  1.0  /  DBili  x   /  AST  34  /  ALT  40  /  AlkPhos  129<H>  08-26    PT/INR - ( 28 Aug 2020 07:19 )   PT: 15.2 sec;   INR: 1.29 ratio         PTT - ( 28 Aug 2020 07:19 )  PTT:25.9 sec

## 2020-08-28 NOTE — PROGRESS NOTE ADULT - SUBJECTIVE AND OBJECTIVE BOX
Patient is a 70y old  Male who presents with a chief complaint of toe ulcer to bone (28 Aug 2020 16:34)       INTERVAL HPI/OVERNIGHT EVENTS:  Patient seen and evaluated at bedside.  Pt is resting comfortable in NAD. Denies N/V/F/C.      Allergies    No Known Allergies    Intolerances        Vital Signs Last 24 Hrs  T(C): 36.4 (28 Aug 2020 17:13), Max: 36.8 (27 Aug 2020 21:08)  T(F): 97.5 (28 Aug 2020 17:13), Max: 98.2 (27 Aug 2020 21:08)  HR: 67 (28 Aug 2020 17:13) (60 - 89)  BP: 114/72 (28 Aug 2020 17:13) (96/59 - 114/72)  BP(mean): --  RR: 16 (28 Aug 2020 17:13) (16 - 17)  SpO2: 100% (28 Aug 2020 17:13) (96% - 100%)    LABS:                        8.5    2.39  )-----------( 22       ( 28 Aug 2020 07:19 )             26.1     08-28    129<L>  |  100  |  49<H>  ----------------------------<  275<H>  4.4   |  19<L>  |  1.73<H>    Ca    8.5      28 Aug 2020 07:19      PT/INR - ( 28 Aug 2020 07:19 )   PT: 15.2 sec;   INR: 1.29 ratio         PTT - ( 28 Aug 2020 07:19 )  PTT:25.9 sec    CAPILLARY BLOOD GLUCOSE      POCT Blood Glucose.: 282 mg/dL (28 Aug 2020 18:26)  POCT Blood Glucose.: 320 mg/dL (28 Aug 2020 13:04)  POCT Blood Glucose.: 262 mg/dL (28 Aug 2020 09:39)  POCT Blood Glucose.: 282 mg/dL (28 Aug 2020 06:40)  POCT Blood Glucose.: 367 mg/dL (28 Aug 2020 00:44)  POCT Blood Glucose.: 374 mg/dL (27 Aug 2020 22:52)      Lower Extremity Physical Exam:  Vasular: DP/PT 2/4, B/L, CFT < 3 seconds B/L, Temperature gradient  warm to cool, B/L.   Neuro: Epicritic sensation diminished to the level of ankle  B/L.  Musculoskeletal/Ortho: s/p partial digital amps 1-3 on R  Derm:  Wound #1:   Location: L foot distal 2nd digit wound   Size: 0.4 cm x 0.3 cm   Depth:to bone  Wound bed: fibrogranular   Drainage: purulence  Odor: none   Periwound: cellulitis   Etiology: chronic pressure/ peripheral neuropathy

## 2020-08-28 NOTE — PROGRESS NOTE ADULT - ASSESSMENT
70M with DM, with increased hyperglycemia while on steroids for thrombocytopenia with breakdown of superficial ulcer.   Medical history of  ICM EF 40%, CABG, HTN, DM II, HLD, pituitary tumor, paroxysmal atrial tachycardia, recently mostly in sinus rhythm, ICD   Podiatry noted that  L foot distal 2nd digit wound extends to bone, dactylitis, cellulitis extending to level of MPJ, scant purulence expressed.    osteomyelitis Left second toe 8/26: ESR/CRP = 35/2.95  - culture with Staph aureus = MSSA  DM2 poorly controlled  8/27: HgbA1C = 9.2  diabetic neuropathy  thrombocytopenia, on IVIG, on prednisone 40 mg daily    CKD  CHF  cirrhosis  HCC    L foot partial 2nd ray resection) cancelled due to low platelet count    Suggest  Discontinue Cefepime/ Vanco   Unasyn 3 gms every 8 hours - I ordered    will follow over weekend

## 2020-08-28 NOTE — PROGRESS NOTE ADULT - ASSESSMENT
70 m with    Toe cellulitis, possible Osteo  - antibiotics  - ID evaluation  - Podiatry evaluation   toe debridement held today dt  low platelets  - wound care    Diabetes Mellitus  - BS control  - ADA diet   - Endocrine evaluation    Afib  - rate control  - cardiology eval  - not AC 2 to low Platelets    AICD   - need for preop deactivation?    Anemia   - follow    Coronary artery disease  - continue Rx  - cardiology evaluation    Elevated prolactin level  - endocrine follow    CKD  - Nephrology evaluation  - follow Cr, lytes  - avoid nephrotoxic Rx    Elevated triglycerides  - continue Rx    Gout  - stable    HCC (hepatocellular carcinoma)  - stable    Heart failure with reduced left ventricular function last EF 3--35% 5/2019  - cardiology evaluation    HTN (hypertension)   - control    Hypothyroidism   - follow TSH    PVD (peripheral vascular disease)   - stable    Thrombocytopenia Chronic with superimposed ITP  - follow  - Hematology evaluation   - continue Prednisone and IVIG for 2 days  - may need preop transfusion    DVT prophylaxis  - PEBBLES Arrington MD pager [phone]

## 2020-08-29 NOTE — PROGRESS NOTE ADULT - ASSESSMENT
69 y/o M w/ L foot distal 2nd digit wound to bone  - pt seen and evaluated  - Bone scan consistent with osteomyelitis of the 2nd toe  - L foot distal 2nd digit wound to bone, dactylitis, cellulitis, scant purulence expressed but cellulitis localized to digit  - Continue IV Abx  - pod plan for L foot partial 2nd digit amputation  - pt thrombocytopenic, goal for surgery is 50k, however if foot continues to worsen and count not improving, will consider amputation at lower count - to reassess Monday  - Discussed with attending

## 2020-08-29 NOTE — PROGRESS NOTE ADULT - SUBJECTIVE AND OBJECTIVE BOX
Follow Up:  toe ulcer to bone    Interval History/ROS: feels fine    Allergies  No Known Allergies    ANTIMICROBIALS:  ampicillin/sulbactam  IVPB 3 every 8 hours      OTHER MEDS:  MEDICATIONS  (STANDING):  acetaminophen   Tablet .. 650 every 6 hours PRN  atorvastatin 40 at bedtime  carvedilol 12.5 every 12 hours  dextrose 40% Gel 15 once PRN  dextrose 50% Injectable 12.5 once  dextrose 50% Injectable 25 once  dextrose 50% Injectable 25 once  famotidine    Tablet 20 daily  furosemide    Tablet 40 daily  glucagon  Injectable 1 once PRN  insulin glargine Injectable (LANTUS) 40 at bedtime  insulin lispro (HumaLOG) corrective regimen sliding scale  three times a day before meals  insulin lispro (HumaLOG) corrective regimen sliding scale  at bedtime  insulin lispro Injectable (HumaLOG) 24 three times a day before meals  levothyroxine 125 daily  lisinopril 10 daily  niacin  at bedtime  predniSONE   Tablet 40 daily  spironolactone 25 daily      Vital Signs Last 24 Hrs  T(C): 36.4 (29 Aug 2020 08:52), Max: 36.6 (29 Aug 2020 05:11)  T(F): 97.5 (29 Aug 2020 08:52), Max: 97.8 (29 Aug 2020 05:11)  HR: 61 (29 Aug 2020 08:52) (59 - 73)  BP: 102/60 (29 Aug 2020 08:52) (96/59 - 122/77)  BP(mean): --  RR: 16 (29 Aug 2020 08:52) (16 - 17)  SpO2: 100% (29 Aug 2020 08:52) (99% - 100%)    PHYSICAL EXAM:  General: WN/WD NAD, Non-toxic  Neurology: A&Ox3, nonfocal  Respiratory: no resp distress  Abdominal:  non-distended,   Extremities: No edema, wearing socke  Line Sites: Clear  Skin: No rash                        9.6    3.81  )-----------( 23       ( 29 Aug 2020 06:12 )             28.8       08-29    132<L>  |  100  |  44<H>  ----------------------------<  111<H>  4.1   |  22  |  1.57<H>    Ca    9.1      29 Aug 2020 06:12    Sedimentation Rate, Erythrocyte (08.26.20 @ 14:15)    Sedimentation Rate, Erythrocyte: 35 mm/hr  C-Reactive Protein, Serum (08.26.20 @ 11:29)    C-Reactive Protein, Serum: 2.95 mg/dL      MICROBIOLOGY:  .Abscess left foot wound  08-26-20   Moderate Staphylococcus aureus  Moderate Alpha hemolytic strep "Susceptibilities not performed"  --  Staphylococcus aureus = MSSA      .Blood Blood-Peripheral  08-26-20   No growth to date.  --  --      .Blood Blood-Peripheral  08-26-20   No growth to date.  --  --      RADIOLOGY:  < from: NM Inflammatory Loc WBC, Single Area Single Day (08.27.20 @ 09:42) >  FINDINGS: There is a discrete focus of labeled leukocyte accumulation in the region of the second digit of the left foot distally. Small foci of radionuclide uptake are noted in the distal metatarsal regions of the first and second digits of the left foot best seen in the plantar projection.    There is focal radiopharmaceutical activity in the posterior nasopharyngeal region.    IMPRESSION: Abnormal Tc labeled leukocyte scan.    1. Findings compatible with osteomyelitis in the second digit of the left foot distally.    2. Probable soft tissue infection on the plantar region overlying the distal metatarsal regions of the first and second digits of the left foot.    3. Posterior nasopharyngeal infection versus bleeding. Please correlate clinically.    < end of copied text >      Gopi Grace MD; Division of Infectious Disease; Pager: [phone]; nights and weekends: [phone]

## 2020-08-29 NOTE — DISCHARGE NOTE NURSING/CASE MANAGEMENT/SOCIAL WORK - NSDCPNINST_GEN_ALL_CORE
if any uncontrollable bleeding, pain or signs of infection (fevers, chills, nausea/vomiting, drainage from incision sites) notify MD.

## 2020-08-29 NOTE — PROGRESS NOTE ADULT - SUBJECTIVE AND OBJECTIVE BOX
Medications:  acetaminophen   Tablet .. 650 milliGRAM(s) Oral every 6 hours PRN  ampicillin/sulbactam  IVPB 3 Gram(s) IV Intermittent every 8 hours  atorvastatin 40 milliGRAM(s) Oral at bedtime  carvedilol 12.5 milliGRAM(s) Oral every 12 hours  dextrose 40% Gel 15 Gram(s) Oral once PRN  dextrose 5%. 1000 milliLiter(s) IV Continuous <Continuous>  dextrose 50% Injectable 12.5 Gram(s) IV Push once  dextrose 50% Injectable 25 Gram(s) IV Push once  dextrose 50% Injectable 25 Gram(s) IV Push once  famotidine    Tablet 20 milliGRAM(s) Oral daily  furosemide    Tablet 40 milliGRAM(s) Oral daily  glucagon  Injectable 1 milliGRAM(s) IntraMuscular once PRN  insulin glargine Injectable (LANTUS) 40 Unit(s) SubCutaneous at bedtime  insulin lispro (HumaLOG) corrective regimen sliding scale   SubCutaneous three times a day before meals  insulin lispro (HumaLOG) corrective regimen sliding scale   SubCutaneous at bedtime  insulin lispro Injectable (HumaLOG) 24 Unit(s) SubCutaneous three times a day before meals  levothyroxine 125 MICROGram(s) Oral daily  lisinopril 10 milliGRAM(s) Oral daily  mupirocin 2% Ointment 1 Application(s) Topical two times a day  niacin  milliGRAM(s) Oral at bedtime  predniSONE   Tablet 40 milliGRAM(s) Oral daily  sodium bicarbonate 650 milliGRAM(s) Oral two times a day  spironolactone 25 milliGRAM(s) Oral daily    PMH/PSH/FH/SH: [ ] Unchanged  Vitals:  T(C): 36.4 (08-29-20 @ 08:52), Max: 36.6 (08-29-20 @ 05:11)  HR: 61 (08-29-20 @ 08:52) (59 - 73)  BP: 102/60 (08-29-20 @ 08:52) (96/59 - 122/77)  RR: 16 (08-29-20 @ 08:52) (16 - 17)  SpO2: 100% (08-29-20 @ 08:52) (99% - 100%)          Physical Exam:  Appearance: No acute distress; well appearing, sitting up and eating meal  Eyes: clear sclera, pink conjunctiva  HENT: NC/AT, MMM  Cardiovascular: RRR, S1, S2, no murmurs, rubs, or gallops; no edema; no JVD  Respiratory: Clear to auscultation bilaterally  Gastrointestinal: soft, non-tender, non-distended with normal bowel sounds  Musculoskeletal: No clubbing, no joint deformity, osteomyelitis left second toe.   Neurologic: Non-focal, able to move all 4 extremities spontaneously  Lymphatic: No lymphadenopathy  Psychiatry: AAOx3, mood & affect appropriate  Skin: Chronic discoloration of lower extremities b/l. No rashes, petechiae or cyanosis              LABS:                      9.6    3.81  )-----------( 23       ( 29 Aug 2020 06:12 )             28.8     08-29  132<L>  |  100  |  44<H>  ----------------------------<  111<H>  4.1   |  22  |  1.57<H>    Ca    9.1      29 Aug 2020 06:12    PT/INR - ( 28 Aug 2020 07:19 )   PT: 15.2 sec;   INR: 1.29 ratio    PTT - ( 28 Aug 2020 07:19 )  PTT:25.9 sec OOB to chair.  Alert, no distress.  Reports no cardiac sxs - no CP, palps or dyspnea       Medications:  acetaminophen   Tablet .. 650 milliGRAM(s) Oral every 6 hours PRN  ampicillin/sulbactam  IVPB 3 Gram(s) IV Intermittent every 8 hours  atorvastatin 40 milliGRAM(s) Oral at bedtime  carvedilol 12.5 milliGRAM(s) Oral every 12 hours  dextrose 40% Gel 15 Gram(s) Oral once PRN  dextrose 5%. 1000 milliLiter(s) IV Continuous <Continuous>  dextrose 50% Injectable 12.5 Gram(s) IV Push once  dextrose 50% Injectable 25 Gram(s) IV Push once  dextrose 50% Injectable 25 Gram(s) IV Push once  famotidine    Tablet 20 milliGRAM(s) Oral daily  furosemide    Tablet 40 milliGRAM(s) Oral daily  glucagon  Injectable 1 milliGRAM(s) IntraMuscular once PRN  insulin glargine Injectable (LANTUS) 40 Unit(s) SubCutaneous at bedtime  insulin lispro (HumaLOG) corrective regimen sliding scale   SubCutaneous three times a day before meals  insulin lispro (HumaLOG) corrective regimen sliding scale   SubCutaneous at bedtime  insulin lispro Injectable (HumaLOG) 24 Unit(s) SubCutaneous three times a day before meals  levothyroxine 125 MICROGram(s) Oral daily  lisinopril 10 milliGRAM(s) Oral daily  mupirocin 2% Ointment 1 Application(s) Topical two times a day  niacin  milliGRAM(s) Oral at bedtime  predniSONE   Tablet 40 milliGRAM(s) Oral daily  sodium bicarbonate 650 milliGRAM(s) Oral two times a day  spironolactone 25 milliGRAM(s) Oral daily    PMH/PSH/FH/SH:  Unchanged    ROS:  Unchanged    Vitals:  T(C): 36.4 (08-29-20 @ 08:52), Max: 36.6 (08-29-20 @ 05:11)  HR: 61 (08-29-20 @ 08:52) (59 - 73)  BP: 102/60 (08-29-20 @ 08:52) (96/59 - 122/77)  RR: 16 (08-29-20 @ 08:52) (16 - 17)  SpO2: 100% (08-29-20 @ 08:52) (99% - 100%)      Physical Exam:  Appearance: No distress; well appearing  Eyes: clear sclera, pink conjunctiva  HEENT:  Neg.  Cardiovascular:  RRR, Soft S1 & S2.  No murmur, rub, or gallop.  No edema; no JVD  Respiratory: Clear to auscultation bilaterally  Gastrointestinal: soft, non-tender, non-distended with normal bowel sounds  Musculoskeletal: No clubbing, no joint deformity, osteomyelitis left second toe.   Neurologic: Non-focal, able to move all 4 extremities spontaneously  Lymphatic: No lymphadenopathy  Psychiatry: AAOx3, mood & affect appropriate  Skin: Chronic discoloration of lower extremities b/l. No rashes, petechiae or cyanosis.  Foot wound dressed        LABS:                      9.6    3.81  )-----------( 23       ( 29 Aug 2020 06:12 )             28.8     08-29  132<L>  |  100  |  44<H>  ----------------------------<  111<H>  4.1   |  22  |  1.57<H>    Ca    9.1      29 Aug 2020 06:12    PT/INR - ( 28 Aug 2020 07:19 )   PT: 15.2 sec;   INR: 1.29 ratio    PTT - ( 28 Aug 2020 07:19 )  PTT:25.9 sec

## 2020-08-29 NOTE — PROGRESS NOTE ADULT - SUBJECTIVE AND OBJECTIVE BOX
Patient is a 70y old  Male who presents with a chief complaint of Toe ulcer (28 Aug 2020 20:38)       INTERVAL HPI/OVERNIGHT EVENTS:  Patient seen and evaluated at bedside.  Pt is resting comfortable in NAD. Denies N/V/F/C.      Allergies    No Known Allergies    Intolerances        Vital Signs Last 24 Hrs  T(C): 36.4 (29 Aug 2020 08:52), Max: 36.6 (29 Aug 2020 05:11)  T(F): 97.5 (29 Aug 2020 08:52), Max: 97.8 (29 Aug 2020 05:11)  HR: 61 (29 Aug 2020 08:52) (59 - 89)  BP: 102/60 (29 Aug 2020 08:52) (96/59 - 122/77)  BP(mean): --  RR: 16 (29 Aug 2020 08:52) (16 - 17)  SpO2: 100% (29 Aug 2020 08:52) (99% - 100%)    LABS:                        9.6    3.81  )-----------( 23       ( 29 Aug 2020 06:12 )             28.8     08-29    132<L>  |  100  |  44<H>  ----------------------------<  111<H>  4.1   |  22  |  1.57<H>    Ca    9.1      29 Aug 2020 06:12      PT/INR - ( 28 Aug 2020 07:19 )   PT: 15.2 sec;   INR: 1.29 ratio         PTT - ( 28 Aug 2020 07:19 )  PTT:25.9 sec    CAPILLARY BLOOD GLUCOSE      POCT Blood Glucose.: 132 mg/dL (29 Aug 2020 08:56)  POCT Blood Glucose.: 141 mg/dL (29 Aug 2020 08:47)  POCT Blood Glucose.: 261 mg/dL (28 Aug 2020 21:33)  POCT Blood Glucose.: 282 mg/dL (28 Aug 2020 18:26)  POCT Blood Glucose.: 320 mg/dL (28 Aug 2020 13:04)  POCT Blood Glucose.: 262 mg/dL (28 Aug 2020 09:39)      Lower Extremity Physical Exam:  Vasular: DP/PT 2/4, B/L, CFT < 3 seconds B/L, Temperature gradient  warm to cool, B/L.   Neuro: Epicritic sensation diminished to the level of ankle  B/L.  Musculoskeletal/Ortho: s/p partial digital amps 1-3 on R  Derm:  Wound #1:   Location: L foot distal 2nd digit wound   Size: 0.4 cm x 0.3 cm   Depth:to bone  Wound bed: fibrogranular   Drainage: purulence  Odor: none   Periwound: cellulitis localized distal to MPJ  Etiology: chronic pressure/ peripheral neuropathy     RADIOLOGY & ADDITIONAL TESTS:    < from: Xray Toes, Left Foot (08.26.20 @ 12:27) >    EXAM:  TOES(S) LEFT FOOT                            PROCEDURE DATE:  08/26/2020            INTERPRETATION:  Indication: Left second toe osteomyelitis.    TECHNIQUE: 3 views of left toes were obtained.    COMPARISON: None.    FINDINGS: There are hammertoe deformities. No significant cortical irregularity or erosion is seen to suggest osteomyelitis. There are degenerative changes in the left first toe.    IMPRESSION: Hammertoe deformities. No radiographic evidence for osteomyelitis of the left toes with attention to the left second toe. If clinically warranted, MRI may be performed for further evaluation.                  KE SHI M.D., ATTENDING RADIOLOGIST  This document has been electronically signed. Aug 26 2020 12:42PM        < end of copied text >

## 2020-08-29 NOTE — DISCHARGE NOTE NURSING/CASE MANAGEMENT/SOCIAL WORK - PATIENT PORTAL LINK FT
You can access the FollowMyHealth Patient Portal offered by [facility] by registering at the following website: [email or web address]. By joining [employer]’s FollowMyHealth portal, you will also be able to view your health information using other applications (apps) compatible with our system.

## 2020-08-29 NOTE — PROGRESS NOTE ADULT - ASSESSMENT
70M with DM, with increased hyperglycemia while on steroids for thrombocytopenia with breakdown of superficial ulcer.   Medical history of  ICM EF 40%, CABG, HTN, DM II, HLD, pituitary tumor, paroxysmal atrial tachycardia, recently mostly in sinus rhythm, ICD   Podiatry noted that  L foot distal 2nd digit wound extends to bone, dactylitis, cellulitis extending to level of MPJ, scant purulence expressed.    osteomyelitis Left second toe 8/26: ESR/CRP = 35/2.95  - culture with Staph aureus = MSSA  DM2 poorly controlled  8/27: HgbA1C = 9.2  diabetic neuropathy  thrombocytopenia, s/p IVIG x 2 days, on prednisone 40 mg daily  nplate 1 mcg/kg dosed on 8/27  CKD  CHF  cirrhosis  HCC    L foot partial 2nd ray resection) delayed  due to low platelet count    Antibiotics  Cefepime 8/26 -->28  Vanco  8/26 --> 28      Suggest  Continue Unasyn 3 gms every 8 hours

## 2020-08-29 NOTE — PROGRESS NOTE ADULT - SUBJECTIVE AND OBJECTIVE BOX
Patient is a 70y old  Male who presents with a chief complaint of toe osteo (29 Aug 2020 11:35)      INTERVAL HPI/OVERNIGHT EVENTS: noted  pt seen and examined  feels well      Vital Signs Last 24 Hrs  T(C): 36.4 (29 Aug 2020 21:37), Max: 36.6 (29 Aug 2020 05:11)  T(F): 97.5 (29 Aug 2020 21:37), Max: 97.8 (29 Aug 2020 05:11)  HR: 62 (29 Aug 2020 21:37) (59 - 71)  BP: 86/53 (29 Aug 2020 21:37) (86/53 - 122/77)  BP(mean): --  RR: 18 (29 Aug 2020 21:37) (16 - 18)  SpO2: 96% (29 Aug 2020 21:37) (96% - 100%)    acetaminophen   Tablet .. 650 milliGRAM(s) Oral every 6 hours PRN  ampicillin/sulbactam  IVPB 3 Gram(s) IV Intermittent every 8 hours  atorvastatin 40 milliGRAM(s) Oral at bedtime  carvedilol 12.5 milliGRAM(s) Oral every 12 hours  dextrose 40% Gel 15 Gram(s) Oral once PRN  dextrose 5%. 1000 milliLiter(s) IV Continuous <Continuous>  dextrose 50% Injectable 12.5 Gram(s) IV Push once  dextrose 50% Injectable 25 Gram(s) IV Push once  dextrose 50% Injectable 25 Gram(s) IV Push once  famotidine    Tablet 20 milliGRAM(s) Oral daily  furosemide    Tablet 40 milliGRAM(s) Oral daily  glucagon  Injectable 1 milliGRAM(s) IntraMuscular once PRN  insulin glargine Injectable (LANTUS) 38 Unit(s) SubCutaneous at bedtime  insulin lispro (HumaLOG) corrective regimen sliding scale   SubCutaneous three times a day before meals  insulin lispro (HumaLOG) corrective regimen sliding scale   SubCutaneous at bedtime  insulin lispro Injectable (HumaLOG) 24 Unit(s) SubCutaneous three times a day before meals  levothyroxine 125 MICROGram(s) Oral daily  lisinopril 10 milliGRAM(s) Oral daily  mupirocin 2% Ointment 1 Application(s) Topical two times a day  niacin  milliGRAM(s) Oral at bedtime  predniSONE   Tablet 40 milliGRAM(s) Oral daily  sodium bicarbonate 650 milliGRAM(s) Oral two times a day  spironolactone 25 milliGRAM(s) Oral daily      PHYSICAL EXAM:  GENERAL: NAD,   EYES: conjunctiva and sclera clear  ENMT: Moist mucous membranes  NECK: Supple, No JVD, Normal thyroid  CHEST/LUNG: non labored, cta b/l  HEART: Regular rate and rhythm; No murmurs, rubs, or gallops  ABDOMEN: Soft, Nontender, Nondistended; Bowel sounds present  EXTREMITIES:  2+ Peripheral Pulses, No clubbing, cyanosis, or edema  LYMPH: No lymphadenopathy noted  SKIN: No rashes or lesions    Consultant(s) Notes Reviewed:  [x ] YES  [ ] NO  Care Discussed with Consultants/Other Providers [ x] YES  [ ] NO    LABS:                        9.6    3.81  )-----------( 23       ( 29 Aug 2020 06:12 )             28.8     08-29    132<L>  |  100  |  44<H>  ----------------------------<  111<H>  4.1   |  22  |  1.57<H>    Ca    9.1      29 Aug 2020 06:12      PT/INR - ( 28 Aug 2020 07:19 )   PT: 15.2 sec;   INR: 1.29 ratio         PTT - ( 28 Aug 2020 07:19 )  PTT:25.9 sec    CAPILLARY BLOOD GLUCOSE      POCT Blood Glucose.: 428 mg/dL (29 Aug 2020 21:41)  POCT Blood Glucose.: 444 mg/dL (29 Aug 2020 18:23)  POCT Blood Glucose.: 393 mg/dL (29 Aug 2020 12:05)  POCT Blood Glucose.: 132 mg/dL (29 Aug 2020 08:56)  POCT Blood Glucose.: 141 mg/dL (29 Aug 2020 08:47)              RADIOLOGY & ADDITIONAL TESTS:    Imaging Personally Reviewed:  [x ] YES  [ ] NO

## 2020-08-29 NOTE — PROGRESS NOTE ADULT - ASSESSMENT
69 yo M with ICM EF 40%, s/p CABG, HTN, DM II, HLD, paroxysmal atrial tachycardia recently mostly in sinus rhythm, s/p ICD.  Admitted with osteomyelitis.   Receiving antibx. will require amputation of toe.        REC:  1. Surgical Risk Stratification    No objection to proceeding with surgery from a cardiac standpoint.  Patient classified as a high risk patient for low risk non-emergent surgery    No active chest pain or palpitations, moderate CAD risk factors      Unlimited exercise tolerance (> 4 METS)    No further cardiac testing or optimization required.    2.  Ischemic cardiomyopathy    Continue carvedilol dose 12.5 mg BID    Lisinopril 10 mg    Increase to spironolactone 25 mg BID    Furosemide 40 mg     Has not been on aspirin and no anticoagulation due to profound thrombocytopenia.    3.  Paroxysmal atrial tachycardia    manage by best treating ischemic cardiomyopathy and avoiding volume overload    no anticoagulation    4.  Paroxysmal ventricular tachycardia    Has ICD.    5.  Chronic thrombocytopenia    Preferable to not be on anticoagulation    Continue with prednisone and IVIG for ITP treatment, per Hematology recommendations, to further optimize for surgery.    Charly Major M.D. (Covering for Dr. Rivers)  Office: [phone] 69 yo M with ICM EF 40%, s/p CABG, HTN, DM II, HLD, paroxysmal atrial tachycardia currently mostly in sinus rhythm, s/p ICD.  Admitted with osteomyelitis.   Receiving antibx. will require amputation of toe.        REC:  1. Surgical Risk Stratification    No objection to proceeding with surgery from a cardiac standpoint.  Patient classified as a high risk patient for low risk non-emergent surgery    No active chest pain or palpitations, moderate CAD risk factors      Unlimited exercise tolerance (> 4 METS)    No further cardiac testing or optimization required.    2.  Ischemic cardiomyopathy    Continue carvedilol dose 12.5 mg BID    Lisinopril 10 mg    Increase to spironolactone 25 mg BID    Furosemide 40 mg     Has not been on aspirin and no anticoagulation due to profound thrombocytopenia.    3.  Paroxysmal atrial tachycardia    manage by best treating ischemic cardiomyopathy and avoiding volume overload    no anticoagulation    4.  Paroxysmal ventricular tachycardia    Has ICD.    5.  Chronic thrombocytopenia    Preferable to not be on anticoagulation    Continue with prednisone and IVIG for ITP treatment, per Hematology recommendations, to further optimize for surgery.      Charly Major M.D. (Covering for Dr. Rivers)  Office: [phone]

## 2020-08-29 NOTE — PROGRESS NOTE ADULT - ASSESSMENT
70 m with    Toe cellulitis, possible Osteo  - antibiotics  - ID fu  - Podiatry fu  toe debridement on hold dt thrombocytopenia  - wound care    Diabetes Mellitus  - BS control  - ADA diet   - Endocrine evaluation    Afib  - rate control  - cardiology eval  - not AC 2 to low Platelets    AICD   - need for preop deactivation?    Anemia   - follow    Coronary artery disease  - continue Rx  - cardiology evaluation    Elevated prolactin level  - endocrine follow    CKD  - Nephrology evaluation  - follow Cr, lytes  - avoid nephrotoxic Rx    Elevated triglycerides  - continue Rx    Gout  - stable    HCC (hepatocellular carcinoma)  - stable    Heart failure with reduced left ventricular function last EF 3--35% 5/2019  - cardiology evaluation    HTN (hypertension)   - control    Hypothyroidism   - follow TSH    PVD (peripheral vascular disease)   - stable    Thrombocytopenia Chronic with superimposed ITP  - Hematology cs fu   - continue Prednisone and IVIG for 2 days  - sp 1u platelets without good response    DVT prophylaxis  - [facility]  [phone]

## 2020-08-30 NOTE — PROGRESS NOTE ADULT - SUBJECTIVE AND OBJECTIVE BOX
OOB to chair.  Alert, no distress.  Reports no cardiac sxs - no CP, palps or dyspnea       Medications:  acetaminophen   Tablet .. 650 milliGRAM(s) Oral every 6 hours PRN  ampicillin/sulbactam  IVPB 3 Gram(s) IV Intermittent every 8 hours  atorvastatin 40 milliGRAM(s) Oral at bedtime  carvedilol 12.5 milliGRAM(s) Oral every 12 hours  dextrose 40% Gel 15 Gram(s) Oral once PRN  dextrose 5%. 1000 milliLiter(s) IV Continuous <Continuous>  dextrose 50% Injectable 12.5 Gram(s) IV Push once  dextrose 50% Injectable 25 Gram(s) IV Push once  dextrose 50% Injectable 25 Gram(s) IV Push once  famotidine    Tablet 20 milliGRAM(s) Oral daily  furosemide    Tablet 40 milliGRAM(s) Oral daily  glucagon  Injectable 1 milliGRAM(s) IntraMuscular once PRN  insulin glargine Injectable (LANTUS) 38 Unit(s) SubCutaneous at bedtime  insulin lispro (HumaLOG) corrective regimen sliding scale   SubCutaneous three times a day before meals  insulin lispro (HumaLOG) corrective regimen sliding scale   SubCutaneous at bedtime  insulin lispro Injectable (HumaLOG) 24 Unit(s) SubCutaneous three times a day before meals  levothyroxine 125 MICROGram(s) Oral daily  lisinopril 10 milliGRAM(s) Oral daily  mupirocin 2% Ointment 1 Application(s) Topical two times a day  niacin  milliGRAM(s) Oral at bedtime  predniSONE   Tablet 40 milliGRAM(s) Oral daily  sodium bicarbonate 650 milliGRAM(s) Oral two times a day  sodium chloride 0.9%. 1000 milliLiter(s) IV Continuous <Continuous>  spironolactone 25 milliGRAM(s) Oral daily    PMH/PSH/FH/SH:  Unchanged    ROS:  Unchanged    Vitals:  T(C): 36.3 (08-30-20 @ 09:09), Max: 36.4 (08-29-20 @ 13:22)  HR: 52 (08-30-20 @ 09:09) (52 - 74)  BP: 99/64 (08-30-20 @ 09:09) (86/53 - 112/73)  RR: 17 (08-30-20 @ 09:09) (17 - 18)  SpO2: 100% (08-30-20 @ 09:09) (96% - 100%)    Physical Exam:  Appearance: No distress; well appearing  Eyes: clear sclera, pink conjunctiva  HEENT:  Neg.  Cardiovascular:  RRR, Soft S1 & S2.  No murmur, rub, or gallop.  No edema; no JVD  Respiratory: Clear to auscultation bilaterally  Gastrointestinal: soft, non-tender, non-distended with normal bowel sounds  Musculoskeletal: No clubbing, no joint deformity, osteomyelitis left second toe.   Neurologic: Non-focal, able to move all 4 extremities spontaneously  Lymphatic: No lymphadenopathy  Psychiatry: AAOx3, mood & affect appropriate  Skin: Chronic discoloration of lower extremities b/l. No rashes, petechiae or cyanosis.  Foot wound dressed         I&O's Summary  29 Aug 2020 07:01  -  30 Aug 2020 07:00  --------------------------------------------------------  IN: 1870 mL / OUT: 0 mL / NET: 1870 mL    30 Aug 2020 07:01  -  30 Aug 2020 12:59  --------------------------------------------------------  IN: 480 mL / OUT: 0 mL / NET: 480 mL        LABS:                      9.1    3.36  )-----------( 21       ( 30 Aug 2020 06:45 )             27.9     08-30  135  |  102  |  58<H>  ----------------------------<  83  4.2   |  22  |  1.96<H>    Ca    8.7      30 Aug 2020 06:45 OOB to chair.  Alert, no distress.  Reports no cardiac sxs - no CP, palps or dyspnea     Medications:  acetaminophen   Tablet .. 650 milliGRAM(s) Oral every 6 hours PRN  ampicillin/sulbactam  IVPB 3 Gram(s) IV Intermittent every 8 hours  atorvastatin 40 milliGRAM(s) Oral at bedtime  carvedilol 12.5 milliGRAM(s) Oral every 12 hours  dextrose 40% Gel 15 Gram(s) Oral once PRN  dextrose 5%. 1000 milliLiter(s) IV Continuous <Continuous>  dextrose 50% Injectable 12.5 Gram(s) IV Push once  dextrose 50% Injectable 25 Gram(s) IV Push once  dextrose 50% Injectable 25 Gram(s) IV Push once  famotidine    Tablet 20 milliGRAM(s) Oral daily  furosemide    Tablet 40 milliGRAM(s) Oral daily  glucagon  Injectable 1 milliGRAM(s) IntraMuscular once PRN  insulin glargine Injectable (LANTUS) 38 Unit(s) SubCutaneous at bedtime  insulin lispro (HumaLOG) corrective regimen sliding scale   SubCutaneous three times a day before meals  insulin lispro (HumaLOG) corrective regimen sliding scale   SubCutaneous at bedtime  insulin lispro Injectable (HumaLOG) 24 Unit(s) SubCutaneous three times a day before meals  levothyroxine 125 MICROGram(s) Oral daily  lisinopril 10 milliGRAM(s) Oral daily  mupirocin 2% Ointment 1 Application(s) Topical two times a day  niacin  milliGRAM(s) Oral at bedtime  predniSONE   Tablet 40 milliGRAM(s) Oral daily  sodium bicarbonate 650 milliGRAM(s) Oral two times a day  sodium chloride 0.9%. 1000 milliLiter(s) IV Continuous <Continuous>  spironolactone 25 milliGRAM(s) Oral daily    PMH/PSH/FH/SH:  Unchanged    ROS:  Unchanged    Vitals:  T(C): 36.3 (08-30-20 @ 09:09), Max: 36.4 (08-29-20 @ 13:22)  HR: 52 (08-30-20 @ 09:09) (52 - 74)  BP: 99/64 (08-30-20 @ 09:09) (86/53 - 112/73)  RR: 17 (08-30-20 @ 09:09) (17 - 18)  SpO2: 100% (08-30-20 @ 09:09) (96% - 100%)    Physical Exam:  Appearance: No distress; well appearing  Eyes: clear sclera, pink conjunctiva  HEENT:  Neg.  Cardiovascular:  RRR, Soft S1 & S2.  No murmur, rub, or gallop.  No edema; no JVD  Respiratory: Clear to auscultation bilaterally  Gastrointestinal: soft, non-tender, non-distended with normal bowel sounds  Musculoskeletal: No clubbing, no joint deformity, osteomyelitis left second toe.   Neurologic: Non-focal, able to move all 4 extremities spontaneously  Lymphatic: No lymphadenopathy  Psychiatry: AAOx3, mood & affect appropriate  Skin: Chronic discoloration of lower extremities b/l. No rashes, petechiae or cyanosis.  Foot wound dressed         I&O's Summary  29 Aug 2020 07:01  -  30 Aug 2020 07:00  --------------------------------------------------------  IN: 1870 mL / OUT: 0 mL / NET: 1870 mL    30 Aug 2020 07:01  -  30 Aug 2020 12:59  --------------------------------------------------------  IN: 480 mL / OUT: 0 mL / NET: 480 mL        LABS:                      9.1    3.36  )-----------( 21       ( 30 Aug 2020 06:45 )             27.9     08-30  135  |  102  |  58<H>  ----------------------------<  83  4.2   |  22  |  1.96<H>    Ca    8.7      30 Aug 2020 06:45

## 2020-08-30 NOTE — PROGRESS NOTE ADULT - ASSESSMENT
70 m with    Toe cellulitis, possible Osteo  - antibiotics  - ID fu  - Podiatry fu  toe debridement on hold dt thrombocytopenia  - wound care    Diabetes Mellitus  - BS control  - ADA diet   - Endocrine evaluation    Afib  - rate control  - cardiology eval  - not AC 2 to low Platelets    AICD   - need for preop deactivation?    Anemia   - follow    Coronary artery disease  - continue Rx  - cardiology evaluation    Elevated prolactin level  - endocrine follow    CKD  - Nephrology evaluation  - follow Cr, lytes  - avoid nephrotoxic Rx    Elevated triglycerides  - continue Rx    Gout  - stable    HCC (hepatocellular carcinoma)  - stable    Heart failure with reduced left ventricular function last EF 3--35% 5/2019  - cardiology evaluation    HTN (hypertension)   - control    Hypothyroidism   - follow TSH    PVD (peripheral vascular disease)   - stable    Thrombocytopenia Chronic with superimposed ITP  - Hematology cs fu   - continue Prednisone   - sp 1u platelets without good response    DVT prophylaxis  - [facility]  [phone]

## 2020-08-30 NOTE — PROGRESS NOTE ADULT - ASSESSMENT
69 y/o M w/ L foot distal 2nd digit wound to bone  - pt seen and evaluated  - Bone scan consistent with osteomyelitis of the 2nd toe  - L foot distal 2nd digit wound to bone, dactylitis, cellulitis, scant purulence expressed but cellulitis localized to digit  - Continue IV Abx  - pod plan for L foot partial 2nd digit amputation  - pt thrombocytopenic, goal for surgery is 50k, however if foot continues to worsen and count not improving, will consider amputation at lower count - to reassess Monday & likely reschedule for this week  - Seen with attending

## 2020-08-30 NOTE — PROGRESS NOTE ADULT - SUBJECTIVE AND OBJECTIVE BOX
Patient is a 70y old  Male who presents with a chief complaint of toe osteo (29 Aug 2020 11:35)      INTERVAL HPI/OVERNIGHT EVENTS: noted  pt seen and examined      Vital Signs Last 24 Hrs  T(C): 36.4 (30 Aug 2020 17:44), Max: 36.5 (30 Aug 2020 13:27)  T(F): 97.5 (30 Aug 2020 17:44), Max: 97.7 (30 Aug 2020 13:27)  HR: 93 (30 Aug 2020 17:44) (52 - 93)  BP: 99/64 (30 Aug 2020 17:44) (86/53 - 112/73)  BP(mean): --  RR: 18 (30 Aug 2020 17:44) (17 - 18)  SpO2: 100% (30 Aug 2020 17:44) (96% - 100%)    acetaminophen   Tablet .. 650 milliGRAM(s) Oral every 6 hours PRN  ampicillin/sulbactam  IVPB 3 Gram(s) IV Intermittent every 8 hours  atorvastatin 40 milliGRAM(s) Oral at bedtime  carvedilol 12.5 milliGRAM(s) Oral every 12 hours  dextrose 40% Gel 15 Gram(s) Oral once PRN  dextrose 5%. 1000 milliLiter(s) IV Continuous <Continuous>  dextrose 50% Injectable 12.5 Gram(s) IV Push once  dextrose 50% Injectable 25 Gram(s) IV Push once  dextrose 50% Injectable 25 Gram(s) IV Push once  famotidine    Tablet 20 milliGRAM(s) Oral daily  furosemide    Tablet 40 milliGRAM(s) Oral daily  glucagon  Injectable 1 milliGRAM(s) IntraMuscular once PRN  insulin glargine Injectable (LANTUS) 38 Unit(s) SubCutaneous at bedtime  insulin lispro (HumaLOG) corrective regimen sliding scale   SubCutaneous three times a day before meals  insulin lispro (HumaLOG) corrective regimen sliding scale   SubCutaneous at bedtime  insulin lispro Injectable (HumaLOG) 28 Unit(s) SubCutaneous three times a day before meals  levothyroxine 125 MICROGram(s) Oral daily  lisinopril 10 milliGRAM(s) Oral daily  mupirocin 2% Ointment 1 Application(s) Topical two times a day  niacin  milliGRAM(s) Oral at bedtime  predniSONE   Tablet 40 milliGRAM(s) Oral daily  sodium bicarbonate 650 milliGRAM(s) Oral two times a day  sodium chloride 0.9%. 1000 milliLiter(s) IV Continuous <Continuous>  spironolactone 25 milliGRAM(s) Oral daily      PHYSICAL EXAM:  GENERAL: NAD,   EYES: conjunctiva and sclera clear  ENMT: Moist mucous membranes  NECK: Supple, No JVD, Normal thyroid  CHEST/LUNG: non labored, cta b/l  HEART: Regular rate and rhythm; No murmurs, rubs, or gallops  ABDOMEN: Soft, Nontender, Nondistended; Bowel sounds present  EXTREMITIES:  2+ Peripheral Pulses, No clubbing, cyanosis, or edema  LYMPH: No lymphadenopathy noted  SKIN: No rashes or lesions    Consultant(s) Notes Reviewed:  [x ] YES  [ ] NO  Care Discussed with Consultants/Other Providers [ x] YES  [ ] NO    LABS:                        9.1    3.36  )-----------( 21       ( 30 Aug 2020 06:45 )             27.9     08-30    135  |  102  |  58<H>  ----------------------------<  83  4.2   |  22  |  1.96<H>    Ca    8.7      30 Aug 2020 06:45          CAPILLARY BLOOD GLUCOSE      POCT Blood Glucose.: 305 mg/dL (30 Aug 2020 18:21)  POCT Blood Glucose.: 269 mg/dL (30 Aug 2020 11:30)  POCT Blood Glucose.: 131 mg/dL (30 Aug 2020 08:52)  POCT Blood Glucose.: 422 mg/dL (29 Aug 2020 21:55)  POCT Blood Glucose.: 428 mg/dL (29 Aug 2020 21:41)              RADIOLOGY & ADDITIONAL TESTS:    Imaging Personally Reviewed:  [x ] YES  [ ] NO

## 2020-08-30 NOTE — PROGRESS NOTE ADULT - ASSESSMENT
69 yo M with ICM EF 40%, s/p CABG, HTN, DM II, HLD, paroxysmal atrial tachycardia currently mostly in sinus rhythm, s/p ICD.  Admitted with osteomyelitis.   Receiving antibx. will require amputation of toe.        REC:  1. Surgical Risk Stratification    No objection to proceeding with surgery from a cardiac standpoint.  Patient classified as a high risk patient for low risk non-emergent surgery    No active chest pain or palpitations, moderate CAD risk factors      Unlimited exercise tolerance (> 4 METS)    No further cardiac testing or optimization required.    2.  Ischemic cardiomyopathy    Continue carvedilol dose 12.5 mg BID    Lisinopril 10 mg    Increase to spironolactone 25 mg BID    Furosemide 40 mg     Has not been on aspirin and no anticoagulation due to profound thrombocytopenia.    3.  Paroxysmal atrial tachycardia    manage by best treating ischemic cardiomyopathy and avoiding volume overload    no anticoagulation    4.  Paroxysmal ventricular tachycardia    Has ICD.    5.  Chronic thrombocytopenia    Preferable to not be on anticoagulation    Continue with prednisone and IVIG for ITP treatment, per Hematology recommendations, to further optimize for surgery.      Charly Major M.D. (Covering for Dr. Rivers)  Office: [phone] 69 yo M with ICM EF 40%, s/p CABG, HTN, DM II, HLD, paroxysmal atrial tachycardia currently mostly in sinus rhythm, s/p ICD.  Hx. of liver Ca, not currently on chemo.  Admitted with osteomyelitis.  Receiving antibx.; plan for for amputation of toe depending on platelet count.        REC:  1. Surgical risk stratification    No objection to surgery from a cardiac standpoint.  Patient classified as a high risk patient for low risk non-emergent surgery    No active chest pain or palpitations, moderate CAD risk factors      Unlimited exercise tolerance (> 4 METS)    No further cardiac testing or optimization required.    2.  Ischemic cardiomyopathy    Continue carvedilol dose 12.5 mg BID    Lisinopril 10 mg    Increase to spironolactone 25 mg BID    Furosemide 40 mg     Has not been on aspirin or anticoagulation due to profound thrombocytopenia.    3.  Paroxysmal atrial tachycardia    best managed by treating ischemic cardiomyopathy and avoiding volume overload    no anticoagulation    4.  Paroxysmal ventricular tachycardia    Has ICD.    5.  Chronic thrombocytopenia    Prednisone and IVIG for ITP treatment, per Hematology recommendations, to further optimize for surgery.      Charly Major M.D. (Covering for Dr. Rivers)  Office: [phone]

## 2020-08-30 NOTE — PROGRESS NOTE ADULT - SUBJECTIVE AND OBJECTIVE BOX
Patient is a 70y old  Male who presents with a chief complaint of toe osteo (29 Aug 2020 11:35)       INTERVAL HPI/OVERNIGHT EVENTS:  Patient seen and evaluated at bedside.  Pt is resting comfortable in NAD. Denies N/V/F/C.     Allergies    No Known Allergies    Intolerances        Vital Signs Last 24 Hrs  T(C): 36.3 (30 Aug 2020 09:09), Max: 36.4 (29 Aug 2020 13:22)  T(F): 97.4 (30 Aug 2020 09:09), Max: 97.6 (29 Aug 2020 13:22)  HR: 52 (30 Aug 2020 09:09) (52 - 74)  BP: 99/64 (30 Aug 2020 09:09) (86/53 - 112/73)  BP(mean): --  RR: 17 (30 Aug 2020 09:09) (17 - 18)  SpO2: 100% (30 Aug 2020 09:09) (96% - 100%)    LABS:                        9.1    3.36  )-----------( 21       ( 30 Aug 2020 06:45 )             27.9     08-30    135  |  102  |  58<H>  ----------------------------<  83  4.2   |  22  |  1.96<H>    Ca    8.7      30 Aug 2020 06:45          CAPILLARY BLOOD GLUCOSE      POCT Blood Glucose.: 131 mg/dL (30 Aug 2020 08:52)  POCT Blood Glucose.: 422 mg/dL (29 Aug 2020 21:55)  POCT Blood Glucose.: 428 mg/dL (29 Aug 2020 21:41)  POCT Blood Glucose.: 444 mg/dL (29 Aug 2020 18:23)  POCT Blood Glucose.: 393 mg/dL (29 Aug 2020 12:05)      Lower Extremity Physical Exam:  Vasular: DP/PT 2/4, B/L, CFT < 3 seconds B/L, Temperature gradient  warm to cool, B/L.   Neuro: Epicritic sensation diminished to the level of ankle  B/L.  Musculoskeletal/Ortho: s/p partial digital amps 1-3 on R  Derm:  Wound #1:   Location: L foot distal 2nd digit wound   Size: 0.4 cm x 0.3 cm   Depth:to bone  Wound bed: fibrogranular   Drainage: purulence  Odor: none   Periwound: cellulitis localized distal to MPJ  Etiology: chronic pressure/ peripheral neuropathy     RADIOLOGY & ADDITIONAL TESTS:

## 2020-08-30 NOTE — CHART NOTE - NSCHARTNOTEFT_GEN_A_CORE
Noted with persistently elevated BG yesterday which was expected as pt did not receive pre-meal Humalog for breakfast yesterday which caused rebound hyperglycemia which can take time to correct.  AM FS noted to be 131. Significant drop in BG from bedtime to this AM is due to the 8 units of Humalog correction that pt received at bedtime, so AM FS corrected appropriately.     Recommend increasing pre-meal Humalog to 30 units as pre-lunch FS above goal with 24 units of Humalog given pre-breakfast. Noted with persistently elevated BG yesterday which was expected as pt did not receive pre-meal Humalog for breakfast yesterday which caused rebound hyperglycemia which can take time to correct.  AM FS noted to be 131. Significant drop in BG from bedtime to this AM is due to the 8 units of Humalog correction that pt received at bedtime, so AM FS corrected appropriately.     Recommend increasing pre-meal Humalog to 28 units as pre-lunch FS above goal with 24 units of Humalog given pre-breakfast. Noted with persistently elevated BG yesterday which was expected as pt did not receive pre-meal Humalog for breakfast yesterday which caused rebound hyperglycemia which can take time to correct.  AM FS noted to be 131. Significant drop in BG from bedtime to this AM is due to the 8 units of Humalog correction that pt received at bedtime, so AM FS corrected appropriately.     Recommend increasing pre-meal Humalog to 28 units as pre-lunch FS above goal with 24 units of Humalog given pre-breakfast.    Inform Endocrine of any changes to steroid regimen.     Jacinta Suarez DO, Endocrine Fellow   Pager [phone]. from 9-5PM. After hours and on weekends please call [phone].

## 2020-08-31 NOTE — PROVIDER CONTACT NOTE (OTHER) - ACTION/TREATMENT ORDERED:
NP aware, as per NP hold doses at this time and reassess.
NP aware, monitor patient and next FS check to be done at 6am.
made Alexis NIXON aware, will continue to monitor, and to give lispro insulin as order regardless of patient FS,
repeated FS and was 132, made Alexis NP aware will hold lispo insulin until NP speak to endo for recommendation.

## 2020-08-31 NOTE — PROGRESS NOTE ADULT - ASSESSMENT
71 y/o M w/ L foot distal 2nd digit wound to bone  - pt seen and evaluated  - Bone scan consistent with osteomyelitis of the 2nd toe  - L foot distal 2nd digit wound to bone, dactylitis, mild improvement in cellulitis localized to digit, scant purulence expressed  - Continue IV Abx  - pod plan for L foot partial 2nd digit amputation  - pt thrombocytopenic, goal for surgery is 50k, however if foot continues to worsen and count not improving, will consider amputation at lower count   - Pod plan to book for Left foot partial 2nd ray resection on Wednesday 9/2 time TBD   - Please document medical optimization for sedation with local anesthesia   - Seen with attending 71 y/o M w/ L foot distal 2nd digit wound to bone  - pt seen and evaluated  - Bone scan consistent with osteomyelitis of the 2nd toe  - L foot distal 2nd digit wound to bone, dactylitis, mild improvement in cellulitis localized to digit, scant purulence expressed  - Continue IV Abx  - pod plan for L foot partial 2nd digit amputation  - pt thrombocytopenic, goal for surgery is 50k, however if foot continues to worsen and count not improving, will consider amputation at lower count   - Pod plan to book for Left foot partial 2nd ray resection on Wednesday 9/2 @2PM   - Please document medical optimization for sedation with local anesthesia   - Seen with attending

## 2020-08-31 NOTE — PROGRESS NOTE ADULT - ASSESSMENT
70 year old male with extensive history, significant for CKD (baseline creatinine in the last 6 months ~1.8-2.1) Ischemic Cardiomyopathy s/p AICD, HCC, Pituitary Adenoma with Hyperprolactinemia/Hypothyroidism, PVD complicated by osteomyelitis s/p multiple toe amputations, Anemia, T2DM, HTN, HLD, Gout, Sleep Apnea, Afib, recently treated for ITP with prednisone, who presented to the hospital with worsening pain, swelling, and redness of the left 2nd toe. Nephrology consulted for management of CKD.    Chronic kidney disease, stage 3  Patient with CKD likely from DM with multiple episodes of JULIANNE over the last few months (ACE/ARB in conjunction with diuretic use with contrast exposures). Baseline creatinine ~1.8-2.1. Creatinine currently 2.1.   - Avoid nephrotoxic agents, HOLD metformin for now, renally dose all medications per eGFR and optimize perfusion pressure.       Hyperkalemia 2/2 hyperglycemia, resolved. Currently serum K 4.0.   - Continue to trend and control hyperglycemia.    Hyponatremia 2/2 iatrogenic from D5W fluid, resolved  - monitor BMP 70M PMHx CKD3 (1.8-2.1), ICM s/p AICD, PVD complicated by osteomyelitis s/p multiple toe amputations, recently treated for ITP with prednisone,present with L toe pain - admitted for cellulitis found to have osteomyelitis pending platelet improvement.  Nephrology consulted for management of CKD.      Chronic kidney disease, stage 3  Patient with CKD likely from DM with multiple episodes of JULIANNE over the last few months (ACE/ARB in conjunction with diuretic use with contrast exposures). Baseline creatinine ~1.8-2.1. Creatinine currently 2.1.   - Avoid nephrotoxic agents, HOLD metformin for now, renally dose all medications per eGFR and optimize perfusion pressure.       Hyperkalemia 2/2 hyperglycemia, resolved. Currently serum K 4.0.   - Continue to trend and control hyperglycemia.    Hyponatremia 2/2 iatrogenic from D5W fluid, resolved  - monitor BMP 70M PMHx CKD3 (1.8-2.1), ICM s/p AICD, PVD complicated by osteomyelitis s/p multiple toe amputations, recently treated for ITP with prednisone,present with L toe pain - admitted for cellulitis found to have osteomyelitis pending platelet improvement.  Nephrology consulted for management of CKD.      Chronic kidney disease, stage 3  Patient with CKD likely from DM with multiple episodes of JULIANNE over the last few months (ACE/ARB in conjunction with diuretic use with contrast exposures). Baseline creatinine ~1.8-2.1. Creatinine currently 2.1.   - Avoid nephrotoxic agents, HOLD metformin for now, renally dose all medications per eGFR and optimize perfusion pressure.  - Please check renal duplex ultrasound to rule out renovascular disease       Hyperkalemia 2/2 hyperglycemia, resolved. Currently serum K 4.0.   - Continue to trend and control hyperglycemia.    Hyponatremia 2/2 iatrogenic from D5W fluid, resolved  - monitor BMP

## 2020-08-31 NOTE — PROGRESS NOTE ADULT - SUBJECTIVE AND OBJECTIVE BOX
INTERVAL EVENTS:  No acute events overnight. Pt's plts are 19 today. Denies any fever, chills, night sweat, CP, SOB, abd pain, constipation, diarrhea, dysuria    Vital Signs Last 24 Hrs  T(C): 36.6 (31 Aug 2020 16:47), Max: 36.8 (31 Aug 2020 05:33)  T(F): 97.9 (31 Aug 2020 16:47), Max: 98.3 (31 Aug 2020 05:33)  HR: 69 (31 Aug 2020 16:47) (56 - 93)  BP: 110/68 (31 Aug 2020 16:47) (90/61 - 110/68)  BP(mean): --  RR: 18 (31 Aug 2020 16:47) (17 - 18)  SpO2: 99% (31 Aug 2020 16:47) (98% - 100%)      PHYSICAL EXAM:   GENERAL: no acute distress  HEENT: EOMI, neck supple  RESPIRATORY: LCTAB/L, no rhonchi, rales, or wheezing  CARDIOVASCULAR: RRR, no murmurs, gallops, rubs  ABDOMINAL: soft, non-tender, non-distended, positive bowel sounds   EXTREMITIES: no clubbing, cyanosis, or edema  NEUROLOGICAL: alert and oriented x 3, non-focal  SKIN: no rashes or lesions   MUSCULOSKELETAL: +left lower foot wrapped in bandage                          8.8    2.90  )-----------( 19       ( 31 Aug 2020 06:58 )             27.1     08-31    135  |  103  |  67<H>  ----------------------------<  121<H>  4.0   |  23  |  2.18<H>    Ca    8.4      31 Aug 2020 06:59          MEDICATIONS  (STANDING):  ampicillin/sulbactam  IVPB 3 Gram(s) IV Intermittent every 8 hours  atorvastatin 40 milliGRAM(s) Oral at bedtime  carvedilol 12.5 milliGRAM(s) Oral every 12 hours  dextrose 5%. 1000 milliLiter(s) (50 mL/Hr) IV Continuous <Continuous>  dextrose 50% Injectable 12.5 Gram(s) IV Push once  dextrose 50% Injectable 25 Gram(s) IV Push once  dextrose 50% Injectable 25 Gram(s) IV Push once  famotidine    Tablet 20 milliGRAM(s) Oral daily  furosemide    Tablet 40 milliGRAM(s) Oral daily  insulin glargine Injectable (LANTUS) 38 Unit(s) SubCutaneous at bedtime  insulin lispro (HumaLOG) corrective regimen sliding scale   SubCutaneous three times a day before meals  insulin lispro (HumaLOG) corrective regimen sliding scale   SubCutaneous at bedtime  insulin lispro Injectable (HumaLOG) 40 Unit(s) SubCutaneous three times a day before meals  levothyroxine 125 MICROGram(s) Oral daily  lisinopril 10 milliGRAM(s) Oral daily  mupirocin 2% Ointment 1 Application(s) Topical two times a day  niacin  milliGRAM(s) Oral at bedtime  predniSONE   Tablet 40 milliGRAM(s) Oral daily  sodium bicarbonate 650 milliGRAM(s) Oral two times a day  sodium chloride 0.9%. 1000 milliLiter(s) (75 mL/Hr) IV Continuous <Continuous>  spironolactone 25 milliGRAM(s) Oral daily

## 2020-08-31 NOTE — PROGRESS NOTE ADULT - ASSESSMENT
70 m with    Toe cellulitis, possible Osteo  - antibiotics  - ID fu  - Podiatry fu  toe debridement on hold dt thrombocytopenia  - wound care    Diabetes Mellitus  - BS control  - ADA diet   - Endocrine evaluation    Afib  - rate control  - cardiology eval  - not AC 2 to low Platelets    AICD   - need for preop deactivation?    Anemia   - follow    Coronary artery disease  - continue Rx  - cardiology evaluation    Elevated prolactin level  - endocrine follow    CKD  - Nephrology evaluation  - follow Cr, lytes  - avoid nephrotoxic Rx    Elevated triglycerides  - continue Rx    Gout  - stable    HCC (hepatocellular carcinoma)  - stable    Heart failure with reduced left ventricular function last EF 3--35% 5/2019  - cardiology evaluation    HTN (hypertension)   - control    Hypothyroidism   - follow TSH    PVD (peripheral vascular disease)   - stable    Thrombocytopenia Chronic with superimposed ITP  - Hematology cs fu   - continue Prednisone   - sp 1u platelets without good response  Nplate given today    DVT prophylaxis  - [facility]  [phone]

## 2020-08-31 NOTE — PROGRESS NOTE ADULT - SUBJECTIVE AND OBJECTIVE BOX
Podiatry pager #: [phone] ([place])/ [phone] ([facility])    Patient is a 70y old  Male who presents with a chief complaint of toe osteo (29 Aug 2020 11:35)       INTERVAL HPI/OVERNIGHT EVENTS:  Patient seen and evaluated at bedside.  Pt is resting comfortable in NAD. Denies N/V/F/C.     Allergies    No Known Allergies    Intolerances        Vital Signs Last 24 Hrs  T(C): 36.6 (31 Aug 2020 10:20), Max: 36.8 (31 Aug 2020 05:33)  T(F): 97.9 (31 Aug 2020 10:20), Max: 98.3 (31 Aug 2020 05:33)  HR: 66 (31 Aug 2020 10:20) (56 - 93)  BP: 90/61 (31 Aug 2020 10:20) (90/60 - 104/59)  BP(mean): --  RR: 18 (31 Aug 2020 10:20) (17 - 18)  SpO2: 99% (31 Aug 2020 10:20) (98% - 100%)    LABS:                        8.8    2.90  )-----------( 19       ( 31 Aug 2020 06:58 )             27.1     08-31    135  |  103  |  67<H>  ----------------------------<  121<H>  4.0   |  23  |  2.18<H>    Ca    8.4      31 Aug 2020 06:59          CAPILLARY BLOOD GLUCOSE      POCT Blood Glucose.: 147 mg/dL (31 Aug 2020 08:37)  POCT Blood Glucose.: 391 mg/dL (30 Aug 2020 21:55)  POCT Blood Glucose.: 305 mg/dL (30 Aug 2020 18:21)      Lower Extremity Physical Exam:  Vascular: DP/PT 2/4, B/L, CFT < 3 seconds B/L, Temperature gradient  warm to cool, B/L.   Neuro: Epicritic sensation diminished to the level of ankle  B/L.  Musculoskeletal/Ortho: s/p partial digital amps 1-3 on R  Derm: erythema improved   Wound #1:   Location: L foot distal 2nd digit wound   Size: 0.4 cm x 0.3 cm   Depth:to bone  Wound bed: fibrogranular   Drainage: purulence  Odor: none   Periwound: cellulitis localized distal to MPJ  Etiology: chronic pressure/ peripheral neuropathy       RADIOLOGY & ADDITIONAL TESTS:

## 2020-08-31 NOTE — PROGRESS NOTE ADULT - ASSESSMENT
71 y/o male with hx of HCC s/p liver directed therapy with stable disease, chronic thrombocytopenia, poorly controlled IDDM, multiple amputations of toes, heart failure s/p AICD placement, CKD and HLD presenting with worsening infection of left foot, planned for digit amputation of 2nd digit.     #Thrombocytopenia  - had baseline thrombocytopenia likely related to splenomegaly/cirrhosis with counts about 40-50k  - now seems to have superimposed ITP with lowering of counts to 10-20k  - started on prednisone 40 mg over past 3 weeks with slight improvement to about 20k   - completed two days of IVIG   - attempted transfusion without response   - pt was started on Nplate 1 mcg/kg on 8/27,  will increase the dose to 2mcg/kg as pt's plt are still low  - continue prednisone 40 mg daily   - recent bone marrow without inherited bone marrow process to contribute   -  podiatry plan to perform digit amputation on Wed (9/2). Pt platelets are still low, can consider performing the procedure with platelet infusion at the same time    #HCC  - s/p liver directed therapy with stable disease  - outpatient f/u with primary oncologist Dr. Fredy Llamas Chi, MD. PGY 6  Heme-Onc fellow  [phone]; [phone]

## 2020-08-31 NOTE — CHART NOTE - NSCHARTNOTEFT_GEN_A_CORE
Called by the nurse to report PLT 19 on prednisone 40mg daily. S/P Hematology consult for thrombocytopenia/ITP. Patient has been given platelets transfusion w/o response in the past. Ptient is asymptomatic, will continue to trend CBC daily and f/u with hematology. Will d/w Dr Swan.

## 2020-08-31 NOTE — PROVIDER CONTACT NOTE (OTHER) - ASSESSMENT
BP 97/60 HR 61, pt ordered for PO lasix, lisinopril, & aldactone this AM. Pt up in room independently at this time.
Pt is asymptomatic, VSS, no complaints of symptoms of hyperglycemia
pt asymptomatic
pt asymptomatic

## 2020-08-31 NOTE — PROGRESS NOTE ADULT - ASSESSMENT
70M with DM, with increased hyperglycemia while on steroids for thrombocytopenia with breakdown of superficial ulcer.   Medical history of  ICM EF 40%, CABG, HTN, DM II, HLD, pituitary tumor, paroxysmal atrial tachycardia, recently mostly in sinus rhythm, ICD   Podiatry noted that  L foot distal 2nd digit wound extends to bone, dactylitis, cellulitis extending to level of MPJ, scant purulence expressed.    osteomyelitis Left second toe 8/26: ESR/CRP = 35/2.95  - culture with Staph aureus = MSSA  DM2 poorly controlled  8/27: HgbA1C = 9.2  diabetic neuropathy  thrombocytopenia, s/p IVIG x 2 days, on prednisone 40 mg daily  nplate 1 mcg/kg dosed on 8/27, dose increased  CKD  CHF  cirrhosis  HCC    L foot partial 2nd ray resection) delayed  due to low platelet count    Antibiotics  Cefepime 8/26 -->28  Vanco  8/26 --> 28  Unasyn 8/28-->      Suggest  Continue Unasyn 3 gms every 8 hours  toe amputation possibly on 9/2

## 2020-08-31 NOTE — PROGRESS NOTE ADULT - SUBJECTIVE AND OBJECTIVE BOX
[facility] DIVISION OF KIDNEY DISEASES AND HYPERTENSION   -- FOLLOW UP NOTE --   Holli Tomlin  Nephrology Fellow  Pager NS: [phone]   /  Pager LIKARINE: [phone]  (after 5pm or weekend please page the on-call fellow)  --------------------------------------------------------------------------------  24 hour events/subjective:  - overnight hypotensive 90s/60s thereby held BP medications, afebrile, unsaved voids  - patient seen and examined at bedside this morning without complaints sitting comfortably in chair  - vitals/lab/medications reviewed, noted for mild uptrend sCr 1.9 to 2.1    PAST HISTORY  --------------------------------------------------------------------------------  No significant changes to PMH, PSH, FHx, SHx, unless otherwise noted    ALLERGIES & MEDICATIONS  --------------------------------------------------------------------------------  Allergies    No Known Allergies    Intolerances    Standing Inpatient Medications  ampicillin/sulbactam  IVPB 3 Gram(s) IV Intermittent every 8 hours  atorvastatin 40 milliGRAM(s) Oral at bedtime  carvedilol 12.5 milliGRAM(s) Oral every 12 hours  dextrose 5%. 1000 milliLiter(s) IV Continuous <Continuous>  dextrose 50% Injectable 12.5 Gram(s) IV Push once  dextrose 50% Injectable 25 Gram(s) IV Push once  dextrose 50% Injectable 25 Gram(s) IV Push once  famotidine    Tablet 20 milliGRAM(s) Oral daily  furosemide    Tablet 40 milliGRAM(s) Oral daily  insulin glargine Injectable (LANTUS) 38 Unit(s) SubCutaneous at bedtime  insulin lispro (HumaLOG) corrective regimen sliding scale   SubCutaneous three times a day before meals  insulin lispro (HumaLOG) corrective regimen sliding scale   SubCutaneous at bedtime  insulin lispro Injectable (HumaLOG) 28 Unit(s) SubCutaneous three times a day before meals  levothyroxine 125 MICROGram(s) Oral daily  lisinopril 10 milliGRAM(s) Oral daily  mupirocin 2% Ointment 1 Application(s) Topical two times a day  niacin  milliGRAM(s) Oral at bedtime  predniSONE   Tablet 40 milliGRAM(s) Oral daily  sodium bicarbonate 650 milliGRAM(s) Oral two times a day  sodium chloride 0.9%. 1000 milliLiter(s) IV Continuous <Continuous>  spironolactone 25 milliGRAM(s) Oral daily    PRN Inpatient Medications  acetaminophen   Tablet .. 650 milliGRAM(s) Oral every 6 hours PRN  dextrose 40% Gel 15 Gram(s) Oral once PRN  glucagon  Injectable 1 milliGRAM(s) IntraMuscular once PRN      REVIEW OF SYSTEMS  --------------------------------------------------------------------------------  Gen: no fever, chills, weakness  Respiratory: No dyspnea, cough  CV: No chest pain, orthopnea  GI: No abdominal pain, nausea, vomiting, diarrhea  MSK: no edema  Neuro: No dizziness, lightheadedness  Heme: No bleeding  All other systems were reviewed and are negative, except as noted.    VITALS/PHYSICAL EXAM  --------------------------------------------------------------------------------  T(C): 36.6 (08-31-20 @ 10:20), Max: 36.8 (08-31-20 @ 05:33)  HR: 66 (08-31-20 @ 10:20) (56 - 93)  BP: 90/61 (08-31-20 @ 10:20) (90/60 - 104/59)  RR: 18 (08-31-20 @ 10:20) (17 - 18)  SpO2: 99% (08-31-20 @ 10:20) (98% - 100%)  Wt(kg): --    08-30-20 @ 07:01  -  08-31-20 @ 07:00  --------------------------------------------------------  IN: 1355 mL / OUT: 0 mL / NET: 1355 mL    08-31-20 @ 07:01  -  08-31-20 @ 11:31  --------------------------------------------------------  IN: 240 mL / OUT: 0 mL / NET: 240 mL    Physical Exam:              Gen: NAD, well-appearing on room air  	HEENT: moist mucous membrane  	Pulm: CTA B/L, no crackles  	CV: RRR, S1S2; no rub/murmur  	GI: +BS, soft, nontender/nondistended  	: No suprapubic tenderness  	MSK: no edema, b/l lower ext chronic venous changes, partial amp R digits 1-3              Neuro: AAOx3  	Psych: Normal affect and mood    LABS/STUDIES  --------------------------------------------------------------------------------              8.8    2.90  >-----------<  19       [08-31-20 @ 06:58]              27.1     135  |  103  |  67  ----------------------------<  121      [08-31-20 @ 06:59]  4.0   |  23  |  2.18        Ca     8.4     [08-31-20 @ 06:59]            Creatinine Trend:  SCr 2.18 [08-31 @ 06:59]  SCr 1.96 [08-30 @ 06:45]  SCr 1.57 [08-29 @ 06:12]  SCr 1.73 [08-28 @ 07:19]  SCr 1.96 [08-27 @ 06:56]        HbA1c 8.1      [03-31-20 @ 13:59]  TSH 3.28      [08-27-20 @ 08:25]

## 2020-08-31 NOTE — PROGRESS NOTE ADULT - ASSESSMENT
69 y/o male with hx of liver ca (not on active chemotherapy), recently diagnosed ITP with thrombocytopenia, T2DM c/b multiple amputations of toes/PAF/CAD, hypothyroidism, pituitary adenoma, HF with ICD placement, HLD, presenting for 2nd toe on left foot ulceration and infection x few days. Patient was seen by his outpatient podiatrist who recommended visiting the ED at this time for abx and admission. Endocrinology consulted for hyperglycemia and T2DM management.          Problem/Recommendation - 1:  Problem: Type 2 diabetes mellitus with stage 4 chronic kidney disease, with long-term current use of insulin. Recommendation: hyperglycemia second to steroids. Prior to starting steroids, DM fairly well controlled (HbA1C 8.1 in March 2020). Remains on prednisone 40mg daily with no plan for taper at this time. BG remains above goal.  - increase Lantus to 40 units qhs  - increase Humalog to 24 units TID pre-meal  - moderate correction scales ac and hs  - CC diet  - check FS ac/hs  - please notify endocrine if any changes to steroid regimen  Discharge  - plan TBD based on insulin requirements and steroid plan at discharge.     Problem/Recommendation - 2:  ·  Problem: Diabetic toe ulcer.  Recommendation: management per primary and podiatry team  outpatient f/u with Podiatry.      Problem/Recommendation - 3:  ·  Problem: Hyperlipidemia, unspecified hyperlipidemia type.  Recommendation: - agree with high intensity statin.      Problem/Recommendation - 4:  ·  Problem: Hypertension, unspecified type.  Recommendation: - BP controlled on lisinopril. Goal BP <130/70.      Problem/Recommendation - 5:  ·  Problem: Hypothyroidism, unspecified type.  Recommendation: - continue LT4 125mcg daily on M,Tu,W,Th,F,Sun. LT4 250mcg on saturdays  - states pt takes "thyrozene" on home med list which is a thyroid herbal supplement. Recommend switching to levothyroxine or synthroid on discharge  - check TFTs outpatient.      Problem/Recommendation - 6:  Problem: Hyperprolactinemia. Recommendation: - on cabergoline 0.5 weekly. Given low prolactin decrease dose to 0.25mg weekly.      Jacinta Suarez DO, Endocrine Fellow   Pager [phone]. from 9-5PM. After hours and on weekends please call [phone].

## 2020-08-31 NOTE — PROGRESS NOTE ADULT - SUBJECTIVE AND OBJECTIVE BOX
F/u on: T2D    S: Patient slated to go to the OR on Wednesday for L 2MT amputation. He is ok with it as he has lost other toes before.   For lunch he ate 95% of his salad, sugar free pudding and fruit cup.       MEDICATIONS  (STANDING):  ampicillin/sulbactam  IVPB 3 Gram(s) IV Intermittent every 8 hours  atorvastatin 40 milliGRAM(s) Oral at bedtime  carvedilol 12.5 milliGRAM(s) Oral every 12 hours  dextrose 5%. 1000 milliLiter(s) (50 mL/Hr) IV Continuous <Continuous>  dextrose 50% Injectable 12.5 Gram(s) IV Push once  dextrose 50% Injectable 25 Gram(s) IV Push once  dextrose 50% Injectable 25 Gram(s) IV Push once  famotidine    Tablet 20 milliGRAM(s) Oral daily  furosemide    Tablet 40 milliGRAM(s) Oral daily  insulin glargine Injectable (LANTUS) 38 Unit(s) SubCutaneous at bedtime  insulin lispro (HumaLOG) corrective regimen sliding scale   SubCutaneous three times a day before meals  insulin lispro (HumaLOG) corrective regimen sliding scale   SubCutaneous at bedtime  insulin lispro Injectable (HumaLOG) 40 Unit(s) SubCutaneous three times a day before meals  levothyroxine 125 MICROGram(s) Oral daily  lisinopril 10 milliGRAM(s) Oral daily  mupirocin 2% Ointment 1 Application(s) Topical two times a day  niacin  milliGRAM(s) Oral at bedtime  predniSONE   Tablet 40 milliGRAM(s) Oral daily  romiPLOStim Injectable 170 MICROGram(s) SubCutaneous once  sodium bicarbonate 650 milliGRAM(s) Oral two times a day  sodium chloride 0.9%. 1000 milliLiter(s) (75 mL/Hr) IV Continuous <Continuous>  spironolactone 25 milliGRAM(s) Oral daily    MEDICATIONS  (PRN):  acetaminophen   Tablet .. 650 milliGRAM(s) Oral every 6 hours PRN Temp greater or equal to 38.5C (101.3F), Mild Pain (1 - 3)  dextrose 40% Gel 15 Gram(s) Oral once PRN Blood Glucose LESS THAN 70 milliGRAM(s)/deciliter  glucagon  Injectable 1 milliGRAM(s) IntraMuscular once PRN Glucose LESS THAN 70 milligrams/deciliter      PHYSICAL EXAM:  VITALS: T(C): 36.4 (08-31-20 @ 14:37)  T(F): 97.6 (08-31-20 @ 14:37), Max: 98.3 (08-31-20 @ 05:33)  HR: 63 (08-31-20 @ 14:37) (56 - 93)  BP: 106/53 (08-31-20 @ 14:37) (90/61 - 106/53)  RR:  (17 - 18)  SpO2:  (98% - 100%)  Wt(kg): --  GENERAL: NAD, well-groomed, well-developed  RESPIRATORY: Clear to auscultation bilaterally; No rales, rhonchi, wheezing, or rubs  CARDIOVASCULAR: Regular rate and rhythm; No murmurs  GI: Soft, nontender, non distended, normal bowel sounds  PSYCH: reactive affect, euthymic mood      POCT Blood Glucose.: 326 mg/dL (08-31-20 @ 12:18)  POCT Blood Glucose.: 147 mg/dL (08-31-20 @ 08:37)  POCT Blood Glucose.: 391 mg/dL (08-30-20 @ 21:55)  POCT Blood Glucose.: 305 mg/dL (08-30-20 @ 18:21)  POCT Blood Glucose.: 269 mg/dL (08-30-20 @ 11:30)  POCT Blood Glucose.: 131 mg/dL (08-30-20 @ 08:52)  POCT Blood Glucose.: 422 mg/dL (08-29-20 @ 21:55)  POCT Blood Glucose.: 428 mg/dL (08-29-20 @ 21:41)  POCT Blood Glucose.: 444 mg/dL (08-29-20 @ 18:23)  POCT Blood Glucose.: 393 mg/dL (08-29-20 @ 12:05)  POCT Blood Glucose.: 132 mg/dL (08-29-20 @ 08:56)  POCT Blood Glucose.: 141 mg/dL (08-29-20 @ 08:47)  POCT Blood Glucose.: 261 mg/dL (08-28-20 @ 21:33)  POCT Blood Glucose.: 282 mg/dL (08-28-20 @ 18:26)                            8.8    2.90  )-----------( 19       ( 31 Aug 2020 06:58 )             27.1       08-31    135  |  103  |  67<H>  ----------------------------<  121<H>  4.0   |  23  |  2.18<H>    EGFR if : 35<L>  EGFR if non : 30<L>    Ca    8.4      08-31        Thyroid Function Tests:  08-27 @ 08:25 TSH 3.28

## 2020-08-31 NOTE — PROGRESS NOTE ADULT - ATTENDING COMMENTS
Alert, conversant  1.  CKD3--no HD requirement at present.  Would check sono+ renal artery dopplers in setting of DM related peripheral vascular disease  2.  HyperK--glycemic control.  Binder if persistently >5.5

## 2020-08-31 NOTE — PROGRESS NOTE ADULT - SUBJECTIVE AND OBJECTIVE BOX
Patient is a 70y old  Male who presents with a chief complaint of osteo r 2nd toe (31 Aug 2020 17:55)      INTERVAL HPI/OVERNIGHT EVENTS: noted  pt seen and examined  feels well      Vital Signs Last 24 Hrs  T(C): 36.6 (31 Aug 2020 16:47), Max: 36.8 (31 Aug 2020 05:33)  T(F): 97.9 (31 Aug 2020 16:47), Max: 98.3 (31 Aug 2020 05:33)  HR: 69 (31 Aug 2020 16:47) (56 - 69)  BP: 110/68 (31 Aug 2020 16:47) (90/61 - 110/68)  BP(mean): --  RR: 18 (31 Aug 2020 16:47) (17 - 18)  SpO2: 99% (31 Aug 2020 16:47) (98% - 100%)    acetaminophen   Tablet .. 650 milliGRAM(s) Oral every 6 hours PRN  ampicillin/sulbactam  IVPB 3 Gram(s) IV Intermittent every 8 hours  atorvastatin 40 milliGRAM(s) Oral at bedtime  carvedilol 12.5 milliGRAM(s) Oral every 12 hours  dextrose 40% Gel 15 Gram(s) Oral once PRN  dextrose 5%. 1000 milliLiter(s) IV Continuous <Continuous>  dextrose 50% Injectable 12.5 Gram(s) IV Push once  dextrose 50% Injectable 25 Gram(s) IV Push once  dextrose 50% Injectable 25 Gram(s) IV Push once  famotidine    Tablet 20 milliGRAM(s) Oral daily  furosemide    Tablet 40 milliGRAM(s) Oral daily  glucagon  Injectable 1 milliGRAM(s) IntraMuscular once PRN  insulin glargine Injectable (LANTUS) 38 Unit(s) SubCutaneous at bedtime  insulin lispro (HumaLOG) corrective regimen sliding scale   SubCutaneous three times a day before meals  insulin lispro (HumaLOG) corrective regimen sliding scale   SubCutaneous at bedtime  insulin lispro Injectable (HumaLOG) 40 Unit(s) SubCutaneous three times a day before meals  levothyroxine 125 MICROGram(s) Oral daily  lisinopril 10 milliGRAM(s) Oral daily  mupirocin 2% Ointment 1 Application(s) Topical two times a day  niacin  milliGRAM(s) Oral at bedtime  predniSONE   Tablet 40 milliGRAM(s) Oral daily  sodium bicarbonate 650 milliGRAM(s) Oral two times a day  sodium chloride 0.9%. 1000 milliLiter(s) IV Continuous <Continuous>  spironolactone 25 milliGRAM(s) Oral daily      PHYSICAL EXAM:  GENERAL: NAD,   EYES: conjunctiva and sclera clear  ENMT: Moist mucous membranes  NECK: Supple, No JVD, Normal thyroid  CHEST/LUNG: non labored, cta b/l  HEART: Regular rate and rhythm; No murmurs, rubs, or gallops  ABDOMEN: Soft, Nontender, Nondistended; Bowel sounds present  EXTREMITIES:  2+ Peripheral Pulses, No clubbing, cyanosis, or edema  LYMPH: No lymphadenopathy noted  SKIN: No rashes or lesions    Consultant(s) Notes Reviewed:  [x ] YES  [ ] NO  Care Discussed with Consultants/Other Providers [ x] YES  [ ] NO    LABS:                        8.8    2.90  )-----------( 19       ( 31 Aug 2020 06:58 )             27.1     08-31    135  |  103  |  67<H>  ----------------------------<  121<H>  4.0   |  23  |  2.18<H>    Ca    8.4      31 Aug 2020 06:59          CAPILLARY BLOOD GLUCOSE      POCT Blood Glucose.: 246 mg/dL (31 Aug 2020 17:58)  POCT Blood Glucose.: 326 mg/dL (31 Aug 2020 12:18)  POCT Blood Glucose.: 147 mg/dL (31 Aug 2020 08:37)  POCT Blood Glucose.: 391 mg/dL (30 Aug 2020 21:55)              RADIOLOGY & ADDITIONAL TESTS:    Imaging Personally Reviewed:  [x ] YES  [ ] NO

## 2020-08-31 NOTE — PROGRESS NOTE ADULT - ATTENDING COMMENTS
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

## 2020-09-01 NOTE — DIETITIAN INITIAL EVALUATION ADULT. - PERTINENT MEDS FT
MEDICATIONS  (STANDING):  ampicillin/sulbactam  IVPB 3 Gram(s) IV Intermittent every 8 hours  atorvastatin 40 milliGRAM(s) Oral at bedtime  carvedilol 12.5 milliGRAM(s) Oral every 12 hours  dextrose 5%. 1000 milliLiter(s) (50 mL/Hr) IV Continuous <Continuous>  dextrose 50% Injectable 12.5 Gram(s) IV Push once  dextrose 50% Injectable 25 Gram(s) IV Push once  dextrose 50% Injectable 25 Gram(s) IV Push once  famotidine    Tablet 20 milliGRAM(s) Oral daily  furosemide    Tablet 40 milliGRAM(s) Oral daily  insulin glargine Injectable (LANTUS) 38 Unit(s) SubCutaneous at bedtime  insulin lispro (HumaLOG) corrective regimen sliding scale   SubCutaneous three times a day before meals  insulin lispro (HumaLOG) corrective regimen sliding scale   SubCutaneous at bedtime  insulin lispro Injectable (HumaLOG) 40 Unit(s) SubCutaneous three times a day before meals  levothyroxine 125 MICROGram(s) Oral daily  lisinopril 10 milliGRAM(s) Oral daily  mupirocin 2% Ointment 1 Application(s) Topical two times a day  niacin  milliGRAM(s) Oral at bedtime  predniSONE   Tablet 40 milliGRAM(s) Oral daily  sodium bicarbonate 650 milliGRAM(s) Oral two times a day  sodium chloride 0.9%. 1000 milliLiter(s) (75 mL/Hr) IV Continuous <Continuous>  spironolactone 25 milliGRAM(s) Oral daily    MEDICATIONS  (PRN):  acetaminophen   Tablet .. 650 milliGRAM(s) Oral every 6 hours PRN Temp greater or equal to 38.5C (101.3F), Mild Pain (1 - 3)  dextrose 40% Gel 15 Gram(s) Oral once PRN Blood Glucose LESS THAN 70 milliGRAM(s)/deciliter  glucagon  Injectable 1 milliGRAM(s) IntraMuscular once PRN Glucose LESS THAN 70 milligrams/deciliter

## 2020-09-01 NOTE — PROGRESS NOTE ADULT - SUBJECTIVE AND OBJECTIVE BOX
Patient is a 70y old  Male who presents with a chief complaint of L 2MT ulcer (01 Sep 2020 16:57)      INTERVAL HPI/OVERNIGHT EVENTS: noted  pt seen and examined  no new events/complaints      Vital Signs Last 24 Hrs  T(C): 36.6 (01 Sep 2020 21:29), Max: 36.6 (01 Sep 2020 14:15)  T(F): 97.9 (01 Sep 2020 21:29), Max: 97.9 (01 Sep 2020 14:15)  HR: 67 (01 Sep 2020 21:29) (53 - 67)  BP: 97/56 (01 Sep 2020 21:29) (93/58 - 117/73)  BP(mean): --  RR: 18 (01 Sep 2020 21:29) (18 - 18)  SpO2: 100% (01 Sep 2020 21:29) (97% - 100%)    acetaminophen   Tablet .. 650 milliGRAM(s) Oral every 6 hours PRN  ampicillin/sulbactam  IVPB 3 Gram(s) IV Intermittent every 8 hours  atorvastatin 40 milliGRAM(s) Oral at bedtime  carvedilol 12.5 milliGRAM(s) Oral every 12 hours  dextrose 40% Gel 15 Gram(s) Oral once PRN  dextrose 5%. 1000 milliLiter(s) IV Continuous <Continuous>  dextrose 50% Injectable 12.5 Gram(s) IV Push once  dextrose 50% Injectable 25 Gram(s) IV Push once  dextrose 50% Injectable 25 Gram(s) IV Push once  famotidine    Tablet 20 milliGRAM(s) Oral daily  furosemide    Tablet 40 milliGRAM(s) Oral daily  glucagon  Injectable 1 milliGRAM(s) IntraMuscular once PRN  insulin glargine Injectable (LANTUS) 28 Unit(s) SubCutaneous at bedtime  insulin lispro (HumaLOG) corrective regimen sliding scale   SubCutaneous every 6 hours  insulin lispro Injectable (HumaLOG) 25 Unit(s) SubCutaneous three times a day before meals  levothyroxine 125 MICROGram(s) Oral daily  lisinopril 10 milliGRAM(s) Oral daily  mupirocin 2% Ointment 1 Application(s) Topical two times a day  niacin  milliGRAM(s) Oral at bedtime  predniSONE   Tablet 40 milliGRAM(s) Oral daily  sodium bicarbonate 650 milliGRAM(s) Oral two times a day  sodium chloride 0.9%. 1000 milliLiter(s) IV Continuous <Continuous>  spironolactone 25 milliGRAM(s) Oral daily      PHYSICAL EXAM:  GENERAL: NAD,   EYES: conjunctiva and sclera clear  ENMT: Moist mucous membranes  NECK: Supple, No JVD, Normal thyroid  CHEST/LUNG: non labored, cta b/l  HEART: Regular rate and rhythm; No murmurs, rubs, or gallops  ABDOMEN: Soft, Nontender, Nondistended; Bowel sounds present  EXTREMITIES:  2+ Peripheral Pulses, No clubbing, cyanosis, or edema  LYMPH: No lymphadenopathy noted  SKIN: No rashes or lesions    Consultant(s) Notes Reviewed:  [x ] YES  [ ] NO  Care Discussed with Consultants/Other Providers [ x] YES  [ ] NO    LABS:                        10.3   6.93  )-----------( 35       ( 01 Sep 2020 07:25 )             31.9     09-01    137  |  105  |  73<H>  ----------------------------<  54<L>  4.0   |  22  |  2.06<H>    Ca    8.5      01 Sep 2020 07:24          CAPILLARY BLOOD GLUCOSE      POCT Blood Glucose.: 311 mg/dL (01 Sep 2020 21:28)  POCT Blood Glucose.: 280 mg/dL (01 Sep 2020 17:57)  POCT Blood Glucose.: 103 mg/dL (01 Sep 2020 13:05)  POCT Blood Glucose.: 87 mg/dL (01 Sep 2020 11:39)              RADIOLOGY & ADDITIONAL TESTS:    Imaging Personally Reviewed:  [x ] YES  [ ] NO

## 2020-09-01 NOTE — DIETITIAN INITIAL EVALUATION ADULT. - OTHER INFO
Patient seen for routine length of stay assessment.  Upon multiple knocks on door and wall, Patient found laying casually in bed, speaking on phone.  Patient did not acknowledge presence of healthcare provider in room.  When asked if I should come back, Patient stated, "yeah come back later". Advised Patient who this provider was and Patient stated, "Don't bother coming back".  Advised Patient that it was recommended by the endocrinology team to see Patient and this RDN was here to assist with A1c as well as for issues with wounds and healing. Patient again waved RDN off and stated, "Don't bother coming back".    As per RN, she was surprised and reports that Patient is eating well but also requiring 40 units of insulin pre-meals and could benefit from interventions and teaching. Patient noted with extensive medical hx including cardiovascular and hepatic disease.  Hx of liver ca (not on active chemotherapy), recently diagnosed ITP with thrombocytopenia, T2DM c/b multiple amputations of toes/PAF/CAD, hypothyroidism, pituitary adenoma, HF with ICD placement, HLD, presenting for 2nd toe on left foot ulceration and infection x few days.  No further subjective history available.  Glucose ranging 147-326.  Presented with hyponatremia and on fluid restriction with Na level now up to 137.

## 2020-09-01 NOTE — DIETITIAN INITIAL EVALUATION ADULT. - ENERGY NEEDS
HT 71 inches,  pounds,  pounds, BMI 26.2, Unsure of wt hx.   Dxd with 2nd toe cellulitis, diabetic ulcer, R/O Osteo.

## 2020-09-01 NOTE — PROGRESS NOTE ADULT - ASSESSMENT
70 m with    Toe cellulitis, possible Osteo  - antibiotics  - ID fu  - Podiatry fu  toe debridement on hold dt thrombocytopenia, tentative plan for 9/2  #Preop  Surgical risk stratification   per cards 8/30    No objection to surgery from a cardiac standpoint.  Patient classified as a high risk patient for low risk non-emergent surgery    No active chest pain or palpitations, moderate CAD risk factors      Unlimited exercise tolerance (> 4 METS)    No further cardiac testing or optimization required.    -From Heme perspective 8/31  will require plt ct 50K prior to procedure  if inadequate response to nplate, suggest plt gtt german-op for amputation procedure  - wound care    Diabetes Mellitus  - BS control  - ADA diet   - Endocrine evaluation    Afib  - rate control  - cardiology eval  - not AC 2 to low Platelets    AICD   - need for preop deactivation?    Anemia   - follow    Coronary artery disease  - continue Rx  - cardiology evaluation    Elevated prolactin level  - endocrine follow    CKD  - Nephrology evaluation  - follow Cr, lytes  - avoid nephrotoxic Rx    Elevated triglycerides  - continue Rx    Gout  - stable    HCC (hepatocellular carcinoma)  - stable    Heart failure with reduced left ventricular function last EF 3--35% 5/2019  - cardiology evaluation    HTN (hypertension)   - control    Hypothyroidism   - follow TSH    PVD (peripheral vascular disease)   - stable    Thrombocytopenia Chronic with superimposed ITP  - Hematology cs fu   - continue Prednisone   - sp 1u platelets without good response  Nplate given , improved pl count to 35    DVT prophylaxis  - PAS     .       [facility]  [phone]

## 2020-09-01 NOTE — DIETITIAN INITIAL EVALUATION ADULT. - PHYSICAL APPEARANCE
other (specify)/well nourished Scabbed skin lesion to left shin and Left second toe diabetic lesion.

## 2020-09-01 NOTE — DIETITIAN INITIAL EVALUATION ADULT. - ADD RECOMMEND
Consistent CHO diet with snack with 1200cc fluid restriction, Monitor diet tolerance, po intake, GI tolerance, weight trends, labs, and skin integrity, reinforce diabetes education as Patient will allow intervention with RDN.

## 2020-09-01 NOTE — PROGRESS NOTE ADULT - SUBJECTIVE AND OBJECTIVE BOX
Chief Complaint/Follow-up on: T2D    Subjective: Patient with serum glucose of 54 but he did not feel it. POC at 11 was 87. He then went for a doppler and upon return about 1pm he was 103. He did not eat b'fast but was going to get his lunch tray.   Tomorrow he is going to the OR at 2pm.    MEDICATIONS  (STANDING):  ampicillin/sulbactam  IVPB 3 Gram(s) IV Intermittent every 8 hours  atorvastatin 40 milliGRAM(s) Oral at bedtime  carvedilol 12.5 milliGRAM(s) Oral every 12 hours  dextrose 5%. 1000 milliLiter(s) (50 mL/Hr) IV Continuous <Continuous>  dextrose 50% Injectable 12.5 Gram(s) IV Push once  dextrose 50% Injectable 25 Gram(s) IV Push once  dextrose 50% Injectable 25 Gram(s) IV Push once  famotidine    Tablet 20 milliGRAM(s) Oral daily  furosemide    Tablet 40 milliGRAM(s) Oral daily  insulin glargine Injectable (LANTUS) 28 Unit(s) SubCutaneous at bedtime  insulin lispro (HumaLOG) corrective regimen sliding scale   SubCutaneous three times a day before meals  insulin lispro (HumaLOG) corrective regimen sliding scale   SubCutaneous at bedtime  insulin lispro Injectable (HumaLOG) 25 Unit(s) SubCutaneous three times a day before meals  levothyroxine 125 MICROGram(s) Oral daily  lisinopril 10 milliGRAM(s) Oral daily  mupirocin 2% Ointment 1 Application(s) Topical two times a day  niacin  milliGRAM(s) Oral at bedtime  predniSONE   Tablet 40 milliGRAM(s) Oral daily  sodium bicarbonate 650 milliGRAM(s) Oral two times a day  sodium chloride 0.9%. 1000 milliLiter(s) (75 mL/Hr) IV Continuous <Continuous>  spironolactone 25 milliGRAM(s) Oral daily    MEDICATIONS  (PRN):  acetaminophen   Tablet .. 650 milliGRAM(s) Oral every 6 hours PRN Temp greater or equal to 38.5C (101.3F), Mild Pain (1 - 3)  dextrose 40% Gel 15 Gram(s) Oral once PRN Blood Glucose LESS THAN 70 milliGRAM(s)/deciliter  glucagon  Injectable 1 milliGRAM(s) IntraMuscular once PRN Glucose LESS THAN 70 milligrams/deciliter      PHYSICAL EXAM:  VITALS: T(C): 36.6 (09-01-20 @ 14:15)  T(F): 97.9 (09-01-20 @ 14:15), Max: 97.9 (09-01-20 @ 14:15)  HR: 65 (09-01-20 @ 14:15) (53 - 65)  BP: 95/62 (09-01-20 @ 14:15) (95/62 - 117/73)  RR:  (18 - 18)  SpO2:  (97% - 100%)  Wt(kg): --  GENERAL: NAD, well-groomed, well-developed  HEENT:  Atraumatic, Normocephalic, moist mucous membranes  RESPIRATORY: Clear to auscultation bilaterally; No rales, rhonchi, wheezing, or rubs  CARDIOVASCULAR: Regular rate and rhythm; No murmurs  GI: Soft, nontender, non distended, normal bowel sounds      POCT Blood Glucose.: 103 mg/dL (09-01-20 @ 13:05)  POCT Blood Glucose.: 87 mg/dL (09-01-20 @ 11:39)  POCT Blood Glucose.: 173 mg/dL (08-31-20 @ 21:52)  POCT Blood Glucose.: 246 mg/dL (08-31-20 @ 17:58)  POCT Blood Glucose.: 326 mg/dL (08-31-20 @ 12:18)  POCT Blood Glucose.: 147 mg/dL (08-31-20 @ 08:37)  POCT Blood Glucose.: 391 mg/dL (08-30-20 @ 21:55)  POCT Blood Glucose.: 305 mg/dL (08-30-20 @ 18:21)  POCT Blood Glucose.: 269 mg/dL (08-30-20 @ 11:30)  POCT Blood Glucose.: 131 mg/dL (08-30-20 @ 08:52)  POCT Blood Glucose.: 422 mg/dL (08-29-20 @ 21:55)  POCT Blood Glucose.: 428 mg/dL (08-29-20 @ 21:41)  POCT Blood Glucose.: 444 mg/dL (08-29-20 @ 18:23)    09-01    137  |  105  |  73<H>  ----------------------------<  54<L>  4.0   |  22  |  2.06<H>    EGFR if : 37<L>  EGFR if non : 32<L>    Ca    8.5      09-01            Thyroid Function Tests:  08-27 @ 08:25 TSH 3.28

## 2020-09-01 NOTE — DIETITIAN INITIAL EVALUATION ADULT. - SIGNS/SYMPTOMS
Left foot wound to 2nd toe, r/o osteo A1c 9.2, steroid use, refusal of nutrition services, education.

## 2020-09-01 NOTE — DIETITIAN INITIAL EVALUATION ADULT. - ETIOLOGY
Increased demand for nutrients for wound healing and repair Endocrine dysfunction, Pharmacological, Behavioral

## 2020-09-01 NOTE — DIETITIAN INITIAL EVALUATION ADULT. - PERTINENT LABORATORY DATA
Na 137, K+ 4.0, BUN 73, Cr 2.06, BG 54, Phos --, Alk Phos --, AST --, ALT --, Mg --, Ca 8.5, HbA1c --  A1c 9.2

## 2020-09-01 NOTE — PROGRESS NOTE ADULT - ASSESSMENT
69 y/o male with hx of liver ca (not on active chemotherapy), recently diagnosed ITP with thrombocytopenia, T2DM c/b multiple amputations of toes/PAF/CAD, hypothyroidism, pituitary adenoma, HF with ICD placement, HLD, presenting for 2nd toe on left foot ulceration and infection x few days. Patient was seen by his outpatient podiatrist who recommended visiting the ED at this time for abx and admission. Endocrinology consulted for hyperglycemia and T2DM management.

## 2020-09-02 NOTE — BRIEF OPERATIVE NOTE - SPECIMENS
Left foot deep wound culture, left foot dirty soft tissue and bone, left foot clean bone margin pathology, left foot clean bone culture

## 2020-09-02 NOTE — PROGRESS NOTE ADULT - SUBJECTIVE AND OBJECTIVE BOX
Diabetes Follow up note:    Chief complaint: T2DM    Interval Hx: Pt w/fasting glucose this AM. NPO for toe amputation. Said he feels his glucose go low when it is in the 60s usually and felt it this AM. Was given dextrose this AM to correct glucose.     Review of Systems:  General: denies pain  GI: Tolerating POs. Denies N/V/D/Abd pain  CV: Denies CP/SOB  ENDO: No S&Sx of hypoglycemia  MEDS:  atorvastatin 40 milliGRAM(s) Oral at bedtime  insulin glargine Injectable (LANTUS) 28 Unit(s) SubCutaneous at bedtime  insulin lispro (HumaLOG) corrective regimen sliding scale   SubCutaneous every 6 hours  insulin lispro Injectable (HumaLOG) 25 Unit(s) SubCutaneous three times a day before meals  levothyroxine 125 MICROGram(s) Oral daily  niacin  milliGRAM(s) Oral at bedtime  predniSONE   Tablet 40 milliGRAM(s) Oral daily    ampicillin/sulbactam  IVPB 3 Gram(s) IV Intermittent every 8 hours    Allergies    No Known Allergies      PE:  General: Male sitting in chair. NAD.   Vital Signs Last 24 Hrs  T(C): 36.4 (02 Sep 2020 09:21), Max: 36.7 (02 Sep 2020 01:12)  T(F): 97.6 (02 Sep 2020 09:21), Max: 98.1 (02 Sep 2020 01:12)  HR: 59 (02 Sep 2020 11:29) (59 - 67)  BP: 91/55 (02 Sep 2020 11:29) (91/55 - 104/65)  BP(mean): --  RR: 18 (02 Sep 2020 09:21) (18 - 18)  SpO2: 98% (02 Sep 2020 09:21) (97% - 100%)  Abd: Soft, NT,ND,   Extremities: Warm. B/L chronic venous changes to LE.   Neuro: A&O X3    LABS:  POCT Blood Glucose.: 117 mg/dL (09-02-20 @ 06:43)  POCT Blood Glucose.: 72 mg/dL (09-02-20 @ 06:17)  POCT Blood Glucose.: 73 mg/dL (09-02-20 @ 05:51)  POCT Blood Glucose.: 67 mg/dL (09-02-20 @ 05:33)  POCT Blood Glucose.: 63 mg/dL (09-02-20 @ 05:21)  POCT Blood Glucose.: 226 mg/dL (09-02-20 @ 00:09)  POCT Blood Glucose.: 311 mg/dL (09-01-20 @ 21:28)  POCT Blood Glucose.: 280 mg/dL (09-01-20 @ 17:57)  POCT Blood Glucose.: 103 mg/dL (09-01-20 @ 13:05)  POCT Blood Glucose.: 87 mg/dL (09-01-20 @ 11:39)  POCT Blood Glucose.: 173 mg/dL (08-31-20 @ 21:52)  POCT Blood Glucose.: 246 mg/dL (08-31-20 @ 17:58)  POCT Blood Glucose.: 326 mg/dL (08-31-20 @ 12:18)  POCT Blood Glucose.: 147 mg/dL (08-31-20 @ 08:37)  POCT Blood Glucose.: 391 mg/dL (08-30-20 @ 21:55)  POCT Blood Glucose.: 305 mg/dL (08-30-20 @ 18:21)                            8.6    4.62  )-----------( 24       ( 02 Sep 2020 07:19 )             26.3       09-02    136  |  104  |  76<H>  ----------------------------<  66<L>  3.6   |  21<L>  |  2.18<H>    Ca    8.0<L>      02 Sep 2020 07:19        Thyroid Function Tests:  08-27 @ 08:25 TSH 3.28 FreeT4 -- T3 -- Anti TPO -- Anti Thyroglobulin Ab -- TSI --      A1C with Estimated Average Glucose Result: 9.2 % (08-27-20 @ 15:29)  Hemoglobin A1C, Whole Blood: 8.1 % (03-31-20 @ 13:59)          Contact number: stephanie [phone] or [phone]

## 2020-09-02 NOTE — BRIEF OPERATIVE NOTE - COMMENTS
s/p Left foot 2nd toe amputation   low concern for residual infection  low concern for viability   adequate intraoperative bleeding  no purulent discharge  pt stable for d/c tomorrow pending no bleeding from incision

## 2020-09-02 NOTE — PROGRESS NOTE ADULT - ASSESSMENT
69 y/o male with hx of liver ca (not on active chemotherapy), thrombocytopenia, poorly controlled IDDM, multiple amputations of toes, ICD placement, and HLD presenting for 2nd toe on left foot ulceration and infection x few days    # Toe cellulitis, possible Osteo  # PVD  - cont unasyn, ID following  - Podiatry following, toe debridement on hold dt thrombocytopenia, tentative plan for 9/2  - medically optimized for OR  - per heme, will require plt ct 50K prior to procedure, suggest plt transfusion german-op for amputation procedure  - wound care    # Thrombocytopenia Chronic with superimposed ITP  # anemia  - Hematology cs fu   - continue Prednisone   - sp 1u platelets without good response  - Nplate given , improved pl count to 35   - transfuse <8    # Diabetes Mellitus  - hypoglycemia noted  - endocrine following    # Afib  # AICD  # Coronary artery disease  # Heart failure with reduced left ventricular function last EF 35% 5/2019  # HTN (hypertension)   # Elevated triglycerides  - rate controlled  - cont lasix, coreg, spironolactone acei  - rate controlled  - not AC 2 to low Platelets  - cont statin    # CKD 3   - Nephrology following  - follow Cr, lytes  - avoid nephrotoxic Rx    # Gout  - stable    # HCC (hepatocellular carcinoma)  - stable    # Hypothyroidism   - cont synthroid    DVT prophylaxis  - [facility]  [phone] 69 y/o male with hx of liver ca (not on active chemotherapy), thrombocytopenia, poorly controlled IDDM, multiple amputations of toes, ICD placement, and HLD presenting for 2nd toe on left foot ulceration and infection x few days    # left 2nd toe OM  # PVD  - cont unasyn, ID following  - Podiatry following, toe debridement on hold dt thrombocytopenia, tentative plan for 9/2  - medically optimized for OR  - per heme, will require plt ct 50K prior to procedure, suggest plt transfusion german-op for amputation procedure  - wound care    # Thrombocytopenia Chronic with superimposed ITP  # anemia  - Hematology cs fu   - continue Prednisone   - sp 1u platelets without good response  - Nplate given , improved pl count to 35   - transfuse <8    # Diabetes Mellitus  - hypoglycemia noted  - endocrine following    # Afib  # AICD  # Coronary artery disease  # Heart failure with reduced left ventricular function last EF 35% 5/2019  # HTN (hypertension)   # Elevated triglycerides  - rate controlled  - cont lasix, coreg, spironolactone acei  - rate controlled  - not AC 2 to low Platelets  - cont statin    # CKD 3   - Nephrology following  - follow Cr, lytes  - avoid nephrotoxic Rx    # Gout  - stable    # HCC (hepatocellular carcinoma)  - stable    # Hypothyroidism   - cont synthroid    DVT prophylaxis  - [facility]  [phone]

## 2020-09-02 NOTE — DISCHARGE NOTE PROVIDER - NSDCFUSCHEDAPPT_GEN_ALL_CORE_FT
CARLIN ELE ; 09/23/2020 ; NPP Cat Scan  [address]  CARLIN LEE ; 09/25/2020 ; NPP Rad  Comm CARLIN Chan ; 10/26/2020 ; NPP Med Endocr [address]  CARLIN LEE ; 11/11/2020 ; NPP [facility]  CARLIN LEE ; 11/12/2020 ; NPP Cardio [address] [facility]  CARLIN LEE ; 11/25/2020 ; NPP Cardio [address]  CARLIN LEE ; 09/23/2020 ; NPP Cat Scan  [address]  CARLIN LEE ; 09/25/2020 ; NPP Rad  Comm CARLIN Chan ; 10/26/2020 ; NPP Med Endocr [address]  CARLIN LEE ; 11/11/2020 ; NPP [facility]  CARLIN LEE ; 11/12/2020 ; NPP Cardio [address] [facility]  CARLIN LEE ; 11/25/2020 ; NPP Cardio [address]  CARLIN LEE ; 09/23/2020 ; NPP Cat Scan  [address]  CARLIN LEE ; 09/25/2020 ; NPP Rad  Comm CARLIN Chan ; 10/26/2020 ; NPP Med Endocr [address]  CARLIN LEE ; 11/11/2020 ; NPP [facility]  CARLIN LEE ; 11/12/2020 ; NPP Cardio [address] [facility]  CARLIN LEE ; 11/25/2020 ; NPP Cardio [address]  CARLIN LEE ; 09/23/2020 ; NPP Cat Scan  [address]  CARLIN LEE ; 09/25/2020 ; NPP Rad  Comm CARLIN Chan ; 10/26/2020 ; NPP Med Endocr [address]  CARLIN LEE ; 11/11/2020 ; NPP [facility]  CARLIN LEE ; 11/12/2020 ; NPP Cardio [facility] [facility]  CARLIN LEE ; 11/25/2020 ; NPP Cardio [address]

## 2020-09-02 NOTE — CHART NOTE - NSCHARTNOTEFT_GEN_A_CORE
Patient's platelets continue to be low despite nplate. Informed by team that plan is for podiatry to take patient to surgery today. Recommend transfusion of 1 unit of platelets 30min-1hour before surgery. Also have platelets on hold for the OR to be given through surgery.    Aubrey Hendricks, PGY-4  Hematology-Oncology Fellow  [phone] ([place]) [phone] ([facility])  Please page fellow on call after 5pm and on weekends Patient's platelets continue to be low despite nplate. Informed by team that plan is for podiatry to take patient to surgery today. Recommend transfusion of 1 unit of platelets 30min-1hour before surgery. Also have platelets on hold for the OR to be given through surgery.    Aubrey Hendricks, PGY-4  Hematology-Oncology Fellow  [phone] ([place]) [phone] ([facility])  Please page fellow on call after 5pm and on weekends      Attg addendum  agree with fellows note as above.

## 2020-09-02 NOTE — DISCHARGE NOTE PROVIDER - CARE PROVIDER_API CALL
mEma Calixto  ENDOCRINOLOGY/METAB/DIABETES  [address] [facility] [address]  [place] [address]  Phone: [phone]  Fax: [phone]  Follow Up Time:

## 2020-09-02 NOTE — DISCHARGE NOTE PROVIDER - NSDCFUADDINST_GEN_ALL_CORE_FT
Podiatry Discharge Instructions:  Follow up: Please follow up with Dr. Rouse within 1 week of discharge from the hospital, please call [phone] for appointment and discuss that you recently were seen in the hospital.  Wound Care: Please leave your dressing clean dry intact until your follow up appointment.   Weight bearing: Please weight bear as tolerated in a surgical shoe.  Antibiotics: Please continue as instructed.

## 2020-09-02 NOTE — DISCHARGE NOTE PROVIDER - NSDCCPCAREPLAN_GEN_ALL_CORE_FT
PRINCIPAL DISCHARGE DIAGNOSIS  Diagnosis: Diabetic toe ulcer  Assessment and Plan of Treatment: s/p L 2nd toe amputation on 9/2. Please se3e the podiatry recommendation      SECONDARY DISCHARGE DIAGNOSES  Diagnosis: JULIANNE (acute kidney injury)  Assessment and Plan of Treatment: on CKD. Now stable  Avoid taking (NSAIDs) - (ex: Ibuprofen, Advil, Celebrex, Naprosyn)  Avoid taking any nephrotoxic agents (can harm kidneys) - Intravenous contrast for diagnostic testing, combination cold medications.  Have all medications adjusted for your renal function by your Health Care Provider.  Blood pressure control is important.  Take all medication as prescribed.      Diagnosis: Thrombocytopenia  Assessment and Plan of Treatment: Chronic. Received N plate and 1 unit of platelet transfusion. Had hematology consult. To follow up wqith your hematologist. Continue steroid.    Diagnosis: Type 2 diabetes mellitus with stage 4 chronic kidney disease, with long-term current use of insulin  Assessment and Plan of Treatment: HgA1C this admission.  Make sure you get your HgA1c checked every three months.  If you take oral diabetes medications, check your blood glucose two times a day.  If you take insulin, check your blood glucose before meals and at bedtime.  It's important not to skip any meals.  Keep a log of your blood glucose results and always take it with you to your doctor appointments.  Keep a list of your current medications including injectables and over the counter medications and bring this medication list with you to all your doctor appointments.  If you have not seen your ophthalmologist this year call for appointment.  Check your feet daily for redness, sores, or openings. Do not self treat. If no improvement in two days call your primary care physician for an appointment.  Low blood sugar (hypoglycemia) is a blood sugar below 70mg/dl. Check your blood sugar if you feel signs/symptoms of hypoglycemia. If your blood sugar is below 70 take 15 grams of carbohydrates (ex 4 oz of apple juice, 3-4 glucose tablets, or 4-6 oz of regular soda) wait 15 minutes and repeat blood sugar to make sure it comes up above 70.  If your blood sugar is above 70 and you are due for a meal, have a meal.  If you are not due for a meal have a snack.  This snack helps keeps your blood sugar at a safe range.  Stop januvia and metformin and Follow up with endocrine.      Diagnosis: Hyperkalemia  Assessment and Plan of Treatment: Likely secondary to hyperglycemia. Now resolved.    Diagnosis: Essential hypertension  Assessment and Plan of Treatment: Low salt diet  Activity as tolerated.  Take all medication as prescribed.  Follow up with your medical doctor for routine blood pressure monitoring at your next visit.  Notify your doctor if you have any of the following symptoms:   Dizziness, Lightheadedness, Blurry vision, Headache, Chest pain, Shortness of breath PRINCIPAL DISCHARGE DIAGNOSIS  Diagnosis: Diabetic toe ulcer  Assessment and Plan of Treatment: s/p L 2nd toe amputation on 9/2. Please se3e the podiatry recommendation      SECONDARY DISCHARGE DIAGNOSES  Diagnosis: JULIANNE (acute kidney injury)  Assessment and Plan of Treatment: on CKD. Now stable  Avoid taking (NSAIDs) - (ex: Ibuprofen, Advil, Celebrex, Naprosyn)  Avoid taking any nephrotoxic agents (can harm kidneys) - Intravenous contrast for diagnostic testing, combination cold medications.  Have all medications adjusted for your renal function by your Health Care Provider.  Blood pressure control is important.  Take all medication as prescribed.      Diagnosis: Thrombocytopenia  Assessment and Plan of Treatment: Chronic. Received N plate and 1 unit of platelet transfusion. Had hematology consult. To follow up wqith your hematologist. Continue steroid.    Diagnosis: Type 2 diabetes mellitus with stage 4 chronic kidney disease, with long-term current use of insulin  Assessment and Plan of Treatment: HgA1C this admission 9.2  Make sure you get your HgA1c checked every three months.  If you take oral diabetes medications, check your blood glucose two times a day.  If you take insulin, check your blood glucose before meals and at bedtime.  It's important not to skip any meals.  Keep a log of your blood glucose results and always take it with you to your doctor appointments.  Keep a list of your current medications including injectables and over the counter medications and bring this medication list with you to all your doctor appointments.  If you have not seen your ophthalmologist this year call for appointment.  Check your feet daily for redness, sores, or openings. Do not self treat. If no improvement in two days call your primary care physician for an appointment.  Low blood sugar (hypoglycemia) is a blood sugar below 70mg/dl. Check your blood sugar if you feel signs/symptoms of hypoglycemia. If your blood sugar is below 70 take 15 grams of carbohydrates (ex 4 oz of apple juice, 3-4 glucose tablets, or 4-6 oz of regular soda) wait 15 minutes and repeat blood sugar to make sure it comes up above 70.  If your blood sugar is above 70 and you are due for a meal, have a meal.  If you are not due for a meal have a snack.  This snack helps keeps your blood sugar at a safe range.  Stop januvia and metformin and Follow up with endocrine.      Diagnosis: Hyperkalemia  Assessment and Plan of Treatment: Likely secondary to hyperglycemia. Now resolved.    Diagnosis: Essential hypertension  Assessment and Plan of Treatment: Low salt diet  Activity as tolerated.  Take all medication as prescribed.  Follow up with your medical doctor for routine blood pressure monitoring at your next visit.  Notify your doctor if you have any of the following symptoms:   Dizziness, Lightheadedness, Blurry vision, Headache, Chest pain, Shortness of breath

## 2020-09-02 NOTE — DISCHARGE NOTE PROVIDER - NSDCMRMEDTOKEN_GEN_ALL_CORE_FT
Aldactone 25 mg oral tablet: 1 tab(s) orally 2 times a day  atorvastatin 40 mg oral tablet: 1 tab(s) orally once a day (at bedtime)  cabergoline 0.5 mg oral tablet: 1 tab(s) orally once a day  carvedilol 12.5 mg oral tablet: 1 tab(s) orally 2 times a day  Florastor 250 mg oral capsule: 1 cap(s) orally once a day  HumaLOG 100 units/mL injectable solution: 3-10 units injected 3 times a day before meals  Januvia 100 mg oral tablet: 1 tab(s) orally once a day  Lantus 100 units/mL subcutaneous solution: 18 unit(s) subcutaneous once a day (at bedtime)  Lasix 40 mg oral tablet: 1 tab(s) orally once a day  metFORMIN 1000 mg oral tablet: 1 tab(s) orally 2 times a day  Niaspan ER 1000 mg oral tablet, extended release: 1 tab(s) orally once a day (at bedtime)  Pepcid 20 mg oral tablet: 1 tab(s) orally once a day  potassium chloride 20 mEq oral tablet, extended release: 1 tab(s) orally once a day  predniSONE: 40 milligram(s) orally once a day  ramipril 2.5 mg oral capsule: 1 cap(s) orally once a day (in the morning)  sodium bicarbonate 650 mg oral tablet: 1 tab(s) orally 2 times a day  thyrozene: 125 microgram(s) orally once a day    250mg on saturday  Vitamin D3 2000 intl units oral tablet: 1 tab(s) orally once a day atorvastatin 40 mg oral tablet: 1 tab(s) orally once a day (at bedtime)  cabergoline 0.5 mg oral tablet: 1 tab(s) orally once a day  carvedilol 12.5 mg oral tablet: 1 tab(s) orally 2 times a day  Florastor 250 mg oral capsule: 1 cap(s) orally once a day  HumaLOG 100 units/mL injectable solution: 3-10 units injected 3 times a day before meals  Januvia 100 mg oral tablet: 1 tab(s) orally once a day  Lantus 100 units/mL subcutaneous solution: 18 unit(s) subcutaneous once a day (at bedtime)  Lasix 40 mg oral tablet: 1 tab(s) orally once a day  levothyroxine 125 mcg (0.125 mg) oral tablet: 1 tab(s) orally once a day  metFORMIN 1000 mg oral tablet: 1 tab(s) orally 2 times a day  Niaspan ER 1000 mg oral tablet, extended release: 1 tab(s) orally once a day (at bedtime)  Pepcid 20 mg oral tablet: 1 tab(s) orally once a day  potassium chloride 20 mEq oral tablet, extended release: 1 tab(s) orally once a day  predniSONE: 40 milligram(s) orally once a day  ramipril 2.5 mg oral capsule: 1 cap(s) orally once a day (in the morning)  sodium bicarbonate 650 mg oral tablet: 1 tab(s) orally 2 times a day  spironolactone 25 mg oral tablet: 1 tab(s) orally once a day  thyrozene: 125 microgram(s) orally once a day    250mg on saturday  Vitamin D3 2000 intl units oral tablet: 1 tab(s) orally once a day atorvastatin 40 mg oral tablet: 1 tab(s) orally once a day (at bedtime)  cabergoline 0.5 mg oral tablet: 1 tab(s) orally once a day  carvedilol 12.5 mg oral tablet: 1 tab(s) orally 2 times a day  clindamycin 300 mg oral capsule: 1 cap(s) orally 2 times a day   Florastor 250 mg oral capsule: 1 cap(s) orally once a day  HumaLOG 100 units/mL injectable solution: 25 unit(s) injectable 3 times a day  before meals  Lantus 100 units/mL subcutaneous solution: 18 unit(s) subcutaneous once a day (at bedtime)  Lasix 40 mg oral tablet: 1 tab(s) orally once a day  Niaspan ER 1000 mg oral tablet, extended release: 1 tab(s) orally once a day (at bedtime)  Pepcid 20 mg oral tablet: 1 tab(s) orally once a day  predniSONE: 40 milligram(s) orally once a day  ramipril 2.5 mg oral capsule: 1 cap(s) orally once a day (in the morning)  sodium bicarbonate 650 mg oral tablet: 1 tab(s) orally 2 times a day  spironolactone 25 mg oral tablet: 1 tab(s) orally once a day  thyrozene: 125 microgram(s) orally once a day    250mg on saturday  Vitamin D3 2000 intl units oral tablet: 1 tab(s) orally once a day

## 2020-09-02 NOTE — DISCHARGE NOTE PROVIDER - HOSPITAL COURSE
69 y/o male with hx of liver ca (not on active chemotherapy), thrombocytopenia, poorly controlled IDDM, multiple amputations of toes, ICD placement, and HLD presenting for 2nd toe on left foot ulceration and infection x few days. Seen by ID and Podiatry, started on unasyn for OM. S/P    L 2nd toe amputation on 9/2/20. Seen by endocrine for hyperglycemia, insulin has been adjusted.    DC diabetic med reviewed with endocrine NP, to DC po meds, to continue on current insulin regime and to F/U with Dr Calixto. DW patient he verbalized understanding. Renal was also consulted for JULIANNE/CKD. Now stable. Discharge medications reviewed with Dr Fields. Also today's platelet is 17, h/o ITP with Chr thrombocytopenia, baseline of 10-20. dr Fields made aware of today's platelet count. Hem/onc on board. Received N plate and 1 platelet transfusion during    the course of hospital stay. DC home today

## 2020-09-02 NOTE — PROGRESS NOTE ADULT - ASSESSMENT
Patient is a 70y old  Male who presents with a chief complaint of L 2MT ulcer (01 Sep 2020 16:57)      INTERVAL HPI/OVERNIGHT EVENTS:   Pt is scheduled for left foot partial 2nd ray resection with Dr. Rouse at 2PM. Patient is aware of procedure and is NPO since midnight.    MEDICATIONS  (STANDING):  ampicillin/sulbactam  IVPB 3 Gram(s) IV Intermittent every 8 hours  atorvastatin 40 milliGRAM(s) Oral at bedtime  carvedilol 12.5 milliGRAM(s) Oral every 12 hours  dextrose 5%. 1000 milliLiter(s) (50 mL/Hr) IV Continuous <Continuous>  dextrose 50% Injectable 12.5 Gram(s) IV Push once  dextrose 50% Injectable 25 Gram(s) IV Push once  dextrose 50% Injectable 25 Gram(s) IV Push once  famotidine    Tablet 20 milliGRAM(s) Oral daily  furosemide    Tablet 40 milliGRAM(s) Oral daily  insulin glargine Injectable (LANTUS) 28 Unit(s) SubCutaneous at bedtime  insulin lispro (HumaLOG) corrective regimen sliding scale   SubCutaneous every 6 hours  insulin lispro Injectable (HumaLOG) 25 Unit(s) SubCutaneous three times a day before meals  levothyroxine 125 MICROGram(s) Oral daily  lisinopril 10 milliGRAM(s) Oral daily  mupirocin 2% Ointment 1 Application(s) Topical two times a day  niacin  milliGRAM(s) Oral at bedtime  predniSONE   Tablet 40 milliGRAM(s) Oral daily  sodium bicarbonate 650 milliGRAM(s) Oral two times a day  sodium chloride 0.9%. 1000 milliLiter(s) (75 mL/Hr) IV Continuous <Continuous>  spironolactone 25 milliGRAM(s) Oral daily    MEDICATIONS  (PRN):  acetaminophen   Tablet .. 650 milliGRAM(s) Oral every 6 hours PRN Temp greater or equal to 38.5C (101.3F), Mild Pain (1 - 3)  dextrose 40% Gel 15 Gram(s) Oral once PRN Blood Glucose LESS THAN 70 milliGRAM(s)/deciliter  glucagon  Injectable 1 milliGRAM(s) IntraMuscular once PRN Glucose LESS THAN 70 milligrams/deciliter      Allergies    No Known Allergies    Intolerances        Vital Signs Last 24 Hrs  T(C): 36.4 (02 Sep 2020 05:09), Max: 36.7 (02 Sep 2020 01:12)  T(F): 97.6 (02 Sep 2020 05:09), Max: 98.1 (02 Sep 2020 01:12)  HR: 60 (02 Sep 2020 05:09) (59 - 67)  BP: 104/65 (02 Sep 2020 05:09) (93/58 - 117/73)  BP(mean): --  RR: 18 (02 Sep 2020 05:09) (18 - 18)  SpO2: 99% (02 Sep 2020 05:09) (97% - 100%)    LABS:                        10.3   6.93  )-----------( 35       ( 01 Sep 2020 07:25 )             31.9     09-01    137  |  105  |  73<H>  ----------------------------<  54<L>  4.0   |  22  |  2.06<H>    Ca    8.5      01 Sep 2020 07:24          CAPILLARY BLOOD GLUCOSE      POCT Blood Glucose.: 72 mg/dL (02 Sep 2020 06:17)  POCT Blood Glucose.: 73 mg/dL (02 Sep 2020 05:51)  POCT Blood Glucose.: 67 mg/dL (02 Sep 2020 05:33)  POCT Blood Glucose.: 63 mg/dL (02 Sep 2020 05:21)  POCT Blood Glucose.: 226 mg/dL (02 Sep 2020 00:09)  POCT Blood Glucose.: 311 mg/dL (01 Sep 2020 21:28)  POCT Blood Glucose.: 280 mg/dL (01 Sep 2020 17:57)  POCT Blood Glucose.: 103 mg/dL (01 Sep 2020 13:05)  POCT Blood Glucose.: 87 mg/dL (01 Sep 2020 11:39)      RADIOLOGY & ADDITIONAL TESTS:    Plan:   Pt is scheduled for left foot partial 2nd ray resection with Dr. Rouse at 2PM. Patient is aware of procedure and is NPO since midnight.  CXR on sunrise.  EKG on sunrise.  Medical/Cardiac clearance since 8/30 and documented in chart.  Consent signed and in chart.  Procedure was explained to patient in detail. All alternatives, risks and complications were discussed. All questions answered.

## 2020-09-02 NOTE — PROGRESS NOTE ADULT - ASSESSMENT
70M PMHx CKD3 (1.8-2.1), ICM s/p AICD, PVD complicated by osteomyelitis s/p multiple toe amputations, recently treated for ITP with prednisone,present with L toe pain - admitted for cellulitis found to have osteomyelitis pending platelet improvement.  Nephrology consulted for management of CKD.      Chronic kidney disease, stage 3  Patient with CKD likely from DM with multiple episodes of JULIANNE over the last few months (ACE/ARB in conjunction with diuretic use with contrast exposures). Baseline creatinine ~1.8-2.1. Duplex ultrasound no renal artery stenosis.  Stable sCr at baseline, last sCr 2.1.     Recommendations:  - plan for toe debridement on 9/2  - Avoid nephrotoxic agents, HOLD metformin for now,   - renally dose all medications per eGFR, optimize perfusion pressure, renal diet

## 2020-09-02 NOTE — PROGRESS NOTE ADULT - SUBJECTIVE AND OBJECTIVE BOX
Patient is a 70y old  Male who presents with a chief complaint of L 2MT ulcer (01 Sep 2020 16:57)      SUBJECTIVE / OVERNIGHT EVENTS:    Patient seen and examined.       Vital Signs Last 24 Hrs  T(C): 36.4 (02 Sep 2020 05:09), Max: 36.7 (02 Sep 2020 01:12)  T(F): 97.6 (02 Sep 2020 05:09), Max: 98.1 (02 Sep 2020 01:12)  HR: 60 (02 Sep 2020 05:09) (60 - 67)  BP: 104/65 (02 Sep 2020 05:09) (93/58 - 117/73)  BP(mean): --  RR: 18 (02 Sep 2020 05:09) (18 - 18)  SpO2: 99% (02 Sep 2020 05:09) (97% - 100%)  I&O's Summary    01 Sep 2020 07:01  -  02 Sep 2020 07:00  --------------------------------------------------------  IN: 840 mL / OUT: 0 mL / NET: 840 mL        PE:  GENERAL: NAD, AAOx3  HEAD:  Atraumatic, Normocephalic  EYES: EOMI, PERRLA, conjunctiva and sclera clear  NECK: Supple, No JVD  CHEST/LUNG: CTABL, No wheeze  HEART: Regular rate and rhythm; + murmur  ABDOMEN: Soft, Nontender, Nondistended; Bowel sounds present  EXTREMITIES:  2+ Peripheral Pulses, No clubbing, cyanosis, or edema  SKIN: No rashes or lesions  NEURO: No focal deficits    LABS:                        8.6    4.62  )-----------( 24       ( 02 Sep 2020 07:19 )             26.3     09-02    136  |  104  |  76<H>  ----------------------------<  66<L>  3.6   |  21<L>  |  2.18<H>    Ca    8.0<L>      02 Sep 2020 07:19      PT/INR - ( 02 Sep 2020 07:19 )   PT: 14.7 sec;   INR: 1.25 ratio         PTT - ( 02 Sep 2020 07:19 )  PTT:26.5 sec  CAPILLARY BLOOD GLUCOSE      POCT Blood Glucose.: 117 mg/dL (02 Sep 2020 06:43)  POCT Blood Glucose.: 72 mg/dL (02 Sep 2020 06:17)  POCT Blood Glucose.: 73 mg/dL (02 Sep 2020 05:51)  POCT Blood Glucose.: 67 mg/dL (02 Sep 2020 05:33)  POCT Blood Glucose.: 63 mg/dL (02 Sep 2020 05:21)  POCT Blood Glucose.: 226 mg/dL (02 Sep 2020 00:09)  POCT Blood Glucose.: 311 mg/dL (01 Sep 2020 21:28)  POCT Blood Glucose.: 280 mg/dL (01 Sep 2020 17:57)  POCT Blood Glucose.: 103 mg/dL (01 Sep 2020 13:05)  POCT Blood Glucose.: 87 mg/dL (01 Sep 2020 11:39)            RADIOLOGY & ADDITIONAL TESTS:    Imaging Personally Reviewed:  [x] YES  [ ] NO    Consultant(s) Notes Reviewed:  [x] YES  [ ] NO    MEDICATIONS  (STANDING):  ampicillin/sulbactam  IVPB 3 Gram(s) IV Intermittent every 8 hours  atorvastatin 40 milliGRAM(s) Oral at bedtime  carvedilol 12.5 milliGRAM(s) Oral every 12 hours  dextrose 5%. 1000 milliLiter(s) (50 mL/Hr) IV Continuous <Continuous>  dextrose 50% Injectable 12.5 Gram(s) IV Push once  dextrose 50% Injectable 25 Gram(s) IV Push once  dextrose 50% Injectable 25 Gram(s) IV Push once  famotidine    Tablet 20 milliGRAM(s) Oral daily  furosemide    Tablet 40 milliGRAM(s) Oral daily  insulin glargine Injectable (LANTUS) 28 Unit(s) SubCutaneous at bedtime  insulin lispro (HumaLOG) corrective regimen sliding scale   SubCutaneous every 6 hours  insulin lispro Injectable (HumaLOG) 25 Unit(s) SubCutaneous three times a day before meals  levothyroxine 125 MICROGram(s) Oral daily  lisinopril 10 milliGRAM(s) Oral daily  mupirocin 2% Ointment 1 Application(s) Topical two times a day  niacin  milliGRAM(s) Oral at bedtime  predniSONE   Tablet 40 milliGRAM(s) Oral daily  sodium bicarbonate 650 milliGRAM(s) Oral two times a day  sodium chloride 0.9%. 1000 milliLiter(s) (75 mL/Hr) IV Continuous <Continuous>  spironolactone 25 milliGRAM(s) Oral daily    MEDICATIONS  (PRN):  acetaminophen   Tablet .. 650 milliGRAM(s) Oral every 6 hours PRN Temp greater or equal to 38.5C (101.3F), Mild Pain (1 - 3)  dextrose 40% Gel 15 Gram(s) Oral once PRN Blood Glucose LESS THAN 70 milliGRAM(s)/deciliter  glucagon  Injectable 1 milliGRAM(s) IntraMuscular once PRN Glucose LESS THAN 70 milligrams/deciliter      Care Discussed with Consultants/Other Providers [x] YES  [ ] NO    HEALTH ISSUES - PROBLEM Dx:  Pituitary adenoma: Pituitary adenoma  Hyponatremia: Hyponatremia  Hyperkalemia: Hyperkalemia  Chronic kidney disease (CKD): Chronic kidney disease (CKD)  Hyperprolactinemia: Hyperprolactinemia  Hypothyroidism, unspecified type: Hypothyroidism, unspecified type  Hypertension, unspecified type: Hypertension, unspecified type  Hyperlipidemia, unspecified hyperlipidemia type: Hyperlipidemia, unspecified hyperlipidemia type  Diabetic toe ulcer: Diabetic toe ulcer  Type 2 diabetes mellitus with stage 4 chronic kidney disease, with long-term current use of insulin: Type 2 diabetes mellitus with stage 4 chronic kidney disease, with long-term current use of insulin Patient is a 70y old  Male who presents with a chief complaint of L 2MT ulcer (01 Sep 2020 16:57)      SUBJECTIVE / OVERNIGHT EVENTS:    Patient seen and examined.   no acute events. denies cp sob nvd abd pain.    Vital Signs Last 24 Hrs  T(C): 36.4 (02 Sep 2020 05:09), Max: 36.7 (02 Sep 2020 01:12)  T(F): 97.6 (02 Sep 2020 05:09), Max: 98.1 (02 Sep 2020 01:12)  HR: 60 (02 Sep 2020 05:09) (60 - 67)  BP: 104/65 (02 Sep 2020 05:09) (93/58 - 117/73)  BP(mean): --  RR: 18 (02 Sep 2020 05:09) (18 - 18)  SpO2: 99% (02 Sep 2020 05:09) (97% - 100%)  I&O's Summary    01 Sep 2020 07:01  -  02 Sep 2020 07:00  --------------------------------------------------------  IN: 840 mL / OUT: 0 mL / NET: 840 mL        PE:  GENERAL: NAD, AAOx3  HEAD:  Atraumatic, Normocephalic  CHEST/LUNG: CTABL, No wheeze  HEART: Regular rate and rhythm; no murmur  ABDOMEN: Soft, Nontender, Nondistended; Bowel sounds present  EXTREMITIES: left 2nd digit wount, some purulent discharge on dressing  SKIN: No rashes or lesions  NEURO: No focal deficits    LABS:                        8.6    4.62  )-----------( 24       ( 02 Sep 2020 07:19 )             26.3     09-02    136  |  104  |  76<H>  ----------------------------<  66<L>  3.6   |  21<L>  |  2.18<H>    Ca    8.0<L>      02 Sep 2020 07:19      PT/INR - ( 02 Sep 2020 07:19 )   PT: 14.7 sec;   INR: 1.25 ratio         PTT - ( 02 Sep 2020 07:19 )  PTT:26.5 sec  CAPILLARY BLOOD GLUCOSE      POCT Blood Glucose.: 117 mg/dL (02 Sep 2020 06:43)  POCT Blood Glucose.: 72 mg/dL (02 Sep 2020 06:17)  POCT Blood Glucose.: 73 mg/dL (02 Sep 2020 05:51)  POCT Blood Glucose.: 67 mg/dL (02 Sep 2020 05:33)  POCT Blood Glucose.: 63 mg/dL (02 Sep 2020 05:21)  POCT Blood Glucose.: 226 mg/dL (02 Sep 2020 00:09)  POCT Blood Glucose.: 311 mg/dL (01 Sep 2020 21:28)  POCT Blood Glucose.: 280 mg/dL (01 Sep 2020 17:57)  POCT Blood Glucose.: 103 mg/dL (01 Sep 2020 13:05)  POCT Blood Glucose.: 87 mg/dL (01 Sep 2020 11:39)            RADIOLOGY & ADDITIONAL TESTS:    Imaging Personally Reviewed:  [x] YES  [ ] NO    Consultant(s) Notes Reviewed:  [x] YES  [ ] NO    MEDICATIONS  (STANDING):  ampicillin/sulbactam  IVPB 3 Gram(s) IV Intermittent every 8 hours  atorvastatin 40 milliGRAM(s) Oral at bedtime  carvedilol 12.5 milliGRAM(s) Oral every 12 hours  dextrose 5%. 1000 milliLiter(s) (50 mL/Hr) IV Continuous <Continuous>  dextrose 50% Injectable 12.5 Gram(s) IV Push once  dextrose 50% Injectable 25 Gram(s) IV Push once  dextrose 50% Injectable 25 Gram(s) IV Push once  famotidine    Tablet 20 milliGRAM(s) Oral daily  furosemide    Tablet 40 milliGRAM(s) Oral daily  insulin glargine Injectable (LANTUS) 28 Unit(s) SubCutaneous at bedtime  insulin lispro (HumaLOG) corrective regimen sliding scale   SubCutaneous every 6 hours  insulin lispro Injectable (HumaLOG) 25 Unit(s) SubCutaneous three times a day before meals  levothyroxine 125 MICROGram(s) Oral daily  lisinopril 10 milliGRAM(s) Oral daily  mupirocin 2% Ointment 1 Application(s) Topical two times a day  niacin  milliGRAM(s) Oral at bedtime  predniSONE   Tablet 40 milliGRAM(s) Oral daily  sodium bicarbonate 650 milliGRAM(s) Oral two times a day  sodium chloride 0.9%. 1000 milliLiter(s) (75 mL/Hr) IV Continuous <Continuous>  spironolactone 25 milliGRAM(s) Oral daily    MEDICATIONS  (PRN):  acetaminophen   Tablet .. 650 milliGRAM(s) Oral every 6 hours PRN Temp greater or equal to 38.5C (101.3F), Mild Pain (1 - 3)  dextrose 40% Gel 15 Gram(s) Oral once PRN Blood Glucose LESS THAN 70 milliGRAM(s)/deciliter  glucagon  Injectable 1 milliGRAM(s) IntraMuscular once PRN Glucose LESS THAN 70 milligrams/deciliter      Care Discussed with Consultants/Other Providers [x] YES  [ ] NO    HEALTH ISSUES - PROBLEM Dx:  Pituitary adenoma: Pituitary adenoma  Hyponatremia: Hyponatremia  Hyperkalemia: Hyperkalemia  Chronic kidney disease (CKD): Chronic kidney disease (CKD)  Hyperprolactinemia: Hyperprolactinemia  Hypothyroidism, unspecified type: Hypothyroidism, unspecified type  Hypertension, unspecified type: Hypertension, unspecified type  Hyperlipidemia, unspecified hyperlipidemia type: Hyperlipidemia, unspecified hyperlipidemia type  Diabetic toe ulcer: Diabetic toe ulcer  Type 2 diabetes mellitus with stage 4 chronic kidney disease, with long-term current use of insulin: Type 2 diabetes mellitus with stage 4 chronic kidney disease, with long-term current use of insulin

## 2020-09-02 NOTE — PROGRESS NOTE ADULT - SUBJECTIVE AND OBJECTIVE BOX
[facility] DIVISION OF KIDNEY DISEASES AND HYPERTENSION   -- FOLLOW UP NOTE --   Holli Tomlin  Nephrology Fellow  Pager NS: [phone]   /  Pager LIKARINE: [phone]  (after 5pm or weekend please page the on-call fellow)  --------------------------------------------------------------------------------  24 hour events/subjective:  - overnight no events reported, vitals afebrile, UOP unsaved  - patient seen and examined at bedside this morning without complaints sitting comfortably in chair on room air.  Denies any fever/chills, toe pain  - vitals/lab/medications reviewed, noted for downtrend Hgb 10.3 to 8.6 denies overt bleed    PAST HISTORY  --------------------------------------------------------------------------------  No significant changes to PMH, PSH, FHx, SHx, unless otherwise noted    ALLERGIES & MEDICATIONS  --------------------------------------------------------------------------------  Allergies    No Known Allergies    Intolerances    Standing Inpatient Medications  ampicillin/sulbactam  IVPB 3 Gram(s) IV Intermittent every 8 hours  atorvastatin 40 milliGRAM(s) Oral at bedtime  carvedilol 12.5 milliGRAM(s) Oral every 12 hours  dextrose 5%. 1000 milliLiter(s) IV Continuous <Continuous>  dextrose 50% Injectable 12.5 Gram(s) IV Push once  dextrose 50% Injectable 25 Gram(s) IV Push once  dextrose 50% Injectable 25 Gram(s) IV Push once  famotidine    Tablet 20 milliGRAM(s) Oral daily  furosemide    Tablet 40 milliGRAM(s) Oral daily  insulin glargine Injectable (LANTUS) 28 Unit(s) SubCutaneous at bedtime  insulin lispro (HumaLOG) corrective regimen sliding scale   SubCutaneous every 6 hours  insulin lispro Injectable (HumaLOG) 25 Unit(s) SubCutaneous three times a day before meals  levothyroxine 125 MICROGram(s) Oral daily  lisinopril 10 milliGRAM(s) Oral daily  mupirocin 2% Ointment 1 Application(s) Topical two times a day  niacin  milliGRAM(s) Oral at bedtime  predniSONE   Tablet 40 milliGRAM(s) Oral daily  sodium bicarbonate 650 milliGRAM(s) Oral two times a day  sodium chloride 0.9%. 1000 milliLiter(s) IV Continuous <Continuous>  spironolactone 25 milliGRAM(s) Oral daily    PRN Inpatient Medications  acetaminophen   Tablet .. 650 milliGRAM(s) Oral every 6 hours PRN  dextrose 40% Gel 15 Gram(s) Oral once PRN  glucagon  Injectable 1 milliGRAM(s) IntraMuscular once PRN    REVIEW OF SYSTEMS  --------------------------------------------------------------------------------  Gen: no fever, chills, weakness  Respiratory: No dyspnea, cough  CV: No chest pain, orthopnea  GI: No abdominal pain, nausea, vomiting, diarrhea  MSK: no edema  Neuro: No dizziness, lightheadedness  Heme: No bleeding  All other systems were reviewed and are negative, except as noted.    VITALS/PHYSICAL EXAM  --------------------------------------------------------------------------------  T(C): 36.4 (09-02-20 @ 09:21), Max: 36.7 (09-02-20 @ 01:12)  HR: 60 (09-02-20 @ 09:21) (60 - 67)  BP: 92/57 (09-02-20 @ 09:21) (92/57 - 104/65)  RR: 18 (09-02-20 @ 09:21) (18 - 18)  SpO2: 98% (09-02-20 @ 09:21) (97% - 100%)  Wt(kg): --    09-01-20 @ 07:01  -  09-02-20 @ 07:00  --------------------------------------------------------  IN: 840 mL / OUT: 0 mL / NET: 840 mL    Physical Exam:              Gen: NAD, well-appearing on room air  	HEENT: moist mucous membrane  	Pulm: CTA B/L, no crackles  	CV: RRR, S1S2; no rub/murmur  	GI: +BS, soft, nontender/nondistended  	: No suprapubic tenderness  	MSK: no edema, b/l lower ext chronic venous changes              Neuro: AAOx3  	Psych: Normal affect and mood    LABS/STUDIES  --------------------------------------------------------------------------------              8.6    4.62  >-----------<  24       [09-02-20 @ 07:19]              26.3     136  |  104  |  76  ----------------------------<  66      [09-02-20 @ 07:19]  3.6   |  21  |  2.18        Ca     8.0     [09-02-20 @ 07:19]      PT/INR: PT 14.7 , INR 1.25       [09-02-20 @ 07:19]  PTT: 26.5       [09-02-20 @ 07:19]      Creatinine Trend:  SCr 2.18 [09-02 @ 07:19]  SCr 2.06 [09-01 @ 07:24]  SCr 2.18 [08-31 @ 06:59]  SCr 1.96 [08-30 @ 06:45]  SCr 1.57 [08-29 @ 06:12]        HbA1c 8.1      [03-31-20 @ 13:59]  TSH 3.28      [08-27-20 @ 08:25]

## 2020-09-02 NOTE — BRIEF OPERATIVE NOTE - OPERATION/FINDINGS
s/p Left foot 2nd toe amputation   low concern for residual infection  low concern for viability   adequate intraoperative bleeding  no purulent discharge

## 2020-09-02 NOTE — PROGRESS NOTE ADULT - ASSESSMENT
71 y/o male with hx of liver ca (not on active chemotherapy), recently diagnosed ITP with thrombocytopenia, T2DM c/b multiple amputations of toes/PAF/CAD, hypothyroidism, pituitary adenoma, HF with ICD placement, HLD, presenting for 2nd toe on left foot ulceration and infection x few days. Patient was seen by his outpatient podiatrist who recommended visiting the ED at this time for abx and admission. Endocrinology consulted for hyperglycemia and T2DM management. Hypoglycemic on fasting glucose last 2 days from wearing off of steroid effect. NPO today for toe amputation. Will adjust regimen. BG goal (100-180mg/dl).

## 2020-09-03 NOTE — PROGRESS NOTE ADULT - SUBJECTIVE AND OBJECTIVE BOX
Patient is a 70y old  Male who presents with a chief complaint of toe pain cellulitis, osteomyelitis (02 Sep 2020 11:00)       INTERVAL HPI/OVERNIGHT EVENTS:  Patient seen and evaluated at bedside.  Pt is resting comfortable in NAD. Denies N/V/F/C.      Allergies    No Known Allergies    Intolerances        Vital Signs Last 24 Hrs  T(C): 36.6 (03 Sep 2020 09:26), Max: 36.7 (02 Sep 2020 13:40)  T(F): 97.9 (03 Sep 2020 09:26), Max: 98.1 (02 Sep 2020 13:40)  HR: 60 (03 Sep 2020 09:26) (57 - 89)  BP: 93/51 (03 Sep 2020 09:26) (88/48 - 114/62)  BP(mean): 73 (02 Sep 2020 17:00) (71 - 77)  RR: 18 (03 Sep 2020 09:26) (16 - 18)  SpO2: 100% (03 Sep 2020 09:26) (97% - 110%)    LABS:                        8.2    3.22  )-----------( 17       ( 03 Sep 2020 07:19 )             25.7     09-03    134<L>  |  101  |  75<H>  ----------------------------<  97  4.3   |  21<L>  |  2.05<H>    Ca    8.0<L>      03 Sep 2020 07:19      PT/INR - ( 02 Sep 2020 07:19 )   PT: 14.7 sec;   INR: 1.25 ratio         PTT - ( 02 Sep 2020 07:19 )  PTT:26.5 sec    CAPILLARY BLOOD GLUCOSE      POCT Blood Glucose.: 100 mg/dL (03 Sep 2020 08:44)  POCT Blood Glucose.: 258 mg/dL (02 Sep 2020 21:55)  POCT Blood Glucose.: 139 mg/dL (02 Sep 2020 18:48)  POCT Blood Glucose.: 172 mg/dL (02 Sep 2020 17:01)  POCT Blood Glucose.: 145 mg/dL (02 Sep 2020 12:46)      Lower Extremity Physical Exam:  Vasular: DP/PT 2/4, B/L, CFT < 3 seconds B/L, Temperature gradient  warm to cool, B/L.   Neuro: Epicritic sensation diminished to the level of ankle  B/L.  Musculoskeletal/Ortho: s/p partial digital amps 1-3 on R    s/p Left foot 2nd toe amputation closed (DOS 9/2/20)  - Left foot surgical site with intact sutures, well coapted, no dehiscence, viable flaps, mild sanguinous oozing controlled, no signs of infection.    RADIOLOGY & ADDITIONAL TESTS:    < from: Xray Foot AP + Lateral + Oblique, Left (09.02.20 @ 17:20) >    EXAM:  FOOT COMPLETE LEFT (MIN 3 VIEWS)                            PROCEDURE DATE:  09/02/2020            INTERPRETATION:  EXAMINATION: 3 views of the left foot    CLINICAL INFORMATION: s/p left foot 2nd toe amputation    Comparison: 8/26/2020    IMPRESSION:    Status post interval amputation of the second digit phalanges. Adjacent soft tissue swelling. No other interval change.    Advanced first metatarsophalangeal joint and interphalangeal joint arthrosis. Tarsometatarsal arthrosis. Plantar and dorsal calcaneal enthesophytes. Vascular calcifications.                  JENNIFER ISIDRO M.D., ATTENDING RADIOLOGIST  This document has been electronically signed. Sep  3 2020  8:19AM        < end of copied text >

## 2020-09-03 NOTE — PROGRESS NOTE ADULT - PROBLEM SELECTOR PROBLEM 3
Hyponatremia
Hyperlipidemia, unspecified hyperlipidemia type
Hyperlipidemia, unspecified hyperlipidemia type
Hyponatremia
Hypertension
Hypertension
Hyperlipidemia, unspecified hyperlipidemia type
Hyperlipidemia, unspecified hyperlipidemia type
Hypothyroidism, unspecified type
Hyperlipidemia, unspecified hyperlipidemia type

## 2020-09-03 NOTE — PROGRESS NOTE ADULT - ASSESSMENT
71 y/o male with hx of liver ca (not on active chemotherapy), recently diagnosed ITP with thrombocytopenia, T2DM c/b multiple amputations of toes/PAF/CAD, hypothyroidism, pituitary adenoma, HF with ICD placement, HLD, presenting for 2nd toe on left foot ulceration and infection x few days. Patient was seen by his outpatient podiatrist who recommended visiting the ED at this time for abx and admission. Endocrinology consulted for hyperglycemia and T2DM management. S/p 2nd left toe amputation. Insulin adjusted while inpatient. Creatinine elevated so will stop oral hypoglycemics for now given that pt is on basal/bolus regimen while on steroids. Reviewed discharge plan w/pt. BG goal (100-180mg/dl). Instructed must take prednisone in AM prior to eating meals/insulin therapy to prevent lows..

## 2020-09-03 NOTE — PROGRESS NOTE ADULT - ASSESSMENT
71 yo male patient s/p Left foot 2nd toe amputation closed (DOS 9/2/20)  - Pt seen and evaluated  - Afebrile, no leukocytosis  - Left foot surgical site with intact sutures, well coapted, no dehiscence, viable flaps, mild sanguinous oozing controlled, no signs of infection.  - Keep dressing clean, dry and intact  - WBAT to LLE to heel in post op shoe  - Discharge info in paperwork  - Stable for discharge on PO Clinda BID 7 days - no need to wait for OR data  - Discussed with attending 69 yo male patient s/p Left foot 2nd toe amputation closed (DOS 9/2/20)  - Pt seen and evaluated  - Afebrile, no leukocytosis  - Left foot surgical site with intact sutures, well coapted, no dehiscence, viable flaps, mild sanguinous oozing controlled, no signs of infection. Mild ecchymosis to plantar surgical site and 3rd digit to monitor.  - Keep dressing clean, dry and intact  - WBAT to LLE to heel in post op shoe  - Discharge info in paperwork  - Stable for discharge on PO Clinda BID 7 days - no need to wait for OR data  - Discussed with attending

## 2020-09-03 NOTE — PHYSICAL THERAPY INITIAL EVALUATION ADULT - ADDITIONAL COMMENTS
Patient reports he lives in a house with his wife with no steps to enter and 1 flight inside. Patient reports he does not need to go to the second floor and use the steps when returning home. Patient also states he owns a rolling walker and cane. Patient's prior level of function prior to hospitalization was independent.

## 2020-09-03 NOTE — PROGRESS NOTE ADULT - PROBLEM SELECTOR PLAN 5
PRL is 0.5 so would stop cabergoline, f/u as an outpatient  Discussed with PARKER Forbes and Medicine NP Kendal Arias MD  Pager: [phone], between 9am-6pm  Nights and Weekends: [phone]
Hyponatremia 2/2 iatrogenic from D5W fluid, resolved last sNa 136  - monitor BMP
Hyponatremia 2/2 iatrogenic from D5W fluid, resolved last sNa 136  - monitor BMP
-levothyroxine 125mcg po qdaily, an hour before other meds and food.    pager: [phone]   office: [phone] (9a-5pm) [phone] (off hours)    -I spent a total time of 25 mins with the patient at bedside/on unit of which more than 50% of time was spent on counseling/coordination of care.
PRL is 0.5 so would stop cabergoline, f/u as an outpatient  Discussed with Medicine NP  Lila Arias MD  Pager: [phone], between 9am-6pm  Nights and Weekends: [phone]

## 2020-09-03 NOTE — PROGRESS NOTE ADULT - REASON FOR ADMISSION
toe ulcer to bone
osteo r 2nd toe
toe osteo
toe ulcer to bone
toe cellulitis, osteomyelitis
L 2MT ulcer
Toe ulcer
toe cellulitis / osteomyelitis
toe pain cellulitis, osteomyelitis
2 L MT OM

## 2020-09-03 NOTE — RESULTS/DATA
[FreeTextEntry1] : discussed plan for BM\par pretx Cbc plat transfusion.\par continue to hold asa no nsaids\par

## 2020-09-03 NOTE — PROGRESS NOTE ADULT - SUBJECTIVE AND OBJECTIVE BOX
Diabetes Follow up note:    Chief complaint: T2DM/steroid induced hyperglycemia    Interval Hx: s/p L 2nd toe amputation. Fasting glucose 100mg/dl this AM. Pt reports good appetite and no pain. Discharge planning for home today. To remain on Prednisone 40mg daily for now.     Review of Systems:  General: denies pain  GI: Tolerating POs. Denies N/V/D/Abd pain  CV: Denies CP/SOB  ENDO: No S&Sx of hypoglycemia  MEDS:  atorvastatin 40 milliGRAM(s) Oral at bedtime    insulin glargine Injectable (LANTUS) 20 Unit(s) SubCutaneous at bedtime  insulin lispro (HumaLOG) corrective regimen sliding scale   SubCutaneous three times a day before meals  insulin lispro (HumaLOG) corrective regimen sliding scale   SubCutaneous at bedtime  insulin lispro Injectable (HumaLOG) 25 Unit(s) SubCutaneous three times a day before meals  levothyroxine 125 MICROGram(s) Oral daily  niacin  milliGRAM(s) Oral at bedtime  predniSONE   Tablet 40 milliGRAM(s) Oral daily    ampicillin/sulbactam  IVPB 3 Gram(s) IV Intermittent every 8 hours    Allergies    No Known Allergies        PE:  General: Male sitting in chair. NAD  Vital Signs Last 24 Hrs  T(C): 36.6 (03 Sep 2020 09:26), Max: 36.7 (02 Sep 2020 13:40)  T(F): 97.9 (03 Sep 2020 09:26), Max: 98.1 (02 Sep 2020 13:40)  HR: 60 (03 Sep 2020 09:26) (57 - 89)  BP: 93/51 (03 Sep 2020 09:26) (88/48 - 114/62)  BP(mean): 73 (02 Sep 2020 17:00) (71 - 77)  RR: 18 (03 Sep 2020 09:26) (16 - 18)  SpO2: 100% (03 Sep 2020 09:26) (97% - 110%)  Abd: Soft, NT,ND,   Extremities: Warm. L foot dsg c/d/i  Neuro: A&O X3    LABS:  POCT Blood Glucose.: 100 mg/dL (09-03-20 @ 08:44)  POCT Blood Glucose.: 258 mg/dL (09-02-20 @ 21:55)  POCT Blood Glucose.: 139 mg/dL (09-02-20 @ 18:48)  POCT Blood Glucose.: 172 mg/dL (09-02-20 @ 17:01)  POCT Blood Glucose.: 145 mg/dL (09-02-20 @ 12:46)  POCT Blood Glucose.: 117 mg/dL (09-02-20 @ 06:43)  POCT Blood Glucose.: 72 mg/dL (09-02-20 @ 06:17)  POCT Blood Glucose.: 73 mg/dL (09-02-20 @ 05:51)  POCT Blood Glucose.: 67 mg/dL (09-02-20 @ 05:33)  POCT Blood Glucose.: 63 mg/dL (09-02-20 @ 05:21)  POCT Blood Glucose.: 226 mg/dL (09-02-20 @ 00:09)  POCT Blood Glucose.: 311 mg/dL (09-01-20 @ 21:28)  POCT Blood Glucose.: 280 mg/dL (09-01-20 @ 17:57)  POCT Blood Glucose.: 103 mg/dL (09-01-20 @ 13:05)  POCT Blood Glucose.: 87 mg/dL (09-01-20 @ 11:39)  POCT Blood Glucose.: 173 mg/dL (08-31-20 @ 21:52)  POCT Blood Glucose.: 246 mg/dL (08-31-20 @ 17:58)  POCT Blood Glucose.: 326 mg/dL (08-31-20 @ 12:18)                            8.2    3.22  )-----------( 17       ( 03 Sep 2020 07:19 )             25.7       09-03    134<L>  |  101  |  75<H>  ----------------------------<  97  4.3   |  21<L>  |  2.05<H>    Ca    8.0<L>      03 Sep 2020 07:19        Thyroid Function Tests:  08-27 @ 08:25 TSH 3.28 FreeT4 -- T3 -- Anti TPO -- Anti Thyroglobulin Ab -- TSI --      A1C with Estimated Average Glucose Result: 9.2 % (08-27-20 @ 15:29)  Hemoglobin A1C, Whole Blood: 8.1 % (03-31-20 @ 13:59)          Contact number: beeper [phone] or [phone]

## 2020-09-03 NOTE — PROGRESS NOTE ADULT - PROBLEM SELECTOR PLAN 1
-Fasting BS at goal but during the day prednisone makes his other bs elevated  -Continue Lantus 38 units sq qhs  -Increase Humalog to 40 units sq tid-ac  -Moderate scale tid-ac/qhs  -Renal/diabetic diet  DISPO: basal/bolus with doses TBD  F/u with Dr. Boy Calixto, endocrine 10/26/2020 at 10am at [address] [facility] [address], [place] [address]
-test BG AC/HS  -Decrease Lantus 18 units QHS  -c/w Humalog 25 units TID w/meals  -c/w Humalog moderate correction scale AC and Mod HS scale  Discharge plan:  Stop Metformin and Januvia for borderline GFR  Discharge on Lantus 18 units QHS  Humalog 25 units w/meals. Will need to closely monitor glucose and correspond with office. Aware that he will need adjustments if steroid regimen is adjusted.   F/u with Dr. Boy Calixto, endocrine 10/26/2020 at 10am at [address] [facility] [address], [place] [address].
-test BG AC/HS when diet restarted  -Decrease Lantus 20 units QHS  -Increase Humalog 30 units AC meals  -c/w Humalog low correction scale Q6h. Change to moderate correction scale AC and low HS scale once diet advanced.   -Renal/diabetic diet  DISPO: basal/bolus with doses TBD  -notify endocrine team if any changes to steroid regimen  F/u with Dr. Boy Calixto, endocrine 10/26/2020 at 10am at [address] [facility] [address], [place] [address].
-Fasting BS at goal but during the day prednisone makes his other bs elevated  -Pt with serum glucose of 54 and POC of 87. Also he shall be NPO after 12am, so decrease Lantus to 28 units sq qhs  -Decrease Humalog to 25 units sq tid-ac  -Moderate scale tid-ac/qhs. Tomorrow when NPO the scale should be q6.  -Renal/diabetic diet  DISPO: basal/bolus with doses TBD  F/u with Dr. Boy Calixto, endocrine 10/26/2020 at 10am at [address] [facility] [address], [place] [address]

## 2020-09-03 NOTE — PROGRESS NOTE ADULT - PROVIDER SPECIALTY LIST ADULT
Cardiology
Cardiology
Endocrinology
Heme/Onc
Heme/Onc
Infectious Disease
Internal Medicine
Nephrology
Podiatry
Cardiology
Heme/Onc
Podiatry
Internal Medicine
Infectious Disease
Nephrology

## 2020-09-03 NOTE — PROGRESS NOTE ADULT - ATTENDING COMMENTS
S/P amputation LE digits  1.  CKD3--stable periop.  NO HD requirements at this time.  Will need close outpt f/u to preserve renal function

## 2020-09-03 NOTE — PROGRESS NOTE ADULT - SUBJECTIVE AND OBJECTIVE BOX
[facility] DIVISION OF KIDNEY DISEASES AND HYPERTENSION   -- FOLLOW UP NOTE --   Holli Tomlin  Nephrology Fellow  Pager NS: [phone]   /  Pager LIKARINE: [phone]  (after 5pm or weekend please page the on-call fellow)  --------------------------------------------------------------------------------  24 hour events/subjective:  - yesterday patient went to OR for L toes amputation, no complication  - overnight no events reported, vitals afebrile no hypotensive episode, total UOP voided 500 cc in the past 24hr  - patient seen and examined at bedside this morning without complaints sitting comfortably in chair, denies any pain  - vitals/lab/medications reviewed, noted for downtrending sCr     PAST HISTORY  --------------------------------------------------------------------------------  No significant changes to PMH, PSH, FHx, SHx, unless otherwise noted    ALLERGIES & MEDICATIONS  --------------------------------------------------------------------------------  Allergies    No Known Allergies    Intolerances    Standing Inpatient Medications  ampicillin/sulbactam  IVPB 3 Gram(s) IV Intermittent every 8 hours  atorvastatin 40 milliGRAM(s) Oral at bedtime  carvedilol 12.5 milliGRAM(s) Oral every 12 hours  dextrose 5%. 1000 milliLiter(s) IV Continuous <Continuous>  dextrose 50% Injectable 12.5 Gram(s) IV Push once  dextrose 50% Injectable 25 Gram(s) IV Push once  dextrose 50% Injectable 25 Gram(s) IV Push once  famotidine    Tablet 20 milliGRAM(s) Oral daily  furosemide    Tablet 40 milliGRAM(s) Oral daily  insulin glargine Injectable (LANTUS) 18 Unit(s) SubCutaneous at bedtime  insulin lispro (HumaLOG) corrective regimen sliding scale   SubCutaneous three times a day before meals  insulin lispro (HumaLOG) corrective regimen sliding scale   SubCutaneous at bedtime  insulin lispro Injectable (HumaLOG) 25 Unit(s) SubCutaneous three times a day before meals  levothyroxine 125 MICROGram(s) Oral daily  lisinopril 10 milliGRAM(s) Oral daily  mupirocin 2% Ointment 1 Application(s) Topical two times a day  niacin  milliGRAM(s) Oral at bedtime  predniSONE   Tablet 40 milliGRAM(s) Oral daily  sodium bicarbonate 650 milliGRAM(s) Oral two times a day  spironolactone 25 milliGRAM(s) Oral daily    PRN Inpatient Medications  acetaminophen   Tablet .. 650 milliGRAM(s) Oral every 6 hours PRN  acetaminophen   Tablet .. 650 milliGRAM(s) Oral every 6 hours PRN  dextrose 40% Gel 15 Gram(s) Oral once PRN  glucagon  Injectable 1 milliGRAM(s) IntraMuscular once PRN  HYDROmorphone  Injectable 0.5 milliGRAM(s) IV Push every 4 hours PRN  oxycodone    5 mG/acetaminophen 325 mG 1 Tablet(s) Oral every 4 hours PRN    REVIEW OF SYSTEMS  --------------------------------------------------------------------------------  Gen: no fever, chills, weakness  Respiratory: No dyspnea, cough  CV: No chest pain, orthopnea  GI: No abdominal pain, nausea, vomiting, diarrhea  MSK: no edema  Neuro: No dizziness, lightheadedness  Heme: No bleeding  All other systems were reviewed and are negative, except as noted.    VITALS/PHYSICAL EXAM  --------------------------------------------------------------------------------  T(C): 36.6 (09-03-20 @ 09:26), Max: 36.7 (09-02-20 @ 13:40)  HR: 60 (09-03-20 @ 11:13) (57 - 89)  BP: 100/66 (09-03-20 @ 11:13) (88/48 - 114/62)  RR: 18 (09-03-20 @ 09:26) (16 - 18)  SpO2: 100% (09-03-20 @ 11:13) (97% - 110%)  Wt(kg): --  Height (cm): 180.34 (09-02-20 @ 14:00)  Weight (kg): 86.6 (09-02-20 @ 14:00)  BMI (kg/m2): 26.6 (09-02-20 @ 14:00)  BSA (m2): 2.07 (09-02-20 @ 14:00)    09-02-20 @ 07:01  -  09-03-20 @ 07:00  --------------------------------------------------------  IN: 440 mL / OUT: 500 mL / NET: -60 mL    09-03-20 @ 07:01  -  09-03-20 @ 12:19  --------------------------------------------------------  IN: 360 mL / OUT: 0 mL / NET: 360 mL    Physical Exam:              Gen: NAD, well-appearing on room air  	HEENT: moist mucous membrane  	Pulm: CTA B/L, no crackles  	CV: RRR, S1S2; no rub/murmur  	GI: +BS, soft, nontender/nondistended  	: No suprapubic tenderness  	MSK: no edema, b/l lower ext chronic venous changes, L foot 2-3 toe amputated covered              Neuro: AAOx3  	Psych: Normal affect and mood    LABS/STUDIES  --------------------------------------------------------------------------------              8.2    3.22  >-----------<  17       [09-03-20 @ 07:19]              25.7     134  |  101  |  75  ----------------------------<  97      [09-03-20 @ 07:19]  4.3   |  21  |  2.05        Ca     8.0     [09-03-20 @ 07:19]      PT/INR: PT 14.7 , INR 1.25       [09-02-20 @ 07:19]  PTT: 26.5       [09-02-20 @ 07:19]      Creatinine Trend:  SCr 2.05 [09-03 @ 07:19]  SCr 2.18 [09-02 @ 07:19]  SCr 2.06 [09-01 @ 07:24]  SCr 2.18 [08-31 @ 06:59]  SCr 1.96 [08-30 @ 06:45]        HbA1c 8.1      [03-31-20 @ 13:59]  TSH 3.28      [08-27-20 @ 08:25]

## 2020-09-03 NOTE — PHYSICAL THERAPY INITIAL EVALUATION ADULT - PERTINENT HX OF CURRENT PROBLEM, REHAB EVAL
PMH liver ca, thrombocytopenia, poorly controlled IDDM, ICD placement, and HLD presenting for 2nd toe on left foot ulceration and infection x few days. PVD complicated by osteomyelitis s/p multiple toe amputations, recently treated for ITP with prednisone,present with L toe pain - admitted for cellulitis 8/26 found to have osteomyelitis pending platelet improvement. On 09/02 patient underwent Left foot 2nd toe amputation.

## 2020-09-03 NOTE — PROGRESS NOTE ADULT - PROBLEM SELECTOR PLAN 4
-levothyroxine 125mcg po qdaily, an hour before other meds and food
Hyperkalemia 2/2 hyperglycemia, resolved, last serum K 3.6  - Continue to trend and control hyperglycemia.
Hyperkalemia 2/2 hyperglycemia, resolved, last serum K 3.6  - Continue to trend and control hyperglycemia.
-PRL is 0.5 so cabergoline stopped while inpatient by endo attending, f/u as an outpatient.    discussed plan w/pt and medicine NP  pager: [phone]   office:  [phone] (9a-5pm)/ [phone] (off hours)     -I spent a total time of 25 mins with the patient at bedside/on unit of which more than 50% of time was spent on counseling/coordination of care/discharge planning.
-levothyroxine 125mcg po qdaily, an hour before other meds and food
PRL is 0.5 so would stop cabergoline, f/u as an outpatient

## 2020-09-03 NOTE — PROGRESS NOTE ADULT - PROBLEM SELECTOR PROBLEM 4
Hypertension, unspecified type
Hypertension, unspecified type
Hyperkalemia
Hyperkalemia
Hypothyroidism, unspecified type
Pituitary adenoma
Pituitary adenoma
Hypothyroidism, unspecified type

## 2020-09-03 NOTE — PROGRESS NOTE ADULT - PROBLEM SELECTOR PROBLEM 1
Chronic kidney disease (CKD)
Type 2 diabetes mellitus with stage 4 chronic kidney disease, with long-term current use of insulin

## 2020-09-03 NOTE — PROGRESS NOTE ADULT - PROBLEM SELECTOR PLAN 3
BP in acceptable range. Monitor BP on current BP medications. Low salt diet advised
BP in acceptable range. Monitor BP on current BP medications. Low salt diet advised
-atorvastatin 40mg po qhs
-atorvastatin 40mg po qhs.
-levothyroxine 125mcg po qdaily, an hour before other meds and food.
-atorvastatin 40mg po qhs

## 2020-09-03 NOTE — PROGRESS NOTE ADULT - PROBLEM SELECTOR PLAN 2
2/2 CKD, last serum bicarb 21  - continue sodium bicarb tabs, monitor bicarb
2/2 CKD, last serum bicarb 21  - continue sodium bicarb tabs, monitor bicarb
-BP goal less than 130/80  -c/w anti-hypertensives
-at goal, less than 130/80 with lisinopril and spironolactone
-atorvastatin 40mg po qhs.
-at goal, less than 130/80 with lisinopril and spironolactone

## 2020-09-03 NOTE — PROGRESS NOTE ADULT - PROBLEM SELECTOR PROBLEM 5
Pituitary adenoma
Hypothyroidism, unspecified type
Hypothyroidism, unspecified type
Hyponatremia
Hyponatremia
Hypothyroidism, unspecified type
Pituitary adenoma

## 2020-09-03 NOTE — HISTORY OF PRESENT ILLNESS
[Disease: _____________________] : Disease: [unfilled] [T: ___] : T[unfilled] [N: ___] : N[unfilled] [M: ___] : M[unfilled] [AJCC Stage: ____] : AJCC Stage: [unfilled] [de-identified] : Mr. Harvey is a 69 y/o male with pmhx of ischemic cardiomyopathy, CHF, V-tach s/p AICD/PPM, CKD, FERNANDES/cirrhosis, chronic thrombocytopenia since at least 2012 (baseline plt count 40-60), presents to establish care for management of HCC and chronic thrombocytopenia. \par \par Pt was originally diagnosed with cirrhosis of the liver in 2017 after workup for elevated liver enzymes. He has been followed with serial imaging by Dr. Chapo Alaniz. He was referred to IR by Dr. Alaniz after imaging revealed liver lesion in Sept 2018. He is now s/p hepatic angiogram and embolization of 4 cm right hepatic tumor on 2/7/19 and hepatic angiogram and bland embolization of segment 8 and 5 on 4/18/19. \par \par He reports having issues with fluid retention and CHF exacerbations over the last year. He has worked with Dr. Rivers to adjust his diuretic regimen and had a significant amount of fluid weight loss in 3/2018. He was evaluated on 2/4/20 and is currently been stable over the last few months, with no CHF exacerbations. \par \par He denies any h/o significant bleeding. Has had plt transfusions with previous surgeries and did respond appropriately. \par \par Hep: Chapo Alaniz\par Cards: Tita\dayanara Nephrology: Racquel\par Endo: Nicole Hennessy\par \par 4/9/20: SIRT \par 6/23/20 CT segment 7 lesion: thick peripheral enhancement decreased peripheral enhancement ; segment 4A/8 post treatment site with viable tumor overall reduction in size of tumor \par \par 7/6/20 Platelet count 19K ; 7/13 repeat value is 16K ASA held [de-identified] : n/a [FreeTextEntry1] : s/p SIRT on April 2020  [de-identified] : Here for platelet transfusion persistent thrombocytopenia below 20K\par Feels well without spontaneous bruising or bleeding\par Appetite good, energy at baseline

## 2020-09-03 NOTE — PROGRESS NOTE ADULT - PROBLEM SELECTOR PROBLEM 2
Hyperkalemia
Diabetic toe ulcer
Diabetic toe ulcer
Hyperkalemia
Metabolic acidosis
Metabolic acidosis
Essential hypertension
Hyperlipidemia, unspecified hyperlipidemia type
Hypertension, unspecified type
Hypertension, unspecified type

## 2020-09-03 NOTE — PHYSICAL THERAPY INITIAL EVALUATION ADULT - NS ASR WT BEARING DETAIL LLE
partial weight-bearing 50/partial weight-bearing WBAT L heel in surgical shoe/weight-bearing as tolerated

## 2020-09-03 NOTE — PROGRESS NOTE ADULT - ASSESSMENT
70M PMHx CKD3 (1.8-2.1), ICM s/p AICD, PVD complicated by osteomyelitis s/p multiple toe amputations, recently treated for ITP with prednisone,present with L toe pain - admitted for cellulitis found to have osteomyelitis pending platelet improvement.  Nephrology consulted for management of CKD.      Chronic kidney disease, stage 3  Patient with CKD likely from DM with multiple episodes of JULIANNE over the last few months (ACE/ARB in conjunction with diuretic use with contrast exposures). Baseline creatinine ~1.8-2.1. Duplex ultrasound no renal artery stenosis.  Stable sCr at baseline, last sCr 2.1.  s/p toes amputation 9/2     Recommendations:  - no renal objection for discharge, follow outpatient nephrologist at [address] [place] [address] (office # [phone])  - Avoid nephrotoxic agents, HOLD metformin for now  - renally dose all medications per eGFR, optimize perfusion pressure, renal diet

## 2020-09-06 PROBLEM — Z29.8 NEED FOR PNEUMOCYSTIS PROPHYLAXIS: Status: ACTIVE | Noted: 2020-01-01

## 2020-09-22 NOTE — CONSULT NOTE ADULT - PROBLEM SELECTOR RECOMMENDATION 2
Called patient and she accepted the appt with LINA on 9/25 @ 12:50 and said to please thank LINA for working her in.   management per primary and podiatry team  outpatient f/u with Podiatry

## 2020-09-23 NOTE — HISTORY OF PRESENT ILLNESS
[de-identified] : This is a 70 year old man with a history of ischemic CM, CHF, Vtach s/p AICD/PPM, CKD, FERNANDES cirrhosis and chronic thrombocytopenia since 2012 who is seeing me for acute on chronic thrombocytopenia in the setting of HCC and possibly ITP. \par \par Pt was originally diagnosed with cirrhosis of the liver in 2017 after workup for elevated liver enzymes. He has been followed with serial imaging by Dr. Chapo Alaniz. He was referred to IR by Dr. Alaniz after imaging revealed liver lesion in Sept 2018. He is now s/p hepatic angiogram and embolization of 4 cm right hepatic tumor on 2/7/19 and hepatic angiogram and bland embolization of segment 8 and 5 on 4/18/19 as well as R lobe SIRT on 4/9/20.  \par \par He reports having issues with fluid retention and CHF exacerbations over the last year in 2019. He has worked with Dr. Rivers to adjust his diuretic regimen and had a significant amount of fluid weight loss in 3/2018. He was evaluated on 2/4/20 and is currently been stable over the last few months with no CHF exacerbations. \par \par He has a CT scan 9/23/20 and he will see Dr Hoyt shortly after.  His platelet count has remained between 40-60K. \par \par Looking back, although patient was diagnosed with Cirrhosis in 2017, he has had thrombocytopenia to the 70-80s since 2003. At this time his coags were normal but he had intermittent elevations of ast/alt. Then in 2012 his PT INR was slightly elevated and his platelet count was around 50-60K. INR and platelet count remained stable until 2018. When INR went up to 2 approx in 5/2018, platelets were at 40K, Although the iNR and the platelet count do not correlate. Then in 10/2019 plt went down to 35K, 3/31/20 at 29K. S/p procedure 4/9/20 (SIRT) with platelet count of 20K or less in june/july.  \par  \par Given drop BMBx was performed. 1, 2. Bone marrow biopsy and bone marrow aspirate \par      - Cellular marrow (40%) with slightly erythroid predominant maturing trilineage hematopoiesis \par      - No morphologic or immunophenotypic evidence supporting involvement by myelodysplasia.\par Megakaryocytes:	Normal in number (2-5 megakaryocytes/hpf), unremarkable morphology\par Variant of unknown significant ASXL1 on bioreference\par \par Started prednisone for possible ITP 40mg 8.1.20 with improvement of platelets to 24K, but blood sugars were increasing. Had a cellulitis on his toe, was admitted for toe infection.  IV antibiotics given. IVIG was given Platelet transufsion was done, amputated the toe with a platelet count of 17K. Sent home on Clindamycin.\par \par Prednisone now decreased to 30mg. Also on atovaquone 750BID. Blood sugars have been better as well. He has started was premeal insulin/humolog.\par Patient denies bleeding or bruising. \par \par Review of scans: 5/2019 + Splenomegaly

## 2020-09-24 NOTE — H&P PST ADULT - PRO PAIN EXPRESSION
Detail Level: Zone
Plan: Discussed with mother treatment options. Explained that in the patient’s age category it would be best to wait on treatment if he is remaining asymptomatic.
verbalization/none

## 2020-10-02 NOTE — HISTORY OF PRESENT ILLNESS
[FreeTextEntry1] : Mr. Jamil Harvey is a 69-year-old man with ischemic cardiomyopathy, ventricular tachycardia and appropriate ICD device firing. Uvaldo lead fractured and was capped and a new generator and lead implanted. After device adjustments to alleviate potential for pause dependent ventricular tachycardia has had no events. Otherwise, despite his long history of severe multivessel coronary artery disease and very remote coronary bypass surgery with severe ventricular dysfunction, has always been active and never experiences chest pain or dyspnea. Recurrent foot infection managed by podiatrist and ID and healed, but recurrent cellulitis with hospital stay for IV antibiotics and complication of JULIANNE, ultimately resolved. He always sleeps in recliner to manage obstructive sleep apnea as has been unable to tolerate any mask or device. Regular exercise includes walking, riding stationary bike 6 miles a day and golf. Now over 30 years coming to this practice since coronary bypass surgery.\par \par Last year onset of anasarca, diuretic dose increased with minimal response. Then added metolazone with dramatic response, 65 pound weight reduction and now weighs much less than at anytime in our record. Was feeling much better and denies dizziness, lightheadedness. Started on anticoagulation with plan for cardioversion, but instead admitted with septic bursitis of knee requiring orthopedic procedure and remarkably spontaneously reverted to sinus rhythm which continues to be maintained. Breathing improved, not retaining excess volume. Had ICD generator change for NÉSTOR and has also IR embolization of HCC.\par \par In March notified of device transmission indicating atrial tachycardia rates 100 to 110. Contacted patient who found rates 100 for few days then down to 80s and since then 60s to 70. Currently finds volume status optimal and all leg wounds have healed. 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 Discharged after procedure. In past week has begun retaining fluid, 13 pound weight gain, lower extremity edema, but denies dyspnea with his low level activities around home, chest pain, palpitations and has no orthopnea or paroxysmal nocturnal dyspnea  He observes continued good urine output. Did have CT scan with contrast on September 24, 2020.

## 2020-10-02 NOTE — DISCUSSION/SUMMARY
[Cardiomyopathy] : cardiomyopathy [NYHA Class II] : NYHA Class II [ACC/AHA Stage C] : ACC/AHA Stage C [Ischemic Cardiomyopathy] : ischemic cardiomyopathy [Coronary Artery Disease] : coronary artery disease [Stable] : stable [None] : none [Acute Systolic Heart Failure] : acute systolic congestive heart failure [Chronic Systolic Heart Failure] : chronic systolic congestive heart failure [Decompensated] : decompensated [Patient] : the patient [de-identified] : prolonged and refractory atrial tachycardia spontaneously reverted to sinus and maintained for prolonged period, brief period in March and back to his usual rhythm [de-identified] : concerns are that decompensation related to recent contrast challenge and JULIANNE, or effects of further decline in Hg (recently 8.2) [de-identified] : awaiting BMP result, 3 day trial of furosemide increase to 80 mg twice daily [FreeTextEntry3] : will call Monday 10/5 to discuss response and then consider follow up visit next week

## 2020-10-02 NOTE — REASON FOR VISIT
[Follow-Up - Clinic] : a clinic follow-up of [Cardiomyopathy] : cardiomyopathy [Coronary Artery Disease] : coronary artery disease [Heart Failure] : congestive heart failure [Ventricular Tachycardia] : ventricular tachycardia

## 2020-10-02 NOTE — PHYSICAL EXAM
[General Appearance - Well Developed] : well developed [Normal Appearance] : normal appearance [Well Groomed] : well groomed [General Appearance - Well Nourished] : well nourished [No Deformities] : no deformities [General Appearance - In No Acute Distress] : no acute distress [Normal Conjunctiva] : the conjunctiva exhibited no abnormalities [Eyelids - No Xanthelasma] : the eyelids demonstrated no xanthelasmas [Normal Oral Mucosa] : normal oral mucosa [No Oral Pallor] : no oral pallor [No Oral Cyanosis] : no oral cyanosis [JVD Elevated _____cm] : JVD elevated [unfilled] ~U cm above clavicle [Normal Jugular Venous V Waves Present] : normal jugular venous V waves present [No Jugular Venous Storm A Waves] : no jugular venous storm A waves [Respiration, Rhythm And Depth] : normal respiratory rhythm and effort [Exaggerated Use Of Accessory Muscles For Inspiration] : no accessory muscle use [Auscultation Breath Sounds / Voice Sounds] : lungs were clear to auscultation bilaterally [Not Palpable] : not palpable [Normal Rate] : normal [Rhythm Regular] : regular [Normal S1] : normal S1 [Normal S2] : normal S2 [No Gallop] : no gallop heard [No Murmur] : no murmurs heard [1+] : left 1+ [2+] : left 2+ [No Abnormalities] : the abdominal aorta was not enlarged and no bruit was heard [___ +] : bilateral [unfilled]U+ pitting edema to the ankles [Abdomen Soft] : soft [Abdomen Tenderness] : non-tender [Abdomen Mass (___ Cm)] : no abdominal mass palpated [Abnormal Walk] : normal gait [Nail Clubbing] : no clubbing of the fingernails [Cyanosis, Localized] : no localized cyanosis [Petechial Hemorrhages (___cm)] : no petechial hemorrhages [Skin Color & Pigmentation] : normal skin color and pigmentation [] : no rash [Skin Lesions] : no skin lesions [No Skin Ulcers] : no skin ulcer [No Xanthoma] : no  xanthoma was observed [Oriented To Time, Place, And Person] : oriented to person, place, and time [Affect] : the affect was normal [Mood] : the mood was normal [No Anxiety] : not feeling anxious [Right Carotid Bruit] : no bruit heard over the right carotid [Left Carotid Bruit] : no bruit heard over the left carotid [Rt] : no varicose veins of the right leg [FreeTextEntry1] : venous stasis discoloration of distal lower extremities

## 2020-10-08 NOTE — PATIENT PROFILE ADULT. - FUNCTIONAL SCREEN CURRENT LEVEL: COMMUNICATION, MLM
Impression: Chronic angle-closure glaucoma, bilateral, severe stage: C94.3496. Uncontrolled IOP OU, NLP left eye, aim is comfort on left Plan: Continue latanoprost qhs OD, add dorzolamide-timolol bid OD. IOP check in 3 weeks.   Blurred vision is likely due to optic nerve damage and YAG capsulotomy not likely to benefit.

 side effects of beta-blockers; dry eyes, red, stinging or painful eyes after using drops, blurry vision, breathing problems in people with asthma, emphysema, or COPD, a slow or irregular heartbeat, feeling tired, depression, dizziness

side effects of Carbonic anhydrase inhibitors; dry eye, stinging eyes, red eyes, blurry vision, a skin rash (especially in people who are allergic to sulfa drugs), changes in how things taste to you (especially with carbonated drinks), bad taste or upset stomach (nausea), feeling tired, decreased energy, increase in urination (with the pills), tingling around the mouth and fingertips (with the pills) (0) understands/communicates without difficulty

## 2020-10-15 NOTE — PHYSICAL EXAM
[Fully active, able to carry on all pre-disease performance without restriction] : Status 0 - Fully active, able to carry on all pre-disease performance without restriction [Normal] : no peripheral adenopathy appreciated [de-identified] : 2+ pitting edema, le discolored from chronic swelling [de-identified] : + Le skin discoloration from chronic swelling

## 2020-10-15 NOTE — ASSESSMENT
[FreeTextEntry1] : This is a 70 year old man who is here to see me for thrombocytopenia in the setting of liver disease. Low platelets date back to 2003, when he had ast/alt abnormalities but no diagnosis of cirrhosis.  \par \par I suspect that he may have an underlying ITP, evidenced by normal megas in the bone marrow and slight response to prednisone 40mg (plt count 13K moved to 24K) although this response was not significant likely because he has many other causes of thrombocytopenia.\par \par His cirrhosis is the main contributor to his thrombocytopenia, likely exacerbated by his splenomegaly and his recent SIRT likely created worsened thrombocytopenia (evidenced by lower numbers a few months after treatment). This is reported in patients who recently had SIRT have worsened thrombocytopenia, platelets reported to drop approx 20%. Patient dropped more than this, requiring transfusion likely given the multifactorial nature of his thrombocytopenia.\par \par Given his intolerance to steroids and his increase in platelet count with tpo agonist given in the hospital, Steroids are now off.\par \par He has received weekly Nplate with improvemen tin platelet count\par Given lack of a need to maintain platelet so high and he has not bled in the past, ok with him getting every other week nplate. \par WIll see what his number is on Monday and then likely schedule for q 2 week Nplate for now\par No IR intervention at this time per patient, repeat CT in 3 months\par \par \par

## 2020-10-15 NOTE — HISTORY OF PRESENT ILLNESS
[de-identified] : This is a 70 year old man with a history of ischemic CM, CHF, Vtach s/p AICD/PPM, CKD, FERNANDES cirrhosis and chronic thrombocytopenia since 2012 who is seeing me for acute on chronic thrombocytopenia in the setting of HCC and possibly ITP. \par \par Pt was originally diagnosed with cirrhosis of the liver in 2017 after workup for elevated liver enzymes. He has been followed with serial imaging by Dr. Chapo Alaniz. He was referred to IR by Dr. Alaniz after imaging revealed liver lesion in Sept 2018. He is now s/p hepatic angiogram and embolization of 4 cm right hepatic tumor on 2/7/19 and hepatic angiogram and bland embolization of segment 8 and 5 on 4/18/19 as well as R lobe SIRT on 4/9/20.  \par \par He reports having issues with fluid retention and CHF exacerbations over the last year in 2019. He has worked with Dr. Rivers to adjust his diuretic regimen and had a significant amount of fluid weight loss in 3/2018. He was evaluated on 2/4/20 and is currently been stable over the last few months with no CHF exacerbations. \par \par He has a CT scan 9/23/20 and he will see Dr Hoyt shortly after.  His platelet count has remained between 40-60K. \par \par Looking back, although patient was diagnosed with Cirrhosis in 2017, he has had thrombocytopenia to the 70-80s since 2003. At this time his coags were normal but he had intermittent elevations of ast/alt. Then in 2012 his PT INR was slightly elevated and his platelet count was around 50-60K. INR and platelet count remained stable until 2018. When INR went up to 2 approx in 5/2018, platelets were at 40K, Although the iNR and the platelet count do not correlate. Then in 10/2019 plt went down to 35K, 3/31/20 at 29K. S/p procedure 4/9/20 (SIRT) with platelet count of 20K or less in june/july.  \par  \par Given drop BMBx was performed. 1, 2. Bone marrow biopsy and bone marrow aspirate \par      - Cellular marrow (40%) with slightly erythroid predominant maturing trilineage hematopoiesis \par      - No morphologic or immunophenotypic evidence supporting involvement by myelodysplasia.\par Megakaryocytes:	Normal in number (2-5 megakaryocytes/hpf), unremarkable morphology\par Variant of unknown significant ASXL1 on bioreference\par \par Started prednisone for possible ITP 40mg 8.1.20 with improvement of platelets to 24K, but blood sugars were increasing. Had a cellulitis on his toe, was admitted for toe infection.  IV antibiotics given. IVIG was given Platelet transufsion was done, amputated the toe with a platelet count of 17K. Sent home on Clindamycin.\par \par Prednisone now decreased to 30mg. Also on atovaquone 750BID. Blood sugars have been better as well. He has started was premeal insulin/humolog.\par Patient denies bleeding or bruising. \par \par Review of scans: 5/2019 + Splenomegaly

## 2020-10-26 NOTE — REASON FOR VISIT
[Follow - Up] : a follow-up visit [DM Type 2] : DM Type 2 [Hypothyroidism] : hypothyroidism [Pituitary Evaluation/ Disorder] : pituitary evaluation/disorder

## 2020-10-27 NOTE — ASSESSMENT
[Diabetes Foot Care] : diabetes foot care [Long Term Vascular Complications] : long term vascular complications of diabetes [Importance of Diet and Exercise] : importance of diet and exercise to improve glycemic control, achieve weight loss and improve cardiovascular health [Hypoglycemia Management] : hypoglycemia management [Action and use of Insulin] : action and use of short and long-acting insulin [Self Monitoring of Blood Glucose] : self monitoring of blood glucose [Injection Technique, Storage, Sharps Disposal] : injection technique, storage, and sharps disposal [Retinopathy Screening] : Patient was referred to ophthalmology for retinopathy screening [Levothyroxine] : The patient was instructed to take Levothyroxine on an empty stomach, separate from vitamins, and wait at least 30 minutes before eating [FreeTextEntry1] : Patient is a 71 yo man with type 2 diabetes, hypothyroidism and hyperprolactinemia\par \par  T2DM, with reasonable control, but with frequent postprandial hypoglycemia since stopping steroids\par - recommend continue to check glucose four times daily.  Discussed option of CGM, pt does not want one at this time\par - Will continue Lantus 18units qhs as AM fasting BG at goal\par - Will decrease Humalog to 20units to prevent low BG throughout the day as steroids have been discontinued.\par  - Send glucose log in one week, consider adding Jardiance and further decreasing humalog. If possible will attempt to transition off prandial insulin.  Discussed with pt that given current kidney and liver function, unlikely to be able to transition back to only oral agents\par -has regular podiatry care with Dr. Tristan\par -Patient scheduled to follow up with the ophthalmology tmw 10/27/2020\par - had flu shot\par \par HTN - blood pressure at goal, continue current regimen.  On ACE, has ckd with regular nephrology follow up\par \par Hyperlipidemia - on atorvastatin\par \par Hypothyroid\par -chronic cold intolerance\par - 6/2020 LT4 decreased to 125mcg 8tabs weekly. TSH 3.28. with no changes in medication 10/2020 TSH now elevated at 7.79 with normal free t4. Will repeat TFT in 1 month and adjust medication as needed based on labs. \par \par hyperprolactinemia - longstanding with no prior imaging.  Pt reports that he has never had symptoms\par  - Will decrease to 0.25 mg per week, repeat prolactin next visit,  Consider discontinuing\par \par \par RTC in 3months \par \par Seen and discussed with Dr Calixto\par Received 2020 flu shot prior to visit\par \par Whitney Egan MD\par Endocrine Fellow

## 2020-10-27 NOTE — HISTORY OF PRESENT ILLNESS
[FreeTextEntry1] : 70M with T2DM, hypothyroid, cirrhosis/HCV, ventricular tachycardia, s/p implantation of cardiac device here for follow up visit.\par Patient was previously under the care of Dr Hennessy. Now transitioned to the care of Dr Calixto. \par \par Type 2 DM - Dx in 2008 on routine exam. Was on Lantus 18 units qhs, Januvia 100 mg daily, Metformin 1000mg BID prior to admission in 8/2020 for osteomyelitis and amputation of left 2nd toe. During admission patient was started on prednisone for management of thrombocytopenia due to ITP and cirrhosis. \par Steroids were tapered after discharge, last dose of steroids 9/26/2020. \par Patient currently on Lantus 18units qhs and Humalog 25units premeal \par Patient has detailed log of BG. Checks 5 times daily. Am fasting at goal. Premeal BG during the day noted to be low normal. Patient reports symptoms of hypoglycemia, sweating 2-3 times weekly. \par Reports limiting carb intake. Rides his bicycle 2-3 times weekly and plays golf. \par Macrovascular cx - No known MI, ? CAD, Hx Vtach and cardiac device, + PAD, No known TIA,CVA. \par Microvascular cx - +DM nephropathy, + DM neuropathy, + recent foot ulcers\par Podiatry visit- follows regularly with Dr. Tristan, last visit was 9/2020 after discharged from hospital \par Dilated eye exam: follows annually. Next appt tmw 10/27/2020\par Had flu shot\par \par  HTN - blood pressure has been controlled, taking carvedilol and Ramipril\par \par Hyperlipidemia - on statin, reports no side effects\par \par Hyperprolactinemia - Diagnosed over 40 years ago, reports that he never had imaging of the brain (was unable to tolerate MRI)\par Took bromocriptine x ~40 years, stopped  bromocriptine 7/2019, prolactin increase (29.9) and cabergoline was started, .5 weekly (sun) in 7/2020.9\par Unable to have MRI now due to claustrophobia and ICD and unable to do CT head with contrast due to renal function \par \par Hypothyroidism\par Taking Levothyroxine 125 mcg daily on empty stomach, adherent to it, no side effects. Takes 8 tablets weekly. \par Pt has no thyroid Sx like fatigue, palpitations,heat intolerance. Chronic history of cold intolerance \par

## 2020-10-27 NOTE — END OF VISIT
[] : Fellow [FreeTextEntry3] : Pt with T2DM, recently uncontrolled in settig of high dose steroids, and switched from oral agents to basal bolus insulin.  Now with hypoglycemia.  Decrease prandial insulin to 20 units (and then 15 if hypoglycemia continues).  Send log in 1 week.  Plan to add jardiance and further decrease humalog.  Continue lantus.   \par Hypothyroidism - elevated TSh - Repeat TFTs in one month\par HYperprolactinemia - longstanding, mild, asymptomatic with no diagnosed adenoam.  Decrease cabergoline, consider discontinuing and then checking visual fields

## 2020-10-27 NOTE — PHYSICAL EXAM
[Alert] : alert [Well Nourished] : well nourished [Healthy Appearance] : healthy appearance [EOMI] : extra ocular movement intact [Normal Hearing] : hearing was normal [No Neck Mass] : no neck mass was observed [Thyroid Not Enlarged] : the thyroid was not enlarged [No Respiratory Distress] : no respiratory distress [No Accessory Muscle Use] : no accessory muscle use [Normal Rate] : heart rate was normal [Soft] : abdomen soft [No Stigmata of Cushings Syndrome] : no stigmata of Cushings Syndrome [Normal Affect] : the affect was normal [Normal Insight/Judgement] : insight and judgment were intact [Normal Mood] : the mood was normal [Regular Rhythm] : with a regular rhythm [Pedal Pulses Normal] : the pedal pulses are present [Normal Bowel Sounds] : normal bowel sounds [Not Tender] : non-tender [No Involuntary Movements] : no involuntary movements were seen [Foot Ulcers] : no foot ulcers [Right Foot Was Examined] : right foot ~C was examined [Left Foot Was Examined] : left foot ~C was examined [de-identified] : + PVD. multiple amputation on right toes. left second toe amputation  [de-identified] : decreased sensation on monofilament testing on b/l feet

## 2020-10-27 NOTE — REVIEW OF SYSTEMS
[Cold Intolerance] : cold intolerance [Fatigue] : no fatigue [Decreased Appetite] : appetite not decreased [Visual Field Defect] : no visual field defect [Blurred Vision] : no blurred vision [Dysphagia] : no dysphagia [Dysphonia] : no dysphonia [Chest Pain] : no chest pain [Slow Heart Rate] : heart rate is not slow [Palpitations] : no palpitations [Fast Heart Rate] : heart rate is not fast [Nausea] : no nausea [Constipation] : no constipation [Vomiting] : no vomiting [Diarrhea] : no diarrhea [Depression] : no depression [Galactorrhea] : no galactorrhea  [All other systems negative] : All other systems negative [FreeTextEntry5] : + LE swelling and decreased sensation in b/l feet

## 2020-10-27 NOTE — DATA REVIEWED
[FreeTextEntry1] : 10/2020\par cr 2.24\par  egfr 29\par ca 8.9\par tsh 7.76\par FT4 1.3\par LDL 50\par \par 8/2020\par prolactin 0.05

## 2020-11-12 NOTE — DISCUSSION/SUMMARY
[Bundle Branch Block] : ~T bundle branch block [Cardiomyopathy] : cardiomyopathy [NYHA Class II] : NYHA Class II [ACC/AHA Stage C] : ACC/AHA Stage C [Ischemic Cardiomyopathy] : ischemic cardiomyopathy [Coronary Artery Disease] : coronary artery disease [Stable] : stable [None] : none [Patient] : the patient [Acute Systolic Heart Failure] : acute systolic congestive heart failure [Chronic Systolic Heart Failure] : chronic systolic congestive heart failure [Improving] : improving [___ Month(s)] : [unfilled] month(s) [de-identified] : with lphb and 1st degree heart block [de-identified] : prolonged and refractory atrial tachycardia spontaneously reverted to sinus and maintained for prolonged period, brief period in March and back to his usual rhythm [de-identified] : continue furosemide 80 mg twice daily [FreeTextEntry3] : for device interrogation

## 2020-11-12 NOTE — HISTORY OF PRESENT ILLNESS
[FreeTextEntry1] : Mr. Jamil Harvey is a 69-year-old man with ischemic cardiomyopathy, ventricular tachycardia and appropriate ICD device firing. Uvaldo lead fractured and was capped and a new generator and lead implanted. After device adjustments to alleviate potential for pause dependent ventricular tachycardia has had no events. Otherwise, despite his long history of severe multivessel coronary artery disease and very remote coronary bypass surgery with severe ventricular dysfunction, has always been active and never experiences chest pain or dyspnea. Recurrent foot infection managed by podiatrist and ID and healed, but recurrent cellulitis with hospital stay for IV antibiotics and complication of JULIANNE, ultimately resolved. He always sleeps in recliner to manage obstructive sleep apnea as has been unable to tolerate any mask or device. Regular exercise includes walking, riding stationary bike 6 miles a day and golf. Now over 30 years coming to this practice since coronary bypass surgery.\par \par Last year onset of anasarca, diuretic dose increased with minimal response. Then added metolazone with dramatic response, 65 pound weight reduction and now weighs much less than at anytime in our record. Was feeling much better and denies dizziness, lightheadedness. Started on anticoagulation with plan for cardioversion, but instead admitted with septic bursitis of knee requiring orthopedic procedure and remarkably spontaneously reverted to sinus rhythm which continues to be maintained. Breathing improved, not retaining excess volume. Had ICD generator change for NÉSTOR and has also IR embolization of HCC.\par \par In March notified of device transmission indicating atrial tachycardia rates 100 to 110. Contacted patient who found rates 100 for few days then down to 80s and since then 60s to 70. Currently finds volume status optimal and all leg wounds have healed. 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 Discharged after procedure. In past week has begun retaining fluid, 13 pound weight gain, lower extremity edema, but denies dyspnea with his low level activities around home, chest pain, palpitations and has no orthopnea or paroxysmal nocturnal dyspnea  He observes continued good urine output. Did have CT scan with contrast on September 24, 2020.\par \par Presented with volume overload CHF last visit, intensified diuretic regimen and responded, weight back down, no dyspnea or orthopnea. Had 10 minute episode of tachycardia last week, sat down and spontaneously reverted. This past past weekend played golf and felt well.

## 2020-11-24 NOTE — HISTORY OF PRESENT ILLNESS
[Disease: _____________________] : Disease: [unfilled] [T: ___] : T[unfilled] [N: ___] : N[unfilled] [M: ___] : M[unfilled] [AJCC Stage: ____] : AJCC Stage: [unfilled] [de-identified] : Mr. Harvey is a 69 y/o male with pmhx of ischemic cardiomyopathy, CHF, V-tach s/p AICD/PPM, CKD, FERNANDES/cirrhosis, chronic thrombocytopenia since at least 2012 (baseline plt count 40-60), presents to establish care for management of HCC and chronic thrombocytopenia. \par \par Pt was originally diagnosed with cirrhosis of the liver in 2017 after workup for elevated liver enzymes. He has been followed with serial imaging by Dr. Chapo Alaniz. He was referred to IR by Dr. Alaniz after imaging revealed liver lesion in Sept 2018. He is now s/p hepatic angiogram and embolization of 4 cm right hepatic tumor on 2/7/19 and hepatic angiogram and bland embolization of segment 8 and 5 on 4/18/19. \par \par He reports having issues with fluid retention and CHF exacerbations over the last year. He has worked with Dr. Rivers to adjust his diuretic regimen and had a significant amount of fluid weight loss in 3/2018. He was evaluated on 2/4/20 and is currently been stable over the last few months, with no CHF exacerbations. \par \par He denies any h/o significant bleeding. Has had plt transfusions with previous surgeries and did respond appropriately. \par \par Hep: Chapo Alaniz\par Cards: Tita\dayanara Nephrology: Racquel\par Endo: Nicole Hennessy\par \par 4/9/20: SIRT \par 9/24/20: CT A/P: stable disease  [de-identified] : n/a [de-identified] : Overall feels ok. Following with heme for management of ITP. On Nplate every other week. Plts have decreased since spacing out doses. No bleeding. Will need dental work soon. \par Repeat imaging to f/u on HCC is pending for Dec. \par f/u with cards, lasix recently increased due to LE edema, now improved. \par Appetite is good. Remains active.

## 2021-01-01 ENCOUNTER — APPOINTMENT (OUTPATIENT)
Dept: INFUSION THERAPY | Facility: HOSPITAL | Age: 71
End: 2021-01-01

## 2021-01-01 ENCOUNTER — RESULT REVIEW (OUTPATIENT)
Age: 71
End: 2021-01-01

## 2021-01-01 ENCOUNTER — TRANSCRIPTION ENCOUNTER (OUTPATIENT)
Age: 71
End: 2021-01-01

## 2021-01-01 ENCOUNTER — NON-APPOINTMENT (OUTPATIENT)
Age: 71
End: 2021-01-01

## 2021-01-01 ENCOUNTER — RX RENEWAL (OUTPATIENT)
Age: 71
End: 2021-01-01

## 2021-01-01 ENCOUNTER — APPOINTMENT (OUTPATIENT)
Dept: HEMATOLOGY ONCOLOGY | Facility: CLINIC | Age: 71
End: 2021-01-01
Payer: COMMERCIAL

## 2021-01-01 ENCOUNTER — APPOINTMENT (OUTPATIENT)
Dept: ENDOCRINOLOGY | Facility: CLINIC | Age: 71
End: 2021-01-01
Payer: COMMERCIAL

## 2021-01-01 ENCOUNTER — APPOINTMENT (OUTPATIENT)
Dept: CT IMAGING | Facility: IMAGING CENTER | Age: 71
End: 2021-01-01

## 2021-01-01 ENCOUNTER — APPOINTMENT (OUTPATIENT)
Dept: CARDIOLOGY | Facility: CLINIC | Age: 71
End: 2021-01-01
Payer: COMMERCIAL

## 2021-01-01 ENCOUNTER — INPATIENT (INPATIENT)
Facility: HOSPITAL | Age: 71
LOS: 0 days | DRG: 853 | End: 2021-05-26
Attending: INTERNAL MEDICINE | Admitting: INTERNAL MEDICINE
Payer: COMMERCIAL

## 2021-01-01 ENCOUNTER — APPOINTMENT (OUTPATIENT)
Dept: RADIOLOGY | Facility: IMAGING CENTER | Age: 71
End: 2021-01-01

## 2021-01-01 ENCOUNTER — APPOINTMENT (OUTPATIENT)
Dept: HEPATOLOGY | Facility: CLINIC | Age: 71
End: 2021-01-01
Payer: COMMERCIAL

## 2021-01-01 ENCOUNTER — APPOINTMENT (OUTPATIENT)
Dept: CT IMAGING | Facility: CLINIC | Age: 71
End: 2021-01-01

## 2021-01-01 ENCOUNTER — APPOINTMENT (OUTPATIENT)
Dept: ELECTROPHYSIOLOGY | Facility: CLINIC | Age: 71
End: 2021-01-01
Payer: COMMERCIAL

## 2021-01-01 ENCOUNTER — OUTPATIENT (OUTPATIENT)
Dept: OUTPATIENT SERVICES | Facility: HOSPITAL | Age: 71
LOS: 1 days | Discharge: HOME | End: 2021-01-01

## 2021-01-01 ENCOUNTER — APPOINTMENT (OUTPATIENT)
Dept: CARDIOLOGY | Facility: CLINIC | Age: 71
End: 2021-01-01

## 2021-01-01 ENCOUNTER — APPOINTMENT (OUTPATIENT)
Dept: INTERVENTIONAL RADIOLOGY/VASCULAR | Facility: CLINIC | Age: 71
End: 2021-01-01
Payer: COMMERCIAL

## 2021-01-01 ENCOUNTER — APPOINTMENT (OUTPATIENT)
Dept: HEPATOLOGY | Facility: CLINIC | Age: 71
End: 2021-01-01

## 2021-01-01 ENCOUNTER — APPOINTMENT (OUTPATIENT)
Dept: NEPHROLOGY | Facility: CLINIC | Age: 71
End: 2021-01-01

## 2021-01-01 ENCOUNTER — APPOINTMENT (OUTPATIENT)
Age: 71
End: 2021-01-01
Payer: COMMERCIAL

## 2021-01-01 ENCOUNTER — APPOINTMENT (OUTPATIENT)
Dept: HEMATOLOGY ONCOLOGY | Facility: CLINIC | Age: 71
End: 2021-01-01

## 2021-01-01 ENCOUNTER — APPOINTMENT (OUTPATIENT)
Dept: ULTRASOUND IMAGING | Facility: HOSPITAL | Age: 71
End: 2021-01-01

## 2021-01-01 ENCOUNTER — APPOINTMENT (OUTPATIENT)
Dept: ELECTROPHYSIOLOGY | Facility: CLINIC | Age: 71
End: 2021-01-01

## 2021-01-01 ENCOUNTER — OUTPATIENT (OUTPATIENT)
Dept: OUTPATIENT SERVICES | Facility: HOSPITAL | Age: 71
LOS: 1 days | End: 2021-01-01
Payer: COMMERCIAL

## 2021-01-01 ENCOUNTER — APPOINTMENT (OUTPATIENT)
Dept: NEPHROLOGY | Facility: CLINIC | Age: 71
End: 2021-01-01
Payer: COMMERCIAL

## 2021-01-01 VITALS
OXYGEN SATURATION: 94 % | BODY MASS INDEX: 29.63 KG/M2 | HEIGHT: 70 IN | DIASTOLIC BLOOD PRESSURE: 64 MMHG | WEIGHT: 207 LBS | SYSTOLIC BLOOD PRESSURE: 108 MMHG | HEART RATE: 68 BPM

## 2021-01-01 VITALS
BODY MASS INDEX: 30.84 KG/M2 | HEART RATE: 87 BPM | OXYGEN SATURATION: 99 % | SYSTOLIC BLOOD PRESSURE: 112 MMHG | TEMPERATURE: 98 F | RESPIRATION RATE: 14 BRPM | OXYGEN SATURATION: 99 % | RESPIRATION RATE: 14 BRPM | TEMPERATURE: 98.1 F | BODY MASS INDEX: 29.1 KG/M2 | WEIGHT: 202.83 LBS | WEIGHT: 214.95 LBS | HEART RATE: 73 BPM | DIASTOLIC BLOOD PRESSURE: 62 MMHG | DIASTOLIC BLOOD PRESSURE: 66 MMHG | SYSTOLIC BLOOD PRESSURE: 98 MMHG

## 2021-01-01 VITALS
WEIGHT: 216 LBS | DIASTOLIC BLOOD PRESSURE: 60 MMHG | SYSTOLIC BLOOD PRESSURE: 108 MMHG | BODY MASS INDEX: 30.92 KG/M2 | TEMPERATURE: 97.6 F | OXYGEN SATURATION: 100 % | HEART RATE: 83 BPM | HEIGHT: 70 IN

## 2021-01-01 VITALS
TEMPERATURE: 97.5 F | WEIGHT: 224 LBS | DIASTOLIC BLOOD PRESSURE: 60 MMHG | HEIGHT: 70 IN | SYSTOLIC BLOOD PRESSURE: 100 MMHG | BODY MASS INDEX: 32.07 KG/M2 | OXYGEN SATURATION: 98 % | HEART RATE: 68 BPM

## 2021-01-01 VITALS
WEIGHT: 211 LBS | RESPIRATION RATE: 14 BRPM | HEIGHT: 70 IN | OXYGEN SATURATION: 95 % | HEART RATE: 92 BPM | BODY MASS INDEX: 30.21 KG/M2 | SYSTOLIC BLOOD PRESSURE: 104 MMHG | DIASTOLIC BLOOD PRESSURE: 64 MMHG

## 2021-01-01 VITALS
DIASTOLIC BLOOD PRESSURE: 61 MMHG | OXYGEN SATURATION: 97 % | HEART RATE: 79 BPM | TEMPERATURE: 97.2 F | SYSTOLIC BLOOD PRESSURE: 96 MMHG | BODY MASS INDEX: 29.56 KG/M2 | WEIGHT: 206 LBS

## 2021-01-01 VITALS
OXYGEN SATURATION: 100 % | SYSTOLIC BLOOD PRESSURE: 105 MMHG | WEIGHT: 218.92 LBS | DIASTOLIC BLOOD PRESSURE: 65 MMHG | TEMPERATURE: 96.6 F | BODY MASS INDEX: 31.34 KG/M2 | HEIGHT: 70 IN | RESPIRATION RATE: 15 BRPM | HEART RATE: 63 BPM

## 2021-01-01 VITALS
RESPIRATION RATE: 16 BRPM | DIASTOLIC BLOOD PRESSURE: 31 MMHG | OXYGEN SATURATION: 100 % | HEIGHT: 71 IN | WEIGHT: 186.07 LBS | HEART RATE: 108 BPM | TEMPERATURE: 98 F | SYSTOLIC BLOOD PRESSURE: 51 MMHG

## 2021-01-01 VITALS
SYSTOLIC BLOOD PRESSURE: 95 MMHG | HEART RATE: 98 BPM | RESPIRATION RATE: 14 BRPM | DIASTOLIC BLOOD PRESSURE: 61 MMHG | TEMPERATURE: 96.7 F | BODY MASS INDEX: 30.78 KG/M2 | WEIGHT: 215 LBS | HEIGHT: 70 IN

## 2021-01-01 VITALS
WEIGHT: 228 LBS | HEART RATE: 69 BPM | BODY MASS INDEX: 32.64 KG/M2 | TEMPERATURE: 97 F | DIASTOLIC BLOOD PRESSURE: 70 MMHG | SYSTOLIC BLOOD PRESSURE: 108 MMHG | HEIGHT: 70 IN

## 2021-01-01 VITALS — HEIGHT: 70 IN | WEIGHT: 202 LBS | BODY MASS INDEX: 28.92 KG/M2

## 2021-01-01 VITALS — RESPIRATION RATE: 16 BRPM | OXYGEN SATURATION: 83 %

## 2021-01-01 DIAGNOSIS — Z01.89 ENCOUNTER FOR OTHER SPECIFIED SPECIAL EXAMINATIONS: Chronic | ICD-10-CM

## 2021-01-01 DIAGNOSIS — I50.22 CHRONIC SYSTOLIC (CONGESTIVE) HEART FAILURE: ICD-10-CM

## 2021-01-01 DIAGNOSIS — E11.8 TYPE 2 DIABETES MELLITUS WITH UNSPECIFIED COMPLICATIONS: ICD-10-CM

## 2021-01-01 DIAGNOSIS — E78.2 MIXED HYPERLIPIDEMIA: ICD-10-CM

## 2021-01-01 DIAGNOSIS — Z89.9 ACQUIRED ABSENCE OF LIMB, UNSPECIFIED: Chronic | ICD-10-CM

## 2021-01-01 DIAGNOSIS — C22.1 INTRAHEPATIC BILE DUCT CARCINOMA: ICD-10-CM

## 2021-01-01 DIAGNOSIS — R11.2 NAUSEA WITH VOMITING, UNSPECIFIED: ICD-10-CM

## 2021-01-01 DIAGNOSIS — E22.1 HYPERPROLACTINEMIA: ICD-10-CM

## 2021-01-01 DIAGNOSIS — C44.92 SQUAMOUS CELL CARCINOMA OF SKIN, UNSPECIFIED: Chronic | ICD-10-CM

## 2021-01-01 DIAGNOSIS — Z51.11 ENCOUNTER FOR ANTINEOPLASTIC CHEMOTHERAPY: ICD-10-CM

## 2021-01-01 DIAGNOSIS — C22.0 LIVER CELL CARCINOMA: ICD-10-CM

## 2021-01-01 DIAGNOSIS — R80.9 PROTEINURIA, UNSPECIFIED: ICD-10-CM

## 2021-01-01 DIAGNOSIS — Z00.00 ENCOUNTER FOR GENERAL ADULT MEDICAL EXAMINATION WITHOUT ABNORMAL FINDINGS: ICD-10-CM

## 2021-01-01 DIAGNOSIS — I25.10 ATHEROSCLEROTIC HEART DISEASE OF NATIVE CORONARY ARTERY W/OUT ANGINA PECTORIS: ICD-10-CM

## 2021-01-01 DIAGNOSIS — I42.9 CARDIOMYOPATHY, UNSPECIFIED: ICD-10-CM

## 2021-01-01 DIAGNOSIS — I95.9 HYPOTENSION, UNSPECIFIED: ICD-10-CM

## 2021-01-01 DIAGNOSIS — I47.1 SUPRAVENTRICULAR TACHYCARDIA: ICD-10-CM

## 2021-01-01 DIAGNOSIS — K74.60 UNSPECIFIED CIRRHOSIS OF LIVER: ICD-10-CM

## 2021-01-01 DIAGNOSIS — I10 ESSENTIAL (PRIMARY) HYPERTENSION: ICD-10-CM

## 2021-01-01 DIAGNOSIS — R60.9 EDEMA, UNSPECIFIED: ICD-10-CM

## 2021-01-01 DIAGNOSIS — N17.9 ACUTE KIDNEY FAILURE, UNSPECIFIED: ICD-10-CM

## 2021-01-01 DIAGNOSIS — R16.0 HEPATOMEGALY, NOT ELSEWHERE CLASSIFIED: ICD-10-CM

## 2021-01-01 DIAGNOSIS — D69.3 IMMUNE THROMBOCYTOPENIC PURPURA: ICD-10-CM

## 2021-01-01 DIAGNOSIS — R94.5 ABNORMAL RESULTS OF LIVER FUNCTION STUDIES: ICD-10-CM

## 2021-01-01 DIAGNOSIS — N18.4 CHRONIC KIDNEY DISEASE, STAGE 4 (SEVERE): ICD-10-CM

## 2021-01-01 DIAGNOSIS — E03.9 HYPOTHYROIDISM, UNSPECIFIED: ICD-10-CM

## 2021-01-01 DIAGNOSIS — I36.1 NONRHEUMATIC TRICUSPID (VALVE) INSUFFICIENCY: ICD-10-CM

## 2021-01-01 DIAGNOSIS — I50.23 ACUTE ON CHRONIC SYSTOLIC (CONGESTIVE) HEART FAILURE: ICD-10-CM

## 2021-01-01 DIAGNOSIS — D69.6 THROMBOCYTOPENIA, UNSPECIFIED: ICD-10-CM

## 2021-01-01 LAB
AFP-TM SERPL-MCNC: 2.5 NG/ML
ALBUMIN SERPL ELPH-MCNC: 3 G/DL — LOW (ref 3.3–5)
ALBUMIN SERPL ELPH-MCNC: 3.5 G/DL — SIGNIFICANT CHANGE UP (ref 3.3–5)
ALBUMIN SERPL ELPH-MCNC: 3.7 G/DL
ALBUMIN SERPL ELPH-MCNC: 3.7 G/DL — SIGNIFICANT CHANGE UP (ref 3.3–5)
ALBUMIN SERPL ELPH-MCNC: 4 G/DL
ALP BLD-CCNC: 126 U/L
ALP BLD-CCNC: 147 U/L
ALP BLD-CCNC: 172 U/L
ALP BLD-CCNC: 179 U/L
ALP SERPL-CCNC: 76 U/L — SIGNIFICANT CHANGE UP (ref 40–120)
ALP SERPL-CCNC: 82 U/L — SIGNIFICANT CHANGE UP (ref 40–120)
ALP SERPL-CCNC: 83 U/L — SIGNIFICANT CHANGE UP (ref 40–120)
ALT FLD-CCNC: 12 U/L — SIGNIFICANT CHANGE UP (ref 10–45)
ALT SERPL-CCNC: 18 U/L
ALT SERPL-CCNC: 22 U/L
ALT SERPL-CCNC: 23 U/L
ALT SERPL-CCNC: 29 U/L
ANION GAP SERPL CALC-SCNC: 11 MMOL/L
ANION GAP SERPL CALC-SCNC: 12 MMOL/L
ANION GAP SERPL CALC-SCNC: 13 MMOL/L
ANION GAP SERPL CALC-SCNC: 14 MMOL/L
ANION GAP SERPL CALC-SCNC: 14 MMOL/L
ANION GAP SERPL CALC-SCNC: 16 MMOL/L
ANION GAP SERPL CALC-SCNC: 16 MMOL/L
ANION GAP SERPL CALC-SCNC: 19 MMOL/L — HIGH (ref 5–17)
ANION GAP SERPL CALC-SCNC: 21 MMOL/L — HIGH (ref 5–17)
ANION GAP SERPL CALC-SCNC: 24 MMOL/L — HIGH (ref 5–17)
ANISOCYTOSIS BLD QL: SLIGHT — SIGNIFICANT CHANGE UP
APPEARANCE UR: CLEAR — SIGNIFICANT CHANGE UP
APPEARANCE: CLEAR
APTT BLD: 31.2 SEC — SIGNIFICANT CHANGE UP (ref 27.5–35.5)
APTT BLD: 34.1 SEC — SIGNIFICANT CHANGE UP (ref 27.5–35.5)
AST SERPL-CCNC: 21 U/L — SIGNIFICANT CHANGE UP (ref 10–40)
AST SERPL-CCNC: 23 U/L — SIGNIFICANT CHANGE UP (ref 10–40)
AST SERPL-CCNC: 29 U/L — SIGNIFICANT CHANGE UP (ref 10–40)
AST SERPL-CCNC: 33 U/L
AST SERPL-CCNC: 40 U/L
AST SERPL-CCNC: 44 U/L
AST SERPL-CCNC: 49 U/L
BACTERIA # UR AUTO: NEGATIVE — SIGNIFICANT CHANGE UP
BACTERIA: NEGATIVE
BASE EXCESS BLDV CALC-SCNC: -6.3 MMOL/L — LOW (ref -2–2)
BASE EXCESS BLDV CALC-SCNC: -6.4 MMOL/L — LOW (ref -2–2)
BASE EXCESS BLDV CALC-SCNC: -9.2 MMOL/L — LOW (ref -2–2)
BASOPHILS # BLD AUTO: 0 K/UL — SIGNIFICANT CHANGE UP (ref 0–0.2)
BASOPHILS # BLD AUTO: 0 K/UL — SIGNIFICANT CHANGE UP (ref 0–0.2)
BASOPHILS # BLD AUTO: 0.01 K/UL — SIGNIFICANT CHANGE UP (ref 0–0.2)
BASOPHILS # BLD AUTO: 0.02 K/UL — SIGNIFICANT CHANGE UP (ref 0–0.2)
BASOPHILS # BLD AUTO: 0.03 K/UL
BASOPHILS # BLD AUTO: 0.03 K/UL — SIGNIFICANT CHANGE UP (ref 0–0.2)
BASOPHILS # BLD AUTO: 0.04 K/UL — SIGNIFICANT CHANGE UP (ref 0–0.2)
BASOPHILS # BLD AUTO: 0.05 K/UL — SIGNIFICANT CHANGE UP (ref 0–0.2)
BASOPHILS # BLD AUTO: 0.05 K/UL — SIGNIFICANT CHANGE UP (ref 0–0.2)
BASOPHILS NFR BLD AUTO: 0 % — SIGNIFICANT CHANGE UP (ref 0–2)
BASOPHILS NFR BLD AUTO: 0 % — SIGNIFICANT CHANGE UP (ref 0–2)
BASOPHILS NFR BLD AUTO: 0.3 % — SIGNIFICANT CHANGE UP (ref 0–2)
BASOPHILS NFR BLD AUTO: 0.4 % — SIGNIFICANT CHANGE UP (ref 0–2)
BASOPHILS NFR BLD AUTO: 0.4 % — SIGNIFICANT CHANGE UP (ref 0–2)
BASOPHILS NFR BLD AUTO: 0.5 % — SIGNIFICANT CHANGE UP (ref 0–2)
BASOPHILS NFR BLD AUTO: 0.6 % — SIGNIFICANT CHANGE UP (ref 0–2)
BASOPHILS NFR BLD AUTO: 0.7 % — SIGNIFICANT CHANGE UP (ref 0–2)
BASOPHILS NFR BLD AUTO: 0.8 %
BASOPHILS NFR BLD AUTO: 0.8 % — SIGNIFICANT CHANGE UP (ref 0–2)
BASOPHILS NFR BLD AUTO: 0.9 % — SIGNIFICANT CHANGE UP (ref 0–2)
BASOPHILS NFR BLD AUTO: 0.9 % — SIGNIFICANT CHANGE UP (ref 0–2)
BASOPHILS NFR BLD AUTO: 1 % — SIGNIFICANT CHANGE UP (ref 0–2)
BASOPHILS NFR BLD AUTO: 1.1 % — SIGNIFICANT CHANGE UP (ref 0–2)
BASOPHILS NFR BLD AUTO: 1.2 % — SIGNIFICANT CHANGE UP (ref 0–2)
BILIRUB SERPL-MCNC: 0.8 MG/DL
BILIRUB SERPL-MCNC: 1 MG/DL
BILIRUB SERPL-MCNC: 1.1 MG/DL
BILIRUB SERPL-MCNC: 1.2 MG/DL
BILIRUB SERPL-MCNC: 1.7 MG/DL — HIGH (ref 0.2–1.2)
BILIRUB SERPL-MCNC: 1.7 MG/DL — HIGH (ref 0.2–1.2)
BILIRUB SERPL-MCNC: 1.8 MG/DL — HIGH (ref 0.2–1.2)
BILIRUB UR-MCNC: NEGATIVE — SIGNIFICANT CHANGE UP
BILIRUBIN URINE: NEGATIVE
BLD GP AB SCN SERPL QL: POSITIVE — SIGNIFICANT CHANGE UP
BLOOD URINE: NEGATIVE
BUN SERPL-MCNC: 117 MG/DL — HIGH (ref 7–23)
BUN SERPL-MCNC: 117 MG/DL — HIGH (ref 7–23)
BUN SERPL-MCNC: 128 MG/DL — HIGH (ref 7–23)
BUN SERPL-MCNC: 49 MG/DL
BUN SERPL-MCNC: 53 MG/DL
BUN SERPL-MCNC: 54 MG/DL
BUN SERPL-MCNC: 54 MG/DL
BUN SERPL-MCNC: 55 MG/DL
BUN SERPL-MCNC: 70 MG/DL
BUN SERPL-MCNC: 77 MG/DL
BURR CELLS BLD QL SMEAR: PRESENT — SIGNIFICANT CHANGE UP
CA-I SERPL-SCNC: 1.14 MMOL/L — SIGNIFICANT CHANGE UP (ref 1.12–1.3)
CA-I SERPL-SCNC: 1.19 MMOL/L — SIGNIFICANT CHANGE UP (ref 1.12–1.3)
CALCIUM SERPL-MCNC: 8.6 MG/DL
CALCIUM SERPL-MCNC: 8.6 MG/DL
CALCIUM SERPL-MCNC: 8.8 MG/DL
CALCIUM SERPL-MCNC: 8.8 MG/DL — SIGNIFICANT CHANGE UP (ref 8.4–10.5)
CALCIUM SERPL-MCNC: 8.9 MG/DL — SIGNIFICANT CHANGE UP (ref 8.4–10.5)
CALCIUM SERPL-MCNC: 9.2 MG/DL
CALCIUM SERPL-MCNC: 9.4 MG/DL
CALCIUM SERPL-MCNC: 9.5 MG/DL — SIGNIFICANT CHANGE UP (ref 8.4–10.5)
CHLORIDE BLDV-SCNC: 102 MMOL/L — SIGNIFICANT CHANGE UP (ref 96–108)
CHLORIDE BLDV-SCNC: 105 MMOL/L — SIGNIFICANT CHANGE UP (ref 96–108)
CHLORIDE SERPL-SCNC: 100 MMOL/L
CHLORIDE SERPL-SCNC: 100 MMOL/L — SIGNIFICANT CHANGE UP (ref 96–108)
CHLORIDE SERPL-SCNC: 101 MMOL/L
CHLORIDE SERPL-SCNC: 101 MMOL/L
CHLORIDE SERPL-SCNC: 102 MMOL/L
CHLORIDE SERPL-SCNC: 102 MMOL/L
CHLORIDE SERPL-SCNC: 97 MMOL/L — SIGNIFICANT CHANGE UP (ref 96–108)
CHLORIDE SERPL-SCNC: 97 MMOL/L — SIGNIFICANT CHANGE UP (ref 96–108)
CK MB BLD-MCNC: 2.3 % — SIGNIFICANT CHANGE UP (ref 0–3.5)
CK MB CFR SERPL CALC: 5.2 NG/ML — SIGNIFICANT CHANGE UP (ref 0–6.7)
CK SERPL-CCNC: 222 U/L — HIGH (ref 30–200)
CO2 BLDV-SCNC: 17 MMOL/L — LOW (ref 22–30)
CO2 BLDV-SCNC: 19 MMOL/L — LOW (ref 22–30)
CO2 BLDV-SCNC: 20 MMOL/L — LOW (ref 22–30)
CO2 SERPL-SCNC: 10 MMOL/L — CRITICAL LOW (ref 22–31)
CO2 SERPL-SCNC: 14 MMOL/L — LOW (ref 22–31)
CO2 SERPL-SCNC: 14 MMOL/L — LOW (ref 22–31)
CO2 SERPL-SCNC: 22 MMOL/L
CO2 SERPL-SCNC: 24 MMOL/L
CO2 SERPL-SCNC: 25 MMOL/L
COLOR SPEC: YELLOW — SIGNIFICANT CHANGE UP
COLOR: NORMAL
CREAT ?TM UR-MCNC: 104 MG/DL — SIGNIFICANT CHANGE UP
CREAT SERPL-MCNC: 2.47 MG/DL
CREAT SERPL-MCNC: 2.49 MG/DL
CREAT SERPL-MCNC: 2.51 MG/DL
CREAT SERPL-MCNC: 2.62 MG/DL
CREAT SERPL-MCNC: 2.73 MG/DL
CREAT SERPL-MCNC: 2.94 MG/DL
CREAT SERPL-MCNC: 3.15 MG/DL
CREAT SERPL-MCNC: 6.14 MG/DL — HIGH (ref 0.5–1.3)
CREAT SERPL-MCNC: 6.2 MG/DL — HIGH (ref 0.5–1.3)
CREAT SERPL-MCNC: 6.96 MG/DL — HIGH (ref 0.5–1.3)
CREAT SPEC-SCNC: 86 MG/DL
CREAT/PROT UR: 0.4 RATIO
CRP SERPL-MCNC: 102 MG/L — HIGH (ref 0–4)
DACRYOCYTES BLD QL SMEAR: SLIGHT — SIGNIFICANT CHANGE UP
DIFF PNL FLD: ABNORMAL
EOSINOPHIL # BLD AUTO: 0.07 K/UL — SIGNIFICANT CHANGE UP (ref 0–0.5)
EOSINOPHIL # BLD AUTO: 0.08 K/UL — SIGNIFICANT CHANGE UP (ref 0–0.5)
EOSINOPHIL # BLD AUTO: 0.13 K/UL — SIGNIFICANT CHANGE UP (ref 0–0.5)
EOSINOPHIL # BLD AUTO: 0.14 K/UL — SIGNIFICANT CHANGE UP (ref 0–0.5)
EOSINOPHIL # BLD AUTO: 0.2 K/UL — SIGNIFICANT CHANGE UP (ref 0–0.5)
EOSINOPHIL # BLD AUTO: 0.21 K/UL — SIGNIFICANT CHANGE UP (ref 0–0.5)
EOSINOPHIL # BLD AUTO: 0.21 K/UL — SIGNIFICANT CHANGE UP (ref 0–0.5)
EOSINOPHIL # BLD AUTO: 0.23 K/UL — SIGNIFICANT CHANGE UP (ref 0–0.5)
EOSINOPHIL # BLD AUTO: 0.25 K/UL — SIGNIFICANT CHANGE UP (ref 0–0.5)
EOSINOPHIL # BLD AUTO: 0.27 K/UL — SIGNIFICANT CHANGE UP (ref 0–0.5)
EOSINOPHIL # BLD AUTO: 0.28 K/UL — SIGNIFICANT CHANGE UP (ref 0–0.5)
EOSINOPHIL # BLD AUTO: 0.29 K/UL — SIGNIFICANT CHANGE UP (ref 0–0.5)
EOSINOPHIL # BLD AUTO: 0.3 K/UL — SIGNIFICANT CHANGE UP (ref 0–0.5)
EOSINOPHIL # BLD AUTO: 0.31 K/UL — SIGNIFICANT CHANGE UP (ref 0–0.5)
EOSINOPHIL # BLD AUTO: 0.33 K/UL
EOSINOPHIL # BLD AUTO: 0.33 K/UL — SIGNIFICANT CHANGE UP (ref 0–0.5)
EOSINOPHIL # BLD AUTO: 0.34 K/UL — SIGNIFICANT CHANGE UP (ref 0–0.5)
EOSINOPHIL # BLD AUTO: 0.35 K/UL — SIGNIFICANT CHANGE UP (ref 0–0.5)
EOSINOPHIL # BLD AUTO: 0.35 K/UL — SIGNIFICANT CHANGE UP (ref 0–0.5)
EOSINOPHIL # BLD AUTO: 0.46 K/UL — SIGNIFICANT CHANGE UP (ref 0–0.5)
EOSINOPHIL NFR BLD AUTO: 0.9 % — SIGNIFICANT CHANGE UP (ref 0–6)
EOSINOPHIL NFR BLD AUTO: 1.7 % — SIGNIFICANT CHANGE UP (ref 0–6)
EOSINOPHIL NFR BLD AUTO: 2.6 % — SIGNIFICANT CHANGE UP (ref 0–6)
EOSINOPHIL NFR BLD AUTO: 3 % — SIGNIFICANT CHANGE UP (ref 0–6)
EOSINOPHIL NFR BLD AUTO: 4.2 % — SIGNIFICANT CHANGE UP (ref 0–6)
EOSINOPHIL NFR BLD AUTO: 4.5 % — SIGNIFICANT CHANGE UP (ref 0–6)
EOSINOPHIL NFR BLD AUTO: 4.6 % — SIGNIFICANT CHANGE UP (ref 0–6)
EOSINOPHIL NFR BLD AUTO: 4.7 % — SIGNIFICANT CHANGE UP (ref 0–6)
EOSINOPHIL NFR BLD AUTO: 5 % — SIGNIFICANT CHANGE UP (ref 0–6)
EOSINOPHIL NFR BLD AUTO: 5.3 % — SIGNIFICANT CHANGE UP (ref 0–6)
EOSINOPHIL NFR BLD AUTO: 5.7 % — SIGNIFICANT CHANGE UP (ref 0–6)
EOSINOPHIL NFR BLD AUTO: 5.9 % — SIGNIFICANT CHANGE UP (ref 0–6)
EOSINOPHIL NFR BLD AUTO: 6 % — SIGNIFICANT CHANGE UP (ref 0–6)
EOSINOPHIL NFR BLD AUTO: 6 % — SIGNIFICANT CHANGE UP (ref 0–6)
EOSINOPHIL NFR BLD AUTO: 6.8 % — HIGH (ref 0–6)
EOSINOPHIL NFR BLD AUTO: 7 % — HIGH (ref 0–6)
EOSINOPHIL NFR BLD AUTO: 7.3 % — HIGH (ref 0–6)
EOSINOPHIL NFR BLD AUTO: 7.8 % — HIGH (ref 0–6)
EOSINOPHIL NFR BLD AUTO: 8.6 % — HIGH (ref 0–6)
EOSINOPHIL NFR BLD AUTO: 8.7 %
EOSINOPHIL NFR BLD AUTO: 8.9 % — HIGH (ref 0–6)
EOSINOPHIL NFR BLD AUTO: 8.9 % — HIGH (ref 0–6)
EPI CELLS # UR: 1 /HPF — SIGNIFICANT CHANGE UP
ERYTHROCYTE [SEDIMENTATION RATE] IN BLOOD: 49 MM/HR — HIGH (ref 0–20)
ESTIMATED AVERAGE GLUCOSE: 183 MG/DL
GAS PNL BLDV: 129 MMOL/L — LOW (ref 135–145)
GAS PNL BLDV: 130 MMOL/L — LOW (ref 135–145)
GAS PNL BLDV: SIGNIFICANT CHANGE UP
GLUCOSE BLDC GLUCOMTR-MCNC: 222 MG/DL — HIGH (ref 70–99)
GLUCOSE BLDV-MCNC: 243 MG/DL — HIGH (ref 70–99)
GLUCOSE BLDV-MCNC: 249 MG/DL — HIGH (ref 70–99)
GLUCOSE QUALITATIVE U: ABNORMAL
GLUCOSE SERPL-MCNC: 114 MG/DL
GLUCOSE SERPL-MCNC: 117 MG/DL
GLUCOSE SERPL-MCNC: 176 MG/DL
GLUCOSE SERPL-MCNC: 207 MG/DL
GLUCOSE SERPL-MCNC: 207 MG/DL
GLUCOSE SERPL-MCNC: 212 MG/DL
GLUCOSE SERPL-MCNC: 233 MG/DL — HIGH (ref 70–99)
GLUCOSE SERPL-MCNC: 238 MG/DL — HIGH (ref 70–99)
GLUCOSE SERPL-MCNC: 241 MG/DL — HIGH (ref 70–99)
GLUCOSE SERPL-MCNC: 286 MG/DL
GLUCOSE UR QL: ABNORMAL
HBA1C MFR BLD HPLC: 8 %
HCO3 BLDV-SCNC: 16 MMOL/L — LOW (ref 21–29)
HCO3 BLDV-SCNC: 18 MMOL/L — LOW (ref 21–29)
HCO3 BLDV-SCNC: 18 MMOL/L — LOW (ref 21–29)
HCT VFR BLD CALC: 27.2 % — LOW (ref 39–50)
HCT VFR BLD CALC: 27.2 % — LOW (ref 39–50)
HCT VFR BLD CALC: 27.7 % — LOW (ref 39–50)
HCT VFR BLD CALC: 28.1 % — LOW (ref 39–50)
HCT VFR BLD CALC: 28.2 % — LOW (ref 39–50)
HCT VFR BLD CALC: 28.3 % — LOW (ref 39–50)
HCT VFR BLD CALC: 28.3 % — LOW (ref 39–50)
HCT VFR BLD CALC: 28.4 % — LOW (ref 39–50)
HCT VFR BLD CALC: 28.4 % — LOW (ref 39–50)
HCT VFR BLD CALC: 28.5 % — LOW (ref 39–50)
HCT VFR BLD CALC: 28.6 % — LOW (ref 39–50)
HCT VFR BLD CALC: 28.7 % — LOW (ref 39–50)
HCT VFR BLD CALC: 28.8 % — LOW (ref 39–50)
HCT VFR BLD CALC: 28.8 % — LOW (ref 39–50)
HCT VFR BLD CALC: 29.1 % — LOW (ref 39–50)
HCT VFR BLD CALC: 29.5 %
HCT VFR BLD CALC: 29.7 % — LOW (ref 39–50)
HCT VFR BLD CALC: 30 % — LOW (ref 39–50)
HCT VFR BLD CALC: 30.3 % — LOW (ref 39–50)
HCT VFR BLD CALC: 30.4 % — LOW (ref 39–50)
HCT VFR BLD CALC: 30.5 % — LOW (ref 39–50)
HCT VFR BLD CALC: 31 % — LOW (ref 39–50)
HCT VFR BLD CALC: 31.4 % — LOW (ref 39–50)
HCT VFR BLD CALC: 32.6 % — LOW (ref 39–50)
HCT VFR BLDA CALC: 32 % — LOW (ref 39–50)
HCT VFR BLDA CALC: 33 % — LOW (ref 39–50)
HGB BLD CALC-MCNC: 10.2 G/DL — LOW (ref 13–17)
HGB BLD CALC-MCNC: 10.8 G/DL — LOW (ref 13–17)
HGB BLD-MCNC: 10 G/DL — LOW (ref 13–17)
HGB BLD-MCNC: 10 G/DL — LOW (ref 13–17)
HGB BLD-MCNC: 10.1 G/DL — LOW (ref 13–17)
HGB BLD-MCNC: 10.1 G/DL — LOW (ref 13–17)
HGB BLD-MCNC: 10.2 G/DL — LOW (ref 13–17)
HGB BLD-MCNC: 8.4 G/DL — LOW (ref 13–17)
HGB BLD-MCNC: 8.6 G/DL — LOW (ref 13–17)
HGB BLD-MCNC: 8.7 G/DL — LOW (ref 13–17)
HGB BLD-MCNC: 8.9 G/DL
HGB BLD-MCNC: 8.9 G/DL — LOW (ref 13–17)
HGB BLD-MCNC: 9 G/DL — LOW (ref 13–17)
HGB BLD-MCNC: 9.1 G/DL — LOW (ref 13–17)
HGB BLD-MCNC: 9.1 G/DL — LOW (ref 13–17)
HGB BLD-MCNC: 9.2 G/DL — LOW (ref 13–17)
HGB BLD-MCNC: 9.3 G/DL — LOW (ref 13–17)
HGB BLD-MCNC: 9.4 G/DL — LOW (ref 13–17)
HGB BLD-MCNC: 9.5 G/DL — LOW (ref 13–17)
HGB BLD-MCNC: 9.5 G/DL — LOW (ref 13–17)
HGB BLD-MCNC: 9.7 G/DL — LOW (ref 13–17)
HOROWITZ INDEX BLDV+IHG-RTO: 100 — SIGNIFICANT CHANGE UP
HYALINE CASTS # UR AUTO: 0 /LPF — SIGNIFICANT CHANGE UP (ref 0–2)
HYALINE CASTS: 0 /LPF
IMM GRANULOCYTES NFR BLD AUTO: 0.3 %
IMM GRANULOCYTES NFR BLD AUTO: 0.3 % — SIGNIFICANT CHANGE UP (ref 0–1.5)
IMM GRANULOCYTES NFR BLD AUTO: 0.6 % — SIGNIFICANT CHANGE UP (ref 0–1.5)
IMM GRANULOCYTES NFR BLD AUTO: 0.6 % — SIGNIFICANT CHANGE UP (ref 0–1.5)
IMM GRANULOCYTES NFR BLD AUTO: 0.7 % — SIGNIFICANT CHANGE UP (ref 0–1.5)
IMM GRANULOCYTES NFR BLD AUTO: 1 % — SIGNIFICANT CHANGE UP (ref 0–1.5)
IMM GRANULOCYTES NFR BLD AUTO: 1.1 % — SIGNIFICANT CHANGE UP (ref 0–1.5)
IMM GRANULOCYTES NFR BLD AUTO: 1.2 % — SIGNIFICANT CHANGE UP (ref 0–1.5)
IMM GRANULOCYTES NFR BLD AUTO: 1.4 % — SIGNIFICANT CHANGE UP (ref 0–1.5)
IMM GRANULOCYTES NFR BLD AUTO: 1.4 % — SIGNIFICANT CHANGE UP (ref 0–1.5)
IMM GRANULOCYTES NFR BLD AUTO: 1.5 % — SIGNIFICANT CHANGE UP (ref 0–1.5)
IMM GRANULOCYTES NFR BLD AUTO: 1.5 % — SIGNIFICANT CHANGE UP (ref 0–1.5)
IMM GRANULOCYTES NFR BLD AUTO: 1.6 % — HIGH (ref 0–1.5)
IMM GRANULOCYTES NFR BLD AUTO: 1.7 % — HIGH (ref 0–1.5)
IMM GRANULOCYTES NFR BLD AUTO: 2 % — HIGH (ref 0–1.5)
IMM GRANULOCYTES NFR BLD AUTO: 3.2 % — HIGH (ref 0–1.5)
IMM GRANULOCYTES NFR BLD AUTO: 3.5 % — HIGH (ref 0–1.5)
INR BLD: 1.81 RATIO — HIGH (ref 0.88–1.16)
INR BLD: 1.82 RATIO — HIGH (ref 0.88–1.16)
KETONES UR-MCNC: NEGATIVE — SIGNIFICANT CHANGE UP
KETONES URINE: NEGATIVE
LACTATE BLDV-MCNC: 1.7 MMOL/L — SIGNIFICANT CHANGE UP (ref 0.7–2)
LACTATE BLDV-MCNC: 2.2 MMOL/L — HIGH (ref 0.7–2)
LACTATE BLDV-MCNC: 2.9 MMOL/L — HIGH (ref 0.7–2)
LEUKOCYTE ESTERASE UR-ACNC: NEGATIVE — SIGNIFICANT CHANGE UP
LEUKOCYTE ESTERASE URINE: NEGATIVE
LYMPHOCYTES # BLD AUTO: 0.23 K/UL — LOW (ref 1–3.3)
LYMPHOCYTES # BLD AUTO: 0.47 K/UL — LOW (ref 1–3.3)
LYMPHOCYTES # BLD AUTO: 0.51 K/UL — LOW (ref 1–3.3)
LYMPHOCYTES # BLD AUTO: 0.53 K/UL
LYMPHOCYTES # BLD AUTO: 0.53 K/UL — LOW (ref 1–3.3)
LYMPHOCYTES # BLD AUTO: 0.53 K/UL — LOW (ref 1–3.3)
LYMPHOCYTES # BLD AUTO: 0.55 K/UL — LOW (ref 1–3.3)
LYMPHOCYTES # BLD AUTO: 0.56 K/UL — LOW (ref 1–3.3)
LYMPHOCYTES # BLD AUTO: 0.56 K/UL — LOW (ref 1–3.3)
LYMPHOCYTES # BLD AUTO: 0.59 K/UL — LOW (ref 1–3.3)
LYMPHOCYTES # BLD AUTO: 0.6 K/UL — LOW (ref 1–3.3)
LYMPHOCYTES # BLD AUTO: 0.6 K/UL — LOW (ref 1–3.3)
LYMPHOCYTES # BLD AUTO: 0.61 K/UL — LOW (ref 1–3.3)
LYMPHOCYTES # BLD AUTO: 0.64 K/UL — LOW (ref 1–3.3)
LYMPHOCYTES # BLD AUTO: 0.66 K/UL — LOW (ref 1–3.3)
LYMPHOCYTES # BLD AUTO: 0.7 K/UL — LOW (ref 1–3.3)
LYMPHOCYTES # BLD AUTO: 0.7 K/UL — LOW (ref 1–3.3)
LYMPHOCYTES # BLD AUTO: 0.74 K/UL — LOW (ref 1–3.3)
LYMPHOCYTES # BLD AUTO: 0.79 K/UL — LOW (ref 1–3.3)
LYMPHOCYTES # BLD AUTO: 0.85 K/UL — LOW (ref 1–3.3)
LYMPHOCYTES # BLD AUTO: 0.88 K/UL — LOW (ref 1–3.3)
LYMPHOCYTES # BLD AUTO: 0.88 K/UL — LOW (ref 1–3.3)
LYMPHOCYTES # BLD AUTO: 10.9 % — LOW (ref 13–44)
LYMPHOCYTES # BLD AUTO: 11.8 % — LOW (ref 13–44)
LYMPHOCYTES # BLD AUTO: 11.9 % — LOW (ref 13–44)
LYMPHOCYTES # BLD AUTO: 12.3 % — LOW (ref 13–44)
LYMPHOCYTES # BLD AUTO: 12.4 % — LOW (ref 13–44)
LYMPHOCYTES # BLD AUTO: 12.7 % — LOW (ref 13–44)
LYMPHOCYTES # BLD AUTO: 12.8 % — LOW (ref 13–44)
LYMPHOCYTES # BLD AUTO: 12.8 % — LOW (ref 13–44)
LYMPHOCYTES # BLD AUTO: 13.1 % — SIGNIFICANT CHANGE UP (ref 13–44)
LYMPHOCYTES # BLD AUTO: 13.3 % — SIGNIFICANT CHANGE UP (ref 13–44)
LYMPHOCYTES # BLD AUTO: 13.4 % — SIGNIFICANT CHANGE UP (ref 13–44)
LYMPHOCYTES # BLD AUTO: 13.5 % — SIGNIFICANT CHANGE UP (ref 13–44)
LYMPHOCYTES # BLD AUTO: 14.7 % — SIGNIFICANT CHANGE UP (ref 13–44)
LYMPHOCYTES # BLD AUTO: 15 % — SIGNIFICANT CHANGE UP (ref 13–44)
LYMPHOCYTES # BLD AUTO: 15.6 % — SIGNIFICANT CHANGE UP (ref 13–44)
LYMPHOCYTES # BLD AUTO: 16.4 % — SIGNIFICANT CHANGE UP (ref 13–44)
LYMPHOCYTES # BLD AUTO: 16.7 % — SIGNIFICANT CHANGE UP (ref 13–44)
LYMPHOCYTES # BLD AUTO: 17.8 % — SIGNIFICANT CHANGE UP (ref 13–44)
LYMPHOCYTES # BLD AUTO: 2.6 % — LOW (ref 13–44)
LYMPHOCYTES # BLD AUTO: 20.1 % — SIGNIFICANT CHANGE UP (ref 13–44)
LYMPHOCYTES # BLD AUTO: 22 % — SIGNIFICANT CHANGE UP (ref 13–44)
LYMPHOCYTES NFR BLD AUTO: 14 %
MACROCYTES BLD QL: SLIGHT — SIGNIFICANT CHANGE UP
MAGNESIUM SERPL-MCNC: 1.6 MG/DL — SIGNIFICANT CHANGE UP (ref 1.6–2.6)
MAGNESIUM SERPL-MCNC: 1.7 MG/DL — SIGNIFICANT CHANGE UP (ref 1.6–2.6)
MAN DIFF?: NORMAL
MANUAL SMEAR VERIFICATION: SIGNIFICANT CHANGE UP
MCHC RBC-ENTMCNC: 25.8 PG — LOW (ref 27–34)
MCHC RBC-ENTMCNC: 26 PG — LOW (ref 27–34)
MCHC RBC-ENTMCNC: 26.2 PG — LOW (ref 27–34)
MCHC RBC-ENTMCNC: 26.3 PG — LOW (ref 27–34)
MCHC RBC-ENTMCNC: 26.5 PG — LOW (ref 27–34)
MCHC RBC-ENTMCNC: 26.7 PG — LOW (ref 27–34)
MCHC RBC-ENTMCNC: 27.2 PG — SIGNIFICANT CHANGE UP (ref 27–34)
MCHC RBC-ENTMCNC: 27.3 PG
MCHC RBC-ENTMCNC: 27.3 PG — SIGNIFICANT CHANGE UP (ref 27–34)
MCHC RBC-ENTMCNC: 27.4 PG — SIGNIFICANT CHANGE UP (ref 27–34)
MCHC RBC-ENTMCNC: 27.4 PG — SIGNIFICANT CHANGE UP (ref 27–34)
MCHC RBC-ENTMCNC: 27.6 PG — SIGNIFICANT CHANGE UP (ref 27–34)
MCHC RBC-ENTMCNC: 27.7 PG — SIGNIFICANT CHANGE UP (ref 27–34)
MCHC RBC-ENTMCNC: 27.9 PG — SIGNIFICANT CHANGE UP (ref 27–34)
MCHC RBC-ENTMCNC: 28.2 PG — SIGNIFICANT CHANGE UP (ref 27–34)
MCHC RBC-ENTMCNC: 28.3 PG — SIGNIFICANT CHANGE UP (ref 27–34)
MCHC RBC-ENTMCNC: 28.4 PG — SIGNIFICANT CHANGE UP (ref 27–34)
MCHC RBC-ENTMCNC: 28.7 PG — SIGNIFICANT CHANGE UP (ref 27–34)
MCHC RBC-ENTMCNC: 28.7 PG — SIGNIFICANT CHANGE UP (ref 27–34)
MCHC RBC-ENTMCNC: 29.2 PG — SIGNIFICANT CHANGE UP (ref 27–34)
MCHC RBC-ENTMCNC: 30.2 GM/DL
MCHC RBC-ENTMCNC: 30.9 G/DL — LOW (ref 32–36)
MCHC RBC-ENTMCNC: 31 G/DL — LOW (ref 32–36)
MCHC RBC-ENTMCNC: 31.3 G/DL — LOW (ref 32–36)
MCHC RBC-ENTMCNC: 31.3 G/DL — LOW (ref 32–36)
MCHC RBC-ENTMCNC: 31.3 GM/DL — LOW (ref 32–36)
MCHC RBC-ENTMCNC: 31.6 G/DL — LOW (ref 32–36)
MCHC RBC-ENTMCNC: 31.7 G/DL — LOW (ref 32–36)
MCHC RBC-ENTMCNC: 31.8 G/DL — LOW (ref 32–36)
MCHC RBC-ENTMCNC: 31.8 G/DL — LOW (ref 32–36)
MCHC RBC-ENTMCNC: 31.8 GM/DL — LOW (ref 32–36)
MCHC RBC-ENTMCNC: 32 G/DL — SIGNIFICANT CHANGE UP (ref 32–36)
MCHC RBC-ENTMCNC: 32.1 G/DL — SIGNIFICANT CHANGE UP (ref 32–36)
MCHC RBC-ENTMCNC: 32.2 G/DL — SIGNIFICANT CHANGE UP (ref 32–36)
MCHC RBC-ENTMCNC: 32.3 G/DL — SIGNIFICANT CHANGE UP (ref 32–36)
MCHC RBC-ENTMCNC: 32.3 GM/DL — SIGNIFICANT CHANGE UP (ref 32–36)
MCHC RBC-ENTMCNC: 32.4 G/DL — SIGNIFICANT CHANGE UP (ref 32–36)
MCHC RBC-ENTMCNC: 32.5 G/DL — SIGNIFICANT CHANGE UP (ref 32–36)
MCHC RBC-ENTMCNC: 32.9 G/DL — SIGNIFICANT CHANGE UP (ref 32–36)
MCHC RBC-ENTMCNC: 33.3 G/DL — SIGNIFICANT CHANGE UP (ref 32–36)
MCV RBC AUTO: 81.7 FL — SIGNIFICANT CHANGE UP (ref 80–100)
MCV RBC AUTO: 81.9 FL — SIGNIFICANT CHANGE UP (ref 80–100)
MCV RBC AUTO: 82.7 FL — SIGNIFICANT CHANGE UP (ref 80–100)
MCV RBC AUTO: 83.1 FL — SIGNIFICANT CHANGE UP (ref 80–100)
MCV RBC AUTO: 83.2 FL — SIGNIFICANT CHANGE UP (ref 80–100)
MCV RBC AUTO: 83.2 FL — SIGNIFICANT CHANGE UP (ref 80–100)
MCV RBC AUTO: 83.4 FL — SIGNIFICANT CHANGE UP (ref 80–100)
MCV RBC AUTO: 83.8 FL — SIGNIFICANT CHANGE UP (ref 80–100)
MCV RBC AUTO: 84 FL — SIGNIFICANT CHANGE UP (ref 80–100)
MCV RBC AUTO: 84.2 FL — SIGNIFICANT CHANGE UP (ref 80–100)
MCV RBC AUTO: 84.5 FL — SIGNIFICANT CHANGE UP (ref 80–100)
MCV RBC AUTO: 85.4 FL — SIGNIFICANT CHANGE UP (ref 80–100)
MCV RBC AUTO: 85.7 FL — SIGNIFICANT CHANGE UP (ref 80–100)
MCV RBC AUTO: 85.8 FL — SIGNIFICANT CHANGE UP (ref 80–100)
MCV RBC AUTO: 86 FL — SIGNIFICANT CHANGE UP (ref 80–100)
MCV RBC AUTO: 86.1 FL — SIGNIFICANT CHANGE UP (ref 80–100)
MCV RBC AUTO: 87.2 FL — SIGNIFICANT CHANGE UP (ref 80–100)
MCV RBC AUTO: 87.4 FL — SIGNIFICANT CHANGE UP (ref 80–100)
MCV RBC AUTO: 89.4 FL — SIGNIFICANT CHANGE UP (ref 80–100)
MCV RBC AUTO: 89.5 FL — SIGNIFICANT CHANGE UP (ref 80–100)
MCV RBC AUTO: 90.3 FL — SIGNIFICANT CHANGE UP (ref 80–100)
MCV RBC AUTO: 90.5 FL
MCV RBC AUTO: 90.6 FL — SIGNIFICANT CHANGE UP (ref 80–100)
MCV RBC AUTO: 90.8 FL — SIGNIFICANT CHANGE UP (ref 80–100)
MICROSCOPIC-UA: NORMAL
MONOCYTES # BLD AUTO: 0.32 K/UL — SIGNIFICANT CHANGE UP (ref 0–0.9)
MONOCYTES # BLD AUTO: 0.49 K/UL — SIGNIFICANT CHANGE UP (ref 0–0.9)
MONOCYTES # BLD AUTO: 0.5 K/UL — SIGNIFICANT CHANGE UP (ref 0–0.9)
MONOCYTES # BLD AUTO: 0.54 K/UL — SIGNIFICANT CHANGE UP (ref 0–0.9)
MONOCYTES # BLD AUTO: 0.54 K/UL — SIGNIFICANT CHANGE UP (ref 0–0.9)
MONOCYTES # BLD AUTO: 0.55 K/UL
MONOCYTES # BLD AUTO: 0.55 K/UL — SIGNIFICANT CHANGE UP (ref 0–0.9)
MONOCYTES # BLD AUTO: 0.56 K/UL — SIGNIFICANT CHANGE UP (ref 0–0.9)
MONOCYTES # BLD AUTO: 0.58 K/UL — SIGNIFICANT CHANGE UP (ref 0–0.9)
MONOCYTES # BLD AUTO: 0.59 K/UL — SIGNIFICANT CHANGE UP (ref 0–0.9)
MONOCYTES # BLD AUTO: 0.6 K/UL — SIGNIFICANT CHANGE UP (ref 0–0.9)
MONOCYTES # BLD AUTO: 0.61 K/UL — SIGNIFICANT CHANGE UP (ref 0–0.9)
MONOCYTES # BLD AUTO: 0.62 K/UL — SIGNIFICANT CHANGE UP (ref 0–0.9)
MONOCYTES # BLD AUTO: 0.63 K/UL — SIGNIFICANT CHANGE UP (ref 0–0.9)
MONOCYTES # BLD AUTO: 0.63 K/UL — SIGNIFICANT CHANGE UP (ref 0–0.9)
MONOCYTES # BLD AUTO: 0.68 K/UL — SIGNIFICANT CHANGE UP (ref 0–0.9)
MONOCYTES # BLD AUTO: 0.68 K/UL — SIGNIFICANT CHANGE UP (ref 0–0.9)
MONOCYTES # BLD AUTO: 0.71 K/UL — SIGNIFICANT CHANGE UP (ref 0–0.9)
MONOCYTES # BLD AUTO: 0.76 K/UL — SIGNIFICANT CHANGE UP (ref 0–0.9)
MONOCYTES # BLD AUTO: 0.83 K/UL — SIGNIFICANT CHANGE UP (ref 0–0.9)
MONOCYTES NFR BLD AUTO: 10.6 % — SIGNIFICANT CHANGE UP (ref 2–14)
MONOCYTES NFR BLD AUTO: 10.7 % — SIGNIFICANT CHANGE UP (ref 2–14)
MONOCYTES NFR BLD AUTO: 11 % — SIGNIFICANT CHANGE UP (ref 2–14)
MONOCYTES NFR BLD AUTO: 11.2 % — SIGNIFICANT CHANGE UP (ref 2–14)
MONOCYTES NFR BLD AUTO: 11.8 % — SIGNIFICANT CHANGE UP (ref 2–14)
MONOCYTES NFR BLD AUTO: 11.9 % — SIGNIFICANT CHANGE UP (ref 2–14)
MONOCYTES NFR BLD AUTO: 12.9 % — SIGNIFICANT CHANGE UP (ref 2–14)
MONOCYTES NFR BLD AUTO: 12.9 % — SIGNIFICANT CHANGE UP (ref 2–14)
MONOCYTES NFR BLD AUTO: 13.3 % — SIGNIFICANT CHANGE UP (ref 2–14)
MONOCYTES NFR BLD AUTO: 13.6 % — SIGNIFICANT CHANGE UP (ref 2–14)
MONOCYTES NFR BLD AUTO: 14 % — SIGNIFICANT CHANGE UP (ref 2–14)
MONOCYTES NFR BLD AUTO: 14.1 % — HIGH (ref 2–14)
MONOCYTES NFR BLD AUTO: 14.2 % — HIGH (ref 2–14)
MONOCYTES NFR BLD AUTO: 14.4 % — HIGH (ref 2–14)
MONOCYTES NFR BLD AUTO: 14.4 % — HIGH (ref 2–14)
MONOCYTES NFR BLD AUTO: 14.5 %
MONOCYTES NFR BLD AUTO: 15.2 % — HIGH (ref 2–14)
MONOCYTES NFR BLD AUTO: 16.2 % — HIGH (ref 2–14)
MONOCYTES NFR BLD AUTO: 16.5 % — HIGH (ref 2–14)
MONOCYTES NFR BLD AUTO: 16.7 % — HIGH (ref 2–14)
MONOCYTES NFR BLD AUTO: 7.9 % — SIGNIFICANT CHANGE UP (ref 2–14)
MONOCYTES NFR BLD AUTO: 8 % — SIGNIFICANT CHANGE UP (ref 2–14)
NEUTROPHILS # BLD AUTO: 2.21 K/UL — SIGNIFICANT CHANGE UP (ref 1.8–7.4)
NEUTROPHILS # BLD AUTO: 2.34 K/UL
NEUTROPHILS # BLD AUTO: 2.44 K/UL — SIGNIFICANT CHANGE UP (ref 1.8–7.4)
NEUTROPHILS # BLD AUTO: 2.52 K/UL — SIGNIFICANT CHANGE UP (ref 1.8–7.4)
NEUTROPHILS # BLD AUTO: 2.53 K/UL — SIGNIFICANT CHANGE UP (ref 1.8–7.4)
NEUTROPHILS # BLD AUTO: 2.61 K/UL — SIGNIFICANT CHANGE UP (ref 1.8–7.4)
NEUTROPHILS # BLD AUTO: 2.66 K/UL — SIGNIFICANT CHANGE UP (ref 1.8–7.4)
NEUTROPHILS # BLD AUTO: 2.68 K/UL — SIGNIFICANT CHANGE UP (ref 1.8–7.4)
NEUTROPHILS # BLD AUTO: 2.69 K/UL — SIGNIFICANT CHANGE UP (ref 1.8–7.4)
NEUTROPHILS # BLD AUTO: 2.8 K/UL — SIGNIFICANT CHANGE UP (ref 1.8–7.4)
NEUTROPHILS # BLD AUTO: 2.92 K/UL — SIGNIFICANT CHANGE UP (ref 1.8–7.4)
NEUTROPHILS # BLD AUTO: 2.95 K/UL — SIGNIFICANT CHANGE UP (ref 1.8–7.4)
NEUTROPHILS # BLD AUTO: 2.96 K/UL — SIGNIFICANT CHANGE UP (ref 1.8–7.4)
NEUTROPHILS # BLD AUTO: 3 K/UL — SIGNIFICANT CHANGE UP (ref 1.8–7.4)
NEUTROPHILS # BLD AUTO: 3.1 K/UL — SIGNIFICANT CHANGE UP (ref 1.8–7.4)
NEUTROPHILS # BLD AUTO: 3.13 K/UL — SIGNIFICANT CHANGE UP (ref 1.8–7.4)
NEUTROPHILS # BLD AUTO: 3.13 K/UL — SIGNIFICANT CHANGE UP (ref 1.8–7.4)
NEUTROPHILS # BLD AUTO: 3.23 K/UL — SIGNIFICANT CHANGE UP (ref 1.8–7.4)
NEUTROPHILS # BLD AUTO: 3.4 K/UL — SIGNIFICANT CHANGE UP (ref 1.8–7.4)
NEUTROPHILS # BLD AUTO: 3.54 K/UL — SIGNIFICANT CHANGE UP (ref 1.8–7.4)
NEUTROPHILS # BLD AUTO: 3.92 K/UL — SIGNIFICANT CHANGE UP (ref 1.8–7.4)
NEUTROPHILS # BLD AUTO: 7.97 K/UL — HIGH (ref 1.8–7.4)
NEUTROPHILS NFR BLD AUTO: 57.5 % — SIGNIFICANT CHANGE UP (ref 43–77)
NEUTROPHILS NFR BLD AUTO: 59.8 % — SIGNIFICANT CHANGE UP (ref 43–77)
NEUTROPHILS NFR BLD AUTO: 59.8 % — SIGNIFICANT CHANGE UP (ref 43–77)
NEUTROPHILS NFR BLD AUTO: 60.8 % — SIGNIFICANT CHANGE UP (ref 43–77)
NEUTROPHILS NFR BLD AUTO: 61.7 %
NEUTROPHILS NFR BLD AUTO: 62.3 % — SIGNIFICANT CHANGE UP (ref 43–77)
NEUTROPHILS NFR BLD AUTO: 62.6 % — SIGNIFICANT CHANGE UP (ref 43–77)
NEUTROPHILS NFR BLD AUTO: 63.1 % — SIGNIFICANT CHANGE UP (ref 43–77)
NEUTROPHILS NFR BLD AUTO: 63.2 % — SIGNIFICANT CHANGE UP (ref 43–77)
NEUTROPHILS NFR BLD AUTO: 63.3 % — SIGNIFICANT CHANGE UP (ref 43–77)
NEUTROPHILS NFR BLD AUTO: 65 % — SIGNIFICANT CHANGE UP (ref 43–77)
NEUTROPHILS NFR BLD AUTO: 65.3 % — SIGNIFICANT CHANGE UP (ref 43–77)
NEUTROPHILS NFR BLD AUTO: 65.4 % — SIGNIFICANT CHANGE UP (ref 43–77)
NEUTROPHILS NFR BLD AUTO: 66.2 % — SIGNIFICANT CHANGE UP (ref 43–77)
NEUTROPHILS NFR BLD AUTO: 66.3 % — SIGNIFICANT CHANGE UP (ref 43–77)
NEUTROPHILS NFR BLD AUTO: 66.8 % — SIGNIFICANT CHANGE UP (ref 43–77)
NEUTROPHILS NFR BLD AUTO: 67.7 % — SIGNIFICANT CHANGE UP (ref 43–77)
NEUTROPHILS NFR BLD AUTO: 68.3 % — SIGNIFICANT CHANGE UP (ref 43–77)
NEUTROPHILS NFR BLD AUTO: 68.8 % — SIGNIFICANT CHANGE UP (ref 43–77)
NEUTROPHILS NFR BLD AUTO: 69.3 % — SIGNIFICANT CHANGE UP (ref 43–77)
NEUTROPHILS NFR BLD AUTO: 72.8 % — SIGNIFICANT CHANGE UP (ref 43–77)
NEUTROPHILS NFR BLD AUTO: 88.6 % — HIGH (ref 43–77)
NITRITE UR-MCNC: NEGATIVE — SIGNIFICANT CHANGE UP
NITRITE URINE: NEGATIVE
NRBC # BLD: 0 /100 WBCS — SIGNIFICANT CHANGE UP (ref 0–0)
NRBC # BLD: 0 /100 — SIGNIFICANT CHANGE UP (ref 0–0)
NRBC # BLD: 1 /100 — HIGH (ref 0–0)
NRBC # BLD: SIGNIFICANT CHANGE UP /100 WBCS (ref 0–0)
OSMOLALITY UR: 376 MOS/KG — SIGNIFICANT CHANGE UP (ref 300–900)
OTHER CELLS CSF MANUAL: 5 ML/DL — LOW (ref 18–22)
OVALOCYTES BLD QL SMEAR: SLIGHT — SIGNIFICANT CHANGE UP
PCO2 BLDV: 32 MMHG — LOW (ref 35–50)
PCO2 BLDV: 36 MMHG — SIGNIFICANT CHANGE UP (ref 35–50)
PCO2 BLDV: 36 MMHG — SIGNIFICANT CHANGE UP (ref 35–50)
PH BLDV: 7.31 — LOW (ref 7.35–7.45)
PH BLDV: 7.33 — LOW (ref 7.35–7.45)
PH BLDV: 7.33 — LOW (ref 7.35–7.45)
PH UR: 6 — SIGNIFICANT CHANGE UP (ref 5–8)
PH URINE: 6.5
PHOSPHATE SERPL-MCNC: 3.8 MG/DL
PHOSPHATE SERPL-MCNC: 5.3 MG/DL — HIGH (ref 2.5–4.5)
PHOSPHATE SERPL-MCNC: 5.5 MG/DL — HIGH (ref 2.5–4.5)
PLAT MORPH BLD: NORMAL — SIGNIFICANT CHANGE UP
PLAT MORPH BLD: NORMAL — SIGNIFICANT CHANGE UP
PLATELET # BLD AUTO: 12 K/UL — CRITICAL LOW (ref 150–400)
PLATELET # BLD AUTO: 15 K/UL — CRITICAL LOW (ref 150–400)
PLATELET # BLD AUTO: 16 K/UL — CRITICAL LOW (ref 150–400)
PLATELET # BLD AUTO: 19 K/UL — CRITICAL LOW (ref 150–400)
PLATELET # BLD AUTO: 20 K/UL — CRITICAL LOW (ref 150–400)
PLATELET # BLD AUTO: 23 K/UL — LOW (ref 150–400)
PLATELET # BLD AUTO: 25 K/UL — LOW (ref 150–400)
PLATELET # BLD AUTO: 26 K/UL — LOW (ref 150–400)
PLATELET # BLD AUTO: 27 K/UL — LOW (ref 150–400)
PLATELET # BLD AUTO: 29 K/UL — LOW (ref 150–400)
PLATELET # BLD AUTO: 30 K/UL — LOW (ref 150–400)
PLATELET # BLD AUTO: 31 K/UL — LOW (ref 150–400)
PLATELET # BLD AUTO: 37 K/UL — LOW (ref 150–400)
PLATELET # BLD AUTO: 45 K/UL — LOW (ref 150–400)
PLATELET # BLD AUTO: 49 K/UL — LOW (ref 150–400)
PLATELET # BLD AUTO: 57 K/UL — LOW (ref 150–400)
PLATELET # BLD AUTO: 58 K/UL — LOW (ref 150–400)
PLATELET # BLD AUTO: 59 K/UL — LOW (ref 150–400)
PLATELET # BLD AUTO: 64 K/UL — LOW (ref 150–400)
PLATELET # BLD AUTO: 65 K/UL — LOW (ref 150–400)
PLATELET # BLD AUTO: 67 K/UL — LOW (ref 150–400)
PLATELET # BLD AUTO: 75 K/UL
PLATELET # BLD AUTO: 8 K/UL — CRITICAL LOW (ref 150–400)
PLATELET # BLD AUTO: 9 K/UL — CRITICAL LOW (ref 150–400)
PO2 BLDV: 25 MMHG — SIGNIFICANT CHANGE UP (ref 25–45)
PO2 BLDV: 34 MMHG — SIGNIFICANT CHANGE UP (ref 25–45)
PO2 BLDV: <20 MMHG — LOW (ref 25–45)
POIKILOCYTOSIS BLD QL AUTO: SIGNIFICANT CHANGE UP
POLYCHROMASIA BLD QL SMEAR: SLIGHT — SIGNIFICANT CHANGE UP
POTASSIUM BLDV-SCNC: 4.3 MMOL/L — SIGNIFICANT CHANGE UP (ref 3.5–5.3)
POTASSIUM BLDV-SCNC: 4.4 MMOL/L — SIGNIFICANT CHANGE UP (ref 3.5–5.3)
POTASSIUM SERPL-MCNC: 4.6 MMOL/L — SIGNIFICANT CHANGE UP (ref 3.5–5.3)
POTASSIUM SERPL-MCNC: 4.7 MMOL/L — SIGNIFICANT CHANGE UP (ref 3.5–5.3)
POTASSIUM SERPL-MCNC: 5 MMOL/L — SIGNIFICANT CHANGE UP (ref 3.5–5.3)
POTASSIUM SERPL-SCNC: 3.8 MMOL/L
POTASSIUM SERPL-SCNC: 3.8 MMOL/L
POTASSIUM SERPL-SCNC: 3.9 MMOL/L
POTASSIUM SERPL-SCNC: 3.9 MMOL/L
POTASSIUM SERPL-SCNC: 4 MMOL/L
POTASSIUM SERPL-SCNC: 4 MMOL/L
POTASSIUM SERPL-SCNC: 4.6 MMOL/L
POTASSIUM SERPL-SCNC: 4.6 MMOL/L — SIGNIFICANT CHANGE UP (ref 3.5–5.3)
POTASSIUM SERPL-SCNC: 4.7 MMOL/L — SIGNIFICANT CHANGE UP (ref 3.5–5.3)
POTASSIUM SERPL-SCNC: 5 MMOL/L — SIGNIFICANT CHANGE UP (ref 3.5–5.3)
POTASSIUM UR-SCNC: 21 MMOL/L — SIGNIFICANT CHANGE UP
PROCALCITONIN SERPL-MCNC: 2.1 NG/ML — HIGH (ref 0.02–0.1)
PROLACTIN SERPL-MCNC: 2.1 NG/ML
PROT SERPL-MCNC: 6 G/DL
PROT SERPL-MCNC: 6.1 G/DL — SIGNIFICANT CHANGE UP (ref 6–8.3)
PROT SERPL-MCNC: 6.2 G/DL
PROT SERPL-MCNC: 6.2 G/DL
PROT SERPL-MCNC: 6.4 G/DL
PROT SERPL-MCNC: 6.5 G/DL — SIGNIFICANT CHANGE UP (ref 6–8.3)
PROT SERPL-MCNC: 6.8 G/DL — SIGNIFICANT CHANGE UP (ref 6–8.3)
PROT UR-MCNC: 31 MG/DL
PROT UR-MCNC: ABNORMAL
PROTEIN URINE: ABNORMAL
PROTHROM AB SERPL-ACNC: 21.1 SEC — HIGH (ref 10.6–13.6)
PROTHROM AB SERPL-ACNC: 21.2 SEC — HIGH (ref 10.6–13.6)
RAPID RVP RESULT: SIGNIFICANT CHANGE UP
RBC # BLD: 3.04 M/UL — LOW (ref 4.2–5.8)
RBC # BLD: 3.05 M/UL — LOW (ref 4.2–5.8)
RBC # BLD: 3.12 M/UL — LOW (ref 4.2–5.8)
RBC # BLD: 3.26 M/UL
RBC # BLD: 3.29 M/UL — LOW (ref 4.2–5.8)
RBC # BLD: 3.36 M/UL — LOW (ref 4.2–5.8)
RBC # BLD: 3.36 M/UL — LOW (ref 4.2–5.8)
RBC # BLD: 3.38 M/UL — LOW (ref 4.2–5.8)
RBC # BLD: 3.39 M/UL — LOW (ref 4.2–5.8)
RBC # BLD: 3.39 M/UL — LOW (ref 4.2–5.8)
RBC # BLD: 3.4 M/UL — LOW (ref 4.2–5.8)
RBC # BLD: 3.41 M/UL — LOW (ref 4.2–5.8)
RBC # BLD: 3.41 M/UL — LOW (ref 4.2–5.8)
RBC # BLD: 3.42 M/UL — LOW (ref 4.2–5.8)
RBC # BLD: 3.43 M/UL — LOW (ref 4.2–5.8)
RBC # BLD: 3.46 M/UL — LOW (ref 4.2–5.8)
RBC # BLD: 3.46 M/UL — LOW (ref 4.2–5.8)
RBC # BLD: 3.48 M/UL — LOW (ref 4.2–5.8)
RBC # BLD: 3.49 M/UL — LOW (ref 4.2–5.8)
RBC # BLD: 3.52 M/UL — LOW (ref 4.2–5.8)
RBC # BLD: 3.56 M/UL — LOW (ref 4.2–5.8)
RBC # BLD: 3.61 M/UL — LOW (ref 4.2–5.8)
RBC # BLD: 3.61 M/UL — LOW (ref 4.2–5.8)
RBC # BLD: 3.65 M/UL — LOW (ref 4.2–5.8)
RBC # FLD: 15.8 % — HIGH (ref 10.3–14.5)
RBC # FLD: 16.1 % — HIGH (ref 10.3–14.5)
RBC # FLD: 16.3 % — HIGH (ref 10.3–14.5)
RBC # FLD: 16.5 %
RBC # FLD: 16.5 % — HIGH (ref 10.3–14.5)
RBC # FLD: 17.2 % — HIGH (ref 10.3–14.5)
RBC # FLD: 17.4 % — HIGH (ref 10.3–14.5)
RBC # FLD: 17.8 % — HIGH (ref 10.3–14.5)
RBC # FLD: 17.8 % — HIGH (ref 10.3–14.5)
RBC # FLD: 18.2 % — HIGH (ref 10.3–14.5)
RBC # FLD: 18.8 % — HIGH (ref 10.3–14.5)
RBC # FLD: 19.9 % — HIGH (ref 10.3–14.5)
RBC # FLD: 20.8 % — HIGH (ref 10.3–14.5)
RBC # FLD: 21.2 % — HIGH (ref 10.3–14.5)
RBC # FLD: 21.8 % — HIGH (ref 10.3–14.5)
RBC # FLD: 21.8 % — HIGH (ref 10.3–14.5)
RBC # FLD: 22.1 % — HIGH (ref 10.3–14.5)
RBC # FLD: 22.2 % — HIGH (ref 10.3–14.5)
RBC # FLD: 22.3 % — HIGH (ref 10.3–14.5)
RBC # FLD: 22.4 % — HIGH (ref 10.3–14.5)
RBC BLD AUTO: ABNORMAL
RBC BLD AUTO: SIGNIFICANT CHANGE UP
RBC CASTS # UR COMP ASSIST: 2 /HPF — SIGNIFICANT CHANGE UP (ref 0–4)
RED BLOOD CELLS URINE: 0 /HPF
RH IG SCN BLD-IMP: POSITIVE — SIGNIFICANT CHANGE UP
SAO2 % BLDV: 24 % — LOW (ref 67–88)
SAO2 % BLDV: 38 % — LOW (ref 67–88)
SAO2 % BLDV: 60 % — LOW (ref 67–88)
SARS-COV-2 RNA SPEC QL NAA+PROBE: SIGNIFICANT CHANGE UP
SCHISTOCYTES BLD QL AUTO: SLIGHT — SIGNIFICANT CHANGE UP
SODIUM SERPL-SCNC: 131 MMOL/L — LOW (ref 135–145)
SODIUM SERPL-SCNC: 132 MMOL/L — LOW (ref 135–145)
SODIUM SERPL-SCNC: 133 MMOL/L — LOW (ref 135–145)
SODIUM SERPL-SCNC: 136 MMOL/L
SODIUM SERPL-SCNC: 138 MMOL/L
SODIUM SERPL-SCNC: 138 MMOL/L
SODIUM SERPL-SCNC: 139 MMOL/L
SODIUM SERPL-SCNC: 139 MMOL/L
SODIUM SERPL-SCNC: 140 MMOL/L
SODIUM SERPL-SCNC: 141 MMOL/L
SODIUM UR-SCNC: 35 MMOL/L — SIGNIFICANT CHANGE UP
SP GR SPEC: 1.01 — SIGNIFICANT CHANGE UP (ref 1.01–1.02)
SPECIFIC GRAVITY URINE: 1.01
SQUAMOUS EPITHELIAL CELLS: 1 /HPF
T4 FREE SERPL-MCNC: 1.4 NG/DL
TARGETS BLD QL SMEAR: SLIGHT — SIGNIFICANT CHANGE UP
TROPONIN T, HIGH SENSITIVITY RESULT: 221 NG/L — HIGH (ref 0–51)
TSH SERPL-ACNC: 5.69 UIU/ML
UROBILINOGEN FLD QL: NEGATIVE — SIGNIFICANT CHANGE UP
UROBILINOGEN URINE: NORMAL
UUN UR-MCNC: 611 MG/DL — SIGNIFICANT CHANGE UP
WBC # BLD: 10.51 K/UL — HIGH (ref 3.8–10.5)
WBC # BLD: 3.5 K/UL — LOW (ref 3.8–10.5)
WBC # BLD: 3.78 K/UL — LOW (ref 3.8–10.5)
WBC # BLD: 3.98 K/UL — SIGNIFICANT CHANGE UP (ref 3.8–10.5)
WBC # BLD: 4.01 K/UL — SIGNIFICANT CHANGE UP (ref 3.8–10.5)
WBC # BLD: 4.06 K/UL — SIGNIFICANT CHANGE UP (ref 3.8–10.5)
WBC # BLD: 4.08 K/UL — SIGNIFICANT CHANGE UP (ref 3.8–10.5)
WBC # BLD: 4.16 K/UL — SIGNIFICANT CHANGE UP (ref 3.8–10.5)
WBC # BLD: 4.28 K/UL — SIGNIFICANT CHANGE UP (ref 3.8–10.5)
WBC # BLD: 4.36 K/UL — SIGNIFICANT CHANGE UP (ref 3.8–10.5)
WBC # BLD: 4.38 K/UL — SIGNIFICANT CHANGE UP (ref 3.8–10.5)
WBC # BLD: 4.48 K/UL — SIGNIFICANT CHANGE UP (ref 3.8–10.5)
WBC # BLD: 4.58 K/UL — SIGNIFICANT CHANGE UP (ref 3.8–10.5)
WBC # BLD: 4.61 K/UL — SIGNIFICANT CHANGE UP (ref 3.8–10.5)
WBC # BLD: 4.63 K/UL — SIGNIFICANT CHANGE UP (ref 3.8–10.5)
WBC # BLD: 4.68 K/UL — SIGNIFICANT CHANGE UP (ref 3.8–10.5)
WBC # BLD: 4.72 K/UL — SIGNIFICANT CHANGE UP (ref 3.8–10.5)
WBC # BLD: 4.74 K/UL — SIGNIFICANT CHANGE UP (ref 3.8–10.5)
WBC # BLD: 5.1 K/UL — SIGNIFICANT CHANGE UP (ref 3.8–10.5)
WBC # BLD: 5.13 K/UL — SIGNIFICANT CHANGE UP (ref 3.8–10.5)
WBC # BLD: 5.18 K/UL — SIGNIFICANT CHANGE UP (ref 3.8–10.5)
WBC # BLD: 5.38 K/UL — SIGNIFICANT CHANGE UP (ref 3.8–10.5)
WBC # BLD: 9 K/UL — SIGNIFICANT CHANGE UP (ref 3.8–10.5)
WBC # FLD AUTO: 10.51 K/UL — HIGH (ref 3.8–10.5)
WBC # FLD AUTO: 3.5 K/UL — LOW (ref 3.8–10.5)
WBC # FLD AUTO: 3.78 K/UL — LOW (ref 3.8–10.5)
WBC # FLD AUTO: 3.79 K/UL
WBC # FLD AUTO: 3.98 K/UL — SIGNIFICANT CHANGE UP (ref 3.8–10.5)
WBC # FLD AUTO: 4.01 K/UL — SIGNIFICANT CHANGE UP (ref 3.8–10.5)
WBC # FLD AUTO: 4.06 K/UL — SIGNIFICANT CHANGE UP (ref 3.8–10.5)
WBC # FLD AUTO: 4.08 K/UL — SIGNIFICANT CHANGE UP (ref 3.8–10.5)
WBC # FLD AUTO: 4.16 K/UL — SIGNIFICANT CHANGE UP (ref 3.8–10.5)
WBC # FLD AUTO: 4.28 K/UL — SIGNIFICANT CHANGE UP (ref 3.8–10.5)
WBC # FLD AUTO: 4.36 K/UL — SIGNIFICANT CHANGE UP (ref 3.8–10.5)
WBC # FLD AUTO: 4.38 K/UL — SIGNIFICANT CHANGE UP (ref 3.8–10.5)
WBC # FLD AUTO: 4.48 K/UL — SIGNIFICANT CHANGE UP (ref 3.8–10.5)
WBC # FLD AUTO: 4.58 K/UL — SIGNIFICANT CHANGE UP (ref 3.8–10.5)
WBC # FLD AUTO: 4.61 K/UL — SIGNIFICANT CHANGE UP (ref 3.8–10.5)
WBC # FLD AUTO: 4.63 K/UL — SIGNIFICANT CHANGE UP (ref 3.8–10.5)
WBC # FLD AUTO: 4.68 K/UL — SIGNIFICANT CHANGE UP (ref 3.8–10.5)
WBC # FLD AUTO: 4.72 K/UL — SIGNIFICANT CHANGE UP (ref 3.8–10.5)
WBC # FLD AUTO: 4.74 K/UL — SIGNIFICANT CHANGE UP (ref 3.8–10.5)
WBC # FLD AUTO: 5.1 K/UL — SIGNIFICANT CHANGE UP (ref 3.8–10.5)
WBC # FLD AUTO: 5.13 K/UL — SIGNIFICANT CHANGE UP (ref 3.8–10.5)
WBC # FLD AUTO: 5.18 K/UL — SIGNIFICANT CHANGE UP (ref 3.8–10.5)
WBC # FLD AUTO: 5.38 K/UL — SIGNIFICANT CHANGE UP (ref 3.8–10.5)
WBC # FLD AUTO: 9 K/UL — SIGNIFICANT CHANGE UP (ref 3.8–10.5)
WBC UR QL: 2 /HPF — SIGNIFICANT CHANGE UP (ref 0–5)
WHITE BLOOD CELLS URINE: 1 /HPF

## 2021-01-01 PROCEDURE — 84484 ASSAY OF TROPONIN QUANT: CPT

## 2021-01-01 PROCEDURE — 99072 ADDL SUPL MATRL&STAF TM PHE: CPT

## 2021-01-01 PROCEDURE — 99291 CRITICAL CARE FIRST HOUR: CPT

## 2021-01-01 PROCEDURE — 74150 CT ABDOMEN W/O CONTRAST: CPT | Mod: 26

## 2021-01-01 PROCEDURE — 84145 PROCALCITONIN (PCT): CPT

## 2021-01-01 PROCEDURE — 99213 OFFICE O/P EST LOW 20 MIN: CPT

## 2021-01-01 PROCEDURE — 80053 COMPREHEN METABOLIC PANEL: CPT

## 2021-01-01 PROCEDURE — 99215 OFFICE O/P EST HI 40 MIN: CPT

## 2021-01-01 PROCEDURE — 82330 ASSAY OF CALCIUM: CPT

## 2021-01-01 PROCEDURE — 73620 X-RAY EXAM OF FOOT: CPT

## 2021-01-01 PROCEDURE — 76978 US TRGT DYN MBUBB 1ST LES: CPT

## 2021-01-01 PROCEDURE — 0225U NFCT DS DNA&RNA 21 SARSCOV2: CPT

## 2021-01-01 PROCEDURE — 36430 TRANSFUSION BLD/BLD COMPNT: CPT

## 2021-01-01 PROCEDURE — 87040 BLOOD CULTURE FOR BACTERIA: CPT

## 2021-01-01 PROCEDURE — 93005 ELECTROCARDIOGRAM TRACING: CPT

## 2021-01-01 PROCEDURE — 99215 OFFICE O/P EST HI 40 MIN: CPT | Mod: 95

## 2021-01-01 PROCEDURE — 93010 ELECTROCARDIOGRAM REPORT: CPT | Mod: 77

## 2021-01-01 PROCEDURE — 36620 INSERTION CATHETER ARTERY: CPT | Mod: GC

## 2021-01-01 PROCEDURE — 85018 HEMOGLOBIN: CPT

## 2021-01-01 PROCEDURE — 82962 GLUCOSE BLOOD TEST: CPT

## 2021-01-01 PROCEDURE — 93283 PRGRMG EVAL IMPLANTABLE DFB: CPT | Mod: 26

## 2021-01-01 PROCEDURE — 84132 ASSAY OF SERUM POTASSIUM: CPT

## 2021-01-01 PROCEDURE — 92950 HEART/LUNG RESUSCITATION CPR: CPT | Mod: GC

## 2021-01-01 PROCEDURE — 74150 CT ABDOMEN W/O CONTRAST: CPT

## 2021-01-01 PROCEDURE — 93000 ELECTROCARDIOGRAM COMPLETE: CPT

## 2021-01-01 PROCEDURE — 99223 1ST HOSP IP/OBS HIGH 75: CPT | Mod: GC

## 2021-01-01 PROCEDURE — 84540 ASSAY OF URINE/UREA-N: CPT

## 2021-01-01 PROCEDURE — 86078 PHYS BLOOD BANK SERV REACTJ: CPT

## 2021-01-01 PROCEDURE — 86900 BLOOD TYPING SEROLOGIC ABO: CPT

## 2021-01-01 PROCEDURE — 86140 C-REACTIVE PROTEIN: CPT

## 2021-01-01 PROCEDURE — 76705 ECHO EXAM OF ABDOMEN: CPT

## 2021-01-01 PROCEDURE — 94002 VENT MGMT INPAT INIT DAY: CPT

## 2021-01-01 PROCEDURE — 84100 ASSAY OF PHOSPHORUS: CPT

## 2021-01-01 PROCEDURE — 96374 THER/PROPH/DIAG INJ IV PUSH: CPT

## 2021-01-01 PROCEDURE — 71045 X-RAY EXAM CHEST 1 VIEW: CPT

## 2021-01-01 PROCEDURE — 87086 URINE CULTURE/COLONY COUNT: CPT

## 2021-01-01 PROCEDURE — 84295 ASSAY OF SERUM SODIUM: CPT

## 2021-01-01 PROCEDURE — 99214 OFFICE O/P EST MOD 30 MIN: CPT | Mod: 95

## 2021-01-01 PROCEDURE — 93971 EXTREMITY STUDY: CPT

## 2021-01-01 PROCEDURE — 86880 COOMBS TEST DIRECT: CPT

## 2021-01-01 PROCEDURE — 99214 OFFICE O/P EST MOD 30 MIN: CPT

## 2021-01-01 PROCEDURE — 85025 COMPLETE CBC W/AUTO DIFF WBC: CPT

## 2021-01-01 PROCEDURE — P9037: CPT

## 2021-01-01 PROCEDURE — 99443: CPT | Mod: 95

## 2021-01-01 PROCEDURE — 73620 X-RAY EXAM OF FOOT: CPT | Mod: 26,RT

## 2021-01-01 PROCEDURE — 96375 TX/PRO/DX INJ NEW DRUG ADDON: CPT

## 2021-01-01 PROCEDURE — 71045 X-RAY EXAM CHEST 1 VIEW: CPT | Mod: 26

## 2021-01-01 PROCEDURE — 86870 RBC ANTIBODY IDENTIFICATION: CPT

## 2021-01-01 PROCEDURE — 86922 COMPATIBILITY TEST ANTIGLOB: CPT

## 2021-01-01 PROCEDURE — 82570 ASSAY OF URINE CREATININE: CPT

## 2021-01-01 PROCEDURE — 83735 ASSAY OF MAGNESIUM: CPT

## 2021-01-01 PROCEDURE — 93296 REM INTERROG EVL PM/IDS: CPT

## 2021-01-01 PROCEDURE — 76978 US TRGT DYN MBUBB 1ST LES: CPT | Mod: 26

## 2021-01-01 PROCEDURE — 86077 PHYS BLOOD BANK SERV XMATCH: CPT

## 2021-01-01 PROCEDURE — 93010 ELECTROCARDIOGRAM REPORT: CPT

## 2021-01-01 PROCEDURE — 93306 TTE W/DOPPLER COMPLETE: CPT

## 2021-01-01 PROCEDURE — 85014 HEMATOCRIT: CPT

## 2021-01-01 PROCEDURE — 85652 RBC SED RATE AUTOMATED: CPT

## 2021-01-01 PROCEDURE — 82435 ASSAY OF BLOOD CHLORIDE: CPT

## 2021-01-01 PROCEDURE — 93308 TTE F-UP OR LMTD: CPT | Mod: 26,GC

## 2021-01-01 PROCEDURE — 36415 COLL VENOUS BLD VENIPUNCTURE: CPT

## 2021-01-01 PROCEDURE — 99285 EMERGENCY DEPT VISIT HI MDM: CPT | Mod: 25

## 2021-01-01 PROCEDURE — 85730 THROMBOPLASTIN TIME PARTIAL: CPT

## 2021-01-01 PROCEDURE — 99291 CRITICAL CARE FIRST HOUR: CPT | Mod: 25

## 2021-01-01 PROCEDURE — 83605 ASSAY OF LACTIC ACID: CPT

## 2021-01-01 PROCEDURE — 93971 EXTREMITY STUDY: CPT | Mod: 26,RT

## 2021-01-01 PROCEDURE — 81001 URINALYSIS AUTO W/SCOPE: CPT

## 2021-01-01 PROCEDURE — 85610 PROTHROMBIN TIME: CPT

## 2021-01-01 PROCEDURE — 82565 ASSAY OF CREATININE: CPT

## 2021-01-01 PROCEDURE — 31500 INSERT EMERGENCY AIRWAY: CPT | Mod: GC

## 2021-01-01 PROCEDURE — 82550 ASSAY OF CK (CPK): CPT

## 2021-01-01 PROCEDURE — 83935 ASSAY OF URINE OSMOLALITY: CPT

## 2021-01-01 PROCEDURE — 86905 BLOOD TYPING RBC ANTIGENS: CPT

## 2021-01-01 PROCEDURE — 82553 CREATINE MB FRACTION: CPT

## 2021-01-01 PROCEDURE — 36556 INSERT NON-TUNNEL CV CATH: CPT | Mod: GC

## 2021-01-01 PROCEDURE — 84133 ASSAY OF URINE POTASSIUM: CPT

## 2021-01-01 PROCEDURE — 86850 RBC ANTIBODY SCREEN: CPT

## 2021-01-01 PROCEDURE — 84300 ASSAY OF URINE SODIUM: CPT

## 2021-01-01 PROCEDURE — 93295 DEV INTERROG REMOTE 1/2/MLT: CPT

## 2021-01-01 PROCEDURE — 82803 BLOOD GASES ANY COMBINATION: CPT

## 2021-01-01 PROCEDURE — 82947 ASSAY GLUCOSE BLOOD QUANT: CPT

## 2021-01-01 PROCEDURE — 87205 SMEAR GRAM STAIN: CPT

## 2021-01-01 PROCEDURE — 87070 CULTURE OTHR SPECIMN AEROBIC: CPT

## 2021-01-01 PROCEDURE — 86901 BLOOD TYPING SEROLOGIC RH(D): CPT

## 2021-01-01 PROCEDURE — 87186 SC STD MICRODIL/AGAR DIL: CPT

## 2021-01-01 RX ORDER — ALBUMIN HUMAN 25 %
100 VIAL (ML) INTRAVENOUS EVERY 6 HOURS
Refills: 0 | Status: DISCONTINUED | OUTPATIENT
Start: 2021-01-01 | End: 2021-01-01

## 2021-01-01 RX ORDER — BUMETANIDE 0.25 MG/ML
1 INJECTION INTRAMUSCULAR; INTRAVENOUS
Qty: 0 | Refills: 0 | DISCHARGE

## 2021-01-01 RX ORDER — AMIODARONE HYDROCHLORIDE 400 MG/1
0.5 TABLET ORAL
Qty: 900 | Refills: 0 | Status: DISCONTINUED | OUTPATIENT
Start: 2021-01-01 | End: 2021-01-01

## 2021-01-01 RX ORDER — RAMIPRIL 2.5 MG/1
2.5 CAPSULE ORAL DAILY
Qty: 90 | Refills: 3 | Status: ACTIVE | COMMUNITY
Start: 2018-10-05 | End: 1900-01-01

## 2021-01-01 RX ORDER — AMIODARONE HYDROCHLORIDE 400 MG/1
150 TABLET ORAL ONCE
Refills: 0 | Status: DISCONTINUED | OUTPATIENT
Start: 2021-01-01 | End: 2021-01-01

## 2021-01-01 RX ORDER — RAMIPRIL 5 MG
1 CAPSULE ORAL
Qty: 0 | Refills: 0 | DISCHARGE

## 2021-01-01 RX ORDER — INSULIN GLARGINE 100 [IU]/ML
18 INJECTION, SOLUTION SUBCUTANEOUS
Qty: 0 | Refills: 0 | DISCHARGE

## 2021-01-01 RX ORDER — CABERGOLINE 0.5 MG/1
1 TABLET ORAL
Qty: 0 | Refills: 0 | DISCHARGE

## 2021-01-01 RX ORDER — CHLORHEXIDINE GLUCONATE 213 G/1000ML
1 SOLUTION TOPICAL
Refills: 0 | Status: DISCONTINUED | OUTPATIENT
Start: 2021-01-01 | End: 2021-01-01

## 2021-01-01 RX ORDER — PIPERACILLIN AND TAZOBACTAM 4; .5 G/20ML; G/20ML
3.38 INJECTION, POWDER, LYOPHILIZED, FOR SOLUTION INTRAVENOUS ONCE
Refills: 0 | Status: COMPLETED | OUTPATIENT
Start: 2021-01-01 | End: 2021-01-01

## 2021-01-01 RX ORDER — SODIUM CHLORIDE 9 MG/ML
1000 INJECTION INTRAMUSCULAR; INTRAVENOUS; SUBCUTANEOUS ONCE
Refills: 0 | Status: COMPLETED | OUTPATIENT
Start: 2021-01-01 | End: 2021-01-01

## 2021-01-01 RX ORDER — INSULIN GLARGINE 100 [IU]/ML
100 INJECTION, SOLUTION SUBCUTANEOUS
Qty: 45 | Refills: 3 | Status: DISCONTINUED | COMMUNITY
Start: 2019-01-02 | End: 2021-01-01

## 2021-01-01 RX ORDER — DEXTROSE 50 % IN WATER 50 %
25 SYRINGE (ML) INTRAVENOUS ONCE
Refills: 0 | Status: DISCONTINUED | OUTPATIENT
Start: 2021-01-01 | End: 2021-01-01

## 2021-01-01 RX ORDER — FENTANYL CITRATE 50 UG/ML
100 INJECTION INTRAVENOUS ONCE
Refills: 0 | Status: DISCONTINUED | OUTPATIENT
Start: 2021-01-01 | End: 2021-01-01

## 2021-01-01 RX ORDER — LEVOTHYROXINE SODIUM 0.14 MG/1
137 TABLET ORAL DAILY
Qty: 90 | Refills: 3 | Status: ACTIVE | COMMUNITY
Start: 2017-06-27 | End: 1900-01-01

## 2021-01-01 RX ORDER — HYDROCORTISONE 20 MG
50 TABLET ORAL EVERY 6 HOURS
Refills: 0 | Status: DISCONTINUED | OUTPATIENT
Start: 2021-01-01 | End: 2021-01-01

## 2021-01-01 RX ORDER — INSULIN LISPRO 100/ML
VIAL (ML) SUBCUTANEOUS AT BEDTIME
Refills: 0 | Status: DISCONTINUED | OUTPATIENT
Start: 2021-01-01 | End: 2021-01-01

## 2021-01-01 RX ORDER — DIPHENHYDRAMINE HCL 50 MG
25 CAPSULE ORAL DAILY
Refills: 0 | Status: DISCONTINUED | OUTPATIENT
Start: 2021-01-01 | End: 2021-01-01

## 2021-01-01 RX ORDER — SODIUM CHLORIDE 9 MG/ML
500 INJECTION INTRAMUSCULAR; INTRAVENOUS; SUBCUTANEOUS ONCE
Refills: 0 | Status: COMPLETED | OUTPATIENT
Start: 2021-01-01 | End: 2021-01-01

## 2021-01-01 RX ORDER — SODIUM CHLORIDE 9 MG/ML
1000 INJECTION, SOLUTION INTRAVENOUS
Refills: 0 | Status: DISCONTINUED | OUTPATIENT
Start: 2021-01-01 | End: 2021-01-01

## 2021-01-01 RX ORDER — NOREPINEPHRINE BITARTRATE/D5W 8 MG/250ML
0.1 PLASTIC BAG, INJECTION (ML) INTRAVENOUS
Qty: 8 | Refills: 0 | Status: DISCONTINUED | OUTPATIENT
Start: 2021-01-01 | End: 2021-01-01

## 2021-01-01 RX ORDER — DEXTROSE 50 % IN WATER 50 %
15 SYRINGE (ML) INTRAVENOUS ONCE
Refills: 0 | Status: DISCONTINUED | OUTPATIENT
Start: 2021-01-01 | End: 2021-01-01

## 2021-01-01 RX ORDER — AMIODARONE HYDROCHLORIDE 400 MG/1
1 TABLET ORAL
Qty: 900 | Refills: 0 | Status: DISCONTINUED | OUTPATIENT
Start: 2021-01-01 | End: 2021-01-01

## 2021-01-01 RX ORDER — ACETAMINOPHEN 500 MG
1000 TABLET ORAL ONCE
Refills: 0 | Status: COMPLETED | OUTPATIENT
Start: 2021-01-01 | End: 2021-01-01

## 2021-01-01 RX ORDER — ATORVASTATIN CALCIUM 40 MG/1
40 TABLET, FILM COATED ORAL
Qty: 90 | Refills: 3 | Status: ACTIVE | COMMUNITY
Start: 2019-04-24 | End: 1900-01-01

## 2021-01-01 RX ORDER — PHENYLEPHRINE HYDROCHLORIDE 10 MG/ML
0.1 INJECTION INTRAVENOUS
Qty: 40 | Refills: 0 | Status: DISCONTINUED | OUTPATIENT
Start: 2021-01-01 | End: 2021-01-01

## 2021-01-01 RX ORDER — FUROSEMIDE 40 MG
1 TABLET ORAL
Qty: 0 | Refills: 0 | DISCHARGE

## 2021-01-01 RX ORDER — INSULIN LISPRO 100/ML
20 VIAL (ML) SUBCUTANEOUS
Qty: 0 | Refills: 0 | DISCHARGE

## 2021-01-01 RX ORDER — IMMUNE GLOBULIN (HUMAN) 10 G/100ML
75 INJECTION INTRAVENOUS; SUBCUTANEOUS DAILY
Refills: 0 | Status: DISCONTINUED | OUTPATIENT
Start: 2021-01-01 | End: 2021-01-01

## 2021-01-01 RX ORDER — ALBUMIN HUMAN 25 %
50 VIAL (ML) INTRAVENOUS EVERY 6 HOURS
Refills: 0 | Status: DISCONTINUED | OUTPATIENT
Start: 2021-01-01 | End: 2021-01-01

## 2021-01-01 RX ORDER — SODIUM CHLORIDE 9 MG/ML
1000 INJECTION, SOLUTION INTRAVENOUS ONCE
Refills: 0 | Status: DISCONTINUED | OUTPATIENT
Start: 2021-01-01 | End: 2021-01-01

## 2021-01-01 RX ORDER — VANCOMYCIN HCL 1 G
1000 VIAL (EA) INTRAVENOUS ONCE
Refills: 0 | Status: COMPLETED | OUTPATIENT
Start: 2021-01-01 | End: 2021-01-01

## 2021-01-01 RX ORDER — CHLORHEXIDINE GLUCONATE 213 G/1000ML
15 SOLUTION TOPICAL EVERY 12 HOURS
Refills: 0 | Status: DISCONTINUED | OUTPATIENT
Start: 2021-01-01 | End: 2021-01-01

## 2021-01-01 RX ORDER — IMMUNE GLOBULIN (HUMAN) 10 G/100ML
75 INJECTION INTRAVENOUS; SUBCUTANEOUS ONCE
Refills: 0 | Status: DISCONTINUED | OUTPATIENT
Start: 2021-01-01 | End: 2021-01-01

## 2021-01-01 RX ORDER — CHOLECALCIFEROL (VITAMIN D3) 125 MCG
1 CAPSULE ORAL
Qty: 0 | Refills: 0 | DISCHARGE

## 2021-01-01 RX ORDER — EPINEPHRINE 0.3 MG/.3ML
1 INJECTION INTRAMUSCULAR; SUBCUTANEOUS ONCE
Refills: 0 | Status: DISCONTINUED | OUTPATIENT
Start: 2021-01-01 | End: 2021-01-01

## 2021-01-01 RX ORDER — CEFEPIME 1 G/1
2000 INJECTION, POWDER, FOR SOLUTION INTRAMUSCULAR; INTRAVENOUS ONCE
Refills: 0 | Status: DISCONTINUED | OUTPATIENT
Start: 2021-01-01 | End: 2021-01-01

## 2021-01-01 RX ORDER — AMIODARONE HYDROCHLORIDE 400 MG/1
150 TABLET ORAL ONCE
Refills: 0 | Status: COMPLETED | OUTPATIENT
Start: 2021-01-01 | End: 2021-01-01

## 2021-01-01 RX ORDER — SODIUM BICARBONATE 650 MG/1
650 TABLET ORAL
Qty: 60 | Refills: 3 | Status: DISCONTINUED | COMMUNITY
Start: 2020-01-01 | End: 2021-01-01

## 2021-01-01 RX ORDER — DIPHENHYDRAMINE HCL 50 MG
25 CAPSULE ORAL ONCE
Refills: 0 | Status: COMPLETED | OUTPATIENT
Start: 2021-01-01 | End: 2021-01-01

## 2021-01-01 RX ORDER — FAMOTIDINE 10 MG/ML
1 INJECTION INTRAVENOUS
Qty: 0 | Refills: 0 | DISCHARGE

## 2021-01-01 RX ORDER — INSULIN GLARGINE 100 [IU]/ML
100 INJECTION, SOLUTION SUBCUTANEOUS
Qty: 1 | Refills: 1 | Status: ACTIVE | COMMUNITY
Start: 2021-01-01

## 2021-01-01 RX ORDER — FUROSEMIDE 80 MG/1
80 TABLET ORAL TWICE DAILY
Qty: 180 | Refills: 0 | Status: DISCONTINUED | COMMUNITY
Start: 2020-01-01 | End: 2021-01-01

## 2021-01-01 RX ORDER — SACCHAROMYCES BOULARDII 250 MG
1 POWDER IN PACKET (EA) ORAL
Qty: 0 | Refills: 0 | DISCHARGE

## 2021-01-01 RX ORDER — SODIUM CHLORIDE 9 MG/ML
1000 INJECTION, SOLUTION INTRAVENOUS ONCE
Refills: 0 | Status: COMPLETED | OUTPATIENT
Start: 2021-01-01 | End: 2021-01-01

## 2021-01-01 RX ORDER — LACTOBACILLUS ACIDOPHILUS 100MM CELL
1 CAPSULE ORAL ONCE
Refills: 0 | Status: DISCONTINUED | OUTPATIENT
Start: 2021-01-01 | End: 2021-01-01

## 2021-01-01 RX ORDER — CARVEDILOL PHOSPHATE 80 MG/1
1 CAPSULE, EXTENDED RELEASE ORAL
Qty: 0 | Refills: 0 | DISCHARGE

## 2021-01-01 RX ORDER — GLUCAGON INJECTION, SOLUTION 0.5 MG/.1ML
1 INJECTION, SOLUTION SUBCUTANEOUS ONCE
Refills: 0 | Status: DISCONTINUED | OUTPATIENT
Start: 2021-01-01 | End: 2021-01-01

## 2021-01-01 RX ORDER — POTASSIUM CHLORIDE 1500 MG/1
20 TABLET, EXTENDED RELEASE ORAL
Qty: 90 | Refills: 3 | Status: DISCONTINUED | COMMUNITY
Start: 2018-04-30 | End: 2021-01-01

## 2021-01-01 RX ORDER — PIPERACILLIN AND TAZOBACTAM 4; .5 G/20ML; G/20ML
3.38 INJECTION, POWDER, LYOPHILIZED, FOR SOLUTION INTRAVENOUS EVERY 12 HOURS
Refills: 0 | Status: DISCONTINUED | OUTPATIENT
Start: 2021-01-01 | End: 2021-01-01

## 2021-01-01 RX ORDER — ROMIPLOSTIM 125 UG/.25ML
INJECTION, POWDER, LYOPHILIZED, FOR SOLUTION SUBCUTANEOUS
Refills: 0 | Status: ACTIVE | COMMUNITY

## 2021-01-01 RX ORDER — ACETAMINOPHEN 500 MG
650 TABLET ORAL ONCE
Refills: 0 | Status: COMPLETED | OUTPATIENT
Start: 2021-01-01 | End: 2021-01-01

## 2021-01-01 RX ORDER — SODIUM BICARBONATE 1 MEQ/ML
1 SYRINGE (ML) INTRAVENOUS
Qty: 0 | Refills: 0 | DISCHARGE

## 2021-01-01 RX ORDER — CABERGOLINE 0.5 MG/1
0.5 TABLET ORAL
Qty: 13 | Refills: 3 | Status: DISCONTINUED | COMMUNITY
Start: 2020-01-01 | End: 2021-01-01

## 2021-01-01 RX ORDER — INSULIN LISPRO 100/ML
VIAL (ML) SUBCUTANEOUS
Refills: 0 | Status: DISCONTINUED | OUTPATIENT
Start: 2021-01-01 | End: 2021-01-01

## 2021-01-01 RX ORDER — INSULIN LISPRO 100/ML
25 VIAL (ML) SUBCUTANEOUS
Qty: 0 | Refills: 0 | DISCHARGE

## 2021-01-01 RX ORDER — DEXTROSE 50 % IN WATER 50 %
12.5 SYRINGE (ML) INTRAVENOUS ONCE
Refills: 0 | Status: DISCONTINUED | OUTPATIENT
Start: 2021-01-01 | End: 2021-01-01

## 2021-01-01 RX ORDER — PIPERACILLIN AND TAZOBACTAM 4; .5 G/20ML; G/20ML
3.38 INJECTION, POWDER, LYOPHILIZED, FOR SOLUTION INTRAVENOUS EVERY 8 HOURS
Refills: 0 | Status: DISCONTINUED | OUTPATIENT
Start: 2021-01-01 | End: 2021-01-01

## 2021-01-01 RX ORDER — INSULIN LISPRO 100 [IU]/ML
100 INJECTION, SOLUTION INTRAVENOUS; SUBCUTANEOUS
Qty: 75 | Refills: 3 | Status: ACTIVE | COMMUNITY
Start: 2020-01-01 | End: 1900-01-01

## 2021-01-01 RX ORDER — FAMOTIDINE 20 MG/1
20 TABLET, FILM COATED ORAL
Qty: 30 | Refills: 1 | Status: DISCONTINUED | COMMUNITY
Start: 2020-01-01 | End: 2021-01-01

## 2021-01-01 RX ORDER — SPIRONOLACTONE 25 MG/1
25 TABLET ORAL
Qty: 180 | Refills: 3 | Status: ACTIVE | COMMUNITY
Start: 2021-01-01

## 2021-01-01 RX ORDER — MORPHINE SULFATE 50 MG/1
2 CAPSULE, EXTENDED RELEASE ORAL ONCE
Refills: 0 | Status: DISCONTINUED | OUTPATIENT
Start: 2021-01-01 | End: 2021-01-01

## 2021-01-01 RX ORDER — KETAMINE HYDROCHLORIDE 100 MG/ML
6 INJECTION INTRAMUSCULAR; INTRAVENOUS ONCE
Refills: 0 | Status: DISCONTINUED | OUTPATIENT
Start: 2021-01-01 | End: 2021-01-01

## 2021-01-01 RX ORDER — MAGNESIUM SULFATE 500 MG/ML
2 VIAL (ML) INJECTION ONCE
Refills: 0 | Status: COMPLETED | OUTPATIENT
Start: 2021-01-01 | End: 2021-01-01

## 2021-01-01 RX ORDER — BUMETANIDE 2 MG/1
2 TABLET ORAL TWICE DAILY
Qty: 180 | Refills: 0 | Status: ACTIVE | COMMUNITY
Start: 2021-01-01 | End: 1900-01-01

## 2021-01-01 RX ADMIN — Medication 400 MILLIGRAM(S): at 17:51

## 2021-01-01 RX ADMIN — Medication 15.8 MICROGRAM(S)/KG/MIN: at 13:00

## 2021-01-01 RX ADMIN — Medication 25 MILLIGRAM(S): at 17:51

## 2021-01-01 RX ADMIN — PIPERACILLIN AND TAZOBACTAM 200 GRAM(S): 4; .5 INJECTION, POWDER, LYOPHILIZED, FOR SOLUTION INTRAVENOUS at 09:41

## 2021-01-01 RX ADMIN — Medication 250 MILLIGRAM(S): at 10:32

## 2021-01-01 RX ADMIN — SODIUM CHLORIDE 1000 MILLILITER(S): 9 INJECTION INTRAMUSCULAR; INTRAVENOUS; SUBCUTANEOUS at 09:28

## 2021-01-01 RX ADMIN — Medication 50 MILLIGRAM(S): at 17:51

## 2021-01-01 RX ADMIN — FENTANYL CITRATE 100 MICROGRAM(S): 50 INJECTION INTRAVENOUS at 19:35

## 2021-01-01 RX ADMIN — SODIUM CHLORIDE 500 MILLILITER(S): 9 INJECTION INTRAMUSCULAR; INTRAVENOUS; SUBCUTANEOUS at 11:30

## 2021-01-01 RX ADMIN — SODIUM CHLORIDE 1000 MILLILITER(S): 9 INJECTION, SOLUTION INTRAVENOUS at 11:00

## 2021-01-01 RX ADMIN — Medication 50 GRAM(S): at 19:35

## 2021-01-01 NOTE — PATIENT PROFILE ADULT. - CENTRAL VENOUS CATHETER
no ,DirectAddress_Unknown ,DirectAddress_Unknown,[email or web address]

## 2021-01-05 PROBLEM — R60.9 EDEMA, UNSPECIFIED TYPE: Status: ACTIVE | Noted: 2019-03-01

## 2021-01-05 PROBLEM — R80.9 PROTEINURIA: Status: ACTIVE | Noted: 2021-01-01

## 2021-01-05 NOTE — PHYSICAL EXAM
[Jugular Venous Distention Increased] : there was no jugular-venous distention [] : no respiratory distress [Heart Sounds Pericardial Friction Rub] : no pericardial rub [FreeTextEntry1] : Unable to examine , this being a tele visit

## 2021-01-05 NOTE — HISTORY OF PRESENT ILLNESS
[FreeTextEntry1] : 70 year old  male, retired (used to work as  for a firm in [place]), with DM x 15 years, with diabetic neuropathy and ?retinopathy, CHF, HLD, CAD s/p CABG (1986) and stent placement (1999), fatty liver with recent diagnosis of HCC, gouty arthritis, AFib, s/p pacemaker and defibrillator, sleep apnea, hypothyroidism, ?prolactinoma, multiple episodes of JULIANNE on CKD, doing a tele follow up. \par \par Pt. was last evaluated in June 2020, when Scr was stable and no changes were made to his meds. He subsequently had JULIANNE in Nov 2020 in the setting of use of higher dose of lasix, which improved with holding ramipril (this was resumed ~a week prior to labs). However, Cr worsened to 3.1 again a week ago, and he was advised to hold ramipril and aldactone and continue with lasix 80mg BID for management of volume overload. \par \par Currently, pt. reports to have lost 9 lbs in the last week, and LE swelling is improved. He however still has swelling more than his usual chronic edema and is ~6-8 lbs above his usual weight. He denies any cp, sob, nausea, vomiting or diarrhea. He also denies use of NSAIDs or nephrotoxins.May need to have CT with contrast in the near future.

## 2021-01-05 NOTE — REASON FOR VISIT
[Initial Evaluation] : an initial evaluation [Home] : at home, [unfilled] , at the time of the visit. [Medical Office: ([facility])___] : at the medical office located in  [Spouse] : spouse [Verbal consent obtained from patient] : the patient, [unfilled]

## 2021-01-05 NOTE — REVIEW OF SYSTEMS
[As noted in HPI] : as noted in HPI [Lower Ext Edema] : lower extremity edema [Negative] : Heme/Lymph [FreeTextEntry9] : right heel and toe pain

## 2021-01-05 NOTE — ASSESSMENT
[FreeTextEntry1] : JULIANNE/ Stage 3 CKD, protienuria : Pt. with CKD in the setting of DM and HF, with multiple episodes of JULIANNE Likely 2/2 cardiorenal syndrome and IV contrast use in the past. ALso had recent JULIANNE that is improving after holding aldactone and ramipril, and with improvement in volume status \par -Pt. with hx of use of IV contrast at multiple occasions, and hypotension in the past.\par -Last Scr improved however with protienuria now that could be due to discontinuation of ramipril.\par -Abdominal US done on 9/24/18 showed normal sized kidneys without any hydronephrosis or evidence of CKD. \par -Monitor renal panel, and spot urine TP/CR.\par -GIven pt, now with recovering JULIANNE, would still suggest to not use IV contrast unless absolutely indicated. \par -Pt. has multiple risk factors to develop contrast induced nephropathy after use of IV contrast, including elevated Scr, anemia, CHF and diabetes. Pt. aware of the risk for worsening of renal function with use of IV contrast to the patient. \par -Advised to hold lasix on the day of the contrast study. Can consider hydration with IV NS @1ml/kg/hr for 6 hours pre, during and post contrast study (depending on pt's volume status at the time of procedure). \par -Avoid chronic NSAIDs. \par \par Edema: in the setting of JULIANNE, HF and HCC/cirrhosis. Resolved per patient. Monitor weight. Continue lasix.\par \par \par Communicated plan to Dr. Hoyt, Dr. Rivers and Dr. Daniel via email. \par Follow up pending further plan for use of IV contrast with CT.

## 2021-01-08 PROBLEM — I50.22 CHRONIC SYSTOLIC HEART FAILURE: Status: ACTIVE | Noted: 2018-06-28

## 2021-01-08 PROBLEM — N17.9 AKI (ACUTE KIDNEY INJURY): Status: ACTIVE | Noted: 2018-10-04

## 2021-01-12 NOTE — PHYSICAL EXAM
[Fully active, able to carry on all pre-disease performance without restriction] : Status 0 - Fully active, able to carry on all pre-disease performance without restriction [Normal] : no JVD, no calf tenderness, venous stasis changes, varices [de-identified] : improved, swellign decreased

## 2021-01-12 NOTE — HISTORY OF PRESENT ILLNESS
[de-identified] : This is a 70 year old man with a history of ischemic CM, CHF, Vtach s/p AICD/PPM, CKD, FERNANDES cirrhosis and chronic thrombocytopenia since 2012 who is seeing me for acute on chronic thrombocytopenia in the setting of HCC and possibly ITP. \par \par Pt was originally diagnosed with cirrhosis of the liver in 2017 after workup for elevated liver enzymes. He has been followed with serial imaging by Dr. Chapo Alaniz. He was referred to IR by Dr. Alaniz after imaging revealed liver lesion in Sept 2018. He is now s/p hepatic angiogram and embolization of 4 cm right hepatic tumor on 2/7/19 and hepatic angiogram and bland embolization of segment 8 and 5 on 4/18/19 as well as R lobe SIRT on 4/9/20.  \par \par He reports having issues with fluid retention and CHF exacerbations over the last year in 2019. He has worked with Dr. Rivers to adjust his diuretic regimen and had a significant amount of fluid weight loss in 3/2018. He was evaluated on 2/4/20 and is currently been stable over the last few months with no CHF exacerbations. \par \par He has a CT scan 9/23/20 and he will see Dr Hoyt shortly after.  His platelet count has remained between 40-60K. \par \par Looking back, although patient was diagnosed with Cirrhosis in 2017, he has had thrombocytopenia to the 70-80s since 2003. At this time his coags were normal but he had intermittent elevations of ast/alt. Then in 2012 his PT INR was slightly elevated and his platelet count was around 50-60K. INR and platelet count remained stable until 2018. When INR went up to 2 approx in 5/2018, platelets were at 40K, Although the iNR and the platelet count do not correlate. Then in 10/2019 plt went down to 35K, 3/31/20 at 29K. S/p procedure 4/9/20 (SIRT) with platelet count of 20K or less in june/july.  \par  \par Given drop BMBx was performed. 1, 2. Bone marrow biopsy and bone marrow aspirate \par      - Cellular marrow (40%) with slightly erythroid predominant maturing trilineage hematopoiesis \par      - No morphologic or immunophenotypic evidence supporting involvement by myelodysplasia.\par Megakaryocytes:	Normal in number (2-5 megakaryocytes/hpf), unremarkable morphology\par Variant of unknown significant ASXL1 on bioreference\par \par Started prednisone for possible ITP 40mg 8.1.20 with improvement of platelets to 24K, but blood sugars were increasing. Had a cellulitis on his toe, was admitted for toe infection.  IV antibiotics given. IVIG was given Platelet transufsion was done, amputated the toe with a platelet count of 17K. Sent home on Clindamycin.\par \par Prednisone now decreased to 30mg. Also on atovaquone 750BID. Blood sugars have been better as well. He has started was premeal insulin/humolog.\par Patient denies bleeding or bruising. \par \par Review of scans: 5/2019 + Splenomegaly  [de-identified] : - tolerating nplate without issues\par - needs dental work\par - otherwise feeling well\par -no significant bleeding or bruising\par

## 2021-01-12 NOTE — ASSESSMENT
[FreeTextEntry1] : This is a 70 year old man who is here to see me for thrombocytopenia in the setting of liver disease. Low platelets date back to 2003, when he had ast/alt abnormalities but no diagnosis of cirrhosis.  \par \par I suspect that he may have an underlying ITP, evidenced by normal megas in the bone marrow and slight response to prednisone 40mg (plt count 13K moved to 24K) although this response was not significant likely because he has many other causes of thrombocytopenia.\par \par His cirrhosis is the main contributor to his thrombocytopenia, likely exacerbated by his splenomegaly and his recent SIRT likely created worsened thrombocytopenia (evidenced by lower numbers a few months after treatment). This is reported in patients who recently had SIRT have worsened thrombocytopenia, platelets reported to drop approx 20%. Patient dropped more than this, requiring transfusion likely given the multifactorial nature of his thrombocytopenia.\par \par Given his intolerance to steroids and his increase in platelet count with tpo agonist given in the hospital, Steroids are now off.\par \par He has received weekly Nplate with improvement in platelet count. Decreased frequency for short period of time with a decrease in platelet count, now improved with  more frequent viists\par -having his ct today to assess HCC\par \par

## 2021-01-21 NOTE — PHYSICAL EXAM
[General Appearance - Well Developed] : well developed [Normal Appearance] : normal appearance [Well Groomed] : well groomed [General Appearance - Well Nourished] : well nourished [No Deformities] : no deformities [General Appearance - In No Acute Distress] : no acute distress [Normal Conjunctiva] : the conjunctiva exhibited no abnormalities [Eyelids - No Xanthelasma] : the eyelids demonstrated no xanthelasmas [Normal Oral Mucosa] : normal oral mucosa [No Oral Pallor] : no oral pallor [No Oral Cyanosis] : no oral cyanosis [JVD Elevated _____cm] : JVD elevated [unfilled] ~U cm above clavicle [Normal Jugular Venous V Waves Present] : normal jugular venous V waves present [No Jugular Venous Storm A Waves] : no jugular venous storm A waves [Respiration, Rhythm And Depth] : normal respiratory rhythm and effort [Exaggerated Use Of Accessory Muscles For Inspiration] : no accessory muscle use [Auscultation Breath Sounds / Voice Sounds] : lungs were clear to auscultation bilaterally [Not Palpable] : not palpable [Normal Rate] : normal [Rhythm Regular] : regular [Normal S1] : normal S1 [Normal S2] : normal S2 [No Gallop] : no gallop heard [No Murmur] : no murmurs heard [1+] : left 1+ [2+] : left 2+ [No Abnormalities] : the abdominal aorta was not enlarged and no bruit was heard [Abdomen Soft] : soft [Abdomen Tenderness] : non-tender [Abdomen Mass (___ Cm)] : no abdominal mass palpated [Abnormal Walk] : normal gait [Nail Clubbing] : no clubbing of the fingernails [Cyanosis, Localized] : no localized cyanosis [Petechial Hemorrhages (___cm)] : no petechial hemorrhages [Skin Color & Pigmentation] : normal skin color and pigmentation [] : no rash [Skin Lesions] : no skin lesions [No Xanthoma] : no  xanthoma was observed [No Skin Ulcers] : no skin ulcer [Oriented To Time, Place, And Person] : oriented to person, place, and time [Affect] : the affect was normal [Mood] : the mood was normal [No Anxiety] : not feeling anxious [___ +] : bilateral [unfilled]U+ pretibial pitting edema [Right Carotid Bruit] : no bruit heard over the right carotid [Left Carotid Bruit] : no bruit heard over the left carotid [FreeTextEntry1] : venous stasis discoloration of distal lower extremities

## 2021-01-21 NOTE — DISCUSSION/SUMMARY
[Cardiomyopathy] : cardiomyopathy [NYHA Class II] : NYHA Class II [ACC/AHA Stage C] : ACC/AHA Stage C [Ischemic Cardiomyopathy] : ischemic cardiomyopathy [Medication Changes Per Orders] : as documented in orders [Coronary Artery Disease] : coronary artery disease [Stable] : stable [None] : none [Decompensated] : decompensated [Patient] : the patient [de-identified] : Maintaining sinus rhythm, has had prolonged and refractory atrial tachycardia, but spontaneously reverted to sinus [de-identified] : Acute on chronic sytolic heart failure [de-identified] : furosemide 160 mg BID for 3 days, spironolactone  25 mg BID, labs pending

## 2021-01-21 NOTE — HISTORY OF PRESENT ILLNESS
[FreeTextEntry1] : Mr. Jamil Harvey is a 70-year-old man with ischemic cardiomyopathy, ventricular tachycardia and appropriate ICD device firing. Uvlado lead fractured and was capped and a new generator and lead implanted. After device adjustments to alleviate potential for pause dependent ventricular tachycardia has had no events. Otherwise, despite his long history of severe multivessel coronary artery disease and very remote coronary bypass surgery with severe ventricular dysfunction, has always been active and never experiences chest pain or dyspnea. Recurrent foot infection managed by podiatrist and ID and healed, but recurrent cellulitis with hospital stay for IV antibiotics and complication of JULIANNE, ultimately resolved. He always sleeps in recliner to manage obstructive sleep apnea as has been unable to tolerate any mask or device. Regular exercise includes walking, riding stationary bike 6 miles a day and golf. Now over 30 years coming to this practice since coronary bypass surgery.\par \par Last year onset of anasarca, diuretic dose increased with minimal response. Then added metolazone with dramatic response, 65 pound weight reduction and now weighs much less than at anytime in our record. Was feeling much better and denies dizziness, lightheadedness. Started on anticoagulation with plan for cardioversion, but instead admitted with septic bursitis of knee requiring orthopedic procedure and remarkably spontaneously reverted to sinus rhythm which continues to be maintained. Breathing improved, not retaining excess volume. Had ICD generator change for NÉSTOR and has also IR embolization of HCC.\par \par In March notified of device transmission indicating atrial tachycardia rates 100 to 110. Contacted patient who found rates 100 for few days then down to 80s and since then 60s to 70. Currently finds volume status optimal and all leg wounds have healed. 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 Discharged after procedure. In past week has begun retaining fluid, 13 pound weight gain, lower extremity edema, but denies dyspnea with his low level activities around home, chest pain, palpitations and has no orthopnea or paroxysmal nocturnal dyspnea  He observes continued good urine output. Did have CT scan with contrast on September 24, 2020.\par \par Presented with volume overload CHF last visit, intensified diuretic regimen and responded, weight back down, no dyspnea or orthopnea. Had 10 minute episode of tachycardia last week, sat down and spontaneously reverted. This past past weekend played golf and felt well.\par \par Recently due to increased creatinine and in anticipation of CT abdomen with contrast Nephrologist stopped ramipril, spironolactone and briefly decreased diuretic. Called and reported marked weight gain, lower extremity edema extending up to scrotum. Advised to take 160 mg furosemide that afternoon and resume spironolactone 25 mg BID. There has been no obvious response.

## 2021-01-27 NOTE — DISCUSSION/SUMMARY
[Cardiomyopathy] : cardiomyopathy [NYHA Class II] : NYHA Class II [ACC/AHA Stage C] : ACC/AHA Stage C [Ischemic Cardiomyopathy] : ischemic cardiomyopathy [Medication Changes Per Orders] : as documented in orders [Coronary Artery Disease] : coronary artery disease [Decompensated] : decompensated [Patient] : the patient [Stable] : stable [None] : none [___ Week(s)] : [unfilled] week(s) [de-identified] : Maintaining sinus rhythm, has had prolonged and refractory atrial tachycardia, but spontaneously reverted to sinus [de-identified] : Acute on chronic sytolic heart failure [FreeTextEntry3] : but phone conversation 2 days to review response to bumetanide [de-identified] : trial of bumetanide 2 mg TID for two days, continue spironolactone  25 mg BID, BMP pending

## 2021-01-27 NOTE — PHYSICAL EXAM
[General Appearance - Well Developed] : well developed [Normal Appearance] : normal appearance [Well Groomed] : well groomed [General Appearance - Well Nourished] : well nourished [No Deformities] : no deformities [General Appearance - In No Acute Distress] : no acute distress [Normal Conjunctiva] : the conjunctiva exhibited no abnormalities [Eyelids - No Xanthelasma] : the eyelids demonstrated no xanthelasmas [Normal Oral Mucosa] : normal oral mucosa [No Oral Pallor] : no oral pallor [No Oral Cyanosis] : no oral cyanosis [JVD Elevated _____cm] : JVD elevated [unfilled] ~U cm above clavicle [Normal Jugular Venous V Waves Present] : normal jugular venous V waves present [No Jugular Venous Storm A Waves] : no jugular venous storm A waves [Respiration, Rhythm And Depth] : normal respiratory rhythm and effort [Auscultation Breath Sounds / Voice Sounds] : lungs were clear to auscultation bilaterally [Exaggerated Use Of Accessory Muscles For Inspiration] : no accessory muscle use [Not Palpable] : not palpable [Normal Rate] : normal [Rhythm Regular] : regular [Normal S1] : normal S1 [Normal S2] : normal S2 [No Gallop] : no gallop heard [No Murmur] : no murmurs heard [1+] : left 1+ [2+] : left 2+ [No Abnormalities] : the abdominal aorta was not enlarged and no bruit was heard [___ +] : bilateral [unfilled]U+ pretibial pitting edema [Abdomen Soft] : soft [Abdomen Tenderness] : non-tender [Abdomen Mass (___ Cm)] : no abdominal mass palpated [Abnormal Walk] : normal gait [Nail Clubbing] : no clubbing of the fingernails [Cyanosis, Localized] : no localized cyanosis [Petechial Hemorrhages (___cm)] : no petechial hemorrhages [Skin Color & Pigmentation] : normal skin color and pigmentation [] : no rash [Skin Lesions] : no skin lesions [No Skin Ulcers] : no skin ulcer [No Xanthoma] : no  xanthoma was observed [Oriented To Time, Place, And Person] : oriented to person, place, and time [Affect] : the affect was normal [Mood] : the mood was normal [No Anxiety] : not feeling anxious [Right Carotid Bruit] : no bruit heard over the right carotid [Left Carotid Bruit] : no bruit heard over the left carotid [FreeTextEntry1] : venous stasis discoloration of distal lower extremities

## 2021-01-27 NOTE — HISTORY OF PRESENT ILLNESS
[FreeTextEntry1] : Mr. Jamil Harvey is a 70-year-old man with ischemic cardiomyopathy, ventricular tachycardia and appropriate ICD device firing. Uvaldo lead fractured and was capped and a new generator and lead implanted. After device adjustments to alleviate potential for pause dependent ventricular tachycardia has had no events. Otherwise, despite his long history of severe multivessel coronary artery disease and very remote coronary bypass surgery with severe ventricular dysfunction, has always been active and never experiences chest pain or dyspnea. Recurrent foot infection managed by podiatrist and ID and healed, but recurrent cellulitis with hospital stay for IV antibiotics and complication of JULIANNE, ultimately resolved. He always sleeps in recliner to manage obstructive sleep apnea as has been unable to tolerate any mask or device. Regular exercise includes walking, riding stationary bike 6 miles a day and golf. Now over 30 years coming to this practice since coronary bypass surgery.\par \par Last year onset of anasarca, diuretic dose increased with minimal response. Then added metolazone with dramatic response, 65 pound weight reduction and now weighs much less than at anytime in our record. Was feeling much better and denies dizziness, lightheadedness. Started on anticoagulation with plan for cardioversion, but instead admitted with septic bursitis of knee requiring orthopedic procedure and remarkably spontaneously reverted to sinus rhythm which continues to be maintained. Breathing improved, not retaining excess volume. Had ICD generator change for NÉSTOR and has also IR embolization of HCC.\par \par In March notified of device transmission indicating atrial tachycardia rates 100 to 110. Contacted patient who found rates 100 for few days then down to 80s and since then 60s to 70. Currently finds volume status optimal and all leg wounds have healed. 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 Discharged after procedure. In past week has begun retaining fluid, 13 pound weight gain, lower extremity edema, but denies dyspnea with his low level activities around home, chest pain, palpitations and has no orthopnea or paroxysmal nocturnal dyspnea  He observes continued good urine output. Did have CT scan with contrast on September 24, 2020.\par \par Presented with volume overload CHF last visit, intensified diuretic regimen and responded, weight back down, no dyspnea or orthopnea. Had 10 minute episode of tachycardia last week, sat down and spontaneously reverted. This past past weekend played golf and felt well.\par \par Recently due to increased creatinine and in anticipation of CT abdomen with contrast Nephrologist stopped ramipril, spironolactone and briefly decreased diuretic. Called and reported marked weight gain, lower extremity edema extending up to scrotum. Advised to take 160 mg furosemide that afternoon and resume spironolactone 25 mg BID. There has been no obvious response. Last week placed on furosemide 160 mg BID and in interim only 5 or 6 pound weight loss, mild improvement in edema.

## 2021-02-01 PROBLEM — E22.1 HYPERPROLACTINEMIA: Status: ACTIVE | Noted: 2017-07-27

## 2021-02-01 PROBLEM — E03.9 HYPOTHYROIDISM, ADULT: Status: ACTIVE | Noted: 2017-07-10

## 2021-02-01 PROBLEM — E78.2 MIXED HYPERLIPIDEMIA: Status: ACTIVE | Noted: 2021-01-01

## 2021-02-01 PROBLEM — E11.8 TYPE 2 DIABETES WITH COMPLICATION: Status: ACTIVE | Noted: 2020-01-01

## 2021-02-01 NOTE — HISTORY OF PRESENT ILLNESS
[Home] : at home, [unfilled] , at the time of the visit. [Other Location: e.g. Home (Enter Location, City,State)___] : at [unfilled] [Verbal consent obtained from patient] : the patient, [unfilled] [Spouse] : spouse [FreeTextEntry1] : cc: diabetes\par \par 70 year old man with T2DM, well controlled.  Diabetes was diagnosed in 2008 and iscomplicated by CAD, PAD, and microvascular disease,.\par He is currently taking Lantus 18units qhs - needs to switch to basaglar based on insurance - and Humalog 20units before meals\par He checks glucose 4 times daily.  Has had occasional hypoglycemia in the middle of the night, when he has a small dinner at night,.\par He limits carbohydrate intake. Less exercise recently due to the weather.\par Microvascular cx - +DM nephropathy, + DM neuropathy, + h/o foot ulcers \par Dilated eye exam: follows annually. Next appt  3/2021\par Had flu shot\par \par  HTN - blood pressure has been controlled, taking carvedilol and Ramipril\par \par Hyperlipidemia - on statin, reports no side effects\par \par Hyperprolactinemia - Diagnosed over 40 years ago, reports that he never had imaging of the brain (was unable to tolerate MRI)\par Took bromocriptine x ~40 years, stopped  bromocriptine 7/2019, prolactin increase (29.9) and cabergoline was started, .5 weekly (sun) in 7/2020  Dose was decreased to 0.25 weekly at last visit, pt without any symptoms.\par Unable to have MRI now due to claustrophobia and ICD and unable to do CT head with contrast due to renal function \par \par Hypothyroidism\par Taking Levothyroxine 125 mcg daily on empty stomach,  Takes 7 tablets weekly (2 on saturday, none on sunday). \par \par

## 2021-02-01 NOTE — REVIEW OF SYSTEMS
[Palpitations] : no palpitations [Lower Ext Edema] : lower extremity edema [Shortness Of Breath] : no shortness of breath [Nausea] : no nausea

## 2021-02-01 NOTE — ASSESSMENT
[FreeTextEntry1] : 70 year old man with  T2DM, with complications, well controlled, but with hypoglycemia overnight if eating a small meal for dinner. \par  - check hba1c\par  - Recommend adjusting pre-dinner humalog based on meal size (10/15/20 for small/medium/large meal).  If full dose was taken and only a small amount eaten then check glucose prior to bed and have bedtime snack if low\par  - send glucose log in 1-2 weeks.  If night-time hypoglycemia continues will also decrease dose of lantus\par - meanwhile continue Lantus 18units qhs and continue Humalog  20units before breakfast and lunch.   PT would like to stop insulin, switch back to oral agents.  Discussed that CKD limits options in terms of oral agents\par  - Discussed adding GLP-1 for cardiovascular protection.  He would like to discuss with his cardiologist\par -has regular podiatry care with Dr. Tristan\par - retinopathy screening up to date\par - had flu shot\par \par HTN - blood pressure at goal, continue current regimen.  On ACE, has ckd with regular nephrology follow up\par \par Hyperlipidemia - continue atorvastatin\par \par Hypothyroidism\par -check tfts, if euthyroid, discussed that can switch levothyroxine to daily rather than 2 on Saturday and none on Sunday\par \par Hyperprolactinemia - longstanding with no prior imaging.  Pt r asymptomatic and reports that he has never had symptoms\par  - CHeck prolactin and  consider discontinuing cabergoline\par \par \par f/u in 3months , send log in 1-2 weeks

## 2021-02-03 PROBLEM — I36.1 NONRHEUMATIC TRICUSPID VALVE REGURGITATION: Status: ACTIVE | Noted: 2021-01-01

## 2021-02-03 NOTE — REASON FOR VISIT
[Follow-Up - Clinic] : a clinic follow-up of [Cardiomyopathy] : cardiomyopathy [Coronary Artery Disease] : coronary artery disease [Heart Failure] : congestive heart failure [Supraventricular Tachycardia] : supraventricular tachycardia [Ventricular Tachycardia] : ventricular tachycardia

## 2021-02-03 NOTE — HISTORY OF PRESENT ILLNESS
[FreeTextEntry1] : Mr. Jamil Harvey is a 70-year-old man with ischemic cardiomyopathy, ventricular tachycardia and appropriate ICD device firing. Uvaldo lead fractured and was capped and a new generator and lead implanted. After device adjustments to alleviate potential for pause dependent ventricular tachycardia has had no events. Otherwise, despite his long history of severe multivessel coronary artery disease and very remote coronary bypass surgery with severe ventricular dysfunction, has always been active and never experiences chest pain or dyspnea. Recurrent foot infection managed by podiatrist and ID and healed, but recurrent cellulitis with hospital stay for IV antibiotics and complication of JULIANNE, ultimately resolved. He always sleeps in recliner to manage obstructive sleep apnea as has been unable to tolerate any mask or device. Regular exercise includes walking, riding stationary bike 6 miles a day and golf. Now over 30 years coming to this practice since coronary bypass surgery.\par \par Last year onset of anasarca, diuretic dose increased with minimal response. Then added metolazone with dramatic response, 65 pound weight reduction and now weighs much less than at anytime in our record. Was feeling much better and denies dizziness, lightheadedness. Started on anticoagulation with plan for cardioversion, but instead admitted with septic bursitis of knee requiring orthopedic procedure and remarkably spontaneously reverted to sinus rhythm which continues to be maintained. Breathing improved, not retaining excess volume. Had ICD generator change for NÉSTOR and has also IR embolization of HCC.\par \par In March notified of device transmission indicating atrial tachycardia rates 100 to 110. Contacted patient who found rates 100 for few days then down to 80s and since then 60s to 70. Currently finds volume status optimal and all leg wounds have healed. 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 Discharged after procedure. In past week has begun retaining fluid, 13 pound weight gain, lower extremity edema, but denies dyspnea with his low level activities around home, chest pain, palpitations and has no orthopnea or paroxysmal nocturnal dyspnea  He observes continued good urine output. Did have CT scan with contrast on September 24, 2020.\par \par Presented with volume overload CHF last visit, intensified diuretic regimen and responded, weight back down, no dyspnea or orthopnea. Had 10 minute episode of tachycardia last week, sat down and spontaneously reverted. This past past weekend played golf and felt well.\par \par Recently due to increased creatinine and in anticipation of CT abdomen with contrast Nephrologist stopped ramipril, spironolactone and briefly decreased diuretic. Called and reported marked weight gain, lower extremity edema extending up to scrotum. Advised to take 160 mg furosemide that afternoon and resume spironolactone 25 mg BID. There has been no obvious response. Then placed on furosemide 160 mg BID and in interim only 5 or 6 pound weight loss, mild improvement in edema. Thus switched to bumetanide.

## 2021-02-03 NOTE — DISCUSSION/SUMMARY
[Coronary Artery Disease] : coronary artery disease [Improving] : improving [Decompensated] : decompensated [Echocardiogram] : echocardiogram [Paroxysmal Ventricular Tachycardia] : paroxysmal [Stable] : stable [Patient] : the patient [Cardiomyopathy] : cardiomyopathy [NYHA Class III] : NYHA Class III [ACC/AHA Stage C] : ACC/AHA Stage C [Ischemic Cardiomyopathy] : ischemic cardiomyopathy [None] : none [de-identified] : Maintaining sinus rhythm, has had prolonged and refractory atrial tachycardia, but spontaneously reverted to sinus [de-identified] : Acute on chronic sytolic heart failure [de-identified] : has ICD [FreeTextEntry1] : \par Also observe discussion regarding addition of GLP-1 receptor agonist which could be beneficial. However, favor deferring until stabilize volume management and current fluctuations in renal function.

## 2021-02-03 NOTE — PHYSICAL EXAM
[General Appearance - Well Developed] : well developed [Normal Appearance] : normal appearance [Well Groomed] : well groomed [General Appearance - Well Nourished] : well nourished [No Deformities] : no deformities [General Appearance - In No Acute Distress] : no acute distress [Normal Conjunctiva] : the conjunctiva exhibited no abnormalities [Eyelids - No Xanthelasma] : the eyelids demonstrated no xanthelasmas [Normal Oral Mucosa] : normal oral mucosa [No Oral Pallor] : no oral pallor [No Oral Cyanosis] : no oral cyanosis [JVD Elevated _____cm] : JVD elevated [unfilled] ~U cm above clavicle [Normal Jugular Venous V Waves Present] : normal jugular venous V waves present [No Jugular Venous Storm A Waves] : no jugular venous storm A waves [Respiration, Rhythm And Depth] : normal respiratory rhythm and effort [Exaggerated Use Of Accessory Muscles For Inspiration] : no accessory muscle use [Auscultation Breath Sounds / Voice Sounds] : lungs were clear to auscultation bilaterally [Not Palpable] : not palpable [Normal Rate] : normal [Rhythm Regular] : regular [Normal S1] : normal S1 [Normal S2] : normal S2 [No Gallop] : no gallop heard [No Murmur] : no murmurs heard [1+] : left 1+ [2+] : left 2+ [No Abnormalities] : the abdominal aorta was not enlarged and no bruit was heard [Abdomen Soft] : soft [Abdomen Tenderness] : non-tender [Abdomen Mass (___ Cm)] : no abdominal mass palpated [Abnormal Walk] : normal gait [Nail Clubbing] : no clubbing of the fingernails [Cyanosis, Localized] : no localized cyanosis [Petechial Hemorrhages (___cm)] : no petechial hemorrhages [Skin Color & Pigmentation] : normal skin color and pigmentation [Skin Lesions] : no skin lesions [No Skin Ulcers] : no skin ulcer [No Xanthoma] : no  xanthoma was observed [Oriented To Time, Place, And Person] : oriented to person, place, and time [Affect] : the affect was normal [Mood] : the mood was normal [No Anxiety] : not feeling anxious [] : increases with inspiration [II] : a grade 2 [Holosystolic] : holosystolic [___ +] : bilateral [unfilled]U+ pretibial pitting edema [Right Carotid Bruit] : no bruit heard over the right carotid [Left Carotid Bruit] : no bruit heard over the left carotid [FreeTextEntry1] : venous stasis discoloration of distal lower extremities

## 2021-02-22 NOTE — HISTORY OF PRESENT ILLNESS
[Disease: _____________________] : Disease: [unfilled] [T: ___] : T[unfilled] [N: ___] : N[unfilled] [M: ___] : M[unfilled] [AJCC Stage: ____] : AJCC Stage: [unfilled] [de-identified] : Mr. Harvey is a 71 y/o male with pmhx of ischemic cardiomyopathy, CHF, V-tach s/p AICD/PPM, CKD, FERNANDES/cirrhosis, chronic thrombocytopenia since at least 2012 (baseline plt count 40-60), presents to establish care for management of HCC and chronic thrombocytopenia. \par \par Pt was originally diagnosed with cirrhosis of the liver in 2017 after workup for elevated liver enzymes. He has been followed with serial imaging by Dr. Chapo Alaniz. He was referred to IR by Dr. Alaniz after imaging revealed liver lesion in Sept 2018. He is now s/p hepatic angiogram and embolization of 4 cm right hepatic tumor on 2/7/19 and hepatic angiogram and bland embolization of segment 8 and 5 on 4/18/19. \par \par He reports having issues with fluid retention and CHF exacerbations over the last year. He has worked with Dr. Rivers to adjust his diuretic regimen and had a significant amount of fluid weight loss in 3/2018. He was evaluated on 2/4/20 and is currently been stable over the last few months, with no CHF exacerbations. \par \par He denies any h/o significant bleeding. Has had plt transfusions with previous surgeries and did respond appropriately. \par \par Hep: Chapo Alaniz\par Cards: Tita\[facility]ara Nephrology: Racquel\par Endo: Nicole Hennessy\par \par 4/9/20: [facility] \par 9/24/20: CT A/P: stable disease \par 1/7/21: US abd: 2.1 segment 7 lesion, ?peripheral enhancement  [de-identified] : n/a [de-identified] : Overall feeling well. Had a contrast US abd, 2.1 cm with ?peripheral enhancement. Denies any c/o. Still getting nplate weekly. Plt count 26 last week. denies any bleeding. f/u with IR later this month.

## 2021-02-25 PROBLEM — I50.23 ACUTE ON CHRONIC SYSTOLIC CONGESTIVE HEART FAILURE: Status: ACTIVE | Noted: 2018-02-20

## 2021-02-25 PROBLEM — I47.1 PAROXYSMAL ATRIAL TACHYCARDIA: Status: ACTIVE | Noted: 2018-06-28

## 2021-02-25 NOTE — DISCUSSION/SUMMARY
[Bundle Branch Block] : ~T bundle branch block [Cardiomyopathy] : cardiomyopathy [NYHA Class III] : NYHA Class III [ACC/AHA Stage C] : ACC/AHA Stage C [Ischemic Cardiomyopathy] : ischemic cardiomyopathy [Echocardiogram] : echocardiogram [Medication Changes Per Orders] : as documented in orders [Coronary Artery Disease] : coronary artery disease [Stable] : stable [None] : none [Improving] : improving [Compensated] : compensated [Patient] : the patient [de-identified] : with LPHB and 1st degree heart block [de-identified] : Maintaining sinus rhythm, has had prolonged and refractory atrial tachycardia, but spontaneously reverted to sinus [de-identified] : decrease bumetanide to 25 mg BID [de-identified] : Acute on chronic sytolic heart failure [de-identified] : bumetanide 2 mg TID has been effective and decreased weight back to baseline along with spironolactone 25 mg BID, will now decrease bumetanide to twice daily

## 2021-02-25 NOTE — CARDIOLOGY SUMMARY
[Severe] : severe LV dysfunction [___] : [unfilled] [___] : [unfilled] [LVEF ___%] : LVEF [unfilled]% [Enlarged] : enlarged LA size [Mild] : mild mitral regurgitation

## 2021-02-25 NOTE — PHYSICAL EXAM
[General Appearance - Well Developed] : well developed [Normal Appearance] : normal appearance [Well Groomed] : well groomed [General Appearance - Well Nourished] : well nourished [No Deformities] : no deformities [General Appearance - In No Acute Distress] : no acute distress [Normal Conjunctiva] : the conjunctiva exhibited no abnormalities [Eyelids - No Xanthelasma] : the eyelids demonstrated no xanthelasmas [Normal Oral Mucosa] : normal oral mucosa [No Oral Pallor] : no oral pallor [No Oral Cyanosis] : no oral cyanosis [JVD Elevated _____cm] : JVD elevated [unfilled] ~U cm above clavicle [Normal Jugular Venous V Waves Present] : normal jugular venous V waves present [No Jugular Venous Storm A Waves] : no jugular venous storm A waves [Respiration, Rhythm And Depth] : normal respiratory rhythm and effort [Exaggerated Use Of Accessory Muscles For Inspiration] : no accessory muscle use [Auscultation Breath Sounds / Voice Sounds] : lungs were clear to auscultation bilaterally [Not Palpable] : not palpable [Normal Rate] : normal [Rhythm Regular] : regular [Normal S1] : normal S1 [Normal S2] : normal S2 [No Gallop] : no gallop heard [No Murmur] : no murmurs heard [II] : a grade 2 [Holosystolic] : holosystolic [1+] : left 1+ [2+] : left 2+ [No Abnormalities] : the abdominal aorta was not enlarged and no bruit was heard [Abdomen Soft] : soft [Abdomen Tenderness] : non-tender [Abdomen Mass (___ Cm)] : no abdominal mass palpated [Abnormal Walk] : normal gait [Nail Clubbing] : no clubbing of the fingernails [Cyanosis, Localized] : no localized cyanosis [Petechial Hemorrhages (___cm)] : no petechial hemorrhages [Skin Color & Pigmentation] : normal skin color and pigmentation [] : no rash [Skin Lesions] : no skin lesions [No Skin Ulcers] : no skin ulcer [No Xanthoma] : no  xanthoma was observed [Oriented To Time, Place, And Person] : oriented to person, place, and time [Affect] : the affect was normal [Mood] : the mood was normal [No Anxiety] : not feeling anxious [No Pitting Edema] : no pitting edema present [Right Carotid Bruit] : no bruit heard over the right carotid [Left Carotid Bruit] : no bruit heard over the left carotid [FreeTextEntry1] : venous stasis discoloration of distal lower extremities

## 2021-03-29 PROBLEM — R94.5 ABNORMAL LIVER FUNCTION TEST: Status: ACTIVE | Noted: 2017-07-10

## 2021-03-29 PROBLEM — D69.6 THROMBOCYTOPENIA: Status: ACTIVE | Noted: 2019-01-11

## 2021-03-29 NOTE — HISTORY OF PRESENT ILLNESS
[___ Month(s) Ago] : [unfilled] month(s) ago [Transfusion before 1992] : transfusion before 1992 [Fatigue] : denies fatigue [Malaise] : denies malaise [Fever] : denies fever [Yellow Skin Or Eyes (Jaundice)] : denies jaundice [Abdominal Pain] : denies abdominal pain [Urine Tests Nonspecific Abnormal Findings Biliuria] : denies dark urine [Light Colored Bowel Movement (Acholic Stools)] : denies pale stools [Skin: Rash] : denies rash [Itching (Pruritus)] : denies pruritus [Shortness Of Breath] : denies shortness of breath [Needlestick Exposure] : no needlestick exposure [Infected Sexual Partner] : no infected sexual partner [IV Drug Use] : no IV drug use [Tattoo] : no tattoos [Body Piercing] : no body piercing [Hemodialysis] : no hemodialysis [Transplant before 1992] : no transplant before 1992 [Incarceration] : no incarceration [Alcohol Abuse] : no alcohol abuse [Autoimmune Disorder] : no autoimmune disorder [Household Contact to HBV] : no household contact to HBV [Travel to Endemic Area] : no travel to an endemic area [Occupational Exposure] : no occupational exposure [de-identified] : Mr. Harvey is a 71 y/o male with pmhx of DM II with retinopathy, CAD s/p CABG (1986), CHF with AICD/PPM (Medtronic 2006), hypothyroidism, HTN, HLD, obesity (BMI 31), and thrombocytopenia (on nplate infusions weekly). He returns for followup regarding cirrhosis and HCC s/p hepatic angiogram and embolization. He has had abnormal liver tests and thrombocytopenia for several years and was diagnosis with cirrhosis and splenomegaly back in 2017. Liver lesion revealed in 2018, since has been followed by closely by MD Daniel ([facility]) and MD Hoyt ().  Admits to maintaining a diet low in carb, sugar, and sodium, denies alcohol/smoking. States he exercises and has a goal of 3x times a week via bike riding. Denies fever/chills, melena/BRBPR, N/V, SOB, hematemesis/hemoptysis and ab pain.  \par \par Workup:\par -07/2017: US of ab revealed cirrhosis with cholelithiasis and splenomegaly, no ascites. Fibroscan reported S2 steatosis and F3 fibrosis. \par -08/2017: EGD reported small varices and mild portal gastropathy. Colonoscopy reported left-sided diverticular disease and internal hemorrhoids. Repeat in 2027.\par -09/2017: BW revealed , BUN 37, cr 1.69, AST 42, ALT 28, alp 114, bili 1.0\par \par -03/2018: BW revealed INR 1.3, WBC 3.55, Hb 10.1, PLT 49,000, Na 144, K 4.0, Cr 1.45, AST 44, ALT 32, , TB 0.7 , MELD Na 13. Abdominal US with Duplex reported Cirrhosis. No focal hepatic lesions No portal vein thrombosis. Mild splenomegaly. Small amount of ascites and Cholelithiasis. \par -09/2018: BW revealed AFP normal, WBC 5.6, Hb 10.1, WBC 67 K, AST 35, ALT 30, , TB 0.6. , Cr 1.61 INR 1.07 Na 137, MELD-Na was 14, Abdominal US showed an exophytic mass of 3.7 cm in right hepatic lobe, splenomegaly and biliary sludge and no ascites. \par -10/2018: US of ab w/contrast reported a 4.9 cm lesion in posterior right hepatic lobe most consistent with HCC\par -11/2018: CT of ab w/contrast reported a 4.3 cm mass with bulging capsule suspicious for HCC in segment 7, and a second suspicious lesion of 1.5 cm in segment 5. small gallstones, splenomegaly\par \par -02/-04/2019: liver directly therapy with embolization for HCC (2 lesions)  in 2/3029 and 4/2019 respectively. \par -11/2019: CT w/contrast reported cirrhosis, with unchanged varies and splenomegaly. , 2 previous treated hepatic tumors with no evidence of viability,  and no ascites \par -12/2019: BW showed AST 48, ALT 29, , TB 0.7 Cr 1.67, Na 140, A1C 7.4%, TG 61, , HDL 62, LDL 53 \par \par -04/2020: R lobe SIRT\par -06/2020: CT of ab revealed +cirrhosis with portal hypertension and varices. Seg 4A/8 unchanged. and Seg 7 ablation site with thick peripheral enhancement, decreased since prior imaging. \par -09/2020: CT of ab revealed stable disease\par \par -01/2021: US of ab revealed 2.1cm lesion in seg 7\par -03/2021: BW revealed bili 1., AST 44, ALT 22, , cr. 2.94, BUN 77, AFP 2.5, H+H 9.1/28.6, PLT 37\par \par Pt states he is feeling well. \par ROS: as below

## 2021-03-29 NOTE — REVIEW OF SYSTEMS
[Negative] : Heme/Lymph [Fever] : no fever [Chills] : no chills [Chest Pain] : no chest pain [Palpitations] : no palpitations [Wheezing] : no wheezing [SOB on Exertion] : no shortness of breath during exertion [Dysuria] : no dysuria [Limb Swelling] : no limb swelling [Skin Lesions] : no skin lesions [Itching] : no itching [Dizziness] : no dizziness [Fainting] : no fainting

## 2021-03-29 NOTE — PHYSICAL EXAM
[General Appearance - Alert] : alert [General Appearance - Well-Appearing] : healthy appearing [Sclera] : the sclera and conjunctiva were normal [Outer Ear] : the ears and nose were normal in appearance [] : no respiratory distress [Bowel Sounds] : normal bowel sounds [Abdomen Soft] : soft [Abdomen Tenderness] : non-tender [No CVA Tenderness] : no ~M costovertebral angle tenderness [Nail Clubbing] : no clubbing  or cyanosis of the fingernails [Skin Color & Pigmentation] : normal skin color and pigmentation [Oriented To Time, Place, And Person] : oriented to person, place, and time [Scleral Icterus] : No Scleral Icterus [Asterixis] : no asterixis observed [Jaundice] : No jaundice [FreeTextEntry1] : +1 b/l le

## 2021-03-29 NOTE — ASSESSMENT
[FreeTextEntry1] : Mr. Harvey is a 69 y/o male with pmhx of DM II with retinopathy, CAD s/p CABG (1986), CHF with AICD/PPM (Medtronic 2006), hypothyroidism, HTN, HLD, obesity (BMI 31), and thrombocytopenia (on nplate infusions weekly). He returns for followup regarding cirrhosis and HCC s/p hepatic angiogram and embolization.\par  \par -01/2021: US of ab revealed 2.1cm lesion in seg 7\par -03/2021: BW revealed bili 1., AST 44, ALT 22, , cr. 2.94, BUN 77, AFP 2.5, H+H 9.1/28.6, PLT 37\par \par 1. FERNANDES Cirrhosis/HCC\par -+1edema: maintain diuretic regimen managed by cardio MD Rivers (bumetanide/Aldactone)\par -EV: f/u EGD for eval Grade 1 EV\par -HE --none\par -HCC: maintain f/u with MD Daniel 04/26 and imaging via CT or US w/contrast depending on eval with MD Clement\par - I have explained and educated pt at length the natural history of cirrhosis, complications of cirrhosis with portal hypertension, esophageal varices with bleeding, portosystemic encephalopathy, ascites and infection.\par -I have explained the best treatment for fatty liver is diet and exercise.\par -I have encouraged a gradual weight loss of 10%. I have encouraged a healthy lifestyle including exercising at least 3x times weekly and maintaining a diet low in carbohydrates, fat and cholesterol. Encouraged healthy fats (avocados and almonds), lean meats and whole grains. I have recommended a low salt diet. \par -I have counseled pt on abstaining from alcohol and illicit drug use, avoid herbal and dietary supplements. Encouraged limit use of acetaminophen to <2gm per day and to avoid NSAIDS.    \par \par 2. HM\par -colonoscopy repeat 2027, In need of EGD f/u, will order\par -completed covid-19 vaccination\par \par Plan: \par RTC in 3mns with MD AUSTIN after repeat HCC screening and eval of potential liver directed treatment\par Maintain f/u appts with participating providers\par Heme/onc: MD Daniel\par IR: MD Hoyt\par Card: MD Rivers\par Neph: MD Clement\par Endo: MD Hennessy

## 2021-04-02 NOTE — PROCEDURE
[ICD] : Implantable cardioverter-defibrillator [No] : not [Atrial Fibrillation] : atrial fibrillation [Lead Imp:  ___ohms] : lead impedance was [unfilled] ohms [Sensing Amplitude ___mv] : sensing amplitude was [unfilled] mv [___V @] : [unfilled] V [___ ms] : [unfilled] ms [de-identified] : Medtronic [de-identified] : [employer] MRI [de-identified] : [id number] [de-identified] : 4/3/2019 [de-identified] : AAI<=>DDD [de-identified] : 9 years [de-identified] : 62

## 2021-04-02 NOTE — PHYSICAL EXAM
[General Appearance - Well Developed] : well developed [Normal Appearance] : normal appearance [Well Groomed] : well groomed [General Appearance - Well Nourished] : well nourished [No Deformities] : no deformities [General Appearance - In No Acute Distress] : no acute distress [Edema] : no peripheral edema present [Respiration, Rhythm And Depth] : normal respiratory rhythm and effort [Exaggerated Use Of Accessory Muscles For Inspiration] : no accessory muscle use [Auscultation Breath Sounds / Voice Sounds] : lungs were clear to auscultation bilaterally [Clean] : clean [Dry] : dry [Well-Healed] : well-healed [Abdomen Soft] : soft [Abdomen Tenderness] : non-tender [Abdomen Mass (___ Cm)] : no abdominal mass palpated [Nail Clubbing] : no clubbing of the fingernails [Cyanosis, Localized] : no localized cyanosis [Petechial Hemorrhages (___cm)] : no petechial hemorrhages [] : no ischemic changes [FreeTextEntry1] : Afib, rate controlled. Grade 2 murmur on LLSB and at the apex

## 2021-04-02 NOTE — HISTORY OF PRESENT ILLNESS
[de-identified] : Mr. Harvey is a 71 year old man with ventricular arrhythmias in the setting of ischemic cardiomyopathy s/p ICD implantation. His past medical history also includes liver ca (not on active chemotherapy), thrombocytopenia, poorly controlled IDDM, multiple amputations of toes, HTN, and HLD. \par \par  Interrogations have shown VT up to 22 minutes with rates upto 150s bpm and he presents today for further evaluation.\par \par He has a history of  Afib, which is currently in progress since March 10th. His ventricular rates are mostly controlled. His OZOJO0CORp score is 4 (HTN, CHF, age>65 and DM). OAC is contraindicated due to his low platelet count. Device check revealed few episodes of NSVT, remains asymptomatic. \par \par He has been feeling tired but when he relaxes and takes a nap he feels better, He recently played golf and walked about a mile and tolerates it well. He is currently followed up at [facility] to get platelet injection. Goes to IR every 3 months. He denies any chest pain, SOB, palpitations, lightheadedness or dizziness. He is currently managed on coreg 12.5mg BID and has a soft BP. \par

## 2021-04-02 NOTE — DISCUSSION/SUMMARY
[FreeTextEntry1] : 71 year old man with ventricular arrhythmias in the setting of CAD.  He has a history of Flidelis lead (capped) and new HV lead implanted 2012. ICD generator change 2019. He has episodes of slow VT (150s) that self terminates and remains asymptomatic. Uptitration of betablocker is limited given his soft blood pressure. We discussed checking his pulse regularly and if his HR persistently stayed around 150s, I suggested that he call us and we will then bring him to the clinic to pace terminate those events.

## 2021-04-02 NOTE — END OF VISIT
[FreeTextEntry3] : seen and examined with NP. I agree with H and P, A and P.  While with VT in the setting of ischemic cardiomyopathy is amenable substrate for ablation, he has significant comorbidities and the arrhythmia is asymptomatic. We discussed options for therapy, but have decided to continue current therapy.  We deferred on adding ATP zone for concern for possible acceleration of what may be an otherwise self terminating rhythm.

## 2021-04-13 PROBLEM — N17.9 ACUTE KIDNEY INJURY: Status: ACTIVE | Noted: 2020-01-01

## 2021-04-13 PROBLEM — I10 BENIGN ESSENTIAL HYPERTENSION: Status: ACTIVE | Noted: 2018-07-09

## 2021-04-13 NOTE — PHYSICAL EXAM
[General Appearance - Alert] : alert [General Appearance - In No Acute Distress] : in no acute distress [Sclera] : the sclera and conjunctiva were normal [Outer Ear] : the ears and nose were normal in appearance [Hearing Threshold Finger Rub Not [place]] : hearing was normal [Examination Of The Oral Cavity] : the lips and gums were normal [Jugular Venous Distention Increased] : there was no jugular-venous distention [] : no respiratory distress [Respiration, Rhythm And Depth] : normal respiratory rhythm and effort [Exaggerated Use Of Accessory Muscles For Inspiration] : no accessory muscle use [Auscultation Breath Sounds / Voice Sounds] : lungs were clear to auscultation bilaterally [Heart Sounds] : normal S1 and S2 [Heart Rate And Rhythm] : heart rate was normal and rhythm regular [Heart Sounds Gallop] : no gallops [Heart Sounds Pericardial Friction Rub] : no pericardial rub [Edema] : there was no peripheral edema [Veins - Varicosity Changes] : there were no varicosital changes [Bowel Sounds] : normal bowel sounds [Abdomen Soft] : soft [Abdomen Tenderness] : non-tender [No CVA Tenderness] : no ~M costovertebral angle tenderness [No Spinal Tenderness] : no spinal tenderness [Abnormal Walk] : normal gait [Musculoskeletal - Swelling] : no joint swelling seen [Oriented To Time, Place, And Person] : oriented to person, place, and time [Impaired Insight] : insight and judgment were intact [Affect] : the affect was normal [Mood] : the mood was normal [FreeTextEntry1] : Unable to examine , this being a tele visit

## 2021-04-13 NOTE — ASSESSMENT
[FreeTextEntry1] : CKD 3/4 with mulltiple episodes of JULIANNE recently, that have been hemodynamic in nature coupled with medications/ hypotension and contrast use. \par \par Creat 2.7-3.1 mg/dl. at this level of gfr, preferable to avoid contrast unless absolutely needed. I discussed with  and he suggested a non contrast CT scan coupled with a contrast enhanced ultrasound that is not nephrotoxic. at this time, he looks euvolemic and BP is at his baseline. I would not change any medications at the current time. \par \par Metabolic acidosis- serum bicarb is at target level. \par \par Hypertension- BP is well controlled. \par \par I did not bring up the issue of access planning/ renal replacement therapy in today's visit. \par \par follow up in 2 months with dr. xiong\par \par

## 2021-04-13 NOTE — HISTORY OF PRESENT ILLNESS
[FreeTextEntry1] : follow up CKD \par \par 71 year old  male, retired (used to work as  for a firm in [place]), with DM x 15 years, with diabetic neuropathy and ?retinopathy, CHF, HLD, CAD s/p CABG (1986) and stent placement (1999), fatty liver with recent diagnosis of HCC, gouty arthritis, AFib, s/p pacemaker and defibrillator, sleep apnea, hypothyroidism, ?prolactinoma, multiple episodes of JULIANNE on CKD.\par \par Pt. was last evaluated in Jan 2021.He has had fluctuations in serum creatinine over the past few months related to hemodynamic changes/ medications/ volume changes and contrast dye use. He has been on and off ACEI/ Aldactone. Currently he is on a small dose of ramipril 2.5 mg daily/ aldactone 25 mg BID and Bumex 2 mg bid. he has not had any swelling. no lightheadedness/ dizziness. \par \par he is due for another imaging test in the near future to evaluate his liver lesions. \par \par he denies any OTC drugs, volume losses or changes in medications \par \par Last serum creat on 4/7- 3.1 mg/dl \par \par ROS \par - No changes in urination, no blood in urine \par CVS- No chest pain, no shortness of breath \par all other systems reviewed in detail and were negative except as above

## 2021-04-26 NOTE — HISTORY OF PRESENT ILLNESS
[de-identified] : Mr. Harvey is a 69 y/o male with pmhx of ischemic cardiomyopathy, CHF, V-tach s/p AICD/PPM, CKD, FERNANDES/cirrhosis, chronic thrombocytopenia since at least 2012 (baseline plt count 40-60), presents to establish care for management of HCC and chronic thrombocytopenia. \par \par Pt was originally diagnosed with cirrhosis of the liver in 2017 after workup for elevated liver enzymes. He has been followed with serial imaging by Dr. Chapo Alaniz. He was referred to IR by Dr. Alaniz after imaging revealed liver lesion in Sept 2018. He is now s/p hepatic angiogram and embolization of 4 cm right hepatic tumor on 2/7/19 and hepatic angiogram and bland embolization of segment 8 and 5 on 4/18/19. \par \par He reports having issues with fluid retention and CHF exacerbations over the last year. He has worked with Dr. Rivers to adjust his diuretic regimen and had a significant amount of fluid weight loss in 3/2018. He was evaluated on 2/4/20 and is currently been stable over the last few months, with no CHF exacerbations. \par \par He denies any h/o significant bleeding. Has had plt transfusions with previous surgeries and did respond appropriately. \par \par Hep: Chapo Alaniz\par Cards: Tita\par Nephrology: Racquel\par Endo: Nicole Hennessy\par \par 4/9/20: SIRT \par 9/24/20: CT A/P: stable disease \par 1/7/21: US abd: 2.1 segment 7 lesion, ?peripheral enhancement \par 4/15/21: US abd: unremarkable contrast, no evidence of viable tumor  [de-identified] : n/a [de-identified] : overall feels well. cont with weekly Nplate. Plts 20s. Denies any bleeding. \par Had US with contrast with CT A/P wo, reviewed results with pt. Denies any pain. Good appetite. Lost weight due to diureses.

## 2021-04-27 PROBLEM — C22.0 HCC (HEPATOCELLULAR CARCINOMA): Status: ACTIVE | Noted: 2019-12-07

## 2021-04-27 PROBLEM — K74.60 CIRRHOSIS: Status: ACTIVE | Noted: 2017-07-10

## 2021-04-27 PROBLEM — N18.4 STAGE 4 CHRONIC KIDNEY DISEASE: Status: ACTIVE | Noted: 2019-05-19

## 2021-04-27 PROBLEM — R16.0 LIVER MASS, RIGHT LOBE: Status: ACTIVE | Noted: 2018-10-01

## 2021-04-27 NOTE — PHYSICAL EXAM
[Alert] : alert [Oriented x3] : oriented to person, place, and time [Normal Insight/Judgement] : insight and judgment were intact [Recent Memory Normal] : recent memory was not impaired [Remote Memory Normal] : remote memory was not impaired [Restricted in physically strenuous activity but ambulatory and able to carry out work of a light or sedentary nature] : Restricted in physically strenuous activity but ambulatory and able to carry out work of a light or sedentary nature, e.g., light house work, office work

## 2021-04-27 NOTE — DATA REVIEWED
[FreeTextEntry1] : \par EXAM: US ABD TARGET DYN INIT LES\par \par EXAM: US ABDOMEN LIMITED\par \par \par PROCEDURE DATE: 01/07/2021\par \par \par \par \par INTERPRETATION: Clinical information: Hepatic lesions in segment 7 (LR-TR equivocal) and junction of segments 4A and 8 (LI-RR viable).\par \par Focused ultrasound examination of the segment 7 mass was performed with ultrasound contrast material. 5 cc of Lumason was injected intravenously. There was 0 cc discarded. There were no adverse events.\par \par Following the intravenous administration of 5 cc of Lumason, serial evaluation of segment 7 mass was performed in the arterial, portal and delayed phases.\par \par Following the intravenous administration of approximately 2.5 cc of Lumason, serial evaluation of segment 4A/8 was performed in the arterial, portal and delayed phases.\par \par No additional lesions were noted within the liver.\par \par FINDINGS:\par \par Nodular hepatic contour and coarse hepatic echogenicity consistent with cirrhosis. Small volume ascites in the right upper quadrant.\par \par A mass in segment 7 demonstrates peripheral calcification and measures 1.9 x 1.4 x 2.1 cm. The mass demonstrates questionable minimal peripheral enhancement. No internal enhancement is noted on arterial, portal and delayed phases.\par \par Sonographic evaluation of segment 4A/8 does not demonstrate a focal mass. Postcontrast imaging does not demonstrate a focal hyperenhancement or delayed hypoenhancement.\par \par The mass demonstrates no arterial enhancement. No significant enhancement or fill-in during venous phase.\par \par IMPRESSION:\par \par A 2.1 cm segment 7 mass with peripheral calcification and questionable minimal peripheral enhancement.\par \par No lesion identified in segment 4A/8.\par

## 2021-04-27 NOTE — ASSESSMENT
[Other: _____] : [unfilled] [FreeTextEntry1] : Mr. Harvey is a 71 y/o male with pmhx of ischemic cardiomyopathy, CHF, V-tach s/p AICD/PPM, HLD, HTN, BEHZAD, gouty arthritis, T2DM, FERNANDES, and cirrhosis who presents today for follow up for liver directed therapy. \par \par 1.  HCC\par - most recent treatment is SIRT to his right lobe on 4/9/20. A\par - I reviewed the imaging and discussed the findings with MrEmre and Mrs. Harvey -abd contrast u/s & non con abd ct on 4/15/21 reveal non-enhancement & interval decrease in size of treated segment 7 hepatic lesion which is quite pleasing\par - given that there is a high risk of JOHN and his lesion has demonstrated interval decrease in size & non-enhancement, we will repeat his imaging in 3 months\par - f/u Telehealth visit after imaging\par - f/u with hepatology on a consistent basis - last eval 4/26/21\par -he will be presented at the next HBTB meeting but I don't anticipate any change in current plan of care or serial imaging selection 2/2 CKD hx\par \par 2.  CKD\par - continue to follow JOHN prophylaxis per renal\par - Pt was on lasix 40mg daily, +aldactone and Ramipril, then Lasix was increased to 80mg BID. Aldactone and Ramipril on hold x 1 week per renal, continued on Lasix 80 BID\par - pt has lost 8 of the 12lbs that he recently gained, has f/u with Dr. Rivers next month\par - F/u with cards, hopefully will be able to titrate down on diuretics\par -f/u with Dr. Clement in early April\par - if renal function stable, will repeat CT scan\par \par 3.  Thrombocytopenia\par - continue to follow with Dr. Stringer for management\par \par I have provided the patient the opportunity to ask questions and have answered them to his satisfaction.  He is  encouraged to contact our office with any further questions, concerns, or issues.\par

## 2021-04-27 NOTE — HISTORY OF PRESENT ILLNESS
[Home] : at home, [unfilled] , at the time of the visit. [Medical Office: ([facility])___] : at the medical office located in  [Verbal consent obtained from patient] : the patient, [unfilled] [FreeTextEntry1] : Mr. Harvey is a 71 year old male with pmhx of ischemic cardiomyopathy, CHF, V-tach s/p AICD/PPM, HLD, HTN, BEHZAD, gouty arthritis, T2DM, FERNANDES, and cirrhosis who presents today for follow up for liver directed therapy. Pt was originally diagnosed with cirrhosis of the liver in 2017 after workup for elevated liver enzymes. He has been followed with serial imaging by Dr. Chapo Alaniz. He was referred to IR by Dr. Alaniz after imaging revealed liver lesion. He is now s/p bland embolization of 4 cm right hepatic tumor on 2/7/19, bland embolization of segment 8 and 5 on 4/18/19, and right lobe SIRT on 4/9/20. He presents today for follow up to review 4/15/21 contrast u/s which did not demonstrate lesion enhancement & non con a/bd ct scan which revealed decrease in hepatic lesion.\par He was last seen by Dr Daniel on 4/26 - all wnl.\par \par Denies abdominal pain, change in skin color, hemoptysis, melena, ascites or hematochezia.\par \par He reports having issues with fluid retention and CHF exacerbations over the last year. He has worked with Dr. Rivers to adjust his diuretic regimen and had a significant amount of fluid weight loss in 3/2018. He was evaluated on 2/4/20 and had been stable for quite a while, but recently started reaccumulating fluid again (12lb weight gain), so his lasix dose was increased. Lasix was increased from 40 mg daily to 80 mg BID.  \par \par He had repeat labs prior to having imaging and his renal function was elevated. CT was deferred and he was sent for CEUS instead.  He presents today to review findings. \par \par He was recently hospitalized for thrombocytopenia and believed to be due to a component of liver disease and ITP.  He was treated with a course of steroids, but later developed hyperglycemia and a toe ulceration.  He underwent a left second toe amputation and is healing without any issues.  He is continuing to follow with Dr. Stringer for his thrombocytopenia.  \par \par Hep: Chapo Alaniz\par Cards: Tita\par Nephrology: Racquel\par Endo: Nicole Hennessy \par Onc: Myke

## 2021-05-25 PROBLEM — D69.3 CHRONIC ITP (IDIOPATHIC THROMBOCYTOPENIA): Status: ACTIVE | Noted: 2020-01-01

## 2021-05-25 NOTE — ASSESSMENT
[FreeTextEntry1] : This is a 70 year old man who is here to see me for thrombocytopenia in the setting of liver disease. Low platelets date back to 2003, when he had ast/alt abnormalities but no diagnosis of cirrhosis.  \par \par I suspect that he may have an underlying ITP, evidenced by normal megas in the bone marrow and slight response to prednisone 40mg (plt count 13K moved to 24K) although this response was not significant likely because he has many other causes of thrombocytopenia.\par \par His cirrhosis is the main contributor to his thrombocytopenia, likely exacerbated by his splenomegaly and his recent SIRT likely created worsened thrombocytopenia (evidenced by lower numbers a few months after treatment). This is reported in patients who recently had SIRT have worsened thrombocytopenia, platelets reported to drop approx 20%. Patient dropped more than this, requiring transfusion likely given the multifactorial nature of his thrombocytopenia.\par \par Nplate response has been blunted\par Will check labs tomorrow for ITP\par He is also reporting that his bp was lower last week in the 60s in the morning. Additionally he has been more fatigued. he has a sore on his lower ankle that opened up and had a temp of 99 yesterday. Chronic cough, nothing localizing.  BP today was mid 80s closer to his baseline.\par \par I have advised that if he has another low grade temp and or a lower blood pressure he should go into the hospital.\par If he does not go today, they will come to the office tomorrow and check a cbc, blood culture, urine culture, cmp and cxr. if plt are <10 [facility] and possible rituximab and management of other  ongoing issues.\par \par \par

## 2021-05-25 NOTE — HISTORY OF PRESENT ILLNESS
[de-identified] : This is a 70 year old man with a history of ischemic CM, CHF, Vtach s/p AICD/PPM, CKD, FERNANDES cirrhosis and chronic thrombocytopenia since 2012 who is seeing me for acute on chronic thrombocytopenia in the setting of HCC and possibly ITP. \par \par Pt was originally diagnosed with cirrhosis of the liver in 2017 after workup for elevated liver enzymes. He has been followed with serial imaging by Dr. Chapo Alaniz. He was referred to IR by Dr. Alaniz after imaging revealed liver lesion in Sept 2018. He is now s/p hepatic angiogram and embolization of 4 cm right hepatic tumor on 2/7/19 and hepatic angiogram and bland embolization of segment 8 and 5 on 4/18/19 as well as R lobe SIRT on 4/9/20.  \par \par He reports having issues with fluid retention and CHF exacerbations over the last year in 2019. He has worked with Dr. Rivers to adjust his diuretic regimen and had a significant amount of fluid weight loss in 3/2018. He was evaluated on 2/4/20 and is currently been stable over the last few months with no CHF exacerbations. \par \par He has a CT scan 9/23/20 and he will see Dr Hoyt shortly after.  His platelet count has remained between 40-60K. \par \par Looking back, although patient was diagnosed with Cirrhosis in 2017, he has had thrombocytopenia to the 70-80s since 2003. At this time his coags were normal but he had intermittent elevations of ast/alt. Then in 2012 his PT INR was slightly elevated and his platelet count was around 50-60K. INR and platelet count remained stable until 2018. When INR went up to 2 approx in 5/2018, platelets were at 40K, Although the iNR and the platelet count do not correlate. Then in 10/2019 plt went down to 35K, 3/31/20 at 29K. S/p procedure 4/9/20 (SIRT) with platelet count of 20K or less in june/july.  \par  \par Given drop BMBx was performed. 1, 2. Bone marrow biopsy and bone marrow aspirate \par      - Cellular marrow (40%) with slightly erythroid predominant maturing trilineage hematopoiesis \par      - No morphologic or immunophenotypic evidence supporting involvement by myelodysplasia.\par Megakaryocytes:	Normal in number (2-5 megakaryocytes/hpf), unremarkable morphology\par Variant of unknown significant ASXL1 on bioreference\par \par Started prednisone for possible ITP 40mg 8.1.20 with improvement of platelets to 24K, but blood sugars were increasing. Had a cellulitis on his toe, was admitted for toe infection.  IV antibiotics given. IVIG was given Platelet transufsion was done, amputated the toe with a platelet count of 17K. Sent home on Clindamycin.\par \par Prednisone now decreased to 30mg. Also on atovaquone 750BID. Blood sugars have been better as well. He has started was premeal insulin/humolog.\par Patient denies bleeding or bruising. \par \par Review of scans: 5/2019 + Splenomegaly  [de-identified] : -blood pressures have been low lately\par -heart rate has been normal\par -last night has been feeling weak and nauseous\par -temp , dry cough, sometimes he starts coughing\par -coughing, covid negative\par -weight has been going up a bit, but now his weight is down to 186\par -R foot ulceration, seeing the podiatrist, not infected, \par -right ankle is hurting him today\par -not eating well

## 2021-05-25 NOTE — REASON FOR VISIT
[Follow-Up Visit] : a follow-up visit for [Home] : at home, [unfilled] , at the time of the visit. [Medical Office: ([facility])___] : at the medical office located in  [Spouse] : spouse [Verbal consent obtained from patient] : the patient, [unfilled]

## 2021-05-26 NOTE — CONSULT NOTE ADULT - ASSESSMENT
Patient is a 70 y/o M w/ a PMHx of ischemic CM, HFrEF, V-tach s/p AICD/PPM, pA-Fib (not on AC), CKD, FERNANDES cirrhosis c/b HCC (s/p SIRT in 4/2020), and chronic thrombocytopenia possibly related to ITP (on Nplate) who presented to the ED with hypotension, found to have worsening thrombocytopenia and JULIANNE on CKD. Hematology was consulted for further evaluation.      *** NOTE INCOMPLETE *** Patient is a 70 y/o M w/ a PMHx of ischemic CM, HFrEF, V-tach s/p AICD/PPM, pA-Fib (not on AC), CKD, FERNANDES cirrhosis c/b HCC (s/p SIRT in 4/2020), and chronic thrombocytopenia possibly related to ITP (on Nplate) who presented to the ED with hypotension, found to have worsening thrombocytopenia and JULIANNE on CKD. Hematology was consulted for further evaluation.      # Hypotension  - Unclear etiology at this juncture. Possibly infection?  - R foot X-ray: Chronic appearing medial angled deformity of the fifth metatarsal neck with erosive appearing changes along the lateral margin which could reflect acute or chronic osteomyelitis.   - Blood cultures sent in the ED, follow-up results. Consider ID input given X-ray findings  - Pt to be admitted to the MICU    # Thrombocytopenia  - Chronic since 2003, but has worsened over the last year. Pt's cirrhosis is suspected to be a main contributor to his thrombocytopenia, likely exacerbated by his splenomegaly and prior SIRT for HCC. There also appears to be an ITP component as patient had a BMBx in 8/2020 which showed normal megakaryocytes and he had a slight response to steroids previously.   - Pt was placed on Nplate in 9/2020 with improvement, but this response has been blunted recently. Unclear if this is 2/2 occult infection vs worsening ITP?  - Continue infectious evaluation  - Recommend giving IVIG 1g/kg daily x2 doses. Please give PO 650mg Tylenol x1 and PO Benadryl 25mg x1 30 minutes prior to IVIG  - If infectious workup is unrevealing and PLT count does not improve, would consider Rituximab  - Monitor CBC and keep active T+S. Transfuse for PLT < 10 for now or if actively bleeding  - Primary Hematologist: Dr. Tish Stringer. Continue outpatient follow-up    # HCC  - Confined to the liver s/p SIRT in 4/2020  - There are no plans for systemic therapy while inpatient  - Primary Oncologist: Dr. Tina Daniel. Continue outpatient follow-up    Noah Mariano, PGY5  Hematology-Oncology Fellow  Pager: [phone] / [phone] Patient is a 72 y/o M w/ a PMHx of ischemic CM, HFrEF, V-tach s/p AICD/PPM, pA-Fib (not on AC), CKD, FERNANDES cirrhosis c/b HCC (s/p SIRT in 4/2020), and chronic thrombocytopenia possibly related to ITP (on Nplate) who presented to the ED with hypotension, found to have worsening thrombocytopenia and JULIANNE on CKD. Hematology was consulted for further evaluation.      # Hypotension  - Unclear etiology at this juncture. Possibly infection?  - R foot X-ray: Chronic appearing medial angled deformity of the fifth metatarsal neck with erosive appearing changes along the lateral margin which could reflect acute or chronic osteomyelitis.   - Blood cultures sent in the ED, follow-up results. Consider ID input given X-ray findings  - Pt to be admitted to the MICU    # Thrombocytopenia  - Chronic since 2003, but has worsened over the last year. Pt's cirrhosis is suspected to be a main contributor to his thrombocytopenia, likely exacerbated by his splenomegaly and prior SIRT for HCC. There also appears to be an ITP component as patient had a BMBx in 8/2020 which showed normal megakaryocytes and he had a slight response to steroids previously.   - Pt was placed on Nplate in 9/2020 with improvement, but this response has been blunted recently. Unclear if this is 2/2 occult infection vs worsening ITP?  - Continue infectious evaluation  - Recommend giving IVIG 1g/kg daily x2 doses. Please give PO 650mg Tylenol x1 and PO Benadryl 25mg x1 30 minutes prior to IVIG  - If infectious workup is unrevealing and PLT count does not improve, would consider Rituximab  - Monitor CBC and keep active T+S. Would hold transfusions for now unless actively bleeding.  - Primary Hematologist: Dr. Tish Stringer. Continue outpatient follow-up    # HCC  - Confined to the liver s/p SIRT in 4/2020  - There are no plans for systemic therapy while inpatient  - Primary Oncologist: Dr. Tina Daniel. Continue outpatient follow-up    Noah Mariano, PGY5  Hematology-Oncology Fellow  Pager: [phone] / [phone] Patient is a 72 y/o M w/ a PMHx of ischemic CM, HFrEF, V-tach s/p AICD/PPM, pA-Fib (not on AC), CKD, FERNANDES cirrhosis c/b HCC (s/p SIRT in 4/2020), and chronic thrombocytopenia possibly related to ITP (on Nplate) who presented to the ED with hypotension, found to have worsening thrombocytopenia and JULIANNE on CKD. Hematology was consulted for further evaluation.      # Hypotension  - Unclear etiology at this juncture. Possibly infection?  - R foot X-ray: Chronic appearing medial angled deformity of the fifth metatarsal neck with erosive appearing changes along the lateral margin which could reflect acute or chronic osteomyelitis.   - Blood cultures sent in the ED, follow-up results. Consider ID input given X-ray findings.   - Of note, patient's R foot was debrided ~ 1 week ago. Per d/w patient's Hematologist, cultures from this debridement returned positive for MRSA and Enterococcus faecalis (sensitive to Vancomycin).   - Pt admitted to the MICU for BP support. Appreciate care as per MICU    # Thrombocytopenia  - Chronic since 2003, but has worsened over the last year. Pt's cirrhosis is suspected to be a main contributor to his thrombocytopenia, likely exacerbated by his splenomegaly and prior SIRT for HCC. There also appears to be an ITP component as patient had a BMBx in 8/2020 which showed normal megakaryocytes and he had a slight response to steroids previously.   - Pt was placed on Nplate in 9/2020 with improvement, but this response has been blunted recently. Unclear if this is 2/2 occult infection vs worsening ITP?  - Continue infectious evaluation  - Recommend giving IVIG 1g/kg daily x2 doses. Please give PO 650mg Tylenol x1 and PO Benadryl 25mg x1 30 minutes prior to IVIG  - If infectious workup is unrevealing and PLT count does not improve, would consider Rituximab  - Monitor CBC and keep active T+S. Would hold transfusions for now unless actively bleeding.  - Primary Hematologist: Dr. Tish Stringer. Continue outpatient follow-up    # HCC  - Confined to the liver s/p SIRT in 4/2020  - There are no plans for systemic therapy while inpatient  - Primary Oncologist: Dr. Tina Daniel. Continue outpatient follow-up    Noah Mariano, PGY5  Hematology-Oncology Fellow  Pager: [phone] / [phone]

## 2021-05-26 NOTE — ED PROVIDER NOTE - CARE PLAN
Principal Discharge DX:	Hypotension  Secondary Diagnosis:	Acute renal failure  Secondary Diagnosis:	Thrombocytopenia

## 2021-05-26 NOTE — ED ADULT NURSE REASSESSMENT NOTE - NS ED NURSE REASSESS COMMENT FT1
Pt bladder scanned by AMELIE Priest. Approx 50 cc of urine scanned in bladder, advised no mckinnon is needed due to low urine in bladder.

## 2021-05-26 NOTE — CONSULT NOTE ADULT - ASSESSMENT
71M w/ R foot submet 4 wound to bone  - Pt seen and evaluated  - Afebrile, WBC 10.51, ESR pending, CRP 10.2 mg/dL  - R foot submet 4 wound to bone, 2.0x2.0cm, depth to bone, wound bed fibrotic, sanguinous drainage, no malodor, periwound mildly erythematous, etiology 2/2 5th met fx & subsequent pressure to 4th met & DM neuropathy  - Excisional debridement of R foot wound to the level of subq demonstrating probing to bone, no purulence, sanguinous drainage  - Wound culture obtained  - ordered Nuclear Medicine scan to r/o OM as wound probes to bone & patient does not have MRI compatible AICD leads  - Pending imaging & medical stabilization, plan for R foot TMA & CLARA   - Please document medical & cardiac optimization for procedure  - d/w attending & in agreement   71M w/ R foot submet 4 wound to bone  - Pt seen and evaluated  - Afebrile, WBC 10.51, ESR pending, CRP 10.2 mg/dL  - R foot submet 4 wound to bone, 2.0x2.0cm, depth to bone, wound bed fibrotic, sanguinous drainage, no malodor, periwound mildly erythematous, etiology 2/2 5th met fx & subsequent pressure to 4th met & DM neuropathy  - Excisional debridement of R foot wound to the level of subq demonstrating probing to bone, no purulence, sanguinous drainage  - Wound culture obtained  - Cont IV ABx - would recommend ID consultation  - ordered Nuclear Medicine scan to r/o OM as wound probes to bone & patient does not have MRI compatible AICD leads  - Pending imaging & medical stabilization, plan for R foot TMA & CLARA   - Please document medical & cardiac optimization for procedure  - d/w attending & in agreement

## 2021-05-26 NOTE — ED PROVIDER NOTE - CROS ED NEURO ALL NEG
Subjective: Overnight patient noted to have temperature of 100.5, afebrile since. Patient reports some nasal congestion. Reports that she slept well. Denies chest pain, cough, SOB or urinary sx. Denies nausea, vomiting. Mild abdominal discomfort around PEG site. Per PCA, patient continues to have loose watery stool.     REVIEW OF SYMPTOMS  Pertinent in the last 24hrs: Neurological deficits, stable  Mild discomfort around PEG site  Loose watery stool    VITALS  Vital Signs Last 24 Hrs  T(C): 37.6 (15 Jul 2019 07:49), Max: 38.1 (14 Jul 2019 21:38)  T(F): 99.6 (15 Jul 2019 07:49), Max: 100.5 (14 Jul 2019 21:38)  HR: 107 (15 Jul 2019 09:09) (93 - 107)  BP: 121/75 (15 Jul 2019 07:49) (121/75 - 130/77)  BP(mean): --  RR: 14 (15 Jul 2019 07:49) (14 - 15)  SpO2: 97% (15 Jul 2019 09:09) (97% - 100%)      PHYSICAL EXAM  Constitutional - NAD, awake  	HEENT - posterior cranial incision, c/d/i, mild erythema surrounding, non-TTP, right eye with conjunctival erythema  	Chest - CTA bilaterally, no increased work of breathing   	Cardiovascular - RRR, S1S2  	Abdomen - BS+, Soft, NTND, mild tenderness around PEG, no discharge  	Extremities - No calf tenderness   	Neurologic Exam -                 	   Cognitive - Drowsy, Oriented to self, place, date, year, situation. Able to follow multistep commands across midline.  	   Communication - Fluent, low volume, dysarthric  	   Cranial Nerves - PERRL, no horizontal eye movements, left facial droop, inability to fully close right eye  	   Motor - No focal deficits 5/5 in BL UE and LE  	   Sensory - Intact to light touch diffusely, impaired proprioception   	   Coordination - HTS intact BL, FTN intact BL but slow movements  Psychiatric - Affect WNL      RECENT LABS                        9.5    5.45  )-----------( 280      ( 15 Jul 2019 06:30 )             31.5     07-15    138  |  100  |  11  ----------------------------<  110<H>  4.4   |  35<H>  |  0.68    Ca    9.1      15 Jul 2019 06:30              RADIOLOGY/OTHER RESULTS      MEDICATIONS  (STANDING):  amLODIPine   Tablet 5 milliGRAM(s) Oral daily  artificial  tears Solution 1 Drop(s) Right EYE every 6 hours  buDESOnide 160 MICROgram(s)/formoterol 4.5 MICROgram(s) Inhaler 2 Puff(s) Inhalation two times a day  docusate sodium 100 milliGRAM(s) Oral two times a day  enoxaparin Injectable 30 milliGRAM(s) SubCutaneous every 12 hours  latanoprost 0.005% Ophthalmic Solution 1 Drop(s) Both EYES at bedtime  nystatin    Suspension 870294 Unit(s) Oral three times a day  petrolatum Ophthalmic Ointment 1 Application(s) Right EYE at bedtime  polyethylene glycol 3350 17 Gram(s) Oral daily  prednisoLONE acetate 1% Suspension 1 Drop(s) Both EYES four times a day  sodium chloride 0.65% Nasal 1 Spray(s) Both Nostrils three times a day    MEDICATIONS  (PRN):  acetaminophen   Tablet .. 650 milliGRAM(s) Oral every 6 hours PRN Temp greater or equal to 38C (100.4F), Mild Pain (1 - 3)  ALBUTerol    90 MICROgram(s) HFA Inhaler 2 Puff(s) Inhalation every 6 hours PRN Shortness of Breath and/or Wheezing  ALBUTerol/ipratropium for Nebulization 3 milliLiter(s) Nebulizer every 6 hours PRN Shortness of Breath and/or Wheezing  nystatin Powder 1 Application(s) Topical two times a day PRN Rash  senna 2 Tablet(s) Oral at bedtime PRN Constipation negative...

## 2021-05-26 NOTE — ED PROVIDER NOTE - PROGRESS NOTE DETAILS
Discussed with Dr VIBHA To B/P normally 90"s systolic has mod severe CHF, Rec MICU C/S.  Discussed with Dr Stringer Consult pending  Micu Cs Labs ARF, Nephro paged  Bladder not distened.   BP 90/64  Jamil Ridley MD, Facep Discussed with MICU fellow neal

## 2021-05-26 NOTE — H&P ADULT - ATTENDING COMMENTS
Pt seen and examined. 71 M with extensive medical hx as noted above, now with septic shock, lactic acidosis, JULIANNE on CKD likely d/t pre-renal ATN and severe thrombocytopenia. Broad spectrum ABx coverage with Vanc/ Zosyn for now, check Bcxs, UA/UCx and MRSA PCR. No obvious infiltrate on CXR. Titrate pressors to keep MAP >65, follow lactate  and check TTE. Received 3.5 L of crystalloid bolus, hold off on further IVF resuscitation as IVC is now 3 cm. POCUS showing VTI 10-12 cm, may require inotropic support if hemodynamics fail to stabilize on Levophed. h/O ITP+ thrombocytopenia 2/2 liver disease. Transfuse 1 unit platelets and plan for IVIG per heme. Cont to follow platelet count, may require Rituxan. JULIANNE on CKD, no emergent indication for HD. Appreciate nephrology evalv, will cont close monitoring of UO/electrolytes. Overall prognosis extremely guarded.

## 2021-05-26 NOTE — CHART NOTE - NSCHARTNOTEFT_GEN_A_CORE
: Dorene    INDICATION: shock    PROCEDURE:  [x] LIMITED ECHO  [ ] LIMITED CHEST  [ ] LIMITED RETROPERITONEAL  [ ] LIMITED ABDOMINAL  [ ] LIMITED DVT  [ ] NEEDLE GUIDANCE VASCULAR  [ ] NEEDLE GUIDANCE THORACENTESIS  [ ] NEEDLE GUIDANCE PARACENTESIS  [ ] NEEDLE GUIDANCE PERICARDIOCENTESIS  [ ] OTHER    FINDINGS:  Moderate to severe reduction in LV systolic function with increased LV size  Normal RV size and systolic function  VTi 10-15 (patient was in Afib)  IVC 3 cm    INTERPRETATION:  Severely decreased LV systolic function  Enlarged IVC  Low but adequate stroke volume    Devang Catherine PGY-5  Pulmonary/Critical Care Fellow  Pager: [phone] ([facility]) [phone] ([facility])  Pulmonary Spectra #[id number] ([facility]) / [phone] ([facility]) : Dorene/Krunal DUBON     INDICATION: shock    PROCEDURE:  [x] LIMITED ECHO  [ ] LIMITED CHEST  [ ] LIMITED RETROPERITONEAL  [ ] LIMITED ABDOMINAL  [ ] LIMITED DVT  [ ] NEEDLE GUIDANCE VASCULAR  [ ] NEEDLE GUIDANCE THORACENTESIS  [ ] NEEDLE GUIDANCE PARACENTESIS  [ ] NEEDLE GUIDANCE PERICARDIOCENTESIS  [ ] OTHER    FINDINGS:  Moderate to severe reduction in LV systolic function with increased LV size  Normal RV size and systolic function  VTi 10-15 (patient was in Afib)  IVC 3 cm    INTERPRETATION:  Severely decreased LV systolic function  Enlarged IVC  Low but adequate stroke volume    Devang Catherine PGY-5  Pulmonary/Critical Care Fellow  Pager: [phone] ([facility]) [phone] ([facility])  Pulmonary Spectra #[id number] ([facility]) / [phone] ([facility])    Images uploaded to [facility]

## 2021-05-26 NOTE — H&P ADULT - NSHPLABSRESULTS_GEN_ALL_CORE
CBC Full  -  ( 26 May 2021 09:41 )  WBC Count : 9.00 K/uL  RBC Count : 3.61 M/uL  Hemoglobin : 10.2 g/dL  Hematocrit : 32.6 %  Platelet Count - Automated : 8 K/uL  Mean Cell Volume : 90.3 fl  Mean Cell Hemoglobin : 28.3 pg  Mean Cell Hemoglobin Concentration : 31.3 gm/dL  Auto Neutrophil # : 7.97 K/uL  Auto Lymphocyte # : 0.23 K/uL  Auto Monocyte # : 0.71 K/uL  Auto Eosinophil # : 0.08 K/uL  Auto Basophil # : 0.00 K/uL  Auto Neutrophil % : 88.6 %  Auto Lymphocyte % : 2.6 %  Auto Monocyte % : 7.9 %  Auto Eosinophil % : 0.9 %  Auto Basophil % : 0.0 %    05-26    132<L>  |  97  |  128<H>  ----------------------------<  241<H>  4.7   |  14<L>  |  6.96<H>    Ca    9.5      26 May 2021 09:41    TPro  6.8  /  Alb  3.7  /  TBili  1.7<H>  /  DBili  x   /  AST  21  /  ALT  12  /  AlkPhos  83  05-26 CBC Full  -  ( 26 May 2021 09:41 )  WBC Count : 9.00 K/uL  RBC Count : 3.61 M/uL  Hemoglobin : 10.2 g/dL  Hematocrit : 32.6 %  Platelet Count - Automated : 8 K/uL  Mean Cell Volume : 90.3 fl  Mean Cell Hemoglobin : 28.3 pg  Mean Cell Hemoglobin Concentration : 31.3 gm/dL  Auto Neutrophil # : 7.97 K/uL  Auto Lymphocyte # : 0.23 K/uL  Auto Monocyte # : 0.71 K/uL  Auto Eosinophil # : 0.08 K/uL  Auto Basophil # : 0.00 K/uL  Auto Neutrophil % : 88.6 %  Auto Lymphocyte % : 2.6 %  Auto Monocyte % : 7.9 %  Auto Eosinophil % : 0.9 %  Auto Basophil % : 0.0 %    05-26    132<L>  |  97  |  128<H>  ----------------------------<  241<H>  4.7   |  14<L>  |  6.96<H>    Ca    9.5      26 May 2021 09:41    TPro  6.8  /  Alb  3.7  /  TBili  1.7<H>  /  DBili  x   /  AST  21  /  ALT  12  /  AlkPhos  83  05-26    `< from: Xray Foot AP + Lateral, Right (05.26.21 @ 10:03) >    IMPRESSION:  1. Chronic appearing medial angled deformity of the fifth metatarsal neck is present with erosive appearing changes along the lateral margin. This could reflect acute or chronic osteomyelitis. Correlation with site of concern is suggested.  2. MRI may be helpful for further evaluation as warranted.    < end of copied text >    < from: Xray Chest 1 View- PORTABLE-Urgent (05.26.21 @ 10:56) >      IMPRESSION:    The heart is enlarged. The lungs appear to be clear. No pleural effusion. No pneumothorax. A pacer is in good position. Multiple metallic clips are seen in the mediastinum from previous surgery. No acute bony pathology could be identified.    < end of copied text >

## 2021-05-26 NOTE — ED PROVIDER NOTE - PMH
Afib  pt reports ~9 mos, [id number], resolved - monitored by Dr. Rivers  AICD lead malfunction  history of - fixed follow-up by Dr Rivers  Anemia    Coronary artery disease    DM (diabetes mellitus)  type 2  Elevated prolactin level  dx: ' 70's  medically managed Bromocriptine  Elevated triglycerides with high cholesterol  on meds  Gout  medically managed  HCC (hepatocellular carcinoma)  s/p liver embolization x2 in 2019  Heart failure with reduced left ventricular function  last EF 3--35% 5/2019  Hepatic cirrhosis, unspecified hepatic cirrhosis type    History of hyperprolactinemia    History of osteomyelitis  2015, right foot 2nd toe   follow-up by podiatrist every 2 weeks  HTN (hypertension)    Hypothyroidism    ICD (implantable cardioverter-defibrillator) in place  [employer], Old Model [id number] , last interrogation 3/27/19, generator change 4/3/19 New Model # [id number]  Ischemic cardiomyopathy    Liver mass  dx: 10/2018   incidental finding. Currently awaitng furthur workup  Pituitary adenoma  as per patient chronic, no changes , recently restarted follow up with endocrinologist ( note in allscripts )  Presence of stent in coronary artery  2 stents 1999, 2006  PVD (peripheral vascular disease)    Sleep apnea  not using CPAP  Thrombocytopenia  Chronic  Ulcer of right ankle  has surgery done Dec 2018  Vitamin D deficiency

## 2021-05-26 NOTE — ED PROVIDER NOTE - ATTENDING CONTRIBUTION TO CARE
Private Physician Tina Daniel, Onc, [facility] To Cards, Suha Sanchez [facility]  71y male pmh H.C.C. Liver ca sp chemo, Cad sp 3vcabg, DMT2, Hypothyrodism, Gout, FERNANDES/cirrhosis, PPM/AIDC, Previuosly Afib, Squamous cell sp mohs, thrombocytopenia, PT was at home b/p was 70/30 for past several weeks, w fever 100.2, anorexia, without nausea and vomiting, didn't eat at all yesterday. +few sips of gingerale. with cough, Has noted low bp with max 82/47 yesterday Heart rate has been 130's to 80"s . Told to come to ed if persisted, PE Eld[place] male awake alert normocephalic atraumatic neck supple chest clear cv irregularly/irregular, chest clear anterior & posterior abd soft +bs no mass guarding, rebound, Neruo gcs 15 speech fluent power 5/5 all extr. MSK ave lower extr chronic venous stasis changes Private Physician Tina Daniel, Onc, [facility] To Cards, Suha [facility]  71y male pmh H.C.C. Liver ca sp chemo, Cad sp 3vcabg, DMT2, Hypothyrodism, Gout, FERNANDES/cirrhosis, PPM/AIDC, Previuosly Afib, Squamous cell sp mohs, thrombocytopenia, PT was at home b/p was 70/30 for past several weeks, w fever 100.2, anorexia, without nausea and vomiting, didn't eat at all yesterday. +few sips of gingerale. with cough, Has noted low bp with max 82/47 yesterday Heart rate has been 130's to 80"s . Told to come to ed if persisted, PE Eldelry male awake alert normocephalic atraumatic neck supple chest clear cv irregularly/irregular, chest clear anterior & posterior abd soft +bs no mass guarding, rebound, Neruo gcs 15 speech fluent power 5/5 all extr. MSK ave lower extr chronic venous stasis changes RT foot with plantar ucler sp recent debridement with active oozing. Sp amptuation of multiple toes.  Jamil Ridley MD, Facep

## 2021-05-26 NOTE — H&P ADULT - ASSESSMENT
Patient is a 70 y/o M w/ a PMHx of ischemic CM, HFrEF, V-tach s/p AICD/PPM, pA-Fib (not on AC), CKD, FERNANDES cirrhosis c/b HCC (s/p SIRT in 4/2020), and chronic thrombocytopenia possibly related to ITP (refractory to Nplate and transfusions), presented with hypotension, admitted for septic shock likely secondary to osteomyelitis    Neuro  -No active issues  -At baseline mentation, AOx4  -PT    CV  #Hypotension  -Refractory to 2 L bolus  -Bedside POCUS severely decreased LV systolic function, enlarged IVC, low but adequate stroke volume  -Likely in setting of sepsis  -Placed on Levophed  -Wean off pressors as tolerated    #AICD  -Tele monitoring   -Consider interrogation if any cardiac events noted    #HTN  -On Ramipril 2.5 mg QD and Bumex 2 mg QD at home  -Hold home anti-hypertensives    Pulm  -No active issues  -Satting well on RA    GI  #FERNANDES Cirrhosis  -Hold spironolactone in setting of hypotension 2/2 to sepsis      #HCC  -Not pursuing treatment at this time    #Feeds  -Regular Diet    Renal  #JULIANNE on CKD  -On Admission, SCr 7  -Baseline Cr 2, Hx of CKD Stage 3   -Strict Is/Os, producing urine    ID  #R/o Osteomyelitis  -XRay R foot alex  -F/u podiatry recs    Ethics/GOC  -Wife is HCP  -Will follow up with patient and wife regarding GOC Patient is a 72 y/o M w/ a PMHx of ischemic CM, HFrEF, V-tach s/p AICD/PPM, pA-Fib (not on AC), CKD, FRENANDES cirrhosis c/b HCC (s/p SIRT in 4/2020), and chronic thrombocytopenia possibly related to ITP (refractory to Nplate and transfusions), presented with hypotension, admitted for septic shock likely secondary to osteomyelitis    Neuro  -No active issues  -At baseline mentation, AOx4  -PT once stable    CV  #Hypotension  -Refractory to 2 L bolus  -Bedside POCUS severely decreased LV systolic function, enlarged IVC, low but adequate stroke volume  -Likely in setting of sepsis  -Placed on Levophed  -Wean off pressors as tolerated    #AICD  -Tele monitoring   -Consider interrogation if any cardiac events noted    #HTN  -On Ramipril 2.5 mg QD and Bumex 2 mg QD at home  -Hold home anti-hypertensives    Pulm  -No active issues  -Satting well on RA    GI  #FERNANDES Cirrhosis  -Hold spironolactone in setting of hypotension 2/2 to sepsis  -Stable, no evidence of decompensation (ascites, HE, HRS)    #HCC  -Dx in 2018, followed by Dr. Alaniz in Hepatology  -s/p IR embolization x 2 (last on 4/18), with embozene  -Not pursuing treatment at this time    #Feeds  -Regular Diet    Renal  #JULIANNE on CKD  -On Admission, SCr 7  -Baseline Cr 2, Hx of CKD Stage 3   -Strict Is/Os, producing urine  -Renal U/S   -Urine lytes    ID  #R/o Osteomyelitis  -XRay R foot showing acute or chronic osteomyelitis  -Trend lactate  -F/u BCx  -MRI  -Vanc/Zosyn  -F/u podiatry recs    Heme-Onc  #Thrombocytopenia  -Refractory to platelet transfusions and Endplate  -Receiving 1U platelet  -Heme-Onc following, appreciate recs    Ethics/GOC  -Wife is HCP  -Will follow up with patient and wife regarding GOC

## 2021-05-26 NOTE — ED ADULT NURSE NOTE - NSIMPLEMENTINTERV_GEN_ALL_ED
Implemented All Fall Risk Interventions:  [place] to call system. Call bell, personal items and telephone within reach. Instruct patient to call for assistance. Room bathroom lighting operational. Non-slip footwear when patient is off stretcher. Physically safe environment: no spills, clutter or unnecessary equipment. Stretcher in lowest position, wheels locked, appropriate side rails in place. Provide visual cue, wrist band, yellow gown, etc. Monitor gait and stability. Monitor for mental status changes and reorient to person, place, and time. Review medications for side effects contributing to fall risk. Reinforce activity limits and safety measures with patient and family.

## 2021-05-26 NOTE — H&P ADULT - NSHPREVIEWOFSYSTEMS_GEN_ALL_CORE
REVIEW OF SYSTEMS:    CONSTITUTIONAL: +Malaise, fatigue. No fevers, night sweats, fatigue/malaise, chills, weight loss, loss of appetite  HEENT: No headache, visual changes, hearing changes, dysphagia or odynophagia    NECK: No neck pain or stiffness  RESPIRATORY: +Productive cough. No SOB, wheezing, hemoptysis   CARDIOVASCULAR: No chest pain, palpitations, orthopnea  GASTROINTESTINAL: No abdominal pain, nausea, vomiting, hematemesis, diarrhea or constipation. No melena or hematochezia.  GENITOURINARY: No dysuria, frequency or hematuria  NEUROLOGICAL: No numbness, weakness, paresthesias  MSK: No joint swelling or pain  HEME: No easy bruising, bleeding or LAD  SKIN: No itching, rashes  PSYCH: No mood changes, no SI/HI REVIEW OF SYSTEMS:    CONSTITUTIONAL: +Malaise, fatigue. No fevers, night sweats, fatigue/malaise, chills, weight loss, loss of appetite  HEENT: No headache, visual changes, hearing changes, dysphagia or odynophagia    NECK: No neck pain or stiffness  RESPIRATORY: +Productive cough. No SOB, wheezing, hemoptysis   CARDIOVASCULAR: No chest pain, palpitations, orthopnea  GASTROINTESTINAL: No abdominal pain, nausea, vomiting, hematemesis, diarrhea or constipation. No melena or hematochezia.  GENITOURINARY: +Urinary frequency. No dysuria or hematuria  NEUROLOGICAL: No numbness, weakness, paresthesias  MSK: No joint swelling or pain  HEME: No easy bruising, bleeding or LAD  SKIN: No itching, rashes  PSYCH: No mood changes, no SI/HI

## 2021-05-26 NOTE — CHART NOTE - NSCHARTNOTEFT_GEN_A_CORE
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 In ED he was found with lactic acidosis, JULIANNE on CKD likely d/t pre-renal ATN, severe thrombocytopenia and hypotensive and tachycardic i/s/o septic shock presumed 2/2 R toe osteomyelitis. He was admitted to MICU for vasopressor therapy and was started on broad spectrum antibiotics.     Pt decompensated this afternoon and became nearly obtunded requiring urgent intubation. After intubation he further decompensated and went into tachyarrythmias/cardiac arrest (refer to event note). The wife was notified and requested no further CPR and to deactivate the AICD and focus on patient's comfort at this time.     Called by bedside by nurse for asystole on monitor and pulselessness.    On physical exam, no spontaneous movements were present. Patient did not respond to verbal or physical stimuli. Pupils were mid-dilated and fixed. No breath sounds were appreciated over either lung field. No carotid pulses were palpable. No heart sounds were auscultated.   Patient pronounced dead at 5/26 17:30 by Barbie DUBON. Attending notified. Family (wife) at bedside. Emotional support provided. Family declined autopsy. Medical Examiner to be contacted. 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 In ED he was found with lactic acidosis, JULIANNE on CKD likely d/t pre-renal ATN, severe thrombocytopenia and hypotensive and tachycardic i/s/o septic shock presumed 2/2 R toe osteomyelitis. He was admitted to MICU for vasopressor therapy and was started on broad spectrum antibiotics.     Pt decompensated this afternoon and became nearly obtunded requiring urgent intubation. After intubation he further decompensated and went into tachyarrythmias/cardiac arrest (refer to event note). The wife was notified at the time and requested no further CPR and to deactivate the AICD and focus on patient's comfort at this time.     Called by bedside by nurse for asystole on monitor and pulselessness.    On physical exam, no spontaneous movements were present. Patient did not respond to verbal or physical stimuli. Pupils were mid-dilated and fixed. No breath sounds were appreciated over either lung field. No carotid pulses were palpable. No heart sounds were auscultated.   Patient pronounced dead at 5/26 17:30 by Barbie DUBON. Attending notified. Family (wife) at bedside. Emotional support provided. Family declined autopsy. Medical Examiner to be contacted. 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 In ED he was found with lactic acidosis, JULIANNE on CKD likely d/t pre-renal ATN, severe thrombocytopenia and hypotensive and tachycardic i/s/o septic shock presumed 2/2 R toe osteomyelitis. He was admitted to MICU for vasopressor therapy and was started on broad spectrum antibiotics.     Pt decompensated this afternoon and became nearly obtunded requiring urgent intubation. After intubation he further decompensated and went into tachyarrythmias/cardiac arrest (refer to event note). The wife was notified at the time and requested no further CPR and to deactivate the AICD and focus on patient's comfort at this time.     Called by bedside by nurse for asystole on monitor and pulselessness.    On physical exam, no spontaneous movements were present. Patient did not respond to verbal or physical stimuli. Pupils were mid-dilated and fixed. No breath sounds were appreciated over either lung field. No carotid pulses were palpable. No heart sounds were auscultated.   Patient pronounced dead at 5/26 17:30 by Barbie DUBON. Attending notified. Family (wife) at bedside. Emotional support provided. Family declined autopsy. Medical Examiner to be contacted.       Critical Care Attending Attestation:   Reported of chill and shivering during Platelet transfusion (finished 3/4 bag)  Called in to bedside an hour later for ADHF, Tachyarrhythmia and Acute Respiratory Failure  - Rapid sequence Intubation and Ventilator Support done without complication   - Recurrent V. Tach with self-AICD Defibrillations noted   - CPR started for PEA vs. V.Fib/ V.Tach Arrest along with CVP and A-Line insertion   - External Cardioversion x 2 along with AICD Self Defibrillation X >6 Times  - High dose Pressors, IV HCO3, IV Amiodarone, Mg and Calcium pushes given   - Family request no escalation of care and no more CPR   - Pronounced Death per above and family at bedside    Patient seen and examined with ICU Resident/Fellow at bedside after lab data, medical records and radiology reports reviewed. I have read and agreeable in general with resident's Documented Note, Assessment and Management Plan which reflected my opinions from bedside round and discussion.   Total Critical Care Time = 45 Min excluding teaching and procedure activity. 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 In ED he was found with lactic acidosis, JULIANNE on CKD likely d/t pre-renal ATN, severe thrombocytopenia and hypotensive and tachycardic i/s/o septic shock presumed 2/2 R toe osteomyelitis. He was admitted to MICU for vasopressor therapy and was started on broad spectrum antibiotics.     Pt decompensated this afternoon and became nearly obtunded requiring urgent intubation. After intubation he further decompensated and went into tachyarrythmias/cardiac arrest (refer to event note). The wife was notified at the time and requested no further CPR and to deactivate the AICD and focus on patient's comfort at this time.     Called by bedside by nurse for asystole on monitor and pulselessness.    On physical exam, no spontaneous movements were present. Patient did not respond to verbal or physical stimuli. Pupils were mid-dilated and fixed. No breath sounds were appreciated over either lung field. No carotid pulses were palpable. No heart sounds were auscultated.   Patient pronounced dead at 5/26 19:30 by Barbie DUBON. Attending notified. Family (wife) at bedside. Emotional support provided. Family declined autopsy. Medical Examiner to be contacted.       Critical Care Attending Attestation:   Reported of chill and shivering during Platelet transfusion (finished 3/4 bag)  Called in to bedside an hour later for ADHF, Tachyarrhythmia and Acute Respiratory Failure  - Rapid sequence Intubation and Ventilator Support done without complication   - Recurrent V. Tach with self-AICD Defibrillations noted   - CPR started for PEA vs. V.Fib/ V.Tach Arrest along with CVP and A-Line insertion   - External Cardioversion x 2 along with AICD Self Defibrillation X >6 Times  - High dose Pressors, IV HCO3, IV Amiodarone, Mg and Calcium pushes given   - Family request no escalation of care and no more CPR   - Pronounced Death per above and family at bedside    Patient seen and examined with ICU Resident/Fellow at bedside after lab data, medical records and radiology reports reviewed. I have read and agreeable in general with resident's Documented Note, Assessment and Management Plan which reflected my opinions from bedside round and discussion.   Total Critical Care Time = 45 Min excluding teaching and procedure activity.

## 2021-05-26 NOTE — ED ADULT NURSE NOTE - OBJECTIVE STATEMENT
70 y/o male hx Liver CA, HF, AICD/PPM, afib, presents to the ED from home c/o low BP x 1 day. Pt states. has had chronic hypotension, seeing hematologist for chronic thrombocytopenia, advised to come to ED when BP reading at home was 60s/70s, associated 100.2F yesterday. Pt also endorsing wound to right plantar surface, clean/dry/intact. Right foot all toe amputation previously. Denies chills, n/v, weakness, abd pain, diarrhea/constipation, numbness/tingling, urinary s/s. Pt A&Ox3, in no respiratory distress, no CP, abd soft, nontender, nondistended. PT safety maintained with family at bedside, call bell within reach and bed in the lowest position.

## 2021-05-26 NOTE — H&P ADULT - NSHPPHYSICALEXAM_GEN_ALL_CORE
PHYSICAL EXAM:  GENERAL: NAD, well-groomed, well-developed  HEAD: Atraumatic, Normocephalic  EYES: EOMI, PERRLA, conjunctiva and sclera clear  ENMT: No tonsillar erythema, exudates, or enlargement; Moist mucous membranes  NECK: Supple, No JVD, Normal Thyroid  CHEST/LUNG: Clear to auscultation bilaterally; No rales, rhonchi, wheezing, or rubs  HEART: Regular rate and rhythm; S1 and S2; No murmurs, rubs, or gallops  ABDOMEN: Soft, Nontender, Nondistended: Bowel sounds present  EXTREMITIES: 2+ Peripheral Pulses, No clubbing, cyanosis, or edema  LYMPH: No lymphadenopathy noted  SKIN: No rashes or lesions  MSK: no joint swelling or erythema  NEURO: CN 2-12 intact, 5/5 strength in UE and LE, gross sensatation intact, ROB intact, Normal gait
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
## 2021-05-26 NOTE — ED PROVIDER NOTE - CLINICAL SUMMARY MEDICAL DECISION MAKING FREE TEXT BOX
Adult male w HCC, pw few weeks of low bp now w low grade fever, fatigue, concerns for sepsis, check labs/cultrues/xr chest-foot. ivf ressuciation, CS cards for arrthymia, podiatry for foot wound, ONC/hosptialtis for admit  Jamil Ridley MD, Facep

## 2021-05-26 NOTE — ED PROVIDER NOTE - PHYSICAL EXAMINATION
Gen: AAO x 3, NAD  Skin: No rashes or lesions  HEENT: NC/AT, PERRLA, EOMI, MMM  Resp: unlabored CTAB  Cardiac: rrr s1s2, no murmurs, rubs or gallops  GI: ND, +BS, Soft, NT  Ext: chronic stasis dermatitis BLE.  RLE + swelling and erythema.  small 2 x 2 cm ulceration plantar surface of the right foot overlying 4th metatarsal.  No purulent discharge.   Neuro: no focal deficits

## 2021-05-26 NOTE — CONSULT NOTE ADULT - SUBJECTIVE AND OBJECTIVE BOX
Patient is a 71y old  Male who presents with a chief complaint of Hypotension (26 May 2021 13:16)      HPI:  Patient is a 72 y/o M w/ a PMHx of ischemic CM, HFrEF, V-tach s/p AICD/PPM, pA-Fib (not on AC), CKD, FERNANDES cirrhosis c/b HCC (s/p SIRT in 4/2020), and chronic thrombocytopenia possibly related to ITP (refractory to Nplate and transfusions) who presented to the ED with hypotension. Patient reported that he had been feeling well recently aside from some mild fatigue; however, he did not that his BP has been low lately. He took his BP this morning and it was in the 60s-70s and so he was instructed to go to the ED. Patient reports a low-grade temperature of 100.2 F at home. He also endorses a R foot ulcer and chronic right ankle discomfort for last 1.5 weeks. Patient reports he went to his podiatrist for multiple debridements. He denies chest pain, shortness of breath, nausea, vomiting, or abdominal pain.     In the ED, patient was found to be hypotensive (SBP 50-90) and tachycardic (HR = 100s-110s). Labs were notable for a downtrending PLT count (8k this AM) and JULIANNE on CKD. R foot X-ray showed a chronic appearing medial angled deformity of the fifth metatarsal neck with erosive appearing changes along the lateral margin which could reflect acute or chronic osteomyelitis. (26 May 2021 13:16)      PAST MEDICAL & SURGICAL HISTORY:  AICD lead malfunction  history of - fixed follow-up by Dr Rivers    DM (diabetes mellitus)  type 2    HTN (hypertension)    Thrombocytopenia  Chronic    Coronary artery disease    Pituitary adenoma  as per patient chronic, no changes , recently restarted follow up with endocrinologist ( note in allscripts )    Ischemic cardiomyopathy    Heart failure with reduced left ventricular function  last EF 3--35% 5/2019    Presence of stent in coronary artery  2 stents 1999, 2006    History of osteomyelitis  2015, right foot 2nd toe   follow-up by podiatrist every 2 weeks    Sleep apnea  not using CPAP    ICD (implantable cardioverter-defibrillator) in place  [employer], Old Model [id number] , last interrogation 3/27/19, generator change 4/3/19 New Model # [id number]    Anemia    Hepatic cirrhosis, unspecified hepatic cirrhosis type    History of hyperprolactinemia    PVD (peripheral vascular disease)    Hypothyroidism    Elevated triglycerides with high cholesterol  on meds    Vitamin D deficiency    Elevated prolactin level  dx: &#x27; 70&#x27;s  medically managed Bromocriptine    Gout  medically managed    Liver mass  dx: 10/2018   incidental finding. Currently awaitng furthur workup    Ulcer of right ankle  has surgery done Dec 2018    Afib  pt reports ~9 mos, [id number], resolved - monitored by Dr. Rivers    HCC (hepatocellular carcinoma)  s/p liver embolization x2 in 2019    Coronary atherosclerosis of artery bypass graft  CABG X 4 (1986)    Stented coronary artery  RCA (1999), another  stent 2000    AICD (automatic cardioverter/defibrillator) present  placement in (2006), generator change 4/3/19    History of amputation  right foot, 2nd toe, osteo 2015    Encounter for incision and drainage procedure  Dec 2018 right foot/ankle    SCC (squamous cell carcinoma)  facial SCC, s/p Mohs        MEDICATIONS  (STANDING):  acetaminophen  IVPB .. 1000 milliGRAM(s) IV Intermittent once  albumin human 25% IVPB 50 milliLiter(s) IV Intermittent every 6 hours  chlorhexidine 4% Liquid 1 Application(s) Topical <User Schedule>  dextrose 40% Gel 15 Gram(s) Oral once  dextrose 5%. 1000 milliLiter(s) (50 mL/Hr) IV Continuous <Continuous>  dextrose 5%. 1000 milliLiter(s) (100 mL/Hr) IV Continuous <Continuous>  dextrose 50% Injectable 25 Gram(s) IV Push once  dextrose 50% Injectable 12.5 Gram(s) IV Push once  dextrose 50% Injectable 25 Gram(s) IV Push once  diphenhydrAMINE   Injectable 25 milliGRAM(s) IV Push once  glucagon  Injectable 1 milliGRAM(s) IntraMuscular once  hydrocortisone sodium succinate Injectable 50 milliGRAM(s) IV Push every 6 hours  immune   globulin 10% (GAMMAGARD) IVPB 75 Gram(s) IV Intermittent daily  insulin lispro (ADMELOG) corrective regimen sliding scale   SubCutaneous three times a day before meals  insulin lispro (ADMELOG) corrective regimen sliding scale   SubCutaneous at bedtime  norepinephrine Infusion 0.097 MICROgram(s)/kG/Min (15.8 mL/Hr) IV Continuous <Continuous>  piperacillin/tazobactam IVPB.. 3.375 Gram(s) IV Intermittent every 12 hours    MEDICATIONS  (PRN):      Allergies    No Known Allergies    Intolerances        VITALS:    Vital Signs Last 24 Hrs  T(C): 36.6 (26 May 2021 13:16), Max: 36.6 (26 May 2021 09:00)  T(F): 97.8 (26 May 2021 13:16), Max: 97.9 (26 May 2021 09:00)  HR: 131 (26 May 2021 17:15) (44 - 131)  BP: 121/62 (26 May 2021 17:00) (51/31 - 121/62)  BP(mean): 75 (26 May 2021 17:00) (46 - 91)  RR: 34 (26 May 2021 17:15) (15 - 38)  SpO2: 100% (26 May 2021 17:15) (95% - 100%)    LABS:                          9.7    10.51 )-----------( 9        ( 26 May 2021 14:31 )             30.5       05-26    133<L>  |  100  |  117<H>  ----------------------------<  233<H>  4.6   |  14<L>  |  6.20<H>    Ca    8.9      26 May 2021 14:31  Phos  5.3     05-26  Mg     1.7     05-26    TPro  6.5  /  Alb  3.5  /  TBili  1.7<H>  /  DBili  x   /  AST  23  /  ALT  12  /  AlkPhos  76  05-26      CAPILLARY BLOOD GLUCOSE          PT/INR - ( 26 May 2021 09:41 )   PT: 21.2 sec;   INR: 1.82 ratio         PTT - ( 26 May 2021 09:41 )  PTT:34.1 sec    LOWER EXTREMITY PHYSICAL EXAM:    Vascular: DP/PT 2/4, B/L, CFT <3 seconds B/L, Temperature gradient warm to cool, B/L.   Neuro: Epicritic sensation absent to the level of midfoot, B/L.  Musculoskeletal/Ortho: s/p R foot partial digital amputations 1-3; s/p L foot partial 2nd ray resection  Skin: R foot submet 4 wound to bone, 2.0x2.0cm, depth to bone, wound bed fibrotic, sanguinous drainage, no malodor, periwound mildly erythematous, etiology 2/2 5th met fx & subsequent pressure to 4th met & DM neuropathy    RADIOLOGY & ADDITIONAL STUDIES:  < from: Xray Foot AP + Lateral, Right (05.26.21 @ 10:03) >    EXAM:  XR FOOT 2 VIEWS RT                            PROCEDURE DATE:  05/26/2021            INTERPRETATION:  XR FOOT 2 VIEWS RIGHT    HISTORY: Right foot ulcer. Pain. Infection. Prior amputations.    VIEWS: 2  IMAGES: 2    COMPARISON: Right foot x-rays dated May 29, 2019 and May 31, 2019    FINDINGS:    OSSEOUS STRUCTURES    Fractures:  There is new chronic appearing medial angled deformity of the fifth metatarsal neck with erosive appearing changes along the lateral margin.    Postoperative Changes: Prior amputations are again seen through the first through third proximal phalangeal diaphyses.    JOINTS    Joint Space(s):  Maintained.    SOFT TISSUES:  Severe atherosclerotic calcifications are noted.    OTHER FINDINGS:  Plantar and retrocalcaneal enthesophytes are noted.    IMPRESSION:  1. Chronic appearing medial angled deformity of the fifth metatarsal neck is present with erosive appearing changes along the lateral margin. This could reflect acute or chronic osteomyelitis. Correlation with site of concern is suggested.  2. MRI may be helpful for further evaluation as warranted.                RADHA LOGAN MD; Attending Radiologist  This document has been electronically signed. May 26 2021 10:59AM    < end of copied text >

## 2021-05-26 NOTE — CONSULT NOTE ADULT - ATTENDING COMMENTS
Alert, conversant on levofed titration.  Seen by me last autumn  1.  ARF likely hemodynamic, hypotension.  Seems relatively euvolemic with NORMAL albumin and little evidence of hepatorenal syndrome.  PT elevation likely reflects decreased synthetic liver capacity but given 2 lactate surprisingly low and may reflect pressor optimization.  Would NOT continue vanco/zosyn combo (can substitute meropenam)  2.  Hypotension--pressor, POCUS volume estimate and cardiac echo given long history of severe LV dysfunction.  Check cortisol level.  Agree with empiric antibiotics given prewentation and potential osteomyelitis    discussed with MICU team, attending
71-yr-old man with h/o FERNANDES cirrhosis, HCC (treated, apparently  in remission) and known ITP, on Nplate, presented with hypotension found have progressive worsening of thrombocytopenia suspicious for sepsis induced TCP vs worsening ITP. Agree with fellow's documentation and plan of a course of IVIG challenge and w/u for sepsis. No need of platelet transfusion unless there is evidence of bleeding. Will follow up.

## 2021-05-26 NOTE — PROCEDURE NOTE - ADDITIONAL PROCEDURE DETAILS
1) Indication for interrogation: Requested to deactivate ICD, pt is DNR and is constantly getting shock for VT  2) Changes made: Tachy-therapy deactivated as requested by the MICU team.    GERALDINE Olea, NP-C  [id number]

## 2021-05-26 NOTE — PROCEDURE NOTE - NSPROCDETAILS_GEN_ALL_CORE
patient pre-oxygenated, tube inserted, placement confirmed
guidewire recovered/lumen(s) aspirated and flushed/sterile dressing applied/sterile technique, catheter placed/ultrasound guidance with use of sterile gel and probe cove
location identified, draped/prepped, sterile technique used, needle inserted/introduced/positive blood return obtained via catheter/connected to a pressurized flush line/sutured in place/hemostasis with direct pressure, dressing applied/Seldinger technique/all materials/supplies accounted for at end of procedure

## 2021-05-26 NOTE — DISCHARGE NOTE FOR THE EXPIRED PATIENT - HOSPITAL COURSE
72 yo M with PMHx of ischemic cardiomyopathy, HFrEF, Vtach s/p AICD/PPM, pA-Fib (not on AC), CKD, FERNANDES cirrhosis c/b HCC (s/p SIRT in 4/2020) and chronic thrombocytopenia possibly related to ITP (refractory to Nplate and transfusions) originally presented to [facility] today, 5/26 with complaints of fatigue, R foot/ankle pain (s/p recent debridement), low grade fever, hypotension at home (SBP 60s-70s). In ED he was found with lactic acidosis, JULIANNE on CKD likely d/t pre-renal ATN, severe thrombocytopenia and hypotensive and tachycardic i/s/o septic shock presumed 2/2 R toe osteomyelitis. He was admitted to MICU for vasopressor therapy and was started on broad spectrum antibiotics.     Pt decompensated this afternoon and became nearly obtunded requiring urgent intubation. After intubation he further decompensated and went into tachyarrythmias/cardiac arrest (refer to event note). The wife was notified at the time and requested no further CPR and to deactivate the AICD and focus on patient's comfort at this time.     Called by bedside by nurse for asystole on monitor and pulselessness.    On physical exam, no spontaneous movements were present. Patient did not respond to verbal or physical stimuli. Pupils were mid-dilated and fixed. No breath sounds were appreciated over either lung field. No carotid pulses were palpable. No heart sounds were auscultated.   Patient pronounced dead at 5/26 17:30 by Barbie DUBON. Attending notified. Family (wife) at bedside. Emotional support provided. Family declined autopsy. Medical Examiner to be contacted. 72 yo M with PMHx of ischemic cardiomyopathy, HFrEF, Vtach s/p AICD/PPM, pA-Fib (not on AC), CKD, FERNANDES cirrhosis c/b HCC (s/p SIRT in 4/2020) and chronic thrombocytopenia possibly related to ITP (refractory to Nplate and transfusions) originally presented to [facility] today, 5/26 with complaints of fatigue, R foot/ankle pain (s/p recent debridement), low grade fever, hypotension at home (SBP 60s-70s). In ED he was found with lactic acidosis, JULIANNE on CKD likely d/t pre-renal ATN, severe thrombocytopenia and hypotensive and tachycardic i/s/o septic shock presumed 2/2 R toe osteomyelitis. He was admitted to MICU for vasopressor therapy and was started on broad spectrum antibiotics.     Pt decompensated this afternoon and became nearly obtunded requiring urgent intubation. After intubation he further decompensated and went into tachyarrythmias/cardiac arrest (refer to event note). The wife was notified at the time and requested no further CPR and to deactivate the AICD and focus on patient's comfort at this time.     Called by bedside by nurse for asystole on monitor and pulselessness.    On physical exam, no spontaneous movements were present. Patient did not respond to verbal or physical stimuli. Pupils were mid-dilated and fixed. No breath sounds were appreciated over either lung field. No carotid pulses were palpable. No heart sounds were auscultated.   Patient pronounced dead at 5/26 19:30 by Barbie DUBON. Attending notified. Family (wife) at bedside. Emotional support provided. Family declined autopsy. Medical Examiner to be contacted.

## 2021-05-26 NOTE — PATIENT PROFILE ADULT - NSPROPASSIVESMOKEEXPOSURE_GEN_A_NUR
Ms. Castillo is a single, 42-year-old  female, non caregiver, unemployed, domiciled on [place] grounds, with past psychiatric history of  Schizoaffective Disorder, Borderline Personality Disorder, cannabis abuse, multiple inpatient psychiatric hospitalizations (>20), recently Low 6 10/17/18-10/30/18, with frequent visits to the [facility] (well-known to staff, last on 12/7) 3 prior suicide attempts (last in 2012 via OD), recurrent suicidal gestures and suicidal ideation, history of property destruction when upset, no history of arrests or access to weapons, was BIB EMS called by staff at [place] due to agitation after pulling the fire alarm.     On interview, patient is labile and quickly becomes irritable. She is internally preoccupied, laughing and talking to herself. She screams out "I need electric shocks" repeatedly. She is observed to disrobe and be provocative and sexually inappropriate towards staff and other patients in the emergency room. She states she is hearing voices telling her to hurt people "for the love of [place]." She speaks nonsensical at times and quickly becomes irritable and not want to answer MD questions. She laughs when asked if she is adherent to medication.    MD spoke with patient's  Ms. Camargo who reports the patient has exhibited a lot of bizarre behavior since last week. She has not taken her medication since last Tuesday when she went to the respite center. Saturday she was observed walking around the first floor of the residence naked. On Monday she flooded her room and did not report to anyone until staff noticed the puddle outside her room. Today she was rolling around on the floor. She has a broken television in her room and brought a piece of the broken television out of her room and was swinging it around and banging it on the floor. She was  very agitated when Ms. Camargo attempted to meet with her. She was spoken with by their deescalation team several times today without any change in behavior. At 11pm tonight, she ran out of her room and pulled the fire alarm twice unprovoked. When EMS came, she was screaming and cursing. She states she was going to kill  staff. Ms. Camargo states at baseline she is much better and she is not sure if the patient used any substances. No Ms. Castillo is a single, 42-year-old  female, non caregiver, unemployed, domiciled on [place] grounds, with past psychiatric history of  Schizoaffective Disorder, Borderline Personality Disorder, cannabis abuse, multiple inpatient psychiatric hospitalizations (>20), recently Low 4 12/12-12/24 2018, [facility]) 3 prior suicide attempts (last in 2012 via OD), recurrent suicidal gestures and suicidal ideation, history of property destruction when upset, no history of arrests or access to weapons, was BIB EMS called by  because patient requested to come to the ER.     see  note for collateral information Ms. Castillo is a single, 42-year-old  female, non caregiver, unemployed, domiciled on [place] grounds, with past psychiatric history of  Schizoaffective Disorder, Borderline Personality Disorder, cannabis abuse, multiple inpatient psychiatric hospitalizations (>20), recently Low 4 12/12-12/24 2018, with frequent visits to the [facility] (well-known to staff) 3 prior suicide attempts (last in 2012 via OD), recurrent suicidal gestures and suicidal ideation, history of property destruction when upset, no history of arrests or access to weapons. PMH GERD & asthma.  BIB EMS called by  because patient requested to come to the ER.     Patient reports she came to the ER because she wants to be admitted. She stated she doesn't like her residence because people steal, do K2 and are always fighting. She reports she requested to come to the hospital and then became upset because she thought someone took her ID and so she threw a chair while waiting for the ambulance. She reports then she calmed down because she found her ID in the bottom of her bag.    She reports endorsing AH and SI/HI to triage nurse because she said she wanted to stay in the hospital and just said that so she could stay or go to respite to get out of her residence. She is medication compliant and discussed her recent use of check to purchase a new wig and clothes. She requested a cab back to her residence and breakfast.     Patient denies any hallucinations, does not report any delusional thought content, denies thought insertion/withdrawal, denies referential thought processes & is not paranoid on interview. Pt is linear,logical, organized and well related. Patient does not report nor exhibit any signs of trina, including irritable or elevated mood, grandiosity, pressured speech, risk-taking behaviors, increase in productivity or agitation. Patient denies any depressive symptoms including depressed mood, anhedonia, changes in energy/concentration/appetite, sleep disturbances, preoccupation with death or feelings of guilt. Patient adamantly denies SI, intent or plan; denies any HI, violent thoughts.     see  note for collateral information Ms. Castillo is a single, 42-year-old  female, non caregiver, unemployed, domiciled on [facility] [address], with past psychiatric history of  Schizoaffective Disorder, Borderline Personality Disorder, cannabis abuse, multiple inpatient psychiatric hospitalizations (>20), recently Low 4 12/12-12/24 2018, with frequent visits to the [facility] (well-known to staff) 3 prior suicide attempts (last in 2012 via OD), recurrent suicidal gestures and suicidal ideation, history of property destruction when upset, past legal issues, no access to weapons. PMH GERD & asthma.  BIB EMS called by  because patient requested to come to the ER.     Patient reports she came to the ER because she wants to be admitted. She stated she doesn't like her residence because people steal, do K2 and are always fighting. She reports she requested to come to the hospital and then became upset because she thought someone took her ID and so she threw a chair while waiting for the ambulance. She reports then she calmed down because she found her ID in the bottom of her bag.    She reports endorsing AH and SI/HI to triage nurse and EMS because she said she wanted to stay in the hospital and just said that so she could stay or go to respite to get out of her residence. She denies intent to harm self or anyone else. She denied CAH/psychotic symptoms. She is medication compliant and discussed her recent use of her check to purchase a new wig and clothes. She requested a cab back to her residence and breakfast.     Patient denies any hallucinations, does not report any delusional thought content, denies thought insertion/withdrawal, denies referential thought processes & is not paranoid on interview. Pt is linear,logical, organized and well related. Patient does not report nor exhibit any signs of trina, including irritable or elevated mood, grandiosity, pressured speech, risk-taking behaviors, increase in productivity or agitation. Patient denies any depressive symptoms including depressed mood, anhedonia, changes in energy/concentration/appetite, sleep disturbances, preoccupation with death or feelings of guilt. Patient adamantly denies SI, intent or plan; denies any HI, violent thoughts.     see  note for collateral information

## 2021-05-26 NOTE — CONSULT NOTE ADULT - SUBJECTIVE AND OBJECTIVE BOX
Reason for Consultation:  Chief Complaint:  Date of Admission:     HPI:  Patient is a 72 y/o M w/ a PMHx of ischemic CM, HFrEF, V-tach s/p AICD/PPM, pA-Fib (not on AC), CKD, FERNANDES cirrhosis c/b HCC (s/p SIRT in 4/2020), and chronic thrombocytopenia possibly related to ITP (on Nplate) who presented to the ED with hypotension. Patient reported that he had been feeling well recently aside from some mild fatigue; however, he did not that his BP has been low lately. He took his BP this morning and it was in the 60s-70s and so he was instructed to go to the ED. Patient reports a low-grade temperature lately. He also endorses a R foot ulcer and some right ankle discomfort for which he has been following up with Podiatry. He denies chest pain, shortness of breath, nausea, vomiting, or abdominal pain. In the ED, patient was found to be hypotensive (SBP 50-90) and tachycardic (HR = 100s-110s). Labs were notable for a downtrending PLT count (8k this AM) and JULIANNE on CKD. R foot X-ray showed a chronic appearing medial angled deformity of the fifth metatarsal neck with erosive appearing changes along the lateral margin which could reflect acute or chronic osteomyelitis. Neprology consulted for JULIANNE on CKD.     ED course:  sp 1L LR, 1.5L NS, started on norepi, vanc/zosyn    Past Medical History:   Shock    AICD lead malfunction    DM (diabetes mellitus)    HTN (hypertension)    HLD (hyperlipidemia)    Thrombocytopenia    Coronary artery disease    Pituitary adenoma    Ischemic cardiomyopathy    Heart failure with reduced left ventricular function    Presence of stent in coronary artery    History of osteomyelitis    Sleep apnea    ICD (implantable cardioverter-defibrillator) in place    Anemia    Hepatic cirrhosis, unspecified hepatic cirrhosis type    History of hyperprolactinemia    PVD (peripheral vascular disease)    Hypothyroidism    Elevated triglycerides with high cholesterol    Vitamin D deficiency    Elevated prolactin level    Gout    Liver mass    Ulcer of right ankle    PAF (paroxysmal atrial fibrillation)    Afib    HCC (hepatocellular carcinoma)        Past Surgical History:   Coronary atherosclerosis of artery bypass graft    Stented coronary artery    AICD (automatic cardioverter/defibrillator) present    History of amputation    Encounter for incision and drainage procedure    SCC (squamous cell carcinoma)        Medications:   chlorhexidine 4% Liquid 1 Application(s) Topical <User Schedule>  norepinephrine Infusion 0.1 MICROgram(s)/kG/Min IV Continuous <Continuous>  piperacillin/tazobactam IVPB.. 3.375 Gram(s) IV Intermittent every 8 hours      Allergies:  No Known Allergies      FAMILY HISTORY:  Family history of MI (myocardial infarction)      No FH of SCD or early onset heart failure    Social History:  Smoking History: Denied  Alcohol Use: Denied   Drug Use: Denied    Review of Systems:  Review of Systems:   CONSTITUTIONAL: + Fatigue, No fever or chills  EYES: No eye pain or discharge  ENMT:  No sinus or throat pain  NECK: No pain or stiffness  RESPIRATORY: No shortness of breath or cough  CARDIOVASCULAR: No chest pain or palpitations  GASTROINTESTINAL: No vomiting or abdominal pain  GENITOURINARY: No dysuria or hematuria  NEUROLOGICAL: No headaches or confusion  SKIN: + R foot ulcer, No itching   MUSCULOSKELETAL: No joint pain or back pain      Vital Signs Last 24 Hrs  T(C): 36.5 (26 May 2021 09:10), Max: 36.6 (26 May 2021 09:00)  T(F): 97.7 (26 May 2021 09:10), Max: 97.9 (26 May 2021 09:00)  HR: 108 (26 May 2021 12:31) (99 - 113)  BP: 73/46 (26 May 2021 12:31) (51/31 - 99/84)  BP(mean): 55 (26 May 2021 12:31) (46 - 91)  RR: 18 (26 May 2021 12:31) (16 - 24)  SpO2: 100% (26 May 2021 12:31) (96% - 100%)    I&O's Summary      Physical Exam:  GENERAL: NAD  HEENT: NC/AT, MMM  NECK: Supple  CHEST/LUNG: Respirations grossly nonlabored  HEART Tachycardic : RRR; +S1/S2  ABDOMEN: +BS, Soft, NT  EXTREMITIES: No LE edema  NEUROLOGY: Awake and alert, Answering questions and following commands appropriately  SKIN: Warm and dry  PSYCH: Calm and cooperative      Labs:      Personally reviewed labs, imaging, EKG                        10.2   9.00  )-----------( 8        ( 26 May 2021 09:41 )             32.6     05-26    132<L>  |  97  |  128<H>  ----------------------------<  241<H>  4.7   |  14<L>  |  6.96<H>    Ca    9.5      26 May 2021 09:41    TPro  6.8  /  Alb  3.7  /  TBili  1.7<H>  /  DBili  x   /  AST  21  /  ALT  12  /  AlkPhos  83  05-26    PT/INR - ( 26 May 2021 09:41 )   PT: 21.2 sec;   INR: 1.82 ratio         PTT - ( 26 May 2021 09:41 )  PTT:34.1 sec      09:41 - VBG - pH: 7.33  | pCO2: 36    | pO2: <20   | Lactate: 2.2      Chest X Ray 05-26-21 @ 12:53 (Personal Read): Clear lungs, cardiomegaly. Pacemaker leads in place   ECG 05-26-21 @ 12:53 (Personal Read): AF. RBBB.     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 Patient reported that he had been feeling well recently aside from some mild fatigue; however, he did not that his BP has been low lately. He took his BP this morning and it was in the 60s-70s and so he was instructed to go to the ED. Patient reports a low-grade temperature lately. He also endorses a R foot ulcer and some right ankle discomfort for which he has been following up with Podiatry. He denies chest pain, shortness of breath, nausea, vomiting, or abdominal pain. In the ED, patient was found to be hypotensive (SBP 50-90) and tachycardic (HR = 100s-110s). Labs were notable for a downtrending PLT count (8k this AM) and JULIANNE on CKD. R foot X-ray showed a chronic appearing medial angled deformity of the fifth metatarsal neck with erosive appearing changes along the lateral margin which could reflect acute or chronic osteomyelitis.   He otherwise denies  chest pain, shortness of breath, dyspnea on exertion, palpitations, pedal edema, weight changes, orthopnea, dizziness, syncope,  chills, nausea, vomiting, diarrhea, abdominal pain, constipation, melena, hematochezia, loss of appetite,  dysuria, cough, runny nose, sore throat, malaise, recent sick contacts, recent travel, rashes, joint pain, headache, sensory/motor weakness, vision changes.   Neprology consulted for JULIANNE on CKD.     ED course:  sp 1L LR, 1.5L NS, started on norepi, vanc/zosyn    Past Medical History:   Shock    AICD lead malfunction    DM (diabetes mellitus)    HTN (hypertension)    HLD (hyperlipidemia)    Thrombocytopenia    Coronary artery disease    Pituitary adenoma    Ischemic cardiomyopathy    Heart failure with reduced left ventricular function    Presence of stent in coronary artery    History of osteomyelitis    Sleep apnea    ICD (implantable cardioverter-defibrillator) in place    Anemia    Hepatic cirrhosis, unspecified hepatic cirrhosis type    History of hyperprolactinemia    PVD (peripheral vascular disease)    Hypothyroidism    Elevated triglycerides with high cholesterol    Vitamin D deficiency    Elevated prolactin level    Gout    Liver mass    Ulcer of right ankle    PAF (paroxysmal atrial fibrillation)    Afib    HCC (hepatocellular carcinoma)        Past Surgical History:   Coronary atherosclerosis of artery bypass graft    Stented coronary artery    AICD (automatic cardioverter/defibrillator) present    History of amputation    Encounter for incision and drainage procedure    SCC (squamous cell carcinoma)        Medications:   chlorhexidine 4% Liquid 1 Application(s) Topical <User Schedule>  norepinephrine Infusion 0.1 MICROgram(s)/kG/Min IV Continuous <Continuous>  piperacillin/tazobactam IVPB.. 3.375 Gram(s) IV Intermittent every 8 hours      Allergies:  No Known Allergies      FAMILY HISTORY:  Family history of MI (myocardial infarction)      No FH of SCD or early onset heart failure    Social History:  Smoking History: Denied  Alcohol Use: Denied   Drug Use: Denied    Review of Systems:  Review of Systems:   CONSTITUTIONAL: + Fatigue, No fever or chills  EYES: No eye pain or discharge  ENMT:  No sinus or throat pain  NECK: No pain or stiffness  RESPIRATORY: No shortness of breath or cough  CARDIOVASCULAR: No chest pain or palpitations  GASTROINTESTINAL: No vomiting or abdominal pain  GENITOURINARY: No dysuria or hematuria  NEUROLOGICAL: No headaches or confusion  SKIN: + R foot ulcer, No itching   MUSCULOSKELETAL: No joint pain or back pain      Vital Signs Last 24 Hrs  T(C): 36.5 (26 May 2021 09:10), Max: 36.6 (26 May 2021 09:00)  T(F): 97.7 (26 May 2021 09:10), Max: 97.9 (26 May 2021 09:00)  HR: 108 (26 May 2021 12:31) (99 - 113)  BP: 73/46 (26 May 2021 12:31) (51/31 - 99/84)  BP(mean): 55 (26 May 2021 12:31) (46 - 91)  RR: 18 (26 May 2021 12:31) (16 - 24)  SpO2: 100% (26 May 2021 12:31) (96% - 100%)    I&O's Summary      Physical Exam:  GENERAL: NAD  HEENT: NC/AT, MMM  NECK: Supple  CHEST/LUNG: Respirations grossly nonlabored  HEART Tachycardic irregular. Systolic murmur ; +S1/S2  ABDOMEN: +BS, Soft, NT  : No suprapubic tenderness, no CVAT.   EXTREMITIES: No LE edema  NEUROLOGY: Awake and alert, Answering questions and following commands appropriately  SKIN: Warm and dry. R foot ulcer.  PSYCH: Calm and cooperative      Labs:      Personally reviewed labs, imaging, EKG                        10.2   9.00  )-----------( 8        ( 26 May 2021 09:41 )             32.6     05-26    132<L>  |  97  |  128<H>  ----------------------------<  241<H>  4.7   |  14<L>  |  6.96<H>    Ca    9.5      26 May 2021 09:41    TPro  6.8  /  Alb  3.7  /  TBili  1.7<H>  /  DBili  x   /  AST  21  /  ALT  12  /  AlkPhos  83  05-26    PT/INR - ( 26 May 2021 09:41 )   PT: 21.2 sec;   INR: 1.82 ratio         PTT - ( 26 May 2021 09:41 )  PTT:34.1 sec      09:41 - VBG - pH: 7.33  | pCO2: 36    | pO2: <20   | Lactate: 2.2      Chest X Ray 05-26-21 @ 12:53 (Personal Read): Clear lungs, cardiomegaly. Pacemaker leads in place   ECG 05-26-21 @ 12:53 (Personal Read): AF. RBBB.     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 Patient reported that he had been feeling well recently aside from some mild fatigue; however, he did not that his BP has been low lately. He took his BP this morning and it was in the 60s-70s and so he was instructed to go to the ED. Patient reports a low-grade temperature lately. He also endorses a R foot ulcer and some right ankle discomfort for which he has been following up with Podiatry. He denies chest pain, shortness of breath, nausea, vomiting, or abdominal pain. In the ED, patient was found to be hypotensive (SBP 50-90) and tachycardic (HR = 100s-110s). Labs were notable for a downtrending PLT count (8k this AM) and JULIANNE on CKD. R foot X-ray showed a chronic appearing medial angled deformity of the fifth metatarsal neck with erosive appearing changes along the lateral margin which could reflect acute or chronic osteomyelitis.   He otherwise denies  chest pain, shortness of breath, dyspnea on exertion, palpitations, pedal edema, weight changes, orthopnea, dizziness, syncope,  chills, nausea, vomiting, diarrhea, abdominal pain, constipation, melena, hematochezia, loss of appetite,  dysuria, cough, runny nose, sore throat, malaise, recent sick contacts, recent travel, rashes, joint pain, headache, sensory/motor weakness, vision changes.   Neprology consulted for JULIANNE on CKD.     ED course:  sp 1L LR, 1.5L NS, started on norepi, vanc/zosyn    Past Medical History:   Shock    AICD lead malfunction    DM (diabetes mellitus)    HTN (hypertension)    HLD (hyperlipidemia)    Thrombocytopenia    Coronary artery disease    Pituitary adenoma    Ischemic cardiomyopathy    Heart failure with reduced left ventricular function    Presence of stent in coronary artery    History of osteomyelitis    Sleep apnea    ICD (implantable cardioverter-defibrillator) in place    Anemia    Hepatic cirrhosis, unspecified hepatic cirrhosis type    History of hyperprolactinemia    PVD (peripheral vascular disease)    Hypothyroidism    Elevated triglycerides with high cholesterol    Vitamin D deficiency    Elevated prolactin level    Gout    Liver mass    Ulcer of right ankle    PAF (paroxysmal atrial fibrillation)    Afib    HCC (hepatocellular carcinoma)        Past Surgical History:   Coronary atherosclerosis of artery bypass graft    Stented coronary artery    AICD (automatic cardioverter/defibrillator) present    History of amputation    Encounter for incision and drainage procedure    SCC (squamous cell carcinoma)        Medications:   chlorhexidine 4% Liquid 1 Application(s) Topical <User Schedule>  norepinephrine Infusion 0.1 MICROgram(s)/kG/Min IV Continuous <Continuous>  piperacillin/tazobactam IVPB.. 3.375 Gram(s) IV Intermittent every 8 hours      Allergies:  No Known Allergies      FAMILY HISTORY:  Family history of MI (myocardial infarction)      No FH of SCD or early onset heart failure    Social History:  Smoking History: Denied  Alcohol Use: Denied   Drug Use: Denied    Review of Systems:  Review of Systems:   CONSTITUTIONAL: + Fatigue, No fever or chills  EYES: No eye pain or discharge  ENMT:  No sinus or throat pain  NECK: No pain or stiffness  RESPIRATORY: No shortness of breath or cough  CARDIOVASCULAR: No chest pain or palpitations  GASTROINTESTINAL: No vomiting or abdominal pain  GENITOURINARY: No dysuria or hematuria  NEUROLOGICAL: No headaches or confusion  SKIN: + R foot ulcer, No itching   MUSCULOSKELETAL: No joint pain or back pain      Vital Signs Last 24 Hrs  T(C): 36.5 (26 May 2021 09:10), Max: 36.6 (26 May 2021 09:00)  T(F): 97.7 (26 May 2021 09:10), Max: 97.9 (26 May 2021 09:00)  HR: 108 (26 May 2021 12:31) (99 - 113)  BP: 73/46 (26 May 2021 12:31) (51/31 - 99/84)  BP(mean): 55 (26 May 2021 12:31) (46 - 91)  RR: 18 (26 May 2021 12:31) (16 - 24)  SpO2: 100% (26 May 2021 12:31) (96% - 100%)    I&O's Summary      Physical Exam:  GENERAL: NAD  HEENT: NC/AT, MMM  NECK: Supple  CHEST/LUNG: Respirations grossly nonlabored  HEART Tachycardic irregular. Systolic murmur ; +S1/S2  ABDOMEN: +BS, Soft, NT. No shifting dullness   : No suprapubic tenderness, no CVAT.   EXTREMITIES: No LE edema.  NEUROLOGY: Awake and alert, Answering questions and following commands appropriately. +asterixis  SKIN: Warm and dry. R foot ulcer.  PSYCH: Calm and cooperative      Labs:      Personally reviewed labs, imaging, EKG                        10.2   9.00  )-----------( 8        ( 26 May 2021 09:41 )             32.6     05-26    132<L>  |  97  |  128<H>  ----------------------------<  241<H>  4.7   |  14<L>  |  6.96<H>    Ca    9.5      26 May 2021 09:41    TPro  6.8  /  Alb  3.7  /  TBili  1.7<H>  /  DBili  x   /  AST  21  /  ALT  12  /  AlkPhos  83  05-26    PT/INR - ( 26 May 2021 09:41 )   PT: 21.2 sec;   INR: 1.82 ratio         PTT - ( 26 May 2021 09:41 )  PTT:34.1 sec      09:41 - VBG - pH: 7.33  | pCO2: 36    | pO2: <20   | Lactate: 2.2      Chest X Ray 05-26-21 @ 12:53 (Personal Read): Clear lungs, cardiomegaly. Pacemaker leads in place   ECG 05-26-21 @ 12:53 (Personal Read): AF. RBBB.

## 2021-05-26 NOTE — CHART NOTE - NSCHARTNOTEFT_GEN_A_CORE
Called to bedside as patient no longer mentating. Pressors changed from Levophed to Jaswinder. At this time, patient's wife agreed to trial of intubation with CPR. Following intubation and 5 shocks from patient's AICD, Ventricular tachycardia noted on telemetry monitoring. Cardiology called. Discussed with patient's wife that there were no other interventions to be done. Following conversation, patient's wife decided to change code status to DNR. This information was relayed to cardiology consult, patient's AICD was shut off.     Roxanne French, PGY-1 Called to bedside as patient no longer mentating. Pressors changed from Levophed to Jaswinder. At this time, patient's wife agreed to trial of intubation with CPR. Following intubation and 5 shocks from patient's AICD, Ventricular tachycardia noted on telemetry monitoring. Cardiology called. Patient's AICD continued to shock him as he re-entered [facility]. Discussed with patient's wife that there were no further reversible interventions to be done. Following conversation, patient's wife decided to change code status to DNR. This information was relayed to cardiology consult, patient's AICD was shut off.     Roxanne French, PGY-1

## 2021-05-26 NOTE — ED PROVIDER NOTE - OBJECTIVE STATEMENT
72 yo male with PMHx of ischemic CM, HFrEF, Vtach s/p AICD/PPM, pAFib not on ac, CKD, FERNANDES cirrhosis c/b HCC not on chemo and chronic thrombocytopenia likely 2/2 ITP p/w hypotension.  Patient reports that he is feeling well only c/o some mild fatigue.  Patient has been following up with his hematologist and his BP has been noted to be low ~80s SBP.  Patient noted one episode of fever, with tmax 100.2.  Patient took his BP this morning and it was in the 60s and 70s so he was instructed to come to the ER.  Patient denies cp, SOB, abd pain, NVD, dysuria, but does endorse right foot ulcer and some right ankle discomfort for which he has been following up with podiatry.

## 2021-05-26 NOTE — H&P ADULT - NSICDXPASTMEDICALHX_GEN_ALL_CORE_FT
PAST MEDICAL HISTORY:  Afib pt reports ~9 mos, [id number], resolved - monitored by Dr. Rivers    AICD lead malfunction history of - fixed follow-up by Dr Rivers    Anemia     Coronary artery disease     DM (diabetes mellitus) type 2    Elevated prolactin level dx: ' 70's  medically managed Bromocriptine    Elevated triglycerides with high cholesterol on meds    Gout medically managed    HCC (hepatocellular carcinoma) s/p liver embolization x2 in 2019    Heart failure with reduced left ventricular function last EF 3--35% 5/2019    Hepatic cirrhosis, unspecified hepatic cirrhosis type     History of hyperprolactinemia     History of osteomyelitis 2015, right foot 2nd toe   follow-up by podiatrist every 2 weeks    HTN (hypertension)     Hypothyroidism     ICD (implantable cardioverter-defibrillator) in place Phoodeeztronic, Old Model [id number] , last interrogation 3/27/19, generator change 4/3/19 New Model # [id number]    Ischemic cardiomyopathy     Liver mass dx: 10/2018   incidental finding. Currently awaitng furthur workup    Pituitary adenoma as per patient chronic, no changes , recently restarted follow up with endocrinologist ( note in allscripts )    Presence of stent in coronary artery 2 stents 1999, 2006    PVD (peripheral vascular disease)     Sleep apnea not using CPAP    Thrombocytopenia Chronic    Ulcer of right ankle has surgery done Dec 2018    Vitamin D deficiency

## 2021-05-26 NOTE — CONSULT NOTE ADULT - ASSESSMENT
*****INCOMPLETE******  *****INCOMPLETE******  *****INCOMPLETE******  *****INCOMPLETE******  CASE NOT DISCUSSED WITH ATTENDING OR FELLOW  *****INCOMPLETE******  *****INCOMPLETE******  *****INCOMPLETE******  *****INCOMPLETE******  70M PMHx CKD3 (1.8-2.1), ICM s/p AICD, PVD complicated by osteomyelitis s/p multiple toe amputations, ITP sp prednisone presents to the ED for fatigue and found to have hypotension, thrombocytopenia,  and sepsis. Nephrology consulted for JULIANNE on CKD     #JULIANNE on ckd   - Patient with CKD likely from DM with multiple episodes of JULIANNE over the last few months (ACE/ARB in conjunction with diuretic use with contrast exposures). Baseline creatinine ~1.8-2.1.   - BUN/Scr on presentation 128/6.96  - In ED sp 1L LR, 1.5L NS,  - started on norepi, vanc/zosyn in ED  - Maintain MAPs > 60  - Monitor for resolution sp IVF   - US Duplex 9/20 negative for stenosis   - Avoid nephrotoxic agents, HOLD metformin for now  - Place mckinnon for UOP monitoring in critically ill.   - Obtain renal US, bladder scan, ucx, urine electrolytes     #Anion Gap Metabolic Acidosis  - Likely 2/2 uremia (degree of lactic acidosis would not be able to explain large AG)   - AG 21, Lactate: 2.2  on presentation   70M PMHx CKD3 (1.8-2.1), ICM s/p AICD, PVD complicated by osteomyelitis s/p multiple toe amputations, ITP sp prednisone presents to the ED for fatigue and found to have hypotension, thrombocytopenia,  and sepsis. Nephrology consulted for JULIANNE on CKD     #JULIANNE on ckd   - Patient with CKD likely from DM with multiple episodes of JULIANNE over the last few months (ACE/ARB in conjunction with diuretic use with contrast exposures). Baseline creatinine ~1.8-2.1.   - BUN/Scr on presentation 128/6.96  - Most likely pre-renal in setting of hypotension, though lactic acidosis on presentation not consistent with degree of hypotension, destinee in setting of liver disease. Bladder scan negative so post renal obstruction unlikely. Abdomen soft so compartment syndrome not likely. Concern for HRS given asterixis, however, no edema/ascites on exam and albumin wnl   - In ED sp 1L LR, 1.5L NS,  - started on norepi, vanc/zosyn in ED  - Maintain MAPs > 60  - cw IVF resuscitation   - Monitor for resolution sp IVF  - No indication for albumin at this time given lack of ascites, albumin wnl  - US Duplex 9/20 negative for stenosis   - Avoid nephrotoxic agents, HOLD metformin for now  - Bladder scan q6 hours  - Obtain renal US, ucx, urine electrolytes   - Check ammonia level, check Mg, phos  - Consider abdominal US to r/o ascites     #Anion Gap Metabolic Acidosis  - Likely 2/2 uremia (degree of lactic acidosis would not be able to explain large AG)   - AG 21, Lactate: 2.2  on presentation  - Monitor for resolution sp fluids     No indication for renal replacement therapy at this time. Nephrology will follow closely.

## 2021-05-26 NOTE — CHART NOTE - NSCHARTNOTEFT_GEN_A_CORE
Patient's wife at the bedside who has elected for no further CPR at this time. I explained to the wife that the patient's AICD has already shocked him ~ 10 times and will continue to do so until deactivation. The patient's wife would like the AICD switched off at this time. Discussed with the cardiology fellow who will deactivate device.

## 2021-05-26 NOTE — ED PROVIDER NOTE - CRITICAL CARE ATTENDING CONTRIBUTION TO CARE
Private Physician Tina Daniel, Onc, Douglas [facility] Cards, Suha Sanchez [facility]  71y male pmh [facility] Liver ca sp chemo, Cad sp 3vcabg, DMT2, Hypothyrodism, Gout, FERNANDES/cirrhosis, PPM/AIDC, Previuosly Afib, Squamous cell sp mohs, thrombocytopenia, PT was at home b/p was 70/30 for past several weeks, w fever 100.2, anorexia, without nausea and vomiting, didn't eat at all yesterday. +few sips of gingerale. with cough, Has noted low bp with max 82/47 yesterday Heart rate has been 130's to 80"s . Told to come to ed if persisted, PE Eldel[place] male awake alert normocephalic atraumatic neck supple chest clear cv irregularly/irregular, chest clear anterior & posterior abd soft +bs no mass guarding, rebound, Neruo gcs 15 speech fluent power 5/5 all extr. MSK ave lower extr chronic venous stasis changes RT foot with plantar ucler sp recent debridement with active oozing. Sp amptuation of multiple toes.  Jamil Ridley MD, Facep    Seen W ACP

## 2021-05-26 NOTE — H&P ADULT - HISTORY OF PRESENT ILLNESS
Patient is a 72 y/o M w/ a PMHx of ischemic CM, HFrEF, V-tach s/p AICD/PPM, pA-Fib (not on AC), CKD, FERNANDES cirrhosis c/b HCC (s/p SIRT in 4/2020), and chronic thrombocytopenia possibly related to ITP (on Nplate) who presented to the ED with hypotension. Patient reported that he had been feeling well recently aside from some mild fatigue; however, he did not that his BP has been low lately. He took his BP this morning and it was in the 60s-70s and so he was instructed to go to the ED. Patient reports a low-grade temperature lately. He also endorses a R foot ulcer and some right ankle discomfort for which he has been following up with Podiatry. He denies chest pain, shortness of breath, nausea, vomiting, or abdominal pain. In the ED, patient was found to be hypotensive (SBP 50-90) and tachycardic (HR = 100s-110s). Labs were notable for a downtrending PLT count (8k this AM) and JULIANNE on CKD. R foot X-ray showed a chronic appearing medial angled deformity of the fifth metatarsal neck with erosive appearing changes along the lateral margin which could reflect acute or chronic osteomyelitis. Given patient's worsening thrombocytopenia, Hematology was consulted for further evaluation.      Hematologic/Oncologic History:  Patient has a history of thrombocytopenia dating back to 2003 in the setting of liver disease(when he had AST/ALT abnormalities, but no diagnosis of cirrhosis). This thrombocytopenia has persisted and has been worsening recently. Patient has a history of HCC s/p SIRT in 4/2020. Patient's cirrhosis is suspected to be a main contributor to his thrombocytopenia, likely exacerbated by his splenomegaly and his prior SIRT (evidenced by lower numbers a few months after treatment). However, there also appears to be an ITP component as patient had a BMBx in 8/2020 which showed normal megakaryocytes and he had a slight response to steroids previously. Patient was placed on Nplate in 9/2020 with good response; however, this response has been blunted recently. He last received Nplate on 5/19/21. Patient follows with Dr. Tina Daniel (for HCC) and Dr. Tish Stringer (for thrombocytopenia) at the [facility]. Patient is a 70 y/o M w/ a PMHx of ischemic CM, HFrEF, V-tach s/p AICD/PPM, pA-Fib (not on AC), CKD, FERNANDES cirrhosis c/b HCC (s/p SIRT in 4/2020), and chronic thrombocytopenia possibly related to ITP (on Nplate) who presented to the ED with hypotension. Patient reported that he had been feeling well recently aside from some mild fatigue; however, he did not that his BP has been low lately. He took his BP this morning and it was in the 60s-70s and so he was instructed to go to the ED. Patient reports a low-grade temperature lately. He also endorses a R foot ulcer and some right ankle discomfort for which he has been following up with Podiatry. He denies chest pain, shortness of breath, nausea, vomiting, or abdominal pain. In the ED, patient was found to be hypotensive (SBP 50-90) and tachycardic (HR = 100s-110s). Labs were notable for a downtrending PLT count (8k this AM) and JULIANNE on CKD. R foot X-ray showed a chronic appearing medial angled deformity of the fifth metatarsal neck with erosive appearing changes along the lateral margin which could reflect acute or chronic osteomyelitis. Given patient's worsening thrombocytopenia, Hematology was consulted for further evaluation.     Patient is a 70 y/o M w/ a PMHx of ischemic CM, HFrEF, V-tach s/p AICD/PPM, pA-Fib (not on AC), CKD, FERNANDES cirrhosis c/b HCC (s/p SIRT in 4/2020), and chronic thrombocytopenia possibly related to ITP (refractory to Nplate and transfusions) who presented to the ED with hypotension. Patient reported that he had been feeling well recently aside from some mild fatigue; however, he did not that his BP has been low lately. He took his BP this morning and it was in the 60s-70s and so he was instructed to go to the ED. Patient reports a low-grade temperature of 100.2 F at home. He also endorses a R foot ulcer and chronic right ankle discomfort for last 1.5 weeks. Patient reports he went to his podiatrist for multiple debridements. He denies chest pain, shortness of breath, nausea, vomiting, or abdominal pain.     In the ED, patient was found to be hypotensive (SBP 50-90) and tachycardic (HR = 100s-110s). Labs were notable for a downtrending PLT count (8k this AM) and JULIANNE on CKD. R foot X-ray showed a chronic appearing medial angled deformity of the fifth metatarsal neck with erosive appearing changes along the lateral margin which could reflect acute or chronic osteomyelitis.

## 2021-05-27 ENCOUNTER — APPOINTMENT (OUTPATIENT)
Dept: CARDIOLOGY | Facility: CLINIC | Age: 71
End: 2021-05-27

## 2021-05-27 LAB
CULTURE RESULTS: SIGNIFICANT CHANGE UP
SPECIMEN SOURCE: SIGNIFICANT CHANGE UP

## 2021-05-27 NOTE — CHART NOTE - NSCHARTNOTEFT_GEN_A_CORE
pt with foot wound. pt seen by podiatry with care plan in place. d/w team and agreeable to defer to podiatry.

## 2021-05-27 NOTE — CHART NOTE - NSCHARTNOTESELECT_GEN_ALL_CORE
AICD deactivation/Event Note
Event Note
Death Note/Event Note
POCUS/Event Note
wound team/Event Note

## 2021-05-28 LAB
-  AMIKACIN: SIGNIFICANT CHANGE UP
-  AMPICILLIN/SULBACTAM: SIGNIFICANT CHANGE UP
-  AZTREONAM: SIGNIFICANT CHANGE UP
-  CEFAZOLIN: SIGNIFICANT CHANGE UP
-  CEFEPIME: SIGNIFICANT CHANGE UP
-  CEFTAZIDIME: SIGNIFICANT CHANGE UP
-  CIPROFLOXACIN: SIGNIFICANT CHANGE UP
-  CLINDAMYCIN: SIGNIFICANT CHANGE UP
-  DAPTOMYCIN: SIGNIFICANT CHANGE UP
-  ERYTHROMYCIN: SIGNIFICANT CHANGE UP
-  GENTAMICIN: SIGNIFICANT CHANGE UP
-  GENTAMICIN: SIGNIFICANT CHANGE UP
-  IMIPENEM: SIGNIFICANT CHANGE UP
-  LEVOFLOXACIN: SIGNIFICANT CHANGE UP
-  LINEZOLID: SIGNIFICANT CHANGE UP
-  MEROPENEM: SIGNIFICANT CHANGE UP
-  OXACILLIN: SIGNIFICANT CHANGE UP
-  PENICILLIN: SIGNIFICANT CHANGE UP
-  PIPERACILLIN/TAZOBACTAM: SIGNIFICANT CHANGE UP
-  RIFAMPIN: SIGNIFICANT CHANGE UP
-  TETRACYCLINE: SIGNIFICANT CHANGE UP
-  TOBRAMYCIN: SIGNIFICANT CHANGE UP
-  TRIMETHOPRIM/SULFAMETHOXAZOLE: SIGNIFICANT CHANGE UP
-  VANCOMYCIN: SIGNIFICANT CHANGE UP
METHOD TYPE: SIGNIFICANT CHANGE UP
METHOD TYPE: SIGNIFICANT CHANGE UP

## 2021-05-31 NOTE — PRE-OP CHECKLIST - ORDERS/MEDICATION ADMINISTRATION RECORD ON CHART
----- Message from Katerin Pena sent at 8/13/2019  9:52 AM CDT -----  General phone call:    Caller: Pt  Primary cardiologist: Jhonathan  Detailed reason for call: Pt was in to see Arcelia today- states his AVS says to call Marva Bethea to sched a procedure- not sure what this is  New or active symptoms?   Best phone number: [phone]  Best time to contact:   Ok to leave a detailed message?   Device? yes    Additional Info:      done

## 2021-07-09 ENCOUNTER — APPOINTMENT (OUTPATIENT)
Dept: HEPATOLOGY | Facility: CLINIC | Age: 71
End: 2021-07-09

## 2021-07-31 NOTE — PROGRESS NOTE ADULT - REASON FOR ADMISSION
[employer] disconnected aseptically, cap applied. Effluent was clear and yellow. Total UF of 33ml. Endorsed primary RN.   
osteo

## 2021-08-19 ENCOUNTER — APPOINTMENT (OUTPATIENT)
Dept: NEPHROLOGY | Facility: CLINIC | Age: 71
End: 2021-08-19

## 2021-08-24 NOTE — PROGRESS NOTE ADULT - ASSESSMENT
[facility] Dermatology Visit    Thank you for allowing us to participate in your care. Your findings, instructions and follow-up plan are as follows:         When should I call my doctor?    If you are worsening or not improving, please, contact us or seek urgent care as noted below.     Who should I call with questions (adults)?    [facility] (adult and pediatric): [phone]     [facility] (adult): [phone]    For urgent needs outside of business hours call the [facility] at [phone] and ask for the dermatology resident on call    If this is a medical emergency and you are unable to reach an ER, Call 911      Who should I call with questions (pediatric)?  [facility]- Pediatric Dermatology  Dr. Ashley Blanco, Dr. Anatoly Freire, Dr. Renuka Beckett, Jesi Gandara, PA  Dr. Gretchen Moreira, Dr. Candie Small & Dr. Yahir Delcid  Non Urgent  Nurse Triage Line; [phone]- Alpa and Sully RN Care Coordinators   Jenise (/Complex ) [phone]    If you need a prescription refill, please contact your pharmacy. Refills are approved or denied by our Physicians during normal business hours, Monday through Fridays  Per office policy, refills will not be granted if you have not been seen within the past year (or sooner depending on your child's condition)    Scheduling Information:  Pediatric Appointment Scheduling and Call Center [phone]  Radiology Scheduling- [phone]  Sedation Unit Scheduling- [phone]  [place] Scheduling- General [phone]; Pediatric Dermatology [phone]  Main  Services: [phone]  Latvian: [phone]  Barbadian: [phone]  Hmong/Estonian/Syriac: [phone]  Preadmission Nursing Department Fax Number: [phone] (Fax all pre-operative paperwork to this number)    For urgent matters arising during evenings,  weekends, or holidays that cannot wait for normal business hours please call [phone] and ask for the Dermatology Resident On-Call to be paged.         69M with PMHx of ischemic cardiomyopathy s/p ICD/PPM in 2006, generator change 2 weeks prior,, and CABG in 1986, T2DM on lantus, hypothyroidism, Afib (spontaneously cardioverted and not on AC), HTN, HLD, BEHZAD (not on CPAP), FERNANDES and cirrhosis with newly diagnosed HCC s/p IR embolization x 2 (last on 4/18), presents to the ED with shoulder pain and fevers/weakness, admitted for SIRS with course complicated by JULIANNE on CKD and shoulder pain. 69M with PMHx of ischemic cardiomyopathy s/p ICD/PPM in 2006, generator change 2 weeks prior,, and CABG in 1986, T2DM on lantus, hypothyroidism, Afib (spontaneously cardioverted and not on AC), HTN, HLD, BEHZAD (not on CPAP), FERNANDES and cirrhosis with newly diagnosed HCC s/p IR embolization x 2 (last on 4/18), presents with acute on chronic right shoulder pain for 5 days found to have Strep agalactiae bacteremia and septic shock.

## 2021-09-08 ENCOUNTER — APPOINTMENT (OUTPATIENT)
Dept: HEMATOLOGY ONCOLOGY | Facility: CLINIC | Age: 71
End: 2021-09-08

## 2021-09-23 NOTE — ED PROVIDER NOTE - OBJECTIVE STATEMENT
The patient is a 69 y/o male with hx of liver ca ( not on active chemotherapy), thrombocytopenia, poorly controlled IDDM, multiple amputations of toes, ICD placement, and HLD presenting for 2nd toe on left foot ulceration and infection x few days. Symptoms started suddenly at rest, have been constant and worsening, with no worsening or alleviating factors. No medications taken. Patient denies chest pain, sob, fever, chills, headache, nausea, emesis, diarrhea, abdominal pain, hematuria/dysuria or lower extremity swelling. Patient was seen by his podiatrist who recommended visiting the ED at this time for abx and admission. none

## 2021-10-25 NOTE — DIETITIAN INITIAL EVALUATION ADULT. - NUTRITION DIAGNOSITC TERMINOLOGY #2
Chief Complaint   Patient presents with   • Urinary Frequency     Started on Friday; patient statews she has had a slight 99.0 temp    • Nausea       History of Present Illness   Brandy El is a 61 y.o. female presents for acute care. She notes increased urinary urgency and frequency w/ left low back pain. Low grade fever yesterday. Taking AZO w/ good symptom benefit. She notes that she recently had intercourse prior to having symptoms.   Patient has hypertension. She is taking valsartan. She takes bp readings at home and is getting 110s-120s/ 60s-70s predominantly at home.       The following portions of the patient's history were reviewed and updated as appropriate: allergies, current medications, past family history, past medical history, past social history, past surgical history and problem list.  Current Outpatient Medications on File Prior to Visit   Medication Sig Dispense Refill   • Clocortolone Pivalate 0.1 % cream Apply 1 application topically to the appropriate area as directed Daily. 45 g 1   • clonazePAM (KlonoPIN) 0.5 MG tablet Take 1 tablet by mouth 2 (Two) Times a Day As Needed for Anxiety. 60 tablet 1   • Crestor 10 MG tablet Take 1 tablet by mouth Daily. 90 tablet 3   • desvenlafaxine (Pristiq) 100 MG 24 hr tablet Take 1 tablet by mouth Daily. 30 tablet 5   • triamcinolone (KENALOG) 0.025 % ointment Apply  topically to the appropriate area as directed 2 (Two) Times a Day. 30 g 1   • valsartan (Diovan) 160 MG tablet Take 1 tablet by mouth Daily. 90 tablet 3     No current facility-administered medications on file prior to visit.     Review of Systems   Constitutional: Negative.    HENT: Negative.    Eyes: Negative.    Respiratory: Negative.    Cardiovascular: Negative.    Gastrointestinal: Negative.    Endocrine: Negative.    Genitourinary: Positive for flank pain, frequency and urgency.   Skin: Negative.    Allergic/Immunologic: Negative.    Neurological: Negative.    Hematological: Negative.   "  Psychiatric/Behavioral: Negative.        Objective   Physical Exam  Vitals and nursing note reviewed.   Constitutional:       Appearance: Normal appearance.   HENT:      Head: Normocephalic and atraumatic.      Right Ear: Tympanic membrane normal.      Left Ear: Tympanic membrane normal.      Nose: Nose normal.      Mouth/Throat:      Mouth: Mucous membranes are moist.   Eyes:      Extraocular Movements: Extraocular movements intact.      Pupils: Pupils are equal, round, and reactive to light.   Cardiovascular:      Rate and Rhythm: Normal rate and regular rhythm.      Pulses: Normal pulses.      Heart sounds: Normal heart sounds.   Pulmonary:      Effort: Pulmonary effort is normal.      Breath sounds: Normal breath sounds.   Abdominal:      General: Abdomen is flat.      Palpations: Abdomen is soft.   Musculoskeletal:         General: Normal range of motion.      Cervical back: Normal range of motion.   Skin:     General: Skin is warm and dry.   Neurological:      General: No focal deficit present.      Mental Status: She is alert and oriented to person, place, and time.   Psychiatric:         Mood and Affect: Mood normal.         Behavior: Behavior normal.         Thought Content: Thought content normal.         Judgment: Judgment normal.          /62   Pulse 90   Temp 97.3 °F (36.3 °C) (Temporal)   Ht 160 cm (62.99\")   Wt 76.7 kg (169 lb)   SpO2 97%   BMI 29.94 kg/m²     Assessment/Plan   Diagnoses and all orders for this visit:    Urinary tract infection with hematuria, site unspecified  -     POCT urinalysis dipstick, automated  -     Urinalysis With Culture If Indicated -    Hematuria, unspecified type  -     Urinalysis With Culture If Indicated -    Other orders  -     nitrofurantoin, macrocrystal-monohydrate, (Macrobid) 100 MG capsule; Take 1 capsule by mouth 2 (Two) Times a Day.      Patient w/ acute UTI. She will start macrobid (per sensitivities from last uti 2019). She is encouraged to " hydrate well. Cranberry tabs. Azo. She is to treat vaginal atrophy w/ vaginal lubricants. She will f/u if worsens or fails to improve. Given hematuria she will repeat u/a after completion of antibiotics and f/u prn. Continue current management of hypertension. Low sodium diet emphasized. Reviewed recent bp readings as above.               Altered Nutrition Related Lab Values

## 2022-01-23 NOTE — PHYSICAL THERAPY INITIAL EVALUATION ADULT - RANGE OF MOTION EXAMINATION, REHAB EVAL
History and Physical    Chief Complaint   Patient presents with   • Abdominal Pain     History Of Present Illness  Julian is a 43 year old male with a past medical history of gastric sleeve surgery.  He presents to the emergency room due to abdominal pain. The patient states he ate some meatballs and had a bourbon and shortly after that he developed epigastric pain. He vomited and the pain became worse so he came to the emergency room for further evaluation. Upon arrival to the emergency room his vital signs were within normal limits.  His labs were significant for WBC 11.9.  Ultrasound of his gallbladder showed gallstones with distended gallbladder and mild diffuse gallbladder wall thickening suspicious for acute cholecystitis per the ER physician, the official report is still pending at this time.  General surgery was consulted, he was given IV antibiotics, pain medication and IV fluids.  He was admitted for further treatment.  He denies any chest pain, shortness of breath, lightheadedness, dizziness, diarrhea, fever or chills.      Past Medical History  Past Medical History:   Diagnosis Date   • Sciatica     Chronic, sees chiropractor, controlled when he works out regularly; not taking medications        Surgical History  Past Surgical History:   Procedure Laterality Date   • Sleeve gastroplasty     • Vasectomy          Social History  Social History     Tobacco Use   • Smoking status: Never Smoker   • Smokeless tobacco: Never Used   Vaping Use   • Vaping Use: never used   Substance Use Topics   • Alcohol use: Yes   • Drug use: Never       Family History  Family History   Problem Relation Age of Onset   • Hypertension Father    • Myocardial Infarction Maternal Grandfather         Allergies  ALLERGIES:  Patient has no known allergies.    Medications  No medications prior to admission.       Review of Systems  Review of Systems   Constitutional: Negative.    HENT: Negative.    Eyes: Negative.    Respiratory:  Negative.  Negative for cough, chest tightness, shortness of breath and wheezing.    Cardiovascular: Negative.    Gastrointestinal: Positive for abdominal pain. Negative for abdominal distention, blood in stool, constipation, diarrhea, nausea and vomiting.   Endocrine: Negative.    Genitourinary: Negative.    Musculoskeletal: Negative.    Skin: Negative.    Neurological: Negative.    Psychiatric/Behavioral: Negative for confusion. The patient is not nervous/anxious.            Physical Exam  Constitutional:       Appearance: He is well-developed.   HENT:      Head: Normocephalic and atraumatic.   Eyes:      Conjunctiva/sclera: Conjunctivae normal.      Pupils: Pupils are equal, round, and reactive to light.   Cardiovascular:      Rate and Rhythm: Normal rate and regular rhythm.      Heart sounds: Normal heart sounds.   Pulmonary:      Effort: Pulmonary effort is normal. No accessory muscle usage or respiratory distress.      Breath sounds: Normal breath sounds.   Abdominal:      General: Bowel sounds are normal. There is no distension.      Palpations: Abdomen is soft.      Tenderness: There is abdominal tenderness. There is no guarding or rebound.   Musculoskeletal:         General: Normal range of motion.      Cervical back: Normal range of motion and neck supple.   Skin:     General: Skin is warm and dry.      Findings: No bruising or erythema.      Nails: There is no clubbing.   Neurological:      Mental Status: He is alert and oriented to person, place, and time.   Psychiatric:         Speech: Speech normal.         Behavior: Behavior normal. Behavior is cooperative.         Thought Content: Thought content normal.          Imaging  No images are attached to the encounter.       Assessment & Plan      Abdominal pain secondary to acute cholecystitis  General surgery consulted - Dr. Amaral  IV Rocephin and Flagyl  Pain medication as needed  Antiemetics as needed  N.p.o.  IV fluids    Leukocytosis likely  secondary to above  Monitor labs  Blood culture pending    History of gastric sleeve surgery        Smoking Cessation  Counseling given: Not Answered      DVT Prophylaxis  SCDs    Code Status    Code Status: Full Resuscitation per APC discussion    Primary Care Physician  MD Faith Gallardo, CNP    Discussed with Dr. Todd     no ROM deficits were identified

## 2022-01-27 NOTE — ED ADULT NURSE NOTE - NS ED NURSE LEVEL OF CONSCIOUSNESS MENTAL STATUS
How Severe Are Your Bumps?: mild Have Your Bumps Been Treated?: been treated Alert/Cooperative/Awake Is This A New Presentation, Or A Follow-Up?: Bumps Additional History: Patient also has been applying Neutrogena rapid Acne cream\\nPatient states flare today

## 2022-02-07 NOTE — PATIENT PROFILE ADULT. - NS TRANSFER DISPOSITION PATIENT BELONGINGS
My signature below certifies that the above stated patient is homebound and upon completion of the Face-To-Face encounter, has the need for intermittent skilled nursing, physical therapy and/or speech or occupational therapy services in their home for their current diagnosis as outlined in their initial plan of care. These services will continue to be monitored by myself or another physician. with patient

## 2022-05-13 NOTE — PATIENT PROFILE ADULT - NSASFALLATTEMPTOOB_GEN_A_NUR
----- Message from Jaz Sharma sent at 5/13/2022  8:24 AM CDT -----  Contact: Patient  Type: Patient Call Back         Who called: Patient         What is the request in detail: I have Maggi Fontaine calling regarding her medication; she wanted to speak with nurse to know if she needs tot at this morning since she missed it yesterday; please advise         Best call back number: [phone]         Additional Information: would not answer my questions; would not tell me Rx's           Thank You       no

## 2022-07-12 NOTE — PATIENT PROFILE ADULT - SURGICAL SITE INCISION
----- Message from Kait Vázquez NP sent at 7/11/2022  7:22 PM CDT -----  TSH normal 1.313. BMP normal.  Repeat fasting labs in one year: lipid panel, BMP and TSH with reflex.    yes

## 2022-07-25 NOTE — ED ADULT TRIAGE NOTE - NS ED NOTE AC HIGH RISK COUNTRIES
Please call the [address] [place] [address] at [phone], select OPTION #2,  if any of your medications change. This means: if a med is discontinued, a new med is started, or a dose is changed. These meds may interact with your warfarin and we may need to adjust your dose. This is especially important if you start any type of ANTIBIOTIC. Also, please call if you have any admissions to the hospital, visits to an emergency room, procedures planned, or if any other medical provider has instructed you to hold your warfarin.
No

## 2022-07-28 ENCOUNTER — NON-APPOINTMENT (OUTPATIENT)
Age: 72
End: 2022-07-28

## 2022-10-03 NOTE — H&P PST ADULT - LAST CARDIAC ANGIOGRAM/IMAGING
Pt's wife said colonoscopy has been done at [facility] and not sure how long ago. I faxed records request to get records.     Colonoscopy records in chart    2006 2006with stent implant

## 2022-11-25 NOTE — PACU DISCHARGE NOTE - NAUSEA/VOMITING:
From: Maddi Dela Cruz  To: Dr. Nick Lazaro: 11/24/2022 9:20 PM EST  Subject: Chad Jimbo Clifford,  In the past few weeks Ive noticed that my skin is itching and my hair is thinning. Onel read that plaquenil can affect the thyroid function so can you please order labs to check my thyroid? I can come in to see you after if needed.     Thanks,  Harman Cox
None

## 2023-02-17 NOTE — ED ADULT NURSE NOTE - NS ED NURSE LEVEL OF CONSCIOUSNESS ORIENTATION
Oriented - self; Oriented - place; Oriented - time Staging Info: By selecting yes to the question above you will include information on AJCC 8 tumor staging in your Mohs note. Information on tumor staging will be automatically added for SCCs on the head and neck. AJCC 8 includes tumor size, tumor depth, perineural involvement and bone invasion.

## 2023-03-24 NOTE — ED PROVIDER NOTE - NS ED MD DISPO ADMITTING SERVICE
Patient's mother called and stated that the patient needs to see a psychiatrist and that she would like a recommendation on who to see  She would like the doctor to call her back  Phone number: [phone] 
MED

## 2023-04-23 NOTE — H&P PST ADULT - EJECTION FRACTION % NO
Airway    Performed by: Alejandro Kowalski MD  Authorized by: Alejandro Kowalski MD    Final Airway Type:  Supraglottic airway  Final Supraglottic Airway:  Air-Q  SGA Size*:  4.5  Attempts*:  1   Patient Identified, Procedure confirmed, Emergency equipment available and Safety protocols followed  Location:  OR  Urgency:  Elective  Difficult Airway: No    Indications for Airway Management:  Anesthesia  Sedation Level:  Deep  Mask Difficulty Assessment:  0 - not attempted      
43

## 2023-06-23 NOTE — ED ADULT TRIAGE NOTE - RESPIRATORY RATE (BREATHS/MIN)
Physical Therapy Visit    Visit Type: Daily Treatment Note  Visit: 5  Referring Provider: Brian Ascencio MD  Medical Diagnosis (from order): Diagnosis Information    Diagnosis  729.1 (ICD-9-CM) - M79.18 (ICD-10-CM) - Myofascial pain  722.4 (ICD-9-CM) - M50.30 (ICD-10-CM) - DDD (degenerative disc disease), cervical  721.0 (ICD-9-CM) - M47.812 (ICD-10-CM) - Arthropathy of cervical facet joint  729.89 (ICD-9-CM) - R29.898 (ICD-10-CM) - Weakness of left arm  723.1, 338.29 (ICD-9-CM) - M54.2, G89.29 (ICD-10-CM) - Chronic neck pain  723.4 (ICD-9-CM) - M54.12 (ICD-10-CM) - Cervical radiculopathy  781.0 (ICD-9-CM) - R25.1 (ICD-10-CM) - Tremor       Patient alert and oriented X3.    SUBJECTIVE                                                                                                               Reports he feels pretty good today. No pain, just continues to have minimal strength in his left arm and shoulder affecting motion.     Pain / Symptoms  - Pain rating (out of 10): Current: 0       OBJECTIVE                                                                                                                                       Treatment     Therapeutic Exercise  Shoulder warm up with dowel - flexion, abduction  Shoulder press - body weight x10  Bent over rows 2# dumb bell 3x10  Yellow Physioball T's   Yellow Physioball Y's  Wall slides - touchdown  Push up on barre  10# shrugs    Yellow T-bar Pronation  Yellow T-bar Supination  Chair push ups  Shoulder External Rotation Step outs with Yellow T-band  - 10 reps   Shoulder Internal Rotation Step outs with Red T-band  - 10 reps    Red T-band Rows  Red T-band shoulder extensions       All exercises performed to muscle fatigue with verbal and tactile cueing to facilitate cervical retraction and scapular retraction    Skilled input: verbal instruction/cues    Writer verbally educated and received verbal consent for hand placement, positioning of patient, and techniques  to be performed today from patient for clothing adjustments for techniques, therapist position for techniques and hand placement and palpation for techniques as described above and how they are pertinent to the patient's plan of care.  Home Exercise Program  Access Code: [email or web address]  URL: [email or web address]  Date: 06/05/2023  Prepared by: Greg Castro    Exercises  - Isometric Shoulder External Rotation at Wall  - 3 x daily - 7 x weekly - 10 reps - 2-5 sec hold  - Standing Isometric Shoulder Internal Rotation at Doorway  - 3 x daily - 7 x weekly - 10 reps - 2-5 sec hold  - Isometric Wrist Extension Pronated  - 3 x daily - 7 x weekly - 10 reps - 2-5 sec hold  - Seated Isometric Elbow Flexion  - 3 x daily - 7 x weekly - 10 reps - 2-5 sec hold      ASSESSMENT                                                                                                            Continues to demonstrate deficits on left vs right with increased muscle fatigue and difficulty. Improved motion and tolerance when focus on posture and scapular retraction/stability.   Pain/symptoms after session (out of 10): 0  Education:   - Results of above outlined education: Verbalizes understanding and Demonstrates understanding    PLAN                                                                                                                           Suggestions for next session as indicated: Progress per plan of care, continue progressing strength and postural awareness       Therapy procedure time and total treatment time can be found documented on the Time Entry flowsheet     16

## 2023-08-15 NOTE — PATIENT PROFILE ADULT - NSPROMEDSBROUGHTTOHOSP_GEN_A_NUR
[Dear  ___] : Dear  [unfilled], [Consult Letter:] : I had the pleasure of evaluating your patient, [unfilled]. [( Thank you for referring [unfilled] for consultation for _____ )] : Thank you for referring [unfilled] for consultation for [unfilled] [Please see my note below.] : Please see my note below. [Consult Closing:] : Thank you very much for allowing me to participate in the care of this patient.  If you have any questions, please do not hesitate to contact me. [Sincerely,] : Sincerely, [FreeTextEntry2] : Dr. Nik Hernandez (Ref)  [FreeTextEntry3] : Quan Olvera MD, FACS \par  Chief, Division of Thoracic Surgery \par  Director, Minimally Invasive Thoracic Surgery \par  Department of Cardiovascular and Thoracic Surgery \par  [facility] \par  , Cardiovascular and Thoracic Surgery\par   no

## 2023-10-24 NOTE — PROGRESS NOTE ADULT - PROBLEM SELECTOR PLAN 2
Rate controlled. On Coreg 25mg BID now.   - cleared by surgery for resuming systemic AC (eliquis); to be resumed by tonight. Pt is not in agreement with resuming eliquis as he won't have cardioversion any time soon. Explained to patient re: systemic AC for afib but he is not in agreement.  Had Coreg dose lowered due to chronically low BP as outpt. (SBP 80-90s)  Previously to hospitalization planned for cardioversion this week.   Seen and followed by card team, cardioversion deferred to later time. Need for prophylactic measure

## 2023-11-09 NOTE — PROGRESS NOTE ADULT - PROBLEM SELECTOR PLAN 8
General: No deformity. Normal range of motion. Cervical back: Normal range of motion and neck supple. No tenderness. Right lower leg: No edema. Left lower leg: No edema. Lymphadenopathy:      Cervical: No cervical adenopathy. Skin:     General: Skin is warm and dry. Findings: No rash. Neurological:      General: No focal deficit present. Mental Status: She is alert and oriented to person, place, and time. Gait: Gait normal.   Psychiatric:         Mood and Affect: Affect normal. Mood is depressed. Speech: Speech normal.         Behavior: Behavior normal.         Thought Content: Thought content normal. Thought content does not include homicidal or suicidal ideation.          Labs:  CBC with Differential:    Lab Results   Component Value Date/Time    WBC 11.5 05/21/2023 12:16 AM    RBC 4.03 05/21/2023 12:16 AM    HGB 11.9 05/21/2023 12:16 AM    HCT 36.2 05/21/2023 12:16 AM     05/21/2023 12:16 AM    MCV 89.8 05/21/2023 12:16 AM    MCH 29.5 05/21/2023 12:16 AM    MCHC 32.9 05/21/2023 12:16 AM    RDW 13.2 05/21/2023 12:16 AM    LYMPHOPCT 17.0 05/21/2023 12:16 AM    MONOPCT 5.0 05/21/2023 12:16 AM    BASOPCT 0.3 05/21/2023 12:16 AM    MONOSABS 0.58 05/21/2023 12:16 AM    LYMPHSABS 1.96 05/21/2023 12:16 AM    EOSABS 0.49 05/21/2023 12:16 AM    BASOSABS 0.04 05/21/2023 12:16 AM     CMP:    Lab Results   Component Value Date/Time     05/21/2023 12:16 AM    K 3.5 05/21/2023 12:16 AM    K 4.2 02/20/2022 03:23 PM     05/21/2023 12:16 AM    CO2 24 05/21/2023 12:16 AM    BUN 16 05/21/2023 12:16 AM    CREATININE 0.8 05/21/2023 12:16 AM    GFRAA >60 02/20/2022 03:23 PM    LABGLOM >60 05/21/2023 12:16 AM    GLUCOSE 119 05/21/2023 12:16 AM    PROT 6.9 05/21/2023 12:16 AM    LABALBU 4.2 05/21/2023 12:16 AM    CALCIUM 9.4 05/21/2023 12:16 AM    BILITOT <0.2 05/21/2023 12:16 AM    ALKPHOS 96 05/21/2023 12:16 AM    AST 17 05/21/2023 12:16 AM    ALT 19 05/21/2023 12:16 AM
c/w statin and niaspan

## 2023-12-09 NOTE — H&P PST ADULT - NS MD HP SKIN WOUND STATUS
INSURANCE COVERAGE REGARDING PAYMENT  FOR YOUR COLONOSCOPY        Colon Cancer is the second leading cause of death among cancers, per the American Cancer Society. It is preventable. Early detection is the key. Your doctor will determine which tests need to be done for prevention and/or treatment. However, colonoscopies can be performed for several reasons:     Screening To screen for any problems within the colon. In this case, the patient is not symptomatic and does not have a personal history of previous colon cancer/condition or abnormal findings. Billed as screening.   Surveillance To monitor for a previously diagnosed colon condition (such as polyps) or when performed at more frequent intervals than every ten years because the patient has a personal/family history of colonic polyps or colon cancer. Billed as diagnostic.   Diagnostic Patient with symptoms, used to evaluate the colon. Billed as diagnostic.       If during a screening colonoscopy, our physician finds an abnormality, performs a biopsy or polypectomy (removal of polyp), your insurance company may consider the procedure to be a diagnostic exam and no longer a screening procedure.     Every insurance company is different. We encourage you to call your insurance company regarding plan benefits. Generally, screening benefits and diagnostic benefits may be paid at different levels. Charges associated with anesthesia or type of facility may also be processed differently. This varies with each insurance company, so we want you to be aware of this prior to your procedure. You do not have to call your insurance company if you have Medicare. For an estimate of potential charges, you may call the [facility] Patient Contact Center at [phone], option 5.     The authorization staff at [facility] will contact your insurance company to check precertification requirements for your colonoscopy. However, precertification, which serves as notification  is never a guarantee of payment. If you have questions regarding precertification for your procedure, please contact your insurance company.   clean/dry

## 2023-12-15 NOTE — DISCHARGE NOTE ADULT - HAS THE PATIENT RECEIVED THE INFLUENZA VACCINE DURING THIS VISIT
Department of Anesthesiology  Postprocedure Note    Patient: Pedro Chavez  MRN: [id number]  YOB: 1981  Date of evaluation: 12/15/2023    Procedure Summary       Date: 12/15/23 Room / Location: [address] / [address] [address]    Anesthesia Start: 1301 Anesthesia Stop: 1400    Procedure: LAPAROSCOPIC REDUCTION AND CLOSURE INTERNAL HERNIA (Abdomen) Diagnosis:       Internal hernia      (Internal hernia [K45.8])    Surgeons: Madison Murillo MD Responsible Provider: Susannah Maurice MD    Anesthesia Type: general ASA Status: 3 - Emergent            Anesthesia Type: No value filed. Jessica Phase I: Jessica Score: 9    Jessica Phase II:      Anesthesia Post Evaluation    Patient location during evaluation: PACU  Patient participation: complete - patient participated  Level of consciousness: awake  Pain score: 4  Airway patency: patent  Nausea & Vomiting: no nausea and no vomiting  Cardiovascular status: hemodynamically stable  Respiratory status: acceptable  Hydration status: euvolemic  Pain management: adequate    No notable events documented.
No

## 2023-12-31 NOTE — PROGRESS NOTE ADULT - ASSESSMENT
Plan:   Pt is scheduled for RIGHT foot P3RR with Dr. Tristan at NOON . Patient is aware of procedure and is NPO since midnight.  CXR on sunrise.  EKG on sunrise.  Medical/Cardiac clearance since 9/7 and documented in chart.  Consent signed and in chart.  Procedure was explained to patient in detail. All alternatives, risks and complications were discussed. All questions answered. Pt c/o right arm pain and right second and third finger numbness x 2 weeks. Pt endorses injury to right arm in November.

## 2024-07-11 NOTE — ED PROVIDER NOTE - EKG ADDITIONAL QUESTION - PERFORMED INDEPENDENT VISUALIZATION
[facility] is requesting a medical clearance  for Pt to have an EGD/Colonoscopy date TBD.       Please fax to [phone]   Yes

## 2024-07-30 NOTE — ED PROVIDER NOTE - ALCOHOL USE HISTORY SINGLE SELECT
Patient presented to office for Resting Metabolic Testing.     Body Composition  InBody 570    Intracellular water = 45.0 lbs  Extracellular Water = 29.5 lbs  Dry Lean Mass = 27.1 lbs  Body Fat Mass = 74.5 lbs    Total Body Water = 74.5 lbs  Lean Body Mass = 101.6 lbs  Weight = 176.1 lbs    Skeletal Muscle Mass 54.5 lbs    Percent Body Fat =  42.3 %  Extracellular water/Total Body Water = 0.396  Visceral Fat Level = 18     Resting Metabolic Testing Results as follows:    Results for orders placed or performed in visit on 24   EXHALED AIR ANALYSIS    Narrative    Resting Metabolic Rate    Dx:  G47.30-G47.37 Sleep Apnea, Hypoventilation (Medicare), E10.65 Diabetes Mellitus (Private insurance), E88.81 Metabolic Syndrome (Private insurance), E66-E66.9 Obesity (Private Insurance), and I10-I11 Hypertension (Private Insurance)  Ordering Provider: Margarette Meraz PA-C    Patient fasting for 10 hours and has not exercised prior to procedure. Patient is reclined with eye mask and ear plugs for relaxation and breathing into mouthpiece using nose clip. Exhaled air was analyzed.     Results:    Measured REE (Resting Energy Expenditure): 1310 Cals  Lifestyle + 393 Cals  Exercise + 136 Cals    Medically supervised zone: 0 to 1048 calories/day  Weight Loss Zone: 1048 to 1310 calories/day  Maintenance Zone: 1310 to 1703 calories/day    Age: 62 year old   Gender: female   Height: Ht Readings from Last 1 Encounters:  24 : 5' 6\" (1.676 m)     Weight: Wt Readings from Last 1 Encounters:  24 : 79.9 kg (176 lb 1.6 oz)     BMI: Body mass index is 28.42 kg/m².     VO2: 190 ml/min (2.37 ml/kg/min)  FeO2: 15.26 %  oxygen  Minute Volume: 4.34 Liters/min  Tidal Volume: 435 ml  Respiratory Rate: 10.1 breaths/min  Test duration: 10.1 minutes          Order routed to ordering provider for review.    never

## 2024-09-26 NOTE — PACU DISCHARGE NOTE - PAIN:
Regarding: Wi-pollo 71- dizziness  ----- Message from Cintia FREDERICK sent at 9/26/2024  7:54 AM CDT -----  Patient Name: Paris Huang    Specialist or PCP Name: Kolton Warren    Symptoms: feels extremely dizzy. also wants to mention she has meneires disease and having earaches    Pregnant (females aged 13-60. If Yes, how long?) : na    Call Back # : [phone]    Which State are you currently located in?: [place]    Name of Clinic Site / Acct# : [address]     Call arrived during: After Hours     Controlled with current regime

## 2025-01-08 NOTE — ED ADULT TRIAGE NOTE - HEART RATE (BEATS/MIN)
Use knee immobilizer and crutches with ambulation.  You should not bear weight on left knee until you are followed up with orthopedics next week.  You can take Motrin 600 mg every 6-8 hours as needed for pain control.  You could also apply ice to the knee and elevate it on a few pillows to help with swelling and pain.  Call the orthopedic numbers provided for a follow-up appointment next week.  If symptoms worsen, if pain or swelling worsen, if you begin experience any numbness or tingling of the left leg or any other new or concerning symptom please return to the ER or seek immediate medical attention.  
85

## 2025-02-04 NOTE — ASSESSMENT
Receive call from [facility] patient accepted to room 4136 number for report [phone]. Call out to [facility] , transfer to [facility], states they will not come until after 7am when shift changes.   [FreeTextEntry1] : JULIANNE/ Stage 3 CKD : Pt. noted to have prior episodes of JULIANNE that had resolved.\par -Pt. likely with hemodynamically mediated JULIANNE in the setting of lasix, ramipril, and aldactone use, with use of IV contrast at multiple occasions, and hypotension in the past.\par -Last Scr improved, was checked ~a week after IV contrast use.\par -UA has been bland and spot urine TP/CR WNL, and abdominal US done on 9/24/18 showed normal sized kidneys without any hydronephrosis or evidence of CKD. \par - Check renal panel, and spot urine TP/CR.\par -Pt. has multiple risk factors to develop contrast induced nephropathy after use of IV contrast, including elevated Scr, anemia, CHF and diabetes. Pt. aware of the risk for worsening of renal function with use of IV contrast to the patient. \par -If another contrast study is absolutely required, pt. advised to hold ramipril, lasix and aldactone a day prior and on the day of the contrast study. Can consider hydration with IV NS @1ml/kg/hr for 6 hours pre, during and post contrast study (depending on pt's volume status at the time of procedure). \par -Avoid nephrotoxins, NSAIDs and IV contrast (if possible). \par \par Edema: in the setting of JULIANNE and HCC/cirrhosis. Resolved per patient. Monitor weight. COntinue aldactone and lasix.\par \par Labs next week.\par Follow up in 6 months.

## 2025-02-21 NOTE — ED CLERICAL - NS ED CLERK UNITS
APER Jaimeo Stanislavs is calling, [employer] was not registered. Please register.Lashawn FONSECA [facility]

## 2025-05-28 NOTE — ED PROVIDER NOTE - RESPIRATORY, MLM
Visit Information    Have you changed or started any medications since your last visit including any over-the-counter medicines, vitamins, or herbal medicines? no   Have you stopped taking any of your medications? Is so, why? -  no  Are you having any side effects from any of your medications? - no    Have you seen any other physician or provider since your last visit?  yes  Have you had any other diagnostic tests since your last visit?  no   Have you been seen in the emergency room and/or had an admission in a hospital since we last saw you?  no   Have you had your routine dental cleaning in the past 6 months?  no     Do you have an active MyChart account? If no, what is the barrier?  Yes    Patient Care Team:  Alisha Hong MD as PCP - General (Family Medicine)  Kirk Hyman IV, DO as Consulting Physician (General Surgery)    Medical History Review  Past Medical, Family, and Social History reviewed and does not contribute to the patient presenting condition    Health Maintenance   Topic Date Due    Varicella vaccine (1 of 2 - 13+ 2-dose series) Never done    HIV screen  Never done    Hepatitis C screen  Never done    Hepatitis B vaccine (1 of 3 - 19+ 3-dose series) Never done    COVID-19 Vaccine (4 - 2024-25 season) 09/01/2024    Flu vaccine (Season Ended) 08/01/2025    Depression Monitoring  03/31/2026    DTaP/Tdap/Td vaccine (2 - Td or Tdap) 10/11/2033    Hepatitis A vaccine  Aged Out    Hib vaccine  Aged Out    HPV vaccine  Aged Out    Polio vaccine  Aged Out    Meningococcal (ACWY) vaccine  Aged Out    Meningococcal B vaccine  Aged Out    Pneumococcal 0-49 years Vaccine  Aged Out    Depression Screen  Discontinued                Breath sounds clear and equal bilaterally.
